# Patient Record
Sex: MALE | Race: WHITE | NOT HISPANIC OR LATINO | Employment: OTHER | URBAN - METROPOLITAN AREA
[De-identification: names, ages, dates, MRNs, and addresses within clinical notes are randomized per-mention and may not be internally consistent; named-entity substitution may affect disease eponyms.]

---

## 2017-01-11 DIAGNOSIS — G89.29 OTHER CHRONIC PAIN: ICD-10-CM

## 2017-01-11 RX ORDER — MORPHINE SULFATE 100 MG/1
100 TABLET, FILM COATED, EXTENDED RELEASE ORAL EVERY 12 HOURS
Qty: 60 TABLET | Refills: 0 | Status: SHIPPED | OUTPATIENT
Start: 2017-01-11 | End: 2017-02-09 | Stop reason: SDUPTHER

## 2017-01-11 NOTE — TELEPHONE ENCOUNTER
----- Message from Meliza Bernstein sent at 1/11/2017  4:09 PM CST -----  Contact: Aysha Pete  Wife called regarding the patient's refill of medication. Please contact [phone]    MS CONTIN 100 MG 12 hr tablet    [facility]/pharmacy  [address]  [place]

## 2017-01-20 DIAGNOSIS — G89.4 CHRONIC PAIN SYNDROME: ICD-10-CM

## 2017-01-20 RX ORDER — OXYCODONE AND ACETAMINOPHEN 10; 325 MG/1; MG/1
1 TABLET ORAL EVERY 6 HOURS PRN
Qty: 120 TABLET | Refills: 0 | Status: SHIPPED | OUTPATIENT
Start: 2017-01-20 | End: 2017-01-23 | Stop reason: SDUPTHER

## 2017-01-20 NOTE — TELEPHONE ENCOUNTER
----- Message from Rachel Gilliland sent at 1/20/2017  9:06 AM CST -----  Contact: wife, ana   Refill    oxycodone-acetaminophen (PERCOCET)  mg per tablet  .      [facility]/pharmacy #[id number] - [place] Singh  [address] [address]  [address] [place] [address]  Phone: [phone] Fax: [phone]     Call back

## 2017-01-23 ENCOUNTER — TELEPHONE (OUTPATIENT)
Dept: FAMILY MEDICINE | Facility: CLINIC | Age: 72
End: 2017-01-23

## 2017-01-23 DIAGNOSIS — G89.4 CHRONIC PAIN SYNDROME: ICD-10-CM

## 2017-01-23 RX ORDER — OXYCODONE AND ACETAMINOPHEN 10; 325 MG/1; MG/1
1 TABLET ORAL EVERY 6 HOURS PRN
Qty: 120 TABLET | Refills: 0 | Status: SHIPPED | OUTPATIENT
Start: 2017-01-23 | End: 2017-02-16 | Stop reason: SDUPTHER

## 2017-01-23 NOTE — TELEPHONE ENCOUNTER
----- Message from Ruthann Riley sent at 1/23/2017  8:18 AM CST -----  Contact: Aysha Pete (Spouse)  Aysha Pete (Spouse) calling in regards to f/u on medication refill. She called last week and left a message requesting a refill on Percocet for the patient. He is completely out. Please advise.  Call back   Thanks!  [facility]/pharmacy #[id number] - [place] - [address] [place] [address]  Phone: [phone] Fax: [phone]

## 2017-01-23 NOTE — TELEPHONE ENCOUNTER
Called [employer] and cancelled Percocet refill-send to [facility] on [address].

## 2017-01-23 NOTE — TELEPHONE ENCOUNTER
----- Message from Kylee Montalvo sent at 1/23/2017  1:32 PM CST -----  Contact: Sam  Patient is calling as Rx Percocet was sent to wrong pharmacy. Should have been sent to     [facility]/pharmacy #[id number] - [place] [facility] [address] [address]  [address] [address]  Phone: [phone] Fax: [phone]    Out of medication. Please call [phone]. Thanks!

## 2017-02-06 ENCOUNTER — DOCUMENTATION ONLY (OUTPATIENT)
Dept: FAMILY MEDICINE | Facility: CLINIC | Age: 72
End: 2017-02-06

## 2017-02-06 NOTE — PROGRESS NOTES
Pre-Visit Chart Review  For Appointment Scheduled on 02/09/2017    Health Maintenance Due   Topic Date Due    Fecal Occult Blood Test (FOBT)  1945    Colonoscopy  07/12/2014

## 2017-02-09 ENCOUNTER — OFFICE VISIT (OUTPATIENT)
Dept: FAMILY MEDICINE | Facility: CLINIC | Age: 72
End: 2017-02-09
Payer: MEDICARE

## 2017-02-09 VITALS
SYSTOLIC BLOOD PRESSURE: 117 MMHG | DIASTOLIC BLOOD PRESSURE: 72 MMHG | HEIGHT: 69 IN | HEART RATE: 80 BPM | TEMPERATURE: 98 F | WEIGHT: 214.5 LBS | BODY MASS INDEX: 31.77 KG/M2

## 2017-02-09 DIAGNOSIS — G89.29 OTHER CHRONIC PAIN: ICD-10-CM

## 2017-02-09 DIAGNOSIS — Z12.11 ENCOUNTER FOR SCREENING COLONOSCOPY: ICD-10-CM

## 2017-02-09 DIAGNOSIS — M47.816 SPONDYLOSIS OF LUMBAR REGION WITHOUT MYELOPATHY OR RADICULOPATHY: ICD-10-CM

## 2017-02-09 DIAGNOSIS — M51.36 DDD (DEGENERATIVE DISC DISEASE), LUMBAR: ICD-10-CM

## 2017-02-09 DIAGNOSIS — M48.061 SPINAL STENOSIS, LUMBAR REGION, WITHOUT NEUROGENIC CLAUDICATION: ICD-10-CM

## 2017-02-09 DIAGNOSIS — I10 ESSENTIAL HYPERTENSION: ICD-10-CM

## 2017-02-09 DIAGNOSIS — Z00.00 ENCOUNTER FOR PREVENTIVE HEALTH EXAMINATION: Primary | ICD-10-CM

## 2017-02-09 DIAGNOSIS — I70.0 ATHEROSCLEROSIS OF AORTA: ICD-10-CM

## 2017-02-09 DIAGNOSIS — M48.02 SPINAL STENOSIS IN CERVICAL REGION: ICD-10-CM

## 2017-02-09 DIAGNOSIS — E66.9 OBESITY (BMI 30.0-34.9): ICD-10-CM

## 2017-02-09 PROCEDURE — 3078F DIAST BP <80 MM HG: CPT | Mod: S$GLB,,, | Performed by: PHYSICIAN ASSISTANT

## 2017-02-09 PROCEDURE — 3074F SYST BP LT 130 MM HG: CPT | Mod: S$GLB,,, | Performed by: PHYSICIAN ASSISTANT

## 2017-02-09 PROCEDURE — 99499 UNLISTED E&M SERVICE: CPT | Mod: S$GLB,,, | Performed by: PHYSICIAN ASSISTANT

## 2017-02-09 PROCEDURE — G0439 PPPS, SUBSEQ VISIT: HCPCS | Mod: S$GLB,,, | Performed by: PHYSICIAN ASSISTANT

## 2017-02-09 PROCEDURE — 99999 PR PBB SHADOW E&M-EST. PATIENT-LVL IV: CPT | Mod: PBBFAC,,, | Performed by: PHYSICIAN ASSISTANT

## 2017-02-09 RX ORDER — OMEPRAZOLE 40 MG/1
CAPSULE, DELAYED RELEASE ORAL
COMMUNITY
Start: 2016-11-22 | End: 2017-02-09 | Stop reason: SDUPTHER

## 2017-02-09 RX ORDER — MORPHINE SULFATE 100 MG/1
100 TABLET, FILM COATED, EXTENDED RELEASE ORAL EVERY 12 HOURS
Qty: 60 TABLET | Refills: 0 | Status: SHIPPED | OUTPATIENT
Start: 2017-02-09 | End: 2017-02-13 | Stop reason: SDUPTHER

## 2017-02-09 NOTE — Clinical Note
Primary Care Providers: Ty Montalvo MD, MD (General)  Your patient was seen today for a HRA visit. Gap(s) in care (HEDIS gaps) have been identified during this visit that require additional testing and possible follow up.  Orders Placed This Encounter     Ambulatory referral to Gastroenterology         Referral Priority:Routine         Referral Type:Consultation         Referral Reason:Specialty Services Required         Requested Specialty:Gastroenterology         Number of Visits Requested:1   These orders were placed using [facility] approved protocol and any results will be forwarded to your office for appropriate follow up. I have included a copy of my visit note; please review the note and feel free to contact me with any questions.   Thank you for allowing me to participate in the care of your patients. Sapna Jordan PA-C

## 2017-02-09 NOTE — TELEPHONE ENCOUNTER
----- Message from Terri Zheng sent at 2/9/2017  1:59 PM CST -----  Contact: Aysha Pete (Spouse)  Refill on morphine (MS CONTIN) 100 MG 12 hr tablet  Call back on # [phone]     [facility]/pharmacy #[id number] - [address] [address]  Phone: [phone] Fax: [phone]

## 2017-02-09 NOTE — MR AVS SNAPSHOT
[facility]  [address] [place] [address]  Phone: [phone]  Fax: [phone]                  Sam Pete   2017 3:00 PM   Office Visit    Description:  Male : 1945   Provider:  Sapna Jordan PA-C   Department:  [place] [facility]           Reason for Visit     Health Risk Assessment           Diagnoses this Visit        Comments    Encounter for preventive health examination    -  Primary     Essential hypertension         Obesity (BMI 30.0-34.9)         DDD (degenerative disc disease), lumbar         Spondylosis of lumbar region without myelopathy or radiculopathy         Spinal stenosis in cervical region         Spinal stenosis, lumbar region, without neurogenic claudication         Encounter for screening colonoscopy                To Do List           Future Appointments        Provider Department Dept Phone    2017 4:00 PM Ty Montalvo MD [facility] [phone]    4/3/2017 8:00 AM Sienna Hawk PA-C [facility] [phone]      Goals (5 Years of Data)     None      [facility]     [facility] Nurse Care Line -  Assistance  Registered nurses in the [facility] provide clinical advisement, health education, appointment booking, and other advisory services.  Call for this free service at [phone].             Medications           Message regarding Medications     Verify the changes and/or additions to your medication regime listed below are the same as discussed with your clinician today.  If any of these changes or additions are incorrect, please notify your healthcare provider.             Verify that the below list of medications is an accurate representation of the medications you are currently taking.  If none reported, the list may be blank. If incorrect, please contact your healthcare provider. Carry this list with you in case of emergency.           Current Medications      "amlodipine (NORVASC) 10 MG tablet TAKE 1 TABLET ONCE DAILY    ammonium lactate 12 % Crea Apply 1 application topically 2 (two) times daily.    aspirin 81 mg Tab Take 1 tablet by mouth Daily. Every day    ciclopirox (PENLAC) 8 % Soln Apply topically nightly.    losartan (COZAAR) 50 MG tablet TAKE 1 TABLET ONE TIME DAILY    morphine (MS CONTIN) 100 MG 12 hr tablet Take 1 tablet (100 mg total) by mouth every 12 (twelve) hours.    omeprazole (PRILOSEC) 40 MG capsule TAKE 1 CAPSULE ONE TIME DAILY - DISCONTINUE NEXIUM    oxycodone-acetaminophen (PERCOCET)  mg per tablet Take 1 tablet by mouth every 6 (six) hours as needed for Pain.           Clinical Reference Information           Your Vitals Were     BP Pulse Temp Height Weight BMI    117/72 (BP Location: Right arm, Patient Position: Sitting, BP Method: Automatic) 80 98.3 °F (36.8 °C) (Oral) 5' 9" (1.753 m) 97.3 kg (214 lb 8.1 oz) 31.68 kg/m2      Blood Pressure          Most Recent Value    BP  117/72      Allergies as of 2/9/2017     Xyzal [Levocetirizine]      Immunizations Administered on Date of Encounter - 2/9/2017     None      Orders Placed During Today's Visit      Normal Orders This Visit    Ambulatory referral to Gastroenterology       [facility] Sign-Up     Activating your [facility] account is as easy as 1-2-3!     1) Visit [email or web address], select Sign Up Now, enter this activation code and your date of birth, then select Next.  Activation code not generated  Current Patient Portal Status: Account disabled      2) Create a username and password to use when you visit [facility] in the future and select a security question in case you lose your password and select Next.    3) Enter your e-mail address and click Sign Up!    Additional Information  If you have questions, please e-mail [email or web address] or call [phone] to talk to our [facility] staff. Remember, [facility] is NOT to be used for urgent needs. For medical emergencies, dial 911.       "   Instructions      Controlling High Blood Pressure  High blood pressure (hypertension) is often called the silent killer. This is because many people who have it dont know it. High blood pressure is defined as 140/90 mm Hg or higher. Know your blood pressure and remember to check it regularly. Doing so can save your life. Here are some things you can do to help control your blood pressure.    Choose heart-healthy foods  · Select low-salt, low-fat foods. Limit sodium intake to 2,400 mg per day or the amount suggested by your healthcare provider.  · Limit canned, dried, cured, packaged, and fast foods. These can contain a lot of salt.  · Eat 8 to 10 servings of fruits and vegetables every day.  · Choose lean meats, fish, or chicken.  · Eat whole-grain pasta, brown rice, and beans.  · Eat 2 to 3 servings of low-fat or fat-free dairy products.  · Ask your doctor about the DASH eating plan. This plan helps reduce blood pressure.  · When you go to a restaurant, ask that your meal be prepared with no added salt.  Maintain a healthy weight  · Ask your healthcare provider how many calories to eat a day. Then stick to that number.  · Ask your healthcare provider what weight range is healthiest for you. If you are overweight, a weight loss of only 3% to 5% of your body weight can help lower blood pressure. Generally, a good weight loss goal is to lose 10% of your body weight in a year.  · Limit snacks and sweets.  · Get regular exercise.  Get up and get active  · Choose activities you enjoy. Find ones you can do with friends or family. This includes bicycling, dancing, walking, and jogging.  · Park farther away from building entrances.  · Use stairs instead of the elevator.  · When you can, walk or bike instead of driving.  · [place] leaves, garden, or do household repairs.  · Be active at a moderate to vigorous level of physical activity for at least 40 minutes for a minimum of 3 to 4 days a week.   Manage stress  · Make time  to relax and enjoy life. Find time to laugh.  · Communicate your concerns with your loved ones and your healthcare provider.  · Visit with family and friends, and keep up with hobbies.  Limit alcohol and quit smoking  · Men should have no more than 2 drinks per day.  · Women should have no more than 1 drink per day.  · Talk with your healthcare provider about quitting smoking. Smoking significantly increases your risk for heart disease and stroke. Ask your healthcare provider about community smoking cessation programs and other options.  Medicines  If lifestyle changes arent enough, your healthcare provider may prescribe high blood pressure medicine. Take all medicines as prescribed. If you have any questions about your medicines, ask your healthcare provider before stopping or changing them.   Date Last Reviewed: 4/27/2016 © 2000-2016 [employer]. [address] [place] [address], [place] [address]. All rights reserved. This information is not intended as a substitute for professional medical care. Always follow your healthcare professional's instructions.        Weight Management: Getting Started  Healthy bodies come in all shapes and sizes. Not all bodies are made to be thin. For some people, a healthy weight is higher than the average weight listed on weight charts. Your healthcare provider can help you decide on a healthy weight for you.    Reasons to lose weight  Losing weight can help with some health problems, such as high blood pressure, heart disease, diabetes, sleep apnea, and arthritis. You may also feel more energy.  Set your long-term goal  Your goal doesn't even have to be a specific weight. You may decide on a fitness goal (such as being able to walk 10 miles a week), or a health goal (such as lowering your blood pressure). Choose a goal that is measurable and reasonable, so you know when you've reached it. A goal of reaching a BMI of less than 25 is not always reasonable (or possible).   Make  an action plan  Habits dont change overnight. Setting your goals too high can leave you feeling discouraged if you cant reach them. Be realistic. Choose one or two small changes you can make now. Set an action plan for how you are going to make these changes. When you can stick to this plan, keep making a few more small changes. Taking small steps will help you stay on the path to success.  Track your progress  Write down your goals. Then, keep a daily record of your progress. Write down what you eat and how active you are. This record lets you look back on how much youve done. It may also help when youre feeling frustrated. Reward yourself for success. Even if you dont reach every goal, give yourself credit for what you do get done.  Get support  Encouragement from others can help make losing weight easier. Ask your family members and friends for support. They may even want to join you. Also look to your healthcare provider, registered dietitian, and  for help. Your local hospital can give you more information about nutrition, exercise, and weight loss.  Date Last Reviewed: 1/31/2016  © 7889-6249 [employer]. [address] [place] [address]. All rights reserved. This information is not intended as a substitute for professional medical care. Always follow your healthcare professional's instructions.        Walking for Fitness  Fitness walking has something for everyone, even people who are already fit. Walking is one of the safest ways to condition your body aerobically. It can boost energy, help you lose weight, and reduce stress.    Physical benefits  · Walking strengthens your heart and lungs, and tones your muscles.  · When walking, your feet land with less impact than in other sports. This reduces chances of muscle, bone, and joint injury.  · Regular walking improves your cholesterol levels and lowers your risk of heart disease. And it helps you control your  blood sugar if you have diabetes.  · Walking is a weight-bearing activity, which helps maintain bone density. This can help prevent osteoporosis.  Personal rewards  · Taking walks can help you relax and manage stress. And fitness walking may make you feel better about yourself.  · Walking can help you sleep better at night and make you less likely to be depressed.  · Regular walking may help maintain your memory as you get older.  · Walking is a great way to spend extra time with friends and family members. Be sure to invite your dog along!  Q&A about fitness walking  Q: Will walking keep me fit?  A: Yes. Regular walking at the right pace gives you all the benefits of other aerobic activities, such as jogging and swimming.  Q: Will walking help me lose weight and keep it off?  A: Yes. Per mile, walking can burn as many calories as jogging. Your health care provider can help work walking into your weight-loss plan.  Q: Is walking safe for my health?  A: Yes. Walking is safe if you have high blood pressure, diabetes, heart disease, or other conditions. Talk to your health care provider before you start.  Date Last Reviewed: 5/9/2015  © 1111-2527 [employer]. [address] [place] [address], [place] [address]. All rights reserved. This information is not intended as a substitute for professional medical care. Always follow your healthcare professional's instructions.        Counseling and Referral of Other Preventative  (Italic type indicates deductible and co-insurance are waived)    Patient Name: Sam Pete  Today's Date: 2/9/2017      SERVICE LIMITATIONS RECOMMENDATION    Vaccines    · Pneumococcal (once after 65)    · Influenza (annually)    · Hepatitis B (if medium/high risk)    · Prevnar 13      Hepatitis B medium/high risk factors:       - End-stage renal disease       - Hemophiliacs who received Factor VII or         IX concentrates       - Clients of institutions for the mentally              retarded       - Persons who live in the same house as          a HepB carrier       - Homosexual men       - Illicit injectable drug abusers     Pneumococcal: Done, no repeat necessary     Influenza: Done, repeat in one year     Hepatitis B: Not indicated     Prevnar 13: Done, no repeat necessary    Prostate cancer screening (annually to age 75)     Prostate specific antigen (PSA) Shared decision making with Provider. Sometimes a co-pay may be required if the patient decides to have this test. The USPSTF no longer recommends prostate cancer screening routinely in medicine: every 1 year per PCP    Colorectal cancer screening (to age 75)    · Fecal occult blood test (annual)  · Flexible sigmoidoscopy (5y)  · Screening colonoscopy (10y)  · Barium enema   Last done 2004, recommend to repeat every 7-10  years    Diabetes self-management training (no USPSTF recommendations)  Requires referral by treating physician for patient with diabetes or renal disease. 10 hours of initial DSMT sessions of no less than 30 minutes each in a continuous 12-month period. 2 hours of follow-up DSMT in subsequent years.  Not indicated    Glaucoma screening (no USPSTF recommendation)  Diabetes mellitus, family history   , age 50 or over    American, age 65 or over  Not indicated    Medical nutrition therapy for diabetes or renal disease (no recommended schedule)  Requires referral by treating physician for patient with diabetes or renal disease or kidney transplant within the past 3 years.  Can be provided in same year as diabetes self-management training (DSMT), and CMS recommends medical nutrition therapy take place after DSMT. Up to 3 hours for initial year and 2 hours in subsequent years.  Not indicated    Cardiovascular screening blood tests (every 5 years)  · Fasting lipid panel  Order as a panel if possible  Due 12/2016, will f/u with PCP next week    Diabetes screening tests (at least every 3 years, Medicare  covers annually or at 6-month intervals for prediabetic patients)  · Fasting blood sugar (FBS) or glucose tolerance test (GTT)  Patient must be diagnosed with one of the following:       - Hypertension       - Dyslipidemia       - Obesity (BMI 30kg/m2)       - Previous elevated impaired FBS or GTT       ... or any two of the following:       - Overweight (BMI 25 but <30)       - Family history of diabetes       - Age 65 or older       - History of gestational diabetes or birth of baby weighing more than 9 pounds  Due 12/2016, will f/u with PCP next week    Abdominal aortic aneurysm screening (once)  · Sonogram   Limited to patients who meet one of the following criteria:       - Men who are 65-75 years old and have smoked more than 100 cigarette in their lifetime       - Anyone with a family history of abdominal aortic aneurysm       - Anyone recommended for screening by the USPSTF  Done, no repeat necessary    HIV screening (annually for increased risk patients)  · HIV-1 and HIV-2 by EIA, or MARGARET, rapid antibody test or oral mucosa transudate  Patients must be at increased risk for HIV infection per USPSTF guidelines or pregnant. Tests covered annually for patient at increased risk or as requested by the patient. Pregnant patients may receive up to 3 tests during pregnancy.  Risks discussed, screening is not recommended    Smoking cessation counseling (up to 8 sessions per year)  Patients must be asymptomatic of tobacco-related conditions to receive as a preventative service.  Not indicated    Subsequent annual wellness visit  At least 12 months since last AWV  Return in one year     The following information is provided to all patients.  This information is to help you find resources for any of the problems found today that may be affecting your health:                Living healthy guide: www.[facility].[place].gov      Understanding Diabetes: www.diabetes.org      Eating healthy: www.cdc.gov/healthyweight      CDC  home safety checklist: www.cdc.gov/steadi/patient.html      Agency on Aging: www.goea.[place].gov      Alcoholics anonymous (AA): www.aa.org      Physical Activity: www.mir.nih.gov/oe0yhlq      Tobacco use: www.quitwithusla.org          Language Assistance Services     ATTENTION: Language assistance services are available, free of charge. Please call [phone].      ATENCIÓN: Si habla good, tiene a ordaz disposición servicios gratuitos de asistencia lingüística. Llame al [phone].     CHÚ Ý: N?u b?n nói Ti?ng Vi?t, có các d?ch v? h? tr? ngôn ng? mi?n phí dành cho b?n. G?i s? [phone].         [place] [facility] complies with applicable Federal civil rights laws and does not discriminate on the basis of race, color, national origin, age, disability, or sex.

## 2017-02-09 NOTE — PATIENT INSTRUCTIONS
Controlling High Blood Pressure  High blood pressure (hypertension) is often called the silent killer. This is because many people who have it dont know it. High blood pressure is defined as 140/90 mm Hg or higher. Know your blood pressure and remember to check it regularly. Doing so can save your life. Here are some things you can do to help control your blood pressure.    Choose heart-healthy foods  · Select low-salt, low-fat foods. Limit sodium intake to 2,400 mg per day or the amount suggested by your healthcare provider.  · Limit canned, dried, cured, packaged, and fast foods. These can contain a lot of salt.  · Eat 8 to 10 servings of fruits and vegetables every day.  · Choose lean meats, fish, or chicken.  · Eat whole-grain pasta, brown rice, and beans.  · Eat 2 to 3 servings of low-fat or fat-free dairy products.  · Ask your doctor about the DASH eating plan. This plan helps reduce blood pressure.  · When you go to a restaurant, ask that your meal be prepared with no added salt.  Maintain a healthy weight  · Ask your healthcare provider how many calories to eat a day. Then stick to that number.  · Ask your healthcare provider what weight range is healthiest for you. If you are overweight, a weight loss of only 3% to 5% of your body weight can help lower blood pressure. Generally, a good weight loss goal is to lose 10% of your body weight in a year.  · Limit snacks and sweets.  · Get regular exercise.  Get up and get active  · Choose activities you enjoy. Find ones you can do with friends or family. This includes bicycling, dancing, walking, and jogging.  · Park farther away from building entrances.  · Use stairs instead of the elevator.  · When you can, walk or bike instead of driving.  · [place] leaves, garden, or do household repairs.  · Be active at a moderate to vigorous level of physical activity for at least 40 minutes for a minimum of 3 to 4 days a week.   Manage stress  · Make time to relax and enjoy  life. Find time to laugh.  · Communicate your concerns with your loved ones and your healthcare provider.  · Visit with family and friends, and keep up with hobbies.  Limit alcohol and quit smoking  · Men should have no more than 2 drinks per day.  · Women should have no more than 1 drink per day.  · Talk with your healthcare provider about quitting smoking. Smoking significantly increases your risk for heart disease and stroke. Ask your healthcare provider about community smoking cessation programs and other options.  Medicines  If lifestyle changes arent enough, your healthcare provider may prescribe high blood pressure medicine. Take all medicines as prescribed. If you have any questions about your medicines, ask your healthcare provider before stopping or changing them.   Date Last Reviewed: 4/27/2016 © 2000-2016 [employer]. [address] [place] [address], [place] [address]. All rights reserved. This information is not intended as a substitute for professional medical care. Always follow your healthcare professional's instructions.        Weight Management: Getting Started  Healthy bodies come in all shapes and sizes. Not all bodies are made to be thin. For some people, a healthy weight is higher than the average weight listed on weight charts. Your healthcare provider can help you decide on a healthy weight for you.    Reasons to lose weight  Losing weight can help with some health problems, such as high blood pressure, heart disease, diabetes, sleep apnea, and arthritis. You may also feel more energy.  Set your long-term goal  Your goal doesn't even have to be a specific weight. You may decide on a fitness goal (such as being able to walk 10 miles a week), or a health goal (such as lowering your blood pressure). Choose a goal that is measurable and reasonable, so you know when you've reached it. A goal of reaching a BMI of less than 25 is not always reasonable (or possible).   Make an action  plan  Habits dont change overnight. Setting your goals too high can leave you feeling discouraged if you cant reach them. Be realistic. Choose one or two small changes you can make now. Set an action plan for how you are going to make these changes. When you can stick to this plan, keep making a few more small changes. Taking small steps will help you stay on the path to success.  Track your progress  Write down your goals. Then, keep a daily record of your progress. Write down what you eat and how active you are. This record lets you look back on how much youve done. It may also help when youre feeling frustrated. Reward yourself for success. Even if you dont reach every goal, give yourself credit for what you do get done.  Get support  Encouragement from others can help make losing weight easier. Ask your family members and friends for support. They may even want to join you. Also look to your healthcare provider, registered dietitian, and  for help. Your local hospital can give you more information about nutrition, exercise, and weight loss.  Date Last Reviewed: 1/31/2016 © 2000-2016 [employer]. [address] [place] [address]. All rights reserved. This information is not intended as a substitute for professional medical care. Always follow your healthcare professional's instructions.        Walking for Fitness  Fitness walking has something for everyone, even people who are already fit. Walking is one of the safest ways to condition your body aerobically. It can boost energy, help you lose weight, and reduce stress.    Physical benefits  · Walking strengthens your heart and lungs, and tones your muscles.  · When walking, your feet land with less impact than in other sports. This reduces chances of muscle, bone, and joint injury.  · Regular walking improves your cholesterol levels and lowers your risk of heart disease. And it helps you control your blood sugar if  you have diabetes.  · Walking is a weight-bearing activity, which helps maintain bone density. This can help prevent osteoporosis.  Personal rewards  · Taking walks can help you relax and manage stress. And fitness walking may make you feel better about yourself.  · Walking can help you sleep better at night and make you less likely to be depressed.  · Regular walking may help maintain your memory as you get older.  · Walking is a great way to spend extra time with friends and family members. Be sure to invite your dog along!  Q&A about fitness walking  Q: Will walking keep me fit?  A: Yes. Regular walking at the right pace gives you all the benefits of other aerobic activities, such as jogging and swimming.  Q: Will walking help me lose weight and keep it off?  A: Yes. Per mile, walking can burn as many calories as jogging. Your health care provider can help work walking into your weight-loss plan.  Q: Is walking safe for my health?  A: Yes. Walking is safe if you have high blood pressure, diabetes, heart disease, or other conditions. Talk to your health care provider before you start.  Date Last Reviewed: 5/9/2015  © 4529-5441 [employer]. [address] [place] [address]. All rights reserved. This information is not intended as a substitute for professional medical care. Always follow your healthcare professional's instructions.        Counseling and Referral of Other Preventative  (Italic type indicates deductible and co-insurance are waived)    Patient Name: Sam Pete  Today's Date: 2/9/2017      SERVICE LIMITATIONS RECOMMENDATION    Vaccines    · Pneumococcal (once after 65)    · Influenza (annually)    · Hepatitis B (if medium/high risk)    · Prevnar 13      Hepatitis B medium/high risk factors:       - End-stage renal disease       - Hemophiliacs who received Factor VII or         IX concentrates       - Clients of institutions for the mentally             retarded       - Persons  who live in the same house as          a HepB carrier       - Homosexual men       - Illicit injectable drug abusers     Pneumococcal: Done, no repeat necessary     Influenza: Done, repeat in one year     Hepatitis B: Not indicated     Prevnar 13: Done, no repeat necessary    Prostate cancer screening (annually to age 75)     Prostate specific antigen (PSA) Shared decision making with Provider. Sometimes a co-pay may be required if the patient decides to have this test. The USPSTF no longer recommends prostate cancer screening routinely in medicine: every 1 year per PCP    Colorectal cancer screening (to age 75)    · Fecal occult blood test (annual)  · Flexible sigmoidoscopy (5y)  · Screening colonoscopy (10y)  · Barium enema   Last done 2004, recommend to repeat every 7-10  years    Diabetes self-management training (no USPSTF recommendations)  Requires referral by treating physician for patient with diabetes or renal disease. 10 hours of initial DSMT sessions of no less than 30 minutes each in a continuous 12-month period. 2 hours of follow-up DSMT in subsequent years.  Not indicated    Glaucoma screening (no USPSTF recommendation)  Diabetes mellitus, family history   , age 50 or over    American, age 65 or over  Not indicated    Medical nutrition therapy for diabetes or renal disease (no recommended schedule)  Requires referral by treating physician for patient with diabetes or renal disease or kidney transplant within the past 3 years.  Can be provided in same year as diabetes self-management training (DSMT), and CMS recommends medical nutrition therapy take place after DSMT. Up to 3 hours for initial year and 2 hours in subsequent years.  Not indicated    Cardiovascular screening blood tests (every 5 years)  · Fasting lipid panel  Order as a panel if possible  Due 12/2016, will f/u with PCP next week    Diabetes screening tests (at least every 3 years, Medicare covers annually or at  6-month intervals for prediabetic patients)  · Fasting blood sugar (FBS) or glucose tolerance test (GTT)  Patient must be diagnosed with one of the following:       - Hypertension       - Dyslipidemia       - Obesity (BMI 30kg/m2)       - Previous elevated impaired FBS or GTT       ... or any two of the following:       - Overweight (BMI 25 but <30)       - Family history of diabetes       - Age 65 or older       - History of gestational diabetes or birth of baby weighing more than 9 pounds  Due 12/2016, will f/u with PCP next week    Abdominal aortic aneurysm screening (once)  · Sonogram   Limited to patients who meet one of the following criteria:       - Men who are 65-75 years old and have smoked more than 100 cigarette in their lifetime       - Anyone with a family history of abdominal aortic aneurysm       - Anyone recommended for screening by the USPSTF  Done, no repeat necessary    HIV screening (annually for increased risk patients)  · HIV-1 and HIV-2 by EIA, or MARGARET, rapid antibody test or oral mucosa transudate  Patients must be at increased risk for HIV infection per USPSTF guidelines or pregnant. Tests covered annually for patient at increased risk or as requested by the patient. Pregnant patients may receive up to 3 tests during pregnancy.  Risks discussed, screening is not recommended    Smoking cessation counseling (up to 8 sessions per year)  Patients must be asymptomatic of tobacco-related conditions to receive as a preventative service.  Not indicated    Subsequent annual wellness visit  At least 12 months since last AWV  Return in one year     The following information is provided to all patients.  This information is to help you find resources for any of the problems found today that may be affecting your health:                Living healthy guide: www.[facility].[place].gov      Understanding Diabetes: www.diabetes.org      Eating healthy: www.cdc.gov/healthyweight      CDC home safety checklist:  www.cdc.gov/steadi/patient.html      Agency on Aging: www.goea.[place].[facility]      Alcoholics anonymous (AA): www.aa.org      Physical Activity: www.mir.nih.gov/zx5pjhf      Tobacco use: www.quitwithusla.org

## 2017-02-13 ENCOUNTER — TELEPHONE (OUTPATIENT)
Dept: FAMILY MEDICINE | Facility: CLINIC | Age: 72
End: 2017-02-13

## 2017-02-13 DIAGNOSIS — G89.29 OTHER CHRONIC PAIN: ICD-10-CM

## 2017-02-13 PROBLEM — I70.0 ATHEROSCLEROSIS OF AORTA: Status: ACTIVE | Noted: 2017-02-13

## 2017-02-13 RX ORDER — MORPHINE SULFATE 100 MG/1
100 TABLET, FILM COATED, EXTENDED RELEASE ORAL EVERY 12 HOURS
Qty: 60 TABLET | Refills: 0 | Status: SHIPPED | OUTPATIENT
Start: 2017-02-13 | End: 2017-03-13 | Stop reason: SDUPTHER

## 2017-02-13 NOTE — PROGRESS NOTES
Subjective:       Patient ID: Sam Pete is here for   Chief Complaint   Patient presents with    Health Risk Assessment       Sam Pete was seen today in a face to face encounter for a comprehensive Health Risk Assessment. This visit included a review of her total medical record available at the time of this visit.        Past Medical, Family, and Surgical History:      This information was reviewed and reconciled today during this encounter. Medications were reviewed and reconciled today. The active problem list has been reviewed/updated and reconciled today. These active problems are listed in the diagnosis list below.    **See completed HRA forms/Questionnaires/Flowsheets for ROS information.    Physical Exam   Constitutional: He is oriented to person, place, and time. He appears well-developed.   HENT:   Head: Normocephalic and atraumatic.   Right Ear: Hearing and external ear normal.   Left Ear: Hearing and external ear normal.   Eyes: Conjunctivae and EOM are normal. Pupils are equal, round, and reactive to light.   Cardiovascular: Normal rate, regular rhythm, normal heart sounds and intact distal pulses.    Pulmonary/Chest: Effort normal and breath sounds normal.   Neurological: He is alert and oriented to person, place, and time.   Skin: Skin is warm and dry.   Psychiatric: He has a normal mood and affect. His behavior is normal.         Diagnoses (identified today) and assoicated plan for each diagnosis:         Encounter for preventive health examination    Essential hypertension  Comments:  Controlled, on norvasc 10 mg daily, continue to follow with PCP    Obesity (BMI 30.0-34.9)  Comments:  Uncontrolled, recommend weight loss and increasing physical activity    DDD (degenerative disc disease), lumbar  Comments:  Chronic, continue to follow with PCP    Spondylosis of lumbar region without myelopathy or radiculopathy  Comments:  Chronic, continue to follow with Neurosurgery    Spinal  stenosis in cervical region  Comments:  Chronic, continue to follow with Neurosurgery    Spinal stenosis, lumbar region, without neurogenic claudication  Comments:  Chronic, continue to follow with Neurosurgery    Atherosclerosis of aorta  Comments:  Stable, atherosclerosis of arch aorta seen on CXR 3/16, continue to follow with PCP    Encounter for screening colonoscopy  -     Ambulatory referral to Gastroenterology    Follow up with PCP in 3-6 months.    Patient readiness: acceptance and barriers:none    During the course of the visit the patient was educated and counseled about the following:     Hypertension:   Medication: no change.  Obesity:   Regular aerobic exercise program discussed.    Goals: Hypertension: Reduce Blood Pressure and Obesity: Reduce calorie intake and BMI    Did patient meet goals/outcomes: Yes to HTN, No to Obesity    The following self management tools provided: declined    Patient Instructions (the written plan) was given to the patient/family.     Time spent with patient: 55 minutes

## 2017-02-13 NOTE — TELEPHONE ENCOUNTER
----- Message from Lacy Landeros sent at 2/13/2017  9:19 AM CST -----  Contact: wife Aysha   Patient's wife Jessica  called stating the she is follow up on a prescription for MS Contin  that was sent to the wrong pharmacy ([facility] mail order )  She would like it send to [facility] on [address] in [place]   Please call [phone] to advise.      Thanks

## 2017-02-13 NOTE — TELEPHONE ENCOUNTER
----- Message from Bonnie Ritchie sent at 2/13/2017 11:57 AM CST -----  Contact: Sam  Patient is requesting to have a call back. Medication Rx Morphine Sulphate was supposed to me sent to    [facility]/pharm[facility] #[id number] - [place] [address] [facility] [place] [address]  [address] [facility] [address]  [place] [address]  Phone: [phone] Fax: [phone]    States he has been trying to get refill since last week, and he is completely out [phone].

## 2017-02-14 ENCOUNTER — DOCUMENTATION ONLY (OUTPATIENT)
Dept: FAMILY MEDICINE | Facility: CLINIC | Age: 72
End: 2017-02-14

## 2017-02-14 NOTE — PROGRESS NOTES
Pre-Visit Chart Review  For Appointment Scheduled on 2-16-17    Health Maintenance Due   Topic Date Due    Zoster Vaccine  04/26/2007    Colonoscopy  07/12/2014

## 2017-02-16 ENCOUNTER — OFFICE VISIT (OUTPATIENT)
Dept: FAMILY MEDICINE | Facility: CLINIC | Age: 72
End: 2017-02-16
Payer: MEDICARE

## 2017-02-16 VITALS
SYSTOLIC BLOOD PRESSURE: 112 MMHG | HEIGHT: 69 IN | WEIGHT: 213.38 LBS | BODY MASS INDEX: 31.6 KG/M2 | TEMPERATURE: 98 F | RESPIRATION RATE: 16 BRPM | DIASTOLIC BLOOD PRESSURE: 69 MMHG | HEART RATE: 74 BPM | OXYGEN SATURATION: 98 %

## 2017-02-16 DIAGNOSIS — Z12.11 COLON CANCER SCREENING: ICD-10-CM

## 2017-02-16 DIAGNOSIS — G89.4 CHRONIC PAIN SYNDROME: Primary | ICD-10-CM

## 2017-02-16 PROCEDURE — 1125F AMNT PAIN NOTED PAIN PRSNT: CPT | Mod: S$GLB,,, | Performed by: FAMILY MEDICINE

## 2017-02-16 PROCEDURE — 1159F MED LIST DOCD IN RCRD: CPT | Mod: S$GLB,,, | Performed by: FAMILY MEDICINE

## 2017-02-16 PROCEDURE — 99213 OFFICE O/P EST LOW 20 MIN: CPT | Mod: S$GLB,,, | Performed by: FAMILY MEDICINE

## 2017-02-16 PROCEDURE — 99999 PR PBB SHADOW E&M-EST. PATIENT-LVL III: CPT | Mod: PBBFAC,,, | Performed by: FAMILY MEDICINE

## 2017-02-16 PROCEDURE — 3078F DIAST BP <80 MM HG: CPT | Mod: S$GLB,,, | Performed by: FAMILY MEDICINE

## 2017-02-16 PROCEDURE — 1157F ADVNC CARE PLAN IN RCRD: CPT | Mod: S$GLB,,, | Performed by: FAMILY MEDICINE

## 2017-02-16 PROCEDURE — 1160F RVW MEDS BY RX/DR IN RCRD: CPT | Mod: S$GLB,,, | Performed by: FAMILY MEDICINE

## 2017-02-16 PROCEDURE — 3074F SYST BP LT 130 MM HG: CPT | Mod: S$GLB,,, | Performed by: FAMILY MEDICINE

## 2017-02-16 RX ORDER — OXYCODONE AND ACETAMINOPHEN 10; 325 MG/1; MG/1
1 TABLET ORAL EVERY 6 HOURS PRN
Qty: 120 TABLET | Refills: 0 | Status: SHIPPED | OUTPATIENT
Start: 2017-02-16 | End: 2017-03-21 | Stop reason: SDUPTHER

## 2017-02-16 NOTE — PATIENT INSTRUCTIONS
Weight Management: Getting Started  Healthy bodies come in all shapes and sizes. Not all bodies are made to be thin. For some people, a healthy weight is higher than the average weight listed on weight charts. Your healthcare provider can help you decide on a healthy weight for you.    Reasons to lose weight  Losing weight can help with some health problems, such as high blood pressure, heart disease, diabetes, sleep apnea, and arthritis. You may also feel more energy.  Set your long-term goal  Your goal doesn't even have to be a specific weight. You may decide on a fitness goal (such as being able to walk 10 miles a week), or a health goal (such as lowering your blood pressure). Choose a goal that is measurable and reasonable, so you know when you've reached it. A goal of reaching a BMI of less than 25 is not always reasonable (or possible).   Make an action plan  Habits dont change overnight. Setting your goals too high can leave you feeling discouraged if you cant reach them. Be realistic. Choose one or two small changes you can make now. Set an action plan for how you are going to make these changes. When you can stick to this plan, keep making a few more small changes. Taking small steps will help you stay on the path to success.  Track your progress  Write down your goals. Then, keep a daily record of your progress. Write down what you eat and how active you are. This record lets you look back on how much youve done. It may also help when youre feeling frustrated. Reward yourself for success. Even if you dont reach every goal, give yourself credit for what you do get done.  Get support  Encouragement from others can help make losing weight easier. Ask your family members and friends for support. They may even want to join you. Also look to your healthcare provider, registered dietitian, and  for help. Your local hospital can give you more information about nutrition, exercise, and  weight loss.  Date Last Reviewed: 1/31/2016  © 7720-0051 [employer]. [address] [place] [address], [place] [address]. All rights reserved. This information is not intended as a substitute for professional medical care. Always follow your healthcare professional's instructions.

## 2017-02-17 NOTE — PROGRESS NOTES
Subjective:       Patient ID: Sam Pete is a 71 y.o. male.    Chief Complaint: Medication Refill    HPI Comments: 71 year old male in for renewal of pain meds for chronic pain disorder of low back and neck, spinal stenosis, and djd left elbow.  He is doing well with pain control depending on activities.  He is still due for a colonoscopy but not willing to do it at this time.  He is agreeable to doing FOBT testing.    Past Medical History:    Anticoagulant long-term use                                     Comment:ASA 81 mg    Arthritis                                                     Chronic low back pain                                         ED (erectile dysfunction)                                     GERD (gastroesophageal reflux disease)                        Hypertension                                                  Obesity                                                       Stroke                                                          Comment:basal ganglia, left sided weakness, resolved    Testicular hypofunction                                       Past Surgical History:    APPENDECTOMY                                                   KNEE ARTHROSCOPY W/ DEBRIDEMENT                                  Comment:bilateral knees , total of six    COLONOSCOPY                                      07/12/2004      Comment:Dr Rodriguez 7-10 year elisa     Epidural steroid injection                                       Comment:Pain management    BACK SURGERY                                                     Comment:3- back surgery    FRACTURE SURGERY                                Left                 Comment:wrist / forearm, total of 5    Nervbe Block injection                                           Comment:Pain management    KNEE SURGERY                                                   JOINT REPLACEMENT                                2-6-2013        Comment:( rt hip 2007), left hip     JOINT  REPLACEMENT                               Left                 Comment:total knee      Current Outpatient Prescriptions:     amlodipine (NORVASC) 10 MG tablet, TAKE 1 TABLET ONCE DAILY, Disp: 90 tablet, Rfl: 3    ammonium lactate 12 % Crea, Apply 1 application topically 2 (two) times daily., Disp: 140 g, Rfl: 11    aspirin 81 mg Tab, Take 1 tablet by mouth Daily. Every day, Disp: , Rfl:     ciclopirox (PENLAC) 8 % Soln, Apply topically nightly., Disp: 6.6 mL, Rfl: 11    losartan (COZAAR) 50 MG tablet, TAKE 1 TABLET ONE TIME DAILY, Disp: 90 tablet, Rfl: 3    morphine (MS CONTIN) 100 MG 12 hr tablet, Take 1 tablet (100 mg total) by mouth every 12 (twelve) hours., Disp: 60 tablet, Rfl: 0    omeprazole (PRILOSEC) 40 MG capsule, TAKE 1 CAPSULE ONE TIME DAILY - DISCONTINUE NEXIUM, Disp: 90 capsule, Rfl: 3    oxycodone-acetaminophen (PERCOCET)  mg per tablet, Take 1 tablet by mouth every 6 (six) hours as needed for Pain., Disp: 120 tablet, Rfl: 0        Medication Refill   Associated symptoms include arthralgias and neck pain.     Review of Systems   Musculoskeletal: Positive for arthralgias, back pain and neck pain.       Objective:      Physical Exam   Constitutional: He is oriented to person, place, and time. He appears well-developed. No distress.   Good blood pressure control  Patient is obese with bmi of 31.5.   Weight is decreased by 1.8lb since last visit of 11/16/16.     Neurological: He is alert and oriented to person, place, and time.   Skin: He is not diaphoretic.   Psychiatric: He has a normal mood and affect. His behavior is normal. Judgment and thought content normal.   Nursing note and vitals reviewed.      Assessment:       1. Chronic pain syndrome    2. Colon cancer screening        Plan:       1. Chronic pain syndrome  - oxycodone-acetaminophen (PERCOCET)  mg per tablet; Take 1 tablet by mouth every 6 (six) hours as needed for Pain.  Dispense: 120 tablet; Refill: 0    2. Colon cancer  screening  - POCT Hemocult Stool X3

## 2017-03-13 ENCOUNTER — OFFICE VISIT (OUTPATIENT)
Dept: ORTHOPEDIC SURGERY | Facility: CLINIC | Age: 72
End: 2017-03-13
Payer: MEDICARE

## 2017-03-13 VITALS
HEIGHT: 70 IN | DIASTOLIC BLOOD PRESSURE: 79 MMHG | SYSTOLIC BLOOD PRESSURE: 119 MMHG | BODY MASS INDEX: 31.43 KG/M2 | HEART RATE: 73 BPM | WEIGHT: 219.56 LBS

## 2017-03-13 DIAGNOSIS — Z98.890 HISTORY OF LUMBAR SURGERY: ICD-10-CM

## 2017-03-13 DIAGNOSIS — G89.29 CHRONIC BILATERAL LOW BACK PAIN WITH BILATERAL SCIATICA: ICD-10-CM

## 2017-03-13 DIAGNOSIS — M54.42 CHRONIC BILATERAL LOW BACK PAIN WITH BILATERAL SCIATICA: ICD-10-CM

## 2017-03-13 DIAGNOSIS — M47.26 OSTEOARTHRITIS OF SPINE WITH RADICULOPATHY, LUMBAR REGION: Primary | ICD-10-CM

## 2017-03-13 DIAGNOSIS — G89.29 OTHER CHRONIC PAIN: ICD-10-CM

## 2017-03-13 DIAGNOSIS — M54.41 CHRONIC BILATERAL LOW BACK PAIN WITH BILATERAL SCIATICA: ICD-10-CM

## 2017-03-13 PROCEDURE — 1160F RVW MEDS BY RX/DR IN RCRD: CPT | Mod: S$GLB,,, | Performed by: PHYSICIAN ASSISTANT

## 2017-03-13 PROCEDURE — 1125F AMNT PAIN NOTED PAIN PRSNT: CPT | Mod: S$GLB,,, | Performed by: PHYSICIAN ASSISTANT

## 2017-03-13 PROCEDURE — 1159F MED LIST DOCD IN RCRD: CPT | Mod: S$GLB,,, | Performed by: PHYSICIAN ASSISTANT

## 2017-03-13 PROCEDURE — 3074F SYST BP LT 130 MM HG: CPT | Mod: S$GLB,,, | Performed by: PHYSICIAN ASSISTANT

## 2017-03-13 PROCEDURE — 3078F DIAST BP <80 MM HG: CPT | Mod: S$GLB,,, | Performed by: PHYSICIAN ASSISTANT

## 2017-03-13 PROCEDURE — 1157F ADVNC CARE PLAN IN RCRD: CPT | Mod: S$GLB,,, | Performed by: PHYSICIAN ASSISTANT

## 2017-03-13 PROCEDURE — 99999 PR PBB SHADOW E&M-EST. PATIENT-LVL III: CPT | Mod: PBBFAC,,, | Performed by: PHYSICIAN ASSISTANT

## 2017-03-13 PROCEDURE — 99213 OFFICE O/P EST LOW 20 MIN: CPT | Mod: S$GLB,,, | Performed by: PHYSICIAN ASSISTANT

## 2017-03-13 RX ORDER — MORPHINE SULFATE 100 MG/1
100 TABLET, FILM COATED, EXTENDED RELEASE ORAL EVERY 12 HOURS
Qty: 60 TABLET | Refills: 0 | Status: SHIPPED | OUTPATIENT
Start: 2017-03-13 | End: 2017-04-12 | Stop reason: SDUPTHER

## 2017-03-13 RX ORDER — GABAPENTIN 300 MG/1
CAPSULE ORAL
Qty: 90 CAPSULE | Refills: 2 | Status: ON HOLD | OUTPATIENT
Start: 2017-03-13 | End: 2017-06-30 | Stop reason: HOSPADM

## 2017-03-13 NOTE — Clinical Note
Can you please arrange for him to see one of the surgeons after his 5/4/17 EMG/ NCV?  This will also give him time to complete PT and get an updated MRI.  Thanks. Sienna Hawk PA-C

## 2017-03-13 NOTE — TELEPHONE ENCOUNTER
----- Message from Terri Zheng sent at 3/13/2017  9:09 AM CDT -----  Contact: spouse  Refill on morphine (MS CONTIN) 100 MG 12 hr tablet  Call back  on # [phone]  thanks    Please send to;   [facility]   [address] [place] [id number]

## 2017-03-13 NOTE — MR AVS SNAPSHOT
[facility]  [address] [facility]  [facility] [address]  Phone: [phone]  Fax: [phone]                  Sam Pete   3/13/2017 1:00 PM   Office Visit    Description:  Male : 1945   Provider:  Sienna Hawk PA-C   Department:  [facility]           Reason for Visit     Back Pain           Diagnoses this Visit        Comments    Osteoarthritis of spine with radiculopathy, lumbar region    -  Primary     Chronic bilateral low back pain with bilateral sciatica                To Do List           Future Appointments        Provider Department Dept Phone    4/10/2017 1:00 PM [facility] MRI1 [facility] [phone]    2017 11:00 AM Олег Hernandez MD [facility] [phone]      Goals (5 Years of Data)     None      [facility] On Call     [facility] Care [facility] -  Assistance  Registered nurses in the [facility] provide clinical advisement, health education, appointment booking, and other advisory services.  Call for this free service at [phone].             Medications           Message regarding Medications     Verify the changes and/or additions to your medication regime listed below are the same as discussed with your clinician today.  If any of these changes or additions are incorrect, please notify your healthcare provider.             Verify that the below list of medications is an accurate representation of the medications you are currently taking.  If none reported, the list may be blank. If incorrect, please contact your healthcare provider. Carry this list with you in case of emergency.           Current Medications     amlodipine (NORVASC) 10 MG tablet TAKE 1 TABLET ONCE DAILY    ammonium lactate 12 % Crea Apply 1 application topically 2 (two) times daily.    aspirin 81 mg Tab Take 1 tablet by mouth Daily. Every day    ciclopirox (PENLAC) 8 % Soln Apply topically nightly.  "   losartan (COZAAR) 50 MG tablet TAKE 1 TABLET ONE TIME DAILY    morphine (MS CONTIN) 100 MG 12 hr tablet Take 1 tablet (100 mg total) by mouth every 12 (twelve) hours.    omeprazole (PRILOSEC) 40 MG capsule TAKE 1 CAPSULE ONE TIME DAILY - DISCONTINUE NEXIUM    oxycodone-acetaminophen (PERCOCET)  mg per tablet Take 1 tablet by mouth every 6 (six) hours as needed for Pain.           Clinical Reference Information           Your Vitals Were     BP Pulse Height Weight BMI    119/79 73 5' 10" (1.778 m) 99.6 kg (219 lb 9.3 oz) 31.51 kg/m2      Blood Pressure          Most Recent Value    BP  119/79      Allergies as of 3/13/2017     Xyzal [Levocetirizine]      Immunizations Administered on Date of Encounter - 3/13/2017     None      Orders Placed During Today's Visit     Future Labs/Procedures Expected by Expires    MRI Lumbar Spine Without Contrast  3/13/2017 3/13/2018    EMG - 2 Extremities  As directed 3/13/2018      Language Assistance Services     ATTENTION: Language assistance services are available, free of charge. Please call [phone].      ATENCIÓN: Si habla good, tiene a ordaz disposición servicios gratuitos de asistencia lingüística. Llame al [phone].     SISI Ý: N?u b?n nói Ti?ng Vi?t, có các d?ch v? h? tr? ngôn ng? mi?n phí dành cho b?n. G?i s? [phone].         [facility] complies with applicable Federal civil rights laws and does not discriminate on the basis of race, color, national origin, age, disability, or sex.        "

## 2017-03-14 ENCOUNTER — TELEPHONE (OUTPATIENT)
Dept: NEUROSURGERY | Facility: CLINIC | Age: 72
End: 2017-03-14
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
## 2017-03-14 NOTE — TELEPHONE ENCOUNTER
----- Message from Sienna Hawk PA-C sent at 3/14/2017 10:48 AM CDT -----  Can you please arrange for him to see one of the surgeons after his 5/4/17 EMG/ NCV?  This will also give him time to complete PT and get an updated MRI.    Thanks.  Sienna Hawk PA-C

## 2017-03-20 ENCOUNTER — TELEPHONE (OUTPATIENT)
Dept: ORTHOPEDIC SURGERY | Facility: CLINIC | Age: 72
End: 2017-03-20

## 2017-03-20 NOTE — TELEPHONE ENCOUNTER
----- Message from Hazel Elaine LPN sent at 3/20/2017  2:42 PM CDT -----  Contact: saloni SOLIS       ----- Message -----     From: Rachel Gilliland     Sent: 3/20/2017   2:39 PM       To: Kenn SAWANT Staff    Needs order for PT   Fax   Call back   sofie

## 2017-03-20 NOTE — TELEPHONE ENCOUNTER
----- Message from Rachel Gilliland sent at 3/20/2017  2:39 PM CDT -----  Contact: saloni SOLIS   Needs order for PT   Fax   Call back   sofie

## 2017-03-21 DIAGNOSIS — G89.4 CHRONIC PAIN SYNDROME: ICD-10-CM

## 2017-03-21 RX ORDER — OXYCODONE AND ACETAMINOPHEN 10; 325 MG/1; MG/1
1 TABLET ORAL EVERY 6 HOURS PRN
Qty: 120 TABLET | Refills: 0 | Status: SHIPPED | OUTPATIENT
Start: 2017-03-21 | End: 2017-04-20 | Stop reason: SDUPTHER

## 2017-04-12 DIAGNOSIS — G89.29 OTHER CHRONIC PAIN: ICD-10-CM

## 2017-04-12 RX ORDER — MORPHINE SULFATE 100 MG/1
100 TABLET, FILM COATED, EXTENDED RELEASE ORAL EVERY 12 HOURS
Qty: 60 TABLET | Refills: 0 | Status: SHIPPED | OUTPATIENT
Start: 2017-04-12 | End: 2017-05-12 | Stop reason: SDUPTHER

## 2017-04-12 NOTE — TELEPHONE ENCOUNTER
----- Message from Brooke Kennedy sent at 4/12/2017  9:17 AM CDT -----  Please call Miss Olmstead at [phone] /requesting  refill ms contin      [facility]/pharmacy #[id number] - [place] - [address] [address]  Phone: [phone] Fax: [phone]

## 2017-04-20 DIAGNOSIS — G89.4 CHRONIC PAIN SYNDROME: ICD-10-CM

## 2017-04-20 RX ORDER — OXYCODONE AND ACETAMINOPHEN 10; 325 MG/1; MG/1
1 TABLET ORAL EVERY 6 HOURS PRN
Qty: 120 TABLET | Refills: 0 | Status: SHIPPED | OUTPATIENT
Start: 2017-04-20 | End: 2017-05-15 | Stop reason: SDUPTHER

## 2017-04-20 RX ORDER — OXYCODONE AND ACETAMINOPHEN 10; 325 MG/1; MG/1
1 TABLET ORAL EVERY 6 HOURS PRN
Qty: 120 TABLET | Refills: 0 | Status: SHIPPED | OUTPATIENT
Start: 2017-04-20 | End: 2017-04-20 | Stop reason: SDUPTHER

## 2017-04-20 NOTE — TELEPHONE ENCOUNTER
----- Message from Kylee Montalvo sent at 4/20/2017  9:00 AM CDT -----  Contact: Aysha  Patient's wife is requesting refill Rx Percocet to be sent to     [facility]/pharmacy #[id number] - [place] - [address] [address]  [address] [address]  Phone: [phone] Fax: [phone]    Out of medication today. Thanks!

## 2017-04-26 ENCOUNTER — TELEPHONE (OUTPATIENT)
Dept: PHYSICAL MEDICINE AND REHAB | Facility: CLINIC | Age: 72
End: 2017-04-26

## 2017-04-26 NOTE — TELEPHONE ENCOUNTER
Advised patient that EMG is only done in [place] on thurs mornings. Patient voiced understanding he thought this appt was to review mri results with dr branch. I advised him this is his appt for the nerve testing. Patient voiced understanding and will keep appt for the date currently set

## 2017-04-26 NOTE — TELEPHONE ENCOUNTER
----- Message from Brooke Kennedy sent at 4/26/2017 12:45 PM CDT -----  Pt is requesting to change his 05/04  appointment to after the mri  scheduled for 05/08  / please call pt at  [phone] or  [phone]

## 2017-05-04 ENCOUNTER — PROCEDURE VISIT (OUTPATIENT)
Dept: PHYSICAL MEDICINE AND REHAB | Facility: CLINIC | Age: 72
End: 2017-05-04
Payer: MEDICARE

## 2017-05-04 DIAGNOSIS — M54.42 CHRONIC BILATERAL LOW BACK PAIN WITH BILATERAL SCIATICA: ICD-10-CM

## 2017-05-04 DIAGNOSIS — M47.26 OSTEOARTHRITIS OF SPINE WITH RADICULOPATHY, LUMBAR REGION: ICD-10-CM

## 2017-05-04 DIAGNOSIS — G89.29 CHRONIC BILATERAL LOW BACK PAIN WITH BILATERAL SCIATICA: ICD-10-CM

## 2017-05-04 DIAGNOSIS — M54.41 CHRONIC BILATERAL LOW BACK PAIN WITH BILATERAL SCIATICA: ICD-10-CM

## 2017-05-04 PROCEDURE — 95909 NRV CNDJ TST 5-6 STUDIES: CPT | Mod: 26,,, | Performed by: PHYSICAL MEDICINE & REHABILITATION

## 2017-05-04 PROCEDURE — 95886 MUSC TEST DONE W/N TEST COMP: CPT | Mod: 26,,, | Performed by: PHYSICAL MEDICINE & REHABILITATION

## 2017-05-07 NOTE — PROCEDURES
[facility]  [address] [place] [address]             Full Name: ROBERT PEARL Gender: Male  Patient ID: [id number] YOB: 1945  History: Pt with multiple prior lumbar spine surgeries, c/o persistent low back pain with radiation into both legs.      Visit Date: 5/4/2017 11:14  Age: 72 Years 0 Months Old  Examining Physician: KOLTON  Referring Physician: ROGELIO      Sensory NCS      Nerve / Sites Rec. Site Onset Lat Peak Lat NP Amp PP Amp Segments Distance Velocity     ms ms µV µV  cm m/s   L Sural - Ankle (Calf)      Calf Ankle 3.13 3.91 10.4 4.7 Calf - Ankle 14 45      2 Ankle 2.97 3.75 13.4 6.1      R Sural - Ankle (Calf)      Calf Ankle NR NR NR NR Calf - Ankle 14 NR       Motor NCS      Nerve / Sites Muscle Latency Amplitude Amp % Duration Segments Distance Lat Diff Velocity     ms mV % ms  cm ms m/s   L Peroneal - EDB      Ankle EDB 4.74 2.2 100 5.26 Ankle - EDB 8        Fib head EDB 12.66 2.2 99.9 6.56 Fib head - Ankle 31 7.92 39      Pop fossa EDB 15.16 2.8 132  Pop fossa - Fib head 11 2.50 44   R Peroneal - EDB      Ankle EDB 4.64 5.3 100 6.72 Ankle - EDB 8        Fib head EDB 12.50 5.2 98.2  Fib head - Ankle 31 7.86 39   L Tibial - AH      Ankle AH 4.69 7.6 100 5.68 Ankle - AH 8        Pop fossa AH 15.31 3.4 44.8  Pop fossa - Ankle 43 10.63 40   R Tibial - AH      Ankle AH 5.94 7.3 100 4.90 Ankle - AH 8        Pop fossa AH 16.25 5.2 71.5  Pop fossa - Ankle 43 10.31 42       EMG         EMG Summary Table     Spontaneous MUAP Recruitment   Muscle IA Fib PSW Fasc H.F. Amp Dur. PPP Pattern   R. Vastus medialis N None None None None N N N N   R. Peroneus longus N None None None None N N N Reduced   R. Tibialis anterior N None None None None N N N Reduced   R. Gastrocnemius (Medial head) N 1+ 2+ None None N N N Reduced   R. First dorsal interosseous N 1+ None None None       R. Gluteus medius N None None None None N N N N   R. Gluteus frank N None None None None       R. Lumbar  paraspinals N None 1+ None None       L. Vastus medialis N None None None None N N N N   L. Tibialis anterior N None None None None N N N Reduced   L. Peroneus longus N None 1+ None None N N 1+ Reduced   L. Gastrocnemius (Medial head) N None None None None N N N Reduced   L. First dorsal interosseous N 1+ 1+ None None       L. Gluteus medius N None None None None N N N Reduced   L. Gluteus frank N None None None None       L. Lumbar paraspinals N None 2+ None None N N N N       Summary    The motor conduction test was normal in all 4 of the tested nerves: L Peroneal - EDB, R Peroneal - EDB, L Tibial - AH, R Tibial - AH.    The sensory conduction test was performed on 2 nerve(s). The results were normal in 1 nerve(s): L Sural - Ankle (Calf). Results outside the specified normal range were found in 1 nerve(s), as follows:   In the R Sural - Ankle (Calf) study  o the response was considered absent for Calf stimulation    The needle EMG examination was performed in 16 muscles. It was normal in 5 muscle(s): R. Vastus medialis, R. Gluteus medius, R. Gluteus frank, L. Vastus medialis, L. Gluteus frank. The study was abnormal in 11 muscle(s), with the following distribution:   Abnormal spontaneous/insertional activity was found in R. Gastrocnemius (Medial head), R. First dorsal interosseous, R. Lumbar paraspinals, L. Peroneus longus, L. First dorsal interosseous, L. Lumbar paraspinals.   The MUP waveform abnormality was found in L. Peroneus longus.   Abnormal interference pattern was found in R. Peroneus longus, R. Tibialis anterior, R. Gastrocnemius (Medial head), L. Tibialis anterior, L. Peroneus longus, L. Gastrocnemius (Medial head), L. Gluteus medius.      Electrodiagnostic Impression:  1. There was electrophysiologic evidence of active right S1 radiculopathy, and active left L5/S1 radiculopathy.  Mr. Pete did have difficulty in performing continuous active muscle contraction, therefore motor unit action  potential analysis (determination of chronic changes) was hindered.  There were some polyphasic waveforms seen in the peroneus longus muscle on the left which may indicate the presence of some chronic neurogenic change in the L5/S1 myotome.  The presence of abnormal spontaneous activity seen in the bilateral lumbar paraspinal musculature is to be expected in a patient with prior lumbar surgery.  Additionally, findings of abnormal spontaneous activity seen in both dorsal interossei muscles are not uncommon in the general population.  Due to these aspects, the precise level of radiculopathy on the left could not be determined.  2. Absent response for the right sural sensory stimulation may be suggestive of some degree of peripheral neuropathy, however focal entrapment of this nerve cannot be entirely excluded.     Summary:  1. Active right S1 radiculopathy.  2. Active and chronic left L5/S1 radiculopathy.  3. Technically limited study (in terms of detecting chronic neurogenic change) secondary to the patients inability to perform continuous maximal muscle contraction.  4. Abnormal sural nerve response on the right, nonlocalized.      Plan:  Todays test results will be sent to his referring neurosurgery team for further review and direction in his treatment.    Thank you very much for the referral. Please call if you have any questions regarding this study or the report.       -------------------------------  Олег Hernandez M.D.

## 2017-05-08 ENCOUNTER — HOSPITAL ENCOUNTER (OUTPATIENT)
Dept: RADIOLOGY | Facility: HOSPITAL | Age: 72
Discharge: HOME | End: 2017-05-08
Attending: NEUROLOGICAL SURGERY
Payer: MEDICARE

## 2017-05-08 DIAGNOSIS — G89.29 CHRONIC BILATERAL LOW BACK PAIN WITH BILATERAL SCIATICA: ICD-10-CM

## 2017-05-08 DIAGNOSIS — M54.42 CHRONIC BILATERAL LOW BACK PAIN WITH BILATERAL SCIATICA: ICD-10-CM

## 2017-05-08 DIAGNOSIS — M47.26 OSTEOARTHRITIS OF SPINE WITH RADICULOPATHY, LUMBAR REGION: ICD-10-CM

## 2017-05-08 DIAGNOSIS — M54.41 CHRONIC BILATERAL LOW BACK PAIN WITH BILATERAL SCIATICA: ICD-10-CM

## 2017-05-08 PROCEDURE — 72148 MRI LUMBAR SPINE W/O DYE: CPT | Mod: 26,,, | Performed by: RADIOLOGY

## 2017-05-08 PROCEDURE — 72148 MRI LUMBAR SPINE W/O DYE: CPT | Mod: TC,PO

## 2017-05-10 ENCOUNTER — OFFICE VISIT (OUTPATIENT)
Dept: NEUROSURGERY | Facility: CLINIC | Age: 72
End: 2017-05-10
Payer: MEDICARE

## 2017-05-10 VITALS
HEIGHT: 70 IN | WEIGHT: 216.69 LBS | SYSTOLIC BLOOD PRESSURE: 130 MMHG | DIASTOLIC BLOOD PRESSURE: 81 MMHG | HEART RATE: 80 BPM | BODY MASS INDEX: 31.02 KG/M2

## 2017-05-10 DIAGNOSIS — Z96.649 STATUS POST TOTAL REPLACEMENT OF HIP, UNSPECIFIED LATERALITY: ICD-10-CM

## 2017-05-10 DIAGNOSIS — M47.816 FACET ARTHROPATHY, LUMBAR: ICD-10-CM

## 2017-05-10 DIAGNOSIS — M51.36 DDD (DEGENERATIVE DISC DISEASE), LUMBAR: Primary | ICD-10-CM

## 2017-05-10 DIAGNOSIS — N40.1 HYPERTROPHY OF PROSTATE WITH URINARY OBSTRUCTION AND OTHER LOWER URINARY TRACT SYMPTOMS (LUTS): ICD-10-CM

## 2017-05-10 DIAGNOSIS — E66.9 OBESITY (BMI 30.0-34.9): ICD-10-CM

## 2017-05-10 DIAGNOSIS — N52.9 IMPOTENCE OF ORGANIC ORIGIN: ICD-10-CM

## 2017-05-10 DIAGNOSIS — N13.8 HYPERTROPHY OF PROSTATE WITH URINARY OBSTRUCTION AND OTHER LOWER URINARY TRACT SYMPTOMS (LUTS): ICD-10-CM

## 2017-05-10 DIAGNOSIS — F11.24 OPIOID DEPENDENCE WITH OPIOID-INDUCED MOOD DISORDER: ICD-10-CM

## 2017-05-10 PROBLEM — F11.20 OPIOID DEPENDENCE: Status: ACTIVE | Noted: 2017-05-10

## 2017-05-10 PROCEDURE — 99499 UNLISTED E&M SERVICE: CPT | Mod: S$GLB,,, | Performed by: NEUROLOGICAL SURGERY

## 2017-05-10 PROCEDURE — 3079F DIAST BP 80-89 MM HG: CPT | Mod: S$GLB,,, | Performed by: NEUROLOGICAL SURGERY

## 2017-05-10 PROCEDURE — 1159F MED LIST DOCD IN RCRD: CPT | Mod: S$GLB,,, | Performed by: NEUROLOGICAL SURGERY

## 2017-05-10 PROCEDURE — 1125F AMNT PAIN NOTED PAIN PRSNT: CPT | Mod: S$GLB,,, | Performed by: NEUROLOGICAL SURGERY

## 2017-05-10 PROCEDURE — 3075F SYST BP GE 130 - 139MM HG: CPT | Mod: S$GLB,,, | Performed by: NEUROLOGICAL SURGERY

## 2017-05-10 PROCEDURE — 1160F RVW MEDS BY RX/DR IN RCRD: CPT | Mod: S$GLB,,, | Performed by: NEUROLOGICAL SURGERY

## 2017-05-10 PROCEDURE — 99215 OFFICE O/P EST HI 40 MIN: CPT | Mod: S$GLB,,, | Performed by: NEUROLOGICAL SURGERY

## 2017-05-10 NOTE — MR AVS SNAPSHOT
[facility] Neurosurgery  [address]  [facility] [address]  Phone: [phone]  Fax: [phone]                  Sam Pete   5/10/2017 2:00 PM   Office Visit    Description:  Male : 1945   Provider:  Lei Fowler MD   Department:  [place] - Neurosurgery           Reason for Visit     Follow-up           Diagnoses this Visit        Comments    DDD (degenerative disc disease), lumbar    -  Primary     Facet arthropathy, lumbar         Hypertrophy of prostate with urinary obstruction and other lower urinary tract symptoms (LUTS)         Obesity (BMI 30.0-34.9)         Impotence of organic origin         Status post total replacement of hip, unspecified laterality         Opioid dependence with opioid-induced mood disorder                To Do List           Goals (5 Years of Data)     None      [facility]     [facility] -  Assistance  Unless otherwise directed by your provider, please contact [facility], our nurse care line that is available for  assistance.     Registered nurses in the [facility] provide: appointment scheduling, clinical advisement, health education, and other advisory services.  Call: [phone] (toll free)               Medications           Message regarding Medications     Verify the changes and/or additions to your medication regime listed below are the same as discussed with your clinician today.  If any of these changes or additions are incorrect, please notify your healthcare provider.             Verify that the below list of medications is an accurate representation of the medications you are currently taking.  If none reported, the list may be blank. If incorrect, please contact your healthcare provider. Carry this list with you in case of emergency.           Current Medications     amlodipine (NORVASC) 10 MG tablet TAKE 1 TABLET ONCE DAILY    ammonium lactate 12 % Crea Apply 1 application  "topically 2 (two) times daily.    aspirin 81 mg Tab Take 1 tablet by mouth Daily. Every day    ciclopirox (PENLAC) 8 % Soln Apply topically nightly.    gabapentin (NEURONTIN) 300 MG capsule Take 1 by mouth at bedtime for 3 days, then 1 by mouth 2 times daily then 1 by mouth three times per day thereafter.    losartan (COZAAR) 50 MG tablet TAKE 1 TABLET ONE TIME DAILY    morphine (MS CONTIN) 100 MG 12 hr tablet Take 1 tablet (100 mg total) by mouth every 12 (twelve) hours.    omeprazole (PRILOSEC) 40 MG capsule TAKE 1 CAPSULE ONE TIME DAILY - DISCONTINUE NEXIUM    oxycodone-acetaminophen (PERCOCET)  mg per tablet Take 1 tablet by mouth every 6 (six) hours as needed for Pain.           Clinical Reference Information           Your Vitals Were     BP Pulse Height Weight BMI    130/81 80 5' 10" (1.778 m) 98.3 kg (216 lb 11.4 oz) 31.09 kg/m2      Blood Pressure          Most Recent Value    BP  130/81      Allergies as of 5/10/2017     Xyzal [Levocetirizine]      Immunizations Administered on Date of Encounter - 5/10/2017     None      Language Assistance Services     ATTENTION: Language assistance services are available, free of charge. Please call [phone].      ATENCIÓN: Si ralph funk, tiene a ordaz disposición servicios gratuitos de asistencia lingüística. Llame al [phone].     [facility] Ý: N?u b?n nói Ti?ng Vi?t, có các d?ch v? h? tr? ngôn ng? mi?n phí dành cho b?n. G?i s? [phone].         [facility] complies with applicable Federal civil rights laws and does not discriminate on the basis of race, color, national origin, age, disability, or sex.        "

## 2017-05-10 NOTE — PROGRESS NOTES
Neurosurgery History & Physical    Patient ID: Sam Pete is a 72 y.o. male.    No chief complaint on file.      Review of Systems   Constitutional: Negative for activity change, chills, fatigue and unexpected weight change.   HENT: Negative for hearing loss, tinnitus, trouble swallowing and voice change.    Eyes: Negative for visual disturbance.   Respiratory: Negative for apnea, chest tightness and shortness of breath.    Cardiovascular: Negative for chest pain and palpitations.   Gastrointestinal: Negative for abdominal pain, constipation, diarrhea, nausea and vomiting.   Genitourinary: Negative for difficulty urinating, dysuria and frequency.   Musculoskeletal: Positive for arthralgias, back pain and myalgias. Negative for gait problem, neck pain and neck stiffness.   Skin: Negative for wound.   Neurological: Positive for numbness. Negative for dizziness, tremors, seizures, facial asymmetry, speech difficulty, weakness, light-headedness and headaches.   Psychiatric/Behavioral: Negative for confusion and decreased concentration.       Past Medical History:   Diagnosis Date    Anticoagulant long-term use     ASA 81 mg    Arthritis     Chronic low back pain     ED (erectile dysfunction)     GERD (gastroesophageal reflux disease)     Hypertension     Obesity     Stroke     basal ganglia, left sided weakness, resolved    Testicular hypofunction      Social History     Social History    Marital status:      Spouse name: N/A    Number of children: N/A    Years of education: N/A     Occupational History    Not on file.     Social History Main Topics    Smoking status: Former Smoker     Packs/day: 1.00     Years: 30.00     Start date: 8/3/1963     Quit date: 1/1/1992    Smokeless tobacco: Never Used    Alcohol use Yes      Comment: On occasion    Drug use: No    Sexual activity: Not on file     Other Topics Concern    Not on file     Social History Narrative     Family History   Problem  Relation Age of Onset    Hypertension Father     Heart disease Father     Drug abuse Daughter     Hypertension Son     Lung disease Sister     COPD Sister     Hypertension Sister     Diverticulitis Sister     No Known Problems Maternal Grandmother     No Known Problems Maternal Grandfather     No Known Problems Paternal Grandmother     No Known Problems Paternal Grandfather     No Known Problems Maternal Aunt     No Known Problems Maternal Uncle     No Known Problems Paternal Aunt     No Known Problems Paternal Uncle      Review of patient's allergies indicates:   Allergen Reactions    Xyzal [levocetirizine] Other (See Comments)     Knocked him out        Current Outpatient Prescriptions:     amlodipine (NORVASC) 10 MG tablet, TAKE 1 TABLET ONCE DAILY, Disp: 90 tablet, Rfl: 3    ammonium lactate 12 % Crea, Apply 1 application topically 2 (two) times daily., Disp: 140 g, Rfl: 11    aspirin 81 mg Tab, Take 1 tablet by mouth Daily. Every day, Disp: , Rfl:     ciclopirox (PENLAC) 8 % Soln, Apply topically nightly., Disp: 6.6 mL, Rfl: 11    gabapentin (NEURONTIN) 300 MG capsule, Take 1 by mouth at bedtime for 3 days, then 1 by mouth 2 times daily then 1 by mouth three times per day thereafter., Disp: 90 capsule, Rfl: 2    losartan (COZAAR) 50 MG tablet, TAKE 1 TABLET ONE TIME DAILY, Disp: 90 tablet, Rfl: 3    morphine (MS CONTIN) 100 MG 12 hr tablet, Take 1 tablet (100 mg total) by mouth every 12 (twelve) hours., Disp: 60 tablet, Rfl: 0    omeprazole (PRILOSEC) 40 MG capsule, TAKE 1 CAPSULE ONE TIME DAILY - DISCONTINUE NEXIUM, Disp: 90 capsule, Rfl: 3    oxycodone-acetaminophen (PERCOCET)  mg per tablet, Take 1 tablet by mouth every 6 (six) hours as needed for Pain., Disp: 120 tablet, Rfl: 0    There were no vitals filed for this visit.    Physical Exam   Constitutional: He is oriented to person, place, and time. He appears well-developed and well-nourished.   HENT:   Head: Normocephalic  and atraumatic.   Eyes: Pupils are equal, round, and reactive to light.   Neck: Normal range of motion. Neck supple.   Cardiovascular: Normal rate.    Pulmonary/Chest: Effort normal.   Abdominal: He exhibits no distension.   Musculoskeletal: Normal range of motion. He exhibits no edema.   Neurological: He is alert and oriented to person, place, and time. He has a normal Finger-Nose-Finger Test, a normal Heel to Shin Test, a normal Romberg Test and a normal Tandem Gait Test. Gait normal.   Reflex Scores:       Tricep reflexes are 0 on the right side and 0 on the left side.       Bicep reflexes are 0 on the right side and 0 on the left side.       Brachioradialis reflexes are 0 on the right side and 0 on the left side.       Patellar reflexes are 0 on the right side and 0 on the left side.       Achilles reflexes are 0 on the right side and 0 on the left side.  Skin: Skin is warm and dry.   Psychiatric: He has a normal mood and affect. His speech is normal and behavior is normal. Judgment and thought content normal.   Nursing note and vitals reviewed.      Neurologic Exam     Mental Status   Oriented to person, place, and time.   Oriented to person.   Oriented to place.   Oriented to time.   Follows 3 step commands.   Attention: normal. Concentration: normal.   Speech: speech is normal   Level of consciousness: alert  Knowledge: consistent with education.   Able to name object. Able to read. Able to repeat. Able to write. Normal comprehension.     Cranial Nerves     CN II   Visual acuity: normal  Right visual field deficit: none  Left visual field deficit: none     CN III, IV, VI   Pupils are equal, round, and reactive to light.  Right pupil: Size: 3 mm. Shape: regular. Reactivity: brisk. Consensual response: intact.   Left pupil: Size: 3 mm. Shape: regular. Reactivity: brisk. Consensual response: intact.   CN III: no CN III palsy  CN VI: no CN VI palsy  Nystagmus: none   Diplopia: none  Ophthalmoparesis:  none  Conjugate gaze: present    CN V   Right facial sensation deficit: none  Left facial sensation deficit: none    CN VII   Right facial weakness: none  Left facial weakness: none    CN VIII   Hearing: intact    CN IX, X   CN IX normal.   CN X normal.     CN XI   Right sternocleidomastoid strength: normal  Left sternocleidomastoid strength: normal  Right trapezius strength: normal  Left trapezius strength: normal    CN XII   Fasciculations: absent  Tongue deviation: none    Motor Exam   Muscle bulk: normal  Overall muscle tone: normal  Right arm pronator drift: absent  Left arm pronator drift: absent    Strength   Right neck flexion: 4/5  Left neck flexion: 4/5  Right neck extension: 4/5  Left neck extension: 4/5  Right deltoid: 4/5  Left deltoid: 4/5  Right biceps: 4/5  Left biceps: 4/5  Right triceps: 4/5  Left triceps: 4/5  Right wrist flexion: 4/5  Left wrist flexion: 4/5  Right wrist extension: 4/5  Left wrist extension: 4/5  Right interossei: 4/5  Left interossei: 4/5  Right abdominals: 4/5  Left abdominals: 4/5  Right iliopsoas: 4/5  Left iliopsoas: 4/5  Right quadriceps: 4/5  Left quadriceps: 4/5  Right hamstrin/5  Left hamstrin/5  Right glutei: 4/5  Left glutei: 4/5  Right anterior tibial: 4/5  Left anterior tibial: 4/5  Right posterior tibial: 4/5  Left posterior tibial: 4/5  Right peroneal: 4/5  Left peroneal: 4/5  Right gastroc: 4/5  Left gastroc: 4/5       Intention tremors present with testing of all muscle groups in the upper and lower extremites.     Sensory Exam   Right arm light touch: normal  Left arm light touch: normal  Right leg light touch: decreased from ankle  Left leg light touch: normal  Right arm vibration: normal  Left arm vibration: normal  Right arm pinprick: normal  Left arm pinprick: normal    Gait, Coordination, and Reflexes     Gait  Gait: normal    Coordination   Romberg: negative  Finger to nose coordination: normal  Heel to shin coordination: normal  Tandem walking  coordination: normal    Tremor   Resting tremor: absent  Intention tremor: absent  Action tremor: absent    Reflexes   Right brachioradialis: 0  Left brachioradialis: 0  Right biceps: 0  Left biceps: 0  Right triceps: 0  Left triceps: 0  Right patellar: 0  Left patellar: 0  Right achilles: 0  Left achilles: 0  Right Rivero: absent  Left Rivero: absent  Right ankle clonus: absent  Left ankle clonus: absent      Provider dictation:  The patient is a 72 year old obese  male with chronic low back pain and a history of 3 previous lumbar decompressions at L4-S1. He returns now with new EMG/NCS and xrays.     He also has had bilateral total hip replacement and left knee replacement with no benefit for and worsening of his back and leg pain.  He has pain across the lower back with radiation into the bilateral posterior legs to the bottom of the feet.  There is burning and tingling from the knees to the feet bilaterally.  He is taking MS Contin and percocet for chronic pain.  He has not tried gabapentin.  He has had PT and CARLOS in the past without success.    Oswestry score: 57%.    PHQ:  3.    On exam, he has 4/5 strength throughout the upper and lower extremties with intention tremors with all strength testing.  His muscle stretch reflexes are absent throughout and has has decreased sensation in the right foot.    I have reviewed his most recent lumbar MRI showing  post operative changes at L4-S1 along with an interbody spacer devices at L4/5, L5/S1 but with pseudoarthrosis, ie failed fusion.  There are disk/ osteophyte complexes at L2/3 and L3/4 with moderate central canal stenosis with significant compression of the left exiting nerve roots at both levels..      The EMG/NCS reveals active S1 radiculopathy and subacute left S1 radiculopathy.    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 We will obtain dynamic xrays preoperatively.        Visit Diagnosis:  DDD (degenerative disc disease), lumbar    Facet arthropathy, lumbar    Hypertrophy of prostate with urinary obstruction and other lower urinary tract symptoms (LUTS)    Obesity (BMI 30.0-34.9)    Impotence of organic origin    Status post total replacement of hip, unspecified laterality    Opioid dependence with opioid-induced mood disorder        Total time spent counseling greater than fifty percent of total visit time.  Counseling included discussion regarding imaging findings, diagnosis possibilities, treatment options, risks and benefits.   The patient had many questions regarding the options and long-term effects.

## 2017-05-11 DIAGNOSIS — Z01.818 PRE-OP EVALUATION: ICD-10-CM

## 2017-05-11 DIAGNOSIS — M48.061 LUMBAR STENOSIS: Primary | ICD-10-CM

## 2017-05-11 DIAGNOSIS — R82.90 ABNORMAL URINE: ICD-10-CM

## 2017-05-11 DIAGNOSIS — Z79.01 ANTICOAGULANT LONG-TERM USE: ICD-10-CM

## 2017-05-11 RX ORDER — SODIUM CHLORIDE 9 MG/ML
INJECTION, SOLUTION INTRAVENOUS CONTINUOUS
Status: CANCELLED | OUTPATIENT
Start: 2017-05-11

## 2017-05-12 DIAGNOSIS — G89.29 OTHER CHRONIC PAIN: ICD-10-CM

## 2017-05-12 RX ORDER — MORPHINE SULFATE 100 MG/1
100 TABLET, FILM COATED, EXTENDED RELEASE ORAL EVERY 12 HOURS
Qty: 60 TABLET | Refills: 0 | Status: SHIPPED | OUTPATIENT
Start: 2017-05-12 | End: 2017-05-15 | Stop reason: SDUPTHER

## 2017-05-12 NOTE — TELEPHONE ENCOUNTER
----- Message from Rachel Gilliland sent at 5/12/2017  9:24 AM CDT -----  Contact: wife  ana   Needs refill   morphine (MS CONTIN) 100 MG 12 hr tablet  And preop appt for surgery   06 26 2017  Call back  [phone]  Lost call, this is on caller ID

## 2017-05-13 DIAGNOSIS — G89.29 OTHER CHRONIC PAIN: ICD-10-CM

## 2017-05-13 NOTE — TELEPHONE ENCOUNTER
Patient morphine rx was sent in on yesterday 5/12/17 to [facility] [address] spoke to jazmyn reports morphine is out of stock,please send to [facility] in [facility]

## 2017-05-13 NOTE — TELEPHONE ENCOUNTER
----- Message from Juana Montalvo sent at 5/13/2017 11:12 AM CDT -----  Patient had a prescription e-scribed to [facility] on [address], however they are out of Morphine. In order to get the medication it has to be e-scribed to the [facility] [address] in [place].     Please call the patient back at [phone], cell number.     Thank you

## 2017-05-15 ENCOUNTER — TELEPHONE (OUTPATIENT)
Dept: NEUROSURGERY | Facility: CLINIC | Age: 72
End: 2017-05-15

## 2017-05-15 ENCOUNTER — TELEPHONE (OUTPATIENT)
Dept: FAMILY MEDICINE | Facility: CLINIC | Age: 72
End: 2017-05-15

## 2017-05-15 DIAGNOSIS — G89.4 CHRONIC PAIN SYNDROME: ICD-10-CM

## 2017-05-15 DIAGNOSIS — G89.29 OTHER CHRONIC PAIN: ICD-10-CM

## 2017-05-15 RX ORDER — MORPHINE SULFATE 100 MG/1
100 TABLET, FILM COATED, EXTENDED RELEASE ORAL EVERY 12 HOURS
Qty: 60 TABLET | Refills: 0 | OUTPATIENT
Start: 2017-05-15

## 2017-05-15 RX ORDER — OXYCODONE AND ACETAMINOPHEN 10; 325 MG/1; MG/1
1 TABLET ORAL EVERY 6 HOURS PRN
Qty: 120 TABLET | Refills: 0 | Status: SHIPPED | OUTPATIENT
Start: 2017-05-15 | End: 2017-05-30 | Stop reason: SDUPTHER

## 2017-05-15 RX ORDER — MORPHINE SULFATE 100 MG/1
100 TABLET, FILM COATED, EXTENDED RELEASE ORAL EVERY 12 HOURS
Qty: 60 TABLET | Refills: 0 | Status: SHIPPED | OUTPATIENT
Start: 2017-05-15 | End: 2017-05-30 | Stop reason: SDUPTHER

## 2017-05-15 NOTE — TELEPHONE ENCOUNTER
----- Message from Rose Singh RN sent at 5/15/2017 10:47 AM CDT -----  Mr. Pete will be having surgery on 6/26/17 with Dr. Fowler, Neurosurgeon, at [facility]. We are reaching out to obtain a surgical clearance from Dr. Montalvo to ensure the safety of our patient. The surgery is L2-S1 decompression and spinal fusion with instrumentation and will be under general anesthesia. This procedure typically lasts approximately 4 hours. We request that the patient hold all anticoagulant/antiinflammatory medications for 5-7 days prior to surgery. Mr. Pete has a pre-op appointment on 6/12/17 at the [facility] where he will have a CBC, BMP, PT/INR, PTT, UA, CXR, AND EKG performed. These results will be available for Dr. Montalvo's review in Epic.  Please let us know if our office can be of further assistance.     Thank you,     Rose Singh RN  P: [phone]  F: [phone]

## 2017-05-15 NOTE — TELEPHONE ENCOUNTER
Patient calling in ahead of time to have Percocet filled at [facility] [address].

## 2017-05-15 NOTE — TELEPHONE ENCOUNTER
Called patient's wife regarding upcoming surgery on 6/26/17 with Dr. Fowler. Pre and post-operative appointments reviewed. Patient aware of stopping all anti-coagulant, anti-inflammatory, anti-platelet medications for 1 week prior to surgery. Address confirmed and appointment reminder letter and post-operative instructions mailed to patient. Informed patient to call with any further questions. Patient verbalized understanding.

## 2017-05-15 NOTE — TELEPHONE ENCOUNTER
----- Message from Bonnie Ritchie sent at 5/13/2017 11:50 AM CDT -----  Contact: Wife  [facility] is out of stock .  Rx morphine (MS CONTIN) 100 MG 12 hr tablet 60 tablet   please call into   [place]

## 2017-05-23 ENCOUNTER — DOCUMENTATION ONLY (OUTPATIENT)
Dept: FAMILY MEDICINE | Facility: CLINIC | Age: 72
End: 2017-05-23

## 2017-05-23 NOTE — PROGRESS NOTES
Pre-Visit Chart Review  For Appointment Scheduled on 5-30-17    Health Maintenance Due   Topic Date Due    Fecal Occult Blood Test (FOBT)  1945    Zoster Vaccine  04/26/2007    Colonoscopy  07/12/2014

## 2017-05-30 ENCOUNTER — HOSPITAL ENCOUNTER (OUTPATIENT)
Dept: RADIOLOGY | Facility: CLINIC | Age: 72
Discharge: HOME | End: 2017-05-30
Attending: FAMILY MEDICINE
Payer: MEDICARE

## 2017-05-30 ENCOUNTER — LAB VISIT (OUTPATIENT)
Dept: LAB | Facility: HOSPITAL | Age: 72
End: 2017-05-30
Attending: FAMILY MEDICINE
Payer: MEDICARE

## 2017-05-30 ENCOUNTER — OFFICE VISIT (OUTPATIENT)
Dept: FAMILY MEDICINE | Facility: CLINIC | Age: 72
End: 2017-05-30
Payer: MEDICARE

## 2017-05-30 VITALS
DIASTOLIC BLOOD PRESSURE: 74 MMHG | HEART RATE: 73 BPM | RESPIRATION RATE: 12 BRPM | OXYGEN SATURATION: 96 % | HEIGHT: 70 IN | TEMPERATURE: 98 F | WEIGHT: 215.38 LBS | SYSTOLIC BLOOD PRESSURE: 117 MMHG | BODY MASS INDEX: 30.83 KG/M2

## 2017-05-30 DIAGNOSIS — Z01.818 PREOP EXAMINATION: ICD-10-CM

## 2017-05-30 DIAGNOSIS — I10 GOOD HYPERTENSION CONTROL: ICD-10-CM

## 2017-05-30 DIAGNOSIS — F11.24 OPIOID DEPENDENCE WITH OPIOID-INDUCED MOOD DISORDER: ICD-10-CM

## 2017-05-30 DIAGNOSIS — M51.36 DDD (DEGENERATIVE DISC DISEASE), LUMBAR: ICD-10-CM

## 2017-05-30 DIAGNOSIS — G89.4 CHRONIC PAIN SYNDROME: ICD-10-CM

## 2017-05-30 DIAGNOSIS — M48.061 SPINAL STENOSIS, LUMBAR REGION, WITHOUT NEUROGENIC CLAUDICATION: Primary | ICD-10-CM

## 2017-05-30 DIAGNOSIS — G89.29 OTHER CHRONIC PAIN: ICD-10-CM

## 2017-05-30 DIAGNOSIS — M51.37 DEGENERATION OF LUMBAR OR LUMBOSACRAL INTERVERTEBRAL DISC: ICD-10-CM

## 2017-05-30 LAB
ANION GAP SERPL CALC-SCNC: 10 MMOL/L
BASOPHILS # BLD AUTO: 0.02 K/UL
BASOPHILS NFR BLD: 0.2 %
BUN SERPL-MCNC: 18 MG/DL
CALCIUM SERPL-MCNC: 8.9 MG/DL
CHLORIDE SERPL-SCNC: 103 MMOL/L
CO2 SERPL-SCNC: 27 MMOL/L
CREAT SERPL-MCNC: 0.8 MG/DL
DIFFERENTIAL METHOD: ABNORMAL
EOSINOPHIL # BLD AUTO: 0.4 K/UL
EOSINOPHIL NFR BLD: 5.2 %
ERYTHROCYTE [DISTWIDTH] IN BLOOD BY AUTOMATED COUNT: 13.8 %
EST. GFR  (AFRICAN AMERICAN): >60 ML/MIN/1.73 M^2
EST. GFR  (NON AFRICAN AMERICAN): >60 ML/MIN/1.73 M^2
GLUCOSE SERPL-MCNC: 90 MG/DL
HCT VFR BLD AUTO: 41.7 %
HGB BLD-MCNC: 12.9 G/DL
LYMPHOCYTES # BLD AUTO: 3.6 K/UL
LYMPHOCYTES NFR BLD: 43.8 %
MCH RBC QN AUTO: 27.7 PG
MCHC RBC AUTO-ENTMCNC: 30.9 %
MCV RBC AUTO: 90 FL
MONOCYTES # BLD AUTO: 0.8 K/UL
MONOCYTES NFR BLD: 9.4 %
NEUTROPHILS # BLD AUTO: 3.4 K/UL
NEUTROPHILS NFR BLD: 41.2 %
PLATELET # BLD AUTO: 296 K/UL
PMV BLD AUTO: 9.4 FL
POTASSIUM SERPL-SCNC: 4.3 MMOL/L
RBC # BLD AUTO: 4.65 M/UL
SODIUM SERPL-SCNC: 140 MMOL/L
WBC # BLD AUTO: 8.22 K/UL

## 2017-05-30 PROCEDURE — 85025 COMPLETE CBC W/AUTO DIFF WBC: CPT

## 2017-05-30 PROCEDURE — 93005 ELECTROCARDIOGRAM TRACING: CPT | Mod: S$GLB,,, | Performed by: FAMILY MEDICINE

## 2017-05-30 PROCEDURE — 99999 PR PBB SHADOW E&M-EST. PATIENT-LVL IV: CPT | Mod: PBBFAC,,, | Performed by: FAMILY MEDICINE

## 2017-05-30 PROCEDURE — 36415 COLL VENOUS BLD VENIPUNCTURE: CPT | Mod: PO

## 2017-05-30 PROCEDURE — 1159F MED LIST DOCD IN RCRD: CPT | Mod: S$GLB,,, | Performed by: FAMILY MEDICINE

## 2017-05-30 PROCEDURE — 99499 UNLISTED E&M SERVICE: CPT | Mod: S$GLB,,, | Performed by: FAMILY MEDICINE

## 2017-05-30 PROCEDURE — 71020 XR CHEST PA AND LATERAL: CPT | Mod: 26,,, | Performed by: RADIOLOGY

## 2017-05-30 PROCEDURE — 80048 BASIC METABOLIC PNL TOTAL CA: CPT

## 2017-05-30 PROCEDURE — 1125F AMNT PAIN NOTED PAIN PRSNT: CPT | Mod: S$GLB,,, | Performed by: FAMILY MEDICINE

## 2017-05-30 PROCEDURE — 93010 ELECTROCARDIOGRAM REPORT: CPT | Mod: S$GLB,,, | Performed by: INTERNAL MEDICINE

## 2017-05-30 PROCEDURE — 99214 OFFICE O/P EST MOD 30 MIN: CPT | Mod: S$GLB,,, | Performed by: FAMILY MEDICINE

## 2017-05-30 RX ORDER — MORPHINE SULFATE 100 MG/1
100 TABLET, FILM COATED, EXTENDED RELEASE ORAL EVERY 12 HOURS
Qty: 60 TABLET | Refills: 0 | Status: ON HOLD | OUTPATIENT
Start: 2017-07-15 | End: 2017-06-30 | Stop reason: HOSPADM

## 2017-05-30 RX ORDER — OXYCODONE AND ACETAMINOPHEN 10; 325 MG/1; MG/1
1 TABLET ORAL EVERY 6 HOURS PRN
Qty: 120 TABLET | Refills: 0 | Status: ON HOLD | OUTPATIENT
Start: 2017-07-15 | End: 2017-06-26 | Stop reason: CLARIF

## 2017-05-30 RX ORDER — OXYCODONE AND ACETAMINOPHEN 10; 325 MG/1; MG/1
1 TABLET ORAL EVERY 6 HOURS PRN
Qty: 120 TABLET | Refills: 0 | Status: ON HOLD | OUTPATIENT
Start: 2017-08-15 | End: 2017-06-26 | Stop reason: CLARIF

## 2017-05-30 RX ORDER — MORPHINE SULFATE 100 MG/1
100 TABLET, FILM COATED, EXTENDED RELEASE ORAL EVERY 12 HOURS
Qty: 60 TABLET | Refills: 0 | Status: ON HOLD | OUTPATIENT
Start: 2017-08-15 | End: 2017-06-26 | Stop reason: CLARIF

## 2017-05-30 RX ORDER — MORPHINE SULFATE 100 MG/1
100 TABLET, FILM COATED, EXTENDED RELEASE ORAL EVERY 12 HOURS
Qty: 60 TABLET | Refills: 0 | Status: ON HOLD | OUTPATIENT
Start: 2017-06-15 | End: 2017-06-30 | Stop reason: HOSPADM

## 2017-05-30 RX ORDER — OXYCODONE AND ACETAMINOPHEN 10; 325 MG/1; MG/1
1 TABLET ORAL EVERY 6 HOURS PRN
Qty: 120 TABLET | Refills: 0 | Status: ON HOLD | OUTPATIENT
Start: 2017-06-15 | End: 2017-06-30 | Stop reason: HOSPADM

## 2017-05-30 NOTE — PATIENT INSTRUCTIONS
Weight Management: Getting Started  Healthy bodies come in all shapes and sizes. Not all bodies are made to be thin. For some people, a healthy weight is higher than the average weight listed on weight charts. Your healthcare provider can help you decide on a healthy weight for you.    Reasons to lose weight  Losing weight can help with some health problems, such as high blood pressure, heart disease, diabetes, sleep apnea, and arthritis. You may also feel more energy.  Set your long-term goal  Your goal doesn't even have to be a specific weight. You may decide on a fitness goal (such as being able to walk 10 miles a week), or a health goal (such as lowering your blood pressure). Choose a goal that is measurable and reasonable, so you know when you've reached it. A goal of reaching a BMI of less than 25 is not always reasonable (or possible).   Make an action plan  Habits dont change overnight. Setting your goals too high can leave you feeling discouraged if you cant reach them. Be realistic. Choose one or two small changes you can make now. Set an action plan for how you are going to make these changes. When you can stick to this plan, keep making a few more small changes. Taking small steps will help you stay on the path to success.  Track your progress  Write down your goals. Then, keep a daily record of your progress. Write down what you eat and how active you are. This record lets you look back on how much youve done. It may also help when youre feeling frustrated. Reward yourself for success. Even if you dont reach every goal, give yourself credit for what you do get done.  Get support  Encouragement from others can help make losing weight easier. Ask your family members and friends for support. They may even want to join you. Also look to your healthcare provider, registered dietitian, and  for help. Your local hospital can give you more information about nutrition, exercise, and  weight loss.  Date Last Reviewed: 1/31/2016  © 7401-6896 [employer], [employer]. [address] [place] [address], [place] [address]. All rights reserved. This information is not intended as a substitute for professional medical care. Always follow your healthcare professional's instructions.

## 2017-05-30 NOTE — PROGRESS NOTES
Subjective:       Patient ID: Sam Pete is a 72 y.o. male.    Chief Complaint: Pre-op Exam    72 year old male with chronic back pain on morphine and percocet has had three failed back surgeries in the past.  He is planning to undergo another surgery with dr. Fowler on June 26 from L2-S1 including grafting at L4-5 and L5-S1.  He is here for preop clearance.  Other than his back pain and some arthritic pain he has no complaints.  In addition to the back he has had six total arthroscopies on the knees as well as bilateral total hips and left total knee.  He has had no significant problems with anesthesia during these surgeries.    Past Medical History:  No date: Anticoagulant long-term use      Comment: ASA 81 mg  No date: Arthritis  No date: Chronic low back pain  No date: ED (erectile dysfunction)  No date: GERD (gastroesophageal reflux disease)  No date: Hypertension  No date: Obesity  No date: Stroke      Comment: basal ganglia, left sided weakness, resolved  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    Past Surgical History:  No date: APPENDECTOMY  No date: BACK SURGERY      Comment: 3- back surgery  07/12/2004: COLONOSCOPY      Comment: Dr Rodriguez 7-10 year elisa   No date: Epidural steroid injection      Comment: Pain management  No date: FRACTURE SURGERY Left      Comment: wrist / forearm, total of 5  2-6-2013: JOINT REPLACEMENT      Comment: ( rt hip 2007), left hip   No date: JOINT REPLACEMENT Left      Comment: total knee  No date: KNEE ARTHROSCOPY W/ DEBRIDEMENT      Comment: bilateral knees , total of six  No date: KNEE SURGERY  No date: Nervbe Block injection      Comment: Pain management    Review of patient's family history indicates:    Hypertension                   Father                    Heart disease                  Father                    Drug abuse                     Daughter                  Hypertension                   Son                       Lung disease                   Sister                     COPD                           Sister                    Hypertension                   Sister                    Diverticulitis                 Sister                    Gout                           Sister                    Kidney disease                 Sister                    No Known Problems              Maternal Grandmother      No Known Problems              Maternal Grandfather      No Known Problems              Paternal Grandmother      No Known Problems              Paternal Grandfather      No Known Problems              Maternal Aunt             No Known Problems              Maternal Uncle            No Known Problems              Paternal Aunt             No Known Problems              Paternal Uncle            Social History    Marital status:              Spouse name:                       Years of education:                 Number of children:               Social History Main Topics    Smoking status: Former Smoker                                                                Packs/day: 1.00      Years: 30.00          Start date: 8/3/1963       Quit date: 1/1/1992    Smokeless tobacco: Never Used                        Alcohol use: Yes                Comment: On occasion    Drug use: No                Current Outpatient Prescriptions:     amlodipine (NORVASC) 10 MG tablet, TAKE 1 TABLET ONCE DAILY, Disp: 90 tablet, Rfl: 3    ammonium lactate 12 % Crea, Apply 1 application topically 2 (two) times daily. (Patient taking differently: Apply 1 application topically 2 (two) times daily as needed. ), Disp: 140 g, Rfl: 11    aspirin 81 mg Tab, Take 1 tablet by mouth Daily. Every day, Disp: , Rfl:     ciclopirox (PENLAC) 8 % Soln, Apply topically nightly. (Patient taking differently: Apply topically nightly as needed. ), Disp: 6.6 mL, Rfl: 11    gabapentin (NEURONTIN) 300 MG capsule, Take 1 by mouth at bedtime for 3 days, then 1 by mouth 2 times daily then 1 by mouth three times  per day thereafter. (Patient taking differently: Take 300 mg by mouth 2 (two) times daily. Take 1 by mouth at bedtime for 3 days, then 1 by mouth 2 times daily then 1 by mouth three times per day thereafter.), Disp: 90 capsule, Rfl: 2    losartan (COZAAR) 50 MG tablet, TAKE 1 TABLET ONE TIME DAILY, Disp: 90 tablet, Rfl: 3    (START ON 6/15/2017) morphine (MS CONTIN) 100 MG 12 hr tablet, Take 1 tablet (100 mg total) by mouth every 12 (twelve) hours., Disp: 60 tablet, Rfl: 0    omeprazole (PRILOSEC) 40 MG capsule, TAKE 1 CAPSULE ONE TIME DAILY - DISCONTINUE NEXIUM, Disp: 90 capsule, Rfl: 3    (START ON 6/15/2017) oxycodone-acetaminophen (PERCOCET)  mg per tablet, Take 1 tablet by mouth every 6 (six) hours as needed for Pain., Disp: 120 tablet, Rfl: 0    Zoster Vaccine due on 04/26/2007  Colonoscopy due on 07/12/2014        Review of Systems   Constitutional: Negative for activity change, chills, fatigue and fever.   HENT: Negative for congestion, ear pain, rhinorrhea and sore throat.    Eyes: Negative for visual disturbance.   Respiratory: Negative for cough, chest tightness and shortness of breath.    Cardiovascular: Negative for chest pain and palpitations.   Gastrointestinal: Negative for abdominal pain, blood in stool, constipation, diarrhea, nausea and vomiting.   Genitourinary: Negative for difficulty urinating, dysuria and hematuria.   Musculoskeletal: Positive for arthralgias, back pain and neck stiffness. Negative for neck pain.   Skin: Negative for rash.   Neurological: Negative for dizziness and headaches.   Psychiatric/Behavioral: Negative for sleep disturbance.       Objective:      Physical Exam   Constitutional: He is oriented to person, place, and time. He appears well-developed and well-nourished. No distress.   Good blood pressure control  Patient is obese with bmi of 30.9.   Weight is increased by 2lb since last visit of 2/16/17.     HENT:   Head: Normocephalic and atraumatic.   Right Ear:  External ear normal.   Left Ear: External ear normal.   Nose: Nose normal.   Mouth/Throat: Oropharynx is clear and moist. No oropharyngeal exudate.   Eyes: Conjunctivae and EOM are normal. Pupils are equal, round, and reactive to light. Right eye exhibits no discharge. Left eye exhibits no discharge. No scleral icterus.   Neck: Normal range of motion. Neck supple. No JVD present. No tracheal deviation present. No thyromegaly present.   Cardiovascular: Normal rate, regular rhythm and normal heart sounds.  Exam reveals no gallop and no friction rub.    No murmur heard.  Pulmonary/Chest: Effort normal and breath sounds normal. No stridor. No respiratory distress. He has no wheezes. He has no rales. He exhibits no tenderness.   Abdominal: Soft. Bowel sounds are normal. He exhibits no distension and no mass. There is no tenderness. There is no rebound and no guarding.   Musculoskeletal: Normal range of motion. He exhibits no edema.   Lymphadenopathy:     He has no cervical adenopathy.   Neurological: He is alert and oriented to person, place, and time. He has normal reflexes. No cranial nerve deficit. He exhibits normal muscle tone.   Skin: Skin is warm and dry. No rash noted. He is not diaphoretic. No erythema.   Psychiatric: He has a normal mood and affect. His behavior is normal. Judgment and thought content normal.   Nursing note and vitals reviewed.      Assessment:       1. Spinal stenosis, lumbar region, without neurogenic claudication    2. Preop examination    3. Opioid dependence with opioid-induced mood disorder    4. Good hypertension control    5. DDD (degenerative disc disease), lumbar    6. Degeneration of lumbar or lumbosacral intervertebral disc    7. Other chronic pain    8. Chronic pain syndrome    9. BMI 30.0-30.9,adult        Plan:       1. Spinal stenosis, lumbar region, without neurogenic claudication  Failed surgery X 3    2. Preop examination  He is clear for surgery on clinical grounds pending  results of ekg, cxr, and lab  - Basic metabolic panel; Future  - CBC auto differential; Future  - Urinalysis; Future  - X-Ray Chest PA And Lateral; Future  - IN OFFICE EKG 12-LEAD (to Muse)    3. Opioid dependence with opioid-induced mood disorder  Will likely remain on chronic pain meds even with successful surgery but may be able to reduce levels hopefully    4. Good hypertension control  No changes needed  - Basic metabolic panel; Future  - CBC auto differential; Future    5. DDD (degenerative disc disease), lumbar    6. Degeneration of lumbar or lumbosacral intervertebral disc    7. Other chronic pain  - morphine (MS CONTIN) 100 MG 12 hr tablet; Take 1 tablet (100 mg total) by mouth every 12 (twelve) hours.  Dispense: 60 tablet; Refill: 0  - morphine (MS CONTIN) 100 MG 12 hr tablet; Take 1 tablet (100 mg total) by mouth every 12 (twelve) hours.  Dispense: 60 tablet; Refill: 0  - morphine (MS CONTIN) 100 MG 12 hr tablet; Take 1 tablet (100 mg total) by mouth every 12 (twelve) hours.  Dispense: 60 tablet; Refill: 0    8. Chronic pain syndrome  - oxycodone-acetaminophen (PERCOCET)  mg per tablet; Take 1 tablet by mouth every 6 (six) hours as needed for Pain.  Dispense: 120 tablet; Refill: 0  - oxycodone-acetaminophen (PERCOCET)  mg per tablet; Take 1 tablet by mouth every 6 (six) hours as needed for Pain.  Dispense: 120 tablet; Refill: 0  - oxycodone-acetaminophen (PERCOCET)  mg per tablet; Take 1 tablet by mouth every 6 (six) hours as needed for Pain.  Dispense: 120 tablet; Refill: 0    9. BMI 30.0-30.9,adult

## 2017-06-12 ENCOUNTER — TELEPHONE (OUTPATIENT)
Dept: NEUROSURGERY | Facility: CLINIC | Age: 72
End: 2017-06-12

## 2017-06-15 ENCOUNTER — TELEPHONE (OUTPATIENT)
Dept: FAMILY MEDICINE | Facility: CLINIC | Age: 72
End: 2017-06-15

## 2017-06-15 DIAGNOSIS — K21.9 GERD (GASTROESOPHAGEAL REFLUX DISEASE): ICD-10-CM

## 2017-06-15 DIAGNOSIS — I10 HYPERTENSION: ICD-10-CM

## 2017-06-15 RX ORDER — OMEPRAZOLE 40 MG/1
CAPSULE, DELAYED RELEASE ORAL
Qty: 90 CAPSULE | Refills: 3 | Status: SHIPPED | OUTPATIENT
Start: 2017-06-15 | End: 2018-05-24 | Stop reason: SDUPTHER

## 2017-06-15 RX ORDER — LOSARTAN POTASSIUM 50 MG/1
TABLET ORAL
Qty: 90 TABLET | Refills: 3 | Status: SHIPPED | OUTPATIENT
Start: 2017-06-15 | End: 2018-05-24 | Stop reason: SDUPTHER

## 2017-06-15 RX ORDER — AMLODIPINE BESYLATE 10 MG/1
TABLET ORAL
Qty: 90 TABLET | Refills: 3 | Status: ON HOLD | OUTPATIENT
Start: 2017-06-15 | End: 2017-06-30 | Stop reason: HOSPADM

## 2017-06-15 NOTE — TELEPHONE ENCOUNTER
----- Message from Meliza Lowe sent at 6/15/2017  1:08 PM CDT -----  refill  omeprazole (PRILOSEC) 40 MG capsule   amlodipine (NORVASC) 10 MG tablet   losartan (COZAAR) 50 MG tablet    [phone] (home)     [facility] - [place], [place] - [address]  [address] [address]  Phone: [phone] Fax: [phone]

## 2017-06-26 PROBLEM — S32.009K LUMBAR PSEUDOARTHROSIS: Status: ACTIVE | Noted: 2017-06-26

## 2017-06-26 PROBLEM — M48.061 LUMBAR STENOSIS: Status: ACTIVE | Noted: 2017-06-26

## 2017-06-28 PROBLEM — E87.6 HYPOKALEMIA: Status: ACTIVE | Noted: 2017-06-28

## 2017-06-29 PROBLEM — E87.6 HYPOKALEMIA: Status: RESOLVED | Noted: 2017-06-29 | Resolved: 2017-06-29

## 2017-06-29 PROBLEM — E87.6 HYPOKALEMIA: Status: RESOLVED | Noted: 2017-06-28 | Resolved: 2017-06-29

## 2017-06-29 PROBLEM — I48.0 PAF (PAROXYSMAL ATRIAL FIBRILLATION): Status: ACTIVE | Noted: 2017-06-29

## 2017-06-30 ENCOUNTER — TELEPHONE (OUTPATIENT)
Dept: CARDIOLOGY | Facility: CLINIC | Age: 72
End: 2017-06-30

## 2017-06-30 NOTE — TELEPHONE ENCOUNTER
----- Message from Zeina Mcakey sent at 6/30/2017 10:14 AM CDT -----  Contact:   call  //[phone]    Calling to  Make a  Two  Week  Hospital   Follow  Up and  Stress  Test // please call

## 2017-07-06 ENCOUNTER — CLINICAL SUPPORT (OUTPATIENT)
Dept: NEUROSURGERY | Facility: CLINIC | Age: 72
End: 2017-07-06
Payer: MEDICARE

## 2017-07-06 DIAGNOSIS — Z98.1 STATUS POST LUMBAR SPINAL FUSION: Primary | ICD-10-CM

## 2017-07-06 NOTE — PROGRESS NOTES
Pt is 10 days s/p L2-S1 PSIF with decompression with Dr. Lei Fowler. No s/s of infection. Incision cleaned with chloraprep and staples removed with no issue. Incision is warm, dry, intact. Pt reports post-operative pain level < pre-operative state. Pt is not requesting medication refill today. Pt re-educated on narcotics policy. Educated patient on weight lifting status, bending/lifting/twisting, and to call with any changes or questions. Pt aware of imaging and f/u appt with provider. No further questions.

## 2017-07-19 ENCOUNTER — PATIENT MESSAGE (OUTPATIENT)
Dept: NEUROSURGERY | Facility: CLINIC | Age: 72
End: 2017-07-19

## 2017-07-21 ENCOUNTER — TELEPHONE (OUTPATIENT)
Dept: NEUROSURGERY | Facility: CLINIC | Age: 72
End: 2017-07-21

## 2017-07-21 NOTE — TELEPHONE ENCOUNTER
----- Message from Hayde Montalvo sent at 7/21/2017 11:10 AM CDT -----  Contact: [facility]  Patient needs refill on Gabapentin, 300 mg  sent to [facility] on [address] [place].

## 2017-07-24 ENCOUNTER — TELEPHONE (OUTPATIENT)
Dept: NEUROSURGERY | Facility: CLINIC | Age: 72
End: 2017-07-24

## 2017-07-24 NOTE — TELEPHONE ENCOUNTER
"I have reviewed his chart.    Per Vidhi's note on his date of discharge from the hospital.    " Patient receives pain medications from PCP. He will obtain future refills from PCP. "    At this time, he needs to contact his PCP for refills of oxycodone and morphine.  "

## 2017-07-24 NOTE — TELEPHONE ENCOUNTER
Called patient to relay this information. Number to Dr. Foss's office given to patient. Verbalized understanding.

## 2017-07-24 NOTE — TELEPHONE ENCOUNTER
----- Message from Kendy Pisano LPN sent at 7/24/2017  9:21 AM CDT -----  Contact: wife,  cristo   Patient is not quite 4 weeks post op. This is his first time requesting a refill. He did ask for a refill of his Gabapentin last week and we've already given this to him.   ----- Message -----  From: Rachel Gilliland  Sent: 7/24/2017   9:12 AM  To: Janeth COLEMAN Staff    oxycodone-acetaminophen (PERCOCET) 7.5-325 mg per tablet  morphine (MS CONTIN) 30 MG 12 hr tablet    .  Call back        [facility] #[id number] - [place] - [address]  [address] [place] [address]  Phone: [phone] Fax: [phone]    Call pt, he was [facility] meds

## 2017-07-26 ENCOUNTER — HOSPITAL ENCOUNTER (OUTPATIENT)
Dept: RADIOLOGY | Facility: HOSPITAL | Age: 72
Discharge: HOME | End: 2017-07-26
Attending: NEUROLOGICAL SURGERY
Payer: MEDICARE

## 2017-07-26 ENCOUNTER — OFFICE VISIT (OUTPATIENT)
Dept: NEUROSURGERY | Facility: CLINIC | Age: 72
End: 2017-07-26
Payer: MEDICARE

## 2017-07-26 VITALS
BODY MASS INDEX: 32.13 KG/M2 | SYSTOLIC BLOOD PRESSURE: 145 MMHG | RESPIRATION RATE: 14 BRPM | DIASTOLIC BLOOD PRESSURE: 79 MMHG | WEIGHT: 224.44 LBS | HEIGHT: 70 IN | HEART RATE: 85 BPM

## 2017-07-26 DIAGNOSIS — Z98.1 STATUS POST LUMBAR SPINAL FUSION: Primary | ICD-10-CM

## 2017-07-26 DIAGNOSIS — Z98.1 STATUS POST LUMBAR SPINAL FUSION: ICD-10-CM

## 2017-07-26 DIAGNOSIS — E66.9 OBESITY (BMI 30.0-34.9): ICD-10-CM

## 2017-07-26 PROCEDURE — 72100 X-RAY EXAM L-S SPINE 2/3 VWS: CPT | Mod: TC,PO

## 2017-07-26 PROCEDURE — 99024 POSTOP FOLLOW-UP VISIT: CPT | Mod: S$GLB,,, | Performed by: PHYSICIAN ASSISTANT

## 2017-07-26 PROCEDURE — 72100 X-RAY EXAM L-S SPINE 2/3 VWS: CPT | Mod: 26,,, | Performed by: RADIOLOGY

## 2017-07-26 RX ORDER — TIZANIDINE 4 MG/1
TABLET ORAL
COMMUNITY
Start: 2017-07-17 | End: 2017-08-02 | Stop reason: SDUPTHER

## 2017-07-27 ENCOUNTER — PATIENT MESSAGE (OUTPATIENT)
Dept: NEUROSURGERY | Facility: CLINIC | Age: 72
End: 2017-07-27

## 2017-07-28 NOTE — TELEPHONE ENCOUNTER
Pt called stating he will  his paperwork from the office. Office hours given to patient. Pt verbalized understanding.

## 2017-07-31 ENCOUNTER — PATIENT MESSAGE (OUTPATIENT)
Dept: FAMILY MEDICINE | Facility: CLINIC | Age: 72
End: 2017-07-31

## 2017-08-01 RX ORDER — MORPHINE SULFATE 30 MG/1
30 TABLET, FILM COATED, EXTENDED RELEASE ORAL EVERY 8 HOURS
Qty: 30 TABLET | Refills: 0 | Status: CANCELLED | OUTPATIENT
Start: 2017-08-01 | End: 2017-08-11

## 2017-08-01 RX ORDER — TIZANIDINE 4 MG/1
4 TABLET ORAL EVERY 8 HOURS PRN
Qty: 45 TABLET | Refills: 3 | Status: CANCELLED | OUTPATIENT
Start: 2017-08-01

## 2017-08-01 NOTE — PROGRESS NOTES
Neurosurgery History & Physical    Patient ID: Sam Pete is a 72 y.o. male.    Chief Complaint   Patient presents with    Post-op Evaluation     lumbar       Review of Systems   Constitutional: Negative for activity change, chills, fatigue and unexpected weight change.   HENT: Negative for hearing loss, tinnitus, trouble swallowing and voice change.    Eyes: Negative for visual disturbance.   Respiratory: Negative for apnea, chest tightness and shortness of breath.    Cardiovascular: Negative for chest pain and palpitations.   Gastrointestinal: Negative for abdominal pain, constipation, diarrhea, nausea and vomiting.   Genitourinary: Negative for difficulty urinating, dysuria and frequency.   Musculoskeletal: Positive for back pain. Negative for gait problem, neck pain and neck stiffness.   Skin: Negative for wound.   Neurological: Negative for dizziness, tremors, seizures, facial asymmetry, speech difficulty, weakness, light-headedness, numbness and headaches.   Psychiatric/Behavioral: Negative for confusion and decreased concentration.       Past Medical History:   Diagnosis Date    Anticoagulant long-term use     ASA 81 mg    Arthritis     Chronic low back pain     ED (erectile dysfunction)     GERD (gastroesophageal reflux disease)     Hypertension     Obesity     Stroke 1997    basal ganglia, left sided weakness, resolved    Testicular hypofunction      Social History     Social History    Marital status:      Spouse name: N/A    Number of children: N/A    Years of education: N/A     Occupational History    Not on file.     Social History Main Topics    Smoking status: Former Smoker     Packs/day: 1.00     Years: 30.00     Start date: 8/3/1963     Quit date: 1/1/1992    Smokeless tobacco: Never Used    Alcohol use Yes      Comment: On occasion    Drug use:      Types: Oxycodone, Morphine    Sexual activity: Not on file     Other Topics Concern    Not on file     Social History  Narrative    No narrative on file     Family History   Problem Relation Age of Onset    Hypertension Father     Heart disease Father     Drug abuse Daughter     Hypertension Son     Lung disease Sister     COPD Sister     Hypertension Sister     Diverticulitis Sister     Gout Sister     Kidney disease Sister     No Known Problems Maternal Grandmother     No Known Problems Maternal Grandfather     No Known Problems Paternal Grandmother     No Known Problems Paternal Grandfather     No Known Problems Maternal Aunt     No Known Problems Maternal Uncle     No Known Problems Paternal Aunt     No Known Problems Paternal Uncle      Review of patient's allergies indicates:   Allergen Reactions    Xyzal [levocetirizine] Other (See Comments)     Knocked him out        Current Outpatient Prescriptions:     aspirin 81 mg Tab, Take 1 tablet by mouth Daily. Every day, Disp: , Rfl:     losartan (COZAAR) 50 MG tablet, TAKE 1 TABLET ONE TIME DAILY, Disp: 90 tablet, Rfl: 3    metoprolol succinate (TOPROL-XL) 50 MG 24 hr tablet, Take 1 tablet (50 mg total) by mouth every evening., Disp: 30 tablet, Rfl: 0    omeprazole (PRILOSEC) 40 MG capsule, TAKE 1 CAPSULE ONE TIME DAILY, Disp: 90 capsule, Rfl: 3    oxycodone-acetaminophen (PERCOCET) 7.5-325 mg per tablet, Take 1 tablet by mouth every 4 (four) hours as needed for Pain., Disp: 60 tablet, Rfl: 0    pregabalin (LYRICA) 75 MG capsule, Take 1 capsule (75 mg total) by mouth 2 (two) times daily., Disp: 60 capsule, Rfl: 0    tizanidine (ZANAFLEX) 4 MG tablet, , Disp: , Rfl:     ammonium lactate 12 % Crea, Apply 1 application topically 2 (two) times daily. (Patient taking differently: Apply 1 application topically 2 (two) times daily as needed. ), Disp: 140 g, Rfl: 11    morphine (MS CONTIN) 30 MG 12 hr tablet, Take 1 tablet (30 mg total) by mouth every 8 (eight) hours., Disp: 30 tablet, Rfl: 0    Vitals:    07/26/17 1100   BP: (!) 145/79   Pulse: 85   Resp: 14  "  Weight: 101.8 kg (224 lb 6.9 oz)   Height: 5' 10" (1.778 m)       Physical Exam   Constitutional: He is oriented to person, place, and time. He appears well-developed and well-nourished.   HENT:   Head: Normocephalic and atraumatic.   Eyes: Pupils are equal, round, and reactive to light.   Neck: Normal range of motion. Neck supple.   Cardiovascular: Normal rate.    Pulmonary/Chest: Effort normal.   Abdominal: He exhibits no distension.   Musculoskeletal: Normal range of motion. He exhibits no edema.   Neurological: He is alert and oriented to person, place, and time. He has a normal Finger-Nose-Finger Test, a normal Heel to Shin Test, a normal Romberg Test and a normal Tandem Gait Test. Gait normal.   Reflex Scores:       Tricep reflexes are 2+ on the right side and 2+ on the left side.       Bicep reflexes are 2+ on the right side and 2+ on the left side.       Brachioradialis reflexes are 2+ on the right side and 2+ on the left side.       Patellar reflexes are 2+ on the right side and 2+ on the left side.       Achilles reflexes are 2+ on the right side and 2+ on the left side.  Skin: Skin is warm and dry.   Psychiatric: He has a normal mood and affect. His speech is normal and behavior is normal. Judgment and thought content normal.   Nursing note and vitals reviewed.      Neurologic Exam     Mental Status   Oriented to person, place, and time.   Oriented to person.   Oriented to place.   Oriented to time.   Follows 3 step commands.   Attention: normal. Concentration: normal.   Speech: speech is normal   Level of consciousness: alert  Knowledge: consistent with education.   Able to name object. Able to read. Able to repeat. Able to write. Normal comprehension.     Cranial Nerves     CN II   Visual acuity: normal  Right visual field deficit: none  Left visual field deficit: none     CN III, IV, VI   Pupils are equal, round, and reactive to light.  Right pupil: Size: 3 mm. Shape: regular. Reactivity: brisk. " Consensual response: intact.   Left pupil: Size: 3 mm. Shape: regular. Reactivity: brisk. Consensual response: intact.   CN III: no CN III palsy  CN VI: no CN VI palsy  Nystagmus: none   Diplopia: none  Ophthalmoparesis: none  Conjugate gaze: present    CN V   Right facial sensation deficit: none  Left facial sensation deficit: none    CN VII   Right facial weakness: none  Left facial weakness: none    CN VIII   Hearing: intact    CN IX, X   CN IX normal.   CN X normal.     CN XI   Right sternocleidomastoid strength: normal  Left sternocleidomastoid strength: normal  Right trapezius strength: normal  Left trapezius strength: normal    CN XII   Fasciculations: absent  Tongue deviation: none    Motor Exam   Muscle bulk: normal  Overall muscle tone: normal  Right arm pronator drift: absent  Left arm pronator drift: absent    Strength   Right neck flexion: 5/5  Left neck flexion: 5/5  Right neck extension: 5/5  Left neck extension: 5/5  Right deltoid: 5/5  Left deltoid: 5/5  Right biceps: 5/5  Left biceps: 5/5  Right triceps: 5/5  Left triceps: 5/5  Right wrist flexion: 5/5  Left wrist flexion: 5/5  Right wrist extension: 5/5  Left wrist extension: 5/5  Right interossei: 5/5  Left interossei: 5/5  Right abdominals: 5/5  Left abdominals: 5/5  Right iliopsoas: 5/5  Left iliopsoas: 5/5  Right quadriceps: 5/5  Left quadriceps: 5/5  Right hamstrin/5  Left hamstrin/5  Right glutei: 5/5  Left glutei: 5/5  Right anterior tibial: 5/5  Left anterior tibial: 5/5  Right posterior tibial: 5/5  Left posterior tibial: 5/5  Right peroneal: 5/5  Left peroneal: 5/5  Right gastroc: 5/5  Left gastroc: 5/5    Sensory Exam   Right arm light touch: normal  Left arm light touch: normal  Right leg light touch: normal  Left leg light touch: normal  Right arm vibration: normal  Left arm vibration: normal  Right arm pinprick: normal  Left arm pinprick: normal    Gait, Coordination, and Reflexes     Gait  Gait: normal    Coordination    Romberg: negative  Finger to nose coordination: normal  Heel to shin coordination: normal  Tandem walking coordination: normal    Tremor   Resting tremor: absent  Intention tremor: absent  Action tremor: absent    Reflexes   Right brachioradialis: 2+  Left brachioradialis: 2+  Right biceps: 2+  Left biceps: 2+  Right triceps: 2+  Left triceps: 2+  Right patellar: 2+  Left patellar: 2+  Right achilles: 2+  Left achilles: 2+  Right Rivero: absent  Left Rivero: absent  Right ankle clonus: absent  Left ankle clonus: absent      Provider dictation:  The patient is a 72 year-old male  following up 4 weeks s/p 6-26-17 L2-S1 PSIF with L4/5, L5/S1 TLIF.  He has had 3 previous lumbar surgeries prior to this most recent one.  He recovered as expected post-operatively and reports intermittent, mild lower back pain. his lower extremity symptoms have completely resolved. He continues to take chronic narcotics for pain and will follow with Dr. Montalvo for medication refills.    Post-op x-ray reveal spinal stability and appropriate hardware alignment without change from immediate post-op films.       He has been wearing the lumbar corset as ordered and we would like for him  to wean use of the brace over the next 2 weeks.     Overall he is very pleased with his surgical outcome. We will follow-up via phone at 12 weeks post-op to see how He is coming along.       Visit Diagnosis:  Status post lumbar spinal fusion    Obesity (BMI 30.0-34.9)        Total time spent counseling greater than fifty percent of total visit time.  Counseling included discussion regarding imaging findings, diagnosis possibilities, treatment options, risks and benefits.   The patient had many questions regarding the options and long-term effects.

## 2017-08-02 ENCOUNTER — PATIENT MESSAGE (OUTPATIENT)
Dept: FAMILY MEDICINE | Facility: CLINIC | Age: 72
End: 2017-08-02

## 2017-08-02 RX ORDER — MORPHINE SULFATE 30 MG/1
30 TABLET, FILM COATED, EXTENDED RELEASE ORAL EVERY 8 HOURS
Qty: 30 TABLET | Refills: 0 | Status: SHIPPED | OUTPATIENT
Start: 2017-08-02 | End: 2017-08-10 | Stop reason: SDUPTHER

## 2017-08-02 RX ORDER — TIZANIDINE 4 MG/1
4 TABLET ORAL EVERY 8 HOURS PRN
Qty: 60 TABLET | Refills: 2 | Status: SHIPPED | OUTPATIENT
Start: 2017-08-02 | End: 2017-09-29 | Stop reason: SDUPTHER

## 2017-08-10 NOTE — TELEPHONE ENCOUNTER
"Someone named Vidhi Price is listed on  site as prescribing morphine and percocet for him recently.  Dose has been changed in epic and several scripts have been cancelled as "error" I can't give him anything until this is straightened out.  Looks like he is getting controlled drugs elsewhere  "

## 2017-08-11 RX ORDER — MORPHINE SULFATE 30 MG/1
30 TABLET, FILM COATED, EXTENDED RELEASE ORAL EVERY 8 HOURS
Qty: 90 TABLET | Refills: 0 | Status: SHIPPED | OUTPATIENT
Start: 2017-08-11 | End: 2017-09-08 | Stop reason: SDUPTHER

## 2017-08-11 NOTE — TELEPHONE ENCOUNTER
Patient recently had back surgery with Dr. Stanley Price is his PA. Patient request for Morphine 30mg one three times daily- Percocet not needed at present- pain level at present is 5/10- patient in constant pain but is trying not to go higher than 30mg Morphine- he was getting 100mg after surgery.

## 2017-09-08 DIAGNOSIS — G89.29 OTHER CHRONIC PAIN: Primary | ICD-10-CM

## 2017-09-08 RX ORDER — MORPHINE SULFATE 30 MG/1
30 TABLET, FILM COATED, EXTENDED RELEASE ORAL EVERY 8 HOURS
Qty: 90 TABLET | Refills: 0 | Status: SHIPPED | OUTPATIENT
Start: 2017-09-08 | End: 2017-09-29

## 2017-09-08 NOTE — TELEPHONE ENCOUNTER
Sent in but he is overdue for 90 day check.  Last refill unless seen.   clear, no inappropriate activity

## 2017-09-19 ENCOUNTER — PATIENT MESSAGE (OUTPATIENT)
Dept: FAMILY MEDICINE | Facility: CLINIC | Age: 72
End: 2017-09-19

## 2017-09-20 ENCOUNTER — PATIENT MESSAGE (OUTPATIENT)
Dept: FAMILY MEDICINE | Facility: CLINIC | Age: 72
End: 2017-09-20

## 2017-09-20 RX ORDER — OXYCODONE AND ACETAMINOPHEN 7.5; 325 MG/1; MG/1
1 TABLET ORAL EVERY 4 HOURS PRN
Qty: 120 TABLET | Refills: 0 | Status: SHIPPED | OUTPATIENT
Start: 2017-09-20 | End: 2017-09-29

## 2017-09-20 NOTE — TELEPHONE ENCOUNTER
We need to wean down or what was the point of having the surgery?  Dr. Fowler had cut back to 7.5 after surgery

## 2017-09-25 ENCOUNTER — DOCUMENTATION ONLY (OUTPATIENT)
Dept: FAMILY MEDICINE | Facility: CLINIC | Age: 72
End: 2017-09-25

## 2017-09-25 NOTE — PROGRESS NOTES
Pre-Visit Chart Review  For Appointment Scheduled on 9-29-17    Health Maintenance Due   Topic Date Due    Zoster Vaccine  04/26/2007    Colonoscopy  07/12/2014

## 2017-09-29 ENCOUNTER — OFFICE VISIT (OUTPATIENT)
Dept: FAMILY MEDICINE | Facility: CLINIC | Age: 72
End: 2017-09-29
Payer: MEDICARE

## 2017-09-29 ENCOUNTER — PATIENT MESSAGE (OUTPATIENT)
Dept: FAMILY MEDICINE | Facility: CLINIC | Age: 72
End: 2017-09-29

## 2017-09-29 VITALS
TEMPERATURE: 98 F | RESPIRATION RATE: 12 BRPM | OXYGEN SATURATION: 97 % | DIASTOLIC BLOOD PRESSURE: 62 MMHG | BODY MASS INDEX: 32.35 KG/M2 | HEIGHT: 70 IN | SYSTOLIC BLOOD PRESSURE: 122 MMHG | HEART RATE: 84 BPM | WEIGHT: 226 LBS

## 2017-09-29 DIAGNOSIS — G89.29 OTHER CHRONIC PAIN: ICD-10-CM

## 2017-09-29 DIAGNOSIS — G89.29 OTHER CHRONIC PAIN: Primary | ICD-10-CM

## 2017-09-29 DIAGNOSIS — M51.36 DDD (DEGENERATIVE DISC DISEASE), LUMBAR: ICD-10-CM

## 2017-09-29 PROCEDURE — 1159F MED LIST DOCD IN RCRD: CPT | Mod: S$GLB,,, | Performed by: FAMILY MEDICINE

## 2017-09-29 PROCEDURE — 1125F AMNT PAIN NOTED PAIN PRSNT: CPT | Mod: S$GLB,,, | Performed by: FAMILY MEDICINE

## 2017-09-29 PROCEDURE — 3078F DIAST BP <80 MM HG: CPT | Mod: S$GLB,,, | Performed by: FAMILY MEDICINE

## 2017-09-29 PROCEDURE — 3074F SYST BP LT 130 MM HG: CPT | Mod: S$GLB,,, | Performed by: FAMILY MEDICINE

## 2017-09-29 PROCEDURE — 3008F BODY MASS INDEX DOCD: CPT | Mod: S$GLB,,, | Performed by: FAMILY MEDICINE

## 2017-09-29 PROCEDURE — 99213 OFFICE O/P EST LOW 20 MIN: CPT | Mod: S$GLB,,, | Performed by: FAMILY MEDICINE

## 2017-09-29 PROCEDURE — 99999 PR PBB SHADOW E&M-EST. PATIENT-LVL III: CPT | Mod: PBBFAC,,, | Performed by: FAMILY MEDICINE

## 2017-09-29 RX ORDER — OXYCODONE AND ACETAMINOPHEN 10; 325 MG/1; MG/1
1 TABLET ORAL EVERY 6 HOURS PRN
Qty: 120 TABLET | Refills: 0 | Status: SHIPPED | OUTPATIENT
Start: 2017-09-29 | End: 2017-09-29 | Stop reason: SDUPTHER

## 2017-09-29 RX ORDER — OXYCODONE AND ACETAMINOPHEN 10; 325 MG/1; MG/1
1 TABLET ORAL EVERY 6 HOURS PRN
Refills: 0 | COMMUNITY
Start: 2017-08-22 | End: 2017-09-29 | Stop reason: SDUPTHER

## 2017-09-29 RX ORDER — MORPHINE SULFATE 60 MG/1
60 TABLET, FILM COATED, EXTENDED RELEASE ORAL 2 TIMES DAILY
Qty: 60 TABLET | Refills: 0 | Status: SHIPPED | OUTPATIENT
Start: 2017-09-29 | End: 2017-10-26 | Stop reason: SDUPTHER

## 2017-09-29 RX ORDER — TIZANIDINE 4 MG/1
4 TABLET ORAL EVERY 8 HOURS PRN
Qty: 60 TABLET | Refills: 5 | Status: SHIPPED | OUTPATIENT
Start: 2017-09-29 | End: 2018-11-19 | Stop reason: SDUPTHER

## 2017-09-29 RX ORDER — OXYCODONE AND ACETAMINOPHEN 10; 325 MG/1; MG/1
1 TABLET ORAL EVERY 6 HOURS PRN
Qty: 120 TABLET | Refills: 0 | Status: SHIPPED | OUTPATIENT
Start: 2017-09-29 | End: 2017-10-26 | Stop reason: SDUPTHER

## 2017-09-29 NOTE — PATIENT INSTRUCTIONS
Weight Management: Getting Started  Healthy bodies come in all shapes and sizes. Not all bodies are made to be thin. For some people, a healthy weight is higher than the average weight listed on weight charts. Your healthcare provider can help you decide on a healthy weight for you.    Reasons to lose weight  Losing weight can help with some health problems, such as high blood pressure, heart disease, diabetes, sleep apnea, and arthritis. You may also feel more energy.  Set your long-term goal  Your goal doesn't even have to be a specific weight. You may decide on a fitness goal (such as being able to walk 10 miles a week), or a health goal (such as lowering your blood pressure). Choose a goal that is measurable and reasonable, so you know when you've reached it. A goal of reaching a BMI of less than 25 is not always reasonable (or possible).   Make an action plan  Habits dont change overnight. Setting your goals too high can leave you feeling discouraged if you cant reach them. Be realistic. Choose one or two small changes you can make now. Set an action plan for how you are going to make these changes. When you can stick to this plan, keep making a few more small changes. Taking small steps will help you stay on the path to success.  Track your progress  Write down your goals. Then, keep a daily record of your progress. Write down what you eat and how active you are. This record lets you look back on how much youve done. It may also help when youre feeling frustrated. Reward yourself for success. Even if you dont reach every goal, give yourself credit for what you do get done.  Get support  Encouragement from others can help make losing weight easier. Ask your family members and friends for support. They may even want to join you. Also look to your healthcare provider, registered dietitian, and  for help. Your local hospital can give you more information about nutrition, exercise, and  weight loss.  Date Last Reviewed: 1/31/2016  © 3147-2445 [employer], [employer]. [address] [place] [address], [place] [address]. All rights reserved. This information is not intended as a substitute for professional medical care. Always follow your healthcare professional's instructions.

## 2017-09-29 NOTE — PROGRESS NOTES
Subjective:       Patient ID: Sam Pete is a 72 y.o. male.    Chief Complaint: Medication Refill    72-year-old male with a long history of degenerative joint disease and disc disease of the lower spine along with arthritis and surgery had surgery for spinal stenosis several months back.  He is improving but very slowly.  Neurosurgery has been weaning his pain meds down faster than he can tolerate especially as he still trying to work and is unable to carry out his functions on current meds.  Currently he is taking MS Contin 30 mg 3 times daily and is on the 7.5 mg Percocet.  Pain level at this moment is 6-7 out of 10 and the 7.5 mg Percocet will bring it down to a 4 or 5 at best.  He is proposing to go to the MS Contin 60 mg twice a day for a slight increase in an easier schedule and would like to go back up to the Percocet 10 for a month or 2.  He is also leaving Sunday on a family trip to [place] and will be on his feet a lot.    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    Past Surgical History:  No date: APPENDECTOMY  No date: BACK SURGERY      Comment: 3- back surgery  07/12/2004: COLONOSCOPY      Comment: Dr Rodriguez 7-10 year elisa   No date: Epidural steroid injection      Comment: Pain management  No date: FRACTURE SURGERY Left      Comment: wrist / forearm, total of 5  2-6-2013: JOINT REPLACEMENT      Comment: ( rt hip 2007), left hip   No date: JOINT REPLACEMENT Left      Comment: total knee  No date: KNEE ARTHROSCOPY W/ DEBRIDEMENT      Comment: bilateral knees , total of six  No date: KNEE SURGERY  No date: Nervbe Block injection      Comment: Pain  management          Review of Systems   Musculoskeletal: Positive for arthralgias, back pain and myalgias.       Objective:      Physical Exam   Constitutional: He is oriented to person, place, and time.   Good blood pressure control   Neurological: He is alert and oriented to person, place, and time.   Psychiatric: He has a normal mood and affect. His behavior is normal.   Nursing note and vitals reviewed.      Assessment:       1. Other chronic pain    2. DDD (degenerative disc disease), lumbar        Plan:       1. Other chronic pain  Will increase MS Contin to 60 mg twice daily with Percocet 10-3 25 for when necessary use.  I would like to reduce it back to a 7-3 25 as soon as possible but will give him some time to adjust to the morphine change.   was checked with no inappropriate activity although Vidhi Price in the neurosurgery department has done some refills recently, back in July.  - morphine (MS CONTIN) 60 MG 12 hr tablet; Take 1 tablet (60 mg total) by mouth 2 (two) times daily.  Dispense: 60 tablet; Refill: 0  - oxycodone-acetaminophen (PERCOCET)  mg per tablet; Take 1 tablet by mouth every 6 (six) hours as needed.  Dispense: 120 tablet; Refill: 0  - tizanidine (ZANAFLEX) 4 MG tablet; Take 1 tablet (4 mg total) by mouth every 8 (eight) hours as needed.  Dispense: 60 tablet; Refill: 5    2. DDD (degenerative disc disease), lumbar

## 2017-10-11 ENCOUNTER — TELEPHONE (OUTPATIENT)
Dept: NEUROSURGERY | Facility: CLINIC | Age: 72
End: 2017-10-11

## 2017-10-11 NOTE — TELEPHONE ENCOUNTER
Called and left message with patient. Doing follow up calls for Dr. Fowler 3 months since Laminectomy. Calling to see how patient is feeling 3 months after surgery.

## 2017-10-11 NOTE — TELEPHONE ENCOUNTER
----- Message from Kendy Pisano LPN sent at 10/11/2017  8:58 AM CDT -----      ----- Message -----  From: Rose Singh, PATY  Sent: 5/11/2017   4:08 PM  To: Kendy Pisano LPN, Rose Singh, RN, #

## 2017-10-11 NOTE — TELEPHONE ENCOUNTER
Returned pt's phone call for 3 month phone follow up. LVM.    ----- Message from Jessica Bojorquez sent at 10/11/2017  1:18 PM CDT -----  Contact: self  Patient returning call for 3 month phone follow-up. Patient call back is [phone].

## 2017-10-16 ENCOUNTER — PATIENT MESSAGE (OUTPATIENT)
Dept: FAMILY MEDICINE | Facility: CLINIC | Age: 72
End: 2017-10-16

## 2017-10-16 RX ORDER — PREGABALIN 75 MG/1
75 CAPSULE ORAL 2 TIMES DAILY
Qty: 60 CAPSULE | Refills: 5 | Status: SHIPPED | OUTPATIENT
Start: 2017-10-16 | End: 2017-11-22 | Stop reason: SDUPTHER

## 2017-10-26 ENCOUNTER — PATIENT MESSAGE (OUTPATIENT)
Dept: FAMILY MEDICINE | Facility: CLINIC | Age: 72
End: 2017-10-26

## 2017-10-26 DIAGNOSIS — G89.29 OTHER CHRONIC PAIN: ICD-10-CM

## 2017-10-26 RX ORDER — MORPHINE SULFATE 60 MG/1
60 TABLET, FILM COATED, EXTENDED RELEASE ORAL 2 TIMES DAILY
Qty: 60 TABLET | Refills: 0 | Status: SHIPPED | OUTPATIENT
Start: 2017-10-26 | End: 2017-11-22 | Stop reason: SDUPTHER

## 2017-10-26 RX ORDER — OXYCODONE AND ACETAMINOPHEN 10; 325 MG/1; MG/1
1 TABLET ORAL EVERY 6 HOURS PRN
Qty: 120 TABLET | Refills: 0 | Status: SHIPPED | OUTPATIENT
Start: 2017-10-26 | End: 2017-11-22 | Stop reason: SDUPTHER

## 2017-11-22 DIAGNOSIS — G89.29 OTHER CHRONIC PAIN: ICD-10-CM

## 2017-11-22 RX ORDER — PREGABALIN 75 MG/1
75 CAPSULE ORAL 2 TIMES DAILY
Qty: 60 CAPSULE | Refills: 5 | Status: SHIPPED | OUTPATIENT
Start: 2017-11-22 | End: 2018-06-01 | Stop reason: SDUPTHER

## 2017-11-22 RX ORDER — OXYCODONE AND ACETAMINOPHEN 10; 325 MG/1; MG/1
1 TABLET ORAL EVERY 6 HOURS PRN
Qty: 120 TABLET | Refills: 0 | Status: SHIPPED | OUTPATIENT
Start: 2017-11-22 | End: 2017-12-22 | Stop reason: SDUPTHER

## 2017-11-22 RX ORDER — MORPHINE SULFATE 60 MG/1
60 TABLET, FILM COATED, EXTENDED RELEASE ORAL 2 TIMES DAILY
Qty: 60 TABLET | Refills: 0 | Status: SHIPPED | OUTPATIENT
Start: 2017-11-22 | End: 2017-12-22 | Stop reason: SDUPTHER

## 2017-11-22 NOTE — TELEPHONE ENCOUNTER
lov 9/29/17  lab 6/29/17  last written:   Lyrica-10/16/17   MS Contin-10/26/17   Percocet-10/26/17  appt 12/22/17

## 2017-12-07 ENCOUNTER — OFFICE VISIT (OUTPATIENT)
Dept: URGENT CARE | Facility: CLINIC | Age: 72
End: 2017-12-07
Payer: MEDICARE

## 2017-12-07 VITALS
TEMPERATURE: 98 F | HEIGHT: 70 IN | DIASTOLIC BLOOD PRESSURE: 85 MMHG | OXYGEN SATURATION: 95 % | HEART RATE: 81 BPM | RESPIRATION RATE: 18 BRPM | WEIGHT: 218 LBS | SYSTOLIC BLOOD PRESSURE: 139 MMHG | BODY MASS INDEX: 31.21 KG/M2

## 2017-12-07 DIAGNOSIS — J06.9 UPPER RESPIRATORY TRACT INFECTION, UNSPECIFIED TYPE: Primary | ICD-10-CM

## 2017-12-07 PROCEDURE — 99213 OFFICE O/P EST LOW 20 MIN: CPT | Mod: S$GLB,,, | Performed by: EMERGENCY MEDICINE

## 2017-12-07 RX ORDER — AMLODIPINE BESYLATE 10 MG/1
1 TABLET ORAL DAILY
COMMUNITY
Start: 2017-10-10 | End: 2018-05-24 | Stop reason: SDUPTHER

## 2017-12-07 RX ORDER — DOXYCYCLINE 100 MG/1
100 CAPSULE ORAL 2 TIMES DAILY
Qty: 14 CAPSULE | Refills: 0 | Status: SHIPPED | OUTPATIENT
Start: 2017-12-07 | End: 2017-12-14

## 2017-12-07 NOTE — PROGRESS NOTES
"Subjective:       Patient ID: Sam Pete is a 72 y.o. male.    Vitals:  height is 5' 10" (1.778 m) and weight is 98.9 kg (218 lb). His oral temperature is 97.6 °F (36.4 °C). His blood pressure is 139/85 and his pulse is 81. His respiration is 18 and oxygen saturation is 95%.     Chief Complaint: Nasal Congestion (Nasal and chest congestion since this morning)    Cold symptoms 1-2 days. Doctor gave him antibiotic last year and knocked it out wants that      URI    This is a new problem. The current episode started today. The problem has been unchanged. There has been no fever. Associated symptoms include congestion. He has tried nothing for the symptoms.     Review of Systems   HENT: Positive for congestion.        Objective:      Physical Exam   Constitutional: He is oriented to person, place, and time. He appears well-developed and well-nourished. He is cooperative.  Non-toxic appearance. He does not appear ill. No distress.   HENT:   Head: Normocephalic and atraumatic.   Right Ear: Hearing, tympanic membrane, external ear and ear canal normal.   Left Ear: Hearing, tympanic membrane, external ear and ear canal normal.   Nose: Rhinorrhea present. No mucosal edema or nasal deformity. No epistaxis. Right sinus exhibits no maxillary sinus tenderness and no frontal sinus tenderness. Left sinus exhibits no maxillary sinus tenderness and no frontal sinus tenderness.   Mouth/Throat: Uvula is midline, oropharynx is clear and moist and mucous membranes are normal. No trismus in the jaw. Normal dentition. No uvula swelling. No posterior oropharyngeal erythema.   Eyes: Conjunctivae and lids are normal. No scleral icterus.   Sclera clear bilat   Neck: Trachea normal, full passive range of motion without pain and phonation normal.   Cardiovascular: Normal rate, regular rhythm, normal heart sounds and normal pulses.    Pulmonary/Chest: Effort normal and breath sounds normal. No respiratory distress.   Abdominal: Normal " Your current Orthopaedic problem we are working together to treat is:  Right wrist fracture.    FRACTURE CARE    A fracture is a broken bone. A bone may be completely fractured or partially fractured in any number of ways.  The most common cause of a fracture is an injury. Many fractures are very painful and may prevent you from moving the injured area.     You will likely experience increased swelling and bruising which is common after a fracture.  Rest, apply ice and elevate higher then your heart to reduce the swelling.     Fractures take several weeks to several months to heal, depending on the extent of the injury.  Proper diet and exercise may help in preventing some fractures. A diet rich in calcium and Vitamin D will promote bone strength.      Your fracture is being treated with:  The splint that was applied in the emergency room. Please keep the splint clean dry and intact.    It is recommended you schedule a follow-up appointment with Lior Burkett PA-C  1 week.      Office hours are 8:00 am to 5:00 pm Monday through Friday.  If it is urgent that you speak with someone outside of these hours, our [facility] will be able to assist you.  You can reach the office by calling the:  [facility] [place]  [address] [place] [address] [phone] during office hours  [phone] after office hours    Thank you for choosing [facility] as your Orthopaedic provider!       appearance. He exhibits no distension. There is no tenderness.   Musculoskeletal: Normal range of motion. He exhibits no edema or deformity.   Neurological: He is alert and oriented to person, place, and time. He exhibits normal muscle tone. Coordination normal.   Skin: Skin is warm, dry and intact. He is not diaphoretic. No pallor.   Psychiatric: He has a normal mood and affect. His speech is normal and behavior is normal. Judgment and thought content normal. Cognition and memory are normal.   Nursing note and vitals reviewed.      Assessment:       1. Upper respiratory tract infection, unspecified type        Plan:         Upper respiratory tract infection, unspecified type  -     doxycycline (VIBRAMYCIN) 100 MG Cap; Take 1 capsule (100 mg total) by mouth 2 (two) times daily.  Dispense: 14 capsule; Refill: 0

## 2017-12-07 NOTE — PATIENT INSTRUCTIONS
You likely have a cold and don't need antibiotics  Viral Upper Respiratory Illness (Adult)  You have a viral upper respiratory illness (URI), which is another term for the common cold. This illness is contagious during the first few days. It is spread through the air by coughing and sneezing. It may also be spread by direct contact (touching the sick person and then touching your own eyes, nose, or mouth). Frequent handwashing will decrease risk of spread. Most viral illnesses go away within 7 to 10 days with rest and simple home remedies. Sometimes the illness may last for several weeks. Antibiotics will not kill a virus, and they are generally not prescribed for this condition.    Home care  · If symptoms are severe, rest at home for the first 2 to 3 days. When you resume activity, don't let yourself get too tired.  · Avoid being exposed to cigarette smoke (yours or others).  · You may use acetaminophen or ibuprofen to control pain and fever, unless another medicine was prescribed. (Note: If you have chronic liver or kidney disease, have ever had a stomach ulcer or gastrointestinal bleeding, or are taking blood-thinning medicines, talk with your healthcare provider before using these medicines.) Aspirin should never be given to anyone under 18 years of age who is ill with a viral infection or fever. It may cause severe liver or brain damage.  · Your appetite may be poor, so a light diet is fine. Avoid dehydration by drinking 6 to 8 glasses of fluids per day (water, soft drinks, juices, tea, or soup). Extra fluids will help loosen secretions in the nose and lungs.  · Over-the-counter cold medicines will not shorten the length of time youre sick, but they may be helpful for the following symptoms: cough, sore throat, and nasal and sinus congestion. (Note: Do not use decongestants if you have high blood pressure.)  Follow-up care  Follow up with your healthcare provider, or as advised.  When to seek medical  advice  Call your healthcare provider right away if any of these occur:  · Cough with lots of colored sputum (mucus)  · Severe headache; face, neck, or ear pain  · Difficulty swallowing due to throat pain  · Fever of 100.4°F (38°C)  Call 911, or get immediate medical care  Call emergency services right away if any of these occur:  · Chest pain, shortness of breath, wheezing, or difficulty breathing  · Coughing up blood  · Inability to swallow due to throat pain  Date Last Reviewed: 9/13/2015 © 2000-2017 [employer]. [address] [place] [address]. All rights reserved. This information is not intended as a substitute for professional medical care. Always follow your healthcare professional's instructions.

## 2017-12-10 ENCOUNTER — TELEPHONE (OUTPATIENT)
Dept: URGENT CARE | Facility: CLINIC | Age: 72
End: 2017-12-10

## 2017-12-13 ENCOUNTER — DOCUMENTATION ONLY (OUTPATIENT)
Dept: FAMILY MEDICINE | Facility: CLINIC | Age: 72
End: 2017-12-13

## 2017-12-13 NOTE — PROGRESS NOTES
Pre-Visit Chart Review  For Appointment Scheduled on 12-22-17    Health Maintenance Due   Topic Date Due    Zoster Vaccine  04/26/2007    Colonoscopy  07/12/2014

## 2017-12-22 ENCOUNTER — LAB VISIT (OUTPATIENT)
Dept: LAB | Facility: HOSPITAL | Age: 72
End: 2017-12-22
Attending: FAMILY MEDICINE
Payer: MEDICARE

## 2017-12-22 ENCOUNTER — OFFICE VISIT (OUTPATIENT)
Dept: FAMILY MEDICINE | Facility: CLINIC | Age: 72
End: 2017-12-22
Payer: MEDICARE

## 2017-12-22 VITALS
HEIGHT: 70 IN | OXYGEN SATURATION: 98 % | SYSTOLIC BLOOD PRESSURE: 130 MMHG | DIASTOLIC BLOOD PRESSURE: 66 MMHG | TEMPERATURE: 98 F | WEIGHT: 227.06 LBS | HEART RATE: 78 BPM | BODY MASS INDEX: 32.51 KG/M2 | RESPIRATION RATE: 16 BRPM

## 2017-12-22 DIAGNOSIS — M47.816 SPONDYLOSIS OF LUMBAR REGION WITHOUT MYELOPATHY OR RADICULOPATHY: ICD-10-CM

## 2017-12-22 DIAGNOSIS — M51.37 DEGENERATION OF LUMBAR OR LUMBOSACRAL INTERVERTEBRAL DISC: ICD-10-CM

## 2017-12-22 DIAGNOSIS — G89.29 OTHER CHRONIC PAIN: ICD-10-CM

## 2017-12-22 DIAGNOSIS — M48.061 SPINAL STENOSIS, LUMBAR REGION, WITHOUT NEUROGENIC CLAUDICATION: ICD-10-CM

## 2017-12-22 DIAGNOSIS — G89.29 OTHER CHRONIC PAIN: Primary | ICD-10-CM

## 2017-12-22 LAB
AMPHET+METHAMPHET UR QL: NEGATIVE
BARBITURATES UR QL SCN>200 NG/ML: NEGATIVE
BENZODIAZ UR QL SCN>200 NG/ML: NEGATIVE
BZE UR QL SCN: NEGATIVE
CANNABINOIDS UR QL SCN: NEGATIVE
CREAT UR-MCNC: 42 MG/DL
ETHANOL UR-MCNC: <10 MG/DL
METHADONE UR QL SCN>300 NG/ML: NEGATIVE
OPIATES UR QL SCN: NORMAL
PCP UR QL SCN>25 NG/ML: NEGATIVE
TOXICOLOGY INFORMATION: NORMAL

## 2017-12-22 PROCEDURE — 99214 OFFICE O/P EST MOD 30 MIN: CPT | Mod: S$GLB,,, | Performed by: FAMILY MEDICINE

## 2017-12-22 PROCEDURE — 80307 DRUG TEST PRSMV CHEM ANLYZR: CPT

## 2017-12-22 PROCEDURE — 99999 PR PBB SHADOW E&M-EST. PATIENT-LVL IV: CPT | Mod: PBBFAC,,, | Performed by: FAMILY MEDICINE

## 2017-12-22 RX ORDER — NALOXONE HYDROCHLORIDE 1 MG/ML
INJECTION INTRAMUSCULAR; INTRAVENOUS; SUBCUTANEOUS
Qty: 2 ML | Refills: 1 | Status: SHIPPED | OUTPATIENT
Start: 2017-12-22 | End: 2018-04-12

## 2017-12-22 RX ORDER — OXYCODONE AND ACETAMINOPHEN 10; 325 MG/1; MG/1
1 TABLET ORAL EVERY 6 HOURS PRN
Qty: 120 TABLET | Refills: 0 | Status: SHIPPED | OUTPATIENT
Start: 2018-01-22 | End: 2018-03-23 | Stop reason: SDUPTHER

## 2017-12-22 RX ORDER — MORPHINE SULFATE 60 MG/1
60 TABLET, FILM COATED, EXTENDED RELEASE ORAL 2 TIMES DAILY
Qty: 60 TABLET | Refills: 0 | Status: SHIPPED | OUTPATIENT
Start: 2018-02-22 | End: 2018-03-23 | Stop reason: SDUPTHER

## 2017-12-22 RX ORDER — MORPHINE SULFATE 60 MG/1
60 TABLET, FILM COATED, EXTENDED RELEASE ORAL 2 TIMES DAILY
Qty: 60 TABLET | Refills: 0 | Status: SHIPPED | OUTPATIENT
Start: 2017-12-22 | End: 2018-03-23 | Stop reason: SDUPTHER

## 2017-12-22 RX ORDER — OXYCODONE AND ACETAMINOPHEN 10; 325 MG/1; MG/1
1 TABLET ORAL EVERY 6 HOURS PRN
Qty: 120 TABLET | Refills: 0 | Status: SHIPPED | OUTPATIENT
Start: 2017-12-22 | End: 2018-03-23 | Stop reason: SDUPTHER

## 2017-12-22 RX ORDER — MORPHINE SULFATE 60 MG/1
60 TABLET, FILM COATED, EXTENDED RELEASE ORAL 2 TIMES DAILY
Qty: 60 TABLET | Refills: 0 | Status: SHIPPED | OUTPATIENT
Start: 2018-01-22 | End: 2018-03-23 | Stop reason: SDUPTHER

## 2017-12-22 RX ORDER — OXYCODONE AND ACETAMINOPHEN 10; 325 MG/1; MG/1
1 TABLET ORAL EVERY 6 HOURS PRN
Qty: 120 TABLET | Refills: 0 | Status: SHIPPED | OUTPATIENT
Start: 2018-02-22 | End: 2018-03-23 | Stop reason: SDUPTHER

## 2017-12-22 NOTE — PATIENT INSTRUCTIONS
Weight Management: Getting Started  Healthy bodies come in all shapes and sizes. Not all bodies are made to be thin. For some people, a healthy weight is higher than the average weight listed on weight charts. Your healthcare provider can help you decide on a healthy weight for you.    Reasons to lose weight  Losing weight can help with some health problems, such as high blood pressure, heart disease, diabetes, sleep apnea, and arthritis. You may also feel more energy.  Set your long-term goal  Your goal doesn't even have to be a specific weight. You may decide on a fitness goal (such as being able to walk 10 miles a week), or a health goal (such as lowering your blood pressure). Choose a goal that is measurable and reasonable, so you know when you've reached it. A goal of reaching a BMI of less than 25 is not always reasonable (or possible).   Make an action plan  Habits dont change overnight. Setting your goals too high can leave you feeling discouraged if you cant reach them. Be realistic. Choose one or two small changes you can make now. Set an action plan for how you are going to make these changes. When you can stick to this plan, keep making a few more small changes. Taking small steps will help you stay on the path to success.  Track your progress  Write down your goals. Then, keep a daily record of your progress. Write down what you eat and how active you are. This record lets you look back on how much youve done. It may also help when youre feeling frustrated. Reward yourself for success. Even if you dont reach every goal, give yourself credit for what you do get done.  Get support  Encouragement from others can help make losing weight easier. Ask your family members and friends for support. They may even want to join you. Also look to your healthcare provider, registered dietitian, and  for help. Your local hospital can give you more information about nutrition, exercise, and  weight loss.  Date Last Reviewed: 1/31/2016  © 0809-8261 [employer], [employer]. [address] [place] [address], [place] [address]. All rights reserved. This information is not intended as a substitute for professional medical care. Always follow your healthcare professional's instructions.

## 2017-12-22 NOTE — PROGRESS NOTES
Subjective:       Patient ID: Sam Pete is a 72 y.o. male.    Chief Complaint: Medication Refill    72-year-old male with a history of chronic pain secondary to back and neck pain with spinal stenosis and degenerative arthritis of the left arm.  He completed a pain contract in September 2014 but that appears to have been deleted from Epic.  We attempted to repeat it but the form could not be printed  We completed the opioid risk tool today and  was checked with no inappropriate activity found.  Pain control is adequate with a combination of slow release morphine and some when necessary Percocet.    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    Past Surgical History:  No date: APPENDECTOMY  No date: BACK SURGERY      Comment: 3- back surgery  07/12/2004: COLONOSCOPY      Comment: Dr Rodriguez 7-10 year elisa   No date: Epidural steroid injection      Comment: Pain management  No date: FRACTURE SURGERY Left      Comment: wrist / forearm, total of 5  2-6-2013: JOINT REPLACEMENT      Comment: ( rt hip 2007), left hip   No date: JOINT REPLACEMENT Left      Comment: total knee  No date: KNEE ARTHROSCOPY W/ DEBRIDEMENT      Comment: bilateral knees , total of six  No date: KNEE SURGERY  No date: Nervbe Block injection      Comment: Pain management          Review of Systems   Musculoskeletal: Positive for arthralgias, back pain and neck pain.       Objective:      Physical Exam   Constitutional: He is oriented to person, place, and time. He appears well-developed.   Good blood pressure control  Obese with BMI 32.6 she is up 1.1 pounds from his last visit September 29, 2017   Neurological: He is  alert and oriented to person, place, and time.   Psychiatric: He has a normal mood and affect. His behavior is normal. Judgment and thought content normal.   Nursing note and vitals reviewed.      Assessment:       1. Other chronic pain    2. Degeneration of lumbar or lumbosacral intervertebral disc    3. Spinal stenosis, lumbar region, without neurogenic claudication    4. Spondylosis of lumbar region without myelopathy or radiculopathy        Plan:       1. Other chronic pain  Discussed risks of respiratory depression.  Use for Narcan.  Completed risk tool.  - morphine (MS CONTIN) 60 MG 12 hr tablet; Take 1 tablet (60 mg total) by mouth 2 (two) times daily.  Dispense: 60 tablet; Refill: 0  - oxyCODONE-acetaminophen (PERCOCET)  mg per tablet; Take 1 tablet by mouth every 6 (six) hours as needed.  Dispense: 120 tablet; Refill: 0  - morphine (MS CONTIN) 60 MG 12 hr tablet; Take 1 tablet (60 mg total) by mouth 2 (two) times daily.  Dispense: 60 tablet; Refill: 0  - oxyCODONE-acetaminophen (PERCOCET)  mg per tablet; Take 1 tablet by mouth every 6 (six) hours as needed.  Dispense: 120 tablet; Refill: 0  - morphine (MS CONTIN) 60 MG 12 hr tablet; Take 1 tablet (60 mg total) by mouth 2 (two) times daily.  Dispense: 60 tablet; Refill: 0  - oxyCODONE-acetaminophen (PERCOCET)  mg per tablet; Take 1 tablet by mouth every 6 (six) hours as needed.  Dispense: 120 tablet; Refill: 0  - TOXICOLOGY SCREEN, URINE, RANDOM (COMPLIANCE); Future  - naloxone (NARCAN) 1 mg/mL injection; Use as needed for respiratory depression  Dispense: 2 mL; Refill: 1    2. Degeneration of lumbar or lumbosacral intervertebral disc    3. Spinal stenosis, lumbar region, without neurogenic claudication    4. Spondylosis of lumbar region without myelopathy or radiculopathy

## 2017-12-26 ENCOUNTER — PATIENT MESSAGE (OUTPATIENT)
Dept: FAMILY MEDICINE | Facility: CLINIC | Age: 72
End: 2017-12-26

## 2018-01-15 ENCOUNTER — OFFICE VISIT (OUTPATIENT)
Dept: URGENT CARE | Facility: CLINIC | Age: 73
End: 2018-01-15
Payer: MEDICARE

## 2018-01-15 VITALS
RESPIRATION RATE: 16 BRPM | HEART RATE: 110 BPM | SYSTOLIC BLOOD PRESSURE: 148 MMHG | TEMPERATURE: 99 F | DIASTOLIC BLOOD PRESSURE: 83 MMHG | OXYGEN SATURATION: 98 %

## 2018-01-15 DIAGNOSIS — J11.1 BRONCHITIS WITH FLU: ICD-10-CM

## 2018-01-15 DIAGNOSIS — J10.1 INFLUENZA A: Primary | ICD-10-CM

## 2018-01-15 LAB
CTP QC/QA: YES
FLUAV AG NPH QL: POSITIVE
FLUBV AG NPH QL: NEGATIVE

## 2018-01-15 PROCEDURE — 87804 INFLUENZA ASSAY W/OPTIC: CPT | Mod: 59,QW,S$GLB, | Performed by: INTERNAL MEDICINE

## 2018-01-15 PROCEDURE — 99213 OFFICE O/P EST LOW 20 MIN: CPT | Mod: 25,S$GLB,, | Performed by: INTERNAL MEDICINE

## 2018-01-15 PROCEDURE — 96372 THER/PROPH/DIAG INJ SC/IM: CPT | Mod: S$GLB,,, | Performed by: INTERNAL MEDICINE

## 2018-01-15 RX ORDER — BENZONATATE 200 MG/1
200 CAPSULE ORAL 3 TIMES DAILY PRN
Qty: 30 CAPSULE | Refills: 1 | Status: SHIPPED | OUTPATIENT
Start: 2018-01-15 | End: 2018-01-25

## 2018-01-15 RX ORDER — OSELTAMIVIR PHOSPHATE 75 MG/1
75 CAPSULE ORAL 2 TIMES DAILY
Qty: 10 CAPSULE | Refills: 0 | Status: SHIPPED | OUTPATIENT
Start: 2018-01-15 | End: 2018-01-20

## 2018-01-15 RX ORDER — METHYLPREDNISOLONE 4 MG/1
TABLET ORAL
Qty: 1 PACKAGE | Refills: 0 | Status: SHIPPED | OUTPATIENT
Start: 2018-01-15 | End: 2018-02-26 | Stop reason: ALTCHOICE

## 2018-01-15 RX ORDER — DEXAMETHASONE SODIUM PHOSPHATE 100 MG/10ML
15 INJECTION INTRAMUSCULAR; INTRAVENOUS
Status: COMPLETED | OUTPATIENT
Start: 2018-01-15 | End: 2018-01-15

## 2018-01-15 RX ADMIN — DEXAMETHASONE SODIUM PHOSPHATE 15 MG: 100 INJECTION INTRAMUSCULAR; INTRAVENOUS at 07:01

## 2018-01-15 NOTE — PROGRESS NOTES
Subjective:       Patient ID: Sam Pete is a 72 y.o. male.    Vitals:  oral temperature is 98.8 °F (37.1 °C). His blood pressure is 148/83 (abnormal) and his pulse is 110. His respiration is 16 and oxygen saturation is 98%.     Chief Complaint: Cough    PT C/O PRODUCTIVE COUGH, 3 DAYS, CHEST AND SINUS CONGESTION, POST NASAL DRIP, PAIN IN CHEST WITH COUGH, CHILLS, BODY ACHES, FEVER SYMPTOMS UNABLE TO GET TEMP AT HOME, TAKING TYLENOL WITH NO RELIEF, PT STATED HE RECEIVED FLU SHOT SHOT IN October       Cough   This is a new problem. The current episode started in the past 7 days. The problem has been unchanged. The problem occurs constantly. The cough is productive of sputum. Associated symptoms include chills, a fever, nasal congestion and postnasal drip. Pertinent negatives include no chest pain, ear pain, eye redness, headaches, myalgias, sore throat, shortness of breath or wheezing. Treatments tried: TYLENOL  The treatment provided mild relief.     Review of Systems   Constitution: Positive for chills, decreased appetite, fever and malaise/fatigue.   HENT: Positive for congestion and postnasal drip. Negative for ear pain, hoarse voice and sore throat.    Eyes: Negative for discharge and redness.   Cardiovascular: Negative for chest pain, dyspnea on exertion and leg swelling.   Respiratory: Positive for cough and sputum production. Negative for shortness of breath and wheezing.    Musculoskeletal: Negative for myalgias.   Gastrointestinal: Negative for abdominal pain and nausea.   Neurological: Negative for headaches.       Objective:      Physical Exam   Constitutional: He is oriented to person, place, and time. He appears well-developed and well-nourished. He is cooperative.  Non-toxic appearance. He does not appear ill. No distress.   HENT:   Head: Normocephalic.   Right Ear: Hearing, tympanic membrane, external ear and ear canal normal.   Left Ear: Hearing, tympanic membrane, external ear and ear canal  normal.   Nose: Nose normal. No mucosal edema, rhinorrhea or nasal deformity. No epistaxis. Right sinus exhibits no maxillary sinus tenderness and no frontal sinus tenderness. Left sinus exhibits no maxillary sinus tenderness and no frontal sinus tenderness.   Mouth/Throat: Uvula is midline, oropharynx is clear and moist and mucous membranes are normal. No trismus in the jaw. Normal dentition. No uvula swelling. No posterior oropharyngeal erythema.   Eyes: Conjunctivae and lids are normal. Right eye exhibits no discharge. Left eye exhibits no discharge.   Sclera clear bilat   Neck: Trachea normal, normal range of motion, full passive range of motion without pain and phonation normal. Neck supple.   Cardiovascular: Normal rate, regular rhythm, normal heart sounds, intact distal pulses and normal pulses.    Pulmonary/Chest: Effort normal and breath sounds normal. No respiratory distress.   Abdominal: Soft. Normal appearance and bowel sounds are normal. He exhibits no distension, no pulsatile midline mass and no mass. There is no tenderness.   Musculoskeletal: Normal range of motion. He exhibits no edema or deformity.   Neurological: He is alert and oriented to person, place, and time. He exhibits normal muscle tone. Coordination normal.   Skin: Skin is warm, dry and intact. He is not diaphoretic. No pallor.   Psychiatric: He has a normal mood and affect. His speech is normal. Cognition and memory are normal.   Nursing note and vitals reviewed.      Assessment:       1. Influenza A    2. Bronchitis with flu        Plan:         Influenza A  -     POCT Influenza A/B  -     oseltamivir (TAMIFLU) 75 MG capsule; Take 1 capsule (75 mg total) by mouth 2 (two) times daily.  Dispense: 10 capsule; Refill: 0    Bronchitis with flu  -     dexamethasone injection 15 mg; Inject 1.5 mLs (15 mg total) into the muscle one time.  -     methylPREDNISolone (MEDROL DOSEPACK) 4 mg tablet; Take all pills on each row once daily  Dispense: 1  Package; Refill: 0  -     benzonatate (TESSALON) 200 MG capsule; Take 1 capsule (200 mg total) by mouth 3 (three) times daily as needed for Cough.  Dispense: 30 capsule; Refill: 1

## 2018-01-16 NOTE — PATIENT INSTRUCTIONS
The Flu (Influenza)     The virus that causes the flu spreads through the air in droplets when someone who has the flu coughs, sneezes, laughs, or talks.   The flu (influenza) is an infection that affects your respiratory tract. This tract is made up of your mouth, nose, and lungs, and the passages between them. Unlike a cold, the flu can make you very ill. And it can lead to pneumonia, a serious lung infection. The flu can have serious complications and even cause death.  Who is at risk for the flu?  Anyone can get the flu. But you are more likely to become infected if you:  · Have a weakened immune system  · Work in a healthcare setting where you may be exposed to flu germs  · Live or work with someone who has the flu  · Havent had an annual flu shot  How does the flu spread?  The flu is caused by a virus. The virus spreads through the air in droplets when someone who has the flu coughs, sneezes, laughs, or talks. You can become infected when you inhale these viruses directly. You can also become infected when you touch a surface on which the droplets have landed and then transfer the germs to your eyes, nose, or mouth. Touching used tissues, or sharing utensils, drinking glasses, or a toothbrush from an infected person can expose you to flu viruses, too.  What are the symptoms of the flu?  Flu symptoms tend to come on quickly and may last a few days to a few weeks. They include:  · Fever usually higher than 100.4°F  (38°C) and chills  · Sore throat and headache  · Dry cough  · Runny nose  · Tiredness and weakness  · Muscle aches  Who is at risk for flu complications?  For some people, the flu can be very serious. The risk for complications is greater for:  · Children younger than age 5  · Adults ages 65 and older  · People with a chronic illness such as diabetes or heart, kidney, or lung disease  · People who live in a nursing home or long-term care facility   How is the flu treated?  The flu usually gets  better after 7 days or so. In some cases, your healthcare provider may prescribe an antiviral medicine. This may help you get well a little sooner. For the medicine to help, you need to take it as soon as possible (ideally within 48 hours) after your symptoms start. If you develop pneumonia or other serious illness, you may need to stay in the hospital.  Easing flu symptoms  · Drink lots of fluids such as water, juice, and warm soup. A good rule is to drink enough so that you urinate your normal amount.  · Get plenty of rest.  · Ask your healthcare provider what to take for fever and pain.  · Call your provider if your fever is 100.4°F (38°C) or higher, or you become dizzy, lightheaded, or short of breath.  Taking steps to protect others  · Wash your hands often, especially after coughing or sneezing. Or clean your hands with an alcohol-based hand  containing at least 60% alcohol.  · Cough or sneeze into a tissue. Then throw the tissue away and wash your hands. If you dont have a tissue, cough and sneeze into your elbow.  · Stay home until at least 24 hours after you no longer have a fever or chills. Be sure the fever isnt being hidden by fever-reducing medicine.  · Dont share food, utensils, drinking glasses, or a toothbrush with others.  · Ask your healthcare provider if others in your household should get antiviral medicine to help them avoid infection.  How can the flu be prevented?  · One of the best ways to avoid the flu is to get a flu vaccine each year. The virus that causes the flu changes from year to year. For that reason, healthcare providers recommend getting the flu vaccine each year, as soon as it's available in your area. The vaccine is given as a shot. Your healthcare provider can tell you which vaccine is right for you. A nasal spray is also available but is not recommended for the 3378-9414 flu season. The CDC says this is because the nasal spray did not seem to protect against the flu  over the last several flu seasons. In the past, it was meant for people ages 2 to 49.  · Wash your hands often. Frequent handwashing is a proven way to help prevent infection.  · Carry an alcohol-based hand gel containing at least 60% alcohol. Use it when you can't use soap and water. Then wash your hands as soon as you can.  · Avoid touching your eyes, nose, and mouth.  · At home and work, clean phones, computer keyboards, and toys often with disinfectant wipes.  · If possible, avoid close contact with others who have the flu or symptoms of the flu.  Handwashing tips  Handwashing is one of the best ways to prevent many common infections. If you are caring for or visiting someone with the flu, wash your hands each time you enter and leave the room. Follow these steps:  · Use warm water and plenty of soap. Rub your hands together well.  · Clean the whole hand, including under your nails, between your fingers, and up the wrists.  · Wash for at least 15 seconds.  · Rinse, letting the water run down your fingers, not up your wrists.  · Dry your hands well. Use a paper towel to turn off the faucet and open the door.  Using alcohol-based hand   Alcohol-based hand  are also a good choice. Use them when you can't use soap and water. Follow these steps:  · Squeeze about a tablespoon of gel into the palm of one hand.  · Rub your hands together briskly, cleaning the backs of your hands, the palms, between your fingers, and up the wrists.  · Rub until the gel is gone and your hands are completely dry.  Preventing the flu in healthcare settings  The flu is a special concern for people in hospitals and long-term care facilities. To help prevent the spread of flu, many hospitals and nursing homes take these steps:  · Healthcare providers wash their hands or use an alcohol-based hand  before and after treating each patient.  · People with the flu have private rooms and bathrooms or share a room with someone  with the same infection.  · People who are at high risk for the flu but don't have it are encouraged to get the flu and pneumonia vaccines.  · All healthcare workers are encouraged or required to get flu shots.

## 2018-01-25 ENCOUNTER — PATIENT MESSAGE (OUTPATIENT)
Dept: FAMILY MEDICINE | Facility: CLINIC | Age: 73
End: 2018-01-25

## 2018-01-31 ENCOUNTER — PES CALL (OUTPATIENT)
Dept: ADMINISTRATIVE | Facility: CLINIC | Age: 73
End: 2018-01-31

## 2018-02-23 ENCOUNTER — DOCUMENTATION ONLY (OUTPATIENT)
Dept: FAMILY MEDICINE | Facility: CLINIC | Age: 73
End: 2018-02-23

## 2018-02-23 NOTE — PROGRESS NOTES
Pre-Visit Chart Review  For Appointment Scheduled on 02/26/2018      Health Maintenance Due   Topic Date Due    Zoster Vaccine  04/26/2007    Colonoscopy  07/12/2014

## 2018-02-26 ENCOUNTER — OFFICE VISIT (OUTPATIENT)
Dept: FAMILY MEDICINE | Facility: CLINIC | Age: 73
End: 2018-02-26
Payer: MEDICARE

## 2018-02-26 VITALS
HEIGHT: 70 IN | DIASTOLIC BLOOD PRESSURE: 68 MMHG | TEMPERATURE: 98 F | BODY MASS INDEX: 32.26 KG/M2 | HEART RATE: 78 BPM | WEIGHT: 225.31 LBS | SYSTOLIC BLOOD PRESSURE: 127 MMHG

## 2018-02-26 DIAGNOSIS — M06.4 INFLAMMATORY POLYARTHROPATHY: ICD-10-CM

## 2018-02-26 DIAGNOSIS — E66.9 OBESITY (BMI 30.0-34.9): ICD-10-CM

## 2018-02-26 DIAGNOSIS — G89.29 OTHER CHRONIC PAIN: ICD-10-CM

## 2018-02-26 DIAGNOSIS — N52.9 IMPOTENCE OF ORGANIC ORIGIN: ICD-10-CM

## 2018-02-26 DIAGNOSIS — I48.0 PAF (PAROXYSMAL ATRIAL FIBRILLATION): ICD-10-CM

## 2018-02-26 DIAGNOSIS — Z00.00 ENCOUNTER FOR PREVENTIVE HEALTH EXAMINATION: Primary | ICD-10-CM

## 2018-02-26 DIAGNOSIS — M47.816 FACET ARTHROPATHY, LUMBAR: ICD-10-CM

## 2018-02-26 DIAGNOSIS — I10 ESSENTIAL (PRIMARY) HYPERTENSION: ICD-10-CM

## 2018-02-26 DIAGNOSIS — F11.24 OPIOID DEPENDENCE WITH OPIOID-INDUCED MOOD DISORDER: ICD-10-CM

## 2018-02-26 DIAGNOSIS — I70.0 ATHEROSCLEROSIS OF AORTA: ICD-10-CM

## 2018-02-26 DIAGNOSIS — G60.9 HEREDITARY AND IDIOPATHIC PERIPHERAL NEUROPATHY: ICD-10-CM

## 2018-02-26 PROBLEM — M48.061 LUMBAR STENOSIS: Status: RESOLVED | Noted: 2017-06-26 | Resolved: 2018-02-26

## 2018-02-26 PROBLEM — S32.009K LUMBAR PSEUDOARTHROSIS: Status: RESOLVED | Noted: 2017-06-26 | Resolved: 2018-02-26

## 2018-02-26 PROCEDURE — G0439 PPPS, SUBSEQ VISIT: HCPCS | Mod: S$GLB,,, | Performed by: PHYSICIAN ASSISTANT

## 2018-02-26 PROCEDURE — 99999 PR PBB SHADOW E&M-EST. PATIENT-LVL V: CPT | Mod: PBBFAC,,, | Performed by: PHYSICIAN ASSISTANT

## 2018-02-26 PROCEDURE — 99499 UNLISTED E&M SERVICE: CPT | Mod: S$GLB,,, | Performed by: PHYSICIAN ASSISTANT

## 2018-02-26 NOTE — PROGRESS NOTES
"Sam Pete presented for a  Medicare AWV and comprehensive Health Risk Assessment today. The following components were reviewed and updated:    · Medical history  · Family History  · Social history  · Allergies and Current Medications  · Health Risk Assessment  · Health Maintenance  · Care Team     ** See Completed Assessments for Annual Wellness Visit within the encounter summary.**       The following assessments were completed:  · Living Situation  · CAGE  · Depression Screening  · Timed Get Up and Go  · Whisper Test  · Cognitive Function Screening  · Nutrition Screening  · ADL Screening  · PAQ Screening    Vitals:    02/26/18 1101   BP: 127/68   BP Location: Right arm   Patient Position: Sitting   BP Method: Large (Automatic)   Pulse: 78   Temp: 98.2 °F (36.8 °C)   TempSrc: Oral   Weight: 102.2 kg (225 lb 5 oz)   Height: 5' 10" (1.778 m)     Body mass index is 32.33 kg/m².  Physical Exam   Constitutional: He is oriented to person, place, and time. He appears well-developed and well-nourished. No distress.   HENT:   Head: Normocephalic and atraumatic.   Neck: Normal range of motion. Neck supple. No JVD present.   Pulmonary/Chest: Effort normal.   Neurological: He is alert and oriented to person, place, and time.   Skin: He is not diaphoretic.                 Diagnoses and health risks identified today and associated recommendations/orders:    Sam was seen today for health risk assessment.    Diagnoses and all orders for this visit:    Encounter for preventive health examination    PAF (paroxysmal atrial fibrillation)  Comments:  stable; continue to monitor    Facet arthropathy, lumbar  Comments:  stable; continue to monitor    Opioid dependence with opioid-induced mood disorder  Comments:  controlled; continue to monitor    Atherosclerosis of aorta  Comments:  stable; continue to monitor    Inflammatory polyarthropathy  Comments:  stable; continue to monitor    Obesity (BMI " 30.0-34.9)  Comments:  uncontrolled; continue to monitor    Impotence of organic origin  Comments:  stable; meds if needed    Hereditary and idiopathic peripheral neuropathy  Comments:  stable; continue to monitor    Other chronic pain  Comments:  stable; followed by pain management    Essential (primary) hypertension  Comments:  controlled; continue to monitor        Provided Sam with a 5-10 year written screening schedule and personal prevention plan. Recommendations were developed using the USPSTF age appropriate recommendations. Education, counseling, and referrals were provided as needed. After Visit Summary printed and given to patient which includes a list of additional screenings\tests needed.    No Follow-up on file.    EMILY Espana    Patient readiness: acceptance and barriers:none    During the course of the visit the patient was educated and counseled about the following:     Hypertension:   Regular aerobic exercise.  Obesity:   General weight loss/lifestyle modification strategies discussed (elicit support from others; identify saboteurs; non-food rewards, etc).    Goals: Hypertension: Reduce Blood Pressure and Obesity: Reduce calorie intake and BMI    Did patient meet goals/outcomes: No    The following self management tools provided: declined    Patient Instructions (the written plan) was given to the patient/family.     Time spent with patient: 55 minutes    Barriers to medications present (no )    Adverse reactions to current medications (no)    Over the counter medications reviewed (Yes)

## 2018-02-26 NOTE — Clinical Note
Primary Care Providers: Ty Montalvo MD, MD (General)  Your patient was seen today for a HRA visit. Gap(s) in care (HEDIS gaps) have been identified during this visit that require additional testing and possible follow up.  No orders of the defined types were placed in this encounter.   These orders were placed using [facility] approved protocol and any results will be forwarded to your office for appropriate follow up. I have included a copy of my visit note; please review the note and feel free to contact me with any questions.   Thank you for allowing me to participate in the care of your patients. EMILY Espana

## 2018-02-26 NOTE — PATIENT INSTRUCTIONS
Weight Management: Getting Started  Healthy bodies come in all shapes and sizes. Not all bodies are made to be thin. For some people, a healthy weight is higher than the average weight listed on weight charts. Your healthcare provider can help you decide on a healthy weight for you.    Reasons to lose weight  Losing weight can help with some health problems, such as high blood pressure, heart disease, diabetes, sleep apnea, and arthritis. You may also feel more energy.  Set your long-term goal  Your goal doesn't even have to be a specific weight. You may decide on a fitness goal (such as being able to walk 10 miles a week), or a health goal (such as lowering your blood pressure). Choose a goal that is measurable and reasonable, so you know when you've reached it. A goal of reaching a BMI of less than 25 is not always reasonable (or possible).   Make an action plan  Habits dont change overnight. Setting your goals too high can leave you feeling discouraged if you cant reach them. Be realistic. Choose one or two small changes you can make now. Set an action plan for how you are going to make these changes. When you can stick to this plan, keep making a few more small changes. Taking small steps will help you stay on the path to success.  Track your progress  Write down your goals. Then, keep a daily record of your progress. Write down what you eat and how active you are. This record lets you look back on how much youve done. It may also help when youre feeling frustrated. Reward yourself for success. Even if you dont reach every goal, give yourself credit for what you do get done.  Get support  Encouragement from others can help make losing weight easier. Ask your family members and friends for support. They may even want to join you. Also look to your healthcare provider, registered dietitian, and  for help. Your local hospital can give you more information about nutrition, exercise, and  weight loss.  Date Last Reviewed: 1/31/2016 © 2000-2017 [employer] [address] [place] [address], [place] [address]. All rights reserved. This information is not intended as a substitute for professional medical care. Always follow your healthcare professional's instructions.        Walking for Fitness  Fitness walking has something for everyone, even people who are already fit. Walking is one of the safest ways to condition your body aerobically. It can boost energy, help you lose weight, and reduce stress.    Physical benefits  · Walking strengthens your heart and lungs, and tones your muscles.  · When walking, your feet land with less impact than in other sports. This reduces chances of muscle, bone, and joint injury.  · Regular walking improves your cholesterol levels and lowers your risk of heart disease. And it helps you control your blood sugar if you have diabetes.  · Walking is a weight-bearing activity, which helps maintain bone density. This can help prevent osteoporosis.  Personal rewards  · Taking walks can help you relax and manage stress. And fitness walking may make you feel better about yourself.  · Walking can help you sleep better at night and make you less likely to be depressed.  · Regular walking may help maintain your memory as you get older.  · Walking is a great way to spend extra time with friends and family members. Be sure to invite your dog along!  Q&A about fitness walking  Q: Will walking keep me fit?  A: Yes. Regular walking at the right pace gives you all the benefits of other aerobic activities, such as jogging and swimming.  Q: Will walking help me lose weight and keep it off?  A: Yes. Per mile, walking can burn as many calories as jogging. Your health care provider can help work walking into your weight-loss plan.  Q: Is walking safe for my health?  A: Yes. Walking is safe if you have high blood pressure, diabetes, heart disease, or other conditions. Talk to your  healthcare provider before you start.  Date Last Reviewed: 4/1/2017  © 1092-3814 [employer]. [address] [place] [address], [place] [address]. All rights reserved. This information is not intended as a substitute for professional medical care. Always follow your healthcare professional's instructions.          Counseling and Referral of Other Preventative  (Italic type indicates deductible and co-insurance are waived)    Patient Name: Sam Pete  Today's Date: 2/26/2018    Health Maintenance       Date Due Completion Date    Colonoscopy 02/26/2019 (Originally 7/12/2014) 7/12/2004 (Done)    Override on 7/12/2004: Done (7-10- year elisa Dr Rodriguez )    Zoster Vaccine 03/04/2019 (Originally 4/26/2007) ---    High Dose Statin 03/04/2019 (Originally 4/26/1966) ---    Urine Drug Screen 06/22/2018 12/22/2017    Naloxone Prescription 12/22/2018 12/22/2017    Lipid Panel 12/30/2020 12/30/2015    TETANUS VACCINE 10/14/2025 10/14/2015        No orders of the defined types were placed in this encounter.    The following information is provided to all patients.  This information is to help you find resources for any of the problems found today that may be affecting your health:                Living healthy guide: www.[facility].[place].gov      Understanding Diabetes: www.diabetes.org      Eating healthy: www.cdc.gov/healthyweight      CDC home safety checklist: www.cdc.gov/steadi/patient.html      Agency on Aging: www.goea.[place].gov      Alcoholics anonymous (AA): www.aa.org      Physical Activity: www.mir.nih.gov/tv2iepv      Tobacco use: www.quitwithusla.org

## 2018-03-23 ENCOUNTER — DOCUMENTATION ONLY (OUTPATIENT)
Dept: FAMILY MEDICINE | Facility: CLINIC | Age: 73
End: 2018-03-23

## 2018-03-23 DIAGNOSIS — G89.29 OTHER CHRONIC PAIN: ICD-10-CM

## 2018-03-23 RX ORDER — OXYCODONE AND ACETAMINOPHEN 10; 325 MG/1; MG/1
1 TABLET ORAL EVERY 6 HOURS PRN
Qty: 120 TABLET | Refills: 0 | Status: SHIPPED | OUTPATIENT
Start: 2018-03-23 | End: 2018-04-12 | Stop reason: SDUPTHER

## 2018-03-23 RX ORDER — MORPHINE SULFATE 60 MG/1
60 TABLET, FILM COATED, EXTENDED RELEASE ORAL 2 TIMES DAILY
Qty: 60 TABLET | Refills: 0 | Status: SHIPPED | OUTPATIENT
Start: 2018-03-23 | End: 2018-04-12 | Stop reason: SDUPTHER

## 2018-03-23 RX ORDER — OXYCODONE AND ACETAMINOPHEN 10; 325 MG/1; MG/1
1 TABLET ORAL EVERY 6 HOURS PRN
Qty: 120 TABLET | Refills: 0 | Status: SHIPPED | OUTPATIENT
Start: 2018-03-23 | End: 2018-03-23 | Stop reason: SDUPTHER

## 2018-03-23 RX ORDER — MORPHINE SULFATE 60 MG/1
60 TABLET, FILM COATED, EXTENDED RELEASE ORAL 2 TIMES DAILY
Qty: 60 TABLET | Refills: 0 | Status: SHIPPED | OUTPATIENT
Start: 2018-03-23 | End: 2018-03-23 | Stop reason: SDUPTHER

## 2018-03-23 NOTE — TELEPHONE ENCOUNTER
Cannot send to pharmacy, printed rx has to be picked up.  The  (Prescription Monitoring Program) website was checked with no inappropriate activity found.

## 2018-03-23 NOTE — PROGRESS NOTES
Pre-Visit Chart Review  For Appointment Scheduled on 4-12-18    There are no preventive care reminders to display for this patient.

## 2018-04-10 DIAGNOSIS — Z12.11 COLON CANCER SCREENING: Primary | ICD-10-CM

## 2018-04-12 ENCOUNTER — OFFICE VISIT (OUTPATIENT)
Dept: FAMILY MEDICINE | Facility: CLINIC | Age: 73
End: 2018-04-12
Attending: FAMILY MEDICINE
Payer: MEDICARE

## 2018-04-12 VITALS
TEMPERATURE: 98 F | RESPIRATION RATE: 16 BRPM | DIASTOLIC BLOOD PRESSURE: 68 MMHG | HEIGHT: 70 IN | OXYGEN SATURATION: 99 % | BODY MASS INDEX: 33.42 KG/M2 | SYSTOLIC BLOOD PRESSURE: 130 MMHG | WEIGHT: 233.44 LBS | HEART RATE: 76 BPM

## 2018-04-12 DIAGNOSIS — I10 GOOD HYPERTENSION CONTROL: ICD-10-CM

## 2018-04-12 DIAGNOSIS — G89.29 OTHER CHRONIC PAIN: Primary | ICD-10-CM

## 2018-04-12 DIAGNOSIS — F11.20 UNCOMPLICATED OPIOID DEPENDENCE: ICD-10-CM

## 2018-04-12 PROCEDURE — 3078F DIAST BP <80 MM HG: CPT | Mod: CPTII,S$GLB,, | Performed by: FAMILY MEDICINE

## 2018-04-12 PROCEDURE — 99999 PR PBB SHADOW E&M-EST. PATIENT-LVL IV: CPT | Mod: PBBFAC,,, | Performed by: FAMILY MEDICINE

## 2018-04-12 PROCEDURE — 99214 OFFICE O/P EST MOD 30 MIN: CPT | Mod: S$GLB,,, | Performed by: FAMILY MEDICINE

## 2018-04-12 PROCEDURE — 3075F SYST BP GE 130 - 139MM HG: CPT | Mod: CPTII,S$GLB,, | Performed by: FAMILY MEDICINE

## 2018-04-12 PROCEDURE — 99499 UNLISTED E&M SERVICE: CPT | Mod: S$GLB,,, | Performed by: FAMILY MEDICINE

## 2018-04-12 RX ORDER — MORPHINE SULFATE 60 MG/1
60 TABLET, FILM COATED, EXTENDED RELEASE ORAL 2 TIMES DAILY
Qty: 60 TABLET | Refills: 0 | Status: SHIPPED | OUTPATIENT
Start: 2018-04-23 | End: 2018-07-18

## 2018-04-12 RX ORDER — OXYCODONE AND ACETAMINOPHEN 10; 325 MG/1; MG/1
1 TABLET ORAL EVERY 6 HOURS PRN
Qty: 120 TABLET | Refills: 0 | Status: SHIPPED | OUTPATIENT
Start: 2018-06-23 | End: 2018-07-18

## 2018-04-12 RX ORDER — OXYCODONE AND ACETAMINOPHEN 10; 325 MG/1; MG/1
1 TABLET ORAL EVERY 6 HOURS PRN
Qty: 120 TABLET | Refills: 0 | Status: SHIPPED | OUTPATIENT
Start: 2018-05-23 | End: 2018-07-18

## 2018-04-12 RX ORDER — OXYCODONE AND ACETAMINOPHEN 10; 325 MG/1; MG/1
1 TABLET ORAL EVERY 6 HOURS PRN
Qty: 120 TABLET | Refills: 0 | Status: SHIPPED | OUTPATIENT
Start: 2018-04-23 | End: 2018-07-18 | Stop reason: SDUPTHER

## 2018-04-12 RX ORDER — MORPHINE SULFATE 60 MG/1
60 TABLET, FILM COATED, EXTENDED RELEASE ORAL 2 TIMES DAILY
Qty: 60 TABLET | Refills: 0 | Status: SHIPPED | OUTPATIENT
Start: 2018-06-23 | End: 2018-07-18 | Stop reason: SDUPTHER

## 2018-04-12 RX ORDER — MORPHINE SULFATE 60 MG/1
60 TABLET, FILM COATED, EXTENDED RELEASE ORAL 2 TIMES DAILY
Qty: 60 TABLET | Refills: 0 | Status: SHIPPED | OUTPATIENT
Start: 2018-05-23 | End: 2018-07-18

## 2018-04-12 RX ORDER — ACETAMINOPHEN 160 MG
2 TABLET,DISINTEGRATING ORAL DAILY PRN
COMMUNITY
Start: 2018-04-02 | End: 2019-01-17 | Stop reason: ALTCHOICE

## 2018-04-12 NOTE — PATIENT INSTRUCTIONS
Weight Management: Getting Started  Healthy bodies come in all shapes and sizes. Not all bodies are made to be thin. For some people, a healthy weight is higher than the average weight listed on weight charts. Your healthcare provider can help you decide on a healthy weight for you.    Reasons to lose weight  Losing weight can help with some health problems, such as high blood pressure, heart disease, diabetes, sleep apnea, and arthritis. You may also feel more energy.  Set your long-term goal  Your goal doesn't even have to be a specific weight. You may decide on a fitness goal (such as being able to walk 10 miles a week), or a health goal (such as lowering your blood pressure). Choose a goal that is measurable and reasonable, so you know when you've reached it. A goal of reaching a BMI of less than 25 is not always reasonable (or possible).   Make an action plan  Habits dont change overnight. Setting your goals too high can leave you feeling discouraged if you cant reach them. Be realistic. Choose one or two small changes you can make now. Set an action plan for how you are going to make these changes. When you can stick to this plan, keep making a few more small changes. Taking small steps will help you stay on the path to success.  Track your progress  Write down your goals. Then, keep a daily record of your progress. Write down what you eat and how active you are. This record lets you look back on how much youve done. It may also help when youre feeling frustrated. Reward yourself for success. Even if you dont reach every goal, give yourself credit for what you do get done.  Get support  Encouragement from others can help make losing weight easier. Ask your family members and friends for support. They may even want to join you. Also look to your healthcare provider, registered dietitian, and  for help. Your local hospital can give you more information about nutrition, exercise, and  weight loss.  Date Last Reviewed: 1/31/2016  © 7899-1976 [employer]. [address] [place] [address], [place] [address]. All rights reserved. This information is not intended as a substitute for professional medical care. Always follow your healthcare professional's instructions.

## 2018-04-12 NOTE — PROGRESS NOTES
Subjective:       Patient ID: Sam Pete is a 72 y.o. male.    Chief Complaint: Medication Refill    72-year-old male with chronic pain disorder secondary to spinal stenosis of lumbar and cervical spine with degenerative joint disease of the left arm and opioid dependent.  He is in for renewal of his Percocet and slow release morphine.  Patient states at its worst pain reaches level of 10 out of 10.  Currently he is about 5 out of 10.   with the morphine taken regularly and occasional use of Percocet pain will go to approximately 3-5 out of 10 depending on his starting point.  The patient is complaining of some drowsiness and thinks it may be due to his medications.  In the addition to the opioids he is on Zanaflex and Lyrica.  On questioning he denies any history of snoring or apnea.    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*  1997: Stroke      Comment: basal ganglia, left sided weakness, resolved  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  7/8/2015: Thoracic or lumbosacral neuritis or radiculitis)\    Past Surgical History:  No date: APPENDECTOMY  No date: BACK SURGERY      Comment: 4- back surgery  07/12/2004: COLONOSCOPY      Comment: Dr Rodriguez 7-10 year elisa   No date: Epidural steroid injection      Comment: Pain management  No date: FRACTURE SURGERY Left      Comment: wrist / forearm, total of 5  2-6-2013: JOINT REPLACEMENT      Comment: ( rt hip 2007), left hip   No date: JOINT REPLACEMENT Left      Comment: total knee  No  date: KNEE ARTHROSCOPY W/ DEBRIDEMENT      Comment: bilateral knees , total of six  No date: KNEE SURGERY  No date: Nervbe Block injection      Comment: Pain management    .      Review of Systems   Musculoskeletal: Positive for arthralgias, back pain and neck pain.   Psychiatric/Behavioral: Positive for sleep disturbance.       Objective:      Physical Exam   Constitutional: He is oriented to person, place, and time. He appears well-developed. No distress.   Good blood pressure control  Obese with BMI 33.5 he is up 6.4 pounds from his last visit with me December 22, 2017   Neurological: He is alert and oriented to person, place, and time.   Skin: He is not diaphoretic.   Psychiatric: He has a normal mood and affect. His behavior is normal. Thought content normal.   Nursing note and vitals reviewed.      Assessment:       1. Other chronic pain    2. Uncomplicated opioid dependence    3. Good hypertension control    4. BMI 33.0-33.9,adult        Plan:       1. Other chronic pain  - morphine (MS CONTIN) 60 MG 12 hr tablet; Take 1 tablet (60 mg total) by mouth 2 (two) times daily.  Dispense: 60 tablet; Refill: 0  - oxyCODONE-acetaminophen (PERCOCET)  mg per tablet; Take 1 tablet by mouth every 6 (six) hours as needed.  Dispense: 120 tablet; Refill: 0  - morphine (MS CONTIN) 60 MG 12 hr tablet; Take 1 tablet (60 mg total) by mouth 2 (two) times daily.  Dispense: 60 tablet; Refill: 0  - oxyCODONE-acetaminophen (PERCOCET)  mg per tablet; Take 1 tablet by mouth every 6 (six) hours as needed.  Dispense: 120 tablet; Refill: 0  - morphine (MS CONTIN) 60 MG 12 hr tablet; Take 1 tablet (60 mg total) by mouth 2 (two) times daily.  Dispense: 60 tablet; Refill: 0  - oxyCODONE-acetaminophen (PERCOCET)  mg per tablet; Take 1 tablet by mouth every 6 (six) hours as needed.  Dispense: 120 tablet; Refill: 0    2. Uncomplicated opioid dependence  The  (Prescription Monitoring Program) website was checked with no  inappropriate activity found.      3. Good hypertension control  No changes    4. BMI 33.0-33.9,adult  Encourage exercise and weight control         Patient readiness: acceptance and barriers:none    During the course of the visit the patient was educated and counseled about the following:     Hypertension:   Medication: no change.  Dietary sodium restriction.  Regular aerobic exercise.  Obesity:   General weight loss/lifestyle modification strategies discussed (elicit support from others; identify saboteurs; non-food rewards, etc).  Informal exercise measures discussed, e.g. taking stairs instead of elevator.  Regular aerobic exercise program discussed.    Goals: Hypertension: Reduce Blood Pressure and Obesity: Reduce calorie intake and BMI    Did patient meet goals/outcomes: No    The following self management tools provided: blood pressure log  excercise log    Patient Instructions (the written plan) was given to the patient/family.     Time spent with patient: 30 minutes

## 2018-05-18 ENCOUNTER — OFFICE VISIT (OUTPATIENT)
Dept: SPINE | Facility: CLINIC | Age: 73
End: 2018-05-18
Payer: MEDICARE

## 2018-05-18 VITALS
WEIGHT: 227.38 LBS | BODY MASS INDEX: 32.55 KG/M2 | SYSTOLIC BLOOD PRESSURE: 126 MMHG | DIASTOLIC BLOOD PRESSURE: 76 MMHG | HEART RATE: 76 BPM | HEIGHT: 70 IN

## 2018-05-18 DIAGNOSIS — G89.29 CHRONIC BILATERAL LOW BACK PAIN WITHOUT SCIATICA: Primary | ICD-10-CM

## 2018-05-18 DIAGNOSIS — M54.50 LOW BACK PAIN, NON-SPECIFIC: ICD-10-CM

## 2018-05-18 DIAGNOSIS — M54.50 CHRONIC BILATERAL LOW BACK PAIN WITHOUT SCIATICA: Primary | ICD-10-CM

## 2018-05-18 DIAGNOSIS — Z98.1 S/P LUMBAR SPINAL FUSION: ICD-10-CM

## 2018-05-18 PROCEDURE — 3074F SYST BP LT 130 MM HG: CPT | Mod: CPTII,S$GLB,, | Performed by: PHYSICIAN ASSISTANT

## 2018-05-18 PROCEDURE — 99213 OFFICE O/P EST LOW 20 MIN: CPT | Mod: S$GLB,,, | Performed by: PHYSICIAN ASSISTANT

## 2018-05-18 PROCEDURE — 99999 PR PBB SHADOW E&M-EST. PATIENT-LVL IV: CPT | Mod: PBBFAC,,, | Performed by: PHYSICIAN ASSISTANT

## 2018-05-18 PROCEDURE — 3078F DIAST BP <80 MM HG: CPT | Mod: CPTII,S$GLB,, | Performed by: PHYSICIAN ASSISTANT

## 2018-05-23 NOTE — PROGRESS NOTES
Neurosurgery History & Physical    Patient ID: Sam Pete is a 73 y.o. male.    Chief Complaint   Patient presents with    Low-back Pain     Has had pain for 2 weeks and it has been gradually getting worse. Pain is constant and severe. Hurts when walking and bending down. Had surgery by Dr. Fowler 1 year ago and he put a cage in his back.       Review of Systems   Constitutional: Negative for activity change, chills, fatigue and unexpected weight change.   HENT: Negative for hearing loss, tinnitus, trouble swallowing and voice change.    Eyes: Negative for visual disturbance.   Respiratory: Negative for apnea, chest tightness and shortness of breath.    Cardiovascular: Negative for chest pain and palpitations.   Gastrointestinal: Negative for abdominal pain, constipation, diarrhea, nausea and vomiting.   Genitourinary: Negative for difficulty urinating, dysuria and frequency.   Musculoskeletal: Positive for back pain. Negative for gait problem, neck pain and neck stiffness.   Skin: Negative for wound.   Neurological: Negative for dizziness, tremors, seizures, facial asymmetry, speech difficulty, weakness, light-headedness, numbness and headaches.   Psychiatric/Behavioral: Negative for confusion and decreased concentration.       Past Medical History:   Diagnosis Date    Anticoagulant long-term use     ASA 81 mg    Arthritis     Chronic low back pain     Complete rupture of rotator cuff 2/8/2011    DDD (degenerative disc disease), lumbar 7/8/2015    Degeneration of lumbar or lumbosacral intervertebral disc 9/9/2015    ED (erectile dysfunction)     GERD (gastroesophageal reflux disease)     Hypertension     Lumbar pseudoarthrosis 6/26/2017    Lumbar stenosis 6/26/2017    Obesity     Spondylosis of lumbar region without myelopathy or radiculopathy 7/8/2015    Stroke 1997    basal ganglia, left sided weakness, resolved    Testicular hypofunction     Thoracic or lumbosacral neuritis or radiculitis  7/8/2015     Social History     Social History    Marital status:      Spouse name: N/A    Number of children: N/A    Years of education: N/A     Occupational History    Not on file.     Social History Main Topics    Smoking status: Former Smoker     Packs/day: 1.00     Years: 30.00     Start date: 8/3/1963     Quit date: 1/1/1992    Smokeless tobacco: Never Used    Alcohol use Yes      Comment: On occasion    Drug use: Yes     Types: Oxycodone, Morphine    Sexual activity: Not on file     Other Topics Concern    Not on file     Social History Narrative    No narrative on file     Family History   Problem Relation Age of Onset    Hypertension Father     Heart disease Father     Drug abuse Daughter     Hypertension Son     Lung disease Sister     COPD Sister     Hypertension Sister     Diverticulitis Sister     Gout Sister     Kidney disease Sister     No Known Problems Mother      Review of patient's allergies indicates:   Allergen Reactions    Xyzal [levocetirizine] Other (See Comments)     Knocked him out        Current Outpatient Prescriptions:     acetaminophen 80 mg TbDL, Take 2 tablets by mouth daily as needed. , Disp: , Rfl:     amLODIPine (NORVASC) 10 MG tablet, Take 1 tablet by mouth once daily., Disp: , Rfl:     ammonium lactate 12 % Crea, Apply 1 application topically 2 (two) times daily. (Patient taking differently: Apply 1 application topically 2 (two) times daily as needed. ), Disp: 140 g, Rfl: 11    aspirin 81 mg Tab, Take 1 tablet by mouth Daily. Every day, Disp: , Rfl:     losartan (COZAAR) 50 MG tablet, TAKE 1 TABLET ONE TIME DAILY, Disp: 90 tablet, Rfl: 3    morphine (MS CONTIN) 60 MG 12 hr tablet, Take 1 tablet (60 mg total) by mouth 2 (two) times daily., Disp: 60 tablet, Rfl: 0    morphine (MS CONTIN) 60 MG 12 hr tablet, Take 1 tablet (60 mg total) by mouth 2 (two) times daily., Disp: 60 tablet, Rfl: 0    [START ON 6/23/2018] morphine (MS CONTIN) 60 MG 12 hr  "tablet, Take 1 tablet (60 mg total) by mouth 2 (two) times daily., Disp: 60 tablet, Rfl: 0    omeprazole (PRILOSEC) 40 MG capsule, TAKE 1 CAPSULE ONE TIME DAILY, Disp: 90 capsule, Rfl: 3    oxyCODONE-acetaminophen (PERCOCET)  mg per tablet, Take 1 tablet by mouth every 6 (six) hours as needed., Disp: 120 tablet, Rfl: 0    oxyCODONE-acetaminophen (PERCOCET)  mg per tablet, Take 1 tablet by mouth every 6 (six) hours as needed., Disp: 120 tablet, Rfl: 0    [START ON 6/23/2018] oxyCODONE-acetaminophen (PERCOCET)  mg per tablet, Take 1 tablet by mouth every 6 (six) hours as needed., Disp: 120 tablet, Rfl: 0    pregabalin (LYRICA) 75 MG capsule, Take 1 capsule (75 mg total) by mouth 2 (two) times daily., Disp: 60 capsule, Rfl: 5    tizanidine (ZANAFLEX) 4 MG tablet, Take 1 tablet (4 mg total) by mouth every 8 (eight) hours as needed., Disp: 60 tablet, Rfl: 5    Vitals:    05/18/18 1111   BP: 126/76   BP Location: Left arm   Patient Position: Sitting   BP Method: Medium (Manual)   Pulse: 76   Weight: 103.1 kg (227 lb 6.5 oz)   Height: 5' 10" (1.778 m)       Physical Exam   Constitutional: He is oriented to person, place, and time. He appears well-developed and well-nourished.   HENT:   Head: Normocephalic and atraumatic.   Eyes: Pupils are equal, round, and reactive to light.   Neck: Normal range of motion. Neck supple.   Cardiovascular: Normal rate.    Pulmonary/Chest: Effort normal.   Abdominal: He exhibits no distension.   Musculoskeletal: Normal range of motion. He exhibits no edema.   Neurological: He is alert and oriented to person, place, and time. He has a normal Finger-Nose-Finger Test, a normal Heel to Shin Test, a normal Romberg Test and a normal Tandem Gait Test. Gait normal.   Reflex Scores:       Tricep reflexes are 2+ on the right side and 2+ on the left side.       Bicep reflexes are 2+ on the right side and 2+ on the left side.       Brachioradialis reflexes are 2+ on the right side " and 2+ on the left side.       Patellar reflexes are 2+ on the right side and 2+ on the left side.       Achilles reflexes are 2+ on the right side and 2+ on the left side.  Skin: Skin is warm and dry.   Psychiatric: He has a normal mood and affect. His speech is normal and behavior is normal. Judgment and thought content normal.   Nursing note and vitals reviewed.      Neurologic Exam     Mental Status   Oriented to person, place, and time.   Oriented to person.   Oriented to place.   Oriented to time.   Follows 3 step commands.   Attention: normal. Concentration: normal.   Speech: speech is normal   Level of consciousness: alert  Knowledge: consistent with education.   Able to name object. Able to read. Able to repeat. Able to write. Normal comprehension.     Cranial Nerves     CN II   Visual acuity: normal  Right visual field deficit: none  Left visual field deficit: none     CN III, IV, VI   Pupils are equal, round, and reactive to light.  Right pupil: Size: 3 mm. Shape: regular. Reactivity: brisk. Consensual response: intact.   Left pupil: Size: 3 mm. Shape: regular. Reactivity: brisk. Consensual response: intact.   CN III: no CN III palsy  CN VI: no CN VI palsy  Nystagmus: none   Diplopia: none  Ophthalmoparesis: none  Conjugate gaze: present    CN V   Right facial sensation deficit: none  Left facial sensation deficit: none    CN VII   Right facial weakness: none  Left facial weakness: none    CN VIII   Hearing: intact    CN IX, X   CN IX normal.   CN X normal.     CN XI   Right sternocleidomastoid strength: normal  Left sternocleidomastoid strength: normal  Right trapezius strength: normal  Left trapezius strength: normal    CN XII   Fasciculations: absent  Tongue deviation: none    Motor Exam   Muscle bulk: normal  Overall muscle tone: normal  Right arm pronator drift: absent  Left arm pronator drift: absent    Strength   Right neck flexion: 5/5  Left neck flexion: 5/5  Right neck extension: 5/5  Left neck  extension: 5/5  Right deltoid: 5/5  Left deltoid: 5/5  Right biceps: 5/5  Left biceps: 5/5  Right triceps: 5/5  Left triceps: 5/5  Right wrist flexion: 5/5  Left wrist flexion: 5/5  Right wrist extension: 5/5  Left wrist extension: 5/5  Right interossei: 5/5  Left interossei: 5/5  Right abdominals: 5/5  Left abdominals: 5/5  Right iliopsoas: 5/5  Left iliopsoas: 5/5  Right quadriceps: 5/5  Left quadriceps: 5/5  Right hamstrin/5  Left hamstrin/  Right glutei: 5/5  Left glutei: 5/  Right anterior tibial: 5/  Left anterior tibial: 5  Right posterior tibial: 5  Left posterior tibial: 5/  Right peroneal: 55  Left peroneal: 5/  Right gastroc: 5/  Left gastroc: 5Tender to palpation along the midline inferior lumbar spine.     Sensory Exam   Right arm light touch: normal  Left arm light touch: normal  Right leg light touch: normal  Left leg light touch: normal  Right arm vibration: normal  Left arm vibration: normal  Right arm pinprick: normal  Left arm pinprick: normal    Gait, Coordination, and Reflexes     Gait  Gait: normal    Coordination   Romberg: negative  Finger to nose coordination: normal  Heel to shin coordination: normal  Tandem walking coordination: normal    Tremor   Resting tremor: absent  Intention tremor: absent  Action tremor: absent    Reflexes   Right brachioradialis: 2+  Left brachioradialis: 2+  Right biceps: 2+  Left biceps: 2+  Right triceps: 2+  Left triceps: 2+  Right patellar: 2+  Left patellar: 2+  Right achilles: 2+  Left achilles: 2+  Right Rivero: absent  Left Rivero: absent  Right ankle clonus: absent  Left ankle clonus: absent      Provider dictation:  73 year old male presents for follow up evaluation of back pain after lumbar surgery.  He is status post 17 L2-S1 PSIF with L4/5, L5/S1 TLIF.  He has chronic lower back pain that has been present since surgical intervention and has increased in the last 2 weeks.  He denies any onset of radicular leg pain, numbness  or tingling.  He denies any recent injury or fall or fever.  Post operative imaging revealled hardware intact without complication at the time and he has not had any recent imaging.  Oswestry score: 42%.  PHQ:  0.    He is neurologically intact with tenderness to palpation along the midline of the lumbar spine inferiorly    He has not had any recent imaging.    We will plan on obtaining updated lumbar xrays and an MRI lumbar spine to rule out any surgcial complication causing his continued and increasing pain. Follow up after imaging is complete.    Visit Diagnosis:  Chronic bilateral low back pain without sciatica  -     MRI Lumbar Spine Without Contrast; Future; Expected date: 05/18/2018  -     X-Ray Lumbar Complete With Flex And Ext; Future; Expected date: 05/18/2018    Low back pain, non-specific  -     MRI Lumbar Spine Without Contrast; Future; Expected date: 05/18/2018  -     X-Ray Lumbar Complete With Flex And Ext; Future; Expected date: 05/18/2018    S/P lumbar spinal fusion  -     X-Ray Lumbar Complete With Flex And Ext; Future; Expected date: 05/18/2018        Total time spent counseling greater than fifty percent of total visit time.  Counseling included discussion regarding imaging findings, diagnosis possibilities, treatment options, risks and benefits.   The patient had many questions regarding the options and long-term effects.

## 2018-05-24 DIAGNOSIS — I10 HYPERTENSION: ICD-10-CM

## 2018-05-24 DIAGNOSIS — K21.9 GERD (GASTROESOPHAGEAL REFLUX DISEASE): ICD-10-CM

## 2018-05-25 RX ORDER — LOSARTAN POTASSIUM 50 MG/1
TABLET ORAL
Qty: 90 TABLET | Refills: 3 | Status: SHIPPED | OUTPATIENT
Start: 2018-05-25 | End: 2019-02-28 | Stop reason: SDUPTHER

## 2018-05-25 RX ORDER — OMEPRAZOLE 40 MG/1
CAPSULE, DELAYED RELEASE ORAL
Qty: 90 CAPSULE | Refills: 3 | Status: SHIPPED | OUTPATIENT
Start: 2018-05-25 | End: 2019-02-28 | Stop reason: SDUPTHER

## 2018-05-25 RX ORDER — AMLODIPINE BESYLATE 10 MG/1
TABLET ORAL
Qty: 90 TABLET | Refills: 3 | Status: SHIPPED | OUTPATIENT
Start: 2018-05-25 | End: 2019-02-28 | Stop reason: SDUPTHER

## 2018-05-30 ENCOUNTER — HOSPITAL ENCOUNTER (OUTPATIENT)
Dept: RADIOLOGY | Facility: HOSPITAL | Age: 73
Discharge: HOME | End: 2018-05-30
Attending: PHYSICIAN ASSISTANT
Payer: MEDICARE

## 2018-05-30 DIAGNOSIS — G89.29 CHRONIC BILATERAL LOW BACK PAIN WITHOUT SCIATICA: ICD-10-CM

## 2018-05-30 DIAGNOSIS — M54.50 CHRONIC BILATERAL LOW BACK PAIN WITHOUT SCIATICA: ICD-10-CM

## 2018-05-30 DIAGNOSIS — M54.50 LOW BACK PAIN, NON-SPECIFIC: ICD-10-CM

## 2018-05-30 DIAGNOSIS — Z98.1 S/P LUMBAR SPINAL FUSION: ICD-10-CM

## 2018-05-30 PROCEDURE — 72148 MRI LUMBAR SPINE W/O DYE: CPT | Mod: 26,,, | Performed by: RADIOLOGY

## 2018-05-30 PROCEDURE — 72148 MRI LUMBAR SPINE W/O DYE: CPT | Mod: TC,PO

## 2018-05-30 PROCEDURE — 72114 X-RAY EXAM L-S SPINE BENDING: CPT | Mod: TC,FY,PO

## 2018-05-30 PROCEDURE — 72114 X-RAY EXAM L-S SPINE BENDING: CPT | Mod: 26,,, | Performed by: RADIOLOGY

## 2018-06-01 ENCOUNTER — TELEPHONE (OUTPATIENT)
Dept: FAMILY MEDICINE | Facility: CLINIC | Age: 73
End: 2018-06-01

## 2018-06-01 RX ORDER — PREGABALIN 75 MG/1
75 CAPSULE ORAL 2 TIMES DAILY
Qty: 60 CAPSULE | Refills: 5 | Status: SHIPPED | OUTPATIENT
Start: 2018-06-01 | End: 2018-10-16

## 2018-06-06 ENCOUNTER — OFFICE VISIT (OUTPATIENT)
Dept: SPINE | Facility: CLINIC | Age: 73
End: 2018-06-06
Payer: MEDICARE

## 2018-06-06 VITALS
HEART RATE: 74 BPM | DIASTOLIC BLOOD PRESSURE: 60 MMHG | WEIGHT: 227.31 LBS | BODY MASS INDEX: 32.54 KG/M2 | SYSTOLIC BLOOD PRESSURE: 116 MMHG | HEIGHT: 70 IN

## 2018-06-06 DIAGNOSIS — M54.50 CHRONIC BILATERAL LOW BACK PAIN WITHOUT SCIATICA: ICD-10-CM

## 2018-06-06 DIAGNOSIS — G89.29 CHRONIC BILATERAL LOW BACK PAIN WITHOUT SCIATICA: ICD-10-CM

## 2018-06-06 DIAGNOSIS — Z98.1 S/P LUMBAR SPINAL FUSION: Primary | ICD-10-CM

## 2018-06-06 PROCEDURE — 99999 PR PBB SHADOW E&M-EST. PATIENT-LVL IV: CPT | Mod: PBBFAC,,, | Performed by: PHYSICIAN ASSISTANT

## 2018-06-06 PROCEDURE — 3078F DIAST BP <80 MM HG: CPT | Mod: CPTII,S$GLB,, | Performed by: PHYSICIAN ASSISTANT

## 2018-06-06 PROCEDURE — 3074F SYST BP LT 130 MM HG: CPT | Mod: CPTII,S$GLB,, | Performed by: PHYSICIAN ASSISTANT

## 2018-06-06 PROCEDURE — 99214 OFFICE O/P EST MOD 30 MIN: CPT | Mod: S$GLB,,, | Performed by: PHYSICIAN ASSISTANT

## 2018-06-06 NOTE — PROGRESS NOTES
Neurosurgery History & Physical    Patient ID: Sam Pete is a 73 y.o. male.    Chief Complaint   Patient presents with    Follow-up     MRI AND X-RAY       Review of Systems   Constitutional: Negative for activity change, chills, fatigue and unexpected weight change.   HENT: Negative for hearing loss, tinnitus, trouble swallowing and voice change.    Eyes: Negative for visual disturbance.   Respiratory: Negative for apnea, chest tightness and shortness of breath.    Cardiovascular: Negative for chest pain and palpitations.   Gastrointestinal: Negative for abdominal pain, constipation, diarrhea, nausea and vomiting.   Genitourinary: Negative for difficulty urinating, dysuria and frequency.   Musculoskeletal: Positive for back pain. Negative for gait problem, neck pain and neck stiffness.   Skin: Negative for wound.   Neurological: Negative for dizziness, tremors, seizures, facial asymmetry, speech difficulty, weakness, light-headedness, numbness and headaches.   Psychiatric/Behavioral: Negative for confusion and decreased concentration.       Past Medical History:   Diagnosis Date    Anticoagulant long-term use     ASA 81 mg    Arthritis     Chronic low back pain     Complete rupture of rotator cuff 2/8/2011    DDD (degenerative disc disease), lumbar 7/8/2015    Degeneration of lumbar or lumbosacral intervertebral disc 9/9/2015    ED (erectile dysfunction)     GERD (gastroesophageal reflux disease)     Hypertension     Lumbar pseudoarthrosis 6/26/2017    Lumbar stenosis 6/26/2017    Obesity     Spondylosis of lumbar region without myelopathy or radiculopathy 7/8/2015    Stroke 1997    basal ganglia, left sided weakness, resolved    Testicular hypofunction     Thoracic or lumbosacral neuritis or radiculitis 7/8/2015     Social History     Social History    Marital status:      Spouse name: N/A    Number of children: N/A    Years of education: N/A     Occupational History    Not on  file.     Social History Main Topics    Smoking status: Former Smoker     Packs/day: 1.00     Years: 30.00     Start date: 8/3/1963     Quit date: 1/1/1992    Smokeless tobacco: Never Used    Alcohol use Yes      Comment: On occasion    Drug use: Yes     Types: Oxycodone, Morphine    Sexual activity: Not on file     Other Topics Concern    Not on file     Social History Narrative    No narrative on file     Family History   Problem Relation Age of Onset    Hypertension Father     Heart disease Father     Drug abuse Daughter     Hypertension Son     Lung disease Sister     COPD Sister     Hypertension Sister     Diverticulitis Sister     Gout Sister     Kidney disease Sister     No Known Problems Mother      Review of patient's allergies indicates:   Allergen Reactions    Xyzal [levocetirizine] Other (See Comments)     Knocked him out        Current Outpatient Prescriptions:     acetaminophen 80 mg TbDL, Take 2 tablets by mouth daily as needed. , Disp: , Rfl:     amLODIPine (NORVASC) 10 MG tablet, TAKE 1 TABLET ONE TIME DAILY, Disp: 90 tablet, Rfl: 3    ammonium lactate 12 % Crea, Apply 1 application topically 2 (two) times daily. (Patient taking differently: Apply 1 application topically 2 (two) times daily as needed. ), Disp: 140 g, Rfl: 11    aspirin 81 mg Tab, Take 1 tablet by mouth Daily. Every day, Disp: , Rfl:     losartan (COZAAR) 50 MG tablet, TAKE 1 TABLET ONE TIME DAILY, Disp: 90 tablet, Rfl: 3    LYRICA 75 mg capsule, TAKE 1 CAPSULE (75 MG TOTAL) BY MOUTH 2 (TWO) TIMES DAILY., Disp: 60 capsule, Rfl: 5    morphine (MS CONTIN) 60 MG 12 hr tablet, Take 1 tablet (60 mg total) by mouth 2 (two) times daily., Disp: 60 tablet, Rfl: 0    morphine (MS CONTIN) 60 MG 12 hr tablet, Take 1 tablet (60 mg total) by mouth 2 (two) times daily., Disp: 60 tablet, Rfl: 0    [START ON 6/23/2018] morphine (MS CONTIN) 60 MG 12 hr tablet, Take 1 tablet (60 mg total) by mouth 2 (two) times daily., Disp:  "60 tablet, Rfl: 0    omeprazole (PRILOSEC) 40 MG capsule, TAKE 1 CAPSULE ONE TIME DAILY, Disp: 90 capsule, Rfl: 3    oxyCODONE-acetaminophen (PERCOCET)  mg per tablet, Take 1 tablet by mouth every 6 (six) hours as needed., Disp: 120 tablet, Rfl: 0    oxyCODONE-acetaminophen (PERCOCET)  mg per tablet, Take 1 tablet by mouth every 6 (six) hours as needed., Disp: 120 tablet, Rfl: 0    [START ON 6/23/2018] oxyCODONE-acetaminophen (PERCOCET)  mg per tablet, Take 1 tablet by mouth every 6 (six) hours as needed., Disp: 120 tablet, Rfl: 0    tizanidine (ZANAFLEX) 4 MG tablet, Take 1 tablet (4 mg total) by mouth every 8 (eight) hours as needed., Disp: 60 tablet, Rfl: 5    Vitals:    06/06/18 1300   BP: 116/60   Pulse: 74   Weight: 103.1 kg (227 lb 4.7 oz)   Height: 5' 10" (1.778 m)       Physical Exam   Constitutional: He is oriented to person, place, and time. He appears well-developed and well-nourished.   HENT:   Head: Normocephalic and atraumatic.   Eyes: Pupils are equal, round, and reactive to light.   Neck: Normal range of motion. Neck supple.   Cardiovascular: Normal rate.    Pulmonary/Chest: Effort normal.   Abdominal: He exhibits no distension.   Musculoskeletal: Normal range of motion. He exhibits no edema.   Neurological: He is alert and oriented to person, place, and time. He has a normal Finger-Nose-Finger Test, a normal Heel to Shin Test, a normal Romberg Test and a normal Tandem Gait Test. Gait normal.   Reflex Scores:       Tricep reflexes are 2+ on the right side and 2+ on the left side.       Bicep reflexes are 2+ on the right side and 2+ on the left side.       Brachioradialis reflexes are 2+ on the right side and 2+ on the left side.       Patellar reflexes are 2+ on the right side and 2+ on the left side.       Achilles reflexes are 2+ on the right side and 2+ on the left side.  Skin: Skin is warm and dry.   Psychiatric: He has a normal mood and affect. His speech is normal and " behavior is normal. Judgment and thought content normal.   Nursing note and vitals reviewed.      Neurologic Exam     Mental Status   Oriented to person, place, and time.   Oriented to person.   Oriented to place.   Oriented to time.   Follows 3 step commands.   Attention: normal. Concentration: normal.   Speech: speech is normal   Level of consciousness: alert  Knowledge: consistent with education.   Able to name object. Able to read. Able to repeat. Able to write. Normal comprehension.     Cranial Nerves     CN II   Visual acuity: normal  Right visual field deficit: none  Left visual field deficit: none     CN III, IV, VI   Pupils are equal, round, and reactive to light.  Right pupil: Size: 3 mm. Shape: regular. Reactivity: brisk. Consensual response: intact.   Left pupil: Size: 3 mm. Shape: regular. Reactivity: brisk. Consensual response: intact.   CN III: no CN III palsy  CN VI: no CN VI palsy  Nystagmus: none   Diplopia: none  Ophthalmoparesis: none  Conjugate gaze: present    CN V   Right facial sensation deficit: none  Left facial sensation deficit: none    CN VII   Right facial weakness: none  Left facial weakness: none    CN VIII   Hearing: intact    CN IX, X   CN IX normal.   CN X normal.     CN XI   Right sternocleidomastoid strength: normal  Left sternocleidomastoid strength: normal  Right trapezius strength: normal  Left trapezius strength: normal    CN XII   Fasciculations: absent  Tongue deviation: none    Motor Exam   Muscle bulk: normal  Overall muscle tone: normal  Right arm pronator drift: absent  Left arm pronator drift: absent    Strength   Right neck flexion: 5/5  Left neck flexion: 5/5  Right neck extension: 5/5  Left neck extension: 5/5  Right deltoid: 5/5  Left deltoid: 5/5  Right biceps: 5/5  Left biceps: 5/5  Right triceps: 5/5  Left triceps: 5/5  Right wrist flexion: 5/5  Left wrist flexion: 5/5  Right wrist extension: 5/5  Left wrist extension: 5/5  Right interossei: 5/5  Left  interossei: 5/5  Right abdominals: 5/5  Left abdominals: 5/5  Right iliopsoas: 5/5  Left iliopsoas: 5/5  Right quadriceps: 5/5  Left quadriceps: 5/5  Right hamstrin/5  Left hamstrin/5  Right glutei: 5/5  Left glutei: 5/5  Right anterior tibial: 5/5  Left anterior tibial: 5/5  Right posterior tibial: 5/5  Left posterior tibial: 5/5  Right peroneal: 5/5  Left peroneal: 5/5  Right gastroc: 5/5  Left gastroc: 5/5Tender to palpation along the midline inferior lumbar spine.     Sensory Exam   Right arm light touch: normal  Left arm light touch: normal  Right leg light touch: normal  Left leg light touch: normal  Right arm vibration: normal  Left arm vibration: normal  Right arm pinprick: normal  Left arm pinprick: normal    Gait, Coordination, and Reflexes     Gait  Gait: normal    Coordination   Romberg: negative  Finger to nose coordination: normal  Heel to shin coordination: normal  Tandem walking coordination: normal    Tremor   Resting tremor: absent  Intention tremor: absent  Action tremor: absent    Reflexes   Right brachioradialis: 2+  Left brachioradialis: 2+  Right biceps: 2+  Left biceps: 2+  Right triceps: 2+  Left triceps: 2+  Right patellar: 2+  Left patellar: 2+  Right achilles: 2+  Left achilles: 2+  Right Rivero: absent  Left Rivero: absent  Right ankle clonus: absent  Left ankle clonus: absent      Provider dictation:  73 year old male presents for follow up evaluation of back pain after lumbar surgery and undergoing updated imaging to discuss results.  He is status post 17 L2-S1 PSIF with L4/5, L5/S1 TLIF.  He has chronic lower back pain that has been present since surgical intervention.  He denies any onset of radicular leg pain, numbness or tingling.  He denies any recent injury or fall or fever.  Post operative imaging revealled hardware intact without complication at the time and he has not had any recent imaging.  At the time of his last visit, we ordered further imaging to check  hardware and any cause of continued back pain.  Symptoms have not changed since his last visit.  Oswestry score: 42%.  PHQ:  0.    He is neurologically intact with tenderness to palpation along the midline of the lumbar spine inferiorly    I have reviewed an MRI and xrays of the lumbar spine from 5-30-18.  Surgical hardware is intact with L2-S1 posterior pedicle screw fusion and cross bar.  There is no mal-alignment, instability of the bones or hardware complication.  There is adequate decompression with no residual central canal or foraminal narrowing.  A seroma is seen posterior to the canal at L3 with no pressure on the canal.    Mr. Pete has had extensive lumbar fusion from L2 to S1 without complication or spine abnormality to explain chronic back pain.  Pain is most likley myofascial in nature.  I recommend regular exercise and PT to help with pain.  Referral placed to [facility].  If he has no benefit with PT we can refer to PM&R.  He would like to try PT before PM&R trigger point injections.    Visit Diagnosis:  S/P lumbar spinal fusion  -     Ambulatory Referral to Physical/Occupational Therapy    Chronic bilateral low back pain without sciatica  -     Ambulatory Referral to Physical/Occupational Therapy        Total time spent counseling greater than fifty percent of total visit time.  Counseling included discussion regarding imaging findings, diagnosis possibilities, treatment options, risks and benefits.   The patient had many questions regarding the options and long-term effects.

## 2018-06-17 ENCOUNTER — PATIENT MESSAGE (OUTPATIENT)
Dept: SPINE | Facility: CLINIC | Age: 73
End: 2018-06-17

## 2018-06-17 DIAGNOSIS — Z98.890 HISTORY OF LUMBAR SURGERY: ICD-10-CM

## 2018-06-17 DIAGNOSIS — G89.29 CHRONIC BILATERAL LOW BACK PAIN WITHOUT SCIATICA: Primary | ICD-10-CM

## 2018-06-17 DIAGNOSIS — M54.50 CHRONIC BILATERAL LOW BACK PAIN WITHOUT SCIATICA: Primary | ICD-10-CM

## 2018-06-18 ENCOUNTER — PATIENT MESSAGE (OUTPATIENT)
Dept: FAMILY MEDICINE | Facility: CLINIC | Age: 73
End: 2018-06-18

## 2018-06-18 NOTE — TELEPHONE ENCOUNTER
Spoke with patient and scheduled him to see Dr. Petit 7-11-18, mailed, and he indicated understanding.

## 2018-07-11 ENCOUNTER — INITIAL CONSULT (OUTPATIENT)
Dept: PHYSICAL MEDICINE AND REHAB | Facility: CLINIC | Age: 73
End: 2018-07-11
Payer: MEDICARE

## 2018-07-11 VITALS
WEIGHT: 227 LBS | DIASTOLIC BLOOD PRESSURE: 77 MMHG | SYSTOLIC BLOOD PRESSURE: 118 MMHG | BODY MASS INDEX: 32.5 KG/M2 | HEART RATE: 69 BPM | HEIGHT: 70 IN

## 2018-07-11 DIAGNOSIS — G89.29 CHRONIC BILATERAL LOW BACK PAIN WITHOUT SCIATICA: ICD-10-CM

## 2018-07-11 DIAGNOSIS — M54.50 CHRONIC BILATERAL LOW BACK PAIN WITHOUT SCIATICA: ICD-10-CM

## 2018-07-11 DIAGNOSIS — Z98.890 HISTORY OF LUMBAR SURGERY: ICD-10-CM

## 2018-07-11 PROCEDURE — 99204 OFFICE O/P NEW MOD 45 MIN: CPT | Mod: 25,S$GLB,, | Performed by: PHYSICAL MEDICINE & REHABILITATION

## 2018-07-11 PROCEDURE — 3078F DIAST BP <80 MM HG: CPT | Mod: CPTII,S$GLB,, | Performed by: PHYSICAL MEDICINE & REHABILITATION

## 2018-07-11 PROCEDURE — 3074F SYST BP LT 130 MM HG: CPT | Mod: CPTII,S$GLB,, | Performed by: PHYSICAL MEDICINE & REHABILITATION

## 2018-07-11 PROCEDURE — 99999 PR PBB SHADOW E&M-EST. PATIENT-LVL III: CPT | Mod: PBBFAC,,, | Performed by: PHYSICAL MEDICINE & REHABILITATION

## 2018-07-11 PROCEDURE — 20553 NJX 1/MLT TRIGGER POINTS 3/>: CPT | Mod: S$GLB,,, | Performed by: PHYSICAL MEDICINE & REHABILITATION

## 2018-07-11 RX ORDER — LIDOCAINE HYDROCHLORIDE 10 MG/ML
3 INJECTION INFILTRATION; PERINEURAL
Status: COMPLETED | OUTPATIENT
Start: 2018-07-11 | End: 2018-07-11

## 2018-07-11 RX ADMIN — LIDOCAINE HYDROCHLORIDE 3 ML: 10 INJECTION INFILTRATION; PERINEURAL at 11:07

## 2018-07-11 NOTE — PROGRESS NOTES
HPI:  Patient is a 73 y.o. year old male w. Back pain. He is s/p 35ft fall in 1987 when he worked as an . He has had a total of 4 spine surgeries. The last one was a year ago by Dr. Fowler. He is still having a lot of back pain. Occasionally he feels severe pain into his left leg, but most of his pain is localized to his low back.    Imaging    FINDINGS:  There is evidence of prior pedicular screw placement from L2-S1 with posterior cross links as demonstrated on recent plain films.  Alignment is good.  There is mild anterior osteophyte formation throughout the lumbar region and there is loss of disc space height at L4/L5.  The screws have been placed relative the previous exam performed May 8, 2017.  Posterior to the canal there is a complex colles fluid collection at the level of L3 measuring maximally 2.8 cm transversely by 1.8 cm AP by 3 cm cranial caudal.  This does not appear to distort the thecal sac and does not have an appearance suggesting a pseudo meninges seal.  This likely represents a seroma.  The conus terminates at L1 and has an unremarkable appearance.  The individual disc levels appears follows:    T12/L1: Degenerative facet changes are noted bilaterally and there is minimal distortion of the central canal.    L1/L2: Degenerative facet changes are noted and there is no evidence of significant canal or foraminal stenosis.    L2/L3: Disc bulging is evident which is more pronounced to the left of midline causing minimal distortion left foramen.  The central canal is intact as is the right foramen.    L3/L4: There is mild disc bulging to the left of midline without evidence of significant canal or foraminal stenosis.    L4/L5: There is annular disc bulging and there is degenerative facet disease.  This canal and foramina are intact.  There is slight narrowing the left foramen due to disc bulging.    L5/S1: There is mild narrowing the left foramen due to disc and osteophyte formation.  I do not  see evidence of recurrent or residual disc extrusion and degenerative facet changes are noted bilaterally.  The central canal is intact.   Impression       Interval fusion with pedicular screws without apparent complication or significant recurrent or residual stenosis     FINDINGS:  There are stable postsurgical changes of posterior instrumented fusion of L2 through S1 with bilateral transpedicular screw fixation and vertical joining rods as well as transverse bar.  There is no hardware complication such as fracture or loosening.  There is stable moderate to marked disc space narrowing at the L4-5 and L5-S1 levels.  There is facet joint arthropathy.  Again, pedicle screws at the S1 level extend just beyond the anterior cortical margin of the vertebral body.  There is moderate to marked atherosclerosis.  There is no obvious abnormal motion with flexion or extension.   Impression       1.  Stable postsurgical changes of extensive lumbar posterior instrumented fusion without change, complication or abnormal motion.  There is stable degenerative disc disease at the L4-5 and L5-S1 levels.    2.  Atherosclerosis.     Labs  Cr egfr nl  Gluc elev 141  lft's nl    Past Medical History:   Diagnosis Date    Anticoagulant long-term use     ASA 81 mg    Arthritis     Chronic low back pain     Complete rupture of rotator cuff 2/8/2011    DDD (degenerative disc disease), lumbar 7/8/2015    Degeneration of lumbar or lumbosacral intervertebral disc 9/9/2015    ED (erectile dysfunction)     GERD (gastroesophageal reflux disease)     Hypertension     Lumbar pseudoarthrosis 6/26/2017    Lumbar stenosis 6/26/2017    Obesity     Spondylosis of lumbar region without myelopathy or radiculopathy 7/8/2015    Stroke 1997    basal ganglia, left sided weakness, resolved    Testicular hypofunction     Thoracic or lumbosacral neuritis or radiculitis 7/8/2015     Past Surgical History:   Procedure Laterality Date    APPENDECTOMY       BACK SURGERY      4- back surgery    COLONOSCOPY  07/12/2004    Dr Rodriguez 7-10 year elisa     Epidural steroid injection      Pain management    FRACTURE SURGERY Left     wrist / forearm, total of 5    JOINT REPLACEMENT  2-6-2013    ( rt hip 2007), left hip     JOINT REPLACEMENT Left     total knee    KNEE ARTHROSCOPY W/ DEBRIDEMENT      bilateral knees , total of six    KNEE SURGERY      Nervbe Block injection      Pain management     Family History   Problem Relation Age of Onset    Hypertension Father     Heart disease Father     Drug abuse Daughter     Hypertension Son     Lung disease Sister     COPD Sister     Hypertension Sister     Diverticulitis Sister     Gout Sister     Kidney disease Sister     No Known Problems Mother      Social History     Social History    Marital status:      Spouse name: N/A    Number of children: N/A    Years of education: N/A     Social History Main Topics    Smoking status: Former Smoker     Packs/day: 1.00     Years: 30.00     Start date: 8/3/1963     Quit date: 1/1/1992    Smokeless tobacco: Never Used    Alcohol use Yes      Comment: On occasion    Drug use: Yes     Types: Oxycodone, Morphine    Sexual activity: Not on file     Other Topics Concern    Not on file     Social History Narrative    No narrative on file       Review of patient's allergies indicates:   Allergen Reactions    Xyzal [levocetirizine] Other (See Comments)     Knocked him out        Current Outpatient Prescriptions:     acetaminophen 80 mg TbDL, Take 2 tablets by mouth daily as needed. , Disp: , Rfl:     amLODIPine (NORVASC) 10 MG tablet, TAKE 1 TABLET ONE TIME DAILY, Disp: 90 tablet, Rfl: 3    ammonium lactate 12 % Crea, Apply 1 application topically 2 (two) times daily. (Patient taking differently: Apply 1 application topically 2 (two) times daily as needed. ), Disp: 140 g, Rfl: 11    aspirin 81 mg Tab, Take 1 tablet by mouth Daily. Every day, Disp: , Rfl:      losartan (COZAAR) 50 MG tablet, TAKE 1 TABLET ONE TIME DAILY, Disp: 90 tablet, Rfl: 3    LYRICA 75 mg capsule, TAKE 1 CAPSULE (75 MG TOTAL) BY MOUTH 2 (TWO) TIMES DAILY., Disp: 60 capsule, Rfl: 5    morphine (MS CONTIN) 60 MG 12 hr tablet, Take 1 tablet (60 mg total) by mouth 2 (two) times daily., Disp: 60 tablet, Rfl: 0    morphine (MS CONTIN) 60 MG 12 hr tablet, Take 1 tablet (60 mg total) by mouth 2 (two) times daily., Disp: 60 tablet, Rfl: 0    morphine (MS CONTIN) 60 MG 12 hr tablet, Take 1 tablet (60 mg total) by mouth 2 (two) times daily., Disp: 60 tablet, Rfl: 0    omeprazole (PRILOSEC) 40 MG capsule, TAKE 1 CAPSULE ONE TIME DAILY, Disp: 90 capsule, Rfl: 3    oxyCODONE-acetaminophen (PERCOCET)  mg per tablet, Take 1 tablet by mouth every 6 (six) hours as needed., Disp: 120 tablet, Rfl: 0    oxyCODONE-acetaminophen (PERCOCET)  mg per tablet, Take 1 tablet by mouth every 6 (six) hours as needed., Disp: 120 tablet, Rfl: 0    oxyCODONE-acetaminophen (PERCOCET)  mg per tablet, Take 1 tablet by mouth every 6 (six) hours as needed., Disp: 120 tablet, Rfl: 0    tizanidine (ZANAFLEX) 4 MG tablet, Take 1 tablet (4 mg total) by mouth every 8 (eight) hours as needed., Disp: 60 tablet, Rfl: 5      Review of Systems:    No nausea, vomiting, fevers, chills , contipation, diarrhea or sweats,no weight change, +back stiffness, no chest pain, no sob, no change of bowel or bladder habits,no coordination issues      Physical Exam:      Vitals:    07/11/18 1054   BP: 118/77   Pulse: 69     alert and oriented ×4 follows commands answers all questions appropriately,affect wnl  Manual muscle test 5 out of 5 sensation to light touch grossly intact  +excruciate tenderness b/l QL  Nl gait  -slR  -SABINE  Dec lumbar ROM in all directions, specially fwd flexion  babinsky down  No clonus  DTR's symmetric 2+  No C/C/E      Assessment:  S/p lumbar fusion  Lumbar ddd  Lumbar radic  Plan:  TPI's to low back  "today  He may be a candidate for a "sweet" caudal kavya in future, but he will need to stop ASA 7 days prior      pROCEDURE NOTE:  Risk and benefit of trigger point injections given to pt. Injections performed w. A 1.5" 25G needle after sterile prep w. Betadine, verbal consent obtained . NO complications. B/l Quadratus Lumborum  AND ILIOCOSTALIS were inJected with a total of 3ML of 1% Lidocaine    Thank you for this interesting referral          "

## 2018-07-11 NOTE — LETTER
July 11, 2018      Sienna Hawk PA-C  [address] [facility] [address]  [address]  [facility] [address]           [facility]  [address] [place] [address] [address]  [facility] [address]  Phone: [phone]  Fax: [phone]          Patient: Sam Pete   MR Number: [id number]   YOB: 1945   Date of Visit: 7/11/2018       Dear Sienna Hawk:    Thank you for referring Sam Pete to me for evaluation. Attached you will find relevant portions of my assessment and plan of care.    If you have questions, please do not hesitate to call me. I look forward to following Sam Pete along with you.    Sincerely,    Denise Petit,     Enclosure  CC:  No Recipients    If you would like to receive this communication electronically, please contact [email or web address] or [phone] to request more information on [facility] [employer] Link access.    For providers and/or their staff who would like to refer a patient to [facility], please contact us through our one-stop-shop provider referral line, [facility], at [phone].    If you feel you have received this communication in error or would no longer like to receive these types of communications, please e-mail [email or web address]

## 2018-07-18 ENCOUNTER — TELEPHONE (OUTPATIENT)
Dept: FAMILY MEDICINE | Facility: CLINIC | Age: 73
End: 2018-07-18

## 2018-07-18 ENCOUNTER — OFFICE VISIT (OUTPATIENT)
Dept: FAMILY MEDICINE | Facility: CLINIC | Age: 73
End: 2018-07-18
Attending: FAMILY MEDICINE
Payer: MEDICARE

## 2018-07-18 ENCOUNTER — LAB VISIT (OUTPATIENT)
Dept: LAB | Facility: HOSPITAL | Age: 73
End: 2018-07-18
Attending: FAMILY MEDICINE
Payer: MEDICARE

## 2018-07-18 VITALS
RESPIRATION RATE: 16 BRPM | SYSTOLIC BLOOD PRESSURE: 128 MMHG | BODY MASS INDEX: 33.11 KG/M2 | TEMPERATURE: 98 F | OXYGEN SATURATION: 95 % | WEIGHT: 231.25 LBS | DIASTOLIC BLOOD PRESSURE: 62 MMHG | HEART RATE: 68 BPM | HEIGHT: 70 IN

## 2018-07-18 DIAGNOSIS — F11.20 UNCOMPLICATED OPIOID DEPENDENCE: ICD-10-CM

## 2018-07-18 DIAGNOSIS — F11.20 UNCOMPLICATED OPIOID DEPENDENCE: Primary | ICD-10-CM

## 2018-07-18 DIAGNOSIS — G89.29 OTHER CHRONIC PAIN: ICD-10-CM

## 2018-07-18 DIAGNOSIS — Z86.73 HISTORY OF CVA IN ADULTHOOD: ICD-10-CM

## 2018-07-18 DIAGNOSIS — Z12.11 COLON CANCER SCREENING: ICD-10-CM

## 2018-07-18 DIAGNOSIS — N52.9 ERECTILE DYSFUNCTION, UNSPECIFIED ERECTILE DYSFUNCTION TYPE: ICD-10-CM

## 2018-07-18 LAB
AMPHET+METHAMPHET UR QL: NEGATIVE
BARBITURATES UR QL SCN>200 NG/ML: NEGATIVE
BENZODIAZ UR QL SCN>200 NG/ML: NEGATIVE
BZE UR QL SCN: NEGATIVE
CANNABINOIDS UR QL SCN: NEGATIVE
CREAT UR-MCNC: 37 MG/DL
ETHANOL UR-MCNC: <10 MG/DL
METHADONE UR QL SCN>300 NG/ML: NEGATIVE
OPIATES UR QL SCN: NORMAL
PCP UR QL SCN>25 NG/ML: NEGATIVE
TOXICOLOGY INFORMATION: NORMAL

## 2018-07-18 PROCEDURE — 3078F DIAST BP <80 MM HG: CPT | Mod: CPTII,S$GLB,, | Performed by: FAMILY MEDICINE

## 2018-07-18 PROCEDURE — 99214 OFFICE O/P EST MOD 30 MIN: CPT | Mod: S$GLB,,, | Performed by: FAMILY MEDICINE

## 2018-07-18 PROCEDURE — 3074F SYST BP LT 130 MM HG: CPT | Mod: CPTII,S$GLB,, | Performed by: FAMILY MEDICINE

## 2018-07-18 PROCEDURE — 99999 PR PBB SHADOW E&M-EST. PATIENT-LVL IV: CPT | Mod: PBBFAC,,, | Performed by: FAMILY MEDICINE

## 2018-07-18 PROCEDURE — 80307 DRUG TEST PRSMV CHEM ANLYZR: CPT

## 2018-07-18 RX ORDER — MORPHINE SULFATE 60 MG/1
60 TABLET, FILM COATED, EXTENDED RELEASE ORAL 2 TIMES DAILY
Qty: 60 TABLET | Refills: 0 | Status: SHIPPED | OUTPATIENT
Start: 2018-09-22 | End: 2018-10-16

## 2018-07-18 RX ORDER — OXYCODONE AND ACETAMINOPHEN 10; 325 MG/1; MG/1
1 TABLET ORAL EVERY 6 HOURS PRN
Qty: 120 TABLET | Refills: 0 | Status: SHIPPED | OUTPATIENT
Start: 2018-07-22 | End: 2018-10-16

## 2018-07-18 RX ORDER — ATORVASTATIN CALCIUM 40 MG/1
40 TABLET, FILM COATED ORAL DAILY
Qty: 90 TABLET | Refills: 3 | Status: SHIPPED | OUTPATIENT
Start: 2018-07-18 | End: 2019-05-22 | Stop reason: SDUPTHER

## 2018-07-18 RX ORDER — MORPHINE SULFATE 60 MG/1
60 TABLET, FILM COATED, EXTENDED RELEASE ORAL 2 TIMES DAILY
Qty: 60 TABLET | Refills: 0 | Status: SHIPPED | OUTPATIENT
Start: 2018-08-22 | End: 2018-10-16

## 2018-07-18 RX ORDER — OXYCODONE AND ACETAMINOPHEN 10; 325 MG/1; MG/1
1 TABLET ORAL EVERY 6 HOURS PRN
Qty: 120 TABLET | Refills: 0 | Status: SHIPPED | OUTPATIENT
Start: 2018-09-22 | End: 2018-10-16

## 2018-07-18 RX ORDER — SILDENAFIL CITRATE 20 MG/1
TABLET ORAL
Qty: 10 TABLET | Refills: 5 | Status: SHIPPED | OUTPATIENT
Start: 2018-07-18 | End: 2023-07-17 | Stop reason: CLARIF

## 2018-07-18 RX ORDER — OXYCODONE AND ACETAMINOPHEN 10; 325 MG/1; MG/1
1 TABLET ORAL EVERY 6 HOURS PRN
Qty: 120 TABLET | Refills: 0 | Status: SHIPPED | OUTPATIENT
Start: 2018-08-22 | End: 2018-10-16

## 2018-07-18 RX ORDER — MORPHINE SULFATE 60 MG/1
60 TABLET, FILM COATED, EXTENDED RELEASE ORAL 2 TIMES DAILY
Qty: 60 TABLET | Refills: 0 | Status: SHIPPED | OUTPATIENT
Start: 2018-07-22 | End: 2018-10-16

## 2018-07-18 RX ORDER — BUPROPION HYDROCHLORIDE 150 MG/1
150 TABLET ORAL DAILY
Qty: 90 TABLET | Refills: 1 | Status: SHIPPED | OUTPATIENT
Start: 2018-07-18 | End: 2019-02-28 | Stop reason: SDUPTHER
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
## 2018-08-07 ENCOUNTER — OFFICE VISIT (OUTPATIENT)
Dept: PHYSICAL MEDICINE AND REHAB | Facility: CLINIC | Age: 73
End: 2018-08-07
Payer: MEDICARE

## 2018-08-07 VITALS
DIASTOLIC BLOOD PRESSURE: 76 MMHG | WEIGHT: 231 LBS | BODY MASS INDEX: 33.07 KG/M2 | HEIGHT: 70 IN | SYSTOLIC BLOOD PRESSURE: 123 MMHG | HEART RATE: 74 BPM

## 2018-08-07 DIAGNOSIS — M79.10 MYALGIA: ICD-10-CM

## 2018-08-07 DIAGNOSIS — M54.16 LUMBAR RADICULAR PAIN: Primary | ICD-10-CM

## 2018-08-07 DIAGNOSIS — M54.50 CHRONIC BILATERAL LOW BACK PAIN WITHOUT SCIATICA: ICD-10-CM

## 2018-08-07 DIAGNOSIS — G89.29 CHRONIC BILATERAL LOW BACK PAIN WITHOUT SCIATICA: ICD-10-CM

## 2018-08-07 PROCEDURE — 3074F SYST BP LT 130 MM HG: CPT | Mod: CPTII,S$GLB,, | Performed by: PHYSICAL MEDICINE & REHABILITATION

## 2018-08-07 PROCEDURE — 99214 OFFICE O/P EST MOD 30 MIN: CPT | Mod: 25,S$GLB,, | Performed by: PHYSICAL MEDICINE & REHABILITATION

## 2018-08-07 PROCEDURE — 99999 PR PBB SHADOW E&M-EST. PATIENT-LVL III: CPT | Mod: PBBFAC,,, | Performed by: PHYSICAL MEDICINE & REHABILITATION

## 2018-08-07 PROCEDURE — 3078F DIAST BP <80 MM HG: CPT | Mod: CPTII,S$GLB,, | Performed by: PHYSICAL MEDICINE & REHABILITATION

## 2018-08-07 PROCEDURE — 20553 NJX 1/MLT TRIGGER POINTS 3/>: CPT | Mod: S$GLB,,, | Performed by: PHYSICAL MEDICINE & REHABILITATION

## 2018-08-07 RX ORDER — LIDOCAINE HYDROCHLORIDE 10 MG/ML
3 INJECTION INFILTRATION; PERINEURAL
Status: COMPLETED | OUTPATIENT
Start: 2018-08-07 | End: 2018-08-07

## 2018-08-07 RX ADMIN — LIDOCAINE HYDROCHLORIDE 3 ML: 10 INJECTION INFILTRATION; PERINEURAL at 10:08

## 2018-08-07 NOTE — PROGRESS NOTES
HPI:  Patient is a 73 y.o. year old male w. Low back pain s/p fusion and lumbar radic. He did well w. tpi's and got over a month of relief, however pain is returning.    Imaging  FINDINGS:  There is evidence of prior pedicular screw placement from L2-S1 with posterior cross links as demonstrated on recent plain films.  Alignment is good.  There is mild anterior osteophyte formation throughout the lumbar region and there is loss of disc space height at L4/L5.  The screws have been placed relative the previous exam performed May 8, 2017.  Posterior to the canal there is a complex colles fluid collection at the level of L3 measuring maximally 2.8 cm transversely by 1.8 cm AP by 3 cm cranial caudal.  This does not appear to distort the thecal sac and does not have an appearance suggesting a pseudo meninges seal.  This likely represents a seroma.  The conus terminates at L1 and has an unremarkable appearance.  The individual disc levels appears follows:    T12/L1: Degenerative facet changes are noted bilaterally and there is minimal distortion of the central canal.    L1/L2: Degenerative facet changes are noted and there is no evidence of significant canal or foraminal stenosis.    L2/L3: Disc bulging is evident which is more pronounced to the left of midline causing minimal distortion left foramen.  The central canal is intact as is the right foramen.    L3/L4: There is mild disc bulging to the left of midline without evidence of significant canal or foraminal stenosis.    L4/L5: There is annular disc bulging and there is degenerative facet disease.  This canal and foramina are intact.  There is slight narrowing the left foramen due to disc bulging.    L5/S1: There is mild narrowing the left foramen due to disc and osteophyte formation.  I do not see evidence of recurrent or residual disc extrusion and degenerative facet changes are noted bilaterally.  The central canal is intact.      Impression       Interval fusion  with pedicular screws without apparent complication or significant recurrent or residual stenosis       FINDINGS:  There are stable postsurgical changes of posterior instrumented fusion of L2 through S1 with bilateral transpedicular screw fixation and vertical joining rods as well as transverse bar.  There is no hardware complication such as fracture or loosening.  There is stable moderate to marked disc space narrowing at the L4-5 and L5-S1 levels.  There is facet joint arthropathy.  Again, pedicle screws at the S1 level extend just beyond the anterior cortical margin of the vertebral body.  There is moderate to marked atherosclerosis.  There is no obvious abnormal motion with flexion or extension.      Impression       1.  Stable postsurgical changes of extensive lumbar posterior instrumented fusion without change, complication or abnormal motion.  There is stable degenerative disc disease at the L4-5 and L5-S1 levels.    2.  Atherosclerosis.     Labs  Gluc elev 173  egfr cr nl      Past Medical History:   Diagnosis Date    Anticoagulant long-term use     ASA 81 mg    Arthritis     Chronic low back pain     Complete rupture of rotator cuff 2/8/2011    DDD (degenerative disc disease), lumbar 7/8/2015    Degeneration of lumbar or lumbosacral intervertebral disc 9/9/2015    ED (erectile dysfunction)     GERD (gastroesophageal reflux disease)     Hypertension     Lumbar pseudoarthrosis 6/26/2017    Lumbar stenosis 6/26/2017    Obesity     Spondylosis of lumbar region without myelopathy or radiculopathy 7/8/2015    Stroke 1997    basal ganglia, left sided weakness, resolved    Testicular hypofunction     Thoracic or lumbosacral neuritis or radiculitis 7/8/2015     Past Surgical History:   Procedure Laterality Date    APPENDECTOMY      BACK SURGERY      4- back surgery    COLONOSCOPY  07/12/2004    Dr Rodriguez 7-10 year elisa     Epidural steroid injection      Pain management    FRACTURE SURGERY Left      wrist / forearm, total of 5    JOINT REPLACEMENT  2-6-2013    ( rt hip 2007), left hip     JOINT REPLACEMENT Left     total knee    KNEE ARTHROSCOPY W/ DEBRIDEMENT      bilateral knees , total of six    KNEE SURGERY      Nervbe Block injection      Pain management     Family History   Problem Relation Age of Onset    Hypertension Father     Heart disease Father     Drug abuse Daughter     Hypertension Son     Lung disease Sister     COPD Sister     Hypertension Sister     Diverticulitis Sister     Gout Sister     Kidney disease Sister     No Known Problems Mother      Social History     Social History    Marital status:      Spouse name: N/A    Number of children: N/A    Years of education: N/A     Social History Main Topics    Smoking status: Former Smoker     Packs/day: 1.00     Years: 30.00     Start date: 8/3/1963     Quit date: 1/1/1992    Smokeless tobacco: Never Used    Alcohol use Yes      Comment: On occasion    Drug use: Yes     Types: Oxycodone, Morphine    Sexual activity: Not on file     Other Topics Concern    Not on file     Social History Narrative    No narrative on file       Review of patient's allergies indicates:   Allergen Reactions    Xyzal [levocetirizine] Other (See Comments)     Knocked him out        Current Outpatient Prescriptions:     acetaminophen 80 mg TbDL, Take 2 tablets by mouth daily as needed. , Disp: , Rfl:     amLODIPine (NORVASC) 10 MG tablet, TAKE 1 TABLET ONE TIME DAILY, Disp: 90 tablet, Rfl: 3    ammonium lactate 12 % Crea, Apply 1 application topically 2 (two) times daily. (Patient taking differently: Apply 1 application topically 2 (two) times daily as needed. ), Disp: 140 g, Rfl: 11    aspirin 81 mg Tab, Take 1 tablet by mouth Daily. Every day, Disp: , Rfl:     atorvastatin (LIPITOR) 40 MG tablet, Take 1 tablet (40 mg total) by mouth once daily., Disp: 90 tablet, Rfl: 3    buPROPion (WELLBUTRIN XL) 150 MG TB24 tablet, Take 1  tablet (150 mg total) by mouth once daily., Disp: 90 tablet, Rfl: 1    losartan (COZAAR) 50 MG tablet, TAKE 1 TABLET ONE TIME DAILY, Disp: 90 tablet, Rfl: 3    LYRICA 75 mg capsule, TAKE 1 CAPSULE (75 MG TOTAL) BY MOUTH 2 (TWO) TIMES DAILY., Disp: 60 capsule, Rfl: 5    morphine (MS CONTIN) 60 MG 12 hr tablet, Take 1 tablet (60 mg total) by mouth 2 (two) times daily., Disp: 60 tablet, Rfl: 0    [START ON 8/22/2018] morphine (MS CONTIN) 60 MG 12 hr tablet, Take 1 tablet (60 mg total) by mouth 2 (two) times daily., Disp: 60 tablet, Rfl: 0    [START ON 9/22/2018] morphine (MS CONTIN) 60 MG 12 hr tablet, Take 1 tablet (60 mg total) by mouth 2 (two) times daily., Disp: 60 tablet, Rfl: 0    omeprazole (PRILOSEC) 40 MG capsule, TAKE 1 CAPSULE ONE TIME DAILY, Disp: 90 capsule, Rfl: 3    oxyCODONE-acetaminophen (PERCOCET)  mg per tablet, Take 1 tablet by mouth every 6 (six) hours as needed., Disp: 120 tablet, Rfl: 0    [START ON 8/22/2018] oxyCODONE-acetaminophen (PERCOCET)  mg per tablet, Take 1 tablet by mouth every 6 (six) hours as needed., Disp: 120 tablet, Rfl: 0    [START ON 9/22/2018] oxyCODONE-acetaminophen (PERCOCET)  mg per tablet, Take 1 tablet by mouth every 6 (six) hours as needed., Disp: 120 tablet, Rfl: 0    sildenafil (REVATIO) 20 mg Tab, Take 1-5 daily as needed for ED, Disp: 10 tablet, Rfl: 5    tizanidine (ZANAFLEX) 4 MG tablet, Take 1 tablet (4 mg total) by mouth every 8 (eight) hours as needed., Disp: 60 tablet, Rfl: 5    Review of Systems  No nausea, vomiting, fevers, Chills , contipation, diarrhea or sweats    Physical Exam:      Vitals:    08/07/18 0956   BP: 123/76   Pulse: 74       alert and oriented ×4 follows commands answers all questions appropriately,affect wnl  Manual muscle test 5 out of 5 sensation to light touch grossly intact  +tenderness b/l QL  antalgic gait  No C/C/E wearing compression stockings    Assessment:  Lumbar ddd w. radic s/p lumbar  "fusion    Plan:  tpi's to low back today  Schedule a "sweet" caudal kavya- he needs to be off all nsaids+asa for 7 days      pROCEDURE NOTE:  Risk and benefit of trigger point injections given to pt. Injections performed w. A 1.5" 25G needle after sterile prep w. chlorohexedine, verbal consent obtained . NO complications. B/l Quadratus Lumborum  AND ILIOCOSTALIS were inJected with a total of 3ML of 1% Lidocaine      "

## 2018-08-21 ENCOUNTER — CLINICAL SUPPORT (OUTPATIENT)
Dept: PHYSICAL MEDICINE AND REHAB | Facility: CLINIC | Age: 73
End: 2018-08-21
Payer: MEDICARE

## 2018-08-21 VITALS
DIASTOLIC BLOOD PRESSURE: 82 MMHG | WEIGHT: 231 LBS | HEART RATE: 85 BPM | HEIGHT: 70 IN | SYSTOLIC BLOOD PRESSURE: 139 MMHG | BODY MASS INDEX: 33.07 KG/M2

## 2018-08-21 DIAGNOSIS — M54.16 LUMBAR RADICULAR PAIN: ICD-10-CM

## 2018-08-21 PROCEDURE — 99999 PR PBB SHADOW E&M-EST. PATIENT-LVL IV: CPT | Mod: PBBFAC,,, | Performed by: PHYSICAL MEDICINE & REHABILITATION

## 2018-08-21 PROCEDURE — 62323 NJX INTERLAMINAR LMBR/SAC: CPT | Mod: S$GLB,,, | Performed by: PHYSICAL MEDICINE & REHABILITATION

## 2018-08-21 PROCEDURE — 99212 OFFICE O/P EST SF 10 MIN: CPT | Mod: 25,S$GLB,, | Performed by: PHYSICAL MEDICINE & REHABILITATION

## 2018-08-21 RX ORDER — DEXAMETHASONE SODIUM PHOSPHATE 4 MG/ML
4 INJECTION, SOLUTION INTRA-ARTICULAR; INTRALESIONAL; INTRAMUSCULAR; INTRAVENOUS; SOFT TISSUE ONCE
Status: COMPLETED | OUTPATIENT
Start: 2018-08-21 | End: 2018-08-21

## 2018-08-21 RX ADMIN — DEXAMETHASONE SODIUM PHOSPHATE 4 MG: 4 INJECTION, SOLUTION INTRA-ARTICULAR; INTRALESIONAL; INTRAMUSCULAR; INTRAVENOUS; SOFT TISSUE at 02:08

## 2018-08-21 NOTE — PROGRESS NOTES
HPI:  Patient is a 73 y.o. year old male w. Back pain s/p fusion. The tpi's he had on 08/07 helped but he is still having a lot of pain. Overall, both tpi's have helped and pain is not as intense.       Past Medical History:   Diagnosis Date    Anticoagulant long-term use     ASA 81 mg    Arthritis     Chronic low back pain     Complete rupture of rotator cuff 2/8/2011    DDD (degenerative disc disease), lumbar 7/8/2015    Degeneration of lumbar or lumbosacral intervertebral disc 9/9/2015    ED (erectile dysfunction)     GERD (gastroesophageal reflux disease)     Hypertension     Lumbar pseudoarthrosis 6/26/2017    Lumbar stenosis 6/26/2017    Obesity     Spondylosis of lumbar region without myelopathy or radiculopathy 7/8/2015    Stroke 1997    basal ganglia, left sided weakness, resolved    Testicular hypofunction     Thoracic or lumbosacral neuritis or radiculitis 7/8/2015     Past Surgical History:   Procedure Laterality Date    APPENDECTOMY      BACK SURGERY      4- back surgery    COLONOSCOPY  07/12/2004    Dr Rodriguez 7-10 year elisa     Epidural steroid injection      Pain management    FRACTURE SURGERY Left     wrist / forearm, total of 5    JOINT REPLACEMENT  2-6-2013    ( rt hip 2007), left hip     JOINT REPLACEMENT Left     total knee    KNEE ARTHROSCOPY W/ DEBRIDEMENT      bilateral knees , total of six    KNEE SURGERY      Nervbe Block injection      Pain management     Family History   Problem Relation Age of Onset    Hypertension Father     Heart disease Father     Drug abuse Daughter     Hypertension Son     Lung disease Sister     COPD Sister     Hypertension Sister     Diverticulitis Sister     Gout Sister     Kidney disease Sister     No Known Problems Mother      Social History     Socioeconomic History    Marital status:      Spouse name: Not on file    Number of children: Not on file    Years of education: Not on file    Highest education level: Not  on file   Social Needs    Financial resource strain: Not on file    Food insecurity - worry: Not on file    Food insecurity - inability: Not on file    Transportation needs - medical: Not on file    Transportation needs - non-medical: Not on file   Occupational History    Not on file   Tobacco Use    Smoking status: Former Smoker     Packs/day: 1.00     Years: 30.00     Pack years: 30.00     Start date: 8/3/1963     Last attempt to quit: 1992     Years since quittin.6    Smokeless tobacco: Never Used   Substance and Sexual Activity    Alcohol use: Yes     Comment: On occasion    Drug use: Yes     Types: Oxycodone, Morphine    Sexual activity: Not on file   Other Topics Concern    Not on file   Social History Narrative    Not on file       Review of patient's allergies indicates:   Allergen Reactions    Xyzal [levocetirizine] Other (See Comments)     Knocked him out        Current Outpatient Medications:     acetaminophen 80 mg TbDL, Take 2 tablets by mouth daily as needed. , Disp: , Rfl:     amLODIPine (NORVASC) 10 MG tablet, TAKE 1 TABLET ONE TIME DAILY, Disp: 90 tablet, Rfl: 3    ammonium lactate 12 % Crea, Apply 1 application topically 2 (two) times daily. (Patient taking differently: Apply 1 application topically 2 (two) times daily as needed. ), Disp: 140 g, Rfl: 11    aspirin 81 mg Tab, Take 1 tablet by mouth Daily. Every day, Disp: , Rfl:     atorvastatin (LIPITOR) 40 MG tablet, Take 1 tablet (40 mg total) by mouth once daily., Disp: 90 tablet, Rfl: 3    buPROPion (WELLBUTRIN XL) 150 MG TB24 tablet, Take 1 tablet (150 mg total) by mouth once daily., Disp: 90 tablet, Rfl: 1    losartan (COZAAR) 50 MG tablet, TAKE 1 TABLET ONE TIME DAILY, Disp: 90 tablet, Rfl: 3    LYRICA 75 mg capsule, TAKE 1 CAPSULE (75 MG TOTAL) BY MOUTH 2 (TWO) TIMES DAILY., Disp: 60 capsule, Rfl: 5    morphine (MS CONTIN) 60 MG 12 hr tablet, Take 1 tablet (60 mg total) by mouth 2 (two) times daily., Disp: 60  "tablet, Rfl: 0    [START ON 8/22/2018] morphine (MS CONTIN) 60 MG 12 hr tablet, Take 1 tablet (60 mg total) by mouth 2 (two) times daily., Disp: 60 tablet, Rfl: 0    [START ON 9/22/2018] morphine (MS CONTIN) 60 MG 12 hr tablet, Take 1 tablet (60 mg total) by mouth 2 (two) times daily., Disp: 60 tablet, Rfl: 0    omeprazole (PRILOSEC) 40 MG capsule, TAKE 1 CAPSULE ONE TIME DAILY, Disp: 90 capsule, Rfl: 3    oxyCODONE-acetaminophen (PERCOCET)  mg per tablet, Take 1 tablet by mouth every 6 (six) hours as needed., Disp: 120 tablet, Rfl: 0    [START ON 8/22/2018] oxyCODONE-acetaminophen (PERCOCET)  mg per tablet, Take 1 tablet by mouth every 6 (six) hours as needed., Disp: 120 tablet, Rfl: 0    [START ON 9/22/2018] oxyCODONE-acetaminophen (PERCOCET)  mg per tablet, Take 1 tablet by mouth every 6 (six) hours as needed., Disp: 120 tablet, Rfl: 0    sildenafil (REVATIO) 20 mg Tab, Take 1-5 daily as needed for ED, Disp: 10 tablet, Rfl: 5    tizanidine (ZANAFLEX) 4 MG tablet, Take 1 tablet (4 mg total) by mouth every 8 (eight) hours as needed., Disp: 60 tablet, Rfl: 5        Review of Systems  No nausea, vomiting, fevers, Chills , contipation, diarrhea or sweats      Physical Exam:      Vitals:    08/21/18 1102   BP: 139/82   Pulse: 85       alert and oriented ×4 follows commands answers all questions appropriately,affect wnl  Manual muscle test 5 out of 5 (before and after procedure) sensation to light touch grossly intact  +tenderness of sacrum and coccyx  antalgic gait  Genu varus  No C/C/E    Assessment:  Lumbar ddd s/p fusion    Plan:  "sweet" caudal kavya today       PROCEDURE   Vertical small needle "sweet" caudal kavya     Risks and benefits of a "sweet " caudal kavya under ultrasound were given to patient. Verbal and written consent was obtained.  Risks and benefits of a caudal KAVYA given to patient. Injection was performed under ultrasound guidance. The patient was placed in a prone position. " Ultrasound was used to identify the sacral hiatus and sacrococcygeal ligament between the bilateral sacral cornua. A DILUTION of 1ml of dexamethasone and 14ml of D5W.  A total of 5 ml of this dilution was  Given using  10ml syringe and a 25 g 2 inch needle. He was not able to tolerate more of the dilution because of discomfort from procedure. Injection was performed under realtime ultrasonography.    The straight transducer was than rotated by 90deg to examine the longitudinal view of the sacral hiatus. The needle penetrated the sacocroccygeal ligament without being inserted into the sacral canal. Gentle aspiration was performed to identify blood or CSF. Upon negative aspiration the combination of dexamethasone and D5W was instilled.  The filling pattern was observed under doppler ultrasound.    Immediate relief of pain was obtained. No complications.

## 2018-09-10 ENCOUNTER — TELEPHONE (OUTPATIENT)
Dept: PHYSICAL MEDICINE AND REHAB | Facility: CLINIC | Age: 73
End: 2018-09-10

## 2018-09-10 ENCOUNTER — PATIENT MESSAGE (OUTPATIENT)
Dept: PHYSICAL MEDICINE AND REHAB | Facility: CLINIC | Age: 73
End: 2018-09-10

## 2018-09-10 DIAGNOSIS — M54.16 LUMBAR RADICULITIS: Primary | ICD-10-CM

## 2018-09-10 NOTE — TELEPHONE ENCOUNTER
There is no need to repeat a procedure that did not help. I have placed a referral for [facility] pain management , he may be able to provide him w. More relief. If he would like to get trigger pt injections pls set that up w. Me as well  pls notify

## 2018-09-10 NOTE — TELEPHONE ENCOUNTER
Informed patient of message.  appt has been scheduled with Dr Terrell.  appt canceled with Dr Petit per patient's request.

## 2018-10-10 ENCOUNTER — PATIENT MESSAGE (OUTPATIENT)
Dept: FAMILY MEDICINE | Facility: CLINIC | Age: 73
End: 2018-10-10

## 2018-10-11 ENCOUNTER — DOCUMENTATION ONLY (OUTPATIENT)
Dept: FAMILY MEDICINE | Facility: CLINIC | Age: 73
End: 2018-10-11

## 2018-10-11 NOTE — PROGRESS NOTES
Pre-Visit Chart Review  For Appointment Scheduled on 10-16-18    Health Maintenance Due   Topic Date Due    Colonoscopy  07/12/2014    Influenza Vaccine  08/01/2018

## 2018-10-16 ENCOUNTER — OFFICE VISIT (OUTPATIENT)
Dept: FAMILY MEDICINE | Facility: CLINIC | Age: 73
End: 2018-10-16
Attending: FAMILY MEDICINE
Payer: MEDICARE

## 2018-10-16 VITALS
SYSTOLIC BLOOD PRESSURE: 116 MMHG | WEIGHT: 234.81 LBS | HEART RATE: 79 BPM | HEIGHT: 70 IN | OXYGEN SATURATION: 92 % | BODY MASS INDEX: 33.62 KG/M2 | DIASTOLIC BLOOD PRESSURE: 68 MMHG | RESPIRATION RATE: 16 BRPM | TEMPERATURE: 98 F

## 2018-10-16 DIAGNOSIS — Z23 IMMUNIZATION DUE: ICD-10-CM

## 2018-10-16 DIAGNOSIS — G89.29 OTHER CHRONIC PAIN: ICD-10-CM

## 2018-10-16 DIAGNOSIS — F11.20 UNCOMPLICATED OPIOID DEPENDENCE: Primary | ICD-10-CM

## 2018-10-16 DIAGNOSIS — Z12.11 COLON CANCER SCREENING: ICD-10-CM

## 2018-10-16 PROCEDURE — 3078F DIAST BP <80 MM HG: CPT | Mod: CPTII,,, | Performed by: FAMILY MEDICINE

## 2018-10-16 PROCEDURE — 99999 PR PBB SHADOW E&M-EST. PATIENT-LVL IV: CPT | Mod: PBBFAC,,, | Performed by: FAMILY MEDICINE

## 2018-10-16 PROCEDURE — 3074F SYST BP LT 130 MM HG: CPT | Mod: CPTII,,, | Performed by: FAMILY MEDICINE

## 2018-10-16 PROCEDURE — 99214 OFFICE O/P EST MOD 30 MIN: CPT | Mod: PBBFAC,PO | Performed by: FAMILY MEDICINE

## 2018-10-16 PROCEDURE — 99214 OFFICE O/P EST MOD 30 MIN: CPT | Mod: S$PBB,,, | Performed by: FAMILY MEDICINE

## 2018-10-16 PROCEDURE — 90662 IIV NO PRSV INCREASED AG IM: CPT | Mod: PBBFAC,PO

## 2018-10-16 PROCEDURE — 1101F PT FALLS ASSESS-DOCD LE1/YR: CPT | Mod: CPTII,,, | Performed by: FAMILY MEDICINE

## 2018-10-16 RX ORDER — OXYCODONE AND ACETAMINOPHEN 10; 325 MG/1; MG/1
1 TABLET ORAL EVERY 6 HOURS PRN
Qty: 120 TABLET | Refills: 0 | Status: SHIPPED | OUTPATIENT
Start: 2018-12-21 | End: 2019-01-17 | Stop reason: SDUPTHER

## 2018-10-16 RX ORDER — MORPHINE SULFATE 60 MG/1
60 TABLET, FILM COATED, EXTENDED RELEASE ORAL 2 TIMES DAILY
Qty: 60 TABLET | Refills: 0 | Status: SHIPPED | OUTPATIENT
Start: 2018-11-22 | End: 2018-10-16 | Stop reason: SDUPTHER

## 2018-10-16 RX ORDER — OXYCODONE AND ACETAMINOPHEN 10; 325 MG/1; MG/1
1 TABLET ORAL EVERY 6 HOURS PRN
Qty: 120 TABLET | Refills: 0 | Status: SHIPPED | OUTPATIENT
Start: 2018-11-22 | End: 2018-10-16 | Stop reason: SDUPTHER

## 2018-10-16 RX ORDER — MORPHINE SULFATE 60 MG/1
60 TABLET, FILM COATED, EXTENDED RELEASE ORAL 2 TIMES DAILY
Qty: 60 TABLET | Refills: 0 | Status: SHIPPED | OUTPATIENT
Start: 2018-12-21 | End: 2019-01-17 | Stop reason: SDUPTHER

## 2018-10-16 RX ORDER — OXYCODONE AND ACETAMINOPHEN 10; 325 MG/1; MG/1
1 TABLET ORAL EVERY 6 HOURS PRN
Qty: 120 TABLET | Refills: 0 | Status: SHIPPED | OUTPATIENT
Start: 2018-10-22 | End: 2018-10-16 | Stop reason: SDUPTHER

## 2018-10-16 RX ORDER — MORPHINE SULFATE 60 MG/1
60 TABLET, FILM COATED, EXTENDED RELEASE ORAL 2 TIMES DAILY
Qty: 60 TABLET | Refills: 0 | Status: SHIPPED | OUTPATIENT
Start: 2018-10-22 | End: 2018-10-16 | Stop reason: SDUPTHER

## 2018-10-16 NOTE — PROGRESS NOTES
Subjective:       Patient ID: Sam Pete is a 73 y.o. male.    Chief Complaint: Medication Refill    73-year-old male coming in for renewal of Percocet and morphine for chronic back pain neck pain spinal stenosis degenerative joint and disc disease. The patient is having no problems with constipation or cognitive impairment.  His usual level of pain is 5 to 6/10 and the Percocet will bring it down to a level 1 or 2/10.  At its worst pain will reach about 9/10 and Percocet will drop it about 7/10 at those times.  He he is cutting back on his work load to try to reduce pain levels in the near future.  He is otherwise doing well and has agreed to go ahead and do his colonoscopy.  He is due for a flu shot and will do that today as well.    Past Medical History:  No date: Anticoagulant long-term use      Comment:  ASA 81 mg  No date: Arthritis  No date: Chronic low back pain  2/8/2011: Complete rupture of rotator cuff  7/8/2015: DDD (degenerative disc disease), lumbar  9/9/2015: Degeneration of lumbar or lumbosacral intervertebral disc  No date: ED (erectile dysfunction)  No date: GERD (gastroesophageal reflux disease)  No date: Hypertension  6/26/2017: Lumbar pseudoarthrosis  6/26/2017: Lumbar stenosis  No date: Obesity  7/8/2015: Spondylosis of lumbar region without myelopathy or   radiculopathy  1997: Stroke      Comment:  basal ganglia, left sided weakness, resolved  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  7/8/2015: Thoracic or lumbosacral neuritis or radiculitis    Past Surgical History:  No date: APPENDECTOMY  No date: BACK SURGERY      Comment:  4- back surgery  10/14/2015: BLOCK-NERVE-MEDIAL BRANCH-LUMBAR L3,4,5; Bilateral      Comment:  Performed by Everette Short MD at [facility] OR  07/12/2004: COLONOSCOPY      Comment:  Dr Rodriguez 7-10 year elisa   No date: Epidural steroid injection      Comment:  Pain management  8/5/2015: CARLOS-TRANSFORAMINAL L4/5 and L5/S1; Right      Comment:  Performed by Everette Short,  MD at [facility] OR  No date: FRACTURE SURGERY; Left      Comment:  wrist / forearm, total of 5  6/26/2017: FUSION-LAMINECTOMY-LUMBAR  L2-S1 DECOMPRESSION AND PSIF; N/  A      Comment:  Performed by Lei Fowler MD at [facility] OR  6/26/2017: FUSION-TRANSLUMINAL LUMBAR INTERBODY (TLIF)  L4-5 AND L5-  S1; N/A      Comment:  Performed by Lei Fowler MD at [facility] OR  9/9/2015: INJECTION-STEROID-EPIDURAL-CAUDAL; N/A      Comment:  Performed by Everette Short MD at [facility] OR  2-6-2013: JOINT REPLACEMENT      Comment:  ( rt hip 2007), left hip   No date: JOINT REPLACEMENT; Left      Comment:  total knee  No date: KNEE ARTHROSCOPY W/ DEBRIDEMENT      Comment:  bilateral knees , total of six  No date: KNEE SURGERY  No date: Nervbe Block injection      Comment:  Pain management  10/28/2015: RADIOFREQUENCY THERMOCOAGULATION (RFTC)-NERVE-MEDIAN   BRANCH-LUMBAR L3,4,5; Bilateral      Comment:  Performed by Everette Short MD at [facility] OR          Review of Systems   Gastrointestinal: Negative for constipation, diarrhea, nausea and vomiting.   Musculoskeletal: Positive for back pain, neck pain and neck stiffness.   Psychiatric/Behavioral: Negative for behavioral problems, confusion and decreased concentration.       Objective:      Physical Exam   Constitutional: He is oriented to person, place, and time.   Neurological: He is alert and oriented to person, place, and time.   Psychiatric: He has a normal mood and affect. His behavior is normal. Judgment and thought content normal.       Assessment:       1. Uncomplicated opioid dependence    2. Other chronic pain    3. Colon cancer screening        Plan:       1. Uncomplicated opioid dependence  BMP checked with no inappropriate activity found  - oxyCODONE-acetaminophen (PERCOCET)  mg per tablet; Take 1 tablet by mouth every 6 (six) hours as needed.  Dispense: 120 tablet; Refill: 0  - morphine (MS CONTIN) 60 MG 12 hr tablet; Take 1 tablet (60 mg total) by mouth 2 (two)  times daily.  Dispense: 60 tablet; Refill: 0  - Ambulatory referral to Gastroenterology    2. Other chronic pain  - oxyCODONE-acetaminophen (PERCOCET)  mg per tablet; Take 1 tablet by mouth every 6 (six) hours as needed.  Dispense: 120 tablet; Refill: 0  - morphine (MS CONTIN) 60 MG 12 hr tablet; Take 1 tablet (60 mg total) by mouth 2 (two) times daily.  Dispense: 60 tablet; Refill: 0  - Ambulatory referral to Gastroenterology    3. Colon cancer screening  - Ambulatory referral to Gastroenterology

## 2018-10-16 NOTE — PROGRESS NOTES
Administered HD-Flu IM in RA. Patient tolerated well. No bleeding at insertion site noted. Pain scale 0/10 with injection. Two patient identifiers used (Name and ). Asceptic technique maintained.

## 2018-10-16 NOTE — PATIENT INSTRUCTIONS
Weight Management: Getting Started  Healthy bodies come in all shapes and sizes. Not all bodies are made to be thin. For some people, a healthy weight is higher than the average weight listed on weight charts. Your healthcare provider can help you decide on a healthy weight for you.    Reasons to lose weight  Losing weight can help with some health problems, such as high blood pressure, heart disease, diabetes, sleep apnea, and arthritis. You may also feel more energy.  Set your long-term goal  Your goal doesn't even have to be a specific weight. You may decide on a fitness goal (such as being able to walk 10 miles a week), or a health goal (such as lowering your blood pressure). Choose a goal that is measurable and reasonable, so you know when you've reached it. A goal of reaching a BMI of less than 25 is not always reasonable (or possible).   Make an action plan  Habits dont change overnight. Setting your goals too high can leave you feeling discouraged if you cant reach them. Be realistic. Choose one or two small changes you can make now. Set an action plan for how you are going to make these changes. When you can stick to this plan, keep making a few more small changes. Taking small steps will help you stay on the path to success.  Track your progress  Write down your goals. Then, keep a daily record of your progress. Write down what you eat and how active you are. This record lets you look back on how much youve done. It may also help when youre feeling frustrated. Reward yourself for success. Even if you dont reach every goal, give yourself credit for what you do get done.  Get support  Encouragement from others can help make losing weight easier. Ask your family members and friends for support. They may even want to join you. Also look to your healthcare provider, registered dietitian, and  for help. Your local hospital can give you more information about nutrition, exercise, and  weight loss.  Date Last Reviewed: 1/31/2016 © 2000-2017 [employer]. [address] [place] [address], [place] [address]. All rights reserved. This information is not intended as a substitute for professional medical care. Always follow your healthcare professional's instructions.        Walking for Fitness  Fitness walking has something for everyone, even people who are already fit. Walking is one of the safest ways to condition your body aerobically. It can boost energy, help you lose weight, and reduce stress.    Physical benefits  · Walking strengthens your heart and lungs, and tones your muscles.  · When walking, your feet land with less impact than in other sports. This reduces chances of muscle, bone, and joint injury.  · Regular walking improves your cholesterol levels and lowers your risk of heart disease. And it helps you control your blood sugar if you have diabetes.  · Walking is a weight-bearing activity, which helps maintain bone density. This can help prevent osteoporosis.  Personal rewards  · Taking walks can help you relax and manage stress. And fitness walking may make you feel better about yourself.  · Walking can help you sleep better at night and make you less likely to be depressed.  · Regular walking may help maintain your memory as you get older.  · Walking is a great way to spend extra time with friends and family members. Be sure to invite your dog along!  Q&A about fitness walking  Q: Will walking keep me fit?  A: Yes. Regular walking at the right pace gives you all the benefits of other aerobic activities, such as jogging and swimming.  Q: Will walking help me lose weight and keep it off?  A: Yes. Per mile, walking can burn as many calories as jogging. Your health care provider can help work walking into your weight-loss plan.  Q: Is walking safe for my health?  A: Yes. Walking is safe if you have high blood pressure, diabetes, heart disease, or other conditions. Talk to your  healthcare provider before you start.  Date Last Reviewed: 4/1/2017  © 0408-7037 [employer], [employer]. [address] [place] [address], [place] [address]. All rights reserved. This information is not intended as a substitute for professional medical care. Always follow your healthcare professional's instructions.

## 2018-11-19 DIAGNOSIS — G89.29 OTHER CHRONIC PAIN: ICD-10-CM

## 2018-11-19 RX ORDER — TIZANIDINE 4 MG/1
4 TABLET ORAL EVERY 8 HOURS PRN
Qty: 60 TABLET | Refills: 5 | Status: SHIPPED | OUTPATIENT
Start: 2018-11-19 | End: 2019-11-06 | Stop reason: SDUPTHER

## 2018-11-27 ENCOUNTER — DOCUMENTATION ONLY (OUTPATIENT)
Dept: FAMILY MEDICINE | Facility: CLINIC | Age: 73
End: 2018-11-27

## 2018-11-27 NOTE — PROGRESS NOTES
Pre-Visit Chart Review  For Appointment Scheduled on 1-17-19    Health Maintenance Due   Topic Date Due    Colonoscopy  07/12/2014

## 2018-12-19 ENCOUNTER — TELEPHONE (OUTPATIENT)
Dept: GASTROENTEROLOGY | Facility: CLINIC | Age: 73
End: 2018-12-19

## 2019-01-03 ENCOUNTER — PATIENT OUTREACH (OUTPATIENT)
Dept: ADMINISTRATIVE | Facility: HOSPITAL | Age: 74
End: 2019-01-03

## 2019-01-17 ENCOUNTER — OFFICE VISIT (OUTPATIENT)
Dept: FAMILY MEDICINE | Facility: CLINIC | Age: 74
End: 2019-01-17
Attending: FAMILY MEDICINE
Payer: MEDICARE

## 2019-01-17 VITALS
HEART RATE: 76 BPM | HEIGHT: 70 IN | DIASTOLIC BLOOD PRESSURE: 68 MMHG | WEIGHT: 226.19 LBS | TEMPERATURE: 98 F | RESPIRATION RATE: 12 BRPM | OXYGEN SATURATION: 94 % | SYSTOLIC BLOOD PRESSURE: 114 MMHG | BODY MASS INDEX: 32.38 KG/M2

## 2019-01-17 DIAGNOSIS — F11.20 UNCOMPLICATED OPIOID DEPENDENCE: Primary | ICD-10-CM

## 2019-01-17 DIAGNOSIS — I10 GOOD HYPERTENSION CONTROL: ICD-10-CM

## 2019-01-17 DIAGNOSIS — G89.29 OTHER CHRONIC PAIN: ICD-10-CM

## 2019-01-17 PROCEDURE — 99999 PR PBB SHADOW E&M-EST. PATIENT-LVL IV: ICD-10-PCS | Mod: PBBFAC,,, | Performed by: FAMILY MEDICINE

## 2019-01-17 PROCEDURE — 99213 OFFICE O/P EST LOW 20 MIN: CPT | Mod: S$GLB,,, | Performed by: FAMILY MEDICINE

## 2019-01-17 PROCEDURE — 99213 PR OFFICE/OUTPT VISIT, EST, LEVL III, 20-29 MIN: ICD-10-PCS | Mod: S$GLB,,, | Performed by: FAMILY MEDICINE

## 2019-01-17 PROCEDURE — 1101F PT FALLS ASSESS-DOCD LE1/YR: CPT | Mod: CPTII,S$GLB,, | Performed by: FAMILY MEDICINE

## 2019-01-17 PROCEDURE — 3074F SYST BP LT 130 MM HG: CPT | Mod: CPTII,S$GLB,, | Performed by: FAMILY MEDICINE

## 2019-01-17 PROCEDURE — 99999 PR PBB SHADOW E&M-EST. PATIENT-LVL IV: CPT | Mod: PBBFAC,,, | Performed by: FAMILY MEDICINE

## 2019-01-17 PROCEDURE — 3078F DIAST BP <80 MM HG: CPT | Mod: CPTII,S$GLB,, | Performed by: FAMILY MEDICINE

## 2019-01-17 PROCEDURE — 3078F PR MOST RECENT DIASTOLIC BLOOD PRESSURE < 80 MM HG: ICD-10-PCS | Mod: CPTII,S$GLB,, | Performed by: FAMILY MEDICINE

## 2019-01-17 PROCEDURE — 3074F PR MOST RECENT SYSTOLIC BLOOD PRESSURE < 130 MM HG: ICD-10-PCS | Mod: CPTII,S$GLB,, | Performed by: FAMILY MEDICINE

## 2019-01-17 PROCEDURE — 1101F PR PT FALLS ASSESS DOC 0-1 FALLS W/OUT INJ PAST YR: ICD-10-PCS | Mod: CPTII,S$GLB,, | Performed by: FAMILY MEDICINE

## 2019-01-17 RX ORDER — MORPHINE SULFATE 60 MG/1
60 TABLET, FILM COATED, EXTENDED RELEASE ORAL 2 TIMES DAILY
Qty: 60 TABLET | Refills: 0 | Status: SHIPPED | OUTPATIENT
Start: 2019-02-22 | End: 2019-04-15

## 2019-01-17 RX ORDER — OXYCODONE AND ACETAMINOPHEN 10; 325 MG/1; MG/1
1 TABLET ORAL EVERY 6 HOURS PRN
Qty: 120 TABLET | Refills: 0 | Status: SHIPPED | OUTPATIENT
Start: 2019-01-22 | End: 2019-04-15

## 2019-01-17 RX ORDER — MORPHINE SULFATE 60 MG/1
60 TABLET, FILM COATED, EXTENDED RELEASE ORAL 2 TIMES DAILY
Qty: 60 TABLET | Refills: 0 | Status: SHIPPED | OUTPATIENT
Start: 2019-01-22 | End: 2019-02-28 | Stop reason: SDUPTHER

## 2019-01-17 RX ORDER — ACETAMINOPHEN 500 MG
500 TABLET ORAL DAILY
COMMUNITY
Start: 2018-11-07 | End: 2019-01-17

## 2019-01-17 RX ORDER — OXYCODONE AND ACETAMINOPHEN 10; 325 MG/1; MG/1
1 TABLET ORAL EVERY 6 HOURS PRN
Qty: 120 TABLET | Refills: 0 | Status: SHIPPED | OUTPATIENT
Start: 2019-03-22 | End: 2019-04-15

## 2019-01-17 RX ORDER — OXYCODONE AND ACETAMINOPHEN 10; 325 MG/1; MG/1
1 TABLET ORAL EVERY 6 HOURS PRN
Qty: 120 TABLET | Refills: 0 | Status: SHIPPED | OUTPATIENT
Start: 2019-02-22 | End: 2019-04-15

## 2019-01-17 RX ORDER — MORPHINE SULFATE 60 MG/1
60 TABLET, FILM COATED, EXTENDED RELEASE ORAL 2 TIMES DAILY
Qty: 60 TABLET | Refills: 0 | Status: SHIPPED | OUTPATIENT
Start: 2019-03-22 | End: 2019-04-15

## 2019-01-17 NOTE — PATIENT INSTRUCTIONS
Weight Management: Getting Started  Healthy bodies come in all shapes and sizes. Not all bodies are made to be thin. For some people, a healthy weight is higher than the average weight listed on weight charts. Your healthcare provider can help you decide on a healthy weight for you.    Reasons to lose weight  Losing weight can help with some health problems, such as high blood pressure, heart disease, diabetes, sleep apnea, and arthritis. You may also feel more energy.  Set your long-term goal  Your goal doesn't even have to be a specific weight. You may decide on a fitness goal (such as being able to walk 10 miles a week), or a health goal (such as lowering your blood pressure). Choose a goal that is measurable and reasonable, so you know when you've reached it. A goal of reaching a BMI of less than 25 is not always reasonable (or possible).   Make an action plan  Habits dont change overnight. Setting your goals too high can leave you feeling discouraged if you cant reach them. Be realistic. Choose one or two small changes you can make now. Set an action plan for how you are going to make these changes. When you can stick to this plan, keep making a few more small changes. Taking small steps will help you stay on the path to success.  Track your progress  Write down your goals. Then, keep a daily record of your progress. Write down what you eat and how active you are. This record lets you look back on how much youve done. It may also help when youre feeling frustrated. Reward yourself for success. Even if you dont reach every goal, give yourself credit for what you do get done.  Get support  Encouragement from others can help make losing weight easier. Ask your family members and friends for support. They may even want to join you. Also look to your healthcare provider, registered dietitian, and  for help. Your local hospital can give you more information about nutrition, exercise, and  weight loss.  Date Last Reviewed: 1/31/2016  © 0324-3830 [employer], [employer]. [address] [place] [address], [place] [address]. All rights reserved. This information is not intended as a substitute for professional medical care. Always follow your healthcare professional's instructions.

## 2019-01-18 NOTE — PROGRESS NOTES
Subjective:       Patient ID: Sam Pete is a 73 y.o. male.    Chief Complaint: Medication Refill    73 year old male presents for renewal of opioids for chronic pain due to spinal stenosis of lumbar spine, cervical spine, polyarthropathy and neuropathy.  He has no new complaints and no problems with constipation or cognitive impairment.  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    Past Medical History:  No date: Anticoagulant long-term use      Comment:  ASA 81 mg  No date: Arthritis  No date: Chronic low back pain  2/8/2011: Complete rupture of rotator cuff  7/8/2015: DDD (degenerative disc disease), lumbar  9/9/2015: Degeneration of lumbar or lumbosacral intervertebral disc  No date: ED (erectile dysfunction)  No date: GERD (gastroesophageal reflux disease)  No date: Hypertension  6/26/2017: Lumbar pseudoarthrosis  6/26/2017: Lumbar stenosis  No date: Obesity  7/8/2015: Spondylosis of lumbar region without myelopathy or   radiculopathy  1997: Stroke      Comment:  basal ganglia, left sided weakness, resolved  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  7/8/2015: Thoracic or lumbosacral neuritis or radiculitis    Past Surgical History:  No date: APPENDECTOMY  No date: BACK SURGERY      Comment:  4- back surgery  10/14/2015: BLOCK-NERVE-MEDIAL BRANCH-LUMBAR L3,4,5; Bilateral      Comment:  Performed by Everette Short MD at [facility] OR  07/12/2004: COLONOSCOPY      Comment:  Dr Rodriguez 7-10 year elisa   No date: Epidural steroid injection      Comment:  Pain management  8/5/2015: CARLOS-TRANSFORAMINAL L4/5 and L5/S1; Right      Comment:  Performed by Everette Short MD at [facility] OR  No date: FRACTURE SURGERY; Left      Comment:  wrist / forearm, total of 5  6/26/2017: FUSION-LAMINECTOMY-LUMBAR  L2-S1 DECOMPRESSION AND PSIF; N/  A      Comment:  Performed by Lei Fowler MD at [facility] OR  6/26/2017: FUSION-TRANSLUMINAL LUMBAR INTERBODY (TLIF)  L4-5 AND L5-  S1; N/A       Comment:  Performed by Lei Fowler MD at [facility] OR  9/9/2015: INJECTION-STEROID-EPIDURAL-CAUDAL; N/A      Comment:  Performed by Everette Short MD at [facility] OR  2-6-2013: JOINT REPLACEMENT      Comment:  ( rt hip 2007), left hip   No date: JOINT REPLACEMENT; Left      Comment:  total knee  No date: KNEE ARTHROSCOPY W/ DEBRIDEMENT      Comment:  bilateral knees , total of six  No date: KNEE SURGERY  No date: Nervbe Block injection      Comment:  Pain management  10/28/2015: RADIOFREQUENCY THERMOCOAGULATION (RFTC)-NERVE-MEDIAN   BRANCH-LUMBAR L3,4,5; Bilateral      Comment:  Performed by Everette Short MD at [facility] OR    Current Outpatient Medications on File Prior to Visit:  amLODIPine (NORVASC) 10 MG tablet, TAKE 1 TABLET ONE TIME DAILY, Disp: 90 tablet, Rfl: 3  ammonium lactate 12 % Crea, Apply 1 application topically 2 (two) times daily. (Patient taking differently: Apply 1 application topically 2 (two) times daily as needed. ), Disp: 140 g, Rfl: 11  aspirin 81 mg Tab, Take 1 tablet by mouth Daily. Every day, Disp: , Rfl:   atorvastatin (LIPITOR) 40 MG tablet, Take 1 tablet (40 mg total) by mouth once daily., Disp: 90 tablet, Rfl: 3  buPROPion (WELLBUTRIN XL) 150 MG TB24 tablet, Take 1 tablet (150 mg total) by mouth once daily., Disp: 90 tablet, Rfl: 1  losartan (COZAAR) 50 MG tablet, TAKE 1 TABLET ONE TIME DAILY, Disp: 90 tablet, Rfl: 3  omeprazole (PRILOSEC) 40 MG capsule, TAKE 1 CAPSULE ONE TIME DAILY, Disp: 90 capsule, Rfl: 3  sildenafil (REVATIO) 20 mg Tab, Take 1-5 daily as needed for ED, Disp: 10 tablet, Rfl: 5  tiZANidine (ZANAFLEX) 4 MG tablet, TAKE 1 TABLET (4 MG TOTAL) BY MOUTH EVERY 8 (EIGHT) HOURS AS NEEDED., Disp: 60 tablet, Rfl: 5  (DISCONTINUED) morphine (MS CONTIN) 60 MG 12 hr tablet, Take 1 tablet (60 mg total) by mouth 2 (two) times daily., Disp: 60 tablet, Rfl: 0  (DISCONTINUED) oxyCODONE-acetaminophen (PERCOCET)  mg per tablet, Take 1 tablet by mouth every 6 (six) hours as  needed., Disp: 120 tablet, Rfl: 0  (DISCONTINUED) acetaminophen (TYLENOL) 500 MG tablet, Take 500 mg by mouth once daily. , Disp: , Rfl:   (DISCONTINUED) acetaminophen 80 mg TbDL, Take 2 tablets by mouth daily as needed. , Disp: , Rfl:     No current facility-administered medications on file prior to visit.     Naloxone Prescription due on 04/26/1965  Colonoscopy due on 07/12/2014        Review of Systems   Musculoskeletal: Positive for arthralgias, back pain, neck pain and neck stiffness.       Objective:      Physical Exam   Constitutional: He is oriented to person, place, and time. He appears well-developed. No distress.   Good blood pressure control  Obese with a BMI of 32.5 he is down 8.6 lb from his October 16, 2018 visit   Neurological: He is alert and oriented to person, place, and time.   Skin: He is not diaphoretic.   Psychiatric: He has a normal mood and affect. His behavior is normal. Judgment and thought content normal.   Nursing note and vitals reviewed.      Assessment:       1. Uncomplicated opioid dependence    2. Other chronic pain    3. Good hypertension control        Plan:       1. Uncomplicated opioid dependence  The  (Prescription Monitoring Program) website was checked with no inappropriate activity found.  - morphine (MS CONTIN) 60 MG 12 hr tablet; Take 1 tablet (60 mg total) by mouth 2 (two) times daily.  Dispense: 60 tablet; Refill: 0  - oxyCODONE-acetaminophen (PERCOCET)  mg per tablet; Take 1 tablet by mouth every 6 (six) hours as needed.  Dispense: 120 tablet; Refill: 0  - morphine (MS CONTIN) 60 MG 12 hr tablet; Take 1 tablet (60 mg total) by mouth 2 (two) times daily.  Dispense: 60 tablet; Refill: 0  - oxyCODONE-acetaminophen (PERCOCET)  mg per tablet; Take 1 tablet by mouth every 6 (six) hours as needed.  Dispense: 120 tablet; Refill: 0  - morphine (MS CONTIN) 60 MG 12 hr tablet; Take 1 tablet (60 mg total) by mouth 2 (two) times daily.  Dispense: 60 tablet; Refill:  0  - oxyCODONE-acetaminophen (PERCOCET)  mg per tablet; Take 1 tablet by mouth every 6 (six) hours as needed.  Dispense: 120 tablet; Refill: 0    2. Other chronic pain  - morphine (MS CONTIN) 60 MG 12 hr tablet; Take 1 tablet (60 mg total) by mouth 2 (two) times daily.  Dispense: 60 tablet; Refill: 0  - oxyCODONE-acetaminophen (PERCOCET)  mg per tablet; Take 1 tablet by mouth every 6 (six) hours as needed.  Dispense: 120 tablet; Refill: 0  - morphine (MS CONTIN) 60 MG 12 hr tablet; Take 1 tablet (60 mg total) by mouth 2 (two) times daily.  Dispense: 60 tablet; Refill: 0  - oxyCODONE-acetaminophen (PERCOCET)  mg per tablet; Take 1 tablet by mouth every 6 (six) hours as needed.  Dispense: 120 tablet; Refill: 0  - morphine (MS CONTIN) 60 MG 12 hr tablet; Take 1 tablet (60 mg total) by mouth 2 (two) times daily.  Dispense: 60 tablet; Refill: 0  - oxyCODONE-acetaminophen (PERCOCET)  mg per tablet; Take 1 tablet by mouth every 6 (six) hours as needed.  Dispense: 120 tablet; Refill: 0    3. Good hypertension control  Good control with no changes needed

## 2019-01-21 ENCOUNTER — PATIENT MESSAGE (OUTPATIENT)
Dept: FAMILY MEDICINE | Facility: CLINIC | Age: 74
End: 2019-01-21

## 2019-01-22 ENCOUNTER — TELEPHONE (OUTPATIENT)
Dept: FAMILY MEDICINE | Facility: CLINIC | Age: 74
End: 2019-01-22

## 2019-01-22 NOTE — TELEPHONE ENCOUNTER
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  On [employer]'s desk.    Form indicates they want records (states to give to patient)  Records go back to paper chart regarding his disability-will involve a stack of paper

## 2019-01-25 ENCOUNTER — PATIENT MESSAGE (OUTPATIENT)
Dept: FAMILY MEDICINE | Facility: CLINIC | Age: 74
End: 2019-01-25

## 2019-02-14 ENCOUNTER — OFFICE VISIT (OUTPATIENT)
Dept: SPINE | Facility: CLINIC | Age: 74
End: 2019-02-14
Payer: MEDICARE

## 2019-02-14 VITALS
DIASTOLIC BLOOD PRESSURE: 77 MMHG | HEIGHT: 70 IN | SYSTOLIC BLOOD PRESSURE: 128 MMHG | BODY MASS INDEX: 32.21 KG/M2 | WEIGHT: 225 LBS | HEART RATE: 77 BPM

## 2019-02-14 DIAGNOSIS — Z98.890 HISTORY OF LUMBAR SURGERY: ICD-10-CM

## 2019-02-14 DIAGNOSIS — M54.50 CHRONIC BILATERAL LOW BACK PAIN WITHOUT SCIATICA: Primary | ICD-10-CM

## 2019-02-14 DIAGNOSIS — G89.29 CHRONIC BILATERAL LOW BACK PAIN WITHOUT SCIATICA: Primary | ICD-10-CM

## 2019-02-14 PROCEDURE — 1101F PR PT FALLS ASSESS DOC 0-1 FALLS W/OUT INJ PAST YR: ICD-10-PCS | Mod: HCNC,CPTII,S$GLB, | Performed by: PHYSICIAN ASSISTANT

## 2019-02-14 PROCEDURE — 3074F SYST BP LT 130 MM HG: CPT | Mod: HCNC,CPTII,S$GLB, | Performed by: PHYSICIAN ASSISTANT

## 2019-02-14 PROCEDURE — 3078F PR MOST RECENT DIASTOLIC BLOOD PRESSURE < 80 MM HG: ICD-10-PCS | Mod: HCNC,CPTII,S$GLB, | Performed by: PHYSICIAN ASSISTANT

## 2019-02-14 PROCEDURE — 1101F PT FALLS ASSESS-DOCD LE1/YR: CPT | Mod: HCNC,CPTII,S$GLB, | Performed by: PHYSICIAN ASSISTANT

## 2019-02-14 PROCEDURE — 99999 PR PBB SHADOW E&M-EST. PATIENT-LVL III: ICD-10-PCS | Mod: PBBFAC,HCNC,, | Performed by: PHYSICIAN ASSISTANT

## 2019-02-14 PROCEDURE — 99213 OFFICE O/P EST LOW 20 MIN: CPT | Mod: HCNC,S$GLB,, | Performed by: PHYSICIAN ASSISTANT

## 2019-02-14 PROCEDURE — 3078F DIAST BP <80 MM HG: CPT | Mod: HCNC,CPTII,S$GLB, | Performed by: PHYSICIAN ASSISTANT

## 2019-02-14 PROCEDURE — 99999 PR PBB SHADOW E&M-EST. PATIENT-LVL III: CPT | Mod: PBBFAC,HCNC,, | Performed by: PHYSICIAN ASSISTANT

## 2019-02-14 PROCEDURE — 3074F PR MOST RECENT SYSTOLIC BLOOD PRESSURE < 130 MM HG: ICD-10-PCS | Mod: HCNC,CPTII,S$GLB, | Performed by: PHYSICIAN ASSISTANT

## 2019-02-14 PROCEDURE — 99213 PR OFFICE/OUTPT VISIT, EST, LEVL III, 20-29 MIN: ICD-10-PCS | Mod: HCNC,S$GLB,, | Performed by: PHYSICIAN ASSISTANT

## 2019-02-14 NOTE — PROGRESS NOTES
Neurosurgery History & Physical    Patient ID: Sam Ptee is a 73 y.o. male.    Chief Complaint   Patient presents with    Low-back Pain     He has had low back pain for 3 days after running on the treadmill on Monday. Pain is constant. Nothing seems to help pain. Movement makes pain worse.       Review of Systems   Constitutional: Negative for activity change, chills, fatigue and unexpected weight change.   HENT: Negative for hearing loss, tinnitus, trouble swallowing and voice change.    Eyes: Negative for visual disturbance.   Respiratory: Negative for apnea, chest tightness and shortness of breath.    Cardiovascular: Negative for chest pain and palpitations.   Gastrointestinal: Negative for abdominal pain, constipation, diarrhea, nausea and vomiting.   Genitourinary: Negative for difficulty urinating, dysuria and frequency.   Musculoskeletal: Positive for back pain. Negative for gait problem, neck pain and neck stiffness.   Skin: Negative for wound.   Neurological: Negative for dizziness, tremors, seizures, facial asymmetry, speech difficulty, weakness, light-headedness, numbness and headaches.   Psychiatric/Behavioral: Negative for confusion and decreased concentration.       Past Medical History:   Diagnosis Date    Anticoagulant long-term use     ASA 81 mg    Arthritis     Chronic low back pain     Complete rupture of rotator cuff 2/8/2011    DDD (degenerative disc disease), lumbar 7/8/2015    Degeneration of lumbar or lumbosacral intervertebral disc 9/9/2015    ED (erectile dysfunction)     GERD (gastroesophageal reflux disease)     Hypertension     Lumbar pseudoarthrosis 6/26/2017    Lumbar stenosis 6/26/2017    Obesity     Spondylosis of lumbar region without myelopathy or radiculopathy 7/8/2015    Stroke 1997    basal ganglia, left sided weakness, resolved    Testicular hypofunction     Thoracic or lumbosacral neuritis or radiculitis 7/8/2015     Social History     Socioeconomic  History    Marital status:      Spouse name: Not on file    Number of children: Not on file    Years of education: Not on file    Highest education level: Not on file   Social Needs    Financial resource strain: Not on file    Food insecurity - worry: Not on file    Food insecurity - inability: Not on file    Transportation needs - medical: Not on file    Transportation needs - non-medical: Not on file   Occupational History    Not on file   Tobacco Use    Smoking status: Former Smoker     Packs/day: 1.00     Years: 30.00     Pack years: 30.00     Start date: 8/3/1963     Last attempt to quit: 1992     Years since quittin.1    Smokeless tobacco: Never Used   Substance and Sexual Activity    Alcohol use: Yes     Comment: On occasion    Drug use: Yes     Types: Oxycodone, Morphine    Sexual activity: Not on file   Other Topics Concern    Not on file   Social History Narrative    Not on file     Family History   Problem Relation Age of Onset    Hypertension Father     Heart disease Father     Drug abuse Daughter     Hypertension Son     Lung disease Sister     COPD Sister     Hypertension Sister     Diverticulitis Sister     Gout Sister     Kidney disease Sister     No Known Problems Mother      Review of patient's allergies indicates:   Allergen Reactions    Xyzal [levocetirizine] Other (See Comments)     Knocked him out        Current Outpatient Medications:     amLODIPine (NORVASC) 10 MG tablet, TAKE 1 TABLET ONE TIME DAILY, Disp: 90 tablet, Rfl: 3    ammonium lactate 12 % Crea, Apply 1 application topically 2 (two) times daily. (Patient taking differently: Apply 1 application topically 2 (two) times daily as needed. ), Disp: 140 g, Rfl: 11    aspirin 81 mg Tab, Take 1 tablet by mouth Daily. Every day, Disp: , Rfl:     atorvastatin (LIPITOR) 40 MG tablet, Take 1 tablet (40 mg total) by mouth once daily., Disp: 90 tablet, Rfl: 3    buPROPion (WELLBUTRIN XL) 150 MG  "TB24 tablet, Take 1 tablet (150 mg total) by mouth once daily., Disp: 90 tablet, Rfl: 1    losartan (COZAAR) 50 MG tablet, TAKE 1 TABLET ONE TIME DAILY, Disp: 90 tablet, Rfl: 3    morphine (MS CONTIN) 60 MG 12 hr tablet, Take 1 tablet (60 mg total) by mouth 2 (two) times daily., Disp: 60 tablet, Rfl: 0    [START ON 2/22/2019] morphine (MS CONTIN) 60 MG 12 hr tablet, Take 1 tablet (60 mg total) by mouth 2 (two) times daily., Disp: 60 tablet, Rfl: 0    [START ON 3/22/2019] morphine (MS CONTIN) 60 MG 12 hr tablet, Take 1 tablet (60 mg total) by mouth 2 (two) times daily., Disp: 60 tablet, Rfl: 0    omeprazole (PRILOSEC) 40 MG capsule, TAKE 1 CAPSULE ONE TIME DAILY, Disp: 90 capsule, Rfl: 3    oxyCODONE-acetaminophen (PERCOCET)  mg per tablet, Take 1 tablet by mouth every 6 (six) hours as needed., Disp: 120 tablet, Rfl: 0    [START ON 2/22/2019] oxyCODONE-acetaminophen (PERCOCET)  mg per tablet, Take 1 tablet by mouth every 6 (six) hours as needed., Disp: 120 tablet, Rfl: 0    [START ON 3/22/2019] oxyCODONE-acetaminophen (PERCOCET)  mg per tablet, Take 1 tablet by mouth every 6 (six) hours as needed., Disp: 120 tablet, Rfl: 0    sildenafil (REVATIO) 20 mg Tab, Take 1-5 daily as needed for ED, Disp: 10 tablet, Rfl: 5    tiZANidine (ZANAFLEX) 4 MG tablet, TAKE 1 TABLET (4 MG TOTAL) BY MOUTH EVERY 8 (EIGHT) HOURS AS NEEDED., Disp: 60 tablet, Rfl: 5    Vitals:    02/14/19 1456   BP: 128/77   BP Location: Left arm   Patient Position: Sitting   BP Method: Large (Automatic)   Pulse: 77   Weight: 102.1 kg (225 lb)   Height: 5' 10" (1.778 m)       Physical Exam   Constitutional: He is oriented to person, place, and time. He appears well-developed and well-nourished.   HENT:   Head: Normocephalic and atraumatic.   Eyes: Pupils are equal, round, and reactive to light.   Neck: Normal range of motion. Neck supple.   Cardiovascular: Normal rate.   Pulmonary/Chest: Effort normal.   Abdominal: He exhibits no " distension.   Musculoskeletal: Normal range of motion. He exhibits no edema.   Neurological: He is alert and oriented to person, place, and time. He has a normal Finger-Nose-Finger Test, a normal Heel to Shin Test, a normal Romberg Test and a normal Tandem Gait Test. Gait normal.   Reflex Scores:       Tricep reflexes are 2+ on the right side and 2+ on the left side.       Bicep reflexes are 2+ on the right side and 2+ on the left side.       Brachioradialis reflexes are 2+ on the right side and 2+ on the left side.       Patellar reflexes are 2+ on the right side and 2+ on the left side.       Achilles reflexes are 2+ on the right side and 2+ on the left side.  Skin: Skin is warm and dry.   Psychiatric: He has a normal mood and affect. His speech is normal and behavior is normal. Judgment and thought content normal.   Nursing note and vitals reviewed.      Neurologic Exam     Mental Status   Oriented to person, place, and time.   Oriented to person.   Oriented to place.   Oriented to time.   Follows 3 step commands.   Attention: normal. Concentration: normal.   Speech: speech is normal   Level of consciousness: alert  Knowledge: consistent with education.   Able to name object. Able to read. Able to repeat. Able to write. Normal comprehension.     Cranial Nerves     CN II   Visual acuity: normal  Right visual field deficit: none  Left visual field deficit: none     CN III, IV, VI   Pupils are equal, round, and reactive to light.  Right pupil: Size: 3 mm. Shape: regular. Reactivity: brisk. Consensual response: intact.   Left pupil: Size: 3 mm. Shape: regular. Reactivity: brisk. Consensual response: intact.   CN III: no CN III palsy  CN VI: no CN VI palsy  Nystagmus: none   Diplopia: none  Ophthalmoparesis: none  Conjugate gaze: present    CN V   Right facial sensation deficit: none  Left facial sensation deficit: none    CN VII   Right facial weakness: none  Left facial weakness: none    CN VIII   Hearing:  intact    CN IX, X   CN IX normal.   CN X normal.     CN XI   Right sternocleidomastoid strength: normal  Left sternocleidomastoid strength: normal  Right trapezius strength: normal  Left trapezius strength: normal    CN XII   Fasciculations: absent  Tongue deviation: none    Motor Exam   Muscle bulk: normal  Overall muscle tone: normal  Right arm pronator drift: absent  Left arm pronator drift: absent    Strength   Right neck flexion: 5/5  Left neck flexion: 5/5  Right neck extension: 5/5  Left neck extension: 5/5  Right deltoid: 5/5  Left deltoid: 5/5  Right biceps: 5/5  Left biceps: 5/5  Right triceps: 5/5  Left triceps: 5/5  Right wrist flexion: 5/5  Left wrist flexion: 5/5  Right wrist extension: 5/5  Left wrist extension: 5/5  Right interossei: 5/5  Left interossei: 5/5  Right abdominals: 5/5  Left abdominals: 5/5  Right iliopsoas: 5/5  Left iliopsoas: 5/5  Right quadriceps: 5/5  Left quadriceps: 5/5  Right hamstrin/5  Left hamstrin/5  Right glutei: 5/5  Left glutei: 5/5  Right anterior tibial: 5/5  Left anterior tibial: 5/5  Right posterior tibial: 5/5  Left posterior tibial: 5/5  Right peroneal: 5/5  Left peroneal: 5/5  Right gastroc: 5/5  Left gastroc: 5/5Tender to palpation along the midline inferior lumbar spine.     Sensory Exam   Right arm light touch: normal  Left arm light touch: normal  Right leg light touch: normal  Left leg light touch: normal  Right arm vibration: normal  Left arm vibration: normal  Right arm pinprick: normal  Left arm pinprick: normal    Gait, Coordination, and Reflexes     Gait  Gait: normal    Coordination   Romberg: negative  Finger to nose coordination: normal  Heel to shin coordination: normal  Tandem walking coordination: normal    Tremor   Resting tremor: absent  Intention tremor: absent  Action tremor: absent    Reflexes   Right brachioradialis: 2+  Left brachioradialis: 2+  Right biceps: 2+  Left biceps: 2+  Right triceps: 2+  Left triceps: 2+  Right patellar:  2+  Left patellar: 2+  Right achilles: 2+  Left achilles: 2+  Right Rivero: absent  Left Rivero: absent  Right ankle clonus: absent  Left ankle clonus: absent      Provider dictation:  73 year old male with history of lumbar fusion presents for follow up evaluation for acute on chronic back pain.  He is status post 6-26-17 L2-S1 PSIF with L4/5, L5/S1 TLIF.  He has chronic lower back pain that has been present since surgical intervention and is managing medically with his PCP.  Pain has been controlled with medications fairly well.  He purchased a treadmill and has been walking since Mid January 3/4 to 1 mile two times per day.  3 days ago, he felt really well and since he had been doing good with walking, he tried jogging for a minute.  That night and into the next day he felt an increase in pain particularly across the lower back.  He denies any radicular symptoms into the legs.  He denies any recent injury or fall or fever.   Oswestry score: 40%.  PHQ:  0.    He is neurologically intact with tenderness to palpation along the midline of the lumbar spine inferiorly - he was tender in June 2018 as well.    I have previously reviewed an MRI and xrays of the lumbar spine from 5-30-18.  Surgical hardware is intact with L2-S1 posterior pedicle screw fusion and cross bar.  There is no mal-alignment, instability of the bones or hardware complication.  There is adequate decompression with no residual central canal or foraminal narrowing.  A seroma is seen posterior to the canal at L3 with no pressure on the canal.    Mr. Pete has had extensive lumbar fusion from L2 to S1 with chronic lumbar pain managed with medications.  He has acute onset likely muscle strain/ spasm after jogging 3 days ago.  He has no neurological deficits.  With no truama, we will hold on xrays at this time.  I recommend adding advil for 1 week to thelp with inflammation.  I have encouraged stretching and slowly increaseing walking to help improve  pain.  Hold on running.    Follow up as needed.  If pain does not show signs of improvement by next week, we will proceed with xyras.    Visit Diagnosis:  Chronic bilateral low back pain without sciatica    History of lumbar surgery        Total time spent counseling greater than fifty percent of total visit time.  Counseling included discussion regarding imaging findings, diagnosis possibilities, treatment options, risks and benefits.   The patient had many questions regarding the options and long-term effects.

## 2019-02-18 ENCOUNTER — OFFICE VISIT (OUTPATIENT)
Dept: OPTOMETRY | Facility: CLINIC | Age: 74
End: 2019-02-18
Payer: MEDICARE

## 2019-02-18 DIAGNOSIS — H52.13 MYOPIA WITH ASTIGMATISM AND PRESBYOPIA, BILATERAL: ICD-10-CM

## 2019-02-18 DIAGNOSIS — Z13.5 GLAUCOMA SCREENING: ICD-10-CM

## 2019-02-18 DIAGNOSIS — H43.393 VITREOUS FLOATERS, BILATERAL: ICD-10-CM

## 2019-02-18 DIAGNOSIS — H52.4 MYOPIA WITH ASTIGMATISM AND PRESBYOPIA, BILATERAL: ICD-10-CM

## 2019-02-18 DIAGNOSIS — H52.203 MYOPIA WITH ASTIGMATISM AND PRESBYOPIA, BILATERAL: ICD-10-CM

## 2019-02-18 DIAGNOSIS — H25.13 NUCLEAR SCLEROSIS, BILATERAL: Primary | ICD-10-CM

## 2019-02-18 PROCEDURE — 92015 DETERMINE REFRACTIVE STATE: CPT | Mod: HCNC,S$GLB,, | Performed by: OPTOMETRIST

## 2019-02-18 PROCEDURE — 92015 PR REFRACTION: ICD-10-PCS | Mod: HCNC,S$GLB,, | Performed by: OPTOMETRIST

## 2019-02-18 PROCEDURE — 92004 PR EYE EXAM, NEW PATIENT,COMPREHESV: ICD-10-PCS | Mod: HCNC,S$GLB,, | Performed by: OPTOMETRIST

## 2019-02-18 PROCEDURE — 99999 PR PBB SHADOW E&M-EST. PATIENT-LVL III: ICD-10-PCS | Mod: PBBFAC,HCNC,, | Performed by: OPTOMETRIST

## 2019-02-18 PROCEDURE — 99999 PR PBB SHADOW E&M-EST. PATIENT-LVL III: CPT | Mod: PBBFAC,HCNC,, | Performed by: OPTOMETRIST

## 2019-02-18 PROCEDURE — 92004 COMPRE OPH EXAM NEW PT 1/>: CPT | Mod: HCNC,S$GLB,, | Performed by: OPTOMETRIST

## 2019-02-18 NOTE — PROGRESS NOTES
HPI     Annual Exam      Additional comments: DLE x 2 yrs (outside [facility])   ocular health exam                 Blurred Vision      Additional comments: at distance only --- [facility]          Last edited by Yesenia Dillon on 2/18/2019  3:04 PM. (History)        ROS     Positive for: Eyes    Negative for: Constitutional, Gastrointestinal, Neurological, Skin,   Genitourinary, Musculoskeletal, HENT, Endocrine, Cardiovascular,   Respiratory, Psychiatric, Allergic/Imm, Heme/Lymph    Last edited by MISSAEL Hodges, OD on 2/18/2019  3:16 PM. (History)        Assessment /Plan     For exam results, see Encounter Report.    Nuclear sclerosis, bilateral    Vitreous floaters, bilateral    Glaucoma screening    Myopia with astigmatism and presbyopia, bilateral      1. Early vis sig, not ready for consult --gave info  2. RD precautions given  3. Not suspect  4. Updated specs rx, gave copy, fill prn    Discussed and educated patient on current findings /plan.  RTC 1 year, prn if any changes / issues

## 2019-02-20 ENCOUNTER — PATIENT MESSAGE (OUTPATIENT)
Dept: GASTROENTEROLOGY | Facility: CLINIC | Age: 74
End: 2019-02-20

## 2019-02-24 NOTE — H&P
History & Physical - Short Stay  Gastroenterology      SUBJECTIVE:     Procedure: Colonoscopy    Chief Complaint/Indication for Procedure: Screening    History of Present Illness:  Asymptomatic    Office Visit     10/16/2018  [place] [facility]      Ty Montalvo MD   Family Medicine   Uncomplicated opioid dependence +3 more   Dx   Medication Refill   Reason for Visit        Progress Notes        Subjective:       Patient ID: Sam Pete is a 73 y.o. male.     Chief Complaint: Medication Refill     73-year-old male coming in for renewal of Percocet and morphine for chronic back pain neck pain spinal stenosis degenerative joint and disc disease. The patient is having no problems with constipation or cognitive impairment.  His usual level of pain is 5 to 6/10 and the Percocet will bring it down to a level 1 or 2/10.  At its worst pain will reach about 9/10 and Percocet will drop it about 7/10 at those times.  He he is cutting back on his work load to try to reduce pain levels in the near future.  He is otherwise doing well and has agreed to go ahead and do his colonoscopy.  He is due for a flu shot and will do that today as well.    Assessment:       1. Uncomplicated opioid dependence    2. Other chronic pain    3. Colon cancer screening        Plan:       1. Uncomplicated opioid dependence  BMP checked with no inappropriate activity found  - oxyCODONE-acetaminophen (PERCOCET)  mg per tablet; Take 1 tablet by mouth every 6 (six) hours as needed.  Dispense: 120 tablet; Refill: 0  - morphine (MS CONTIN) 60 MG 12 hr tablet; Take 1 tablet (60 mg total) by mouth 2 (two) times daily.  Dispense: 60 tablet; Refill: 0  - Ambulatory referral to Gastroenterology     2. Other chronic pain  - oxyCODONE-acetaminophen (PERCOCET)  mg per tablet; Take 1 tablet by mouth every 6 (six) hours as needed.  Dispense: 120 tablet; Refill: 0  - morphine (MS CONTIN) 60 MG 12 hr tablet; Take 1 tablet (60 mg total) by  mouth 2 (two) times daily.  Dispense: 60 tablet; Refill: 0  - Ambulatory referral to Gastroenterology     3. Colon cancer screening  - Ambulatory referral to Gastroenterology            PTA Medications   Medication Sig    amLODIPine (NORVASC) 10 MG tablet TAKE 1 TABLET ONE TIME DAILY    aspirin 81 mg Tab Take 1 tablet by mouth Daily. Every day    atorvastatin (LIPITOR) 40 MG tablet Take 1 tablet (40 mg total) by mouth once daily.    buPROPion (WELLBUTRIN XL) 150 MG TB24 tablet Take 1 tablet (150 mg total) by mouth once daily.    losartan (COZAAR) 50 MG tablet TAKE 1 TABLET ONE TIME DAILY    morphine (MS CONTIN) 60 MG 12 hr tablet Take 1 tablet (60 mg total) by mouth 2 (two) times daily.    omeprazole (PRILOSEC) 40 MG capsule TAKE 1 CAPSULE ONE TIME DAILY    oxyCODONE-acetaminophen (PERCOCET)  mg per tablet Take 1 tablet by mouth every 6 (six) hours as needed.    tiZANidine (ZANAFLEX) 4 MG tablet TAKE 1 TABLET (4 MG TOTAL) BY MOUTH EVERY 8 (EIGHT) HOURS AS NEEDED.    ammonium lactate 12 % Crea Apply 1 application topically 2 (two) times daily. (Patient taking differently: Apply 1 application topically 2 (two) times daily as needed. )    morphine (MS CONTIN) 60 MG 12 hr tablet Take 1 tablet (60 mg total) by mouth 2 (two) times daily.    [START ON 3/22/2019] morphine (MS CONTIN) 60 MG 12 hr tablet Take 1 tablet (60 mg total) by mouth 2 (two) times daily.    oxyCODONE-acetaminophen (PERCOCET)  mg per tablet Take 1 tablet by mouth every 6 (six) hours as needed.    [START ON 3/22/2019] oxyCODONE-acetaminophen (PERCOCET)  mg per tablet Take 1 tablet by mouth every 6 (six) hours as needed.    sildenafil (REVATIO) 20 mg Tab Take 1-5 daily as needed for ED       Review of patient's allergies indicates:   Allergen Reactions    Xyzal [levocetirizine] Other (See Comments)     Knocked him out         Past Medical History:   Diagnosis Date    Anticoagulant long-term use     ASA 81 mg     Arthritis     Cataract     OU    Chronic low back pain     Complete rupture of rotator cuff 2/8/2011    DDD (degenerative disc disease), lumbar 7/8/2015    Degeneration of lumbar or lumbosacral intervertebral disc 9/9/2015    ED (erectile dysfunction)     GERD (gastroesophageal reflux disease)     Hypertension     Lumbar pseudoarthrosis 6/26/2017    Lumbar stenosis 6/26/2017    Obesity     Spondylosis of lumbar region without myelopathy or radiculopathy 7/8/2015    Stroke 1997    basal ganglia, left sided weakness, resolved    Testicular hypofunction     Thoracic or lumbosacral neuritis or radiculitis 7/8/2015     Past Surgical History:   Procedure Laterality Date    APPENDECTOMY      BACK SURGERY      4- back surgery    BLOCK-NERVE-MEDIAL BRANCH-LUMBAR L3,4,5 Bilateral 10/14/2015    [facility]    COLONOSCOPY  07/12/2004    [facility].   Redundant tortuous colon, otherwise normal.    Epidural steroid injection      Pain management    CARLOS-TRANSFORAMINAL L4/5 and L5/S1 Right 8/5/2015    [facility] OR    ESOPHAGOGASTRODUODENOSCOPY  07/12/2004    [facility].    FRACTURE SURGERY Left     wrist / forearm, total of 5    FUSION-LAMINECTOMY-LUMBAR  L2-S1 DECOMPRESSION AND PSIF N/A 6/26/2017    [facility] OR    FUSION-TRANSLUMINAL LUMBAR INTERBODY (TLIF)  L4-5 AND L5-S1 N/A 6/26/2017    [facility]    INJECTION-STEROID-EPIDURAL-CAUDAL N/A 9/9/2015    [facility] OR    JOINT REPLACEMENT  2-6-2013    ( rt hip 2007), left hip     JOINT REPLACEMENT Left     total knee    KNEE ARTHROSCOPY W/ DEBRIDEMENT      bilateral knees , total of six    KNEE SURGERY      Nervbe Block injection      Pain management    RADIOFREQUENCY THERMOCOAGULATION (RFTC)-NERVE-MEDIAN BRANCH-LUMBAR L3,4,5 Bilateral 10/28/2015    [facility] at [facility]  "OR     Family History   Problem Relation Age of Onset    Hypertension Father     Heart disease Father     Drug abuse Daughter     Hypertension Son     Lung disease Sister     COPD Sister     Hypertension Sister     Diverticulitis Sister     Gout Sister     Kidney disease Sister     No Known Problems Mother      Social History     Tobacco Use    Smoking status: Former Smoker     Packs/day: 1.00     Years: 30.00     Pack years: 30.00     Start date: 8/3/1963     Last attempt to quit: 1992     Years since quittin.1    Smokeless tobacco: Never Used   Substance Use Topics    Alcohol use: Yes     Comment: On occasion    Drug use: Yes     Types: Oxycodone, Morphine         OBJECTIVE:     Vital Signs (Most Recent)  Temp: 97.2 °F (36.2 °C) (19 1008)  Pulse: 94 (19 1008)  Resp: 16 (19 1008)  BP: (!) 156/72 (19 1008)  SpO2: 96 % (19 1008)    Physical Exam:  : Ht 5' 10" (1.778 m)   Wt 106.5 kg (234 lb 12.6 oz)   BMI 33.69 kg/m² GENERAL:  Comfortable, in no acute distress.                                 HEENT EXAM:  Nonicteric.  No adenopathy.  Oropharynx is clear.      NECK:  Supple.                                                               LUNGS:  Clear.                                                               CARDIAC:  Regular rate and rhythm.  S1, S2.  No murmur.                ABDOMEN:  Soft, positive bowel sounds, nontender.  No hepatosplenomegaly or masses.  No rebound or guarding.                 EXTREMITIES:  No edema.     MENTAL STATUS:  Alert and oriented.    ASSESSMENT/PLAN:     Assessment: Colorectal cancer screening    Plan: Colonoscopy    Anesthesia Plan:   MAC / General Anaesthesia    ASA Grade: ASA 2 - Patient with mild systemic disease with no functional limitations    MALLAMPATI SCORE: II (hard and soft palate, upper portion of tonsils anduvula visible)    "

## 2019-02-25 ENCOUNTER — ANESTHESIA EVENT (OUTPATIENT)
Dept: ENDOSCOPY | Facility: HOSPITAL | Age: 74
End: 2019-02-25
Payer: MEDICARE

## 2019-02-25 ENCOUNTER — HOSPITAL ENCOUNTER (OUTPATIENT)
Facility: HOSPITAL | Age: 74
Discharge: HOME | End: 2019-02-25
Attending: INTERNAL MEDICINE | Admitting: INTERNAL MEDICINE
Payer: MEDICARE

## 2019-02-25 ENCOUNTER — ANESTHESIA (OUTPATIENT)
Dept: ENDOSCOPY | Facility: HOSPITAL | Age: 74
End: 2019-02-25
Payer: MEDICARE

## 2019-02-25 VITALS
HEIGHT: 70 IN | WEIGHT: 225 LBS | SYSTOLIC BLOOD PRESSURE: 138 MMHG | OXYGEN SATURATION: 93 % | BODY MASS INDEX: 32.21 KG/M2 | TEMPERATURE: 97 F | HEART RATE: 78 BPM | DIASTOLIC BLOOD PRESSURE: 74 MMHG | RESPIRATION RATE: 18 BRPM

## 2019-02-25 DIAGNOSIS — Z12.11 COLON CANCER SCREENING: ICD-10-CM

## 2019-02-25 PROCEDURE — 63600175 PHARM REV CODE 636 W HCPCS: Mod: HCNC,PO | Performed by: NURSE ANESTHETIST, CERTIFIED REGISTERED

## 2019-02-25 PROCEDURE — G0121 COLON CA SCRN NOT HI RSK IND: ICD-10-PCS | Mod: ,,, | Performed by: INTERNAL MEDICINE

## 2019-02-25 PROCEDURE — 25000003 PHARM REV CODE 250: Mod: HCNC,PO | Performed by: ANESTHESIOLOGY

## 2019-02-25 PROCEDURE — 37000008 HC ANESTHESIA 1ST 15 MINUTES: Mod: HCNC,PO | Performed by: INTERNAL MEDICINE

## 2019-02-25 PROCEDURE — D9220A PRA ANESTHESIA: ICD-10-PCS | Mod: HCNC,ANES,, | Performed by: ANESTHESIOLOGY

## 2019-02-25 PROCEDURE — G0121 COLON CA SCRN NOT HI RSK IND: HCPCS | Mod: HCNC,PO | Performed by: INTERNAL MEDICINE

## 2019-02-25 PROCEDURE — G0121 COLON CA SCRN NOT HI RSK IND: HCPCS | Mod: ,,, | Performed by: INTERNAL MEDICINE

## 2019-02-25 PROCEDURE — 25000003 PHARM REV CODE 250: Mod: HCNC,PO | Performed by: INTERNAL MEDICINE

## 2019-02-25 PROCEDURE — D9220A PRA ANESTHESIA: Mod: HCNC,ANES,, | Performed by: ANESTHESIOLOGY

## 2019-02-25 PROCEDURE — 37000009 HC ANESTHESIA EA ADD 15 MINS: Mod: HCNC,PO | Performed by: INTERNAL MEDICINE

## 2019-02-25 RX ORDER — LIDOCAINE HYDROCHLORIDE 10 MG/ML
1 INJECTION INFILTRATION; PERINEURAL ONCE
Status: COMPLETED | OUTPATIENT
Start: 2019-02-25 | End: 2019-02-25

## 2019-02-25 RX ORDER — PROPOFOL 10 MG/ML
VIAL (ML) INTRAVENOUS
Status: DISCONTINUED | OUTPATIENT
Start: 2019-02-25 | End: 2019-02-25

## 2019-02-25 RX ORDER — OXYCODONE AND ACETAMINOPHEN 5; 325 MG/1; MG/1
2 TABLET ORAL ONCE AS NEEDED
Status: COMPLETED | OUTPATIENT
Start: 2019-02-25 | End: 2019-02-25

## 2019-02-25 RX ORDER — SODIUM CHLORIDE 0.9 % (FLUSH) 0.9 %
3 SYRINGE (ML) INJECTION
Status: DISCONTINUED | OUTPATIENT
Start: 2019-02-25 | End: 2019-02-25 | Stop reason: HOSPADM

## 2019-02-25 RX ORDER — LIDOCAINE HCL/PF 100 MG/5ML
SYRINGE (ML) INTRAVENOUS
Status: DISCONTINUED | OUTPATIENT
Start: 2019-02-25 | End: 2019-02-25

## 2019-02-25 RX ORDER — SODIUM CHLORIDE, SODIUM LACTATE, POTASSIUM CHLORIDE, CALCIUM CHLORIDE 600; 310; 30; 20 MG/100ML; MG/100ML; MG/100ML; MG/100ML
INJECTION, SOLUTION INTRAVENOUS CONTINUOUS
Status: DISCONTINUED | OUTPATIENT
Start: 2019-02-25 | End: 2019-02-25 | Stop reason: HOSPADM

## 2019-02-25 RX ADMIN — PROPOFOL 30 MG: 10 INJECTION, EMULSION INTRAVENOUS at 11:02

## 2019-02-25 RX ADMIN — PROPOFOL 30 MG: 10 INJECTION, EMULSION INTRAVENOUS at 10:02

## 2019-02-25 RX ADMIN — OXYCODONE HYDROCHLORIDE AND ACETAMINOPHEN 2 TABLET: 5; 325 TABLET ORAL at 11:02

## 2019-02-25 RX ADMIN — LIDOCAINE HYDROCHLORIDE 100 MG: 20 INJECTION, SOLUTION INTRAVENOUS at 10:02

## 2019-02-25 RX ADMIN — LIDOCAINE HYDROCHLORIDE: 10 INJECTION, SOLUTION EPIDURAL; INFILTRATION; INTRACAUDAL; PERINEURAL at 10:02

## 2019-02-25 RX ADMIN — SODIUM CHLORIDE, SODIUM LACTATE, POTASSIUM CHLORIDE, AND CALCIUM CHLORIDE: .6; .31; .03; .02 INJECTION, SOLUTION INTRAVENOUS at 10:02

## 2019-02-25 NOTE — [employer] PATIENT INSTRUCTIONS
Discharge Summary/Instructions for after Colonoscopy without   Biopsy/Polypectomy  Sam Pete    Monday, February 25, 2019  Gilbert Rodriguez MD  RESTRICTIONS ON ACTIVITY:  - Do not drive a car or operate machinery until the day after the procedure.      - The following day: return to full activity including work.  - For  3 days: No heavy lifting, straining or running.  - Diet: You may eat solid foods, but no gassy foods (i.e. beans, broccoli,   cabbage, etc).  TREATMENT FOR COMMON SIDE EFFECTS:  - Mild abdominal pain and bloating or excessive gas: rest, eat lightly and   use a heating pad.  SYMPTOMS TO WATCH FOR AND REPORT TO YOUR PHYSICIAN:  1. Severe abdominal pain.  2. Fever within 24 hours after a procedure.  3. A large amount of rectal bleeding. (A small amount of blood from the   rectum is not serious, especially if hemorrhoids are present.  3.  Because air was put into your colon during the procedure, expelling   large amounts of air from your rectum is normal.  4.  You may not have a bowel movement for 1-3 days because of the   colonoscopy prep.  This is normal.  5.  Call immediately if you notice any of the following:   Chills and/or fever over 101   Persistent vomiting   Severe abdominal pain, other than gas cramps   Severe chest pain   Black, tarry stools   Any bleeding - exceeding one tablespoon  Your doctor recommends these additional instructions:  Eat a high fiber diet.   Continue your present medications.   Take a fiber supplement, for example Citrucel, Fibercon, Konsyl or   Metamucil.  Every day.  Take a PROBIOTIC, such as a carton of GREEK YOGURT (Chobani or Oikos,  or   Activia or Dannon);  or tablets of ALIGN or CULTURELLE or BOOGIE-Q (all   non-prescription), every day for a month.   And repeat this 3-4 times a   year.  Your physician has recommended a repeat colonoscopy in 9-10 years (at 81 y/o) for screening purposes.  None  If you have any questions or problems, please call your  physician.  EMERGENCY PHONE NUMBER: [phone]  LAB RESULTS: Call in two (2) weeks for lab results, [phone]  ___________________________________________  Nurse Signature  ___________________________________________  Patient/Designated Responsible Party Signature  Gilbert Rodriguez MD  2/25/2019 11:32:33 AM  This report has been verified and signed electronically.  [employer]

## 2019-02-25 NOTE — ANESTHESIA PREPROCEDURE EVALUATION
02/25/2019  Sam Pete is a 73 y.o., male.    Anesthesia Evaluation      I have reviewed the Medications.     Review of Systems  Anesthesia Hx:  No problems with previous Anesthesia   Social:  Former Smoker    Cardiovascular:   Hypertension Dysrhythmias atrial fibrillation hyperlipidemia    Pulmonary:  Pulmonary Normal    Renal/:  Renal/ Normal     Hepatic/GI:   GERD    Musculoskeletal:  Spine Disorders: thoracic, lumbar and cervical Chronic Pain and Degenerative disease    Neurological:   CVA MS Contin 60 mg BID and Percocets 10/325 daily  Chronic Pain Syndrome   Endocrine:  Endocrine Normal        Physical Exam  General:  Obesity    Airway/Jaw/Neck:  Airway Findings: Mouth Opening: Normal Tongue: Normal  General Airway Assessment: Adult, Average  Mallampati: III  Jaw/Neck Findings:  Neck ROM: Normal ROM      Dental:  Dental Findings: Upper Dentures, Lower Dentures   Chest/Lungs:  Chest/Lungs Findings: Clear to auscultation, Normal Respiratory Rate     Heart/Vascular:  Heart Findings: Rate: Normal  Rhythm: Regular Rhythm  Sounds: Normal  Heart murmur: negative       Mental Status:  Mental Status Findings:  Cooperative, Alert and Oriented         Anesthesia Plan  Type of Anesthesia, risks & benefits discussed:  Anesthesia Type:  general  Patient's Preference:   Intra-op Monitoring Plan:   Intra-op Monitoring Plan Comments:   Post Op Pain Control Plan:   Post Op Pain Control Plan Comments:   Induction:   IV  Beta Blocker:  Patient is not currently on a Beta-Blocker (No further documentation required).       Informed Consent: Patient understands risks and agrees with Anesthesia plan.  Questions answered. Anesthesia consent signed with patient.  ASA Score: 3     Day of Surgery Review of History & Physical:        Anesthesia Plan Notes: Propofol general        Ready For Surgery From Anesthesia  Perspective.

## 2019-02-25 NOTE — BRIEF OP NOTE
Discharge Note  Short Stay      SUMMARY     Admit Date: 2/25/2019    Attending Physician: Gilbert Rodriguez Jr., MD     Discharge Physician: Gilbert Rodriguez Jr., MD    Discharge Date: 2/25/2019 11:34 AM    Final Diagnosis: Screen for colon cancer [Z12.11]  Impression:  - Non-bleeding internal hemorrhoids.                       - Redundant colon.                       - The examination was otherwise normal.                       - The examined portion of the ileum was normal.                       - No specimens collected.  Recommendation:      - Discharge patient to home.                       - High fiber diet.                       - Continue present medications.                       - Use fiber, for example Citrucel, Fibercon, Konsyl                        or Metamucil.                       - Take a PROBIOTIC, such as a carton of GREEK YOGURT                        (Chobani or Oikos, or Activia or Dannon); or tablets                        of ALIGN or CULTURELLE or BOOGIE-Q (all                        non-prescription), every day for a month.                       - Repeat colonoscopy in 9 years for screening                        purposes.                       - Patient has a contact number available for                        emergencies. The signs and symptoms of potential                        delayed complications were discussed with the                        patient. Return to normal activities tomorrow.                        Written discharge instructions were provided to the                        patient.                       - Return to normal activities tomorrow.  Gilbert Rodriguez MD  2/25/2019  Disposition: HOME OR SELF CARE    Patient Instructions:   Current Discharge Medication List      CONTINUE these medications which have NOT CHANGED    Details   amLODIPine (NORVASC) 10 MG tablet TAKE 1 TABLET ONE TIME DAILY  Qty: 90 tablet, Refills: 3      aspirin 81 mg Tab Take 1 tablet by mouth  Daily. Every day      atorvastatin (LIPITOR) 40 MG tablet Take 1 tablet (40 mg total) by mouth once daily.  Qty: 90 tablet, Refills: 3    Associated Diagnoses: History of CVA in adulthood      buPROPion (WELLBUTRIN XL) 150 MG TB24 tablet Take 1 tablet (150 mg total) by mouth once daily.  Qty: 90 tablet, Refills: 1      losartan (COZAAR) 50 MG tablet TAKE 1 TABLET ONE TIME DAILY  Qty: 90 tablet, Refills: 3    Associated Diagnoses: Hypertension      !! morphine (MS CONTIN) 60 MG 12 hr tablet Take 1 tablet (60 mg total) by mouth 2 (two) times daily.  Qty: 60 tablet, Refills: 0    Associated Diagnoses: Other chronic pain; Uncomplicated opioid dependence      omeprazole (PRILOSEC) 40 MG capsule TAKE 1 CAPSULE ONE TIME DAILY  Qty: 90 capsule, Refills: 3    Associated Diagnoses: GERD (gastroesophageal reflux disease)      !! oxyCODONE-acetaminophen (PERCOCET)  mg per tablet Take 1 tablet by mouth every 6 (six) hours as needed.  Qty: 120 tablet, Refills: 0    Associated Diagnoses: Other chronic pain; Uncomplicated opioid dependence      tiZANidine (ZANAFLEX) 4 MG tablet TAKE 1 TABLET (4 MG TOTAL) BY MOUTH EVERY 8 (EIGHT) HOURS AS NEEDED.  Qty: 60 tablet, Refills: 5    Associated Diagnoses: Other chronic pain      ammonium lactate 12 % Crea Apply 1 application topically 2 (two) times daily.  Qty: 140 g, Refills: 11      !! morphine (MS CONTIN) 60 MG 12 hr tablet Take 1 tablet (60 mg total) by mouth 2 (two) times daily.  Qty: 60 tablet, Refills: 0    Associated Diagnoses: Other chronic pain; Uncomplicated opioid dependence      !! morphine (MS CONTIN) 60 MG 12 hr tablet Take 1 tablet (60 mg total) by mouth 2 (two) times daily.  Qty: 60 tablet, Refills: 0    Associated Diagnoses: Other chronic pain; Uncomplicated opioid dependence      !! oxyCODONE-acetaminophen (PERCOCET)  mg per tablet Take 1 tablet by mouth every 6 (six) hours as needed.  Qty: 120 tablet, Refills: 0    Associated Diagnoses: Other chronic pain;  Uncomplicated opioid dependence      !! oxyCODONE-acetaminophen (PERCOCET)  mg per tablet Take 1 tablet by mouth every 6 (six) hours as needed.  Qty: 120 tablet, Refills: 0    Associated Diagnoses: Other chronic pain; Uncomplicated opioid dependence      sildenafil (REVATIO) 20 mg Tab Take 1-5 daily as needed for ED  Qty: 10 tablet, Refills: 5    Associated Diagnoses: Erectile dysfunction, unspecified erectile dysfunction type       !! - Potential duplicate medications found. Please discuss with provider.          Discharge Procedure Orders (must include Diet, Follow-up, Activity)    Follow Up:  Follow up with PCP as per your routine.  Please follow a high fiber diet.  Activity as tolerated.    No driving day of procedure.    PROBIOTICS:  Now that your colon is so cleaned out, now is a good time for a round of PROBIOTICS.  Eat a container of Greek Yogurt, such as OIKOS or CHOBANI,  Or Activia or Dannon    Greek Yogurt.    Or Take a similar Probiotic product such as Align or Culturelle or Ro-Q, every day for a month.                  (The products listed are non-prescription, but you may need to ask the pharmacist for their location.)  Repeat this 3-4 times a year.

## 2019-02-25 NOTE — PLAN OF CARE
Patient voided via bedside with urinal, 400cc voided clear yellow urine. Spouse at bedside to assist with dresssing.

## 2019-02-25 NOTE — DISCHARGE INSTRUCTIONS
Recovery After Procedural Sedation (Adult)  You have been given medicine by vein to make you sleep during your surgery. This may have included both a pain medicine and sleeping medicine. Most of the effects have worn off. But you may still have some drowsiness for the next 6 to 8 hours.  Home care  Follow these guidelines when you get home:  · For the next 8 hours, you should be watched by a responsible adult. This person should make sure your condition is not getting worse.  · Don't drink any alcohol for the next 24 hours.  · Don't drive, operate dangerous machinery, or make important business or personal decisions during the next 24 hours.  Note: Your healthcare provider may tell you not to take any medicine by mouth for pain or sleep in the next 4 hours. These medicines may react with the medicines you were given in the hospital. This could cause a much stronger response than usual.  Follow-up care  Follow up with your healthcare provider if you are not alert and back to your usual level of activity within 12 hours.  When to seek medical advice  Call your healthcare provider right away if any of these occur:  · Drowsiness gets worse  · Weakness or dizziness gets worse  · Repeated vomiting  · You can't be awakened   Date Last Reviewed: 10/18/2016  © 4079-4127 [employer]. [address] [place] [address], [place] [address]. All rights reserved. This information is not intended as a substitute for professional medical care. Always follow your healthcare professional's instructions.      PROBIOTICS:  Now that your colon is so cleaned out, now is a good time for a round of PROBIOTICS.  Eat a container of Greek Yogurt, such as OIKOS or CHOBANI,  Or Activia or Dannon    Greek Yogurt.    Or Take a similar Probiotic product such as Align or Culturelle or Ro-Q, every day for a month.                  (The products listed are non-prescription, but you may need to ask the pharmacist for their location.)  Repeat  this 3-4 times a year.        High-Fiber Diet  Fiber is in fruits, vegetables, cereals, and grains. Fiber passes through your body undigested. A high-fiber diet helps food move through your intestinal tract. The added bulk is helpful in preventing constipation. In people with diverticulosis, fiber helps clean out the pouches along the colon wall. It also prevents new pouches from forming. A high-fiber diet reduces the risk of colon cancer. It also lowers blood cholesterol and prevents high blood sugar in people with diabetes.    The fiber-rich foods listed below should be part of your diet. If you are not used to high-fiber foods, start with 1 or 2 foods from this list. Every 3 to 4 days add a new one to your diet. Do this until you are eating 4 high-fiber foods per day. This should give you 20 to 35 grams of fiber a day. It is also important to drink a lot of water when you are on this diet. You should have 6 to 8 glasses of water a day. Water makes the fiber swell and increases the benefit.  Foods high in dietary fiber  The following foods are high in dietary fiber:  · Breads. Breads made with 100% whole-wheat flour; bebe, wheat, or rye crackers; whole-grain tortillas, bran muffins.  · Cereals. Whole-grain and bran cereals with bran (shredded wheat, wheat flakes, raisin bran, corn bran); oatmeal, rolled oats, granola, and brown rice.  · Fruits. Fresh fruits and their edible skins (pears, prunes, raisins, berries, apples, and apricots); bananas, citrus fruit, mangoes, pineapple; and prune juice.  · Nuts. Any nuts and seeds.  · Vegetables. Best served raw or lightly cooked. All types, especially: green peas, celery, eggplant, potatoes, spinach, broccoli, [place] sprouts, winter squash, carrots, cauliflower, soybeans, lentils, and fresh and dried beans of all kinds.  · Other. Popcorn, any spices.  Date Last Reviewed: 8/1/2016  © 3135-0692 [employer]. [address] [place] [address], [place] [address]. All  rights reserved. This information is not intended as a substitute for professional medical care. Always follow your healthcare professional's instructions.

## 2019-02-25 NOTE — ANESTHESIA POSTPROCEDURE EVALUATION
"Anesthesia Post Evaluation    Patient: Sam Pete    Procedure(s) Performed: Procedure(s) (LRB):  COLONOSCOPY (N/A)    Final Anesthesia Type: general  Patient location during evaluation: PACU  Patient participation: Yes- Able to Participate  Level of consciousness: awake and alert  Post-procedure vital signs: reviewed and stable  Pain management: adequate  Airway patency: patent  PONV status at discharge: No PONV  Anesthetic complications: no      Cardiovascular status: hemodynamically stable and blood pressure returned to baseline  Respiratory status: unassisted, spontaneous ventilation and room air  Hydration status: euvolemic  Follow-up not needed.        Visit Vitals  BP (P) 132/75 (BP Location: Left arm, Patient Position: Sitting)   Pulse (P) 95   Temp 36.3 °C (97.3 °F) (Skin)   Resp (P) 18   Ht 5' 10" (1.778 m)   Wt 102.1 kg (225 lb)   SpO2 (P) 96%   BMI 32.28 kg/m²       Pain/Forrest Score: Pain Rating Prior to Med Admin: 9 (2/25/2019 11:34 AM)        "

## 2019-02-28 DIAGNOSIS — K21.9 GERD (GASTROESOPHAGEAL REFLUX DISEASE): ICD-10-CM

## 2019-02-28 DIAGNOSIS — I10 HYPERTENSION: ICD-10-CM

## 2019-02-28 DIAGNOSIS — G89.29 OTHER CHRONIC PAIN: ICD-10-CM

## 2019-02-28 DIAGNOSIS — F11.20 UNCOMPLICATED OPIOID DEPENDENCE: ICD-10-CM

## 2019-02-28 RX ORDER — MORPHINE SULFATE 60 MG/1
60 TABLET, FILM COATED, EXTENDED RELEASE ORAL 2 TIMES DAILY
Qty: 60 TABLET | Refills: 0 | Status: SHIPPED | OUTPATIENT
Start: 2019-02-28 | End: 2019-04-15

## 2019-02-28 RX ORDER — BUPROPION HYDROCHLORIDE 150 MG/1
150 TABLET ORAL DAILY
Qty: 90 TABLET | Refills: 1 | Status: SHIPPED | OUTPATIENT
Start: 2019-02-28 | End: 2019-08-19 | Stop reason: SDUPTHER

## 2019-02-28 RX ORDER — AMLODIPINE BESYLATE 10 MG/1
TABLET ORAL
Qty: 90 TABLET | Refills: 3 | Status: SHIPPED | OUTPATIENT
Start: 2019-02-28 | End: 2020-01-30

## 2019-02-28 RX ORDER — OMEPRAZOLE 40 MG/1
CAPSULE, DELAYED RELEASE ORAL
Qty: 90 CAPSULE | Refills: 3 | Status: SHIPPED | OUTPATIENT
Start: 2019-02-28 | End: 2020-01-30

## 2019-02-28 RX ORDER — LOSARTAN POTASSIUM 50 MG/1
TABLET ORAL
Qty: 90 TABLET | Refills: 3 | Status: SHIPPED | OUTPATIENT
Start: 2019-02-28 | End: 2020-01-30

## 2019-03-06 ENCOUNTER — TELEPHONE (OUTPATIENT)
Dept: FAMILY MEDICINE | Facility: CLINIC | Age: 74
End: 2019-03-06

## 2019-03-06 DIAGNOSIS — G89.29 OTHER CHRONIC PAIN: ICD-10-CM

## 2019-03-06 NOTE — TELEPHONE ENCOUNTER
[facility] called- unable to give them date when patients arthritis began- it is prior to 2005 according to Dr. Ramirez note on the form in scanning- paper chart is in storage- representative will add that to her notes-if they need anything further they will contact office.

## 2019-03-15 ENCOUNTER — PATIENT MESSAGE (OUTPATIENT)
Dept: FAMILY MEDICINE | Facility: CLINIC | Age: 74
End: 2019-03-15

## 2019-03-19 ENCOUNTER — OFFICE VISIT (OUTPATIENT)
Dept: URGENT CARE | Facility: CLINIC | Age: 74
End: 2019-03-19
Payer: MEDICARE

## 2019-03-19 VITALS
RESPIRATION RATE: 16 BRPM | HEART RATE: 86 BPM | BODY MASS INDEX: 32.21 KG/M2 | OXYGEN SATURATION: 95 % | SYSTOLIC BLOOD PRESSURE: 135 MMHG | HEIGHT: 70 IN | TEMPERATURE: 97 F | WEIGHT: 225 LBS | DIASTOLIC BLOOD PRESSURE: 76 MMHG

## 2019-03-19 DIAGNOSIS — J40 BRONCHITIS: Primary | ICD-10-CM

## 2019-03-19 PROCEDURE — 3075F SYST BP GE 130 - 139MM HG: CPT | Mod: CPTII,S$GLB,, | Performed by: FAMILY MEDICINE

## 2019-03-19 PROCEDURE — 1101F PT FALLS ASSESS-DOCD LE1/YR: CPT | Mod: CPTII,S$GLB,, | Performed by: FAMILY MEDICINE

## 2019-03-19 PROCEDURE — 3078F DIAST BP <80 MM HG: CPT | Mod: CPTII,S$GLB,, | Performed by: FAMILY MEDICINE

## 2019-03-19 PROCEDURE — 99214 PR OFFICE/OUTPT VISIT, EST, LEVL IV, 30-39 MIN: ICD-10-PCS | Mod: 25,S$GLB,, | Performed by: FAMILY MEDICINE

## 2019-03-19 PROCEDURE — 3075F PR MOST RECENT SYSTOLIC BLOOD PRESS GE 130-139MM HG: ICD-10-PCS | Mod: CPTII,S$GLB,, | Performed by: FAMILY MEDICINE

## 2019-03-19 PROCEDURE — 3078F PR MOST RECENT DIASTOLIC BLOOD PRESSURE < 80 MM HG: ICD-10-PCS | Mod: CPTII,S$GLB,, | Performed by: FAMILY MEDICINE

## 2019-03-19 PROCEDURE — 96372 THER/PROPH/DIAG INJ SC/IM: CPT | Mod: S$GLB,,, | Performed by: FAMILY MEDICINE

## 2019-03-19 PROCEDURE — 99214 OFFICE O/P EST MOD 30 MIN: CPT | Mod: 25,S$GLB,, | Performed by: FAMILY MEDICINE

## 2019-03-19 PROCEDURE — 1101F PR PT FALLS ASSESS DOC 0-1 FALLS W/OUT INJ PAST YR: ICD-10-PCS | Mod: CPTII,S$GLB,, | Performed by: FAMILY MEDICINE

## 2019-03-19 PROCEDURE — 96372 PR INJECTION,THERAP/PROPH/DIAG2ST, IM OR SUBCUT: ICD-10-PCS | Mod: S$GLB,,, | Performed by: FAMILY MEDICINE

## 2019-03-19 RX ORDER — AZITHROMYCIN 250 MG/1
TABLET, FILM COATED ORAL
Qty: 6 TABLET | Refills: 0 | Status: SHIPPED | OUTPATIENT
Start: 2019-03-19 | End: 2019-04-15 | Stop reason: ALTCHOICE

## 2019-03-19 RX ORDER — BENZONATATE 100 MG/1
100 CAPSULE ORAL EVERY 6 HOURS PRN
Qty: 30 CAPSULE | Refills: 1 | Status: SHIPPED | OUTPATIENT
Start: 2019-03-19 | End: 2019-04-15 | Stop reason: ALTCHOICE

## 2019-03-19 RX ORDER — BETAMETHASONE SODIUM PHOSPHATE AND BETAMETHASONE ACETATE 3; 3 MG/ML; MG/ML
6 INJECTION, SUSPENSION INTRA-ARTICULAR; INTRALESIONAL; INTRAMUSCULAR; SOFT TISSUE ONCE
Status: COMPLETED | OUTPATIENT
Start: 2019-03-19 | End: 2019-03-19

## 2019-03-19 RX ADMIN — BETAMETHASONE SODIUM PHOSPHATE AND BETAMETHASONE ACETATE 6 MG: 3; 3 INJECTION, SUSPENSION INTRA-ARTICULAR; INTRALESIONAL; INTRAMUSCULAR; SOFT TISSUE at 12:03

## 2019-03-19 NOTE — PROGRESS NOTES
"Subjective:       Patient ID: Sam Pete is a 73 y.o. male.    Vitals:  height is 5' 10" (1.778 m) and weight is 102.1 kg (225 lb). His oral temperature is 97.3 °F (36.3 °C). His blood pressure is 135/76 and his pulse is 86. His respiration is 16 and oxygen saturation is 95%.     Chief Complaint: Cough    Severe dry cough for about three days and nasal congestion. Pt also complains of body aches.      Cough   This is a new problem. The current episode started in the past 7 days. The problem has been gradually worsening. The problem occurs constantly. The cough is non-productive. Associated symptoms include nasal congestion, postnasal drip and a sore throat. Pertinent negatives include no chills, ear pain, eye redness, fever, hemoptysis, myalgias, rash, shortness of breath or wheezing. Nothing aggravates the symptoms. Treatments tried: mucinex. The treatment provided no relief.       Constitution: Positive for appetite change. Negative for chills, sweating, fatigue and fever.   HENT: Positive for postnasal drip, sore throat and voice change. Negative for ear pain, congestion, sinus pain and sinus pressure.    Neck: Negative for painful lymph nodes.   Eyes: Negative for eye redness.   Respiratory: Positive for cough. Negative for chest tightness, sputum production, bloody sputum, COPD, shortness of breath, stridor, wheezing and asthma.    Gastrointestinal: Negative for nausea and vomiting.   Musculoskeletal: Negative for muscle ache.   Skin: Negative for rash.   Allergic/Immunologic: Negative for seasonal allergies and asthma.   Hematologic/Lymphatic: Negative for swollen lymph nodes.       Objective:      Physical Exam   Constitutional: He is oriented to person, place, and time. He appears well-developed and well-nourished. He is cooperative.  Non-toxic appearance. He does not appear ill. No distress.   HENT:   Head: Normocephalic and atraumatic.   Right Ear: Hearing, tympanic membrane, external ear and ear " canal normal.   Left Ear: Hearing, tympanic membrane, external ear and ear canal normal.   Nose: Nose normal. No mucosal edema, rhinorrhea or nasal deformity. No epistaxis. Right sinus exhibits no maxillary sinus tenderness and no frontal sinus tenderness. Left sinus exhibits no maxillary sinus tenderness and no frontal sinus tenderness.   Mouth/Throat: Uvula is midline, oropharynx is clear and moist and mucous membranes are normal. No trismus in the jaw. Normal dentition. No uvula swelling. No posterior oropharyngeal erythema.   Eyes: Conjunctivae and lids are normal. No scleral icterus.   Sclera clear bilat   Neck: Trachea normal, full passive range of motion without pain and phonation normal. Neck supple.   Cardiovascular: Normal rate, regular rhythm, normal heart sounds, intact distal pulses and normal pulses.   Pulmonary/Chest: Effort normal and breath sounds normal. No respiratory distress.   Wet cough   Abdominal: Soft. Normal appearance and bowel sounds are normal. He exhibits no distension. There is no tenderness.   Musculoskeletal: Normal range of motion. He exhibits no edema or deformity.   Neurological: He is alert and oriented to person, place, and time. He exhibits normal muscle tone. Coordination normal.   Skin: Skin is warm, dry and intact. He is not diaphoretic. No pallor.   Psychiatric: He has a normal mood and affect. His speech is normal and behavior is normal. Judgment and thought content normal. Cognition and memory are normal.   Nursing note and vitals reviewed.      Assessment:       1. Bronchitis        Plan:         Bronchitis    Other orders  -     azithromycin (ZITHROMAX) 250 MG tablet; Take 2 pills on day one, then one pill each day until completed  Dispense: 6 tablet; Refill: 0  -     betamethasone acetate-betamethasone sodium phosphate injection 6 mg  -     benzonatate (TESSALON PERLES) 100 MG capsule; Take 1 capsule (100 mg total) by mouth every 6 (six) hours as needed for Cough.   Dispense: 30 capsule; Refill: 1    pt states that usually gets steroid and abx when he's like this. Pocket script given to patient in case symptoms fail to improve or worsen. Pt advised on risk of taking medication too soon and verbalized understanding.    Explained r/b and patient v/u to fill only if symptoms progress or worsen after maximizing home remedies sheet given and/or explained during encounter.

## 2019-03-22 ENCOUNTER — CLINICAL SUPPORT (OUTPATIENT)
Dept: URGENT CARE | Facility: CLINIC | Age: 74
End: 2019-03-22
Payer: MEDICARE

## 2019-03-22 ENCOUNTER — TELEPHONE (OUTPATIENT)
Dept: URGENT CARE | Facility: CLINIC | Age: 74
End: 2019-03-22

## 2019-03-22 VITALS
RESPIRATION RATE: 16 BRPM | BODY MASS INDEX: 32.21 KG/M2 | HEIGHT: 70 IN | WEIGHT: 225 LBS | DIASTOLIC BLOOD PRESSURE: 73 MMHG | TEMPERATURE: 97 F | HEART RATE: 90 BPM | OXYGEN SATURATION: 93 % | SYSTOLIC BLOOD PRESSURE: 131 MMHG

## 2019-03-22 DIAGNOSIS — J40 BRONCHITIS: Primary | ICD-10-CM

## 2019-03-22 PROCEDURE — 71046 X-RAY EXAM CHEST 2 VIEWS: CPT | Mod: S$GLB,,, | Performed by: RADIOLOGY

## 2019-03-22 PROCEDURE — 99213 PR OFFICE/OUTPT VISIT, EST, LEVL III, 20-29 MIN: ICD-10-PCS | Mod: S$GLB,,, | Performed by: FAMILY MEDICINE

## 2019-03-22 PROCEDURE — 99213 OFFICE O/P EST LOW 20 MIN: CPT | Mod: S$GLB,,, | Performed by: FAMILY MEDICINE

## 2019-03-22 PROCEDURE — 71046 XR CHEST PA AND LATERAL: ICD-10-PCS | Mod: S$GLB,,, | Performed by: RADIOLOGY

## 2019-03-22 RX ORDER — METHYLPREDNISOLONE 4 MG/1
4 TABLET ORAL DAILY
Qty: 1 PACKAGE | Refills: 0 | Status: SHIPPED | OUTPATIENT
Start: 2019-03-22 | End: 2019-03-30

## 2019-03-22 RX ORDER — CEFUROXIME AXETIL 500 MG/1
500 TABLET ORAL 2 TIMES DAILY
Qty: 20 TABLET | Refills: 0 | Status: SHIPPED | OUTPATIENT
Start: 2019-03-22 | End: 2019-03-30

## 2019-03-22 RX ORDER — ALBUTEROL SULFATE 90 UG/1
2 AEROSOL, METERED RESPIRATORY (INHALATION) EVERY 6 HOURS PRN
Qty: 18 G | Refills: 0 | Status: SHIPPED | OUTPATIENT
Start: 2019-03-22 | End: 2020-03-25 | Stop reason: SDUPTHER

## 2019-03-22 NOTE — PROGRESS NOTES
"Subjective:       Patient ID: Sam Pete is a 73 y.o. male.    Vitals:  height is 5' 10" (1.778 m) and weight is 102.1 kg (225 lb). His oral temperature is 97 °F (36.1 °C). His blood pressure is 131/73 and his pulse is 90. His respiration is 16 and oxygen saturation is 93 (abnormal)%.     Chief Complaint: Cough    Patient was seen here three days ago and has not gotten any better. He is on antibiotics but wants more. Pt feeling worse. Run down and still with deep cough      Cough   This is a new problem. The current episode started in the past 7 days. The problem has been gradually worsening. The cough is productive of sputum. Pertinent negatives include no chills, ear pain, eye redness, fever, hemoptysis, myalgias, rash, sore throat, shortness of breath or wheezing. The treatment provided mild relief.       Constitution: Negative for chills, sweating, fatigue and fever.   HENT: Negative for ear pain, congestion, sinus pain, sinus pressure, sore throat and voice change.    Neck: Negative for painful lymph nodes.   Eyes: Negative for eye redness.   Respiratory: Positive for cough. Negative for chest tightness, sputum production, bloody sputum, COPD, shortness of breath, stridor, wheezing and asthma.    Gastrointestinal: Negative for nausea and vomiting.   Musculoskeletal: Negative for muscle ache.   Skin: Negative for rash.   Allergic/Immunologic: Negative for seasonal allergies and asthma.   Hematologic/Lymphatic: Negative for swollen lymph nodes.       Objective:      Physical Exam   Constitutional: He appears well-developed and well-nourished.   HENT:   Head: Normocephalic and atraumatic.   Right Ear: External ear normal.   Left Ear: External ear normal.   Nose: Nose normal.   Mouth/Throat: Oropharynx is clear and moist. No oropharyngeal exudate.   Eyes: Conjunctivae are normal. Right eye exhibits no discharge. Left eye exhibits no discharge.   Cardiovascular: Normal rate, regular rhythm and normal heart " sounds.   Pulmonary/Chest: Effort normal and breath sounds normal. He has no wheezes.   Rhonchi throughout on right, O2 fluctuating between 90 and 93   Skin: Skin is warm and dry.   Psychiatric: He has a normal mood and affect. His behavior is normal.   Vitals reviewed.      Assessment:       1. Bronchitis        Plan:         Bronchitis  -     X-Ray Chest PA And Lateral    Other orders  -     cefUROXime (CEFTIN) 500 MG tablet; Take 1 tablet (500 mg total) by mouth 2 (two) times daily. for 10 days  Dispense: 20 tablet; Refill: 0  -     methylPREDNISolone (MEDROL DOSEPACK) 4 mg tablet; Take 1 tablet (4 mg total) by mouth once daily. use as directed  Dispense: 1 Package; Refill: 0  -     albuterol (PROVENTIL/VENTOLIN HFA) 90 mcg/actuation inhaler; Inhale 2 puffs into the lungs every 6 (six) hours as needed for Wheezing. Rescue  Dispense: 18 g; Refill: 0

## 2019-03-30 ENCOUNTER — OFFICE VISIT (OUTPATIENT)
Dept: URGENT CARE | Facility: CLINIC | Age: 74
End: 2019-03-30
Payer: MEDICARE

## 2019-03-30 VITALS
HEIGHT: 70 IN | OXYGEN SATURATION: 95 % | SYSTOLIC BLOOD PRESSURE: 132 MMHG | DIASTOLIC BLOOD PRESSURE: 75 MMHG | TEMPERATURE: 97 F | BODY MASS INDEX: 31.21 KG/M2 | RESPIRATION RATE: 16 BRPM | HEART RATE: 80 BPM | WEIGHT: 218 LBS

## 2019-03-30 DIAGNOSIS — J40 BRONCHITIS: Primary | ICD-10-CM

## 2019-03-30 PROCEDURE — 3075F PR MOST RECENT SYSTOLIC BLOOD PRESS GE 130-139MM HG: ICD-10-PCS | Mod: CPTII,S$GLB,, | Performed by: INTERNAL MEDICINE

## 2019-03-30 PROCEDURE — 1101F PR PT FALLS ASSESS DOC 0-1 FALLS W/OUT INJ PAST YR: ICD-10-PCS | Mod: CPTII,S$GLB,, | Performed by: INTERNAL MEDICINE

## 2019-03-30 PROCEDURE — 99213 PR OFFICE/OUTPT VISIT, EST, LEVL III, 20-29 MIN: ICD-10-PCS | Mod: 25,S$GLB,, | Performed by: INTERNAL MEDICINE

## 2019-03-30 PROCEDURE — 3078F PR MOST RECENT DIASTOLIC BLOOD PRESSURE < 80 MM HG: ICD-10-PCS | Mod: CPTII,S$GLB,, | Performed by: INTERNAL MEDICINE

## 2019-03-30 PROCEDURE — 96372 PR INJECTION,THERAP/PROPH/DIAG2ST, IM OR SUBCUT: ICD-10-PCS | Mod: S$GLB,,, | Performed by: INTERNAL MEDICINE

## 2019-03-30 PROCEDURE — 99213 OFFICE O/P EST LOW 20 MIN: CPT | Mod: 25,S$GLB,, | Performed by: INTERNAL MEDICINE

## 2019-03-30 PROCEDURE — 3075F SYST BP GE 130 - 139MM HG: CPT | Mod: CPTII,S$GLB,, | Performed by: INTERNAL MEDICINE

## 2019-03-30 PROCEDURE — 96372 THER/PROPH/DIAG INJ SC/IM: CPT | Mod: S$GLB,,, | Performed by: INTERNAL MEDICINE

## 2019-03-30 PROCEDURE — 1101F PT FALLS ASSESS-DOCD LE1/YR: CPT | Mod: CPTII,S$GLB,, | Performed by: INTERNAL MEDICINE

## 2019-03-30 PROCEDURE — 3078F DIAST BP <80 MM HG: CPT | Mod: CPTII,S$GLB,, | Performed by: INTERNAL MEDICINE

## 2019-03-30 RX ORDER — DEXAMETHASONE SODIUM PHOSPHATE 100 MG/10ML
15 INJECTION INTRAMUSCULAR; INTRAVENOUS
Status: COMPLETED | OUTPATIENT
Start: 2019-03-30 | End: 2019-03-30

## 2019-03-30 RX ORDER — METHYLPREDNISOLONE 4 MG/1
TABLET ORAL
Qty: 1 PACKAGE | Refills: 0 | Status: SHIPPED | OUTPATIENT
Start: 2019-03-30 | End: 2019-04-15 | Stop reason: ALTCHOICE

## 2019-03-30 RX ORDER — AMOXICILLIN 875 MG/1
875 TABLET, FILM COATED ORAL 2 TIMES DAILY
Qty: 20 TABLET | Refills: 0 | Status: SHIPPED | OUTPATIENT
Start: 2019-03-30 | End: 2019-04-09

## 2019-03-30 RX ADMIN — DEXAMETHASONE SODIUM PHOSPHATE 15 MG: 100 INJECTION INTRAMUSCULAR; INTRAVENOUS at 04:03

## 2019-03-30 NOTE — PROGRESS NOTES
"Subjective:       Patient ID: Sam Pete is a 73 y.o. male.    Vitals:  height is 5' 10" (1.778 m) and weight is 98.9 kg (218 lb). His oral temperature is 97.3 °F (36.3 °C). His blood pressure is 132/75 and his pulse is 80. His respiration is 16 and oxygen saturation is 95%.     Chief Complaint: Cough    Pt states he has been here twice for cough and he still has cough for about two weeks.  He was prescribed Zpak and first visit but didn't work and was prescribed cefuroxime on last visit on 3/22 but took it for only 2 days due to diarrhea.   He complains of persistent cough with purulent sputum.      Cough   This is a new problem. The current episode started 1 to 4 weeks ago. The problem has been gradually worsening. The problem occurs every few minutes. The cough is non-productive. Associated symptoms include headaches, myalgias and a sore throat. Pertinent negatives include no chills, ear pain, eye redness, fever, rash, shortness of breath or wheezing. Associated symptoms comments: Runny nose, achy all over   . Nothing aggravates the symptoms. He has tried OTC cough suppressant (mucinex DM) for the symptoms. The treatment provided no relief.       Constitution: Negative for chills, sweating, fatigue and fever.   HENT: Positive for congestion, sinus pressure and sore throat. Negative for ear pain, sinus pain and voice change.    Neck: Negative for painful lymph nodes.   Eyes: Negative for eye redness.   Respiratory: Positive for cough and sputum production. Negative for chest tightness, COPD, shortness of breath and wheezing.    Gastrointestinal: Negative for nausea and vomiting.   Musculoskeletal: Positive for muscle ache.   Skin: Negative for rash.   Allergic/Immunologic: Negative for seasonal allergies.   Neurological: Positive for headaches.   Hematologic/Lymphatic: Negative for swollen lymph nodes.       Objective:      Physical Exam   Constitutional: He is oriented to person, place, and time. He appears " well-developed and well-nourished. He is cooperative.  Non-toxic appearance. He does not appear ill. No distress.   HENT:   Head: Normocephalic and atraumatic.   Right Ear: Hearing, tympanic membrane, external ear and ear canal normal.   Left Ear: Hearing, tympanic membrane, external ear and ear canal normal.   Nose: Nose normal. No mucosal edema, rhinorrhea or nasal deformity. No epistaxis. Right sinus exhibits no maxillary sinus tenderness and no frontal sinus tenderness. Left sinus exhibits no maxillary sinus tenderness and no frontal sinus tenderness.   Mouth/Throat: Uvula is midline, oropharynx is clear and moist and mucous membranes are normal. No trismus in the jaw. Normal dentition. No uvula swelling. No posterior oropharyngeal erythema.   Eyes: Conjunctivae and lids are normal.   Neck: Trachea normal, full passive range of motion without pain and phonation normal. Neck supple.   Cardiovascular: Normal rate, regular rhythm, normal heart sounds and normal pulses.   Pulmonary/Chest: Effort normal and breath sounds normal. No respiratory distress. He has no wheezes.   Bilateral scattered rhonchi   Abdominal: Soft. Normal appearance and bowel sounds are normal. He exhibits no distension. There is no tenderness.   Musculoskeletal: Normal range of motion. He exhibits no edema.   Lymphadenopathy:     He has no cervical adenopathy.   Neurological: He is alert and oriented to person, place, and time. He exhibits normal muscle tone. Coordination normal.   Skin: Skin is warm, dry and intact. No rash noted. He is not diaphoretic.   Psychiatric: He has a normal mood and affect. His speech is normal and behavior is normal. Cognition and memory are normal.   Nursing note and vitals reviewed.      Assessment:       1. Bronchitis        Plan:         Bronchitis  -     methylPREDNISolone (MEDROL DOSEPACK) 4 mg tablet; Take all pills on each row once daily  Dispense: 1 Package; Refill: 0  -     dexamethasone injection 15 mg  -      amoxicillin (AMOXIL) 875 MG tablet; Take 1 tablet (875 mg total) by mouth 2 (two) times daily. for 10 days  Dispense: 20 tablet; Refill: 0

## 2019-04-02 ENCOUNTER — TELEPHONE (OUTPATIENT)
Dept: URGENT CARE | Facility: CLINIC | Age: 74
End: 2019-04-02

## 2019-04-04 ENCOUNTER — PATIENT OUTREACH (OUTPATIENT)
Dept: ADMINISTRATIVE | Facility: HOSPITAL | Age: 74
End: 2019-04-04

## 2019-04-05 ENCOUNTER — DOCUMENTATION ONLY (OUTPATIENT)
Dept: FAMILY MEDICINE | Facility: CLINIC | Age: 74
End: 2019-04-05

## 2019-04-05 NOTE — PROGRESS NOTES
Pre-Visit Chart Review  For Appointment Scheduled on 4-15-19    Health Maintenance Due   Topic Date Due    Zoster Vaccine  04/26/2007

## 2019-04-11 ENCOUNTER — DOCUMENTATION ONLY (OUTPATIENT)
Dept: FAMILY MEDICINE | Facility: CLINIC | Age: 74
End: 2019-04-11

## 2019-04-11 NOTE — PROGRESS NOTES
Pre-Visit Chart Review  For Appointment Scheduled on 4-18-19    Health Maintenance Due   Topic Date Due    Zoster Vaccine  04/26/2007

## 2019-04-15 ENCOUNTER — OFFICE VISIT (OUTPATIENT)
Dept: FAMILY MEDICINE | Facility: CLINIC | Age: 74
End: 2019-04-15
Attending: FAMILY MEDICINE
Payer: MEDICARE

## 2019-04-15 VITALS
DIASTOLIC BLOOD PRESSURE: 74 MMHG | SYSTOLIC BLOOD PRESSURE: 138 MMHG | HEIGHT: 70 IN | WEIGHT: 229.06 LBS | OXYGEN SATURATION: 95 % | HEART RATE: 82 BPM | BODY MASS INDEX: 32.79 KG/M2 | TEMPERATURE: 98 F | RESPIRATION RATE: 16 BRPM

## 2019-04-15 DIAGNOSIS — I48.0 PAF (PAROXYSMAL ATRIAL FIBRILLATION): ICD-10-CM

## 2019-04-15 DIAGNOSIS — M48.061 SPINAL STENOSIS, LUMBAR REGION, WITHOUT NEUROGENIC CLAUDICATION: ICD-10-CM

## 2019-04-15 DIAGNOSIS — G89.29 OTHER CHRONIC PAIN: ICD-10-CM

## 2019-04-15 DIAGNOSIS — M48.02 SPINAL STENOSIS IN CERVICAL REGION: ICD-10-CM

## 2019-04-15 DIAGNOSIS — I10 ESSENTIAL (PRIMARY) HYPERTENSION: ICD-10-CM

## 2019-04-15 DIAGNOSIS — F11.20 UNCOMPLICATED OPIOID DEPENDENCE: Primary | ICD-10-CM

## 2019-04-15 LAB
AMPHETAMINE, QUAL, UR: NEGATIVE
COCAINE (METABOLITE), QUAL, UR: NEGATIVE
METHAMPHETAMINE, URINE SCREEN: NEGATIVE
OPIATE SCREEN, URINE: POSITIVE
OXYCODONE,URINE, POC: POSITIVE
THC, UR: NEGATIVE

## 2019-04-15 PROCEDURE — 99499 RISK ADDL DX/OHS AUDIT: ICD-10-PCS | Mod: HCNC,S$GLB,, | Performed by: FAMILY MEDICINE

## 2019-04-15 PROCEDURE — 99999 PR PBB SHADOW E&M-EST. PATIENT-LVL IV: CPT | Mod: PBBFAC,HCNC,, | Performed by: FAMILY MEDICINE

## 2019-04-15 PROCEDURE — 3078F DIAST BP <80 MM HG: CPT | Mod: CPTII,S$GLB,, | Performed by: FAMILY MEDICINE

## 2019-04-15 PROCEDURE — 99999 PR PBB SHADOW E&M-EST. PATIENT-LVL IV: ICD-10-PCS | Mod: PBBFAC,HCNC,, | Performed by: FAMILY MEDICINE

## 2019-04-15 PROCEDURE — 1101F PR PT FALLS ASSESS DOC 0-1 FALLS W/OUT INJ PAST YR: ICD-10-PCS | Mod: CPTII,S$GLB,, | Performed by: FAMILY MEDICINE

## 2019-04-15 PROCEDURE — 80305 POCT RAPID DRUG SCREEN: ICD-10-PCS | Mod: QW,S$GLB,, | Performed by: FAMILY MEDICINE

## 2019-04-15 PROCEDURE — 80305 DRUG TEST PRSMV DIR OPT OBS: CPT | Mod: QW,S$GLB,, | Performed by: FAMILY MEDICINE

## 2019-04-15 PROCEDURE — 3075F SYST BP GE 130 - 139MM HG: CPT | Mod: CPTII,S$GLB,, | Performed by: FAMILY MEDICINE

## 2019-04-15 PROCEDURE — 3078F PR MOST RECENT DIASTOLIC BLOOD PRESSURE < 80 MM HG: ICD-10-PCS | Mod: CPTII,S$GLB,, | Performed by: FAMILY MEDICINE

## 2019-04-15 PROCEDURE — 99499 UNLISTED E&M SERVICE: CPT | Mod: HCNC,S$GLB,, | Performed by: FAMILY MEDICINE

## 2019-04-15 PROCEDURE — 99213 PR OFFICE/OUTPT VISIT, EST, LEVL III, 20-29 MIN: ICD-10-PCS | Mod: S$GLB,,, | Performed by: FAMILY MEDICINE

## 2019-04-15 PROCEDURE — 1101F PT FALLS ASSESS-DOCD LE1/YR: CPT | Mod: CPTII,S$GLB,, | Performed by: FAMILY MEDICINE

## 2019-04-15 PROCEDURE — 99213 OFFICE O/P EST LOW 20 MIN: CPT | Mod: S$GLB,,, | Performed by: FAMILY MEDICINE

## 2019-04-15 PROCEDURE — 3075F PR MOST RECENT SYSTOLIC BLOOD PRESS GE 130-139MM HG: ICD-10-PCS | Mod: CPTII,S$GLB,, | Performed by: FAMILY MEDICINE

## 2019-04-15 RX ORDER — MORPHINE SULFATE 60 MG/1
60 TABLET, FILM COATED, EXTENDED RELEASE ORAL 2 TIMES DAILY
Qty: 60 TABLET | Refills: 0 | Status: SHIPPED | OUTPATIENT
Start: 2019-04-26 | End: 2019-05-24 | Stop reason: SDUPTHER

## 2019-04-15 RX ORDER — OXYCODONE AND ACETAMINOPHEN 10; 325 MG/1; MG/1
1 TABLET ORAL EVERY 6 HOURS PRN
Qty: 120 TABLET | Refills: 0 | Status: SHIPPED | OUTPATIENT
Start: 2019-04-22 | End: 2019-06-18 | Stop reason: SDUPTHER

## 2019-04-15 RX ORDER — OXYCODONE AND ACETAMINOPHEN 10; 325 MG/1; MG/1
1 TABLET ORAL EVERY 6 HOURS PRN
Qty: 120 TABLET | Refills: 0 | Status: SHIPPED | OUTPATIENT
Start: 2019-06-22 | End: 2019-06-18

## 2019-04-15 RX ORDER — MORPHINE SULFATE 60 MG/1
60 TABLET, FILM COATED, EXTENDED RELEASE ORAL 2 TIMES DAILY
Qty: 60 TABLET | Refills: 0 | Status: SHIPPED | OUTPATIENT
Start: 2019-05-26 | End: 2019-07-17 | Stop reason: SDUPTHER

## 2019-04-15 RX ORDER — MORPHINE SULFATE 60 MG/1
60 TABLET, FILM COATED, EXTENDED RELEASE ORAL 2 TIMES DAILY
Qty: 60 TABLET | Refills: 0 | Status: SHIPPED | OUTPATIENT
Start: 2019-06-26 | End: 2019-06-18

## 2019-04-15 RX ORDER — OXYCODONE AND ACETAMINOPHEN 10; 325 MG/1; MG/1
1 TABLET ORAL EVERY 6 HOURS PRN
Qty: 120 TABLET | Refills: 0 | Status: SHIPPED | OUTPATIENT
Start: 2019-05-22 | End: 2019-07-17 | Stop reason: SDUPTHER

## 2019-04-15 NOTE — PATIENT INSTRUCTIONS
Weight Management: Getting Started  Healthy bodies come in all shapes and sizes. Not all bodies are made to be thin. For some people, a healthy weight is higher than the average weight listed on weight charts. Your healthcare provider can help you decide on a healthy weight for you.    Reasons to lose weight  Losing weight can help with some health problems, such as high blood pressure, heart disease, diabetes, sleep apnea, and arthritis. You may also feel more energy.  Set your long-term goal  Your goal doesn't even have to be a specific weight. You may decide on a fitness goal (such as being able to walk 10 miles a week), or a health goal (such as lowering your blood pressure). Choose a goal that is measurable and reasonable, so you know when you've reached it. A goal of reaching a BMI of less than 25 is not always reasonable (or possible).   Make an action plan  Habits dont change overnight. Setting your goals too high can leave you feeling discouraged if you cant reach them. Be realistic. Choose one or two small changes you can make now. Set an action plan for how you are going to make these changes. When you can stick to this plan, keep making a few more small changes. Taking small steps will help you stay on the path to success.  Track your progress  Write down your goals. Then, keep a daily record of your progress. Write down what you eat and how active you are. This record lets you look back on how much youve done. It may also help when youre feeling frustrated. Reward yourself for success. Even if you dont reach every goal, give yourself credit for what you do get done.  Get support  Encouragement from others can help make losing weight easier. Ask your family members and friends for support. They may even want to join you. Also look to your healthcare provider, registered dietitian, and  for help. Your local hospital can give you more information about nutrition, exercise, and  weight loss.  Date Last Reviewed: 1/31/2016  © 3562-1231 [employer], [employer]. [address] [place] [address], [place] [address]. All rights reserved. This information is not intended as a substitute for professional medical care. Always follow your healthcare professional's instructions.

## 2019-04-15 NOTE — PROGRESS NOTES
Subjective:       Patient ID: Sam Pete is a 73 y.o. male.    Chief Complaint: Medication Refill    73-year-old male presents for refill of pain meds for chronic pain secondary to spinal stenosis in cervical region, spinal stenosis lumbar with neurogenic claudication peripheral neuropathy, degenerative arthritis of the left arm, and multiple joint replacements.  The patient states pain control has been satisfactory with no side effect issues.  There is no cognitive impairment and no constipation.  The  (Prescription Monitoring Program) website was checked with no inappropriate activity found.      Past Medical History:  No date: Anticoagulant long-term use      Comment:  ASA 81 mg  No date: Arthritis  No date: Cataract      Comment:  OU  No date: Chronic low back pain  2/8/2011: Complete rupture of rotator cuff  7/8/2015: DDD (degenerative disc disease), lumbar  9/9/2015: Degeneration of lumbar or lumbosacral intervertebral disc  No date: ED (erectile dysfunction)  No date: GERD (gastroesophageal reflux disease)  No date: Hypertension  6/26/2017: Lumbar pseudoarthrosis  6/26/2017: Lumbar stenosis  No date: Obesity  7/8/2015: Spondylosis of lumbar region without myelopathy or   radiculopathy  1997: Stroke      Comment:  basal ganglia, left sided weakness, resolved  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  7/8/2015: Thoracic or lumbosacral neuritis or radiculitis    Past Surgical History:  No date: APPENDECTOMY  No date: BACK SURGERY      Comment:  4- back surgery  10/14/2015: BLOCK-NERVE-MEDIAL BRANCH-LUMBAR L3,4,5; Bilateral      Comment:  Performed by Everette Short MD at [facility] OR  07/12/2004: COLONOSCOPY      Comment:  CHILO.   Redundant tortuous colon, otherwise normal.  2/25/2019: COLONOSCOPY; N/A      Comment:  Performed by Gilbert Rodriguez Jr., MD at [facility] ENDO  No date: Epidural steroid injection      Comment:  Pain management  8/5/2015: CARLOS-TRANSFORAMINAL L4/5 and L5/S1; Right      Comment:  Performed  by Everette Short MD at [facility] OR  07/12/2004: ESOPHAGOGASTRODUODENOSCOPY      Comment:  CHILO.  No date: FRACTURE SURGERY; Left      Comment:  wrist / forearm, total of 5  6/26/2017: FUSION-LAMINECTOMY-LUMBAR  L2-S1 DECOMPRESSION AND PSIF; N/  A      Comment:  Performed by Lei Fowler MD at [facility] OR  6/26/2017: FUSION-TRANSLUMINAL LUMBAR INTERBODY (TLIF)  L4-5 AND L5-  S1; N/A      Comment:  Performed by Lei Fowler MD at [facility] OR  9/9/2015: INJECTION-STEROID-EPIDURAL-CAUDAL; N/A      Comment:  Performed by Everette Short MD at [facility] OR  2-6-2013: JOINT REPLACEMENT      Comment:  ( rt hip 2007), left hip   No date: JOINT REPLACEMENT; Left      Comment:  total knee  No date: KNEE ARTHROSCOPY W/ DEBRIDEMENT      Comment:  bilateral knees , total of six  No date: KNEE SURGERY  No date: Nervbe Block injection      Comment:  Pain management  10/28/2015: RADIOFREQUENCY THERMOCOAGULATION (RFTC)-NERVE-MEDIAN   BRANCH-LUMBAR L3,4,5; Bilateral      Comment:  Performed by Everette Short MD at [facility] OR    Current Outpatient Medications on File Prior to Visit:  albuterol (PROVENTIL/VENTOLIN HFA) 90 mcg/actuation inhaler, Inhale 2 puffs into the lungs every 6 (six) hours as needed for Wheezing. Rescue, Disp: 18 g, Rfl: 0  amLODIPine (NORVASC) 10 MG tablet, TAKE 1 TABLET EVERY DAY, Disp: 90 tablet, Rfl: 3  ammonium lactate 12 % Crea, Apply 1 application topically 2 (two) times daily. (Patient taking differently: Apply 1 application topically 2 (two) times daily as needed. ), Disp: 140 g, Rfl: 11  aspirin 81 mg Tab, Take 1 tablet by mouth Daily. Every day, Disp: , Rfl:   atorvastatin (LIPITOR) 40 MG tablet, Take 1 tablet (40 mg total) by mouth once daily., Disp: 90 tablet, Rfl: 3  buPROPion (WELLBUTRIN XL) 150 MG TB24 tablet, TAKE 1 TABLET (150 MG TOTAL) BY MOUTH ONCE DAILY., Disp: 90 tablet, Rfl: 1  losartan (COZAAR) 50 MG tablet, TAKE 1 TABLET EVERY DAY, Disp: 90 tablet, Rfl: 3  omeprazole (PRILOSEC) 40 MG  capsule, TAKE 1 CAPSULE EVERY DAY, Disp: 90 capsule, Rfl: 3  sildenafil (REVATIO) 20 mg Tab, Take 1-5 daily as needed for ED, Disp: 10 tablet, Rfl: 5  tiZANidine (ZANAFLEX) 4 MG tablet, TAKE 1 TABLET (4 MG TOTAL) BY MOUTH EVERY 8 (EIGHT) HOURS AS NEEDED., Disp: 60 tablet, Rfl: 5  (DISCONTINUED) morphine (MS CONTIN) 60 MG 12 hr tablet, Take 1 tablet (60 mg total) by mouth 2 (two) times daily., Disp: 60 tablet, Rfl: 0  (DISCONTINUED) morphine (MS CONTIN) 60 MG 12 hr tablet, Take 1 tablet (60 mg total) by mouth 2 (two) times daily., Disp: 60 tablet, Rfl: 0  (DISCONTINUED) morphine (MS CONTIN) 60 MG 12 hr tablet, Take 1 tablet (60 mg total) by mouth 2 (two) times daily., Disp: 60 tablet, Rfl: 0  (DISCONTINUED) oxyCODONE-acetaminophen (PERCOCET)  mg per tablet, Take 1 tablet by mouth every 6 (six) hours as needed., Disp: 120 tablet, Rfl: 0  (DISCONTINUED) oxyCODONE-acetaminophen (PERCOCET)  mg per tablet, Take 1 tablet by mouth every 6 (six) hours as needed., Disp: 120 tablet, Rfl: 0  (DISCONTINUED) oxyCODONE-acetaminophen (PERCOCET)  mg per tablet, Take 1 tablet by mouth every 6 (six) hours as needed., Disp: 120 tablet, Rfl: 0  (DISCONTINUED) azithromycin (ZITHROMAX) 250 MG tablet, Take 2 pills on day one, then one pill each day until completed, Disp: 6 tablet, Rfl: 0  (DISCONTINUED) benzonatate (TESSALON PERLES) 100 MG capsule, Take 1 capsule (100 mg total) by mouth every 6 (six) hours as needed for Cough., Disp: 30 capsule, Rfl: 1  (DISCONTINUED) methylPREDNISolone (MEDROL DOSEPACK) 4 mg tablet, Take all pills on each row once daily, Disp: 1 Package, Rfl: 0    No current facility-administered medications on file prior to visit.         Review of Systems   Respiratory: Negative for chest tightness and shortness of breath.    Cardiovascular: Negative for chest pain and palpitations.   Gastrointestinal: Negative for constipation, diarrhea, nausea and vomiting.   Musculoskeletal: Positive for  arthralgias, back pain and neck pain.   Psychiatric/Behavioral: Negative for confusion and decreased concentration.       Objective:      Physical Exam   Constitutional: He is oriented to person, place, and time. He appears well-developed. No distress.   Good blood pressure control  Regular pulse  Obese with a BMI of 32.9 is up 3 lb from his January 17, 2019 visit   Cardiovascular: Normal rate, regular rhythm and normal heart sounds.   Neurological: He is alert and oriented to person, place, and time.   Skin: He is not diaphoretic.   Psychiatric: He has a normal mood and affect. His behavior is normal. Judgment and thought content normal.   Nursing note and vitals reviewed.      Assessment:       1. Uncomplicated opioid dependence    2. Other chronic pain    3. Spinal stenosis in cervical region    4. Spinal stenosis, lumbar region, without neurogenic claudication    5. Essential (primary) hypertension    6. PAF (paroxysmal atrial fibrillation)        Plan:       1. Uncomplicated opioid dependence  The  (Prescription Monitoring Program) website was checked with no inappropriate activity found.  - POCT RAPID DRUG SCREEN  - morphine (MS CONTIN) 60 MG 12 hr tablet; Take 1 tablet (60 mg total) by mouth 2 (two) times daily.  Dispense: 60 tablet; Refill: 0  - morphine (MS CONTIN) 60 MG 12 hr tablet; Take 1 tablet (60 mg total) by mouth 2 (two) times daily.  Dispense: 60 tablet; Refill: 0  - morphine (MS CONTIN) 60 MG 12 hr tablet; Take 1 tablet (60 mg total) by mouth 2 (two) times daily.  Dispense: 60 tablet; Refill: 0  - oxyCODONE-acetaminophen (PERCOCET)  mg per tablet; Take 1 tablet by mouth every 6 (six) hours as needed.  Dispense: 120 tablet; Refill: 0  - oxyCODONE-acetaminophen (PERCOCET)  mg per tablet; Take 1 tablet by mouth every 6 (six) hours as needed.  Dispense: 120 tablet; Refill: 0  - oxyCODONE-acetaminophen (PERCOCET)  mg per tablet; Take 1 tablet by mouth every 6 (six) hours as needed.   Dispense: 120 tablet; Refill: 0    2. Other chronic pain  - morphine (MS CONTIN) 60 MG 12 hr tablet; Take 1 tablet (60 mg total) by mouth 2 (two) times daily.  Dispense: 60 tablet; Refill: 0  - morphine (MS CONTIN) 60 MG 12 hr tablet; Take 1 tablet (60 mg total) by mouth 2 (two) times daily.  Dispense: 60 tablet; Refill: 0  - morphine (MS CONTIN) 60 MG 12 hr tablet; Take 1 tablet (60 mg total) by mouth 2 (two) times daily.  Dispense: 60 tablet; Refill: 0  - oxyCODONE-acetaminophen (PERCOCET)  mg per tablet; Take 1 tablet by mouth every 6 (six) hours as needed.  Dispense: 120 tablet; Refill: 0  - oxyCODONE-acetaminophen (PERCOCET)  mg per tablet; Take 1 tablet by mouth every 6 (six) hours as needed.  Dispense: 120 tablet; Refill: 0  - oxyCODONE-acetaminophen (PERCOCET)  mg per tablet; Take 1 tablet by mouth every 6 (six) hours as needed.  Dispense: 120 tablet; Refill: 0    3. Spinal stenosis in cervical region  Stable, pain controlled    4. Spinal stenosis, lumbar region, without neurogenic claudication  Stable, pain controlled    5. Essential (primary) hypertension  Good control with no changes needed    6. PAF (paroxysmal atrial fibrillation)  Asymptomatic currently

## 2019-04-15 NOTE — TELEPHONE ENCOUNTER
----- Message from Kylee Montalvo sent at 3/21/2017 10:05 AM CDT -----  Contact: Aysha  Patient's wife is requesting refill Rx Percocet to be sent to     [facility]/pharmacy #[id number] - [place] - [address] [address]  [address] [address]  Phone: [phone] Fax: [phone]    Out of medication. Thanks!  
2-3 times/wk

## 2019-04-16 ENCOUNTER — PATIENT MESSAGE (OUTPATIENT)
Dept: FAMILY MEDICINE | Facility: CLINIC | Age: 74
End: 2019-04-16

## 2019-05-10 ENCOUNTER — PES CALL (OUTPATIENT)
Dept: ADMINISTRATIVE | Facility: CLINIC | Age: 74
End: 2019-05-10

## 2019-05-22 DIAGNOSIS — E78.5 HYPERLIPIDEMIA, UNSPECIFIED HYPERLIPIDEMIA TYPE: Primary | ICD-10-CM

## 2019-05-22 DIAGNOSIS — Z86.73 HISTORY OF CVA IN ADULTHOOD: ICD-10-CM

## 2019-05-22 DIAGNOSIS — I10 HYPERTENSION, ESSENTIAL: ICD-10-CM

## 2019-05-22 RX ORDER — ATORVASTATIN CALCIUM 40 MG/1
TABLET, FILM COATED ORAL
Qty: 90 TABLET | Refills: 0 | Status: SHIPPED | OUTPATIENT
Start: 2019-05-22 | End: 2019-08-19 | Stop reason: SDUPTHER

## 2019-05-24 DIAGNOSIS — G89.29 OTHER CHRONIC PAIN: ICD-10-CM

## 2019-05-24 DIAGNOSIS — F11.20 UNCOMPLICATED OPIOID DEPENDENCE: ICD-10-CM

## 2019-05-24 RX ORDER — MORPHINE SULFATE 60 MG/1
60 TABLET, FILM COATED, EXTENDED RELEASE ORAL 2 TIMES DAILY
Qty: 60 TABLET | Refills: 0 | Status: SHIPPED | OUTPATIENT
Start: 2019-05-24 | End: 2019-06-18 | Stop reason: SDUPTHER

## 2019-06-18 DIAGNOSIS — G89.29 OTHER CHRONIC PAIN: ICD-10-CM

## 2019-06-18 DIAGNOSIS — F11.20 UNCOMPLICATED OPIOID DEPENDENCE: ICD-10-CM

## 2019-06-18 RX ORDER — OXYCODONE AND ACETAMINOPHEN 10; 325 MG/1; MG/1
1 TABLET ORAL EVERY 6 HOURS PRN
Qty: 120 TABLET | Refills: 0 | Status: SHIPPED | OUTPATIENT
Start: 2019-06-18 | End: 2019-07-17 | Stop reason: SDUPTHER

## 2019-06-18 RX ORDER — MORPHINE SULFATE 60 MG/1
60 TABLET, FILM COATED, EXTENDED RELEASE ORAL 2 TIMES DAILY
Qty: 60 TABLET | Refills: 0 | Status: SHIPPED | OUTPATIENT
Start: 2019-06-18 | End: 2019-07-17 | Stop reason: SDUPTHER

## 2019-06-18 NOTE — TELEPHONE ENCOUNTER
Sam Pete would like a refill of the following medications:         oxyCODONE-acetaminophen (PERCOCET)  mg per tablet [Ty Montalvo MD]         morphine (MS CONTIN) 60 MG 12 hr tablet [Ty Montalvo MD]     Preferred pharmacy: Other - will . Leaving for [place] Friday 6/21, celebrating our 52th with a week there.  Current RX is dated and [facility] will not fill it sooner so can you please do these dated now or 6/20 so will have them on vacation, thank you   Delivery method: Shawna       lov 4-15-19

## 2019-07-05 ENCOUNTER — PATIENT OUTREACH (OUTPATIENT)
Dept: ADMINISTRATIVE | Facility: HOSPITAL | Age: 74
End: 2019-07-05

## 2019-07-09 ENCOUNTER — DOCUMENTATION ONLY (OUTPATIENT)
Dept: FAMILY MEDICINE | Facility: CLINIC | Age: 74
End: 2019-07-09

## 2019-07-09 NOTE — PROGRESS NOTES
Pre-Visit Chart Review  For Appointment Scheduled on 7-17-19    Health Maintenance Due   Topic Date Due    Lipid Panel  12/30/2016

## 2019-07-17 ENCOUNTER — OFFICE VISIT (OUTPATIENT)
Dept: FAMILY MEDICINE | Facility: CLINIC | Age: 74
End: 2019-07-17
Attending: FAMILY MEDICINE
Payer: MEDICARE

## 2019-07-17 VITALS
HEART RATE: 83 BPM | HEIGHT: 70 IN | OXYGEN SATURATION: 94 % | DIASTOLIC BLOOD PRESSURE: 80 MMHG | RESPIRATION RATE: 16 BRPM | WEIGHT: 224.88 LBS | SYSTOLIC BLOOD PRESSURE: 120 MMHG | BODY MASS INDEX: 32.19 KG/M2 | TEMPERATURE: 98 F

## 2019-07-17 DIAGNOSIS — G89.29 OTHER CHRONIC PAIN: ICD-10-CM

## 2019-07-17 DIAGNOSIS — G60.9 HEREDITARY AND IDIOPATHIC PERIPHERAL NEUROPATHY: ICD-10-CM

## 2019-07-17 DIAGNOSIS — M48.061 SPINAL STENOSIS, LUMBAR REGION, WITHOUT NEUROGENIC CLAUDICATION: ICD-10-CM

## 2019-07-17 DIAGNOSIS — I70.0 ATHEROSCLEROSIS OF AORTA: ICD-10-CM

## 2019-07-17 DIAGNOSIS — F11.20 UNCOMPLICATED OPIOID DEPENDENCE: Primary | ICD-10-CM

## 2019-07-17 DIAGNOSIS — M48.02 SPINAL STENOSIS IN CERVICAL REGION: ICD-10-CM

## 2019-07-17 PROCEDURE — 99999 PR PBB SHADOW E&M-EST. PATIENT-LVL IV: ICD-10-PCS | Mod: PBBFAC,HCNC,, | Performed by: FAMILY MEDICINE

## 2019-07-17 PROCEDURE — 3079F PR MOST RECENT DIASTOLIC BLOOD PRESSURE 80-89 MM HG: ICD-10-PCS | Mod: HCNC,CPTII,S$GLB, | Performed by: FAMILY MEDICINE

## 2019-07-17 PROCEDURE — 1101F PT FALLS ASSESS-DOCD LE1/YR: CPT | Mod: HCNC,CPTII,S$GLB, | Performed by: FAMILY MEDICINE

## 2019-07-17 PROCEDURE — 3074F PR MOST RECENT SYSTOLIC BLOOD PRESSURE < 130 MM HG: ICD-10-PCS | Mod: HCNC,CPTII,S$GLB, | Performed by: FAMILY MEDICINE

## 2019-07-17 PROCEDURE — 3079F DIAST BP 80-89 MM HG: CPT | Mod: HCNC,CPTII,S$GLB, | Performed by: FAMILY MEDICINE

## 2019-07-17 PROCEDURE — 99213 PR OFFICE/OUTPT VISIT, EST, LEVL III, 20-29 MIN: ICD-10-PCS | Mod: HCNC,S$GLB,, | Performed by: FAMILY MEDICINE

## 2019-07-17 PROCEDURE — 99499 RISK ADDL DX/OHS AUDIT: ICD-10-PCS | Mod: HCNC,S$GLB,, | Performed by: FAMILY MEDICINE

## 2019-07-17 PROCEDURE — 99213 OFFICE O/P EST LOW 20 MIN: CPT | Mod: HCNC,S$GLB,, | Performed by: FAMILY MEDICINE

## 2019-07-17 PROCEDURE — 1101F PR PT FALLS ASSESS DOC 0-1 FALLS W/OUT INJ PAST YR: ICD-10-PCS | Mod: HCNC,CPTII,S$GLB, | Performed by: FAMILY MEDICINE

## 2019-07-17 PROCEDURE — 99499 UNLISTED E&M SERVICE: CPT | Mod: HCNC,S$GLB,, | Performed by: FAMILY MEDICINE

## 2019-07-17 PROCEDURE — 99999 PR PBB SHADOW E&M-EST. PATIENT-LVL IV: CPT | Mod: PBBFAC,HCNC,, | Performed by: FAMILY MEDICINE

## 2019-07-17 PROCEDURE — 3074F SYST BP LT 130 MM HG: CPT | Mod: HCNC,CPTII,S$GLB, | Performed by: FAMILY MEDICINE

## 2019-07-17 RX ORDER — MORPHINE SULFATE 60 MG/1
60 TABLET, FILM COATED, EXTENDED RELEASE ORAL 2 TIMES DAILY
Qty: 60 TABLET | Refills: 0 | Status: SHIPPED | OUTPATIENT
Start: 2019-08-20 | End: 2019-10-21

## 2019-07-17 RX ORDER — OXYCODONE AND ACETAMINOPHEN 10; 325 MG/1; MG/1
1 TABLET ORAL EVERY 6 HOURS PRN
Qty: 120 TABLET | Refills: 0 | Status: SHIPPED | OUTPATIENT
Start: 2019-07-20 | End: 2019-10-21

## 2019-07-17 RX ORDER — OXYCODONE AND ACETAMINOPHEN 10; 325 MG/1; MG/1
1 TABLET ORAL EVERY 4 HOURS PRN
Qty: 120 TABLET | Refills: 0 | Status: SHIPPED | OUTPATIENT
Start: 2019-09-20 | End: 2019-10-21

## 2019-07-17 RX ORDER — GABAPENTIN 300 MG/1
CAPSULE ORAL
Qty: 180 CAPSULE | Refills: 1 | Status: SHIPPED | OUTPATIENT
Start: 2019-07-17 | End: 2020-01-30

## 2019-07-17 RX ORDER — MORPHINE SULFATE 60 MG/1
60 TABLET, FILM COATED, EXTENDED RELEASE ORAL 2 TIMES DAILY
Qty: 60 TABLET | Refills: 0 | Status: SHIPPED | OUTPATIENT
Start: 2019-07-20 | End: 2019-10-21

## 2019-07-17 RX ORDER — NALOXONE HYDROCHLORIDE 1 MG/ML
INJECTION INTRAMUSCULAR; INTRAVENOUS; SUBCUTANEOUS
Qty: 2 ML | Refills: 1 | Status: SHIPPED | OUTPATIENT
Start: 2019-07-17 | End: 2020-06-15

## 2019-07-17 RX ORDER — OXYCODONE AND ACETAMINOPHEN 10; 325 MG/1; MG/1
1 TABLET ORAL EVERY 6 HOURS PRN
Qty: 120 TABLET | Refills: 0 | Status: SHIPPED | OUTPATIENT
Start: 2019-08-20 | End: 2019-10-21

## 2019-07-17 RX ORDER — MORPHINE SULFATE 60 MG/1
60 TABLET, FILM COATED, EXTENDED RELEASE ORAL 2 TIMES DAILY
Qty: 60 TABLET | Refills: 0 | Status: SHIPPED | OUTPATIENT
Start: 2019-09-20 | End: 2019-10-21

## 2019-07-17 NOTE — PATIENT INSTRUCTIONS
Weight Management: Getting Started  Healthy bodies come in all shapes and sizes. Not all bodies are made to be thin. For some people, a healthy weight is higher than the average weight listed on weight charts. Your healthcare provider can help you decide on a healthy weight for you.    Reasons to lose weight  Losing weight can help with some health problems, such as high blood pressure, heart disease, diabetes, sleep apnea, and arthritis. You may also feel more energy.  Set your long-term goal  Your goal doesn't even have to be a specific weight. You may decide on a fitness goal (such as being able to walk 10 miles a week), or a health goal (such as lowering your blood pressure). Choose a goal that is measurable and reasonable, so you know when you've reached it. A goal of reaching a BMI of less than 25 is not always reasonable (or possible).   Make an action plan  Habits dont change overnight. Setting your goals too high can leave you feeling discouraged if you cant reach them. Be realistic. Choose one or two small changes you can make now. Set an action plan for how you are going to make these changes. When you can stick to this plan, keep making a few more small changes. Taking small steps will help you stay on the path to success.  Track your progress  Write down your goals. Then, keep a daily record of your progress. Write down what you eat and how active you are. This record lets you look back on how much youve done. It may also help when youre feeling frustrated. Reward yourself for success. Even if you dont reach every goal, give yourself credit for what you do get done.  Get support  Encouragement from others can help make losing weight easier. Ask your family members and friends for support. They may even want to join you. Also look to your healthcare provider, registered dietitian, and  for help. Your local hospital can give you more information about nutrition, exercise, and  weight loss.  Date Last Reviewed: 1/31/2016  © 7450-8830 [employer], [employer]. [address] [place] [address], [place] [address]. All rights reserved. This information is not intended as a substitute for professional medical care. Always follow your healthcare professional's instructions.

## 2019-07-18 NOTE — PROGRESS NOTES
Subjective:       Patient ID: Sam Pete is a 74 y.o. male.    Chief Complaint: Medication Refill    74-year-old male coming in for renewal of long-term opioids for chronic back and neck pain with degenerative disc disease of lumbar and cervical spine.  He is status post multilevel fusion of the lumbar spine with incomplete relief of discomfort.  The patient states that he is overall stable still having significant pain on a daily basis but the Percocet and morphine are able to keep him mobile and carrying out his normal daily activities.  On a recent family vacation to the [facility] he walked excessively causing increased pain. When his usual pain medications did not give him sufficient relief he took 1/2 of his wife's 600 mg gabapentin and found that it gave him good relief of his pain. He had been placed on gabapentin back in March of 2017 by Sienna Hawk during evaluation in the back in spine clinic but was taken off of it three months later following his L2-S1 fusion with Dr. Fowler.  He has not been on it since that time.  He would like to try the 300 mg but is somewhat reluctant to go on a daily dose.  He wishes to try it a few times for exacerbations of pain and perhaps we can convince him to use it on a regular basis and reduce some of his opioid dependence.  Overall he is comfortable at this time and having no problems with medications either with GI upset her constipation or cognitive interference.  He is due to have a new prescription for Narcan.  His urine drug screen is up-to-date but will need to be repeated at his next visit in three months.    Past Medical History:  No date: Anticoagulant long-term use      Comment:  ASA 81 mg  No date: Arthritis  No date: Cataract      Comment:  OU  No date: Chronic low back pain  2/8/2011: Complete rupture of rotator cuff  7/8/2015: DDD (degenerative disc disease), lumbar  9/9/2015: Degeneration of lumbar or lumbosacral intervertebral disc  No date:  ED (erectile dysfunction)  No date: GERD (gastroesophageal reflux disease)  No date: Hypertension  6/26/2017: Lumbar pseudoarthrosis  6/26/2017: Lumbar stenosis  No date: Obesity  7/8/2015: Spondylosis of lumbar region without myelopathy or   radiculopathy  1997: Stroke      Comment:  basal ganglia, left sided weakness, resolved  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  7/8/2015: Thoracic or lumbosacral neuritis or radiculitis    Past Surgical History:  No date: APPENDECTOMY  No date: BACK SURGERY      Comment:  4- back surgery  10/14/2015: BLOCK-NERVE-MEDIAL BRANCH-LUMBAR L3,4,5; Bilateral      Comment:  Performed by Everette Short MD at [facility] OR  07/12/2004: COLONOSCOPY      Comment:  CHILO.   Redundant tortuous colon, otherwise normal.  2/25/2019: COLONOSCOPY; N/A      Comment:  Performed by Gilbert Rodriguez Jr., MD at [facility] ENDO  No date: Epidural steroid injection      Comment:  Pain management  8/5/2015: CARLOS-TRANSFORAMINAL L4/5 and L5/S1; Right      Comment:  Performed by Everette Short MD at [facility] OR  07/12/2004: ESOPHAGOGASTRODUODENOSCOPY      Comment:  CHILO.  No date: FRACTURE SURGERY; Left      Comment:  wrist / forearm, total of 5  6/26/2017: FUSION-LAMINECTOMY-LUMBAR  L2-S1 DECOMPRESSION AND PSIF; N/  A      Comment:  Performed by Lei Fowler MD at [facility] OR  6/26/2017: FUSION-TRANSLUMINAL LUMBAR INTERBODY (TLIF)  L4-5 AND L5-  S1; N/A      Comment:  Performed by Lei Fowler MD at [facility] OR  9/9/2015: INJECTION-STEROID-EPIDURAL-CAUDAL; N/A      Comment:  Performed by Everette Short MD at [facility] OR  2-6-2013: JOINT REPLACEMENT      Comment:  ( rt hip 2007), left hip   No date: JOINT REPLACEMENT; Left      Comment:  total knee  No date: KNEE ARTHROSCOPY W/ DEBRIDEMENT      Comment:  bilateral knees , total of six  No date: KNEE SURGERY  No date: Nervbe Block injection      Comment:  Pain management  10/28/2015: RADIOFREQUENCY THERMOCOAGULATION (RFTC)-NERVE-MEDIAN   BRANCH-LUMBAR L3,4,5;  Bilateral      Comment:  Performed by Everette Short MD at [facility] OR    Current Outpatient Medications on File Prior to Visit:  albuterol (PROVENTIL/VENTOLIN HFA) 90 mcg/actuation inhaler, Inhale 2 puffs into the lungs every 6 (six) hours as needed for Wheezing. Rescue, Disp: 18 g, Rfl: 0  amLODIPine (NORVASC) 10 MG tablet, TAKE 1 TABLET EVERY DAY, Disp: 90 tablet, Rfl: 3  ammonium lactate 12 % Crea, Apply 1 application topically 2 (two) times daily. (Patient taking differently: Apply 1 application topically 2 (two) times daily as needed. ), Disp: 140 g, Rfl: 11  aspirin 81 mg Tab, Take 1 tablet by mouth Daily. Every day, Disp: , Rfl:   atorvastatin (LIPITOR) 40 MG tablet, TAKE 1 TABLET EVERY DAY, Disp: 90 tablet, Rfl: 0  buPROPion (WELLBUTRIN XL) 150 MG TB24 tablet, TAKE 1 TABLET (150 MG TOTAL) BY MOUTH ONCE DAILY., Disp: 90 tablet, Rfl: 1  losartan (COZAAR) 50 MG tablet, TAKE 1 TABLET EVERY DAY, Disp: 90 tablet, Rfl: 3  omeprazole (PRILOSEC) 40 MG capsule, TAKE 1 CAPSULE EVERY DAY, Disp: 90 capsule, Rfl: 3  sildenafil (REVATIO) 20 mg Tab, Take 1-5 daily as needed for ED, Disp: 10 tablet, Rfl: 5  tiZANidine (ZANAFLEX) 4 MG tablet, TAKE 1 TABLET (4 MG TOTAL) BY MOUTH EVERY 8 (EIGHT) HOURS AS NEEDED., Disp: 60 tablet, Rfl: 5  (DISCONTINUED) morphine (MS CONTIN) 60 MG 12 hr tablet, Take 1 tablet (60 mg total) by mouth 2 (two) times daily., Disp: 60 tablet, Rfl: 0  (DISCONTINUED) morphine (MS CONTIN) 60 MG 12 hr tablet, Take 1 tablet (60 mg total) by mouth 2 (two) times daily., Disp: 60 tablet, Rfl: 0  (DISCONTINUED) oxyCODONE-acetaminophen (PERCOCET)  mg per tablet, Take 1 tablet by mouth every 6 (six) hours as needed., Disp: 120 tablet, Rfl: 0  (DISCONTINUED) oxyCODONE-acetaminophen (PERCOCET)  mg per tablet, Take 1 tablet by mouth every 6 (six) hours as needed., Disp: 120 tablet, Rfl: 0    No current facility-administered medications on file prior to visit.         Review of Systems    Gastrointestinal: Negative for abdominal pain, constipation, diarrhea, nausea and vomiting.   Musculoskeletal: Positive for arthralgias, back pain and neck pain.   Skin: Negative for color change and rash.   Psychiatric/Behavioral: Negative for confusion, decreased concentration, dysphoric mood and sleep disturbance. The patient is not nervous/anxious.        Objective:      Physical Exam   Constitutional: He is oriented to person, place, and time.   Good blood pressure control  Obese with a BMI of 32.3 he is down 4.3 lb from his April 15, 2019 visit   Neurological: He is alert and oriented to person, place, and time.   Psychiatric: He has a normal mood and affect. His behavior is normal. Judgment and thought content normal.   Nursing note and vitals reviewed.      Assessment:       1. Uncomplicated opioid dependence    2. Other chronic pain    3. Spinal stenosis, lumbar region, without neurogenic claudication    4. Spinal stenosis in cervical region    5. Hereditary and idiopathic peripheral neuropathy    6. Atherosclerosis of aorta        Plan:       1. Uncomplicated opioid dependence  The  (Prescription Monitoring Program) website was checked with no inappropriate activity found.  - morphine (MS CONTIN) 60 MG 12 hr tablet; Take 1 tablet (60 mg total) by mouth 2 (two) times daily.  Dispense: 60 tablet; Refill: 0  - oxyCODONE-acetaminophen (PERCOCET)  mg per tablet; Take 1 tablet by mouth every 6 (six) hours as needed.  Dispense: 120 tablet; Refill: 0  - oxyCODONE-acetaminophen (PERCOCET)  mg per tablet; Take 1 tablet by mouth every 6 (six) hours as needed.  Dispense: 120 tablet; Refill: 0  - morphine (MS CONTIN) 60 MG 12 hr tablet; Take 1 tablet (60 mg total) by mouth 2 (two) times daily.  Dispense: 60 tablet; Refill: 0  - oxyCODONE-acetaminophen (PERCOCET)  mg per tablet; Take 1 tablet by mouth every 4 (four) hours as needed for Pain.  Dispense: 120 tablet; Refill: 0  - morphine (MS CONTIN)  60 MG 12 hr tablet; Take 1 tablet (60 mg total) by mouth 2 (two) times daily.  Dispense: 60 tablet; Refill: 0  - naloxone (NARCAN) 1 mg/mL injection; Use as needed for respiratory depression  Dispense: 2 mL; Refill: 1    2. Other chronic pain  - morphine (MS CONTIN) 60 MG 12 hr tablet; Take 1 tablet (60 mg total) by mouth 2 (two) times daily.  Dispense: 60 tablet; Refill: 0  - oxyCODONE-acetaminophen (PERCOCET)  mg per tablet; Take 1 tablet by mouth every 6 (six) hours as needed.  Dispense: 120 tablet; Refill: 0  - oxyCODONE-acetaminophen (PERCOCET)  mg per tablet; Take 1 tablet by mouth every 6 (six) hours as needed.  Dispense: 120 tablet; Refill: 0  - morphine (MS CONTIN) 60 MG 12 hr tablet; Take 1 tablet (60 mg total) by mouth 2 (two) times daily.  Dispense: 60 tablet; Refill: 0  - oxyCODONE-acetaminophen (PERCOCET)  mg per tablet; Take 1 tablet by mouth every 4 (four) hours as needed for Pain.  Dispense: 120 tablet; Refill: 0  - morphine (MS CONTIN) 60 MG 12 hr tablet; Take 1 tablet (60 mg total) by mouth 2 (two) times daily.  Dispense: 60 tablet; Refill: 0  - gabapentin (NEURONTIN) 300 MG capsule; Take one twice a day prn back pain flare  Dispense: 180 capsule; Refill: 1    3. Spinal stenosis, lumbar region, without neurogenic claudication  Failure to get significant relief of opioid dependence following back surgery in June 2017    4. Spinal stenosis in cervical region    5. Hereditary and idiopathic peripheral neuropathy    6. Atherosclerosis of aorta
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
## 2019-08-07 ENCOUNTER — OFFICE VISIT (OUTPATIENT)
Dept: ORTHOPEDICS | Facility: CLINIC | Age: 74
End: 2019-08-07
Payer: MEDICARE

## 2019-08-07 ENCOUNTER — HOSPITAL ENCOUNTER (OUTPATIENT)
Dept: RADIOLOGY | Facility: HOSPITAL | Age: 74
Discharge: HOME | End: 2019-08-07
Attending: ORTHOPAEDIC SURGERY
Payer: MEDICARE

## 2019-08-07 VITALS — HEIGHT: 70 IN | WEIGHT: 224 LBS | BODY MASS INDEX: 32.07 KG/M2
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
## 2019-08-07 DIAGNOSIS — M25.572 ACUTE LEFT ANKLE PAIN: ICD-10-CM

## 2019-08-07 DIAGNOSIS — M25.552 LEFT HIP PAIN: ICD-10-CM

## 2019-08-07 DIAGNOSIS — Z96.642 HISTORY OF TOTAL HIP ARTHROPLASTY, LEFT: ICD-10-CM
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
## 2019-08-07 PROCEDURE — 73502 X-RAY EXAM HIP UNI 2-3 VIEWS: CPT | Mod: 26,HCNC,LT, | Performed by: RADIOLOGY

## 2019-08-07 PROCEDURE — 73502 X-RAY EXAM HIP UNI 2-3 VIEWS: CPT | Mod: TC,HCNC,PO,LT

## 2019-08-07 PROCEDURE — 1101F PR PT FALLS ASSESS DOC 0-1 FALLS W/OUT INJ PAST YR: ICD-10-PCS | Mod: HCNC,CPTII,S$GLB, | Performed by: ORTHOPAEDIC SURGERY

## 2019-08-07 PROCEDURE — 99213 PR OFFICE/OUTPT VISIT, EST, LEVL III, 20-29 MIN: ICD-10-PCS | Mod: 25,HCNC,S$GLB, | Performed by: ORTHOPAEDIC SURGERY

## 2019-08-07 PROCEDURE — 73560 X-RAY EXAM OF KNEE 1 OR 2: CPT | Mod: TC,HCNC,PO,RT

## 2019-08-07 PROCEDURE — 73562 X-RAY EXAM OF KNEE 3: CPT | Mod: 26,HCNC,LT, | Performed by: RADIOLOGY

## 2019-08-07 PROCEDURE — 73560 X-RAY EXAM OF KNEE 1 OR 2: CPT | Mod: 26,HCNC,59,RT | Performed by: RADIOLOGY

## 2019-08-07 PROCEDURE — 73562 XR KNEE ORTHO LEFT: ICD-10-PCS | Mod: 26,HCNC,LT, | Performed by: RADIOLOGY

## 2019-08-07 PROCEDURE — 73610 XR ANKLE COMPLETE 3 VIEW LEFT: ICD-10-PCS | Mod: 26,HCNC,LT, | Performed by: RADIOLOGY

## 2019-08-07 PROCEDURE — 99213 OFFICE O/P EST LOW 20 MIN: CPT | Mod: 25,HCNC,S$GLB, | Performed by: ORTHOPAEDIC SURGERY

## 2019-08-07 PROCEDURE — 73560 XR KNEE ORTHO LEFT: ICD-10-PCS | Mod: 26,HCNC,59,RT | Performed by: RADIOLOGY

## 2019-08-07 PROCEDURE — 97760 PR ORTHOTIC MGMT&TRAINJ INITIAL ENC EA 15 MINS: ICD-10-PCS | Mod: HCNC,GP,S$GLB, | Performed by: ORTHOPAEDIC SURGERY

## 2019-08-07 PROCEDURE — 73502 XR ORTHO PELVIS_HIP POST OP LEFT: ICD-10-PCS | Mod: 26,HCNC,LT, | Performed by: RADIOLOGY

## 2019-08-07 PROCEDURE — 73610 X-RAY EXAM OF ANKLE: CPT | Mod: 26,HCNC,LT, | Performed by: RADIOLOGY

## 2019-08-07 PROCEDURE — 97760 ORTHOTIC MGMT&TRAING 1ST ENC: CPT | Mod: HCNC,GP,S$GLB, | Performed by: ORTHOPAEDIC SURGERY

## 2019-08-07 PROCEDURE — 99999 PR PBB SHADOW E&M-EST. PATIENT-LVL II: ICD-10-PCS | Mod: PBBFAC,HCNC,, | Performed by: ORTHOPAEDIC SURGERY

## 2019-08-07 PROCEDURE — 99999 PR PBB SHADOW E&M-EST. PATIENT-LVL II: CPT | Mod: PBBFAC,HCNC,, | Performed by: ORTHOPAEDIC SURGERY

## 2019-08-07 PROCEDURE — 73562 X-RAY EXAM OF KNEE 3: CPT | Mod: TC,HCNC,PO,LT

## 2019-08-07 PROCEDURE — 73610 X-RAY EXAM OF ANKLE: CPT | Mod: TC,HCNC,PO,LT

## 2019-08-07 PROCEDURE — 1101F PT FALLS ASSESS-DOCD LE1/YR: CPT | Mod: HCNC,CPTII,S$GLB, | Performed by: ORTHOPAEDIC SURGERY

## 2019-08-07 NOTE — PROGRESS NOTES
A 74 years old, 10 days ago fell from a ladder, injured his ankle, swollen and   painful since then.    PHYSICAL EXAMINATION:  Today shows he has swelling about the ankle, tender   laterally, also medially.  Skin is intact.  Compartments are soft.  No signs of   infection.    X-rays are negative.    ASSESSMENT:  Ankle sprain.    PLAN:  Lace-up ankle brace, relative rest, elevation and ice.  Follow up in a   few weeks' time to see how things are coming along.      PBB/IN  dd: 08/07/2019 11:54:16 (CDT)  td: 08/08/2019 05:38:17 (CDT)  Doc ID   #[id number]  Job ID #[id number]    CC:     We performed a custom orthotic/brace fitting, adjusting and training with the patient. The patient demonstrated understanding and proper care. This was performed for 15 minutes.

## 2019-08-13 ENCOUNTER — OFFICE VISIT (OUTPATIENT)
Dept: SPINE | Facility: CLINIC | Age: 74
End: 2019-08-13
Payer: MEDICARE

## 2019-08-13 ENCOUNTER — HOSPITAL ENCOUNTER (OUTPATIENT)
Dept: RADIOLOGY | Facility: HOSPITAL | Age: 74
Discharge: HOME | End: 2019-08-13
Attending: PHYSICIAN ASSISTANT
Payer: MEDICARE

## 2019-08-13 VITALS
SYSTOLIC BLOOD PRESSURE: 119 MMHG | WEIGHT: 224 LBS | HEIGHT: 70 IN | HEART RATE: 78 BPM | DIASTOLIC BLOOD PRESSURE: 80 MMHG | BODY MASS INDEX: 32.07 KG/M2

## 2019-08-13 DIAGNOSIS — M54.6 ACUTE RIGHT-SIDED THORACIC BACK PAIN: ICD-10-CM

## 2019-08-13 DIAGNOSIS — M54.50 ACUTE RIGHT-SIDED LOW BACK PAIN WITHOUT SCIATICA: Primary | ICD-10-CM

## 2019-08-13 DIAGNOSIS — M54.50 ACUTE RIGHT-SIDED LOW BACK PAIN WITHOUT SCIATICA: ICD-10-CM

## 2019-08-13 PROCEDURE — 72110 X-RAY EXAM L-2 SPINE 4/>VWS: CPT | Mod: 26,HCNC,, | Performed by: RADIOLOGY

## 2019-08-13 PROCEDURE — 72110 X-RAY EXAM L-2 SPINE 4/>VWS: CPT | Mod: TC,HCNC,PO

## 2019-08-13 PROCEDURE — 71100 X-RAY EXAM RIBS UNI 2 VIEWS: CPT | Mod: 26,HCNC,RT, | Performed by: RADIOLOGY

## 2019-08-13 PROCEDURE — 99999 PR PBB SHADOW E&M-EST. PATIENT-LVL V: ICD-10-PCS | Mod: PBBFAC,HCNC,, | Performed by: PHYSICIAN ASSISTANT

## 2019-08-13 PROCEDURE — 1101F PR PT FALLS ASSESS DOC 0-1 FALLS W/OUT INJ PAST YR: ICD-10-PCS | Mod: HCNC,CPTII,S$GLB, | Performed by: PHYSICIAN ASSISTANT

## 2019-08-13 PROCEDURE — 71100 X-RAY EXAM RIBS UNI 2 VIEWS: CPT | Mod: TC,HCNC,PO,RT

## 2019-08-13 PROCEDURE — 3079F PR MOST RECENT DIASTOLIC BLOOD PRESSURE 80-89 MM HG: ICD-10-PCS | Mod: HCNC,CPTII,S$GLB, | Performed by: PHYSICIAN ASSISTANT

## 2019-08-13 PROCEDURE — 3079F DIAST BP 80-89 MM HG: CPT | Mod: HCNC,CPTII,S$GLB, | Performed by: PHYSICIAN ASSISTANT

## 2019-08-13 PROCEDURE — 72110 XR LUMBAR SPINE 5 VIEW WITH FLEX AND EXT: ICD-10-PCS | Mod: 26,HCNC,, | Performed by: RADIOLOGY

## 2019-08-13 PROCEDURE — 71100 XR RIBS 2 VIEW RIGHT: ICD-10-PCS | Mod: 26,HCNC,RT, | Performed by: RADIOLOGY

## 2019-08-13 PROCEDURE — 3074F PR MOST RECENT SYSTOLIC BLOOD PRESSURE < 130 MM HG: ICD-10-PCS | Mod: HCNC,CPTII,S$GLB, | Performed by: PHYSICIAN ASSISTANT

## 2019-08-13 PROCEDURE — 99213 OFFICE O/P EST LOW 20 MIN: CPT | Mod: HCNC,S$GLB,, | Performed by: PHYSICIAN ASSISTANT

## 2019-08-13 PROCEDURE — 99213 PR OFFICE/OUTPT VISIT, EST, LEVL III, 20-29 MIN: ICD-10-PCS | Mod: HCNC,S$GLB,, | Performed by: PHYSICIAN ASSISTANT

## 2019-08-13 PROCEDURE — 1101F PT FALLS ASSESS-DOCD LE1/YR: CPT | Mod: HCNC,CPTII,S$GLB, | Performed by: PHYSICIAN ASSISTANT

## 2019-08-13 PROCEDURE — 72070 X-RAY EXAM THORAC SPINE 2VWS: CPT | Mod: 26,HCNC,, | Performed by: RADIOLOGY

## 2019-08-13 PROCEDURE — 99999 PR PBB SHADOW E&M-EST. PATIENT-LVL V: CPT | Mod: PBBFAC,HCNC,, | Performed by: PHYSICIAN ASSISTANT

## 2019-08-13 PROCEDURE — 3074F SYST BP LT 130 MM HG: CPT | Mod: HCNC,CPTII,S$GLB, | Performed by: PHYSICIAN ASSISTANT

## 2019-08-13 PROCEDURE — 72070 X-RAY EXAM THORAC SPINE 2VWS: CPT | Mod: TC,HCNC,PO

## 2019-08-13 PROCEDURE — 72070 XR THORACIC SPINE AP LATERAL: ICD-10-PCS | Mod: 26,HCNC,, | Performed by: RADIOLOGY

## 2019-08-13 NOTE — PROGRESS NOTES
Neurosurgery History & Physical    Patient ID: Sam Pete is a 74 y.o. male.    Chief Complaint   Patient presents with    Low-back Pain     He fell off a ladder 2 weeks ago and injured his low back, right side. Pain is constant. Rest helps. Being up on his feet and bending down makes pain worse.       Review of Systems   Constitutional: Negative for activity change, chills, fatigue and unexpected weight change.   HENT: Negative for hearing loss, tinnitus, trouble swallowing and voice change.    Eyes: Negative for visual disturbance.   Respiratory: Negative for apnea, chest tightness and shortness of breath.    Cardiovascular: Negative for chest pain and palpitations.   Gastrointestinal: Negative for abdominal pain, constipation, diarrhea, nausea and vomiting.   Genitourinary: Negative for difficulty urinating, dysuria and frequency.   Musculoskeletal: Positive for back pain. Negative for gait problem, neck pain and neck stiffness.   Skin: Negative for wound.   Neurological: Negative for dizziness, tremors, seizures, facial asymmetry, speech difficulty, weakness, light-headedness, numbness and headaches.   Psychiatric/Behavioral: Negative for confusion and decreased concentration.       Past Medical History:   Diagnosis Date    Anticoagulant long-term use     ASA 81 mg    Arthritis     Cataract     OU    Chronic low back pain     Complete rupture of rotator cuff 2/8/2011    DDD (degenerative disc disease), lumbar 7/8/2015    Degeneration of lumbar or lumbosacral intervertebral disc 9/9/2015    ED (erectile dysfunction)     GERD (gastroesophageal reflux disease)     Hypertension     Lumbar pseudoarthrosis 6/26/2017    Lumbar stenosis 6/26/2017    Obesity     Spondylosis of lumbar region without myelopathy or radiculopathy 7/8/2015    Stroke 1997    basal ganglia, left sided weakness, resolved    Testicular hypofunction     Thoracic or lumbosacral neuritis or radiculitis 7/8/2015     Social  History     Socioeconomic History    Marital status:      Spouse name: Not on file    Number of children: Not on file    Years of education: Not on file    Highest education level: Not on file   Occupational History    Not on file   Social Needs    Financial resource strain: Not on file    Food insecurity:     Worry: Not on file     Inability: Not on file    Transportation needs:     Medical: Not on file     Non-medical: Not on file   Tobacco Use    Smoking status: Former Smoker     Packs/day: 1.00     Years: 30.00     Pack years: 30.00     Start date: 8/3/1963     Last attempt to quit: 1992     Years since quittin.6    Smokeless tobacco: Never Used   Substance and Sexual Activity    Alcohol use: Yes     Comment: On occasion    Drug use: Yes     Types: Oxycodone, Morphine    Sexual activity: Not on file   Lifestyle    Physical activity:     Days per week: Not on file     Minutes per session: Not on file    Stress: Not on file   Relationships    Social connections:     Talks on phone: Not on file     Gets together: Not on file     Attends Baptism service: Not on file     Active member of club or organization: Not on file     Attends meetings of clubs or organizations: Not on file     Relationship status: Not on file   Other Topics Concern    Not on file   Social History Narrative    Not on file     Family History   Problem Relation Age of Onset    Hypertension Father     Heart disease Father     Drug abuse Daughter     Hypertension Son     Lung disease Sister     COPD Sister     Hypertension Sister     Diverticulitis Sister     Gout Sister     Kidney disease Sister     No Known Problems Mother      Review of patient's allergies indicates:   Allergen Reactions    Xyzal [levocetirizine] Other (See Comments)     Knocked him out        Current Outpatient Medications:     albuterol (PROVENTIL/VENTOLIN HFA) 90 mcg/actuation inhaler, Inhale 2 puffs into the lungs every 6  (six) hours as needed for Wheezing. Rescue, Disp: 18 g, Rfl: 0    amLODIPine (NORVASC) 10 MG tablet, TAKE 1 TABLET EVERY DAY, Disp: 90 tablet, Rfl: 3    ammonium lactate 12 % Crea, Apply 1 application topically 2 (two) times daily. (Patient taking differently: Apply 1 application topically 2 (two) times daily as needed. ), Disp: 140 g, Rfl: 11    aspirin 81 mg Tab, Take 1 tablet by mouth Daily. Every day, Disp: , Rfl:     atorvastatin (LIPITOR) 40 MG tablet, TAKE 1 TABLET EVERY DAY, Disp: 90 tablet, Rfl: 0    buPROPion (WELLBUTRIN XL) 150 MG TB24 tablet, TAKE 1 TABLET (150 MG TOTAL) BY MOUTH ONCE DAILY., Disp: 90 tablet, Rfl: 1    gabapentin (NEURONTIN) 300 MG capsule, Take one twice a day prn back pain flare, Disp: 180 capsule, Rfl: 1    losartan (COZAAR) 50 MG tablet, TAKE 1 TABLET EVERY DAY, Disp: 90 tablet, Rfl: 3    morphine (MS CONTIN) 60 MG 12 hr tablet, Take 1 tablet (60 mg total) by mouth 2 (two) times daily., Disp: 60 tablet, Rfl: 0    [START ON 8/20/2019] morphine (MS CONTIN) 60 MG 12 hr tablet, Take 1 tablet (60 mg total) by mouth 2 (two) times daily., Disp: 60 tablet, Rfl: 0    [START ON 9/20/2019] morphine (MS CONTIN) 60 MG 12 hr tablet, Take 1 tablet (60 mg total) by mouth 2 (two) times daily., Disp: 60 tablet, Rfl: 0    naloxone (NARCAN) 1 mg/mL injection, Use as needed for respiratory depression, Disp: 2 mL, Rfl: 1    omeprazole (PRILOSEC) 40 MG capsule, TAKE 1 CAPSULE EVERY DAY, Disp: 90 capsule, Rfl: 3    oxyCODONE-acetaminophen (PERCOCET)  mg per tablet, Take 1 tablet by mouth every 6 (six) hours as needed., Disp: 120 tablet, Rfl: 0    [START ON 8/20/2019] oxyCODONE-acetaminophen (PERCOCET)  mg per tablet, Take 1 tablet by mouth every 6 (six) hours as needed., Disp: 120 tablet, Rfl: 0    [START ON 9/20/2019] oxyCODONE-acetaminophen (PERCOCET)  mg per tablet, Take 1 tablet by mouth every 4 (four) hours as needed for Pain., Disp: 120 tablet, Rfl: 0    sildenafil  "(REVATIO) 20 mg Tab, Take 1-5 daily as needed for ED, Disp: 10 tablet, Rfl: 5    tiZANidine (ZANAFLEX) 4 MG tablet, TAKE 1 TABLET (4 MG TOTAL) BY MOUTH EVERY 8 (EIGHT) HOURS AS NEEDED., Disp: 60 tablet, Rfl: 5    Vitals:    08/13/19 0913   BP: 119/80   BP Location: Left arm   Patient Position: Sitting   BP Method: Large (Automatic)   Pulse: 78   Weight: 101.6 kg (223 lb 15.8 oz)   Height: 5' 10" (1.778 m)       Physical Exam   Constitutional: He is oriented to person, place, and time. He appears well-developed and well-nourished.   HENT:   Head: Normocephalic and atraumatic.   Eyes: Pupils are equal, round, and reactive to light.   Neck: Normal range of motion. Neck supple.   Cardiovascular: Normal rate.   Pulmonary/Chest: Effort normal.   Abdominal: He exhibits no distension.   Musculoskeletal: Normal range of motion. He exhibits no edema.   Neurological: He is alert and oriented to person, place, and time. He has a normal Finger-Nose-Finger Test, a normal Heel to Shin Test, a normal Romberg Test and a normal Tandem Gait Test. Gait normal.   Reflex Scores:       Tricep reflexes are 2+ on the right side and 2+ on the left side.       Bicep reflexes are 2+ on the right side and 2+ on the left side.       Brachioradialis reflexes are 2+ on the right side and 2+ on the left side.       Patellar reflexes are 2+ on the right side and 2+ on the left side.       Achilles reflexes are 2+ on the right side and 2+ on the left side.  Skin: Skin is warm and dry.   Psychiatric: He has a normal mood and affect. His speech is normal and behavior is normal. Judgment and thought content normal.   Nursing note and vitals reviewed.      Neurologic Exam     Mental Status   Oriented to person, place, and time.   Oriented to person.   Oriented to place.   Oriented to time.   Follows 3 step commands.   Attention: normal. Concentration: normal.   Speech: speech is normal   Level of consciousness: alert  Knowledge: consistent with " education.   Able to name object. Able to read. Able to repeat. Able to write. Normal comprehension.     Cranial Nerves     CN II   Visual acuity: normal  Right visual field deficit: none  Left visual field deficit: none     CN III, IV, VI   Pupils are equal, round, and reactive to light.  Right pupil: Size: 3 mm. Shape: regular. Reactivity: brisk. Consensual response: intact.   Left pupil: Size: 3 mm. Shape: regular. Reactivity: brisk. Consensual response: intact.   CN III: no CN III palsy  CN VI: no CN VI palsy  Nystagmus: none   Diplopia: none  Ophthalmoparesis: none  Conjugate gaze: present    CN V   Right facial sensation deficit: none  Left facial sensation deficit: none    CN VII   Right facial weakness: none  Left facial weakness: none    CN VIII   Hearing: intact    CN IX, X   CN IX normal.   CN X normal.     CN XI   Right sternocleidomastoid strength: normal  Left sternocleidomastoid strength: normal  Right trapezius strength: normal  Left trapezius strength: normal    CN XII   Fasciculations: absent  Tongue deviation: none    Motor Exam   Muscle bulk: normal  Overall muscle tone: normal  Right arm pronator drift: absent  Left arm pronator drift: absent    Strength   Right neck flexion: 5/5  Left neck flexion: 5/5  Right neck extension: 5/5  Left neck extension: 5/5  Right deltoid: 5/5  Left deltoid: 5/5  Right biceps: 5/5  Left biceps: 5/5  Right triceps: 5/5  Left triceps: 5/5  Right wrist flexion: 5/5  Left wrist flexion: 5/5  Right wrist extension: 5/5  Left wrist extension: 5/5  Right interossei: 5/5  Left interossei: 5/5  Right abdominals: 5/5  Left abdominals: 5/5  Right iliopsoas: 5/5  Left iliopsoas: 5/5  Right quadriceps: 5/5  Left quadriceps: 5/5  Right hamstrin/5  Left hamstrin/5  Right glutei: 5/5  Left glutei: 5/5  Right anterior tibial: 5/5  Left anterior tibial: 5/5  Right posterior tibial: 5/5  Left posterior tibial: 5/5  Right peroneal: 5/5  Left peroneal: 5/5  Right gastroc:  5/5  Left gastroc: 5/5Tender to palpation along the midline inferior lumbar spine.     Sensory Exam   Right arm light touch: normal  Left arm light touch: normal  Right leg light touch: normal  Left leg light touch: normal  Right arm vibration: normal  Left arm vibration: normal  Right arm pinprick: normal  Left arm pinprick: normal    Gait, Coordination, and Reflexes     Gait  Gait: normal    Coordination   Romberg: negative  Finger to nose coordination: normal  Heel to shin coordination: normal  Tandem walking coordination: normal    Tremor   Resting tremor: absent  Intention tremor: absent  Action tremor: absent    Reflexes   Right brachioradialis: 2+  Left brachioradialis: 2+  Right biceps: 2+  Left biceps: 2+  Right triceps: 2+  Left triceps: 2+  Right patellar: 2+  Left patellar: 2+  Right achilles: 2+  Left achilles: 2+  Right Rivero: absent  Left Rivero: absent  Right ankle clonus: absent  Left ankle clonus: absent      Provider dictation:  73 year old male with history of 6-26-17 L2-S1 PSIF with L4/5, L5/S1 TLIF presents for follow up after a recent fall.  He has had chronic lower back pain that has been present since surgical intervention and continues to manage medically with his PCP.  Overall he has been doing well.  He fell off a ladder about 8 feet 2 weeks ago landing on the floor.  He had had pain since then.  Pain is felt in the right lower thoracic/ lumbar para spinal region and along the right infreior ribs.  He denies any new onset leg pain, weakness, bowel/ bladder dysfunction.  Overall pain has improved some since initial onset.  Oswestry score: 31%.  PHQ:  0.    He is neurologically intact with tenderness to palpation along the midline of the thoracic and lumbar spine.    In light of recent fall and area of pain in the thoracic, lumbar and right rib area I recommend xrays of the spine and ribs.  We will call with results and any further recommendations.  In the mean time, take it easy without  lifting anything greater than 10 pounds.       Visit Diagnosis:  Acute right-sided low back pain without sciatica  -     X-Ray Lumbar Complete With Flex And Ext; Future; Expected date: 08/13/2019    Acute right-sided thoracic back pain  -     X-Ray Thoracic Spine AP Lateral; Future; Expected date: 08/13/2019  -     X-Ray Ribs 2 View Right; Future; Expected date: 08/13/2019        Total time spent counseling greater than fifty percent of total visit time.  Counseling included discussion regarding imaging findings, diagnosis possibilities, treatment options, risks and benefits.   The patient had many questions regarding the options and long-term effects.

## 2019-08-14 ENCOUNTER — PATIENT MESSAGE (OUTPATIENT)
Dept: SPINE | Facility: CLINIC | Age: 74
End: 2019-08-14

## 2019-08-15 ENCOUNTER — PATIENT MESSAGE (OUTPATIENT)
Dept: SPINE | Facility: CLINIC | Age: 74
End: 2019-08-15

## 2019-08-19 DIAGNOSIS — Z86.73 HISTORY OF CVA IN ADULTHOOD: ICD-10-CM

## 2019-08-19 RX ORDER — BUPROPION HYDROCHLORIDE 150 MG/1
TABLET ORAL
Qty: 90 TABLET | Refills: 1 | Status: SHIPPED | OUTPATIENT
Start: 2019-08-19 | End: 2020-01-30

## 2019-08-20 RX ORDER — ATORVASTATIN CALCIUM 40 MG/1
TABLET, FILM COATED ORAL
Qty: 90 TABLET | Refills: 0 | Status: SHIPPED | OUTPATIENT
Start: 2019-08-20 | End: 2019-11-04 | Stop reason: SDUPTHER

## 2019-08-20 NOTE — TELEPHONE ENCOUNTER
Patient informed lipid booked 8-21-19. There is an order for a cmp from same date. Do you want it done also?

## 2019-08-21 ENCOUNTER — LAB VISIT (OUTPATIENT)
Dept: LAB | Facility: HOSPITAL | Age: 74
End: 2019-08-21
Attending: FAMILY MEDICINE
Payer: MEDICARE

## 2019-08-21 DIAGNOSIS — E78.5 HYPERLIPIDEMIA, UNSPECIFIED HYPERLIPIDEMIA TYPE: ICD-10-CM

## 2019-08-21 DIAGNOSIS — I10 HYPERTENSION, ESSENTIAL: ICD-10-CM

## 2019-08-21 LAB
ALBUMIN SERPL BCP-MCNC: 3.8 G/DL (ref 3.5–5.2)
ALP SERPL-CCNC: 93 U/L (ref 55–135)
ALT SERPL W/O P-5'-P-CCNC: 16 U/L (ref 10–44)
ANION GAP SERPL CALC-SCNC: 7 MMOL/L (ref 8–16)
AST SERPL-CCNC: 19 U/L (ref 10–40)
BILIRUB SERPL-MCNC: 0.2 MG/DL (ref 0.1–1)
BUN SERPL-MCNC: 18 MG/DL (ref 8–23)
CALCIUM SERPL-MCNC: 9.1 MG/DL (ref 8.7–10.5)
CHLORIDE SERPL-SCNC: 104 MMOL/L (ref 95–110)
CHOLEST SERPL-MCNC: 135 MG/DL (ref 120–199)
CHOLEST/HDLC SERPL: 3 {RATIO} (ref 2–5)
CO2 SERPL-SCNC: 30 MMOL/L (ref 23–29)
CREAT SERPL-MCNC: 0.9 MG/DL (ref 0.5–1.4)
EST. GFR  (AFRICAN AMERICAN): >60 ML/MIN/1.73 M^2
EST. GFR  (NON AFRICAN AMERICAN): >60 ML/MIN/1.73 M^2
GLUCOSE SERPL-MCNC: 93 MG/DL (ref 70–110)
HDLC SERPL-MCNC: 45 MG/DL (ref 40–75)
HDLC SERPL: 33.3 % (ref 20–50)
LDLC SERPL CALC-MCNC: 47.2 MG/DL (ref 63–159)
NONHDLC SERPL-MCNC: 90 MG/DL
POTASSIUM SERPL-SCNC: 4.6 MMOL/L (ref 3.5–5.1)
PROT SERPL-MCNC: 7.4 G/DL (ref 6–8.4)
SODIUM SERPL-SCNC: 141 MMOL/L (ref 136–145)
TRIGL SERPL-MCNC: 214 MG/DL (ref 30–150)

## 2019-08-21 PROCEDURE — 80053 COMPREHEN METABOLIC PANEL: CPT | Mod: HCNC

## 2019-08-21 PROCEDURE — 36415 COLL VENOUS BLD VENIPUNCTURE: CPT | Mod: HCNC,PO

## 2019-08-21 PROCEDURE — 80061 LIPID PANEL: CPT | Mod: HCNC

## 2019-10-19 ENCOUNTER — PATIENT MESSAGE (OUTPATIENT)
Dept: FAMILY MEDICINE | Facility: CLINIC | Age: 74
End: 2019-10-19

## 2019-10-19 DIAGNOSIS — F11.20 UNCOMPLICATED OPIOID DEPENDENCE: ICD-10-CM

## 2019-10-19 DIAGNOSIS — G89.29 OTHER CHRONIC PAIN: ICD-10-CM

## 2019-10-21 ENCOUNTER — PATIENT MESSAGE (OUTPATIENT)
Dept: FAMILY MEDICINE | Facility: CLINIC | Age: 74
End: 2019-10-21

## 2019-10-21 RX ORDER — MORPHINE SULFATE 60 MG/1
60 TABLET, FILM COATED, EXTENDED RELEASE ORAL 2 TIMES DAILY
Qty: 60 TABLET | Refills: 0 | Status: SHIPPED | OUTPATIENT
Start: 2019-10-21 | End: 2019-11-20 | Stop reason: SDUPTHER

## 2019-10-21 RX ORDER — OXYCODONE AND ACETAMINOPHEN 10; 325 MG/1; MG/1
1 TABLET ORAL EVERY 4 HOURS PRN
Qty: 120 TABLET | Refills: 0 | Status: SHIPPED | OUTPATIENT
Start: 2019-10-21 | End: 2019-11-20 | Stop reason: SDUPTHER

## 2019-10-24 ENCOUNTER — DOCUMENTATION ONLY (OUTPATIENT)
Dept: FAMILY MEDICINE | Facility: CLINIC | Age: 74
End: 2019-10-24

## 2019-10-24 NOTE — PROGRESS NOTES
Pre-Visit Chart Review  For Appointment Scheduled on 11-1-19    There are no preventive care reminders to display for this patient.

## 2019-11-01 ENCOUNTER — DOCUMENTATION ONLY (OUTPATIENT)
Dept: FAMILY MEDICINE | Facility: CLINIC | Age: 74
End: 2019-11-01

## 2019-11-01 ENCOUNTER — OFFICE VISIT (OUTPATIENT)
Dept: PAIN MEDICINE | Facility: CLINIC | Age: 74
End: 2019-11-01
Payer: MEDICARE

## 2019-11-01 VITALS
HEIGHT: 70 IN | OXYGEN SATURATION: 95 % | WEIGHT: 234.25 LBS | BODY MASS INDEX: 33.53 KG/M2 | SYSTOLIC BLOOD PRESSURE: 143 MMHG | DIASTOLIC BLOOD PRESSURE: 65 MMHG | TEMPERATURE: 97 F | RESPIRATION RATE: 20 BRPM | HEART RATE: 80 BPM

## 2019-11-01 DIAGNOSIS — M96.1 FAILED BACK SURGICAL SYNDROME: Primary | ICD-10-CM

## 2019-11-01 DIAGNOSIS — M54.16 LUMBAR RADICULOPATHY: ICD-10-CM

## 2019-11-01 PROCEDURE — 3077F SYST BP >= 140 MM HG: CPT | Mod: HCNC,CPTII,S$GLB, | Performed by: ANESTHESIOLOGY

## 2019-11-01 PROCEDURE — 3078F PR MOST RECENT DIASTOLIC BLOOD PRESSURE < 80 MM HG: ICD-10-PCS | Mod: HCNC,CPTII,S$GLB, | Performed by: ANESTHESIOLOGY

## 2019-11-01 PROCEDURE — 99214 OFFICE O/P EST MOD 30 MIN: CPT | Mod: HCNC,S$GLB,, | Performed by: ANESTHESIOLOGY

## 2019-11-01 PROCEDURE — 99214 PR OFFICE/OUTPT VISIT, EST, LEVL IV, 30-39 MIN: ICD-10-PCS | Mod: HCNC,S$GLB,, | Performed by: ANESTHESIOLOGY

## 2019-11-01 PROCEDURE — 3288F PR FALLS RISK ASSESSMENT DOCUMENTED: ICD-10-PCS | Mod: HCNC,CPTII,S$GLB, | Performed by: ANESTHESIOLOGY

## 2019-11-01 PROCEDURE — 3077F PR MOST RECENT SYSTOLIC BLOOD PRESSURE >= 140 MM HG: ICD-10-PCS | Mod: HCNC,CPTII,S$GLB, | Performed by: ANESTHESIOLOGY

## 2019-11-01 PROCEDURE — 3078F DIAST BP <80 MM HG: CPT | Mod: HCNC,CPTII,S$GLB, | Performed by: ANESTHESIOLOGY

## 2019-11-01 PROCEDURE — 99999 PR PBB SHADOW E&M-EST. PATIENT-LVL III: CPT | Mod: PBBFAC,HCNC,, | Performed by: ANESTHESIOLOGY

## 2019-11-01 PROCEDURE — 99999 PR PBB SHADOW E&M-EST. PATIENT-LVL III: ICD-10-PCS | Mod: PBBFAC,HCNC,, | Performed by: ANESTHESIOLOGY

## 2019-11-01 PROCEDURE — 3288F FALL RISK ASSESSMENT DOCD: CPT | Mod: HCNC,CPTII,S$GLB, | Performed by: ANESTHESIOLOGY

## 2019-11-01 PROCEDURE — 1100F PTFALLS ASSESS-DOCD GE2>/YR: CPT | Mod: HCNC,CPTII,S$GLB, | Performed by: ANESTHESIOLOGY

## 2019-11-01 PROCEDURE — 1100F PR PT FALLS ASSESS DOC 2+ FALLS/FALL W/INJURY/YR: ICD-10-PCS | Mod: HCNC,CPTII,S$GLB, | Performed by: ANESTHESIOLOGY

## 2019-11-01 NOTE — LETTER
November 1, 2019      Ty Montalvo MD  [address] [place] [address]           [facility]  [address] [place] [address]  Phone: [phone]  Fax: [phone]          Patient: Sam Pete   MR Number: [id number]   YOB: 1945   Date of Visit: 11/1/2019       Dear Dr. Ty Montalvo:    Thank you for referring Sam Pete to me for evaluation. Attached you will find relevant portions of my assessment and plan of care.    If you have questions, please do not hesitate to call me. I look forward to following Sam Pete along with you.    Sincerely,    Evan Lyles MD    Enclosure  CC:  No Recipients    If you would like to receive this communication electronically, please contact [email or web address] or [phone] to request more information on [employer] access.    For providers and/or their staff who would like to refer a patient to [facility], please contact us through our one-stop-shop provider referral line, [facility], at [phone].    If you feel you have received this communication in error or would no longer like to receive these types of communications, please e-mail [email or web address]

## 2019-11-01 NOTE — PROGRESS NOTES
[facility] New Patient Evaluation    Referred by: Dr. Montalvo  Reason for referral: back pain     CC:   Chief Complaint   Patient presents with    Establish Care    Low-back Pain      Last 3 PDI Scores 11/1/2019 8/26/2015 7/8/2015   Pain Disability Index (PDI) 14 6 8       HPI:   Sam Pete is a 74 y.o. male who complains of back pain    Onset: 30 years  Inciting Event: fell off a ladder in july  Progression: since onset, pain is gradually worsening  Current Pain Score: 6/10  Timing: constant  Quality: aching  Radiation: no  Associated numbness or weakness: no numbness, no weakness  Exacerbated by: back flexion, standing, walking  Allievated by: nothing  Is Pain Level Acceptable?: No    Previous Therapies:  PT/OT: yes  HEP: yes  Interventions:   10/28/15 - b/l L3-5 RFA with Dr. Short  9/9/15 - caudal CARLOS with Dr. Short  8/5/15 - right L4/5 and L5/S1 TFESI with Dr. Short  Surgery:  Medications:   - NSAIDS:   - MSK Relaxants: tizanidine   - TCAs:   - SNRIs:   - Topicals:   - Anticonvulsants: gabapentin  - Opioids: morphine ER, oxycodone    History:    Current Outpatient Medications:     albuterol (PROVENTIL/VENTOLIN HFA) 90 mcg/actuation inhaler, Inhale 2 puffs into the lungs every 6 (six) hours as needed for Wheezing. Rescue, Disp: 18 g, Rfl: 0    amLODIPine (NORVASC) 10 MG tablet, TAKE 1 TABLET EVERY DAY, Disp: 90 tablet, Rfl: 3    ammonium lactate 12 % Crea, Apply 1 application topically 2 (two) times daily. (Patient taking differently: Apply 1 application topically 2 (two) times daily as needed. ), Disp: 140 g, Rfl: 11    aspirin 81 mg Tab, Take 1 tablet by mouth Daily. Every day, Disp: , Rfl:     atorvastatin (LIPITOR) 40 MG tablet, TAKE 1 TABLET EVERY DAY, Disp: 90 tablet, Rfl: 0    buPROPion (WELLBUTRIN XL) 150 MG TB24 tablet, TAKE 1 TABLET EVERY DAY, Disp: 90 tablet, Rfl: 1    gabapentin (NEURONTIN) 300 MG capsule, Take one twice a day prn back pain flare, Disp: 180 capsule,  Rfl: 1    losartan (COZAAR) 50 MG tablet, TAKE 1 TABLET EVERY DAY, Disp: 90 tablet, Rfl: 3    morphine (MS CONTIN) 60 MG 12 hr tablet, Take 1 tablet (60 mg total) by mouth 2 (two) times daily., Disp: 60 tablet, Rfl: 0    naloxone (NARCAN) 1 mg/mL injection, Use as needed for respiratory depression, Disp: 2 mL, Rfl: 1    omeprazole (PRILOSEC) 40 MG capsule, TAKE 1 CAPSULE EVERY DAY, Disp: 90 capsule, Rfl: 3    oxyCODONE-acetaminophen (PERCOCET)  mg per tablet, Take 1 tablet by mouth every 4 (four) hours as needed for Pain., Disp: 120 tablet, Rfl: 0    sildenafil (REVATIO) 20 mg Tab, Take 1-5 daily as needed for ED, Disp: 10 tablet, Rfl: 5    tiZANidine (ZANAFLEX) 4 MG tablet, TAKE 1 TABLET (4 MG TOTAL) BY MOUTH EVERY 8 (EIGHT) HOURS AS NEEDED., Disp: 60 tablet, Rfl: 5    Past Medical History:   Diagnosis Date    Anticoagulant long-term use     ASA 81 mg    Arthritis     Cataract     OU    Chronic low back pain     Complete rupture of rotator cuff 2/8/2011    DDD (degenerative disc disease), lumbar 7/8/2015    Degeneration of lumbar or lumbosacral intervertebral disc 9/9/2015    ED (erectile dysfunction)     GERD (gastroesophageal reflux disease)     Hypertension     Lumbar pseudoarthrosis 6/26/2017    Lumbar stenosis 6/26/2017    Obesity     Spondylosis of lumbar region without myelopathy or radiculopathy 7/8/2015    Stroke 1997    basal ganglia, left sided weakness, resolved    Testicular hypofunction     Thoracic or lumbosacral neuritis or radiculitis 7/8/2015       Past Surgical History:   Procedure Laterality Date    APPENDECTOMY      BACK SURGERY      4- back surgery    COLONOSCOPY  07/12/2004    [facility].   Redundant tortuous colon, otherwise normal.    COLONOSCOPY N/A 2/25/2019    Procedure: COLONOSCOPY;  Surgeon: Gilbert Rodriguez Jr., MD;  Location: [facility];  Service: Endoscopy;  Laterality: N/A;    Epidural steroid injection      Pain management     ESOPHAGOGASTRODUODENOSCOPY  2004    [facility].    FRACTURE SURGERY Left     wrist / forearm, total of 5    JOINT REPLACEMENT  -    ( rt hip ), left hip     JOINT REPLACEMENT Left     total knee    KNEE ARTHROSCOPY W/ DEBRIDEMENT      bilateral knees , total of six    KNEE SURGERY      Nervbe Block injection      Pain management       Family History   Problem Relation Age of Onset    Hypertension Father     Heart disease Father     Drug abuse Daughter     Hypertension Son     Lung disease Sister     COPD Sister     Hypertension Sister     Diverticulitis Sister     Gout Sister     Kidney disease Sister     No Known Problems Mother        Social History     Socioeconomic History    Marital status:      Spouse name: Not on file    Number of children: Not on file    Years of education: Not on file    Highest education level: Not on file   Occupational History    Not on file   Social Needs    Financial resource strain: Not on file    Food insecurity:     Worry: Not on file     Inability: Not on file    Transportation needs:     Medical: Not on file     Non-medical: Not on file   Tobacco Use    Smoking status: Former Smoker     Packs/day: 1.00     Years: 30.00     Pack years: 30.00     Start date: 8/3/1963     Last attempt to quit: 1992     Years since quittin.8    Smokeless tobacco: Never Used   Substance and Sexual Activity    Alcohol use: Yes     Comment: On occasion    Drug use: Yes     Types: Oxycodone, Morphine    Sexual activity: Not on file   Lifestyle    Physical activity:     Days per week: Not on file     Minutes per session: Not on file    Stress: Not on file   Relationships    Social connections:     Talks on phone: Not on file     Gets together: Not on file     Attends Baptism service: Not on file     Active member of club or organization: Not on file     Attends meetings of clubs or organizations: Not on file     Relationship status: Not on file  "  Other Topics Concern    Not on file   Social History Narrative    Not on file       Review of patient's allergies indicates:   Allergen Reactions    Xyzal [levocetirizine] Other (See Comments)     Knocked him out        Review of Systems:  General ROS: negative for - fever  Psychological ROS: negative for - hostility  Hematological and Lymphatic ROS: negative for - bleeding problems  Endocrine ROS: negative for - unexpected weight changes  Respiratory ROS: no cough, shortness of breath, or wheezing  Cardiovascular ROS: no chest pain or dyspnea on exertion  Gastrointestinal ROS: no abdominal pain, change in bowel habits, or black or bloody stools  Musculoskeletal ROS: negative for - muscular weakness  Neurological ROS: positive for - numbness/tingling  negative for - bowel and bladder control changes  Dermatological ROS: negative for rash    Physical Exam:  Vitals:    11/01/19 1429   BP: (!) 143/65   Pulse: 80   Resp: 20   Temp: 97 °F (36.1 °C)   TempSrc: Oral   SpO2: 95%   Weight: 106.3 kg (234 lb 3.8 oz)   Height: 5' 10" (1.778 m)   PainSc:   6   PainLoc: Back     Body mass index is 33.61 kg/m².     Gen: NAD  Gait: gait intact  Psych:  Mood appropriate for given condition  HEENT: eyes anicteric   GI: Abd soft  CV: RRR  Lungs: breathing unlabored   ROM: limited AROM of the L spine in all planes, full ROM at ankles, knees and hips  Lumbar flexion 90 degrees, extension 50 degrees, side bending 30 degrees.    Sensation: intact to light touch in all dermatomes tested from L2-S1 bilaterally, except decreased left L5  Reflexes: 0/0 b/l patella and Achilles  Palpation: Diffusely tender over lumbar paraspinals  Tone: normal in the b/l knees and hips   Skin: intact  Extremities: No edema in b/l ankles or hands  Provacative tests: + b/l axial facet loading       Right Left   L2/3 Iliacus Hip flexion  5  5   L3/4 Qudratus Femoris Knee Extension  5  5   L4/5 Tib Anterior Ankle Dorsiflexion   5  5   L5/S1 Extensor Hallicus " Longus Great toe extension  5  5-                 S1/S2 Gastroc/Soleus Plantar Flexion  5  5     Imaging:  Xray lumbar spine 8/13/19  FINDINGS:  Postsurgical changes of posterior instrumented fusion with bipedicular screws and vertical stabilization rods from L2-S1 again demonstrated.  No hardware complication demonstrated.  S1 screw again extends just beyond the anterior cortical margin of the vertebral body.    The bones are osteopenic.  Sagittal alignment appears stable.  Vertebral bodies demonstrate normal height.  No obvious translation on flexion and extension.    Moderate to severe disc space narrowing at L4-5 and L5-S1 again demonstrated, unchanged.There is facet joint arthropathy throughout the lumbar spine, most pronounced at the lower lumbar levels.    Partially visualized bilateral hip replacements noted.  Atherosclerotic calcification of the abdominal aorta and common iliac arteries noted.    Xray thoracic spine 8/13/19  FINDINGS:  The bones are osteopenic.  There are postsurgical changes of posterior lumbar fusion which are incompletely included on the study.  The vertebral bodies maintain normal height and alignment.  There is no fracture.  There is only mild disc space narrowing in the midthoracic region where there is also bridging osteophyte formation.  The paraspinous soft tissues are grossly normal.    MRI lumbar spine 5/30/18  FINDINGS:  There is evidence of prior pedicular screw placement from L2-S1 with posterior cross links as demonstrated on recent plain films.  Alignment is good.  There is mild anterior osteophyte formation throughout the lumbar region and there is loss of disc space height at L4/L5.  The screws have been placed relative the previous exam performed May 8, 2017.  Posterior to the canal there is a complex colles fluid collection at the level of L3 measuring maximally 2.8 cm transversely by 1.8 cm AP by 3 cm cranial caudal.  This does not appear to distort the thecal sac and  does not have an appearance suggesting a pseudo meninges seal.  This likely represents a seroma.  The conus terminates at L1 and has an unremarkable appearance.  The individual disc levels appears follows:    T12/L1: Degenerative facet changes are noted bilaterally and there is minimal distortion of the central canal.  L1/L2: Degenerative facet changes are noted and there is no evidence of significant canal or foraminal stenosis.  L2/L3: Disc bulging is evident which is more pronounced to the left of midline causing minimal distortion left foramen.  The central canal is intact as is the right foramen.  L3/L4: There is mild disc bulging to the left of midline without evidence of significant canal or foraminal stenosis.  L4/L5: There is annular disc bulging and there is degenerative facet disease.  This canal and foramina are intact.  There is slight narrowing the left foramen due to disc bulging.  L5/S1: There is mild narrowing the left foramen due to disc and osteophyte formation.  I do not see evidence of recurrent or residual disc extrusion and degenerative facet changes are noted bilaterally.  The central canal is intact.    Labs:  BMP  Lab Results   Component Value Date     08/21/2019    K 4.6 08/21/2019     08/21/2019    CO2 30 (H) 08/21/2019    BUN 18 08/21/2019    CREATININE 0.9 08/21/2019    CALCIUM 9.1 08/21/2019    ANIONGAP 7 (L) 08/21/2019    ESTGFRAFRICA >60.0 08/21/2019    EGFRNONAA >60.0 08/21/2019     Lab Results   Component Value Date    ALT 16 08/21/2019    AST 19 08/21/2019    ALKPHOS 93 08/21/2019    BILITOT 0.2 08/21/2019       Assessment:  Problem List Items Addressed This Visit        Neuro    Lumbar radiculopathy       Orthopedic    Failed back surgical syndrome - Primary    Relevant Orders    MRI Lumbar Spine W WO Contrast    Creatinine, serum          Treatment Plan:  74 y.o. year old male with PMH paroxsymal a-fib, HTN presents to the office with back pain.  He has a history of  4 prior lumbar surgeries the most recent being L2-S1 posterolateral fusion on 6/26/17 with Dr. oFwler.  Today his back pain is 7/10, constant, aching, burning, worse with standing, walking, and back flexion and not relieved by anything.  He also has burning sensation in the bottom of his feet.  He has tried PT in the past and continues HEP at home.  He takes morphine ER 60mg BID, oxycodone 10/35mg q4h prn, and gabapentin 300mg BID.  He has undergone caudal and TFESI with Dr. Short in 2015 without relief of his previous radicular pain at the time.  He is having persistent lumbar back pain in the setting of previous lumbar fusion.  His pain is limiting his mobility and interfering with his ADL's.  He is an excellent candidate for SCS trial with nevro HF 10.  I've given him reading material on the device today.  He would like to take some time and discuss it with his wife.  Ideally it will provide him with pain relief, improvement with ADL's and mobility, and also provide an opportunity for him to decrease his opioid usage.  We will get lumbar MRI to look for any changes in previous seroma and he will follow up once its done to discuss his decision on the SCS trial.     Procedures: discussed SCS trial with nevro HF 10.  He is going to speak with his wife and get lumbar MRI and come in to discuss further.  PT/OT/HEP: continue HEP  Medications: continue current medications as prescribed  Labs: Reviewed and medications are appropriately dosed for current hepatorenal function.  Imaging: MRI lumbar spine w w/o contrast to evaluate for any changes to previous seroma    : Reviewed and consistent with medication use as prescribed.    Evan Lyles M.D.  Interventional Pain Medicine / Anesthesiology

## 2019-11-01 NOTE — PROGRESS NOTES
Pre-Visit Chart Review  For Appointment Scheduled on 11-20-19    There are no preventive care reminders to display for this patient.

## 2019-11-04 DIAGNOSIS — Z86.73 HISTORY OF CVA IN ADULTHOOD: ICD-10-CM

## 2019-11-05 RX ORDER — ATORVASTATIN CALCIUM 40 MG/1
TABLET, FILM COATED ORAL
Qty: 90 TABLET | Refills: 3 | Status: SHIPPED | OUTPATIENT
Start: 2019-11-05 | End: 2020-11-05 | Stop reason: SDUPTHER

## 2019-11-06 DIAGNOSIS — G89.29 OTHER CHRONIC PAIN: ICD-10-CM

## 2019-11-06 RX ORDER — TIZANIDINE 4 MG/1
TABLET ORAL
Qty: 60 TABLET | Refills: 2 | Status: SHIPPED | OUTPATIENT
Start: 2019-11-06 | End: 2020-06-15

## 2019-11-20 ENCOUNTER — OFFICE VISIT (OUTPATIENT)
Dept: FAMILY MEDICINE | Facility: CLINIC | Age: 74
End: 2019-11-20
Attending: FAMILY MEDICINE
Payer: MEDICARE

## 2019-11-20 VITALS
HEIGHT: 70 IN | WEIGHT: 234.38 LBS | SYSTOLIC BLOOD PRESSURE: 128 MMHG | HEART RATE: 93 BPM | RESPIRATION RATE: 12 BRPM | BODY MASS INDEX: 33.55 KG/M2 | TEMPERATURE: 98 F | DIASTOLIC BLOOD PRESSURE: 70 MMHG | OXYGEN SATURATION: 97 %

## 2019-11-20 DIAGNOSIS — G89.29 OTHER CHRONIC PAIN: ICD-10-CM

## 2019-11-20 DIAGNOSIS — F11.20 UNCOMPLICATED OPIOID DEPENDENCE: ICD-10-CM

## 2019-11-20 DIAGNOSIS — G89.4 CHRONIC PAIN SYNDROME: Primary | ICD-10-CM

## 2019-11-20 PROCEDURE — 3078F DIAST BP <80 MM HG: CPT | Mod: CPTII,S$GLB,, | Performed by: FAMILY MEDICINE

## 2019-11-20 PROCEDURE — 1101F PR PT FALLS ASSESS DOC 0-1 FALLS W/OUT INJ PAST YR: ICD-10-PCS | Mod: CPTII,S$GLB,, | Performed by: FAMILY MEDICINE

## 2019-11-20 PROCEDURE — 1159F PR MEDICATION LIST DOCUMENTED IN MEDICAL RECORD: ICD-10-PCS | Mod: S$GLB,,, | Performed by: FAMILY MEDICINE

## 2019-11-20 PROCEDURE — 99999 PR PBB SHADOW E&M-EST. PATIENT-LVL IV: CPT | Mod: PBBFAC,,, | Performed by: FAMILY MEDICINE

## 2019-11-20 PROCEDURE — 3074F PR MOST RECENT SYSTOLIC BLOOD PRESSURE < 130 MM HG: ICD-10-PCS | Mod: CPTII,S$GLB,, | Performed by: FAMILY MEDICINE

## 2019-11-20 PROCEDURE — 99999 PR PBB SHADOW E&M-EST. PATIENT-LVL IV: ICD-10-PCS | Mod: PBBFAC,,, | Performed by: FAMILY MEDICINE

## 2019-11-20 PROCEDURE — 3074F SYST BP LT 130 MM HG: CPT | Mod: CPTII,S$GLB,, | Performed by: FAMILY MEDICINE

## 2019-11-20 PROCEDURE — 99213 PR OFFICE/OUTPT VISIT, EST, LEVL III, 20-29 MIN: ICD-10-PCS | Mod: S$GLB,,, | Performed by: FAMILY MEDICINE

## 2019-11-20 PROCEDURE — 1125F AMNT PAIN NOTED PAIN PRSNT: CPT | Mod: S$GLB,,, | Performed by: FAMILY MEDICINE

## 2019-11-20 PROCEDURE — 1101F PT FALLS ASSESS-DOCD LE1/YR: CPT | Mod: CPTII,S$GLB,, | Performed by: FAMILY MEDICINE

## 2019-11-20 PROCEDURE — 1159F MED LIST DOCD IN RCRD: CPT | Mod: S$GLB,,, | Performed by: FAMILY MEDICINE

## 2019-11-20 PROCEDURE — 99213 OFFICE O/P EST LOW 20 MIN: CPT | Mod: S$GLB,,, | Performed by: FAMILY MEDICINE

## 2019-11-20 PROCEDURE — 1125F PR PAIN SEVERITY QUANTIFIED, PAIN PRESENT: ICD-10-PCS | Mod: S$GLB,,, | Performed by: FAMILY MEDICINE

## 2019-11-20 PROCEDURE — 3078F PR MOST RECENT DIASTOLIC BLOOD PRESSURE < 80 MM HG: ICD-10-PCS | Mod: CPTII,S$GLB,, | Performed by: FAMILY MEDICINE

## 2019-11-20 PROCEDURE — 80307 DRUG TEST PRSMV CHEM ANLYZR: CPT

## 2019-11-20 RX ORDER — OXYCODONE AND ACETAMINOPHEN 10; 325 MG/1; MG/1
1 TABLET ORAL EVERY 4 HOURS PRN
Qty: 120 TABLET | Refills: 0 | Status: SHIPPED | OUTPATIENT
Start: 2020-01-20 | End: 2020-02-17

## 2019-11-20 RX ORDER — MORPHINE SULFATE 60 MG/1
60 TABLET, FILM COATED, EXTENDED RELEASE ORAL 2 TIMES DAILY
Qty: 60 TABLET | Refills: 0 | Status: SHIPPED | OUTPATIENT
Start: 2019-11-20 | End: 2020-02-07 | Stop reason: CLARIF

## 2019-11-20 RX ORDER — OXYCODONE AND ACETAMINOPHEN 10; 325 MG/1; MG/1
1 TABLET ORAL EVERY 4 HOURS PRN
Qty: 120 TABLET | Refills: 0 | Status: SHIPPED | OUTPATIENT
Start: 2019-12-20 | End: 2020-02-07 | Stop reason: CLARIF

## 2019-11-20 RX ORDER — MORPHINE SULFATE 60 MG/1
60 TABLET, FILM COATED, EXTENDED RELEASE ORAL 2 TIMES DAILY
Qty: 60 TABLET | Refills: 0 | Status: SHIPPED | OUTPATIENT
Start: 2019-12-20 | End: 2020-02-07 | Stop reason: CLARIF

## 2019-11-20 RX ORDER — OXYCODONE AND ACETAMINOPHEN 10; 325 MG/1; MG/1
1 TABLET ORAL EVERY 4 HOURS PRN
Qty: 120 TABLET | Refills: 0 | Status: SHIPPED | OUTPATIENT
Start: 2019-11-20 | End: 2020-02-07 | Stop reason: CLARIF

## 2019-11-20 RX ORDER — MORPHINE SULFATE 60 MG/1
60 TABLET, FILM COATED, EXTENDED RELEASE ORAL 2 TIMES DAILY
Qty: 60 TABLET | Refills: 0 | Status: SHIPPED | OUTPATIENT
Start: 2020-01-20 | End: 2020-02-17

## 2019-11-20 NOTE — PATIENT INSTRUCTIONS
Controlling High Blood Pressure  High blood pressure (hypertension) is often called the silent killer. This is because many people who have it dont know it. High blood pressure is defined as 140/90 mm Hg or higher. Know your blood pressure and remember to check it regularly. Doing so can save your life. Here are some things you can do to help control your blood pressure.    Choose heart-healthy foods  · Select low-salt, low-fat foods. Limit sodium intake to 2,400 mg per day or the amount suggested by your healthcare provider.  · Limit canned, dried, cured, packaged, and fast foods. These can contain a lot of salt.  · Eat 8 to 10 servings of fruits and vegetables every day.  · Choose lean meats, fish, or chicken.  · Eat whole-grain pasta, brown rice, and beans.  · Eat 2 to 3 servings of low-fat or fat-free dairy products.  · Ask your doctor about the DASH eating plan. This plan helps reduce blood pressure.  · When you go to a restaurant, ask that your meal be prepared with no added salt.  Maintain a healthy weight  · Ask your healthcare provider how many calories to eat a day. Then stick to that number.  · Ask your healthcare provider what weight range is healthiest for you. If you are overweight, a weight loss of only 3% to 5% of your body weight can help lower blood pressure. Generally, a good weight loss goal is to lose 10% of your body weight in a year.  · Limit snacks and sweets.  · Get regular exercise.  Get up and get active  · Choose activities you enjoy. Find ones you can do with friends or family. This includes bicycling, dancing, walking, and jogging.  · Park farther away from building entrances.  · Use stairs instead of the elevator.  · When you can, walk or bike instead of driving.  · [place] leaves, garden, or do household repairs.  · Be active at a moderate to vigorous level of physical activity for at least 40 minutes for a minimum of 3 to 4 days a week.   Manage stress  · Make time to relax and enjoy  life. Find time to laugh.  · Communicate your concerns with your loved ones and your healthcare provider.  · Visit with family and friends, and keep up with hobbies.  Limit alcohol and quit smoking  · Men should have no more than 2 drinks per day.  · Women should have no more than 1 drink per day.  · Talk with your healthcare provider about quitting smoking. Smoking significantly increases your risk for heart disease and stroke. Ask your healthcare provider about community smoking cessation programs and other options.  Medicines  If lifestyle changes arent enough, your healthcare provider may prescribe high blood pressure medicine. Take all medicines as prescribed. If you have any questions about your medicines, ask your healthcare provider before stopping or changing them.   Date Last Reviewed: 4/27/2016  © 9520-1003 [email or web address]. [address] [place] [address], [place] [address]. All rights reserved. This information is not intended as a substitute for professional medical care. Always follow your healthcare professional's instructions.        Controlling High Blood Pressure  High blood pressure (hypertension) is often called the silent killer. This is because many people who have it dont know it. High blood pressure is defined as 140/90 mm Hg or higher. Know your blood pressure and remember to check it regularly. Doing so can save your life. Here are some things you can do to help control your blood pressure.    Choose heart-healthy foods  · Select low-salt, low-fat foods. Limit sodium intake to 2,400 mg per day or the amount suggested by your healthcare provider.  · Limit canned, dried, cured, packaged, and fast foods. These can contain a lot of salt.  · Eat 8 to 10 servings of fruits and vegetables every day.  · Choose lean meats, fish, or chicken.  · Eat whole-grain pasta, brown rice, and beans.  · Eat 2 to 3 servings of low-fat or fat-free dairy products.  · Ask your doctor about the DASH eating plan.  This plan helps reduce blood pressure.  · When you go to a restaurant, ask that your meal be prepared with no added salt.  Maintain a healthy weight  · Ask your healthcare provider how many calories to eat a day. Then stick to that number.  · Ask your healthcare provider what weight range is healthiest for you. If you are overweight, a weight loss of only 3% to 5% of your body weight can help lower blood pressure. Generally, a good weight loss goal is to lose 10% of your body weight in a year.  · Limit snacks and sweets.  · Get regular exercise.  Get up and get active  · Choose activities you enjoy. Find ones you can do with friends or family. This includes bicycling, dancing, walking, and jogging.  · Park farther away from building entrances.  · Use stairs instead of the elevator.  · When you can, walk or bike instead of driving.  · [place] leaves, garden, or do household repairs.  · Be active at a moderate to vigorous level of physical activity for at least 40 minutes for a minimum of 3 to 4 days a week.   Manage stress  · Make time to relax and enjoy life. Find time to laugh.  · Communicate your concerns with your loved ones and your healthcare provider.  · Visit with family and friends, and keep up with hobbies.  Limit alcohol and quit smoking  · Men should have no more than 2 drinks per day.  · Women should have no more than 1 drink per day.  · Talk with your healthcare provider about quitting smoking. Smoking significantly increases your risk for heart disease and stroke. Ask your healthcare provider about community smoking cessation programs and other options.  Medicines  If lifestyle changes arent enough, your healthcare provider may prescribe high blood pressure medicine. Take all medicines as prescribed. If you have any questions about your medicines, ask your healthcare provider before stopping or changing them.   Date Last Reviewed: 4/27/2016  © 1537-2526 [employer]. [address] [facility],  [place] [address]. All rights reserved. This information is not intended as a substitute for professional medical care. Always follow your healthcare professional's instructions.        Walking for Fitness  Fitness walking has something for everyone, even people who are already fit. Walking is one of the safest ways to condition your body aerobically. It can boost energy, help you lose weight, and reduce stress.    Physical benefits  · Walking strengthens your heart and lungs, and tones your muscles.  · When walking, your feet land with less impact than in other sports. This reduces chances of muscle, bone, and joint injury.  · Regular walking improves your cholesterol levels and lowers your risk of heart disease. And it helps you control your blood sugar if you have diabetes.  · Walking is a weight-bearing activity, which helps maintain bone density. This can help prevent osteoporosis.  Personal rewards  · Taking walks can help you relax and manage stress. And fitness walking may make you feel better about yourself.  · Walking can help you sleep better at night and make you less likely to be depressed.  · Regular walking may help maintain your memory as you get older.  · Walking is a great way to spend extra time with friends and family members. Be sure to invite your dog along!  Q&A about fitness walking  Q: Will walking keep me fit?  A: Yes. Regular walking at the right pace gives you all the benefits of other aerobic activities, such as jogging and swimming.  Q: Will walking help me lose weight and keep it off?  A: Yes. Per mile, walking can burn as many calories as jogging. Your health care provider can help work walking into your weight-loss plan.  Q: Is walking safe for my health?  A: Yes. Walking is safe if you have high blood pressure, diabetes, heart disease, or other conditions. Talk to your healthcare provider before you start.  Date Last Reviewed: 4/1/2017  © 7055-7473 [employer] [address]  [place] [address]. All rights reserved. This information is not intended as a substitute for professional medical care. Always follow your healthcare professional's instructions.        Weight Management: Getting Started  Healthy bodies come in all shapes and sizes. Not all bodies are made to be thin. For some people, a healthy weight is higher than the average weight listed on weight charts. Your healthcare provider can help you decide on a healthy weight for you.    Reasons to lose weight  Losing weight can help with some health problems, such as high blood pressure, heart disease, diabetes, sleep apnea, and arthritis. You may also feel more energy.  Set your long-term goal  Your goal doesn't even have to be a specific weight. You may decide on a fitness goal (such as being able to walk 10 miles a week), or a health goal (such as lowering your blood pressure). Choose a goal that is measurable and reasonable, so you know when you've reached it. A goal of reaching a BMI of less than 25 is not always reasonable (or possible).   Make an action plan  Habits dont change overnight. Setting your goals too high can leave you feeling discouraged if you cant reach them. Be realistic. Choose one or two small changes you can make now. Set an action plan for how you are going to make these changes. When you can stick to this plan, keep making a few more small changes. Taking small steps will help you stay on the path to success.  Track your progress  Write down your goals. Then, keep a daily record of your progress. Write down what you eat and how active you are. This record lets you look back on how much youve done. It may also help when youre feeling frustrated. Reward yourself for success. Even if you dont reach every goal, give yourself credit for what you do get done.  Get support  Encouragement from others can help make losing weight easier. Ask your family members and friends for support. They  may even want to join you. Also look to your healthcare provider, registered dietitian, and  for help. Your local hospital can give you more information about nutrition, exercise, and weight loss.  Date Last Reviewed: 1/31/2016  © 0112-0169 [employer]. [address] [place] [address], [place] [address]. All rights reserved. This information is not intended as a substitute for professional medical care. Always follow your healthcare professional's instructions.

## 2019-11-20 NOTE — PROGRESS NOTES
Subjective:       Patient ID: Sam Pete is a 74 y.o. male.    Chief Complaint: Medication Refill    74-year-old male coming in for renewal of opioids for chronic pain.  He has degenerative joint and disc disease of the spine with lumbar spinal stenosis as well as extensive arthritis with bilateral hip replacements, left knee replacement, and has been discussing with Dr. Livingston a spinal stimulator.  Orthopedics has been wanting to do shoulder replacements and he generally has had enough surgery and does not want to do any of this.  He is on a combination of morphine with p.r.n. Percocet an is controlling his pain well which allows him to carry out activities of daily living, moderate yd work and maintenance activity without any significant constipation or cognitive impairment.    Past Medical History:  No date: Anticoagulant long-term use      Comment:  ASA 81 mg  No date: Arthritis  No date: Cataract      Comment:  OU  No date: Chronic low back pain  2/8/2011: Complete rupture of rotator cuff  7/8/2015: DDD (degenerative disc disease), lumbar  9/9/2015: Degeneration of lumbar or lumbosacral intervertebral disc  No date: ED (erectile dysfunction)  No date: GERD (gastroesophageal reflux disease)  No date: Hypertension  6/26/2017: Lumbar pseudoarthrosis  6/26/2017: Lumbar stenosis  No date: Obesity  7/8/2015: Spondylosis of lumbar region without myelopathy or   radiculopathy  1997: Stroke      Comment:  basal ganglia, left sided weakness, resolved  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   Redundant tortuous colon, otherwise normal.  2/25/2019: COLONOSCOPY; N/A      Comment:  Procedure: COLONOSCOPY;  Surgeon: Gilbert Rodriguez Jr., MD;  Location: [facility];  Service: Endoscopy;                  Laterality: N/A;  No date: Epidural steroid injection      Comment:  Pain management  07/12/2004: ESOPHAGOGASTRODUODENOSCOPY      Comment:  CHILO.  No date: FRACTURE SURGERY; Left      Comment:  wrist / forearm, total of 5  2-6-2013: JOINT REPLACEMENT      Comment:  ( rt hip 2007), left hip   No date: JOINT REPLACEMENT; Left      Comment:  total knee  No date: KNEE ARTHROSCOPY W/ DEBRIDEMENT      Comment:  bilateral knees , total of six  No date: KNEE SURGERY  No date: Nervbe Block injection      Comment:  Pain management    Current Outpatient Medications on File Prior to Visit:  albuterol (PROVENTIL/VENTOLIN HFA) 90 mcg/actuation inhaler, Inhale 2 puffs into the lungs every 6 (six) hours as needed for Wheezing. Rescue, Disp: 18 g, Rfl: 0  amLODIPine (NORVASC) 10 MG tablet, TAKE 1 TABLET EVERY DAY, Disp: 90 tablet, Rfl: 3  ammonium lactate 12 % Crea, Apply 1 application topically 2 (two) times daily. (Patient taking differently: Apply 1 application topically 2 (two) times daily as needed. ), Disp: 140 g, Rfl: 11  aspirin 81 mg Tab, Take 1 tablet by mouth Daily. Every day, Disp: , Rfl:   atorvastatin (LIPITOR) 40 MG tablet, TAKE 1 TABLET EVERY DAY, Disp: 90 tablet, Rfl: 3  buPROPion (WELLBUTRIN XL) 150 MG TB24 tablet, TAKE 1 TABLET EVERY DAY, Disp: 90 tablet, Rfl: 1  gabapentin (NEURONTIN) 300 MG capsule, Take one twice a day prn back pain flare, Disp: 180 capsule, Rfl: 1  losartan (COZAAR) 50 MG tablet, TAKE 1 TABLET EVERY DAY, Disp: 90 tablet, Rfl: 3  omeprazole (PRILOSEC) 40 MG capsule, TAKE 1 CAPSULE EVERY DAY, Disp: 90 capsule, Rfl: 3  sildenafil (REVATIO) 20 mg Tab, Take 1-5 daily as needed for ED, Disp: 10 tablet, Rfl: 5  tiZANidine (ZANAFLEX) 4 MG tablet, TAKE 1 TABLET BY MOUTH EVERY 8 HOURS AS NEEDED, Disp: 60 tablet, Rfl: 2  (DISCONTINUED) morphine (MS CONTIN) 60 MG 12 hr tablet, Take 1 tablet (60 mg total) by mouth 2 (two) times daily., Disp: 60 tablet, Rfl: 0  (DISCONTINUED) oxyCODONE-acetaminophen  (PERCOCET)  mg per tablet, Take 1 tablet by mouth every 4 (four) hours as needed for Pain., Disp: 120 tablet, Rfl: 0  naloxone (NARCAN) 1 mg/mL injection, Use as needed for respiratory depression (Patient not taking: Reported on 11/20/2019), Disp: 2 mL, Rfl: 1    No current facility-administered medications on file prior to visit.         Review of Systems   Gastrointestinal: Negative for constipation, nausea and vomiting.   Musculoskeletal: Positive for arthralgias and back pain.   Psychiatric/Behavioral: Negative for behavioral problems, confusion, decreased concentration and dysphoric mood. The patient is not nervous/anxious.        Objective:      Physical Exam   Constitutional: He is oriented to person, place, and time. He appears well-developed. No distress.   Good blood pressure control  Obese with a BMI of 33.6 he is up 9.5 lb from his last visit with me July 17, 2019   Neurological: He is alert and oriented to person, place, and time.   Skin: He is not diaphoretic.   Psychiatric: He has a normal mood and affect. His behavior is normal. Judgment and thought content normal.   Nursing note and vitals reviewed.      Assessment:       1. Chronic pain syndrome    2. Other chronic pain    3. Uncomplicated opioid dependence        Plan:       1. Chronic pain syndrome  The  (Prescription Monitoring Program) website was checked with no inappropriate activity found.  - POCT RAPID DRUG SCREEN  - POCT Urine Drug Screen Pain Management  - Pain Clinic Drug Screen    2. Other chronic pain  - oxyCODONE-acetaminophen (PERCOCET)  mg per tablet; Take 1 tablet by mouth every 4 (four) hours as needed for Pain.  Dispense: 120 tablet; Refill: 0  - morphine (MS CONTIN) 60 MG 12 hr tablet; Take 1 tablet (60 mg total) by mouth 2 (two) times daily.  Dispense: 60 tablet; Refill: 0  - oxyCODONE-acetaminophen (PERCOCET)  mg per tablet; Take 1 tablet by mouth every 4 (four) hours as needed for Pain.  Dispense: 120  tablet; Refill: 0  - morphine (MS CONTIN) 60 MG 12 hr tablet; Take 1 tablet (60 mg total) by mouth 2 (two) times daily.  Dispense: 60 tablet; Refill: 0  - oxyCODONE-acetaminophen (PERCOCET)  mg per tablet; Take 1 tablet by mouth every 4 (four) hours as needed for Pain.  Dispense: 120 tablet; Refill: 0  - morphine (MS CONTIN) 60 MG 12 hr tablet; Take 1 tablet (60 mg total) by mouth 2 (two) times daily.  Dispense: 60 tablet; Refill: 0    3. Uncomplicated opioid dependence  - oxyCODONE-acetaminophen (PERCOCET)  mg per tablet; Take 1 tablet by mouth every 4 (four) hours as needed for Pain.  Dispense: 120 tablet; Refill: 0  - morphine (MS CONTIN) 60 MG 12 hr tablet; Take 1 tablet (60 mg total) by mouth 2 (two) times daily.  Dispense: 60 tablet; Refill: 0  - oxyCODONE-acetaminophen (PERCOCET)  mg per tablet; Take 1 tablet by mouth every 4 (four) hours as needed for Pain.  Dispense: 120 tablet; Refill: 0  - morphine (MS CONTIN) 60 MG 12 hr tablet; Take 1 tablet (60 mg total) by mouth 2 (two) times daily.  Dispense: 60 tablet; Refill: 0  - oxyCODONE-acetaminophen (PERCOCET)  mg per tablet; Take 1 tablet by mouth every 4 (four) hours as needed for Pain.  Dispense: 120 tablet; Refill: 0  - morphine (MS CONTIN) 60 MG 12 hr tablet; Take 1 tablet (60 mg total) by mouth 2 (two) times daily.  Dispense: 60 tablet; Refill: 0  - POCT Urine Drug Screen Pain Management  - Pain Clinic Drug Screen

## 2019-11-26 ENCOUNTER — HOSPITAL ENCOUNTER (OUTPATIENT)
Dept: RADIOLOGY | Facility: HOSPITAL | Age: 74
Discharge: HOME | End: 2019-11-26
Attending: ANESTHESIOLOGY
Payer: MEDICARE

## 2019-11-26 DIAGNOSIS — M96.1 FAILED BACK SURGICAL SYNDROME: ICD-10-CM

## 2019-11-26 PROCEDURE — 72158 MRI LUMBAR SPINE W/O & W/DYE: CPT | Mod: 26,,, | Performed by: RADIOLOGY

## 2019-11-26 PROCEDURE — 72158 MRI LUMBAR SPINE W WO CONTRAST: ICD-10-PCS | Mod: 26,,, | Performed by: RADIOLOGY

## 2019-11-26 PROCEDURE — 72158 MRI LUMBAR SPINE W/O & W/DYE: CPT | Mod: TC,PO

## 2019-11-26 PROCEDURE — A9585 GADOBUTROL INJECTION: HCPCS | Mod: PO | Performed by: ANESTHESIOLOGY

## 2019-11-26 PROCEDURE — 25500020 PHARM REV CODE 255: Mod: PO | Performed by: ANESTHESIOLOGY

## 2019-11-26 RX ORDER — GADOBUTROL 604.72 MG/ML
10 INJECTION INTRAVENOUS
Status: COMPLETED | OUTPATIENT
Start: 2019-11-26 | End: 2019-11-26

## 2019-11-26 RX ADMIN — GADOBUTROL 10 ML: 604.72 INJECTION INTRAVENOUS at 11:11

## 2019-12-02 ENCOUNTER — TELEPHONE (OUTPATIENT)
Dept: PAIN MEDICINE | Facility: CLINIC | Age: 74
End: 2019-12-02

## 2019-12-02 ENCOUNTER — OFFICE VISIT (OUTPATIENT)
Dept: PAIN MEDICINE | Facility: CLINIC | Age: 74
End: 2019-12-02
Payer: MEDICARE

## 2019-12-02 VITALS
RESPIRATION RATE: 18 BRPM | TEMPERATURE: 98 F | DIASTOLIC BLOOD PRESSURE: 67 MMHG | BODY MASS INDEX: 32.58 KG/M2 | WEIGHT: 227.06 LBS | HEART RATE: 80 BPM | SYSTOLIC BLOOD PRESSURE: 112 MMHG

## 2019-12-02 DIAGNOSIS — Z11.9 SCREENING EXAMINATION FOR INFECTIOUS DISEASE: ICD-10-CM

## 2019-12-02 DIAGNOSIS — G89.4 CHRONIC PAIN SYNDROME: ICD-10-CM

## 2019-12-02 DIAGNOSIS — M96.1 FAILED BACK SURGICAL SYNDROME: Primary | ICD-10-CM

## 2019-12-02 PROCEDURE — 1125F PR PAIN SEVERITY QUANTIFIED, PAIN PRESENT: ICD-10-PCS | Mod: HCNC,S$GLB,, | Performed by: ANESTHESIOLOGY

## 2019-12-02 PROCEDURE — 3078F DIAST BP <80 MM HG: CPT | Mod: HCNC,CPTII,S$GLB, | Performed by: ANESTHESIOLOGY

## 2019-12-02 PROCEDURE — 1100F PR PT FALLS ASSESS DOC 2+ FALLS/FALL W/INJURY/YR: ICD-10-PCS | Mod: HCNC,CPTII,S$GLB, | Performed by: ANESTHESIOLOGY

## 2019-12-02 PROCEDURE — 3288F PR FALLS RISK ASSESSMENT DOCUMENTED: ICD-10-PCS | Mod: HCNC,CPTII,S$GLB, | Performed by: ANESTHESIOLOGY

## 2019-12-02 PROCEDURE — 3074F PR MOST RECENT SYSTOLIC BLOOD PRESSURE < 130 MM HG: ICD-10-PCS | Mod: HCNC,CPTII,S$GLB, | Performed by: ANESTHESIOLOGY

## 2019-12-02 PROCEDURE — 87081 CULTURE SCREEN ONLY: CPT | Mod: HCNC

## 2019-12-02 PROCEDURE — 1159F PR MEDICATION LIST DOCUMENTED IN MEDICAL RECORD: ICD-10-PCS | Mod: HCNC,S$GLB,, | Performed by: ANESTHESIOLOGY

## 2019-12-02 PROCEDURE — 1159F MED LIST DOCD IN RCRD: CPT | Mod: HCNC,S$GLB,, | Performed by: ANESTHESIOLOGY

## 2019-12-02 PROCEDURE — 3074F SYST BP LT 130 MM HG: CPT | Mod: HCNC,CPTII,S$GLB, | Performed by: ANESTHESIOLOGY

## 2019-12-02 PROCEDURE — 3288F FALL RISK ASSESSMENT DOCD: CPT | Mod: HCNC,CPTII,S$GLB, | Performed by: ANESTHESIOLOGY

## 2019-12-02 PROCEDURE — 99214 OFFICE O/P EST MOD 30 MIN: CPT | Mod: HCNC,S$GLB,, | Performed by: ANESTHESIOLOGY

## 2019-12-02 PROCEDURE — 3078F PR MOST RECENT DIASTOLIC BLOOD PRESSURE < 80 MM HG: ICD-10-PCS | Mod: HCNC,CPTII,S$GLB, | Performed by: ANESTHESIOLOGY

## 2019-12-02 PROCEDURE — 99999 PR PBB SHADOW E&M-EST. PATIENT-LVL III: ICD-10-PCS | Mod: PBBFAC,,, | Performed by: ANESTHESIOLOGY

## 2019-12-02 PROCEDURE — 99214 PR OFFICE/OUTPT VISIT, EST, LEVL IV, 30-39 MIN: ICD-10-PCS | Mod: HCNC,S$GLB,, | Performed by: ANESTHESIOLOGY

## 2019-12-02 PROCEDURE — 99499 UNLISTED E&M SERVICE: CPT | Mod: HCNC,S$GLB,, | Performed by: ANESTHESIOLOGY

## 2019-12-02 PROCEDURE — 99499 RISK ADDL DX/OHS AUDIT: ICD-10-PCS | Mod: HCNC,S$GLB,, | Performed by: ANESTHESIOLOGY

## 2019-12-02 PROCEDURE — 1125F AMNT PAIN NOTED PAIN PRSNT: CPT | Mod: HCNC,S$GLB,, | Performed by: ANESTHESIOLOGY

## 2019-12-02 PROCEDURE — 1100F PTFALLS ASSESS-DOCD GE2>/YR: CPT | Mod: HCNC,CPTII,S$GLB, | Performed by: ANESTHESIOLOGY

## 2019-12-02 PROCEDURE — 99999 PR PBB SHADOW E&M-EST. PATIENT-LVL III: CPT | Mod: PBBFAC,,, | Performed by: ANESTHESIOLOGY

## 2019-12-02 RX ORDER — SODIUM CHLORIDE, SODIUM LACTATE, POTASSIUM CHLORIDE, CALCIUM CHLORIDE 600; 310; 30; 20 MG/100ML; MG/100ML; MG/100ML; MG/100ML
INJECTION, SOLUTION INTRAVENOUS CONTINUOUS
Status: CANCELLED | OUTPATIENT
Start: 2019-12-12

## 2019-12-02 NOTE — TELEPHONE ENCOUNTER
This patient is scheduled to have a spinal cord stimulator trial on 12/12 and he will need to hold his aspirin for 7 days prior to the surgery and for another 7 days during the trial. Please advise

## 2019-12-02 NOTE — H&P (VIEW-ONLY)
[facility] Follow Up Evaluation    Referred by: Dr. Montalvo  Reason for referral: back pain     CC:   Chief Complaint   Patient presents with    Results     MRI      Last 3 PDI Scores 12/2/2019 11/1/2019 8/26/2015   Pain Disability Index (PDI) 8 14 6     Interval HPI 12/2/19: Mr. Pete returns to the office for MRI review.    HPI:   Sam Pete is a 74 y.o. male who complains of back pain    Onset: 30 years  Inciting Event: fell off a ladder in july  Progression: since onset, pain is gradually worsening  Current Pain Score: 6/10  Timing: constant  Quality: aching  Radiation: no  Associated numbness or weakness: no numbness, no weakness  Exacerbated by: back flexion, standing, walking  Allievated by: nothing  Is Pain Level Acceptable?: No    Previous Therapies:  PT/OT: yes  HEP: yes  Interventions:   10/28/15 - b/l L3-5 RFA with Dr. Short  9/9/15 - caudal CARLOS with Dr. Short  8/5/15 - right L4/5 and L5/S1 TFESI with Dr. Short  Surgery:  Medications:   - NSAIDS:   - MSK Relaxants: tizanidine   - TCAs:   - SNRIs:   - Topicals:   - Anticonvulsants: gabapentin  - Opioids: morphine ER, oxycodone    History:    Current Outpatient Medications:     albuterol (PROVENTIL/VENTOLIN HFA) 90 mcg/actuation inhaler, Inhale 2 puffs into the lungs every 6 (six) hours as needed for Wheezing. Rescue, Disp: 18 g, Rfl: 0    amLODIPine (NORVASC) 10 MG tablet, TAKE 1 TABLET EVERY DAY, Disp: 90 tablet, Rfl: 3    ammonium lactate 12 % Crea, Apply 1 application topically 2 (two) times daily. (Patient taking differently: Apply 1 application topically 2 (two) times daily as needed. ), Disp: 140 g, Rfl: 11    aspirin 81 mg Tab, Take 1 tablet by mouth Daily. Every day, Disp: , Rfl:     atorvastatin (LIPITOR) 40 MG tablet, TAKE 1 TABLET EVERY DAY, Disp: 90 tablet, Rfl: 3    buPROPion (WELLBUTRIN XL) 150 MG TB24 tablet, TAKE 1 TABLET EVERY DAY, Disp: 90 tablet, Rfl: 1    gabapentin (NEURONTIN) 300 MG capsule, Take one  twice a day prn back pain flare, Disp: 180 capsule, Rfl: 1    losartan (COZAAR) 50 MG tablet, TAKE 1 TABLET EVERY DAY, Disp: 90 tablet, Rfl: 3    morphine (MS CONTIN) 60 MG 12 hr tablet, Take 1 tablet (60 mg total) by mouth 2 (two) times daily., Disp: 60 tablet, Rfl: 0    [START ON 12/20/2019] morphine (MS CONTIN) 60 MG 12 hr tablet, Take 1 tablet (60 mg total) by mouth 2 (two) times daily., Disp: 60 tablet, Rfl: 0    [START ON 1/20/2020] morphine (MS CONTIN) 60 MG 12 hr tablet, Take 1 tablet (60 mg total) by mouth 2 (two) times daily., Disp: 60 tablet, Rfl: 0    naloxone (NARCAN) 1 mg/mL injection, Use as needed for respiratory depression (Patient not taking: Reported on 11/20/2019), Disp: 2 mL, Rfl: 1    omeprazole (PRILOSEC) 40 MG capsule, TAKE 1 CAPSULE EVERY DAY, Disp: 90 capsule, Rfl: 3    oxyCODONE-acetaminophen (PERCOCET)  mg per tablet, Take 1 tablet by mouth every 4 (four) hours as needed for Pain., Disp: 120 tablet, Rfl: 0    [START ON 12/20/2019] oxyCODONE-acetaminophen (PERCOCET)  mg per tablet, Take 1 tablet by mouth every 4 (four) hours as needed for Pain., Disp: 120 tablet, Rfl: 0    [START ON 1/20/2020] oxyCODONE-acetaminophen (PERCOCET)  mg per tablet, Take 1 tablet by mouth every 4 (four) hours as needed for Pain., Disp: 120 tablet, Rfl: 0    sildenafil (REVATIO) 20 mg Tab, Take 1-5 daily as needed for ED, Disp: 10 tablet, Rfl: 5    tiZANidine (ZANAFLEX) 4 MG tablet, TAKE 1 TABLET BY MOUTH EVERY 8 HOURS AS NEEDED, Disp: 60 tablet, Rfl: 2    Past Medical History:   Diagnosis Date    Anticoagulant long-term use     ASA 81 mg    Arthritis     Cataract     OU    Chronic low back pain     Complete rupture of rotator cuff 2/8/2011    DDD (degenerative disc disease), lumbar 7/8/2015    Degeneration of lumbar or lumbosacral intervertebral disc 9/9/2015    ED (erectile dysfunction)     GERD (gastroesophageal reflux disease)     Hypertension     Lumbar pseudoarthrosis  6/26/2017    Lumbar stenosis 6/26/2017    Obesity     Spondylosis of lumbar region without myelopathy or radiculopathy 7/8/2015    Stroke 1997    basal ganglia, left sided weakness, resolved    Testicular hypofunction     Thoracic or lumbosacral neuritis or radiculitis 7/8/2015       Past Surgical History:   Procedure Laterality Date    APPENDECTOMY      BACK SURGERY      4- back surgery    COLONOSCOPY  07/12/2004    [facility].   Redundant tortuous colon, otherwise normal.    COLONOSCOPY N/A 2/25/2019    Procedure: COLONOSCOPY;  Surgeon: Gilbert Rodriguez Jr., MD;  Location: [facility];  Service: Endoscopy;  Laterality: N/A;    Epidural steroid injection      Pain management    ESOPHAGOGASTRODUODENOSCOPY  07/12/2004    [facility].    FRACTURE SURGERY Left     wrist / forearm, total of 5    JOINT REPLACEMENT  2-6-2013    ( rt hip 2007), left hip     JOINT REPLACEMENT Left     total knee    KNEE ARTHROSCOPY W/ DEBRIDEMENT      bilateral knees , total of six    KNEE SURGERY      Nervbe Block injection      Pain management       Family History   Problem Relation Age of Onset    Hypertension Father     Heart disease Father     Drug abuse Daughter     Hypertension Son     Lung disease Sister     COPD Sister     Hypertension Sister     Diverticulitis Sister     Gout Sister     Kidney disease Sister     No Known Problems Mother        Social History     Socioeconomic History    Marital status:      Spouse name: Not on file    Number of children: Not on file    Years of education: Not on file    Highest education level: Not on file   Occupational History    Not on file   Social Needs    Financial resource strain: Not on file    Food insecurity:     Worry: Not on file     Inability: Not on file    Transportation needs:     Medical: Not on file     Non-medical: Not on file   Tobacco Use    Smoking status: Former Smoker     Packs/day: 1.00     Years: 30.00     Pack years: 30.00     Start date:  8/3/1963     Last attempt to quit: 1992     Years since quittin.9    Smokeless tobacco: Never Used   Substance and Sexual Activity    Alcohol use: Yes     Comment: On occasion    Drug use: Yes     Types: Oxycodone, Morphine    Sexual activity: Not on file   Lifestyle    Physical activity:     Days per week: Not on file     Minutes per session: Not on file    Stress: Not on file   Relationships    Social connections:     Talks on phone: Not on file     Gets together: Not on file     Attends Worship service: Not on file     Active member of club or organization: Not on file     Attends meetings of clubs or organizations: Not on file     Relationship status: Not on file   Other Topics Concern    Not on file   Social History Narrative    Not on file       Review of patient's allergies indicates:   Allergen Reactions    Xyzal [levocetirizine] Other (See Comments)     Knocked him out        Review of Systems:  General ROS: negative for - fever  Psychological ROS: negative for - hostility  Hematological and Lymphatic ROS: negative for - bleeding problems  Endocrine ROS: negative for - unexpected weight changes  Respiratory ROS: no cough, shortness of breath, or wheezing  Cardiovascular ROS: no chest pain or dyspnea on exertion  Gastrointestinal ROS: no abdominal pain, change in bowel habits, or black or bloody stools  Musculoskeletal ROS: negative for - muscular weakness  Neurological ROS: positive for - numbness/tingling  negative for - bowel and bladder control changes  Dermatological ROS: negative for rash    Physical Exam:  Vitals:    19 1340   BP: 112/67   Pulse: 80   Resp: 18   Temp: 97.6 °F (36.4 °C)   TempSrc: Oral   Weight: 103 kg (227 lb 1.2 oz)   PainSc:   6   PainLoc: Back     Body mass index is 32.58 kg/m².     Gen: NAD  Gait: gait intact  Psych:  Mood appropriate for given condition  HEENT: eyes anicteric   GI: Abd soft  CV: RRR  Lungs: breathing unlabored   ROM: limited AROM of the  L spine in all planes, full ROM at ankles, knees and hips  Lumbar flexion 90 degrees, extension 50 degrees, side bending 30 degrees.    Sensation: intact to light touch in all dermatomes tested from L2-S1 bilaterally, except decreased left L5  Reflexes: 0/0 b/l patella and Achilles  Palpation: Diffusely tender over lumbar paraspinals  Tone: normal in the b/l knees and hips   Skin: intact  Extremities: No edema in b/l ankles or hands  Provacative tests: + b/l axial facet loading       Right Left   L2/3 Iliacus Hip flexion  5  5   L3/4 Qudratus Femoris Knee Extension  5  5   L4/5 Tib Anterior Ankle Dorsiflexion   5  5   L5/S1 Extensor Hallicus Longus Great toe extension  5  5- pain limited                  S1/S2 Gastroc/Soleus Plantar Flexion  5  5     Imaging:  Xray lumbar spine 8/13/19  FINDINGS:  Postsurgical changes of posterior instrumented fusion with bipedicular screws and vertical stabilization rods from L2-S1 again demonstrated.  No hardware complication demonstrated.  S1 screw again extends just beyond the anterior cortical margin of the vertebral body.    The bones are osteopenic.  Sagittal alignment appears stable.  Vertebral bodies demonstrate normal height.  No obvious translation on flexion and extension.    Moderate to severe disc space narrowing at L4-5 and L5-S1 again demonstrated, unchanged.There is facet joint arthropathy throughout the lumbar spine, most pronounced at the lower lumbar levels.    Partially visualized bilateral hip replacements noted.  Atherosclerotic calcification of the abdominal aorta and common iliac arteries noted.    Xray thoracic spine 8/13/19  FINDINGS:  The bones are osteopenic.  There are postsurgical changes of posterior lumbar fusion which are incompletely included on the study.  The vertebral bodies maintain normal height and alignment.  There is no fracture.  There is only mild disc space narrowing in the midthoracic region where there is also bridging osteophyte  formation.  The paraspinous soft tissues are grossly normal.    MRI lumbar spine 5/30/18  FINDINGS:  There is evidence of prior pedicular screw placement from L2-S1 with posterior cross links as demonstrated on recent plain films.  Alignment is good.  There is mild anterior osteophyte formation throughout the lumbar region and there is loss of disc space height at L4/L5.  The screws have been placed relative the previous exam performed May 8, 2017.  Posterior to the canal there is a complex colles fluid collection at the level of L3 measuring maximally 2.8 cm transversely by 1.8 cm AP by 3 cm cranial caudal.  This does not appear to distort the thecal sac and does not have an appearance suggesting a pseudo meninges seal.  This likely represents a seroma.  The conus terminates at L1 and has an unremarkable appearance.  The individual disc levels appears follows:    T12/L1: Degenerative facet changes are noted bilaterally and there is minimal distortion of the central canal.  L1/L2: Degenerative facet changes are noted and there is no evidence of significant canal or foraminal stenosis.  L2/L3: Disc bulging is evident which is more pronounced to the left of midline causing minimal distortion left foramen.  The central canal is intact as is the right foramen.  L3/L4: There is mild disc bulging to the left of midline without evidence of significant canal or foraminal stenosis.  L4/L5: There is annular disc bulging and there is degenerative facet disease.  This canal and foramina are intact.  There is slight narrowing the left foramen due to disc bulging.  L5/S1: There is mild narrowing the left foramen due to disc and osteophyte formation.  I do not see evidence of recurrent or residual disc extrusion and degenerative facet changes are noted bilaterally.  The central canal is intact.    MRI lumbar spine 11/26/19  FINDINGS:  There has been posterior loc and pedicle screw fusion from L2 through S1.  Multilevel  laminectomies have also been performed.  There is a 3 cm collection within the posterior soft tissues at the L3 level which is without significant change.  There is minimal anterolisthesis of L4 on L5.  Mild loss of disc height is present at all lumbar levels.  Conus terminates at L1.    T12-L1: No disc herniation, spinal canal narrowing, or neuroforaminal narrowing.  L1-2: No disc herniation, spinal canal narrowing, or neuroforaminal narrowing.  L2-3: There is mild generalized disc bulging without significant spinal canal narrowing.  Mild bilateral neuroforaminal narrowing is present.  No significant enhancing scar tissue formation identified.  There is no spinal canal narrowing.  L3-4: There is no disc herniation.  No spinal canal narrowing.  There is mild right neuroforaminal narrowing.  No significant enhancing scar tissue formation identified.  L4-5: There is no disc herniation or spinal canal narrowing.  Mild right neuroforaminal narrowing is present.  No significant enhancing scar tissue formation is identified.  L5-S1: No disc herniation, spinal canal narrowing, or neuroforaminal narrowing.    Labs:  BMP  Lab Results   Component Value Date     08/21/2019    K 4.6 08/21/2019     08/21/2019    CO2 30 (H) 08/21/2019    BUN 18 08/21/2019    CREATININE 1.0 11/26/2019    CALCIUM 9.1 08/21/2019    ANIONGAP 7 (L) 08/21/2019    ESTGFRAFRICA >60 11/26/2019    EGFRNONAA >60 11/26/2019     Lab Results   Component Value Date    ALT 16 08/21/2019    AST 19 08/21/2019    ALKPHOS 93 08/21/2019    BILITOT 0.2 08/21/2019       Assessment:  Problem List Items Addressed This Visit        Neuro    Chronic pain low back and neck with spinal stenosis, djd left arm.  pain contract renewed 9/10/14    Relevant Orders    Ambulatory Referral to Psychology       Orthopedic    Failed back surgical syndrome - Primary          Treatment Plan:  74 y.o. year old male with PMH paroxsymal a-fib, HTN presents to the office with back  pain.  He has a history of 4 prior lumbar surgeries the most recent being L2-S1 posterolateral fusion on 6/26/17 with Dr. Fowler.  Today his back pain is 7/10, constant, aching, burning, worse with standing, walking, and back flexion and not relieved by anything.  He also has burning sensation in the bottom of his feet.  He has tried PT in the past and continues HEP at home.  He takes morphine ER 60mg BID, oxycodone 10/35mg q4h prn, and gabapentin 300mg BID.  He has undergone caudal and TFESI with Dr. Short in 2015 without relief of his previous radicular pain at the time.  He is having persistent lumbar back pain in the setting of previous lumbar fusion.  His pain is limiting his mobility and interfering with his ADL's.  He is an excellent candidate for SCS trial with nevro HF 10.  I've given him reading material on the device today.  He would like to take some time and discuss it with his wife.  Ideally it will provide him with pain relief, improvement with ADL's and mobility, and also provide an opportunity for him to decrease his opioid usage.  We will get lumbar MRI to look for any changes in previous seroma and he will follow up once its done to discuss his decision on the SCS trial.     12/2/19 - Mr. Pete returns to the office for MRI review.  He continues to have axial lower back pain as well as burning in the bottom of his feet.  He continues morphine ER, oxycodone, and gabapentin.  On exam he is unchanged except that his 5- left EHL is pain limited and likely not true weakness.  MRI lumbar spine reviewed and he still has an unchanged 2cm seroma at the L3 level.  He is not having any fevers or tenderness over his spine.  We will proceed with SCS trial for his chronic pain and history of lumbar surgery as his pain is limiting his mobility and interfering with his ADL's.  I've placed a psychology referral today for assessment of his pain.  MRSA nasal swab screening.  We will get clearance for him to hold  his ASA for 7 days prior to and for the duration of the trial.  Follow up 5-7 days post trial.     Procedures: SCS trial. Procedure explained using an anatomical model.  Risks, benefits, alternatives explained to patient who verbalized understanding, including increased risk of infection, bleeding, need for additional procedures or surgery, and nerve damage.  Questions regarding the procedure, risks, expected outcome, and possible side effects were solicited and answered to the patient's satisfaction.  Ayo wishes to proceed with the injection.  Verbal and written consent were obtained in clinic today.  PT/OT/HEP: offered formal PT, he would like to continue HEP  Medications: continue current medications as prescribed  Labs: Reviewed and medications are appropriately dosed for current hepatorenal function.  Imaging: no additional at this time    : Reviewed and consistent with medication use as prescribed.    Evan Lyles M.D.  Interventional Pain Medicine / Anesthesiology

## 2019-12-02 NOTE — PROGRESS NOTES
[facility] Follow Up Evaluation    Referred by: Dr. Montalvo  Reason for referral: back pain     CC:   Chief Complaint   Patient presents with    Results     MRI      Last 3 PDI Scores 12/2/2019 11/1/2019 8/26/2015   Pain Disability Index (PDI) 8 14 6     Interval HPI 12/2/19: Mr. Pete returns to the office for MRI review.    HPI:   Sam Pete is a 74 y.o. male who complains of back pain    Onset: 30 years  Inciting Event: fell off a ladder in july  Progression: since onset, pain is gradually worsening  Current Pain Score: 6/10  Timing: constant  Quality: aching  Radiation: no  Associated numbness or weakness: no numbness, no weakness  Exacerbated by: back flexion, standing, walking  Allievated by: nothing  Is Pain Level Acceptable?: No    Previous Therapies:  PT/OT: yes  HEP: yes  Interventions:   10/28/15 - b/l L3-5 RFA with Dr. Short  9/9/15 - caudal CARLOS with Dr. Short  8/5/15 - right L4/5 and L5/S1 TFESI with Dr. Short  Surgery:  Medications:   - NSAIDS:   - MSK Relaxants: tizanidine   - TCAs:   - SNRIs:   - Topicals:   - Anticonvulsants: gabapentin  - Opioids: morphine ER, oxycodone    History:    Current Outpatient Medications:     albuterol (PROVENTIL/VENTOLIN HFA) 90 mcg/actuation inhaler, Inhale 2 puffs into the lungs every 6 (six) hours as needed for Wheezing. Rescue, Disp: 18 g, Rfl: 0    amLODIPine (NORVASC) 10 MG tablet, TAKE 1 TABLET EVERY DAY, Disp: 90 tablet, Rfl: 3    ammonium lactate 12 % Crea, Apply 1 application topically 2 (two) times daily. (Patient taking differently: Apply 1 application topically 2 (two) times daily as needed. ), Disp: 140 g, Rfl: 11    aspirin 81 mg Tab, Take 1 tablet by mouth Daily. Every day, Disp: , Rfl:     atorvastatin (LIPITOR) 40 MG tablet, TAKE 1 TABLET EVERY DAY, Disp: 90 tablet, Rfl: 3    buPROPion (WELLBUTRIN XL) 150 MG TB24 tablet, TAKE 1 TABLET EVERY DAY, Disp: 90 tablet, Rfl: 1    gabapentin (NEURONTIN) 300 MG capsule, Take one  twice a day prn back pain flare, Disp: 180 capsule, Rfl: 1    losartan (COZAAR) 50 MG tablet, TAKE 1 TABLET EVERY DAY, Disp: 90 tablet, Rfl: 3    morphine (MS CONTIN) 60 MG 12 hr tablet, Take 1 tablet (60 mg total) by mouth 2 (two) times daily., Disp: 60 tablet, Rfl: 0    [START ON 12/20/2019] morphine (MS CONTIN) 60 MG 12 hr tablet, Take 1 tablet (60 mg total) by mouth 2 (two) times daily., Disp: 60 tablet, Rfl: 0    [START ON 1/20/2020] morphine (MS CONTIN) 60 MG 12 hr tablet, Take 1 tablet (60 mg total) by mouth 2 (two) times daily., Disp: 60 tablet, Rfl: 0    naloxone (NARCAN) 1 mg/mL injection, Use as needed for respiratory depression (Patient not taking: Reported on 11/20/2019), Disp: 2 mL, Rfl: 1    omeprazole (PRILOSEC) 40 MG capsule, TAKE 1 CAPSULE EVERY DAY, Disp: 90 capsule, Rfl: 3    oxyCODONE-acetaminophen (PERCOCET)  mg per tablet, Take 1 tablet by mouth every 4 (four) hours as needed for Pain., Disp: 120 tablet, Rfl: 0    [START ON 12/20/2019] oxyCODONE-acetaminophen (PERCOCET)  mg per tablet, Take 1 tablet by mouth every 4 (four) hours as needed for Pain., Disp: 120 tablet, Rfl: 0    [START ON 1/20/2020] oxyCODONE-acetaminophen (PERCOCET)  mg per tablet, Take 1 tablet by mouth every 4 (four) hours as needed for Pain., Disp: 120 tablet, Rfl: 0    sildenafil (REVATIO) 20 mg Tab, Take 1-5 daily as needed for ED, Disp: 10 tablet, Rfl: 5    tiZANidine (ZANAFLEX) 4 MG tablet, TAKE 1 TABLET BY MOUTH EVERY 8 HOURS AS NEEDED, Disp: 60 tablet, Rfl: 2    Past Medical History:   Diagnosis Date    Anticoagulant long-term use     ASA 81 mg    Arthritis     Cataract     OU    Chronic low back pain     Complete rupture of rotator cuff 2/8/2011    DDD (degenerative disc disease), lumbar 7/8/2015    Degeneration of lumbar or lumbosacral intervertebral disc 9/9/2015    ED (erectile dysfunction)     GERD (gastroesophageal reflux disease)     Hypertension     Lumbar pseudoarthrosis  6/26/2017    Lumbar stenosis 6/26/2017    Obesity     Spondylosis of lumbar region without myelopathy or radiculopathy 7/8/2015    Stroke 1997    basal ganglia, left sided weakness, resolved    Testicular hypofunction     Thoracic or lumbosacral neuritis or radiculitis 7/8/2015       Past Surgical History:   Procedure Laterality Date    APPENDECTOMY      BACK SURGERY      4- back surgery    COLONOSCOPY  07/12/2004    [facility].   Redundant tortuous colon, otherwise normal.    COLONOSCOPY N/A 2/25/2019    Procedure: COLONOSCOPY;  Surgeon: Gilbert Rodriguez Jr., MD;  Location: [facility];  Service: Endoscopy;  Laterality: N/A;    Epidural steroid injection      Pain management    ESOPHAGOGASTRODUODENOSCOPY  07/12/2004    [facility].    FRACTURE SURGERY Left     wrist / forearm, total of 5    JOINT REPLACEMENT  2-6-2013    ( rt hip 2007), left hip     JOINT REPLACEMENT Left     total knee    KNEE ARTHROSCOPY W/ DEBRIDEMENT      bilateral knees , total of six    KNEE SURGERY      Nervbe Block injection      Pain management       Family History   Problem Relation Age of Onset    Hypertension Father     Heart disease Father     Drug abuse Daughter     Hypertension Son     Lung disease Sister     COPD Sister     Hypertension Sister     Diverticulitis Sister     Gout Sister     Kidney disease Sister     No Known Problems Mother        Social History     Socioeconomic History    Marital status:      Spouse name: Not on file    Number of children: Not on file    Years of education: Not on file    Highest education level: Not on file   Occupational History    Not on file   Social Needs    Financial resource strain: Not on file    Food insecurity:     Worry: Not on file     Inability: Not on file    Transportation needs:     Medical: Not on file     Non-medical: Not on file   Tobacco Use    Smoking status: Former Smoker     Packs/day: 1.00     Years: 30.00     Pack years: 30.00     Start date:  8/3/1963     Last attempt to quit: 1992     Years since quittin.9    Smokeless tobacco: Never Used   Substance and Sexual Activity    Alcohol use: Yes     Comment: On occasion    Drug use: Yes     Types: Oxycodone, Morphine    Sexual activity: Not on file   Lifestyle    Physical activity:     Days per week: Not on file     Minutes per session: Not on file    Stress: Not on file   Relationships    Social connections:     Talks on phone: Not on file     Gets together: Not on file     Attends [employer] service: Not on file     Active member of club or organization: Not on file     Attends meetings of clubs or organizations: Not on file     Relationship status: Not on file   Other Topics Concern    Not on file   Social History Narrative    Not on file       Review of patient's allergies indicates:   Allergen Reactions    Xyzal [levocetirizine] Other (See Comments)     Knocked him out        Review of Systems:  General ROS: negative for - fever  Psychological ROS: negative for - hostility  Hematological and Lymphatic ROS: negative for - bleeding problems  Endocrine ROS: negative for - unexpected weight changes  Respiratory ROS: no cough, shortness of breath, or wheezing  Cardiovascular ROS: no chest pain or dyspnea on exertion  Gastrointestinal ROS: no abdominal pain, change in bowel habits, or black or bloody stools  Musculoskeletal ROS: negative for - muscular weakness  Neurological ROS: positive for - numbness/tingling  negative for - bowel and bladder control changes  Dermatological ROS: negative for rash    Physical Exam:  Vitals:    19 1340   BP: 112/67   Pulse: 80   Resp: 18   Temp: 97.6 °F (36.4 °C)   TempSrc: Oral   Weight: 103 kg (227 lb 1.2 oz)   PainSc:   6   PainLoc: Back     Body mass index is 32.58 kg/m².     Gen: NAD  Gait: gait intact  Psych:  Mood appropriate for given condition  HEENT: eyes anicteric   GI: Abd soft  CV: RRR  Lungs: breathing unlabored   ROM: limited AROM of the  L spine in all planes, full ROM at ankles, knees and hips  Lumbar flexion 90 degrees, extension 50 degrees, side bending 30 degrees.    Sensation: intact to light touch in all dermatomes tested from L2-S1 bilaterally, except decreased left L5  Reflexes: 0/0 b/l patella and Achilles  Palpation: Diffusely tender over lumbar paraspinals  Tone: normal in the b/l knees and hips   Skin: intact  Extremities: No edema in b/l ankles or hands  Provacative tests: + b/l axial facet loading       Right Left   L2/3 Iliacus Hip flexion  5  5   L3/4 Qudratus Femoris Knee Extension  5  5   L4/5 Tib Anterior Ankle Dorsiflexion   5  5   L5/S1 Extensor Hallicus Longus Great toe extension  5  5- pain limited                  S1/S2 Gastroc/Soleus Plantar Flexion  5  5     Imaging:  Xray lumbar spine 8/13/19  FINDINGS:  Postsurgical changes of posterior instrumented fusion with bipedicular screws and vertical stabilization rods from L2-S1 again demonstrated.  No hardware complication demonstrated.  S1 screw again extends just beyond the anterior cortical margin of the vertebral body.    The bones are osteopenic.  Sagittal alignment appears stable.  Vertebral bodies demonstrate normal height.  No obvious translation on flexion and extension.    Moderate to severe disc space narrowing at L4-5 and L5-S1 again demonstrated, unchanged.There is facet joint arthropathy throughout the lumbar spine, most pronounced at the lower lumbar levels.    Partially visualized bilateral hip replacements noted.  Atherosclerotic calcification of the abdominal aorta and common iliac arteries noted.    Xray thoracic spine 8/13/19  FINDINGS:  The bones are osteopenic.  There are postsurgical changes of posterior lumbar fusion which are incompletely included on the study.  The vertebral bodies maintain normal height and alignment.  There is no fracture.  There is only mild disc space narrowing in the midthoracic region where there is also bridging osteophyte  formation.  The paraspinous soft tissues are grossly normal.    MRI lumbar spine 5/30/18  FINDINGS:  There is evidence of prior pedicular screw placement from L2-S1 with posterior cross links as demonstrated on recent plain films.  Alignment is good.  There is mild anterior osteophyte formation throughout the lumbar region and there is loss of disc space height at L4/L5.  The screws have been placed relative the previous exam performed May 8, 2017.  Posterior to the canal there is a complex colles fluid collection at the level of L3 measuring maximally 2.8 cm transversely by 1.8 cm AP by 3 cm cranial caudal.  This does not appear to distort the thecal sac and does not have an appearance suggesting a pseudo meninges seal.  This likely represents a seroma.  The conus terminates at L1 and has an unremarkable appearance.  The individual disc levels appears follows:    T12/L1: Degenerative facet changes are noted bilaterally and there is minimal distortion of the central canal.  L1/L2: Degenerative facet changes are noted and there is no evidence of significant canal or foraminal stenosis.  L2/L3: Disc bulging is evident which is more pronounced to the left of midline causing minimal distortion left foramen.  The central canal is intact as is the right foramen.  L3/L4: There is mild disc bulging to the left of midline without evidence of significant canal or foraminal stenosis.  L4/L5: There is annular disc bulging and there is degenerative facet disease.  This canal and foramina are intact.  There is slight narrowing the left foramen due to disc bulging.  L5/S1: There is mild narrowing the left foramen due to disc and osteophyte formation.  I do not see evidence of recurrent or residual disc extrusion and degenerative facet changes are noted bilaterally.  The central canal is intact.    MRI lumbar spine 11/26/19  FINDINGS:  There has been posterior loc and pedicle screw fusion from L2 through S1.  Multilevel  laminectomies have also been performed.  There is a 3 cm collection within the posterior soft tissues at the L3 level which is without significant change.  There is minimal anterolisthesis of L4 on L5.  Mild loss of disc height is present at all lumbar levels.  Conus terminates at L1.    T12-L1: No disc herniation, spinal canal narrowing, or neuroforaminal narrowing.  L1-2: No disc herniation, spinal canal narrowing, or neuroforaminal narrowing.  L2-3: There is mild generalized disc bulging without significant spinal canal narrowing.  Mild bilateral neuroforaminal narrowing is present.  No significant enhancing scar tissue formation identified.  There is no spinal canal narrowing.  L3-4: There is no disc herniation.  No spinal canal narrowing.  There is mild right neuroforaminal narrowing.  No significant enhancing scar tissue formation identified.  L4-5: There is no disc herniation or spinal canal narrowing.  Mild right neuroforaminal narrowing is present.  No significant enhancing scar tissue formation is identified.  L5-S1: No disc herniation, spinal canal narrowing, or neuroforaminal narrowing.    Labs:  BMP  Lab Results   Component Value Date     08/21/2019    K 4.6 08/21/2019     08/21/2019    CO2 30 (H) 08/21/2019    BUN 18 08/21/2019    CREATININE 1.0 11/26/2019    CALCIUM 9.1 08/21/2019    ANIONGAP 7 (L) 08/21/2019    ESTGFRAFRICA >60 11/26/2019    EGFRNONAA >60 11/26/2019     Lab Results   Component Value Date    ALT 16 08/21/2019    AST 19 08/21/2019    ALKPHOS 93 08/21/2019    BILITOT 0.2 08/21/2019       Assessment:  Problem List Items Addressed This Visit        Neuro    Chronic pain low back and neck with spinal stenosis, djd left arm.  pain contract renewed 9/10/14    Relevant Orders    Ambulatory Referral to Psychology       Orthopedic    Failed back surgical syndrome - Primary          Treatment Plan:  74 y.o. year old male with PMH paroxsymal a-fib, HTN presents to the office with back  pain.  He has a history of 4 prior lumbar surgeries the most recent being L2-S1 posterolateral fusion on 6/26/17 with Dr. Fowler.  Today his back pain is 7/10, constant, aching, burning, worse with standing, walking, and back flexion and not relieved by anything.  He also has burning sensation in the bottom of his feet.  He has tried PT in the past and continues HEP at home.  He takes morphine ER 60mg BID, oxycodone 10/35mg q4h prn, and gabapentin 300mg BID.  He has undergone caudal and TFESI with Dr. Short in 2015 without relief of his previous radicular pain at the time.  He is having persistent lumbar back pain in the setting of previous lumbar fusion.  His pain is limiting his mobility and interfering with his ADL's.  He is an excellent candidate for SCS trial with nevro HF 10.  I've given him reading material on the device today.  He would like to take some time and discuss it with his wife.  Ideally it will provide him with pain relief, improvement with ADL's and mobility, and also provide an opportunity for him to decrease his opioid usage.  We will get lumbar MRI to look for any changes in previous seroma and he will follow up once its done to discuss his decision on the SCS trial.     12/2/19 - Mr. Pete returns to the office for MRI review.  He continues to have axial lower back pain as well as burning in the bottom of his feet.  He continues morphine ER, oxycodone, and gabapentin.  On exam he is unchanged except that his 5- left EHL is pain limited and likely not true weakness.  MRI lumbar spine reviewed and he still has an unchanged 2cm seroma at the L3 level.  He is not having any fevers or tenderness over his spine.  We will proceed with SCS trial for his chronic pain and history of lumbar surgery as his pain is limiting his mobility and interfering with his ADL's.  I've placed a psychology referral today for assessment of his pain.  MRSA nasal swab screening.  We will get clearance for him to hold  his ASA for 7 days prior to and for the duration of the trial.  Follow up 5-7 days post trial.     Procedures: SCS trial. Procedure explained using an anatomical model.  Risks, benefits, alternatives explained to patient who verbalized understanding, including increased risk of infection, bleeding, need for additional procedures or surgery, and nerve damage.  Questions regarding the procedure, risks, expected outcome, and possible side effects were solicited and answered to the patient's satisfaction.  Ayo wishes to proceed with the injection.  Verbal and written consent were obtained in clinic today.  PT/OT/HEP: offered formal PT, he would like to continue HEP  Medications: continue current medications as prescribed  Labs: Reviewed and medications are appropriately dosed for current hepatorenal function.  Imaging: no additional at this time    : Reviewed and consistent with medication use as prescribed.    Evan Lyles M.D.  Interventional Pain Medicine / Anesthesiology

## 2019-12-04 LAB — MRSA SPEC QL CULT: NORMAL

## 2019-12-11 ENCOUNTER — TELEPHONE (OUTPATIENT)
Dept: PAIN MEDICINE | Facility: CLINIC | Age: 74
End: 2019-12-11

## 2019-12-11 DIAGNOSIS — M51.36 DDD (DEGENERATIVE DISC DISEASE), LUMBAR: Primary | ICD-10-CM

## 2019-12-11 NOTE — TELEPHONE ENCOUNTER
----- Message from Shyann Block sent at 12/11/2019 12:54 PM CST -----  Contact: patient  Type: Needs Medical Advice    Who Called:  Patient  Best Call Back Number:   Additional Information: Has questions regarding procedure tomorrow

## 2019-12-12 ENCOUNTER — HOSPITAL ENCOUNTER (OUTPATIENT)
Dept: RADIOLOGY | Facility: HOSPITAL | Age: 74
Discharge: HOME | End: 2019-12-12
Attending: ANESTHESIOLOGY | Admitting: ANESTHESIOLOGY
Payer: MEDICARE

## 2019-12-12 ENCOUNTER — HOSPITAL ENCOUNTER (OUTPATIENT)
Facility: HOSPITAL | Age: 74
Discharge: HOME | End: 2019-12-12
Attending: ANESTHESIOLOGY | Admitting: ANESTHESIOLOGY
Payer: MEDICARE

## 2019-12-12 ENCOUNTER — ANESTHESIA (OUTPATIENT)
Dept: SURGERY | Facility: HOSPITAL | Age: 74
End: 2019-12-12
Payer: MEDICARE

## 2019-12-12 ENCOUNTER — ANESTHESIA EVENT (OUTPATIENT)
Dept: SURGERY | Facility: HOSPITAL | Age: 74
End: 2019-12-12
Payer: MEDICARE

## 2019-12-12 DIAGNOSIS — M51.36 DDD (DEGENERATIVE DISC DISEASE), LUMBAR: ICD-10-CM

## 2019-12-12 DIAGNOSIS — G89.4 CHRONIC PAIN SYNDROME: Primary | ICD-10-CM

## 2019-12-12 DIAGNOSIS — M96.1 FAILED BACK SURGICAL SYNDROME: ICD-10-CM

## 2019-12-12 LAB

## 2019-12-12 PROCEDURE — 76000 FLUOROSCOPY <1 HR PHYS/QHP: CPT | Mod: TC,HCNC,PO

## 2019-12-12 PROCEDURE — 95971 ALYS SMPL SP/PN NPGT W/PRGRM: CPT | Mod: 59,HCNC,, | Performed by: ANESTHESIOLOGY

## 2019-12-12 PROCEDURE — 37000008 HC ANESTHESIA 1ST 15 MINUTES: Mod: HCNC,PO | Performed by: ANESTHESIOLOGY

## 2019-12-12 PROCEDURE — D9220A PRA ANESTHESIA: Mod: HCNC,CRNA,, | Performed by: NURSE ANESTHETIST, CERTIFIED REGISTERED

## 2019-12-12 PROCEDURE — 63600175 PHARM REV CODE 636 W HCPCS: Mod: HCNC,PO | Performed by: ANESTHESIOLOGY

## 2019-12-12 PROCEDURE — D9220A PRA ANESTHESIA: ICD-10-PCS | Mod: HCNC,ANES,, | Performed by: ANESTHESIOLOGY

## 2019-12-12 PROCEDURE — 36000706: Mod: HCNC,PO | Performed by: ANESTHESIOLOGY

## 2019-12-12 PROCEDURE — 63600175 PHARM REV CODE 636 W HCPCS: Mod: HCNC,PO | Performed by: NURSE ANESTHETIST, CERTIFIED REGISTERED

## 2019-12-12 PROCEDURE — 25000003 PHARM REV CODE 250: Mod: HCNC,PO | Performed by: NURSE ANESTHETIST, CERTIFIED REGISTERED

## 2019-12-12 PROCEDURE — 63650 PR PERCUT IMPLNT NEUROELECT,EPIDURAL: ICD-10-PCS | Mod: HCNC,,, | Performed by: ANESTHESIOLOGY

## 2019-12-12 PROCEDURE — 27201423 OPTIME MED/SURG SUP & DEVICES STERILE SUPPLY: Mod: HCNC,PO | Performed by: ANESTHESIOLOGY

## 2019-12-12 PROCEDURE — 63650 IMPLANT NEUROELECTRODES: CPT | Mod: HCNC,,, | Performed by: ANESTHESIOLOGY

## 2019-12-12 PROCEDURE — D9220A PRA ANESTHESIA: Mod: HCNC,ANES,, | Performed by: ANESTHESIOLOGY

## 2019-12-12 PROCEDURE — 71000033 HC RECOVERY, INTIAL HOUR: Mod: HCNC,PO | Performed by: ANESTHESIOLOGY

## 2019-12-12 PROCEDURE — D9220A PRA ANESTHESIA: ICD-10-PCS | Mod: HCNC,CRNA,, | Performed by: NURSE ANESTHETIST, CERTIFIED REGISTERED

## 2019-12-12 PROCEDURE — 95971 PR ANALYZE NEUROSTIM,SIMPLE/PROG: ICD-10-PCS | Mod: 59,HCNC,, | Performed by: ANESTHESIOLOGY

## 2019-12-12 PROCEDURE — 37000009 HC ANESTHESIA EA ADD 15 MINS: Mod: HCNC,PO | Performed by: ANESTHESIOLOGY

## 2019-12-12 PROCEDURE — 36000707: Mod: HCNC,PO | Performed by: ANESTHESIOLOGY

## 2019-12-12 PROCEDURE — 25000003 PHARM REV CODE 250: Mod: HCNC,PO | Performed by: ANESTHESIOLOGY

## 2019-12-12 PROCEDURE — C1778 LEAD, NEUROSTIMULATOR: HCPCS | Mod: HCNC,PO | Performed by: ANESTHESIOLOGY

## 2019-12-12 DEVICE — KIT TRIAL LEAD 50CM 5MM: Type: IMPLANTABLE DEVICE | Site: BACK | Status: FUNCTIONAL

## 2019-12-12 RX ORDER — FENTANYL CITRATE 50 UG/ML
INJECTION, SOLUTION INTRAMUSCULAR; INTRAVENOUS
Status: DISCONTINUED | OUTPATIENT
Start: 2019-12-12 | End: 2019-12-12

## 2019-12-12 RX ORDER — MEPERIDINE HYDROCHLORIDE 50 MG/ML
12.5 INJECTION INTRAMUSCULAR; INTRAVENOUS; SUBCUTANEOUS ONCE AS NEEDED
Status: DISCONTINUED | OUTPATIENT
Start: 2019-12-12 | End: 2019-12-12 | Stop reason: HOSPADM

## 2019-12-12 RX ORDER — PROPOFOL 10 MG/ML
VIAL (ML) INTRAVENOUS
Status: DISCONTINUED | OUTPATIENT
Start: 2019-12-12 | End: 2019-12-12

## 2019-12-12 RX ORDER — LIDOCAINE HYDROCHLORIDE 10 MG/ML
INJECTION, SOLUTION EPIDURAL; INFILTRATION; INTRACAUDAL; PERINEURAL
Status: DISCONTINUED | OUTPATIENT
Start: 2019-12-12 | End: 2019-12-12 | Stop reason: HOSPADM

## 2019-12-12 RX ORDER — SODIUM CHLORIDE, SODIUM LACTATE, POTASSIUM CHLORIDE, CALCIUM CHLORIDE 600; 310; 30; 20 MG/100ML; MG/100ML; MG/100ML; MG/100ML
INJECTION, SOLUTION INTRAVENOUS CONTINUOUS
Status: DISCONTINUED | OUTPATIENT
Start: 2019-12-12 | End: 2019-12-12 | Stop reason: HOSPADM

## 2019-12-12 RX ORDER — ACETAMINOPHEN 10 MG/ML
INJECTION, SOLUTION INTRAVENOUS
Status: DISCONTINUED | OUTPATIENT
Start: 2019-12-12 | End: 2019-12-12

## 2019-12-12 RX ORDER — FENTANYL CITRATE 50 UG/ML
25 INJECTION, SOLUTION INTRAMUSCULAR; INTRAVENOUS EVERY 5 MIN PRN
Status: DISCONTINUED | OUTPATIENT
Start: 2019-12-12 | End: 2019-12-12 | Stop reason: HOSPADM

## 2019-12-12 RX ORDER — OXYCODONE HYDROCHLORIDE 5 MG/1
5 TABLET ORAL
Status: DISCONTINUED | OUTPATIENT
Start: 2019-12-12 | End: 2019-12-12 | Stop reason: HOSPADM

## 2019-12-12 RX ORDER — MIDAZOLAM HYDROCHLORIDE 1 MG/ML
INJECTION, SOLUTION INTRAMUSCULAR; INTRAVENOUS
Status: DISCONTINUED | OUTPATIENT
Start: 2019-12-12 | End: 2019-12-12

## 2019-12-12 RX ORDER — DIPHENHYDRAMINE HYDROCHLORIDE 50 MG/ML
25 INJECTION INTRAMUSCULAR; INTRAVENOUS EVERY 6 HOURS PRN
Status: DISCONTINUED | OUTPATIENT
Start: 2019-12-12 | End: 2019-12-12 | Stop reason: HOSPADM

## 2019-12-12 RX ORDER — LIDOCAINE HYDROCHLORIDE 10 MG/ML
1 INJECTION INFILTRATION; PERINEURAL ONCE
Status: COMPLETED | OUTPATIENT
Start: 2019-12-12 | End: 2019-12-12

## 2019-12-12 RX ORDER — PROPOFOL 10 MG/ML
VIAL (ML) INTRAVENOUS CONTINUOUS PRN
Status: DISCONTINUED | OUTPATIENT
Start: 2019-12-12 | End: 2019-12-12

## 2019-12-12 RX ORDER — LIDOCAINE HCL/PF 100 MG/5ML
SYRINGE (ML) INTRAVENOUS
Status: DISCONTINUED | OUTPATIENT
Start: 2019-12-12 | End: 2019-12-12

## 2019-12-12 RX ORDER — KETAMINE HYDROCHLORIDE 100 MG/ML
INJECTION, SOLUTION INTRAMUSCULAR; INTRAVENOUS
Status: DISCONTINUED | OUTPATIENT
Start: 2019-12-12 | End: 2019-12-12

## 2019-12-12 RX ORDER — HYDROMORPHONE HYDROCHLORIDE 2 MG/ML
0.2 INJECTION, SOLUTION INTRAMUSCULAR; INTRAVENOUS; SUBCUTANEOUS EVERY 5 MIN PRN
Status: DISCONTINUED | OUTPATIENT
Start: 2019-12-12 | End: 2019-12-12 | Stop reason: HOSPADM

## 2019-12-12 RX ORDER — CEFAZOLIN SODIUM 2 G/50ML
2 SOLUTION INTRAVENOUS
Status: COMPLETED | OUTPATIENT
Start: 2019-12-12 | End: 2019-12-12

## 2019-12-12 RX ORDER — SODIUM CHLORIDE 0.9 % (FLUSH) 0.9 %
3 SYRINGE (ML) INJECTION
Status: DISCONTINUED | OUTPATIENT
Start: 2019-12-12 | End: 2019-12-12 | Stop reason: HOSPADM

## 2019-12-12 RX ADMIN — KETAMINE HYDROCHLORIDE 10 MG: 100 INJECTION, SOLUTION, CONCENTRATE INTRAMUSCULAR; INTRAVENOUS at 11:12

## 2019-12-12 RX ADMIN — FENTANYL CITRATE 25 MCG: 50 INJECTION, SOLUTION INTRAMUSCULAR; INTRAVENOUS at 11:12

## 2019-12-12 RX ADMIN — ACETAMINOPHEN 1000 MG: 10 INJECTION, SOLUTION INTRAVENOUS at 11:12

## 2019-12-12 RX ADMIN — MIDAZOLAM HYDROCHLORIDE 2 MG: 1 INJECTION, SOLUTION INTRAMUSCULAR; INTRAVENOUS at 10:12

## 2019-12-12 RX ADMIN — SODIUM CHLORIDE, SODIUM LACTATE, POTASSIUM CHLORIDE, AND CALCIUM CHLORIDE: .6; .31; .03; .02 INJECTION, SOLUTION INTRAVENOUS at 10:12

## 2019-12-12 RX ADMIN — FENTANYL CITRATE 25 MCG: 50 INJECTION, SOLUTION INTRAMUSCULAR; INTRAVENOUS at 12:12

## 2019-12-12 RX ADMIN — PROPOFOL 30 MG: 10 INJECTION, EMULSION INTRAVENOUS at 10:12

## 2019-12-12 RX ADMIN — PROPOFOL 25 MCG/KG/MIN: 10 INJECTION, EMULSION INTRAVENOUS at 11:12

## 2019-12-12 RX ADMIN — CEFAZOLIN SODIUM 2 G: 2 SOLUTION INTRAVENOUS at 11:12

## 2019-12-12 RX ADMIN — LIDOCAINE HYDROCHLORIDE: 10 INJECTION, SOLUTION EPIDURAL; INFILTRATION; INTRACAUDAL; PERINEURAL at 10:12

## 2019-12-12 RX ADMIN — LIDOCAINE HYDROCHLORIDE 100 MG: 20 INJECTION PARENTERAL at 10:12

## 2019-12-12 NOTE — PLAN OF CARE
Patient tolerating oral liquids without difficulty. No apparent s&s of distress noted at this time, no complaints voiced at this time. Discharge instructions reviewed with patient and wife with good verbal feedback received. Patient ready for discharge

## 2019-12-12 NOTE — ANESTHESIA POSTPROCEDURE EVALUATION
Anesthesia Post Evaluation    Patient: Sam Pete    Procedure(s) Performed: Procedure(s) (LRB):  INSERTION, NEUROSTIMULATOR, SPINAL CORD, DORSAL COLUMN, FOR TRIAL (N/A)    Final Anesthesia Type: general    Patient location during evaluation: PACU  Patient participation: Yes- Able to Participate  Level of consciousness: sedated and awake  Post-procedure vital signs: reviewed and stable  Pain management: adequate  Airway patency: patent    PONV status at discharge: No PONV  Anesthetic complications: no      Cardiovascular status: blood pressure returned to baseline  Respiratory status: spontaneous ventilation  Hydration status: euvolemic  Follow-up not needed.          Vitals Value Taken Time   /63 12/12/2019 10:05 AM   Temp 36.4 °C (97.5 °F) 12/12/2019 10:05 AM   Pulse 70 12/12/2019 10:05 AM   Resp 18 12/12/2019 10:05 AM   SpO2 95 % 12/12/2019 10:05 AM         No case tracking events are documented in the log.      Pain/Forrest Score: No data recorded

## 2019-12-12 NOTE — OP NOTE
PROCEDURE DATE:  12/12/2019     Procedure  1. Percutaneous insertion of spinal cord stimulator leads x 1. Total of 8 contacts.   2. Fluoroscopic needle guidance.     Pre-op Diagnosis: : 1. Chronic pain syndrome  2. Failed back syndrome    Post-op Diagnosis: Same    Surgeon: Evan Lyles M.D.    Anesthesia: MAC per Anesthesia Provider    Blood Loss: none    Complications: none    PROCEDURE NOTE: After obtaining written informed consent patient was taken to the procedure room. Pre-procedure blood pressure and pulse were stable and recorded in patients clinic chart. Pre-op IV antibiotics were started by the Anesthesia provider.    The patient was taken to the operating room and placed in prone position. Routine monitors were applied and IV anesthesia was titrated to effect by the Anesthesia provider. The patient's back and buttocks were prepped and draped in sterile fashion using betadine and then DuraPrep solution and sterile drapes were applied. C-arm fluoroscopy was used to identify the L1-2 interspace. Through a 1% local lidocaine skin wheal, a small stab incision was made with a #11 blade scapel. Through this, a 14-gauge Tuohy needle was advanced until contact was made with the right-sided L1 lamina. It was then walked off in a cephalad medial direction using loss of resistance to technique entering into the epidural space. Once within the epidural space, an epidural spinal cord stimulator lead was advanced until the tip of the lead rested at the top of the T8 vertebral body. Following this, another percutaneous lead was placed with another Tuohy needle on the left side, however despite multiple attempts I was initially not able to access the epidural space at T12-L1 and eventually access it at L1-2.  At L1-2 I was not able to maintain the lead posteriorly in the epidural space.  Decision was made to do the trial with one lead at the top of T8. Once the leads were noted to be within proper position, the needle was  removed. The leads were secured to the patient's back using 0-0 silk. Skin was cleaned, bacitracin applied and a sterile dressing was applied.    Following the procedure the patient's vital signs were stable. The patient was taken to the recovery room in good condition with no known complication. The patient was allowed to fully awaken from anesthesia and final programming of the device was done by the device representative under my guidance. The patient was discharged home in good condition after being given discharge instructions.

## 2019-12-12 NOTE — ANESTHESIA PREPROCEDURE EVALUATION
12/12/2019  Sam Pete is a 74 y.o., male.    Anesthesia Evaluation    I have reviewed the Patient Summary Reports.    I have reviewed the Nursing Notes.   I have reviewed the Medications.     Review of Systems  Anesthesia Hx:  No problems with previous Anesthesia    Social:  Former Smoker    Cardiovascular:   Hypertension, well controlled Dysrhythmias atrial fibrillation    Pulmonary:  Pulmonary Normal    Renal/:  Renal/ Normal     Hepatic/GI:   GERD, well controlled    Musculoskeletal:   Arthritis     Neurological:   CVA, no residual symptoms Neuromuscular Disease, Chronic Pain   Endocrine:  Endocrine Normal        Physical Exam  General:  Well nourished, Obesity    Airway/Jaw/Neck:  Airway Findings: Mouth Opening: Normal Tongue: Normal  General Airway Assessment: Adult  Oropharynx Findings:  Mallampati: II  Jaw/Neck Findings:  Neck ROM: Normal ROM     Eyes/Ears/Nose:  Eyes/Ears/Nose Findings:    Dental:  Dental Findings:   Chest/Lungs:  Chest/Lungs Findings: Normal Respiratory Rate     Heart/Vascular:  Heart Findings: Rate: Normal  Rhythm: Regular Rhythm        Mental Status:  Mental Status Findings:  Cooperative, Alert and Oriented         Anesthesia Plan  Type of Anesthesia, risks & benefits discussed:  Anesthesia Type:  general, MAC  Patient's Preference:   Intra-op Monitoring Plan: standard ASA monitors  Intra-op Monitoring Plan Comments:   Post Op Pain Control Plan: multimodal analgesia  Post Op Pain Control Plan Comments:   Induction:   IV  Beta Blocker:  Patient is not currently on a Beta-Blocker (No further documentation required).       Informed Consent: Patient understands risks and agrees with Anesthesia plan.  Questions answered. Anesthesia consent signed with patient.  ASA Score: 3     Day of Surgery Review of History & Physical:  There are no significant changes.   H&P completed by  Anesthesiologist.       Ready For Surgery From Anesthesia Perspective.

## 2019-12-12 NOTE — DISCHARGE SUMMARY
[facility]  Discharge Note  Short Stay    Admit Date: 12/12/2019    Discharge Date: 12/12/2019    Attending Physician: Evan Lyles     Discharge Provider: Evan Lyles    Diagnoses:  Active Hospital Problems    Diagnosis  POA    Chronic pain syndrome [G89.4]  Yes      Resolved Hospital Problems   No resolved problems to display.       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 The patient tolerated the procedure well with no complications.    Follow up/Patient Instructions:  Follow up as scheduled in Pain Management office in 3-4 weeks.  Patient has received instructions and follow up date and time.    Medications:  Continue previous medications    Discharge Procedure Orders   Notify your health care provider if you experience any of the following:  temperature >100.4     Notify your health care provider if you experience any of the following:  persistent nausea and vomiting or diarrhea     Notify your health care provider if you experience any of the following:  severe uncontrolled pain     Notify your health care provider if you experience any of the following:  redness, tenderness, or signs of infection (pain, swelling, redness, odor or green/yellow discharge around incision site)     Notify your health care provider if you experience any of the following:  difficulty breathing or increased cough     Notify your health care provider if you experience any of the following:  severe persistent headache     Notify your health care provider if you experience any of the following:  worsening rash     Notify your health care provider if you experience any of the following:  persistent dizziness,  light-headedness, or visual disturbances     Notify your health care provider if you experience any of the following:  increased confusion or weakness     Activity as tolerated         Discharge Procedure Orders (must include Diet, Follow-up, Activity):   Discharge Procedure Orders (must include Diet, Follow-up, Activity)   Notify your health care provider if you experience any of the following:  temperature >100.4     Notify your health care provider if you experience any of the following:  persistent nausea and vomiting or diarrhea     Notify your health care provider if you experience any of the following:  severe uncontrolled pain     Notify your health care provider if you experience any of the following:  redness, tenderness, or signs of infection (pain, swelling, redness, odor or green/yellow discharge around incision site)     Notify your health care provider if you experience any of the following:  difficulty breathing or increased cough     Notify your health care provider if you experience any of the following:  severe persistent headache     Notify your health care provider if you experience any of the following:  worsening rash     Notify your health care provider if you experience any of the following:  persistent dizziness, light-headedness, or visual disturbances     Notify your health care provider if you experience any of the following:  increased confusion or weakness     Activity as tolerated

## 2019-12-12 NOTE — TRANSFER OF CARE
"Anesthesia Transfer of Care Note    Patient: Sam Pete    Procedure(s) Performed: Procedure(s) (LRB):  INSERTION, NEUROSTIMULATOR, SPINAL CORD, DORSAL COLUMN, FOR TRIAL (N/A)    Patient location: PACU    Anesthesia Type: MAC    Transport from OR: Transported from OR on room air with adequate spontaneous ventilation    Post pain: adequate analgesia    Post assessment: no apparent anesthetic complications    Post vital signs: stable    Level of consciousness: responds to stimulation    Nausea/Vomiting: no nausea/vomiting    Complications: none    Transfer of care protocol was followed      Last vitals:   Visit Vitals  /63 (BP Location: Right arm, Patient Position: Lying)   Pulse 70   Temp 36.4 °C (97.5 °F) (Skin)   Resp 18   Ht 5' 10" (1.778 m)   Wt 103 kg (227 lb 1.2 oz)   SpO2 95%   BMI 32.58 kg/m²     "

## 2019-12-12 NOTE — DISCHARGE INSTRUCTIONS
Trial Stimulator    Sponge baths only until followup appointment.   Keep dressing clean and dry.   Avoid any unnecessary bending at the waist  No overhead lifting above the shoulders.  Stimulator must be turned off while driving.  Do not drive until tomorrow  Resume home medications as prescribed today  Resume normal diet      SEE IMMEDIATE MEDICAL HELP FOR:  Severe increase in your usual pain or appearance of new pain  Prolonged or increasing weakness or numbness in the legs or arms  Drainage, redness, active bleeding, or increased swelling at the injection site  Temperature over 100.0 degrees F.    Please call [facility] for questions or concerns about the stimulator    CALL 911 OR GO DIRECTLY TO EMERGENCY DEPARTMENT FOR:  Shortness of breath, chest pain, or problems breathing              Discharge Instructions: After Your Surgery  Youve just had surgery. During surgery, you were given medicine called anesthesia to keep you relaxed and free of pain. After surgery, you may have some pain or nausea. This is common. Here are some tips for feeling better and getting well after surgery.     Stay on schedule with your medicine.   Going home  Your healthcare provider will show you how to take care of yourself when you go home. He or she will also answer your questions. Have an adult family member or friend drive you home. For the first 24 hours after your surgery:  · Do not drive or use heavy equipment.  · Do not make important decisions or sign legal papers.  · Do not drink alcohol.  · Have someone stay with you, if needed. He or she can watch for problems and help keep you safe.  Be sure to go to all follow-up visits with your healthcare provider. And rest after your surgery for as long as your healthcare provider tells you to.  Coping with pain  If you have pain after surgery, pain medicine will help you feel better. Take it as told, before pain becomes severe. Also, ask your healthcare provider or pharmacist about  other ways to control pain. This might be with heat, ice, or relaxation. And follow any other instructions your surgeon or nurse gives you.  Tips for taking pain medicine  To get the best relief possible, remember these points:  · Pain medicines can upset your stomach. Taking them with a little food may help.  · Most pain relievers taken by mouth need at least 20 to 30 minutes to start to work.  · Taking medicine on a schedule can help you remember to take it. Try to time your medicine so that you can take it before starting an activity. This might be before you get dressed, go for a walk, or sit down for dinner.  · Constipation is a common side effect of pain medicines. Call your healthcare provider before taking any medicines such as laxatives or stool softeners to help ease constipation. Also ask if you should skip any foods. Drinking lots of fluids and eating foods such as fruits and vegetables that are high in fiber can also help. Remember, do not take laxatives unless your surgeon has prescribed them.  · Drinking alcohol and taking pain medicine can cause dizziness and slow your breathing. It can even be deadly. Do not drink alcohol while taking pain medicine.  · Pain medicine can make you react more slowly to things. Do not drive or run machinery while taking pain medicine.  Your healthcare provider may tell you to take acetaminophen to help ease your pain. Ask him or her how much you are supposed to take each day. Acetaminophen or other pain relievers may interact with your prescription medicines or other over-the-counter (OTC) medicines. Some prescription medicines have acetaminophen and other ingredients. Using both prescription and OTC acetaminophen for pain can cause you to overdose. Read the labels on your OTC medicines with care. This will help you to clearly know the list of ingredients, how much to take, and any warnings. It may also help you not take too much acetaminophen. If you have questions or  do not understand the information, ask your pharmacist or healthcare provider to explain it to you before you take the OTC medicine.  Managing nausea  Some people have an upset stomach after surgery. This is often because of anesthesia, pain, or pain medicine, or the stress of surgery. These tips will help you handle nausea and eat healthy foods as you get better. If you were on a special food plan before surgery, ask your healthcare provider if you should follow it while you get better. These tips may help:  · Do not push yourself to eat. Your body will tell you when to eat and how much.  · Start off with clear liquids and soup. They are easier to digest.  · Next try semi-solid foods, such as mashed potatoes, applesauce, and gelatin, as you feel ready.  · Slowly move to solid foods. Dont eat fatty, rich, or spicy foods at first.  · Do not force yourself to have 3 large meals a day. Instead eat smaller amounts more often.  · Take pain medicines with a small amount of solid food, such as crackers or toast, to avoid nausea.     Call your surgeon if  · You still have pain an hour after taking medicine. The medicine may not be strong enough.  · You feel too sleepy, dizzy, or groggy. The medicine may be too strong.  · You have side effects like nausea, vomiting, or skin changes, such as rash, itching, or hives.       If you have obstructive sleep apnea  You were given anesthesia medicine during surgery to keep you comfortable and free of pain. After surgery, you may have more apnea spells because of this medicine and other medicines you were given. The spells may last longer than usual.   At home:  · Keep using the continuous positive airway pressure (CPAP) device when you sleep. Unless your healthcare provider tells you not to, use it when you sleep, day or night. CPAP is a common device used to treat obstructive sleep apnea.  · Talk with your provider before taking any pain medicine, muscle relaxants, or sedatives.  Your provider will tell you about the possible dangers of taking these medicines.  Date Last Reviewed: 12/1/2016  © 6044-6141 [employer]. [address] [place] [address], [place] [address]. All rights reserved. This information is not intended as a substitute for professional medical care. Always follow your healthcare professional's instructions.

## 2019-12-13 VITALS
BODY MASS INDEX: 32.51 KG/M2 | TEMPERATURE: 97 F | HEIGHT: 70 IN | RESPIRATION RATE: 13 BRPM | WEIGHT: 227.06 LBS | HEART RATE: 75 BPM | OXYGEN SATURATION: 96 % | SYSTOLIC BLOOD PRESSURE: 135 MMHG | DIASTOLIC BLOOD PRESSURE: 71 MMHG

## 2019-12-16 ENCOUNTER — DOCUMENTATION ONLY (OUTPATIENT)
Dept: FAMILY MEDICINE | Facility: CLINIC | Age: 74
End: 2019-12-16

## 2019-12-16 ENCOUNTER — TELEPHONE (OUTPATIENT)
Dept: PAIN MEDICINE | Facility: CLINIC | Age: 74
End: 2019-12-16

## 2019-12-16 NOTE — TELEPHONE ENCOUNTER
----- Message from Carolina Martinez sent at 12/13/2019 12:39 PM CST -----  Pt is calling to see about getting a back brace that some one was to call him about       Pt # [phone]

## 2019-12-16 NOTE — PROGRESS NOTES
Pre-Visit Chart Review  For Appointment Scheduled on 1-6-20    There are no preventive care reminders to display for this patient.

## 2019-12-18 ENCOUNTER — OFFICE VISIT (OUTPATIENT)
Dept: FAMILY MEDICINE | Facility: CLINIC | Age: 74
End: 2019-12-18
Payer: MEDICARE

## 2019-12-18 VITALS
HEIGHT: 70 IN | BODY MASS INDEX: 33.42 KG/M2 | SYSTOLIC BLOOD PRESSURE: 132 MMHG | DIASTOLIC BLOOD PRESSURE: 70 MMHG | WEIGHT: 233.44 LBS

## 2019-12-18 DIAGNOSIS — Z00.00 ENCOUNTER FOR PREVENTIVE HEALTH EXAMINATION: Primary | ICD-10-CM

## 2019-12-18 DIAGNOSIS — I70.0 ATHEROSCLEROSIS OF AORTA: ICD-10-CM

## 2019-12-18 DIAGNOSIS — F11.20 UNCOMPLICATED OPIOID DEPENDENCE: ICD-10-CM

## 2019-12-18 DIAGNOSIS — R26.9 ABNORMALITY OF GAIT AND MOBILITY: ICD-10-CM

## 2019-12-18 DIAGNOSIS — I48.0 PAF (PAROXYSMAL ATRIAL FIBRILLATION): ICD-10-CM

## 2019-12-18 DIAGNOSIS — I10 ESSENTIAL (PRIMARY) HYPERTENSION: ICD-10-CM

## 2019-12-18 PROCEDURE — 99999 PR PBB SHADOW E&M-EST. PATIENT-LVL IV: ICD-10-PCS | Mod: PBBFAC,HCNC,, | Performed by: NURSE PRACTITIONER

## 2019-12-18 PROCEDURE — 3075F SYST BP GE 130 - 139MM HG: CPT | Mod: HCNC,CPTII,S$GLB, | Performed by: NURSE PRACTITIONER

## 2019-12-18 PROCEDURE — 3078F PR MOST RECENT DIASTOLIC BLOOD PRESSURE < 80 MM HG: ICD-10-PCS | Mod: HCNC,CPTII,S$GLB, | Performed by: NURSE PRACTITIONER

## 2019-12-18 PROCEDURE — 3078F DIAST BP <80 MM HG: CPT | Mod: HCNC,CPTII,S$GLB, | Performed by: NURSE PRACTITIONER

## 2019-12-18 PROCEDURE — G0439 PPPS, SUBSEQ VISIT: HCPCS | Mod: HCNC,S$GLB,, | Performed by: NURSE PRACTITIONER

## 2019-12-18 PROCEDURE — 99999 PR PBB SHADOW E&M-EST. PATIENT-LVL IV: CPT | Mod: PBBFAC,HCNC,, | Performed by: NURSE PRACTITIONER

## 2019-12-18 PROCEDURE — 3075F PR MOST RECENT SYSTOLIC BLOOD PRESS GE 130-139MM HG: ICD-10-PCS | Mod: HCNC,CPTII,S$GLB, | Performed by: NURSE PRACTITIONER

## 2019-12-18 PROCEDURE — G0439 PR MEDICARE ANNUAL WELLNESS SUBSEQUENT VISIT: ICD-10-PCS | Mod: HCNC,S$GLB,, | Performed by: NURSE PRACTITIONER

## 2019-12-18 NOTE — PROGRESS NOTES
"Sam Pete presented for a  Medicare AWV and comprehensive Health Risk Assessment today. The following components were reviewed and updated:    · Medical history  · Family History  · Social history  · Allergies and Current Medications  · Health Risk Assessment  · Health Maintenance  · Care Team     ** See Completed Assessments for Annual Wellness Visit within the encounter summary.**       The following assessments were completed:  · Living Situation  · CAGE  · Depression Screening  · Timed Get Up and Go  · Whisper Test  · Cognitive Function Screening          · Nutrition Screening  · ADL Screening  · PAQ Screening    Vitals:    12/18/19 1356   BP: 132/70   BP Location: Left arm   Patient Position: Sitting   BP Method: Large (Manual)   Weight: 105.9 kg (233 lb 7.5 oz)   Height: 5' 10" (1.778 m)     Body mass index is 33.5 kg/m².  Physical Exam   Constitutional: He is oriented to person, place, and time. He appears well-nourished. No distress.   Ambulates with cane   Cardiovascular: Normal rate and regular rhythm.   Pulmonary/Chest: Breath sounds normal. He has no wheezes.   Neurological: He is alert and oriented to person, place, and time.   Psychiatric: He has a normal mood and affect. His behavior is normal. Judgment and thought content normal.   Vitals reviewed.        Diagnoses and health risks identified today and associated recommendations/orders:    1. Encounter for preventive health examination  Reviewed health maintenance and provided recommendations    Educated on shingrix vaccine    2. Abnormality of gait and mobility  Continue to monitor  Followed by Ty Montalvo MD .      3. Uncomplicated opioid dependence  Continue to monitor  Followed by Ty Montalvo MD   Continue to take all meds as prescribed.      4. Essential (primary) hypertension  Continue to monitor  Followed by Ty Montalvo MD .      5. Atherosclerosis of aorta  Continue to monitor  Followed by Ty Montalvo MD   Taking " statin.      6. PAF (paroxysmal atrial fibrillation)  Continue to monitor  Followed by Ty Montalvo MD .        Provided Sam with a 5-10 year written screening schedule and personal prevention plan. Recommendations were developed using the USPSTF age appropriate recommendations. Education, counseling, and referrals were provided as needed. After Visit Summary printed and given to patient which includes a list of additional screenings\tests needed.    Follow up in about 1 year (around 12/18/2020).    Esperanza Krishnamurthy NP  I offered to discuss end of life issues, including information on how to make advance directives that the patient could use to name someone who would make medical decisions on their behalf if they became too ill to make themselves.    ___Patient declined  _X_Patient is interested, I provided paper work and offered to discuss.

## 2019-12-18 NOTE — PATIENT INSTRUCTIONS
Counseling and Referral of Other Preventative  (Italic type indicates deductible and co-insurance are waived)    Patient Name: Sam Pete  Today's Date: 12/18/2019    Health Maintenance       Date Due Completion Date    Shingles Vaccine (1 of 2) 04/26/1997 ---    Urine Drug Screen 05/20/2020 11/20/2019    Override on 4/15/2019: Done    Naloxone Prescription 07/17/2020 7/17/2019    Lipid Panel 08/21/2020 8/21/2019    High Dose Statin 12/12/2020 12/12/2019    TETANUS VACCINE 10/14/2025 10/14/2015    Colonoscopy 02/25/2029 2/25/2019    Override on 7/12/2004: Done (report in legacy)        No orders of the defined types were placed in this encounter.    The following information is provided to all patients.  This information is to help you find resources for any of the problems found today that may be affecting your health:                Living healthy guide: www.[facility].[place]      Understanding Diabetes: www.diabetes.org      Eating healthy: www.cdc.gov/healthyweight      CDC home safety checklist: www.cdc.gov/steadi/patient.html      Agency on Aging: www.goea.[place]      Alcoholics anonymous (AA): www.aa.org      Physical Activity: www.mir.nih.gov/nl5jkwg      Tobacco use: www.quitwithusla.org

## 2019-12-19 ENCOUNTER — HOSPITAL ENCOUNTER (OUTPATIENT)
Dept: RADIOLOGY | Facility: HOSPITAL | Age: 74
Discharge: HOME | End: 2019-12-19
Attending: ANESTHESIOLOGY
Payer: MEDICARE

## 2019-12-19 ENCOUNTER — OFFICE VISIT (OUTPATIENT)
Dept: PAIN MEDICINE | Facility: CLINIC | Age: 74
End: 2019-12-19
Payer: MEDICARE

## 2019-12-19 VITALS
HEIGHT: 70 IN | WEIGHT: 235.88 LBS | RESPIRATION RATE: 13 BRPM | SYSTOLIC BLOOD PRESSURE: 132 MMHG | HEART RATE: 86 BPM | BODY MASS INDEX: 33.77 KG/M2 | DIASTOLIC BLOOD PRESSURE: 63 MMHG

## 2019-12-19 DIAGNOSIS — G89.4 CHRONIC PAIN SYNDROME: ICD-10-CM

## 2019-12-19 DIAGNOSIS — M96.1 FAILED BACK SURGICAL SYNDROME: Primary | ICD-10-CM

## 2019-12-19 DIAGNOSIS — M96.1 FAILED BACK SURGICAL SYNDROME: ICD-10-CM

## 2019-12-19 PROCEDURE — 72080 XR THORACOLUMBAR SPINE AP LATERAL: ICD-10-PCS | Mod: 26,HCNC,, | Performed by: RADIOLOGY

## 2019-12-19 PROCEDURE — 3288F PR FALLS RISK ASSESSMENT DOCUMENTED: ICD-10-PCS | Mod: HCNC,CPTII,S$GLB, | Performed by: ANESTHESIOLOGY

## 2019-12-19 PROCEDURE — 1100F PR PT FALLS ASSESS DOC 2+ FALLS/FALL W/INJURY/YR: ICD-10-PCS | Mod: HCNC,CPTII,S$GLB, | Performed by: ANESTHESIOLOGY

## 2019-12-19 PROCEDURE — 1125F PR PAIN SEVERITY QUANTIFIED, PAIN PRESENT: ICD-10-PCS | Mod: HCNC,S$GLB,, | Performed by: ANESTHESIOLOGY

## 2019-12-19 PROCEDURE — 3078F DIAST BP <80 MM HG: CPT | Mod: HCNC,CPTII,S$GLB, | Performed by: ANESTHESIOLOGY

## 2019-12-19 PROCEDURE — 72080 X-RAY EXAM THORACOLMB 2/> VW: CPT | Mod: 26,HCNC,, | Performed by: RADIOLOGY

## 2019-12-19 PROCEDURE — 99999 PR PBB SHADOW E&M-EST. PATIENT-LVL III: ICD-10-PCS | Mod: PBBFAC,HCNC,, | Performed by: ANESTHESIOLOGY

## 2019-12-19 PROCEDURE — 99024 PR POST-OP FOLLOW-UP VISIT: ICD-10-PCS | Mod: HCNC,S$GLB,, | Performed by: ANESTHESIOLOGY

## 2019-12-19 PROCEDURE — 3075F PR MOST RECENT SYSTOLIC BLOOD PRESS GE 130-139MM HG: ICD-10-PCS | Mod: HCNC,CPTII,S$GLB, | Performed by: ANESTHESIOLOGY

## 2019-12-19 PROCEDURE — 3075F SYST BP GE 130 - 139MM HG: CPT | Mod: HCNC,CPTII,S$GLB, | Performed by: ANESTHESIOLOGY

## 2019-12-19 PROCEDURE — 1125F AMNT PAIN NOTED PAIN PRSNT: CPT | Mod: HCNC,S$GLB,, | Performed by: ANESTHESIOLOGY

## 2019-12-19 PROCEDURE — 72080 X-RAY EXAM THORACOLMB 2/> VW: CPT | Mod: TC,HCNC,PO

## 2019-12-19 PROCEDURE — 1100F PTFALLS ASSESS-DOCD GE2>/YR: CPT | Mod: HCNC,CPTII,S$GLB, | Performed by: ANESTHESIOLOGY

## 2019-12-19 PROCEDURE — 1159F PR MEDICATION LIST DOCUMENTED IN MEDICAL RECORD: ICD-10-PCS | Mod: HCNC,S$GLB,, | Performed by: ANESTHESIOLOGY

## 2019-12-19 PROCEDURE — 99024 POSTOP FOLLOW-UP VISIT: CPT | Mod: HCNC,S$GLB,, | Performed by: ANESTHESIOLOGY

## 2019-12-19 PROCEDURE — 99999 PR PBB SHADOW E&M-EST. PATIENT-LVL III: CPT | Mod: PBBFAC,HCNC,, | Performed by: ANESTHESIOLOGY

## 2019-12-19 PROCEDURE — 3288F FALL RISK ASSESSMENT DOCD: CPT | Mod: HCNC,CPTII,S$GLB, | Performed by: ANESTHESIOLOGY

## 2019-12-19 PROCEDURE — 3078F PR MOST RECENT DIASTOLIC BLOOD PRESSURE < 80 MM HG: ICD-10-PCS | Mod: HCNC,CPTII,S$GLB, | Performed by: ANESTHESIOLOGY

## 2019-12-19 PROCEDURE — 1159F MED LIST DOCD IN RCRD: CPT | Mod: HCNC,S$GLB,, | Performed by: ANESTHESIOLOGY

## 2019-12-19 NOTE — PROGRESS NOTES
"Post-Op Follow Up for Spinal Cord Stimulator Trial    Subjective  Sam Pete returns today reporting 80% relief of his tingling leg pain and back pain during the spinal cord stimulator trial.  Patient states that he has improved mobility and functionality during the course of the trial.  He also reports that he decreased his opioid use, decreasing from oxycodone 6 tabs per day to 3 tabs per day. .  Overall satisfaction with the trial is rated 10/10.  Patient would like to move forward with implantation of the permanent device.    Please see previous clinic note for full H&P.    Objective  Vitals:    12/19/19 1304   BP: 132/63   Pulse: 86   Resp: 13   Weight: 107 kg (235 lb 14.3 oz)   Height: 5' 10" (1.778 m)   PainSc:   3       Lead Removal Procedure  Dressing removed to expose insertion sites in low back.  Inspection of the lead insertion site reveal mild erythema around the leads without drainage.  Silk sutures anchoring the leads are intact.    Silk suture was cut with scissors and removed intact with pain or discomfort.  Leads were with drawn using gentle, continuous traction.  Inspection of the leads demonstrated they were removed intact with fracturing or breaking.  The skin was cleaned with chlorhexidine and an adhesive bandage applied.    Assessment:  Problem List Items Addressed This Visit        Neuro    Chronic pain syndrome       Orthopedic    Failed back surgical syndrome - Primary    Relevant Orders    X-Ray Thoracolumbar Spine AP Lateral        Plan  Proceed with SCS implant.  I has difficulty with lead placement during the trial and I was only able to get 1 lead up to T8.  We will get thoracolumbar xray today to document the current position of the lead and i'm going to consult with our neurosurgeons to see if they are willing to place a paddle lead for his implant.  He is schedule to complete his psych assessment at the beginning of January.     Evan Lyles M.D.  Interventional Pain Medicine " / Anesthesiology

## 2020-01-06 ENCOUNTER — PATIENT MESSAGE (OUTPATIENT)
Dept: FAMILY MEDICINE | Facility: CLINIC | Age: 75
End: 2020-01-06

## 2020-01-08 ENCOUNTER — PATIENT MESSAGE (OUTPATIENT)
Dept: PAIN MEDICINE | Facility: CLINIC | Age: 75
End: 2020-01-08

## 2020-01-15 ENCOUNTER — PATIENT MESSAGE (OUTPATIENT)
Dept: PAIN MEDICINE | Facility: CLINIC | Age: 75
End: 2020-01-15

## 2020-01-28 ENCOUNTER — TELEPHONE (OUTPATIENT)
Dept: PAIN MEDICINE | Facility: CLINIC | Age: 75
End: 2020-01-28

## 2020-01-28 ENCOUNTER — PATIENT MESSAGE (OUTPATIENT)
Dept: PAIN MEDICINE | Facility: CLINIC | Age: 75
End: 2020-01-28

## 2020-01-28 DIAGNOSIS — M96.1 FAILED BACK SURGICAL SYNDROME: Primary | ICD-10-CM

## 2020-01-30 ENCOUNTER — PATIENT MESSAGE (OUTPATIENT)
Dept: PAIN MEDICINE | Facility: CLINIC | Age: 75
End: 2020-01-30

## 2020-01-30 DIAGNOSIS — G89.29 OTHER CHRONIC PAIN: ICD-10-CM

## 2020-01-30 DIAGNOSIS — I10 HYPERTENSION: ICD-10-CM

## 2020-01-30 DIAGNOSIS — K21.9 GERD (GASTROESOPHAGEAL REFLUX DISEASE): ICD-10-CM

## 2020-01-30 RX ORDER — GABAPENTIN 300 MG/1
CAPSULE ORAL
Qty: 180 CAPSULE | Refills: 1 | Status: SHIPPED | OUTPATIENT
Start: 2020-01-30 | End: 2020-06-15 | Stop reason: SDUPTHER

## 2020-01-30 RX ORDER — AMLODIPINE BESYLATE 10 MG/1
TABLET ORAL
Qty: 90 TABLET | Refills: 3 | Status: SHIPPED | OUTPATIENT
Start: 2020-01-30 | End: 2021-01-26 | Stop reason: SDUPTHER

## 2020-01-30 RX ORDER — LOSARTAN POTASSIUM 50 MG/1
TABLET ORAL
Qty: 90 TABLET | Refills: 3 | Status: SHIPPED | OUTPATIENT
Start: 2020-01-30 | End: 2021-01-26 | Stop reason: SDUPTHER

## 2020-01-30 RX ORDER — BUPROPION HYDROCHLORIDE 150 MG/1
TABLET ORAL
Qty: 90 TABLET | Refills: 1 | Status: SHIPPED | OUTPATIENT
Start: 2020-01-30 | End: 2020-10-02 | Stop reason: SDUPTHER

## 2020-01-30 RX ORDER — OMEPRAZOLE 40 MG/1
CAPSULE, DELAYED RELEASE ORAL
Qty: 90 CAPSULE | Refills: 3 | Status: SHIPPED | OUTPATIENT
Start: 2020-01-30 | End: 2021-01-26 | Stop reason: SDUPTHER

## 2020-01-31 ENCOUNTER — TELEPHONE (OUTPATIENT)
Dept: NEUROSURGERY | Facility: CLINIC | Age: 75
End: 2020-01-31

## 2020-01-31 NOTE — TELEPHONE ENCOUNTER
----- Message from Shahrzad Martinez MA sent at 1/29/2020  4:29 PM CST -----      ----- Message -----  From: Bev Jones LPN  Sent: 1/29/2020   3:30 PM CST  To: [facility] Staff    Please schedule patient for spinal cord stimulator, psych eval scanned into chart.

## 2020-02-05 ENCOUNTER — OFFICE VISIT (OUTPATIENT)
Dept: NEUROSURGERY | Facility: CLINIC | Age: 75
End: 2020-02-05
Payer: MEDICARE

## 2020-02-05 ENCOUNTER — TELEPHONE (OUTPATIENT)
Dept: NEUROSURGERY | Facility: CLINIC | Age: 75
End: 2020-02-05

## 2020-02-05 VITALS
DIASTOLIC BLOOD PRESSURE: 68 MMHG | BODY MASS INDEX: 32.21 KG/M2 | SYSTOLIC BLOOD PRESSURE: 130 MMHG | HEART RATE: 78 BPM | HEIGHT: 70 IN | WEIGHT: 225 LBS | TEMPERATURE: 98 F

## 2020-02-05 DIAGNOSIS — M96.1 FAILED BACK SURGICAL SYNDROME: ICD-10-CM

## 2020-02-05 DIAGNOSIS — G89.4 CHRONIC PAIN SYNDROME: Primary | ICD-10-CM

## 2020-02-05 DIAGNOSIS — M96.1 POST LAMINECTOMY SYNDROME: ICD-10-CM

## 2020-02-05 PROCEDURE — 99214 OFFICE O/P EST MOD 30 MIN: CPT | Mod: HCNC,S$GLB,, | Performed by: NEUROLOGICAL SURGERY

## 2020-02-05 PROCEDURE — 1100F PR PT FALLS ASSESS DOC 2+ FALLS/FALL W/INJURY/YR: ICD-10-PCS | Mod: HCNC,CPTII,S$GLB, | Performed by: NEUROLOGICAL SURGERY

## 2020-02-05 PROCEDURE — 3078F DIAST BP <80 MM HG: CPT | Mod: HCNC,CPTII,S$GLB, | Performed by: NEUROLOGICAL SURGERY

## 2020-02-05 PROCEDURE — 3075F SYST BP GE 130 - 139MM HG: CPT | Mod: HCNC,CPTII,S$GLB, | Performed by: NEUROLOGICAL SURGERY

## 2020-02-05 PROCEDURE — 1159F PR MEDICATION LIST DOCUMENTED IN MEDICAL RECORD: ICD-10-PCS | Mod: HCNC,S$GLB,, | Performed by: NEUROLOGICAL SURGERY

## 2020-02-05 PROCEDURE — 3288F FALL RISK ASSESSMENT DOCD: CPT | Mod: HCNC,CPTII,S$GLB, | Performed by: NEUROLOGICAL SURGERY

## 2020-02-05 PROCEDURE — 99214 PR OFFICE/OUTPT VISIT, EST, LEVL IV, 30-39 MIN: ICD-10-PCS | Mod: HCNC,S$GLB,, | Performed by: NEUROLOGICAL SURGERY

## 2020-02-05 PROCEDURE — 3078F PR MOST RECENT DIASTOLIC BLOOD PRESSURE < 80 MM HG: ICD-10-PCS | Mod: HCNC,CPTII,S$GLB, | Performed by: NEUROLOGICAL SURGERY

## 2020-02-05 PROCEDURE — 1159F MED LIST DOCD IN RCRD: CPT | Mod: HCNC,S$GLB,, | Performed by: NEUROLOGICAL SURGERY

## 2020-02-05 PROCEDURE — 3075F PR MOST RECENT SYSTOLIC BLOOD PRESS GE 130-139MM HG: ICD-10-PCS | Mod: HCNC,CPTII,S$GLB, | Performed by: NEUROLOGICAL SURGERY

## 2020-02-05 PROCEDURE — 99999 PR PBB SHADOW E&M-EST. PATIENT-LVL III: ICD-10-PCS | Mod: PBBFAC,HCNC,, | Performed by: NEUROLOGICAL SURGERY

## 2020-02-05 PROCEDURE — 1100F PTFALLS ASSESS-DOCD GE2>/YR: CPT | Mod: HCNC,CPTII,S$GLB, | Performed by: NEUROLOGICAL SURGERY

## 2020-02-05 PROCEDURE — 1125F AMNT PAIN NOTED PAIN PRSNT: CPT | Mod: HCNC,S$GLB,, | Performed by: NEUROLOGICAL SURGERY

## 2020-02-05 PROCEDURE — 3288F PR FALLS RISK ASSESSMENT DOCUMENTED: ICD-10-PCS | Mod: HCNC,CPTII,S$GLB, | Performed by: NEUROLOGICAL SURGERY

## 2020-02-05 PROCEDURE — 1125F PR PAIN SEVERITY QUANTIFIED, PAIN PRESENT: ICD-10-PCS | Mod: HCNC,S$GLB,, | Performed by: NEUROLOGICAL SURGERY

## 2020-02-05 PROCEDURE — 99999 PR PBB SHADOW E&M-EST. PATIENT-LVL III: CPT | Mod: PBBFAC,HCNC,, | Performed by: NEUROLOGICAL SURGERY

## 2020-02-05 NOTE — TELEPHONE ENCOUNTER
----- Message from Gisela Holguin sent at 2/5/2020  2:25 PM CST -----  Contact: Sam  Cory was seen in the office on today and scheduled for surgery on 02/12/20.  He says he is supposed to be using an antibiotic prior to surgery but no prescription was given.  Please call in to:     [facility]/pharmacy #[id number] - [place] - [address] [place] [address]  Phone: [phone] Fax: [phone]    He would also like to know when he is supposed to start using the antibiotic.  He can be reached at [phone]

## 2020-02-05 NOTE — PROGRESS NOTES
History & Physical    SUBJECTIVE:     Chief Complaint:  Low back pain and bilateral lower extremity pain and paresthesias.    History of Present Illness:  Mr. Pereyra is a 74-year-old male who was referred to me by Dr. Evan Lyles.  His past medical history is significant for arthritis, cataracts, chronic low back pain, rectal dysfunction, GERD, hypertension, stroke, and testicular hyper function.  He has a longstanding history of low back pain and leg pain.  This started years ago.  He failed conservative management and approximately 2 and half years ago underwent an L2 through S1 fusion.  This failed to improve his pain.  He currently complains of back pain worsened leg pain.  He complains of radiation into his bilateral legs as well as numbness and tingling of his legs and feet.  He has tried both physical therapy as well as multiple epidural steroid injections which did not significantly help his pain.  Ultimately, he underwent a spinal cord stimulator trial which brought him 80% relief of his back pain and leg pain.  He had improved mobility in functionality during the course of the trial.  He also had decreased opioid use during the trial.  He decreased his oxycodone usage from 6 tabs to 3 tabs per day.  He was referred to me for permanent implantation of a spinal cord stimulator paddle electrode due to the difficulty placing percutaneous leads during the trial.    Review of patient's allergies indicates:   Allergen Reactions    Xyzal [levocetirizine] Other (See Comments)     Knocked him out        Current Outpatient Medications   Medication Sig Dispense Refill    albuterol (PROVENTIL/VENTOLIN HFA) 90 mcg/actuation inhaler Inhale 2 puffs into the lungs every 6 (six) hours as needed for Wheezing. Rescue 18 g 0    amLODIPine (NORVASC) 10 MG tablet TAKE 1 TABLET EVERY DAY 90 tablet 3    ammonium lactate 12 % Crea Apply 1 application topically 2 (two) times daily. (Patient taking differently: Apply 1 application  topically 2 (two) times daily as needed. ) 140 g 11    aspirin 81 mg Tab Take 1 tablet by mouth Daily. Every day      atorvastatin (LIPITOR) 40 MG tablet TAKE 1 TABLET EVERY DAY 90 tablet 3    buPROPion (WELLBUTRIN XL) 150 MG TB24 tablet TAKE 1 TABLET EVERY DAY 90 tablet 1    gabapentin (NEURONTIN) 300 MG capsule TAKE 1 CAPSULE TWICE DAILY AS NEEDED  FOR  BACK  PAIN  FLARE 180 capsule 1    losartan (COZAAR) 50 MG tablet TAKE 1 TABLET EVERY DAY 90 tablet 3    morphine (MS CONTIN) 60 MG 12 hr tablet Take 1 tablet (60 mg total) by mouth 2 (two) times daily. 60 tablet 0    naloxone (NARCAN) 1 mg/mL injection Use as needed for respiratory depression 2 mL 1    omeprazole (PRILOSEC) 40 MG capsule TAKE 1 CAPSULE EVERY DAY 90 capsule 3    oxyCODONE-acetaminophen (PERCOCET)  mg per tablet Take 1 tablet by mouth every 4 (four) hours as needed for Pain. 120 tablet 0    sildenafil (REVATIO) 20 mg Tab Take 1-5 daily as needed for ED 10 tablet 5    tiZANidine (ZANAFLEX) 4 MG tablet TAKE 1 TABLET BY MOUTH EVERY 8 HOURS AS NEEDED 60 tablet 2    morphine (MS CONTIN) 60 MG 12 hr tablet Take 1 tablet (60 mg total) by mouth 2 (two) times daily. (Patient not taking: Reported on 2/5/2020) 60 tablet 0    morphine (MS CONTIN) 60 MG 12 hr tablet Take 1 tablet (60 mg total) by mouth 2 (two) times daily. (Patient not taking: Reported on 2/5/2020) 60 tablet 0    oxyCODONE-acetaminophen (PERCOCET)  mg per tablet Take 1 tablet by mouth every 4 (four) hours as needed for Pain. 120 tablet 0    oxyCODONE-acetaminophen (PERCOCET)  mg per tablet Take 1 tablet by mouth every 4 (four) hours as needed for Pain. 120 tablet 0     No current facility-administered medications for this visit.        Past Medical History:   Diagnosis Date    Anticoagulant long-term use     ASA 81 mg    Arthritis     Cataract     OU    Chronic low back pain     Complete rupture of rotator cuff 2/8/2011    DDD (degenerative disc disease),  lumbar 7/8/2015    Degeneration of lumbar or lumbosacral intervertebral disc 9/9/2015    ED (erectile dysfunction)     GERD (gastroesophageal reflux disease)     Hypertension     Lumbar pseudoarthrosis 6/26/2017    Lumbar stenosis 6/26/2017    Obesity     Spondylosis of lumbar region without myelopathy or radiculopathy 7/8/2015    Stroke 1997    basal ganglia, left sided weakness, resolved    Testicular hypofunction     Thoracic or lumbosacral neuritis or radiculitis 7/8/2015     Past Surgical History:   Procedure Laterality Date    APPENDECTOMY      BACK SURGERY      4- back surgery    COLONOSCOPY  07/12/2004    [facility]   Redundant tortuous colon, otherwise normal.    COLONOSCOPY N/A 2/25/2019    Procedure: COLONOSCOPY;  Surgeon: Gilbert Rodriguez Jr., MD;  Location: [facility] END[facility];  Service: Endoscopy;  Laterality: N/A;    Epidural steroid injection      Pain management    ESOPHAGOGASTRODUODENOSCOPY  07/12/2004    [facility].    FRACTURE SURGERY Left     wrist / forearm, total of 5    INSERTION OF DORSAL COLUMN NERVE STIMULATOR FOR TRIAL N/A 12/12/2019    Procedure: INSERTION, NEUROSTIMULATOR, SPINAL CORD, DORSAL COLUMN, FOR TRIAL;  Surgeon: Evan Lyles MD;  Location: [facility] OR;  Service: Pain Management;  Laterality: N/A;    JOINT REPLACEMENT  2-6-2013    ( rt hip 2007), left hip     JOINT REPLACEMENT Left     total knee    KNEE ARTHROSCOPY W/ DEBRIDEMENT      bilateral knees , total of six    KNEE SURGERY      Nervbe Block injection      Pain management     Family History     Problem Relation (Age of Onset)    COPD Sister    Diverticulitis Sister    Drug abuse Daughter    Gout Sister    Heart disease Father    Hypertension Father, Son, Sister    Kidney disease Sister    Lung disease Sister    No Known Problems Mother        Social History     Socioeconomic History    Marital status:      Spouse name: Not on file    Number of children: Not on file    Years of education: Not on file     Highest education level: Not on file   Occupational History    Not on file   Social Needs    Financial resource strain: Not on file    Food insecurity:     Worry: Not on file     Inability: Not on file    Transportation needs:     Medical: Not on file     Non-medical: Not on file   Tobacco Use    Smoking status: Former Smoker     Packs/day: 1.00     Years: 30.00     Pack years: 30.00     Start date: 8/3/1963     Last attempt to quit: 1992     Years since quittin.1    Smokeless tobacco: Never Used   Substance and Sexual Activity    Alcohol use: Yes     Comment: On occasion    Drug use: Yes     Types: Oxycodone, Morphine    Sexual activity: Not on file   Lifestyle    Physical activity:     Days per week: Not on file     Minutes per session: Not on file    Stress: Not on file   Relationships    Social connections:     Talks on phone: Not on file     Gets together: Not on file     Attends Rastafarian service: Not on file     Active member of club or organization: Not on file     Attends meetings of clubs or organizations: Not on file     Relationship status: Not on file   Other Topics Concern    Not on file   Social History Narrative    Not on file       Review of Systems:  Review of Systems   Constitutional: Positive for unexpected weight change. Negative for activity change, diaphoresis, fatigue and fever.   HENT: Negative for hearing loss, nosebleeds, tinnitus and trouble swallowing.    Eyes: Negative for visual disturbance.   Respiratory: Negative for cough, shortness of breath and wheezing.    Cardiovascular: Negative for chest pain and palpitations.   Gastrointestinal: Negative for abdominal distention, abdominal pain, blood in stool, constipation, diarrhea, nausea and vomiting.   Endocrine: Negative for cold intolerance and heat intolerance.   Genitourinary: Negative for difficulty urinating, enuresis, frequency and urgency.   Musculoskeletal: Positive for back pain. Negative for gait  "problem, joint swelling, myalgias, neck pain and neck stiffness.   Skin: Negative for color change, rash and wound.   Allergic/Immunologic: Negative for environmental allergies and food allergies.   Neurological: Negative for dizziness, seizures, facial asymmetry, speech difficulty, weakness, light-headedness, numbness and headaches.   Hematological: Bruises/bleeds easily.   Psychiatric/Behavioral: Negative for agitation, behavioral problems, dysphoric mood and hallucinations. The patient is not nervous/anxious.        OBJECTIVE:     Vital Signs  Temp: 98.2 °F (36.8 °C)  Pulse: 78  BP: 130/68  Pain Score:   7  Height: 5' 10" (177.8 cm)  Weight: 102.1 kg (225 lb)  Body mass index is 32.28 kg/m².      Physical Exam:  Physical Exam:  Vitals reviewed.    Constitutional: He appears well-developed and well-nourished. No distress.     Eyes: Pupils are equal, round, and reactive to light. Conjunctivae and EOM are normal.     Cardiovascular: Normal rate, regular rhythm, normal pulses and no edema.     Abdominal: Soft. Bowel sounds are normal.     Skin: Skin displays no rash on trunk and no rash on extremities. Skin displays no lesions on trunk and no lesions on extremities.     Psych/Behavior: He is alert. He is oriented to person, place, and time. He has a normal mood and affect.     Musculoskeletal: Gait is normal.        Neck: Range of motion is full. There is no tenderness. Muscle strength is 5/5. Tone is normal.        Back: Range of motion is full. There is no tenderness. Muscle strength is 5/5. Tone is normal.        Right Upper Extremities: Range of motion is full. There is no tenderness. Muscle strength is 5/5. Tone is normal.        Left Upper Extremities: Range of motion is full. There is no tenderness. Muscle strength is 5/5. Tone is normal.       Right Lower Extremities: Range of motion is full. There is no tenderness. Muscle strength is 5/5. Tone is normal.        Left Lower Extremities: Range of motion is " full. There is no tenderness. Muscle strength is 5/5. Tone is normal.     Neurological:        Coordination: He has a normal Romberg Test, normal finger to nose coordination and normal tandem walking coordination.        Sensory: There is no sensory deficit in the trunk. There is no sensory deficit in the extremities.        DTRs: DTRs are DTRS NORMAL AND SYMMETRICnormal and symmetric. He displays no Babinski's sign on the right side. He displays no Babinski's sign on the left side.        Cranial nerves: Cranial nerve(s) II, III, IV, V, VI, VII, VIII, IX, X, XI and XII are intact.         Diagnostic Results:  He has an MRI of the lumbar spine available for review which I personally reviewed this shows a L2-S1 posterior instrumentation and fusion as well as laminectomy.  There is no adjacent level disease.  There is no significant spinal stenosis and neural foraminal narrowing throughout the lumbar spine.    ASSESSMENT/PLAN:     Mr. Pereyra is a 74-year-old male with chronic pain syndrome and lumbar post-laminectomy syndrome.  He has a longstanding history of back pain and leg pain as described above.  He failed conservative therapy as well as surgical therapy.  He responded well to a spinal cord stimulator trial.  He is ready to proceed with permanent implantation of a spinal cord stimulator paddle electrode and pulse generator.  I explained the procedure in detail to the patient as well as the risks and benefits and present cons.  The patient wishes to proceed at this point.  We will get all the appropriate preoperative testing and schedule surgery in the near future.  The patient knows he needs to be off of his aspirin for 1 week prior to surgery.  He knows he can call with any further questions or concerns in the meantime.        Note dictated with voice recognition software, please excuse any grammatical errors.

## 2020-02-05 NOTE — H&P (VIEW-ONLY)
History & Physical    SUBJECTIVE:     Chief Complaint:  Low back pain and bilateral lower extremity pain and paresthesias.    History of Present Illness:  Mr. Pereyra is a 74-year-old male who was referred to me by Dr. Evan Lyles.  His past medical history is significant for arthritis, cataracts, chronic low back pain, rectal dysfunction, GERD, hypertension, stroke, and testicular hyper function.  He has a longstanding history of low back pain and leg pain.  This started years ago.  He failed conservative management and approximately 2 and half years ago underwent an L2 through S1 fusion.  This failed to improve his pain.  He currently complains of back pain worsened leg pain.  He complains of radiation into his bilateral legs as well as numbness and tingling of his legs and feet.  He has tried both physical therapy as well as multiple epidural steroid injections which did not significantly help his pain.  Ultimately, he underwent a spinal cord stimulator trial which brought him 80% relief of his back pain and leg pain.  He had improved mobility in functionality during the course of the trial.  He also had decreased opioid use during the trial.  He decreased his oxycodone usage from 6 tabs to 3 tabs per day.  He was referred to me for permanent implantation of a spinal cord stimulator paddle electrode due to the difficulty placing percutaneous leads during the trial.    Review of patient's allergies indicates:   Allergen Reactions    Xyzal [levocetirizine] Other (See Comments)     Knocked him out        Current Outpatient Medications   Medication Sig Dispense Refill    albuterol (PROVENTIL/VENTOLIN HFA) 90 mcg/actuation inhaler Inhale 2 puffs into the lungs every 6 (six) hours as needed for Wheezing. Rescue 18 g 0    amLODIPine (NORVASC) 10 MG tablet TAKE 1 TABLET EVERY DAY 90 tablet 3    ammonium lactate 12 % Crea Apply 1 application topically 2 (two) times daily. (Patient taking differently: Apply 1 application  topically 2 (two) times daily as needed. ) 140 g 11    aspirin 81 mg Tab Take 1 tablet by mouth Daily. Every day      atorvastatin (LIPITOR) 40 MG tablet TAKE 1 TABLET EVERY DAY 90 tablet 3    buPROPion (WELLBUTRIN XL) 150 MG TB24 tablet TAKE 1 TABLET EVERY DAY 90 tablet 1    gabapentin (NEURONTIN) 300 MG capsule TAKE 1 CAPSULE TWICE DAILY AS NEEDED  FOR  BACK  PAIN  FLARE 180 capsule 1    losartan (COZAAR) 50 MG tablet TAKE 1 TABLET EVERY DAY 90 tablet 3    morphine (MS CONTIN) 60 MG 12 hr tablet Take 1 tablet (60 mg total) by mouth 2 (two) times daily. 60 tablet 0    naloxone (NARCAN) 1 mg/mL injection Use as needed for respiratory depression 2 mL 1    omeprazole (PRILOSEC) 40 MG capsule TAKE 1 CAPSULE EVERY DAY 90 capsule 3    oxyCODONE-acetaminophen (PERCOCET)  mg per tablet Take 1 tablet by mouth every 4 (four) hours as needed for Pain. 120 tablet 0    sildenafil (REVATIO) 20 mg Tab Take 1-5 daily as needed for ED 10 tablet 5    tiZANidine (ZANAFLEX) 4 MG tablet TAKE 1 TABLET BY MOUTH EVERY 8 HOURS AS NEEDED 60 tablet 2    morphine (MS CONTIN) 60 MG 12 hr tablet Take 1 tablet (60 mg total) by mouth 2 (two) times daily. (Patient not taking: Reported on 2/5/2020) 60 tablet 0    morphine (MS CONTIN) 60 MG 12 hr tablet Take 1 tablet (60 mg total) by mouth 2 (two) times daily. (Patient not taking: Reported on 2/5/2020) 60 tablet 0    oxyCODONE-acetaminophen (PERCOCET)  mg per tablet Take 1 tablet by mouth every 4 (four) hours as needed for Pain. 120 tablet 0    oxyCODONE-acetaminophen (PERCOCET)  mg per tablet Take 1 tablet by mouth every 4 (four) hours as needed for Pain. 120 tablet 0     No current facility-administered medications for this visit.        Past Medical History:   Diagnosis Date    Anticoagulant long-term use     ASA 81 mg    Arthritis     Cataract     OU    Chronic low back pain     Complete rupture of rotator cuff 2/8/2011    DDD (degenerative disc disease),  lumbar 7/8/2015    Degeneration of lumbar or lumbosacral intervertebral disc 9/9/2015    ED (erectile dysfunction)     GERD (gastroesophageal reflux disease)     Hypertension     Lumbar pseudoarthrosis 6/26/2017    Lumbar stenosis 6/26/2017    Obesity     Spondylosis of lumbar region without myelopathy or radiculopathy 7/8/2015    Stroke 1997    basal ganglia, left sided weakness, resolved    Testicular hypofunction     Thoracic or lumbosacral neuritis or radiculitis 7/8/2015     Past Surgical History:   Procedure Laterality Date    APPENDECTOMY      BACK SURGERY      4- back surgery    COLONOSCOPY  07/12/2004    [facility]   Redundant tortuous colon, otherwise normal.    COLONOSCOPY N/A 2/25/2019    Procedure: COLONOSCOPY;  Surgeon: Gilbert Rodriguez Jr., MD;  Location: [facility] END[facility];  Service: Endoscopy;  Laterality: N/A;    Epidural steroid injection      Pain management    ESOPHAGOGASTRODUODENOSCOPY  07/12/2004    [facility].    FRACTURE SURGERY Left     wrist / forearm, total of 5    INSERTION OF DORSAL COLUMN NERVE STIMULATOR FOR TRIAL N/A 12/12/2019    Procedure: INSERTION, NEUROSTIMULATOR, SPINAL CORD, DORSAL COLUMN, FOR TRIAL;  Surgeon: Evan Lyles MD;  Location: [facility] OR;  Service: Pain Management;  Laterality: N/A;    JOINT REPLACEMENT  2-6-2013    ( rt hip 2007), left hip     JOINT REPLACEMENT Left     total knee    KNEE ARTHROSCOPY W/ DEBRIDEMENT      bilateral knees , total of six    KNEE SURGERY      Nervbe Block injection      Pain management     Family History     Problem Relation (Age of Onset)    COPD Sister    Diverticulitis Sister    Drug abuse Daughter    Gout Sister    Heart disease Father    Hypertension Father, Son, Sister    Kidney disease Sister    Lung disease Sister    No Known Problems Mother        Social History     Socioeconomic History    Marital status:      Spouse name: Not on file    Number of children: Not on file    Years of education: Not on file     Highest education level: Not on file   Occupational History    Not on file   Social Needs    Financial resource strain: Not on file    Food insecurity:     Worry: Not on file     Inability: Not on file    Transportation needs:     Medical: Not on file     Non-medical: Not on file   Tobacco Use    Smoking status: Former Smoker     Packs/day: 1.00     Years: 30.00     Pack years: 30.00     Start date: 8/3/1963     Last attempt to quit: 1992     Years since quittin.1    Smokeless tobacco: Never Used   Substance and Sexual Activity    Alcohol use: Yes     Comment: On occasion    Drug use: Yes     Types: Oxycodone, Morphine    Sexual activity: Not on file   Lifestyle    Physical activity:     Days per week: Not on file     Minutes per session: Not on file    Stress: Not on file   Relationships    Social connections:     Talks on phone: Not on file     Gets together: Not on file     Attends Caodaism service: Not on file     Active member of club or organization: Not on file     Attends meetings of clubs or organizations: Not on file     Relationship status: Not on file   Other Topics Concern    Not on file   Social History Narrative    Not on file       Review of Systems:  Review of Systems   Constitutional: Positive for unexpected weight change. Negative for activity change, diaphoresis, fatigue and fever.   HENT: Negative for hearing loss, nosebleeds, tinnitus and trouble swallowing.    Eyes: Negative for visual disturbance.   Respiratory: Negative for cough, shortness of breath and wheezing.    Cardiovascular: Negative for chest pain and palpitations.   Gastrointestinal: Negative for abdominal distention, abdominal pain, blood in stool, constipation, diarrhea, nausea and vomiting.   Endocrine: Negative for cold intolerance and heat intolerance.   Genitourinary: Negative for difficulty urinating, enuresis, frequency and urgency.   Musculoskeletal: Positive for back pain. Negative for gait  "problem, joint swelling, myalgias, neck pain and neck stiffness.   Skin: Negative for color change, rash and wound.   Allergic/Immunologic: Negative for environmental allergies and food allergies.   Neurological: Negative for dizziness, seizures, facial asymmetry, speech difficulty, weakness, light-headedness, numbness and headaches.   Hematological: Bruises/bleeds easily.   Psychiatric/Behavioral: Negative for agitation, behavioral problems, dysphoric mood and hallucinations. The patient is not nervous/anxious.        OBJECTIVE:     Vital Signs  Temp: 98.2 °F (36.8 °C)  Pulse: 78  BP: 130/68  Pain Score:   7  Height: 5' 10" (177.8 cm)  Weight: 102.1 kg (225 lb)  Body mass index is 32.28 kg/m².      Physical Exam:  Physical Exam:  Vitals reviewed.    Constitutional: He appears well-developed and well-nourished. No distress.     Eyes: Pupils are equal, round, and reactive to light. Conjunctivae and EOM are normal.     Cardiovascular: Normal rate, regular rhythm, normal pulses and no edema.     Abdominal: Soft. Bowel sounds are normal.     Skin: Skin displays no rash on trunk and no rash on extremities. Skin displays no lesions on trunk and no lesions on extremities.     Psych/Behavior: He is alert. He is oriented to person, place, and time. He has a normal mood and affect.     Musculoskeletal: Gait is normal.        Neck: Range of motion is full. There is no tenderness. Muscle strength is 5/5. Tone is normal.        Back: Range of motion is full. There is no tenderness. Muscle strength is 5/5. Tone is normal.        Right Upper Extremities: Range of motion is full. There is no tenderness. Muscle strength is 5/5. Tone is normal.        Left Upper Extremities: Range of motion is full. There is no tenderness. Muscle strength is 5/5. Tone is normal.       Right Lower Extremities: Range of motion is full. There is no tenderness. Muscle strength is 5/5. Tone is normal.        Left Lower Extremities: Range of motion is " full. There is no tenderness. Muscle strength is 5/5. Tone is normal.     Neurological:        Coordination: He has a normal Romberg Test, normal finger to nose coordination and normal tandem walking coordination.        Sensory: There is no sensory deficit in the trunk. There is no sensory deficit in the extremities.        DTRs: DTRs are DTRS NORMAL AND SYMMETRICnormal and symmetric. He displays no Babinski's sign on the right side. He displays no Babinski's sign on the left side.        Cranial nerves: Cranial nerve(s) II, III, IV, V, VI, VII, VIII, IX, X, XI and XII are intact.         Diagnostic Results:  He has an MRI of the lumbar spine available for review which I personally reviewed this shows a L2-S1 posterior instrumentation and fusion as well as laminectomy.  There is no adjacent level disease.  There is no significant spinal stenosis and neural foraminal narrowing throughout the lumbar spine.    ASSESSMENT/PLAN:     Mr. Pereyra is a 74-year-old male with chronic pain syndrome and lumbar post-laminectomy syndrome.  He has a longstanding history of back pain and leg pain as described above.  He failed conservative therapy as well as surgical therapy.  He responded well to a spinal cord stimulator trial.  He is ready to proceed with permanent implantation of a spinal cord stimulator paddle electrode and pulse generator.  I explained the procedure in detail to the patient as well as the risks and benefits and present cons.  The patient wishes to proceed at this point.  We will get all the appropriate preoperative testing and schedule surgery in the near future.  The patient knows he needs to be off of his aspirin for 1 week prior to surgery.  He knows he can call with any further questions or concerns in the meantime.        Note dictated with voice recognition software, please excuse any grammatical errors.

## 2020-02-07 ENCOUNTER — ANESTHESIA EVENT (OUTPATIENT)
Dept: SURGERY | Facility: OTHER | Age: 75
End: 2020-02-07
Payer: MEDICARE

## 2020-02-07 ENCOUNTER — DOCUMENTATION ONLY (OUTPATIENT)
Dept: FAMILY MEDICINE | Facility: CLINIC | Age: 75
End: 2020-02-07

## 2020-02-07 ENCOUNTER — HOSPITAL ENCOUNTER (OUTPATIENT)
Dept: PREADMISSION TESTING | Facility: OTHER | Age: 75
Discharge: HOME | End: 2020-02-07
Attending: NEUROLOGICAL SURGERY
Payer: MEDICARE

## 2020-02-07 VITALS
OXYGEN SATURATION: 94 % | HEART RATE: 86 BPM | HEIGHT: 70 IN | SYSTOLIC BLOOD PRESSURE: 133 MMHG | WEIGHT: 225 LBS | TEMPERATURE: 98 F | BODY MASS INDEX: 32.21 KG/M2 | DIASTOLIC BLOOD PRESSURE: 73 MMHG

## 2020-02-07 DIAGNOSIS — Z01.818 PRE-OP TESTING: Primary | ICD-10-CM

## 2020-02-07 LAB
ANION GAP SERPL CALC-SCNC: 9 MMOL/L (ref 8–16)
BASOPHILS # BLD AUTO: 0.05 K/UL (ref 0–0.2)
BASOPHILS NFR BLD: 0.5 % (ref 0–1.9)
BUN SERPL-MCNC: 15 MG/DL (ref 8–23)
CALCIUM SERPL-MCNC: 8.8 MG/DL (ref 8.7–10.5)
CHLORIDE SERPL-SCNC: 102 MMOL/L (ref 95–110)
CO2 SERPL-SCNC: 28 MMOL/L (ref 23–29)
CREAT SERPL-MCNC: 0.8 MG/DL (ref 0.5–1.4)
DIFFERENTIAL METHOD: ABNORMAL
EOSINOPHIL # BLD AUTO: 0.2 K/UL (ref 0–0.5)
EOSINOPHIL NFR BLD: 2.5 % (ref 0–8)
ERYTHROCYTE [DISTWIDTH] IN BLOOD BY AUTOMATED COUNT: 15.7 % (ref 11.5–14.5)
EST. GFR  (AFRICAN AMERICAN): >60 ML/MIN/1.73 M^2
EST. GFR  (NON AFRICAN AMERICAN): >60 ML/MIN/1.73 M^2
GLUCOSE SERPL-MCNC: 100 MG/DL (ref 70–110)
HCT VFR BLD AUTO: 39.2 % (ref 40–54)
HGB BLD-MCNC: 11.3 G/DL (ref 14–18)
IMM GRANULOCYTES # BLD AUTO: 0.02 K/UL (ref 0–0.04)
IMM GRANULOCYTES NFR BLD AUTO: 0.2 % (ref 0–0.5)
LYMPHOCYTES # BLD AUTO: 2.8 K/UL (ref 1–4.8)
LYMPHOCYTES NFR BLD: 30.7 % (ref 18–48)
MCH RBC QN AUTO: 23.5 PG (ref 27–31)
MCHC RBC AUTO-ENTMCNC: 28.8 G/DL (ref 32–36)
MCV RBC AUTO: 82 FL (ref 82–98)
MONOCYTES # BLD AUTO: 1 K/UL (ref 0.3–1)
MONOCYTES NFR BLD: 11 % (ref 4–15)
NEUTROPHILS # BLD AUTO: 5.1 K/UL (ref 1.8–7.7)
NEUTROPHILS NFR BLD: 55.1 % (ref 38–73)
NRBC BLD-RTO: 0 /100 WBC
PLATELET # BLD AUTO: 328 K/UL (ref 150–350)
PMV BLD AUTO: 9.6 FL (ref 9.2–12.9)
POTASSIUM SERPL-SCNC: 4.5 MMOL/L (ref 3.5–5.1)
RBC # BLD AUTO: 4.81 M/UL (ref 4.6–6.2)
SODIUM SERPL-SCNC: 139 MMOL/L (ref 136–145)
WBC # BLD AUTO: 9.19 K/UL (ref 3.9–12.7)

## 2020-02-07 PROCEDURE — 80048 BASIC METABOLIC PNL TOTAL CA: CPT | Mod: HCNC

## 2020-02-07 PROCEDURE — 85025 COMPLETE CBC W/AUTO DIFF WBC: CPT | Mod: HCNC

## 2020-02-07 PROCEDURE — 93010 ELECTROCARDIOGRAM REPORT: CPT | Mod: HCNC,,, | Performed by: INTERNAL MEDICINE

## 2020-02-07 PROCEDURE — 93005 ELECTROCARDIOGRAM TRACING: CPT | Mod: HCNC

## 2020-02-07 PROCEDURE — 36415 COLL VENOUS BLD VENIPUNCTURE: CPT | Mod: HCNC

## 2020-02-07 PROCEDURE — 93010 EKG 12-LEAD: ICD-10-PCS | Mod: HCNC,,, | Performed by: INTERNAL MEDICINE

## 2020-02-07 RX ORDER — ACETAMINOPHEN 500 MG
1000 TABLET ORAL
Status: CANCELLED | OUTPATIENT
Start: 2020-02-07 | End: 2020-02-07

## 2020-02-07 RX ORDER — SODIUM CHLORIDE, SODIUM LACTATE, POTASSIUM CHLORIDE, CALCIUM CHLORIDE 600; 310; 30; 20 MG/100ML; MG/100ML; MG/100ML; MG/100ML
INJECTION, SOLUTION INTRAVENOUS CONTINUOUS
Status: CANCELLED | OUTPATIENT
Start: 2020-02-07

## 2020-02-07 RX ORDER — LIDOCAINE HYDROCHLORIDE 10 MG/ML
0.5 INJECTION, SOLUTION EPIDURAL; INFILTRATION; INTRACAUDAL; PERINEURAL ONCE
Status: CANCELLED | OUTPATIENT
Start: 2020-02-07 | End: 2020-02-07

## 2020-02-07 RX ORDER — PREGABALIN 50 MG/1
50 CAPSULE ORAL
Status: CANCELLED | OUTPATIENT
Start: 2020-02-07 | End: 2020-02-07

## 2020-02-07 NOTE — DISCHARGE INSTRUCTIONS
PRE-ADMIT TESTING -  [phone]    [address]  [place] [facility]          Your surgery has been scheduled at [facility]. We are pleased to have the opportunity to serve you. For Further Information please call [phone].    On the day of surgery please report to the Information Desk on the 1st floor.    · CONTACT YOUR PHYSICIAN'S OFFICE THE DAY PRIOR TO YOUR SURGERY TO OBTAIN YOUR ARRIVAL TIME.     · The evening before surgery do not eat anything after 9 p.m. ( this includes hard candy, chewing gum and mints).  You may only have GATORADE, POWERADE AND WATER  from 9 p.m. until you leave your home.   DO NOT DRINK ANY LIQUIDS ON THE WAY TO THE HOSPITAL.      SPECIAL MEDICATION INSTRUCTIONS: TAKE medications checked off by the Anesthesiologist on your Medication List.    Angiogram Patients: Take medications as instructed by your physician, including aspirin.     Surgery Patients:    If you take ASPIRIN - Your PHYSICIAN/SURGEON will need to inform you IF/OR when you need to stop taking aspirin prior to your surgery.     Do Not take any medications containing IBUPROFEN.  Do Not Wear any make-up or dark nail polish   (especially eye make-up) to surgery. If you come to surgery with makeup on you will be required to remove the makeup or nail polish.    Do not shave your surgical area at least 5 days prior to your surgery. The surgical prep will be performed at the hospital according to Infection Control regulations.    Leave all valuables at home.   Do Not wear any jewelry or watches, including any metal in body piercings. Jewelry must be removed prior to coming to the hospital.  There is a possibility that rings that are unable to be removed may be cut off if they are on the surgical extremity.    Contact Lens must be removed before surgery. Either do not wear the contact lens or bring a case and solution for storage.  Please bring a container for eyeglasses or dentures as required.  Bring  any paperwork your physician has provided, such as consent forms,  history and physicals, doctor's orders, etc.   Bring comfortable clothes that are loose fitting to wear upon discharge. Take into consideration the type of surgery being performed.  Maintain your diet as advised per your physician the day prior to surgery.      Adequate rest the night before surgery is advised.   Park in the Parking lot behind the hospital or in the [facility] across the street from the parking lot. Parking is complimentary.  If you will be discharged the same day as your procedure, please arrange for a responsible adult to drive you home or to accompany you if traveling by taxi.   YOU WILL NOT BE PERMITTED TO DRIVE OR TO LEAVE THE HOSPITAL ALONE AFTER SURGERY.   It is strongly recommended that you arrange for someone to remain with you for the first 24 hrs following your surgery.    The Surgeon will speak to your family/visitor after your surgery regarding the outcome of your surgery and post op care.  The Surgeon may speak to you after your surgery, but there is a possibility you may not remember the details.  Please check with your family members regarding the conversation with the Surgeon.    We strongly recommend whoever is bringing you home be present for discharge instructions.  This will ensure a thorough understanding for your post op home care.    EACH PATIENT IS ALLOWED TWO FAMILY MEMBERS OR VISITORS IN THE ROOM AND IN THE WAITING ROOMS WHILE YOU ARE IN SURGERY. ALL CHILDREN MUST ALWAYS BE ACCOMPANIED BY AN ADULT.    Thank you for your cooperation.  The Staff of [facility].                Bathing Instructions with Hibiclens     Shower the evening before and morning of your procedure with Hibiclens:   Wash your face with water and your regular face wash/soap   Apply Hibiclens directly on your skin or on a wet washcloth and wash gently. When showering: Move away from the shower stream when  applying Hibiclens to avoid rinsing off too soon.   Rinse thoroughly with warm water   Do not dilute Hibiclens         Dry off as usual, do not use any deodorant, powder, body lotions, perfume, after shave or cologne.

## 2020-02-07 NOTE — ANESTHESIA PREPROCEDURE EVALUATION
02/07/2020  Sam Pete is a 74 y.o., male.    Anesthesia Evaluation    I have reviewed the Patient Summary Reports.    I have reviewed the Nursing Notes.   I have reviewed the Medications.     Review of Systems  Anesthesia Hx:  No problems with previous Anesthesia  History of prior surgery of interest to airway management or planning: Denies Family Hx of Anesthesia complications.   Denies Personal Hx of Anesthesia complications.   Social:  Former Smoker    Hematology/Oncology:  Hematology Normal   Oncology Normal     EENT/Dental:EENT/Dental Normal   Cardiovascular:   Exercise tolerance: good Hypertension, well controlled Dysrhythmias atrial fibrillation hyperlipidemia Hx of short run of a fib in past   Pulmonary:  Pulmonary Normal    Renal/:  Renal/ Normal     Hepatic/GI:   GERD, well controlled    Musculoskeletal:   Arthritis   Spine Disorders: lumbar Degenerative disease    Neurological:   TIA, 1997 TIA done well since  Chronic Pain Syndrome   Endocrine:  Endocrine Normal    Dermatological:  Skin Normal    Psych:  Psychiatric Normal           Physical Exam  General:  Obesity    Airway/Jaw/Neck:  Airway Findings: Mouth Opening: Normal Tongue: Normal  General Airway Assessment: Adult  Mallampati: II      Dental:  Dental Findings: Upper Dentures, Lower Dentures        Mental Status:  Mental Status Findings:  Cooperative, Alert and Oriented         Anesthesia Plan  Type of Anesthesia, risks & benefits discussed:  Anesthesia Type:  general  Patient's Preference:   Intra-op Monitoring Plan: standard ASA monitors  Intra-op Monitoring Plan Comments:   Post Op Pain Control Plan: per primary service following discharge from PACU and multimodal analgesia  Post Op Pain Control Plan Comments:   Induction:   IV  Beta Blocker:         Informed Consent: Patient understands risks and agrees with Anesthesia plan.   Questions answered. Anesthesia consent signed with patient.  ASA Score: 3     Day of Surgery Review of History & Physical:    H&P update referred to the surgeon.     Anesthesia Plan Notes: Labs and EKG today    EKG normal, CBC mild anemia HCT 39        Ready For Surgery From Anesthesia Perspective.

## 2020-02-07 NOTE — PROGRESS NOTES
Pre-Visit Chart Review  For Appointment Scheduled on 2-17-20    There are no preventive care reminders to display for this patient.

## 2020-02-12 ENCOUNTER — HOSPITAL ENCOUNTER (OUTPATIENT)
Facility: OTHER | Age: 75
Discharge: HOME | End: 2020-02-12
Attending: NEUROLOGICAL SURGERY | Admitting: NEUROLOGICAL SURGERY
Payer: MEDICARE

## 2020-02-12 ENCOUNTER — TELEPHONE (OUTPATIENT)
Dept: NEUROSURGERY | Facility: CLINIC | Age: 75
End: 2020-02-12

## 2020-02-12 ENCOUNTER — ANESTHESIA (OUTPATIENT)
Dept: SURGERY | Facility: OTHER | Age: 75
End: 2020-02-12
Payer: MEDICARE

## 2020-02-12 VITALS
RESPIRATION RATE: 16 BRPM | SYSTOLIC BLOOD PRESSURE: 144 MMHG | TEMPERATURE: 98 F | HEIGHT: 70 IN | BODY MASS INDEX: 32.21 KG/M2 | OXYGEN SATURATION: 95 % | HEART RATE: 91 BPM | WEIGHT: 225 LBS | DIASTOLIC BLOOD PRESSURE: 65 MMHG

## 2020-02-12 DIAGNOSIS — Z01.818 PRE-OP TESTING: ICD-10-CM

## 2020-02-12 DIAGNOSIS — G89.29 CHRONIC BACK PAIN: Primary | ICD-10-CM

## 2020-02-12 DIAGNOSIS — M54.9 CHRONIC BACK PAIN: Primary | ICD-10-CM

## 2020-02-12 LAB
APTT BLDCRRT: 28.2 SEC (ref 21–32)
BILIRUB UR QL STRIP: NEGATIVE
CLARITY UR: CLEAR
COLOR UR: YELLOW
GLUCOSE UR QL STRIP: NEGATIVE
HGB UR QL STRIP: NEGATIVE
INR PPP: 0.9 (ref 0.8–1.2)
KETONES UR QL STRIP: NEGATIVE
LEUKOCYTE ESTERASE UR QL STRIP: NEGATIVE
NITRITE UR QL STRIP: NEGATIVE
PH UR STRIP: 7 [PH] (ref 5–8)
PROT UR QL STRIP: NEGATIVE
PROTHROMBIN TIME: 10 SEC (ref 9–12.5)
SP GR UR STRIP: <=1.005 (ref 1–1.03)
URN SPEC COLLECT METH UR: ABNORMAL
UROBILINOGEN UR STRIP-ACNC: NEGATIVE EU/DL

## 2020-02-12 PROCEDURE — 36000711: Mod: HCNC | Performed by: NEUROLOGICAL SURGERY

## 2020-02-12 PROCEDURE — C1778 LEAD, NEUROSTIMULATOR: HCPCS | Mod: HCNC | Performed by: NEUROLOGICAL SURGERY

## 2020-02-12 PROCEDURE — 37000009 HC ANESTHESIA EA ADD 15 MINS: Mod: HCNC | Performed by: NEUROLOGICAL SURGERY

## 2020-02-12 PROCEDURE — 63685 PR IMPLANT SPINAL NEUROSTIM/RECEIVER: ICD-10-PCS | Mod: 51,HCNC,, | Performed by: NEUROLOGICAL SURGERY

## 2020-02-12 PROCEDURE — 71000033 HC RECOVERY, INTIAL HOUR: Mod: HCNC | Performed by: NEUROLOGICAL SURGERY

## 2020-02-12 PROCEDURE — 63600175 PHARM REV CODE 636 W HCPCS: Mod: HCNC | Performed by: ANESTHESIOLOGY

## 2020-02-12 PROCEDURE — 71000015 HC POSTOP RECOV 1ST HR: Mod: HCNC | Performed by: NEUROLOGICAL SURGERY

## 2020-02-12 PROCEDURE — 25000003 PHARM REV CODE 250: Mod: HCNC | Performed by: NURSE ANESTHETIST, CERTIFIED REGISTERED

## 2020-02-12 PROCEDURE — 25000003 PHARM REV CODE 250: Mod: HCNC | Performed by: NEUROLOGICAL SURGERY

## 2020-02-12 PROCEDURE — 63600175 PHARM REV CODE 636 W HCPCS: Mod: HCNC | Performed by: NEUROLOGICAL SURGERY

## 2020-02-12 PROCEDURE — 63655 PR SURG IMPLNT NEUROELECT,EPIDURAL: ICD-10-PCS | Mod: HCNC,,, | Performed by: NEUROLOGICAL SURGERY

## 2020-02-12 PROCEDURE — 85610 PROTHROMBIN TIME: CPT | Mod: HCNC

## 2020-02-12 PROCEDURE — 81003 URINALYSIS AUTO W/O SCOPE: CPT | Mod: HCNC

## 2020-02-12 PROCEDURE — 71000039 HC RECOVERY, EACH ADD'L HOUR: Mod: HCNC | Performed by: NEUROLOGICAL SURGERY

## 2020-02-12 PROCEDURE — C1787 PATIENT PROGR, NEUROSTIM: HCPCS | Mod: HCNC | Performed by: NEUROLOGICAL SURGERY

## 2020-02-12 PROCEDURE — 25000003 PHARM REV CODE 250: Mod: HCNC | Performed by: STUDENT IN AN ORGANIZED HEALTH CARE EDUCATION/TRAINING PROGRAM

## 2020-02-12 PROCEDURE — 63600175 PHARM REV CODE 636 W HCPCS: Mod: HCNC | Performed by: STUDENT IN AN ORGANIZED HEALTH CARE EDUCATION/TRAINING PROGRAM

## 2020-02-12 PROCEDURE — C1822 GEN, NEURO, HF, RECHG BAT: HCPCS | Mod: HCNC | Performed by: NEUROLOGICAL SURGERY

## 2020-02-12 PROCEDURE — 71000016 HC POSTOP RECOV ADDL HR: Mod: HCNC | Performed by: NEUROLOGICAL SURGERY

## 2020-02-12 PROCEDURE — 37000008 HC ANESTHESIA 1ST 15 MINUTES: Mod: HCNC | Performed by: NEUROLOGICAL SURGERY

## 2020-02-12 PROCEDURE — 63600175 PHARM REV CODE 636 W HCPCS: Mod: HCNC | Performed by: NURSE ANESTHETIST, CERTIFIED REGISTERED

## 2020-02-12 PROCEDURE — 85730 THROMBOPLASTIN TIME PARTIAL: CPT | Mod: HCNC

## 2020-02-12 PROCEDURE — 36000710: Mod: HCNC | Performed by: NEUROLOGICAL SURGERY

## 2020-02-12 PROCEDURE — 36415 COLL VENOUS BLD VENIPUNCTURE: CPT | Mod: HCNC

## 2020-02-12 PROCEDURE — 63685 INS/RPLC SPI NPG/RCVR POCKET: CPT | Mod: 51,HCNC,, | Performed by: NEUROLOGICAL SURGERY

## 2020-02-12 PROCEDURE — 63655 IMPLANT NEUROELECTRODES: CPT | Mod: HCNC,,, | Performed by: NEUROLOGICAL SURGERY

## 2020-02-12 PROCEDURE — 25000003 PHARM REV CODE 250: Mod: HCNC | Performed by: ANESTHESIOLOGY

## 2020-02-12 PROCEDURE — 27201423 OPTIME MED/SURG SUP & DEVICES STERILE SUPPLY: Mod: HCNC | Performed by: NEUROLOGICAL SURGERY

## 2020-02-12 DEVICE — IMPLANTABLE DEVICE: Type: IMPLANTABLE DEVICE | Site: SPINE LUMBAR | Status: FUNCTIONAL

## 2020-02-12 RX ORDER — EPHEDRINE SULFATE 50 MG/ML
INJECTION, SOLUTION INTRAVENOUS
Status: DISCONTINUED | OUTPATIENT
Start: 2020-02-12 | End: 2020-02-12

## 2020-02-12 RX ORDER — ONDANSETRON 2 MG/ML
4 INJECTION INTRAMUSCULAR; INTRAVENOUS DAILY PRN
Status: DISCONTINUED | OUTPATIENT
Start: 2020-02-12 | End: 2020-02-12 | Stop reason: HOSPADM

## 2020-02-12 RX ORDER — PREGABALIN 50 MG/1
50 CAPSULE ORAL
Status: COMPLETED | OUTPATIENT
Start: 2020-02-12 | End: 2020-02-12

## 2020-02-12 RX ORDER — ROCURONIUM BROMIDE 10 MG/ML
INJECTION, SOLUTION INTRAVENOUS
Status: DISCONTINUED | OUTPATIENT
Start: 2020-02-12 | End: 2020-02-12

## 2020-02-12 RX ORDER — SODIUM CHLORIDE, SODIUM LACTATE, POTASSIUM CHLORIDE, CALCIUM CHLORIDE 600; 310; 30; 20 MG/100ML; MG/100ML; MG/100ML; MG/100ML
INJECTION, SOLUTION INTRAVENOUS CONTINUOUS
Status: DISCONTINUED | OUTPATIENT
Start: 2020-02-12 | End: 2020-02-12 | Stop reason: HOSPADM

## 2020-02-12 RX ORDER — SUCCINYLCHOLINE CHLORIDE 20 MG/ML
INJECTION INTRAMUSCULAR; INTRAVENOUS
Status: DISCONTINUED | OUTPATIENT
Start: 2020-02-12 | End: 2020-02-12

## 2020-02-12 RX ORDER — CEFAZOLIN SODIUM 1 G/3ML
2 INJECTION, POWDER, FOR SOLUTION INTRAMUSCULAR; INTRAVENOUS
Status: COMPLETED | OUTPATIENT
Start: 2020-02-12 | End: 2020-02-12

## 2020-02-12 RX ORDER — KETAMINE HCL IN 0.9 % NACL 50 MG/5 ML
SYRINGE (ML) INTRAVENOUS
Status: DISCONTINUED | OUTPATIENT
Start: 2020-02-12 | End: 2020-02-12

## 2020-02-12 RX ORDER — LIDOCAINE HYDROCHLORIDE 20 MG/ML
INJECTION INTRAVENOUS
Status: DISCONTINUED | OUTPATIENT
Start: 2020-02-12 | End: 2020-02-12

## 2020-02-12 RX ORDER — DIAZEPAM 5 MG/1
5 TABLET ORAL EVERY 6 HOURS PRN
Qty: 30 TABLET | Refills: 0 | Status: SHIPPED | OUTPATIENT
Start: 2020-02-12 | End: 2020-02-12 | Stop reason: SDUPTHER

## 2020-02-12 RX ORDER — ACETAMINOPHEN 500 MG
1000 TABLET ORAL
Status: COMPLETED | OUTPATIENT
Start: 2020-02-12 | End: 2020-02-12

## 2020-02-12 RX ORDER — BACITRACIN ZINC 500 UNIT/G
OINTMENT (GRAM) TOPICAL
Status: DISCONTINUED | OUTPATIENT
Start: 2020-02-12 | End: 2020-02-12 | Stop reason: HOSPADM

## 2020-02-12 RX ORDER — DIAZEPAM 5 MG/1
5 TABLET ORAL EVERY 6 HOURS PRN
Qty: 30 TABLET | Refills: 0 | Status: SHIPPED | OUTPATIENT
Start: 2020-02-12 | End: 2020-06-15

## 2020-02-12 RX ORDER — ONDANSETRON 2 MG/ML
INJECTION INTRAMUSCULAR; INTRAVENOUS
Status: DISCONTINUED | OUTPATIENT
Start: 2020-02-12 | End: 2020-02-12

## 2020-02-12 RX ORDER — OXYCODONE HYDROCHLORIDE 5 MG/1
5 TABLET ORAL
Status: DISCONTINUED | OUTPATIENT
Start: 2020-02-12 | End: 2020-02-12 | Stop reason: HOSPADM

## 2020-02-12 RX ORDER — LIDOCAINE HYDROCHLORIDE 10 MG/ML
0.5 INJECTION, SOLUTION EPIDURAL; INFILTRATION; INTRACAUDAL; PERINEURAL ONCE
Status: DISCONTINUED | OUTPATIENT
Start: 2020-02-12 | End: 2020-02-12 | Stop reason: HOSPADM

## 2020-02-12 RX ORDER — SODIUM CHLORIDE 9 MG/ML
INJECTION, SOLUTION INTRAVENOUS CONTINUOUS
Status: DISCONTINUED | OUTPATIENT
Start: 2020-02-12 | End: 2020-02-12 | Stop reason: HOSPADM

## 2020-02-12 RX ORDER — HYDROMORPHONE HYDROCHLORIDE 2 MG/ML
0.4 INJECTION, SOLUTION INTRAMUSCULAR; INTRAVENOUS; SUBCUTANEOUS EVERY 5 MIN PRN
Status: DISCONTINUED | OUTPATIENT
Start: 2020-02-12 | End: 2020-02-12 | Stop reason: HOSPADM

## 2020-02-12 RX ORDER — BACITRACIN 50000 [IU]/1
INJECTION, POWDER, FOR SOLUTION INTRAMUSCULAR
Status: DISCONTINUED | OUTPATIENT
Start: 2020-02-12 | End: 2020-02-12 | Stop reason: HOSPADM

## 2020-02-12 RX ORDER — SODIUM CHLORIDE 0.9 % (FLUSH) 0.9 %
3 SYRINGE (ML) INJECTION
Status: DISCONTINUED | OUTPATIENT
Start: 2020-02-12 | End: 2020-02-12 | Stop reason: HOSPADM

## 2020-02-12 RX ORDER — PROPOFOL 10 MG/ML
VIAL (ML) INTRAVENOUS
Status: DISCONTINUED | OUTPATIENT
Start: 2020-02-12 | End: 2020-02-12

## 2020-02-12 RX ORDER — MEPERIDINE HYDROCHLORIDE 25 MG/ML
12.5 INJECTION INTRAMUSCULAR; INTRAVENOUS; SUBCUTANEOUS ONCE AS NEEDED
Status: DISCONTINUED | OUTPATIENT
Start: 2020-02-12 | End: 2020-02-12 | Stop reason: HOSPADM

## 2020-02-12 RX ORDER — CEPHALEXIN 500 MG/1
500 CAPSULE ORAL 4 TIMES DAILY
Qty: 20 CAPSULE | Refills: 0 | Status: SHIPPED | OUTPATIENT
Start: 2020-02-12 | End: 2020-02-12 | Stop reason: SDUPTHER

## 2020-02-12 RX ORDER — CEPHALEXIN 500 MG/1
500 CAPSULE ORAL 4 TIMES DAILY
Qty: 20 CAPSULE | Refills: 0 | Status: SHIPPED | OUTPATIENT
Start: 2020-02-12 | End: 2020-02-17

## 2020-02-12 RX ORDER — DEXMEDETOMIDINE HYDROCHLORIDE 100 UG/ML
INJECTION, SOLUTION INTRAVENOUS
Status: DISCONTINUED | OUTPATIENT
Start: 2020-02-12 | End: 2020-02-12

## 2020-02-12 RX ORDER — OXYCODONE AND ACETAMINOPHEN 5; 325 MG/1; MG/1
1 TABLET ORAL EVERY 4 HOURS PRN
Qty: 30 TABLET | Refills: 0 | Status: SHIPPED | OUTPATIENT
Start: 2020-02-12 | End: 2020-02-12 | Stop reason: SDUPTHER

## 2020-02-12 RX ORDER — LIDOCAINE HYDROCHLORIDE AND EPINEPHRINE 10; 10 MG/ML; UG/ML
INJECTION, SOLUTION INFILTRATION; PERINEURAL
Status: DISCONTINUED | OUTPATIENT
Start: 2020-02-12 | End: 2020-02-12 | Stop reason: HOSPADM

## 2020-02-12 RX ORDER — MUPIROCIN 20 MG/G
OINTMENT TOPICAL
Status: DISCONTINUED | OUTPATIENT
Start: 2020-02-12 | End: 2020-02-12 | Stop reason: HOSPADM

## 2020-02-12 RX ORDER — FENTANYL CITRATE 50 UG/ML
INJECTION, SOLUTION INTRAMUSCULAR; INTRAVENOUS
Status: DISCONTINUED | OUTPATIENT
Start: 2020-02-12 | End: 2020-02-12

## 2020-02-12 RX ORDER — OXYCODONE AND ACETAMINOPHEN 5; 325 MG/1; MG/1
1 TABLET ORAL EVERY 4 HOURS PRN
Qty: 30 TABLET | Refills: 0 | Status: SHIPPED | OUTPATIENT
Start: 2020-02-12 | End: 2020-02-17

## 2020-02-12 RX ORDER — MUPIROCIN 20 MG/G
1 OINTMENT TOPICAL 2 TIMES DAILY
Status: DISCONTINUED | OUTPATIENT
Start: 2020-02-12 | End: 2020-02-12

## 2020-02-12 RX ORDER — VANCOMYCIN HYDROCHLORIDE 1 G/20ML
INJECTION, POWDER, LYOPHILIZED, FOR SOLUTION INTRAVENOUS
Status: DISCONTINUED | OUTPATIENT
Start: 2020-02-12 | End: 2020-02-12 | Stop reason: HOSPADM

## 2020-02-12 RX ADMIN — DEXMEDETOMIDINE HYDROCHLORIDE 8000 MCG: 100 INJECTION, SOLUTION, CONCENTRATE INTRAVENOUS at 09:02

## 2020-02-12 RX ADMIN — FENTANYL CITRATE 100 MCG: 50 INJECTION, SOLUTION INTRAMUSCULAR; INTRAVENOUS at 08:02

## 2020-02-12 RX ADMIN — ACETAMINOPHEN 1000 MG: 500 TABLET, FILM COATED ORAL at 06:02

## 2020-02-12 RX ADMIN — CEFAZOLIN 2 G: 330 INJECTION, POWDER, FOR SOLUTION INTRAMUSCULAR; INTRAVENOUS at 08:02

## 2020-02-12 RX ADMIN — Medication 25 MG: at 08:02

## 2020-02-12 RX ADMIN — HYDROMORPHONE HYDROCHLORIDE 0.4 MG: 2 INJECTION, SOLUTION INTRAMUSCULAR; INTRAVENOUS; SUBCUTANEOUS at 11:02

## 2020-02-12 RX ADMIN — SODIUM CHLORIDE, SODIUM LACTATE, POTASSIUM CHLORIDE, AND CALCIUM CHLORIDE: 600; 310; 30; 20 INJECTION, SOLUTION INTRAVENOUS at 07:02

## 2020-02-12 RX ADMIN — OXYCODONE HYDROCHLORIDE 5 MG: 5 TABLET ORAL at 11:02

## 2020-02-12 RX ADMIN — HYDROMORPHONE HYDROCHLORIDE 0.4 MG: 2 INJECTION, SOLUTION INTRAMUSCULAR; INTRAVENOUS; SUBCUTANEOUS at 12:02

## 2020-02-12 RX ADMIN — CARBOXYMETHYLCELLULOSE SODIUM 2 DROP: 2.5 SOLUTION/ DROPS OPHTHALMIC at 08:02

## 2020-02-12 RX ADMIN — PREGABALIN 50 MG: 50 CAPSULE ORAL at 06:02

## 2020-02-12 RX ADMIN — LIDOCAINE HYDROCHLORIDE 75 MG: 20 INJECTION, SOLUTION INTRAVENOUS at 08:02

## 2020-02-12 RX ADMIN — SUCCINYLCHOLINE CHLORIDE 140 MG: 20 INJECTION, SOLUTION INTRAMUSCULAR; INTRAVENOUS at 08:02

## 2020-02-12 RX ADMIN — EPHEDRINE SULFATE 5 MG: 50 INJECTION INTRAMUSCULAR; INTRAVENOUS; SUBCUTANEOUS at 09:02

## 2020-02-12 RX ADMIN — MUPIROCIN: 20 OINTMENT TOPICAL at 06:02

## 2020-02-12 RX ADMIN — FENTANYL CITRATE 50 MCG: 50 INJECTION, SOLUTION INTRAMUSCULAR; INTRAVENOUS at 11:02

## 2020-02-12 RX ADMIN — ONDANSETRON 4 MG: 2 INJECTION INTRAMUSCULAR; INTRAVENOUS at 10:02

## 2020-02-12 RX ADMIN — ROCURONIUM BROMIDE 5 MG: 10 INJECTION, SOLUTION INTRAVENOUS at 08:02

## 2020-02-12 RX ADMIN — PROPOFOL 120 MG: 10 INJECTION, EMULSION INTRAVENOUS at 08:02

## 2020-02-12 NOTE — TRANSFER OF CARE
"Anesthesia Transfer of Care Note    Patient: Sam Pete    Procedure(s) Performed: Procedure(s) (LRB):  LAMINECTOMY, SPINE, MINIMALLY INVASIVE /  PLACEMENT OF SPINAL CORD STIMULATOR (N/A)    Patient location: PACU    Anesthesia Type: general    Transport from OR: Transported from OR on 2-3 L/min O2 by NC with adequate spontaneous ventilation    Post pain: adequate analgesia    Post assessment: no apparent anesthetic complications    Post vital signs: stable    Level of consciousness: awake    Nausea/Vomiting: no nausea/vomiting    Complications: none    Transfer of care protocol was followed      Last vitals:   Visit Vitals  /74   Pulse 79   Temp 36.9 °C (98.4 °F) (Oral)   Resp 16   Ht 5' 10" (1.778 m)   Wt 102.1 kg (225 lb)   SpO2 (!) 94%   BMI 32.28 kg/m²     "

## 2020-02-12 NOTE — ANESTHESIA PROCEDURE NOTES
Intubation  Performed by: Darlene Ornelas CRNA  Authorized by: Josie Burgos MD     Intubation:     Induction:  Intravenous    Intubated:  Postinduction    Mask Ventilation:  Very difficult with oral airway (two hands needed on mask/ difficult fit)    Attempts:  1    Attempted By:  CRNA    Method of Intubation:  Video laryngoscopy    Blade:  Michael 3    Laryngeal View Grade: Grade I - full view of chords      Difficult Airway Encountered?: No      Complications:  None    Airway Device:  Oral endotracheal tube    Airway Device Size:  7.5    Style/Cuff Inflation:  Cuffed    Inflation Amount (mL):  4    Tube secured:  22    Secured at:  The lips    Placement Verified By:  Capnometry    Complicating Factors:  Obesity and poor neck/head extension    Findings Post-Intubation:  BS equal bilateral and atraumatic/condition of teeth unchanged

## 2020-02-12 NOTE — ANESTHESIA POSTPROCEDURE EVALUATION
Anesthesia Post Evaluation    Patient: Sam Pete    Procedure(s) Performed: Procedure(s) (LRB):  LAMINECTOMY, SPINE, MINIMALLY INVASIVE /  PLACEMENT OF SPINAL CORD STIMULATOR (N/A)    Final Anesthesia Type: general    Patient location during evaluation: PACU  Patient participation: Yes- Able to Participate  Level of consciousness: oriented and awake  Post-procedure vital signs: reviewed and stable  Pain management: adequate  Airway patency: patent    PONV status at discharge: No PONV  Anesthetic complications: no      Cardiovascular status: hemodynamically stable  Respiratory status: unassisted, spontaneous ventilation and room air  Hydration status: euvolemic  Follow-up not needed.          Vitals Value Taken Time   /84 2/12/2020  1:39 PM   Temp 36.6 °C (97.9 °F) 2/12/2020 12:35 PM   Pulse 68 2/12/2020  1:50 PM   Resp 16 2/12/2020  1:35 PM   SpO2 100 % 2/12/2020  1:50 PM   Vitals shown include unvalidated device data.      Event Time     Out of Recovery 12:30:23          Pain/Forrest Score: Pain Rating Prior to Med Admin: 7 (2/12/2020 12:06 PM)  Pain Rating Post Med Admin: 7 (states that pain is tolerabe, same as pre op) (2/12/2020 12:10 PM)  Forrest Score: 10 (2/12/2020  1:35 PM)

## 2020-02-12 NOTE — DISCHARGE INSTRUCTIONS
Neurosurgery Patient Information. Please follow ONLY the instructions that are checked below.    Activity Restrictions:  [x]  Return to work will be determined on an individual basis.  [x]  No lifting greater than 5-10 pounds.  [x]  Avoid bending and twisting the area of your surgery more than 45 degrees from neutral position in any direction.  [x]  No driving or operating machinery:  [x]  until cleared by your surgeon.  [x]  while taking narcotic pain medications or muscle relaxants.  []  Wear your brace at all times except when lying in bed, showering, using the restroom, or performing hygiene tasks.   [x]  Increase ambulation over the next 2 weeks so that you are walking 2 miles per day at 2 weeks post-operatively.  [x]  Walk on paved surfaces only. It is okay to walk up and down stairs while holding onto a side rail.  [x]  No sexual activity for 6 weeks.    Discharge Medication/Follow-up:  [x]  Please refer to discharge medication reconciliation form.  [x]  Do not take ANY Aspirin or Aspirin containing products for 2 weeks after surgery.   [x]  Do not take ANY herbal supplements for 2 weeks after surgery.    [x]  Do not take ANY non-steroidal anti-inflammatory drugs (NSAIDS), including the following: ibuprofen, naprosyn, Aleve, Advil, Indocin, Mobic, or Celebrex for 6 weeks after surgery.   [x]  Prescriptions for appropriate medication will be given upon discharge.  [x]  Take docusate (Colace 100 mg): take one capsule a day as needed for constipation. You can get this over the counter.  [x]  Follow-up appointment:  [x]  10-14 days post-op for wound check by physician assistant/nurse  [x]  4-6 weeks with MD:  []  with x-rays  [x]  An appointment will be mailed to you.    Wound Care:  [x]  No bandage required. Keep your incision open to the air.  [x]  You may shower on the 2nd day after your surgery. Keep the incision clean and dry at all times. Please cover the incision while showering and REMOVE once you have  completed taking your shower. Do not allow the force of water to hit the incision. If the incision gets damp, pat it dry. Do not rub or scrub the incision.  [x]  You cannot take a bath until 8 weeks after surgery.  [x]  Apply bacitracin to incision twice a day for 2 weeks.    Call your doctor or go to the Emergency Room for any signs of infection, including: increased redness, drainage, pain, or fever (temperature ?101.5 for 24 hours). Call your doctor or go to the Emergency Room if there are any localized neurological changes; problems with speech, vision, numbness, tingling, weakness, or severe headache; or for other concerns.    Special Instructions:  [x]  No use of tobacco products.  [x]  Diet: Please eat a regular diet as tolerated.      Physicians need 3 days' notice for pain medicine to be refilled. Pain medicine will only be refilled between 8 AM and 5 PM, Monday through Friday, due to Food and Drug Administration regulation of documentation.    If you have any questions about this form, please call [phone].    Form No. 59981 (Revised 10/31/2013)      Discharge Instructions: After Your Surgery  Youve just had surgery. During surgery, you were given medicine called anesthesia to keep you relaxed and free of pain. After surgery, you may have some pain or nausea. This is common. Here are some tips for feeling better and getting well after surgery.     Stay on schedule with your medicine.   Going home  Your healthcare provider will show you how to take care of yourself when you go home. He or she will also answer your questions. Have an adult family member or friend drive you home. For the first 24 hours after your surgery:  · Do not drive or use heavy equipment.  · Do not make important decisions or sign legal papers.  · Do not drink alcohol.  · Have someone stay with you, if needed. He or she can watch for problems and help keep you safe.  Be sure to go to all follow-up visits with your healthcare  provider. And rest after your surgery for as long as your healthcare provider tells you to.    Coping with pain  If you have pain after surgery, pain medicine will help you feel better. Take it as told, before pain becomes severe. Also, ask your healthcare provider or pharmacist about other ways to control pain. This might be with heat, ice, or relaxation. And follow any other instructions your surgeon or nurse gives you.    Tips for taking pain medicine  To get the best relief possible, remember these points:  · Pain medicines can upset your stomach. Taking them with a little food may help.  · Most pain relievers taken by mouth need at least 20 to 30 minutes to start to work.  · Taking medicine on a schedule can help you remember to take it. Try to time your medicine so that you can take it before starting an activity. This might be before you get dressed, go for a walk, or sit down for dinner.  · Constipation is a common side effect of pain medicines. Call your healthcare provider before taking any medicines such as laxatives or stool softeners to help ease constipation. Also ask if you should skip any foods. Drinking lots of fluids and eating foods such as fruits and vegetables that are high in fiber can also help. Remember, do not take laxatives unless your surgeon has prescribed them.  · Drinking alcohol and taking pain medicine can cause dizziness and slow your breathing. It can even be deadly. Do not drink alcohol while taking pain medicine.  · Pain medicine can make you react more slowly to things. Do not drive or run machinery while taking pain medicine.  Your healthcare provider may tell you to take acetaminophen to help ease your pain. Ask him or her how much you are supposed to take each day. Acetaminophen or other pain relievers may interact with your prescription medicines or other over-the-counter (OTC) medicines. Some prescription medicines have acetaminophen and other ingredients. Using both  prescription and OTC acetaminophen for pain can cause you to overdose. Read the labels on your OTC medicines with care. This will help you to clearly know the list of ingredients, how much to take, and any warnings. It may also help you not take too much acetaminophen. If you have questions or do not understand the information, ask your pharmacist or healthcare provider to explain it to you before you take the OTC medicine.    Managing nausea  Some people have an upset stomach after surgery. This is often because of anesthesia, pain, or pain medicine, or the stress of surgery. These tips will help you handle nausea and eat healthy foods as you get better. If you were on a special food plan before surgery, ask your healthcare provider if you should follow it while you get better. These tips may help:  · Do not push yourself to eat. Your body will tell you when to eat and how much.  · Start off with clear liquids and soup. They are easier to digest.  · Next try semi-solid foods, such as mashed potatoes, applesauce, and gelatin, as you feel ready.  · Slowly move to solid foods. Dont eat fatty, rich, or spicy foods at first.  · Do not force yourself to have 3 large meals a day. Instead eat smaller amounts more often.  · Take pain medicines with a small amount of solid food, such as crackers or toast, to avoid nausea.     Call your surgeon if  · You still have pain an hour after taking medicine. The medicine may not be strong enough.  · You feel too sleepy, dizzy, or groggy. The medicine may be too strong.  · You have side effects like nausea, vomiting, or skin changes, such as rash, itching, or hives.       If you have obstructive sleep apnea  You were given anesthesia medicine during surgery to keep you comfortable and free of pain. After surgery, you may have more apnea spells because of this medicine and other medicines you were given. The spells may last longer than usual.   At home:  · Keep using the continuous  positive airway pressure (CPAP) device when you sleep. Unless your healthcare provider tells you not to, use it when you sleep, day or night. CPAP is a common device used to treat obstructive sleep apnea.  · Talk with your provider before taking any pain medicine, muscle relaxants, or sedatives. Your provider will tell you about the possible dangers of taking these medicines.    Date Last Reviewed: 12/1/2016  © 0170-3360 [employer] [address] [place] [address], [place] [address]. All rights reserved. This information is not intended as a substitute for professional medical care. Always follow your healthcare professional's instructions.

## 2020-02-12 NOTE — OP NOTE
DATE OF PROCEDURE: 2/12/2020     PREOPERATIVE DIAGNOSES:   Chronic pain syndrome, Lumbar post laminectomy syndrome.    POSTOPERATIVE DIAGNOSES:   Chronic pain syndrome, Lumbar post laminectomy syndrome.    PROCEDURES PERFORMED:   1. Minimally invasive approach for a T11-T12 laminotomy for placement of spinal   cord stimulator paddle electrode using microsurgical technique.   2. Placement of subcutaneous spinal cord stimulator pulse generator.     Surgeon(s) and Role:     * Darlene Max MD - Primary     * Darlene Rangel MD - resident, assisting    ANESTHESIA: General    ESTIMATED BLOOD LOSS: 25 mL.    INDICATION FOR PROCEDURE: Sam Pete is a 74 y.o.male    who complains of a longstanding history of low back pain and leg pain.    He underwent an L2 through S1 instrumentation and fusion 2 and half   years ago which failed to improve his pain.  He has also failed conservative   management.  Ultimately, he underwent a spinal cord stimulator trial   which brought him 80% relief of his back pain and leg pain.  Therefore,   the decision was made to bring the patient to the operating room for   permanent placement of a spinal cord stimulator paddle electrode and   pulse generator.    OPERATIVE NOTE: After obtaining informed consent, the patient was   brought into the Operating Room. he was intubated and anesthetized by   Anesthesia. Neuromonitoring needles were placed and baselines were   obtained. Preoperative antibiotics as well as dexamethasone were   administered. The patient was then flipped prone on the 4-post Jose   table. All appropriate pressure points were padded. Fluoroscopy was   brought into the field to confirm the appropriate level. Skin incision was   then marked over the T11-T12 interspace and a transverse incision   was marked across the right back. The patient was then prepped and  draped in the standard surgical fashion. The skin was incised in the   midline and using sharp dissection, the  incision was carried down   through the fascia. At this point, we used the METRx tube system to   dilate up to a 22 mm tube. This was docked just over the T11-T12   spinous processes. The operating microscope was then brought into   the field and using microsurgical technique, a subperiosteal dissection   was carried down the T11-T12 spinous processes bilaterally in order to   expose the T11 and T12 lamina. A high-speed drill was then used to drill  down the T11 spinous process to the level of the T12 lamina. Ligamentum   flavum was identified and this was removed using the curette as well as   Kerrison rongeurs. The exposure was carried out laterally enough to create   enough room to place our paddle electrode. The thecal sac was identified.   The spinal cord stimulator electrode paddle was then passed into the   epidural space. This passed easily without any downward pressure on   the thecal sac. We advanced the lead into the epidural space to what we   believed to be the T9-T10 level. AP fluoroscopy was used to identify the   position of the paddle electrode. This showed the electrode was indeed   placed at the T9-T10 level, but was slightly off midline to the right. We   made several additional attempts to place the electrode more midline and   ultimately, the final placement of the paddle electrode was at midline.   Given this, we were happy with the placement of the paddle electrode.   We then turned our attention to hemostasis. FloSeal and bipolar   electrocautery were used. Bone wax was used on the drilled bone surfaces.   Evicel was laid down in the laminotomy defect. The paddle was secured to   the T12 spinous process to ensure that it would not migrate. A relaxing loop   was also secured to the T12 spinous process. We then turned our attention   to the right back where the subcutaneous neurostimulator pulse generator   would be placed. A transverse incision was made in between the bottom   of the ribs and  the top of the iliac crest. A subcutaneous pocket was created   in the fat layer. A sharp tip passer was then used to tunnel from the thoracic   region down to the generator pocket. The electrodes were passed through   the cannula. The connectors were then attached to the pulse generator.   The battery was implanted subcutaneously. The device magnet was brought   into the field to ensure the impedances through the skin were within an   acceptable range. Both wounds were then copiously irrigated. Vancomycin   powder was placed in the wounds. The wounds were closed in layers. Sterile  dressings were put into place. The patient was flipped back supine onto the   stretcher and extubated by Anesthesia. He was brought to the Recovery Room   in stable condition. All counts were correct at the end of the case and   neuromonitoring remained stable throughout the case.

## 2020-02-12 NOTE — PLAN OF CARE
Sam LEVON Juan R has met all discharge criteria from Phase II. Vital Signs are stable, ambulating  without difficulty.Pain is now under control and tolerable for the pt. Pain score 5/10 at this time.  Discharge instructions given, patient verbalized understanding. Discharged from facility via wheelchair in stable condition.

## 2020-02-12 NOTE — INTERVAL H&P NOTE
The patient has been examined and the H&P has been reviewed:    I concur with the findings and no changes have occurred since H&P was written.    Anesthesia/Surgery risks, benefits and alternative options discussed and understood by patient/family.          Active Hospital Problems    Diagnosis  POA    Chronic back pain [M54.9, G89.29]  Yes      Resolved Hospital Problems   No resolved problems to display.

## 2020-02-12 NOTE — BRIEF OP NOTE
[facility]  Surgery Department  Operative Note    SUMMARY     Date of Procedure: 2/12/2020     Procedure: Procedure(s) (LRB):  LAMINECTOMY, SPINE, MINIMALLY INVASIVE /  PLACEMENT OF SPINAL CORD STIMULATOR (N/A)     Surgeon(s) and Role:     * Darlene Max MD - Primary     * Darlene Rangel MD - Resident Assisting  Assisting Surgeon: None    Pre-Operative Diagnosis: Chronic pain syndrome [G89.4]  Post laminectomy syndrome [M96.1]    Post-Operative Diagnosis: Post-Op Diagnosis Codes:     * Chronic pain syndrome [G89.4]     * Post laminectomy syndrome [M96.1]    Anesthesia: General    Technical Procedures Used: see full operative note    Description of the Findings of the Procedure: insertion of Nevro SCS paddle and battery      Complications: No    Estimated Blood Loss (EBL): * No values recorded between 2/12/2020  9:17 AM and 2/12/2020 11:12 AM *           Implants:   Implant Name Type Inv. Item Serial No.  Lot No. LRB No. Used   IPG kit   [id number] N/A 1   70 cm Surgical Lead Kit    [employer] [id number] N/A 1       Specimens:   Specimen (12h ago, onward)    None                  Condition: Stable    Disposition: PACU - hemodynamically stable.    Attestation: Dr Max was present and scrubbed for procedure.     Darlene Rangel MD  Neurosurgery  [facility]

## 2020-02-12 NOTE — TELEPHONE ENCOUNTER
----- Message from Darlene Rangel MD sent at 2/12/2020 11:14 AM CST -----  2 weeks wound check, thx!

## 2020-02-12 NOTE — PLAN OF CARE
Noe Florence   3/31/2017   Anticoagulation Therapy Visit    Description:  83 year old male   Provider:  Danya Edwards, RN   Department:  [facility]           INR as of 3/31/2017     Today's INR 1.88!      Anticoagulation Summary as of 3/31/2017     INR goal 2.0-3.0   Today's INR 1.88!   Full instructions 3/31: 7.5 mg; Otherwise 5 mg every day   Next INR check 4/7/2017    Indications   Atrial fibrillation (H) [I48.91] [I48.91]  Long-term (current) use of anticoagulants [Z79.01] [Z79.01]         March 2017 Details    Sun Mon Tue Wed Thu Fri Sat        1               2               3               4                 5               6               7               8               9               10               11                 12               13               14               15               16               17               18                 19               20               21               22               23               24               25                 26               27               28               29               30               31      7.5 mg   See details        Date Details   03/31 This INR check               How to take your warfarin dose     To take:  7.5 mg Take 1.5 of the 5 mg tablets.           April 2017 Details    Sun Mon Tue Wed Thu Fri Sat           1      5 mg           2      5 mg         3      5 mg         4      5 mg         5      5 mg         6      5 mg         7            8                 9               10               11               12               13               14               15                 16               17               18               19               20               21               22                 23               24               25               26               27               28               29                 30                      Date Details   No additional details    Date of next INR:  4/7/2017         How to take  Patient prefers to have Aysha present for discharge.   your warfarin dose     To take:  5 mg Take 1 of the 5 mg tablets.

## 2020-02-17 ENCOUNTER — OFFICE VISIT (OUTPATIENT)
Dept: FAMILY MEDICINE | Facility: CLINIC | Age: 75
End: 2020-02-17
Attending: FAMILY MEDICINE
Payer: MEDICARE

## 2020-02-17 VITALS
HEART RATE: 86 BPM | SYSTOLIC BLOOD PRESSURE: 112 MMHG | WEIGHT: 231.5 LBS | HEIGHT: 70 IN | OXYGEN SATURATION: 96 % | DIASTOLIC BLOOD PRESSURE: 68 MMHG | BODY MASS INDEX: 33.14 KG/M2 | RESPIRATION RATE: 12 BRPM | TEMPERATURE: 98 F

## 2020-02-17 DIAGNOSIS — M48.02 SPINAL STENOSIS IN CERVICAL REGION: ICD-10-CM

## 2020-02-17 DIAGNOSIS — F11.20 UNCOMPLICATED OPIOID DEPENDENCE: ICD-10-CM

## 2020-02-17 DIAGNOSIS — G89.29 OTHER CHRONIC PAIN: Primary | ICD-10-CM

## 2020-02-17 DIAGNOSIS — M48.061 SPINAL STENOSIS, LUMBAR REGION, WITHOUT NEUROGENIC CLAUDICATION: ICD-10-CM

## 2020-02-17 DIAGNOSIS — I48.0 PAF (PAROXYSMAL ATRIAL FIBRILLATION): ICD-10-CM

## 2020-02-17 DIAGNOSIS — I70.0 ATHEROSCLEROSIS OF AORTA: ICD-10-CM

## 2020-02-17 PROCEDURE — 1159F MED LIST DOCD IN RCRD: CPT | Mod: HCNC,S$GLB,, | Performed by: FAMILY MEDICINE

## 2020-02-17 PROCEDURE — 3078F PR MOST RECENT DIASTOLIC BLOOD PRESSURE < 80 MM HG: ICD-10-PCS | Mod: HCNC,CPTII,S$GLB, | Performed by: FAMILY MEDICINE

## 2020-02-17 PROCEDURE — 1125F PR PAIN SEVERITY QUANTIFIED, PAIN PRESENT: ICD-10-PCS | Mod: HCNC,S$GLB,, | Performed by: FAMILY MEDICINE

## 2020-02-17 PROCEDURE — 3078F DIAST BP <80 MM HG: CPT | Mod: HCNC,CPTII,S$GLB, | Performed by: FAMILY MEDICINE

## 2020-02-17 PROCEDURE — 3074F PR MOST RECENT SYSTOLIC BLOOD PRESSURE < 130 MM HG: ICD-10-PCS | Mod: HCNC,CPTII,S$GLB, | Performed by: FAMILY MEDICINE

## 2020-02-17 PROCEDURE — 99499 UNLISTED E&M SERVICE: CPT | Mod: HCNC,S$GLB,, | Performed by: FAMILY MEDICINE

## 2020-02-17 PROCEDURE — 99214 OFFICE O/P EST MOD 30 MIN: CPT | Mod: HCNC,S$GLB,, | Performed by: FAMILY MEDICINE

## 2020-02-17 PROCEDURE — 99999 PR PBB SHADOW E&M-EST. PATIENT-LVL III: CPT | Mod: PBBFAC,HCNC,, | Performed by: FAMILY MEDICINE

## 2020-02-17 PROCEDURE — 99214 PR OFFICE/OUTPT VISIT, EST, LEVL IV, 30-39 MIN: ICD-10-PCS | Mod: HCNC,S$GLB,, | Performed by: FAMILY MEDICINE

## 2020-02-17 PROCEDURE — 1101F PT FALLS ASSESS-DOCD LE1/YR: CPT | Mod: HCNC,CPTII,S$GLB, | Performed by: FAMILY MEDICINE

## 2020-02-17 PROCEDURE — 99999 PR PBB SHADOW E&M-EST. PATIENT-LVL III: ICD-10-PCS | Mod: PBBFAC,HCNC,, | Performed by: FAMILY MEDICINE

## 2020-02-17 PROCEDURE — 1101F PR PT FALLS ASSESS DOC 0-1 FALLS W/OUT INJ PAST YR: ICD-10-PCS | Mod: HCNC,CPTII,S$GLB, | Performed by: FAMILY MEDICINE

## 2020-02-17 PROCEDURE — 1125F AMNT PAIN NOTED PAIN PRSNT: CPT | Mod: HCNC,S$GLB,, | Performed by: FAMILY MEDICINE

## 2020-02-17 PROCEDURE — 1159F PR MEDICATION LIST DOCUMENTED IN MEDICAL RECORD: ICD-10-PCS | Mod: HCNC,S$GLB,, | Performed by: FAMILY MEDICINE

## 2020-02-17 PROCEDURE — 99499 RISK ADDL DX/OHS AUDIT: ICD-10-PCS | Mod: HCNC,S$GLB,, | Performed by: FAMILY MEDICINE

## 2020-02-17 PROCEDURE — 3074F SYST BP LT 130 MM HG: CPT | Mod: HCNC,CPTII,S$GLB, | Performed by: FAMILY MEDICINE

## 2020-02-17 RX ORDER — MORPHINE SULFATE 60 MG/1
60 TABLET, FILM COATED, EXTENDED RELEASE ORAL 2 TIMES DAILY
Qty: 60 TABLET | Refills: 0 | Status: SHIPPED | OUTPATIENT
Start: 2020-02-17 | End: 2020-05-06 | Stop reason: SDUPTHER

## 2020-02-17 RX ORDER — OXYCODONE AND ACETAMINOPHEN 10; 325 MG/1; MG/1
1 TABLET ORAL EVERY 4 HOURS PRN
Qty: 120 TABLET | Refills: 0 | Status: SHIPPED | OUTPATIENT
Start: 2020-03-17 | End: 2020-06-15

## 2020-02-17 RX ORDER — MORPHINE SULFATE 60 MG/1
60 TABLET, FILM COATED, EXTENDED RELEASE ORAL 2 TIMES DAILY
Qty: 60 TABLET | Refills: 0 | Status: SHIPPED | OUTPATIENT
Start: 2020-04-17 | End: 2020-06-15

## 2020-02-17 RX ORDER — MORPHINE SULFATE 60 MG/1
60 TABLET, FILM COATED, EXTENDED RELEASE ORAL 2 TIMES DAILY
Qty: 60 TABLET | Refills: 0 | Status: SHIPPED | OUTPATIENT
Start: 2020-03-17 | End: 2020-06-15

## 2020-02-17 RX ORDER — OXYCODONE AND ACETAMINOPHEN 10; 325 MG/1; MG/1
1 TABLET ORAL EVERY 4 HOURS PRN
Qty: 120 TABLET | Refills: 0 | Status: SHIPPED | OUTPATIENT
Start: 2020-04-17 | End: 2020-06-15

## 2020-02-17 RX ORDER — OXYCODONE AND ACETAMINOPHEN 10; 325 MG/1; MG/1
1 TABLET ORAL EVERY 4 HOURS PRN
Qty: 120 TABLET | Refills: 0 | Status: SHIPPED | OUTPATIENT
Start: 2020-02-17 | End: 2020-05-06 | Stop reason: SDUPTHER

## 2020-02-17 NOTE — PROGRESS NOTES
Subjective:       Patient ID: Sam Pete is a 74 y.o. male.    Chief Complaint: Follow-up    74-year-old male with a history of degenerative disc disease and spinal stenosis of cervical and lumbar spine who recently underwent implantation of a spinal stimulator on February 12th by .  He has a follow-up on February 27th to remove staples and it looks like the plan is to turn on the stimulator about a month after that.  He is still dependent on opioids for pain control at this time.   was checked with no inappropriate activity.  He has a history of paroxysmal atrial fibrillation an atherosclerosis of the aorta but apparently they had no problems with surgery regarding these.    Past Medical History:  No date: Anticoagulant long-term use      Comment:  ASA 81 mg  No date: Arthritis  No date: Cataract      Comment:  OU  No date: Chronic low back pain  2/8/2011: Complete rupture of rotator cuff  7/8/2015: DDD (degenerative disc disease), lumbar  9/9/2015: Degeneration of lumbar or lumbosacral intervertebral disc  No date: ED (erectile dysfunction)  No date: GERD (gastroesophageal reflux disease)  No date: Hypertension  6/26/2017: Lumbar pseudoarthrosis  6/26/2017: Lumbar stenosis  No date: Obesity  7/8/2015: Spondylosis of lumbar region without myelopathy or   radiculopathy  1997: Stroke      Comment:  basal ganglia, left sided weakness, resolved  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   Redundant tortuous colon, otherwise normal.  2/25/2019: COLONOSCOPY; N/A      Comment:  Procedure: COLONOSCOPY;  Surgeon: Gilbert Rodriguez Jr., MD;  Location: [facility];  Service: Endoscopy;                 Laterality: N/A;  No date: Epidural steroid injection      Comment:  Pain management  07/12/2004:  ESOPHAGOGASTRODUODENOSCOPY      Comment:  CHILO.  No date: FRACTURE SURGERY; Left      Comment:  wrist / forearm, total of 5  12/12/2019: INSERTION OF DORSAL COLUMN NERVE STIMULATOR FOR TRIAL; N/A      Comment:  Procedure: INSERTION, NEUROSTIMULATOR, SPINAL CORD,                DORSAL COLUMN, FOR TRIAL;  Surgeon: Evan Lyles MD;                 Location: [facility] OR;  Service: Pain Management;                 Laterality: N/A;  2-6-2013: JOINT REPLACEMENT      Comment:  ( rt hip 2007), left hip   No date: JOINT REPLACEMENT; Left      Comment:  total knee  No date: KNEE ARTHROSCOPY W/ DEBRIDEMENT      Comment:  bilateral knees , total of six  No date: KNEE SURGERY  2/12/2020: LAMINECTOMY USING MINIMALLY INVASIVE TECHNIQUE; N/A      Comment:  Procedure: LAMINECTOMY, SPINE, MINIMALLY INVASIVE /                 PLACEMENT OF SPINAL CORD STIMULATOR;  Surgeon: Darlene Max MD;  Location: [facility] OR;  Service: Neurosurgery;                 Laterality: N/A;  No date: Nervbe Block injection      Comment:  Pain management    Current Outpatient Medications on File Prior to Visit:  albuterol (PROVENTIL/VENTOLIN HFA) 90 mcg/actuation inhaler, Inhale 2 puffs into the lungs every 6 (six) hours as needed for Wheezing. Rescue, Disp: 18 g, Rfl: 0  amLODIPine (NORVASC) 10 MG tablet, TAKE 1 TABLET EVERY DAY, Disp: 90 tablet, Rfl: 3  ammonium lactate 12 % Crea, Apply 1 application topically 2 (two) times daily. (Patient taking differently: Apply 1 application topically 2 (two) times daily as needed. ), Disp: 140 g, Rfl: 11  atorvastatin (LIPITOR) 40 MG tablet, TAKE 1 TABLET EVERY DAY, Disp: 90 tablet, Rfl: 3  buPROPion (WELLBUTRIN XL) 150 MG TB24 tablet, TAKE 1 TABLET EVERY DAY, Disp: 90 tablet, Rfl: 1  cephALEXin (KEFLEX) 500 MG capsule, Take 1 capsule (500 mg total) by mouth 4 (four) times daily. for 5 days, Disp: 20 capsule, Rfl: 0  diazePAM (VALIUM) 5 MG tablet, Take 1 tablet (5 mg total) by mouth every 6 (six) hours as  needed for Anxiety., Disp: 30 tablet, Rfl: 0  gabapentin (NEURONTIN) 300 MG capsule, TAKE 1 CAPSULE TWICE DAILY AS NEEDED  FOR  BACK  PAIN  FLARE, Disp: 180 capsule, Rfl: 1  losartan (COZAAR) 50 MG tablet, TAKE 1 TABLET EVERY DAY, Disp: 90 tablet, Rfl: 3  omeprazole (PRILOSEC) 40 MG capsule, TAKE 1 CAPSULE EVERY DAY, Disp: 90 capsule, Rfl: 3  sildenafil (REVATIO) 20 mg Tab, Take 1-5 daily as needed for ED, Disp: 10 tablet, Rfl: 5  tiZANidine (ZANAFLEX) 4 MG tablet, TAKE 1 TABLET BY MOUTH EVERY 8 HOURS AS NEEDED, Disp: 60 tablet, Rfl: 2  (DISCONTINUED) morphine (MS CONTIN) 60 MG 12 hr tablet, Take 1 tablet (60 mg total) by mouth 2 (two) times daily., Disp: 60 tablet, Rfl: 0  (DISCONTINUED) oxyCODONE-acetaminophen (PERCOCET) 5-325 mg per tablet, Take 1 tablet by mouth every 4 (four) hours as needed for Pain., Disp: 30 tablet, Rfl: 0  naloxone (NARCAN) 1 mg/mL injection, Use as needed for respiratory depression (Patient not taking: Reported on 2/17/2020), Disp: 2 mL, Rfl: 1  (DISCONTINUED) oxyCODONE-acetaminophen (PERCOCET)  mg per tablet, Take 1 tablet by mouth every 4 (four) hours as needed for Pain. (Patient not taking: Reported on 2/17/2020), Disp: 120 tablet, Rfl: 0    No current facility-administered medications on file prior to visit.         Review of Systems   Respiratory: Negative for chest tightness and shortness of breath.    Cardiovascular: Negative for chest pain and palpitations.   Musculoskeletal: Positive for back pain and neck pain.       Objective:      Physical Exam   Constitutional: He is oriented to person, place, and time. He appears well-developed. No distress.   Good blood pressure control  Obese with a BMI of 33.2 is down 2.8 lb from his November 20, 2019 visit   HENT:   Head: Normocephalic and atraumatic.   Eyes: Pupils are equal, round, and reactive to light. EOM are normal. No scleral icterus.   Cardiovascular: Normal rate, regular rhythm and normal heart sounds. Exam reveals no  gallop and no friction rub.   No murmur heard.  Pulmonary/Chest: Effort normal and breath sounds normal.   Neurological: He is alert and oriented to person, place, and time.   Skin: He is not diaphoretic.   Psychiatric: He has a normal mood and affect. His behavior is normal. Judgment and thought content normal.   Nursing note and vitals reviewed.      Assessment:       1. Other chronic pain    2. Spinal stenosis, lumbar region, without neurogenic claudication    3. Spinal stenosis in cervical region    4. Uncomplicated opioid dependence    5. PAF (paroxysmal atrial fibrillation)    6. Atherosclerosis of aorta        Plan:       1. Other chronic pain  The  (Prescription Monitoring Program) website was checked with no inappropriate activity found.  - oxyCODONE-acetaminophen (PERCOCET)  mg per tablet; Take 1 tablet by mouth every 4 (four) hours as needed for Pain.  Dispense: 120 tablet; Refill: 0  - morphine (MS CONTIN) 60 MG 12 hr tablet; Take 1 tablet (60 mg total) by mouth 2 (two) times daily.  Dispense: 60 tablet; Refill: 0  - oxyCODONE-acetaminophen (PERCOCET)  mg per tablet; Take 1 tablet by mouth every 4 (four) hours as needed for Pain.  Dispense: 120 tablet; Refill: 0  - morphine (MS CONTIN) 60 MG 12 hr tablet; Take 1 tablet (60 mg total) by mouth 2 (two) times daily.  Dispense: 60 tablet; Refill: 0  - oxyCODONE-acetaminophen (PERCOCET)  mg per tablet; Take 1 tablet by mouth every 4 (four) hours as needed for Pain.  Dispense: 120 tablet; Refill: 0  - morphine (MS CONTIN) 60 MG 12 hr tablet; Take 1 tablet (60 mg total) by mouth 2 (two) times daily.  Dispense: 60 tablet; Refill: 0    2. Spinal stenosis, lumbar region, without neurogenic claudication  Stable, spinal stimulator implanted.  Still opioid dependent, hopefully will be able to wean him off of some of his medication once it becomes effective    3. Spinal stenosis in cervical region    4. Uncomplicated opioid dependence  -  oxyCODONE-acetaminophen (PERCOCET)  mg per tablet; Take 1 tablet by mouth every 4 (four) hours as needed for Pain.  Dispense: 120 tablet; Refill: 0  - morphine (MS CONTIN) 60 MG 12 hr tablet; Take 1 tablet (60 mg total) by mouth 2 (two) times daily.  Dispense: 60 tablet; Refill: 0  - oxyCODONE-acetaminophen (PERCOCET)  mg per tablet; Take 1 tablet by mouth every 4 (four) hours as needed for Pain.  Dispense: 120 tablet; Refill: 0  - morphine (MS CONTIN) 60 MG 12 hr tablet; Take 1 tablet (60 mg total) by mouth 2 (two) times daily.  Dispense: 60 tablet; Refill: 0  - oxyCODONE-acetaminophen (PERCOCET)  mg per tablet; Take 1 tablet by mouth every 4 (four) hours as needed for Pain.  Dispense: 120 tablet; Refill: 0  - morphine (MS CONTIN) 60 MG 12 hr tablet; Take 1 tablet (60 mg total) by mouth 2 (two) times daily.  Dispense: 60 tablet; Refill: 0    5. PAF (paroxysmal atrial fibrillation)  Currently normal sinus rhythm    6. Atherosclerosis of aorta  Asymptomatic

## 2020-02-27 ENCOUNTER — CLINICAL SUPPORT (OUTPATIENT)
Dept: NEUROSURGERY | Facility: CLINIC | Age: 75
End: 2020-02-27
Payer: MEDICARE

## 2020-02-27 VITALS
TEMPERATURE: 98 F | SYSTOLIC BLOOD PRESSURE: 124 MMHG | HEART RATE: 93 BPM | BODY MASS INDEX: 34.54 KG/M2 | DIASTOLIC BLOOD PRESSURE: 64 MMHG | WEIGHT: 240.69 LBS

## 2020-02-27 PROCEDURE — 99999 PR PBB SHADOW E&M-EST. PATIENT-LVL III: ICD-10-PCS | Mod: PBBFAC,HCNC,,

## 2020-02-27 PROCEDURE — 99999 PR PBB SHADOW E&M-EST. PATIENT-LVL III: CPT | Mod: PBBFAC,HCNC,,

## 2020-02-27 NOTE — PROGRESS NOTES
Patient seen in clinic for 2 week post op s/p SCS placement 02/12/2020 with Dr Max            Incision on thoracic spine and right back assessed, removed staples , patient tolerated well, no swelling or purulent drainage, edges well approximated.     Patient was instructed as follows:    Discontinue Bacitracin after tonight.   May shower normally but pat dry after shower.   Do not submerge wound in bath tub or go swimming until released by the physician   Keep incision clean, dry and open to air as much as possible.   Patient encouraged to walk as much as possible but advised to walk with family member or friend and rest as necessary.   No lifting >10lbs.   Avoid bending and twisting the area of your surgery more than 45 degrees from neutral position in any direction.    A copy of post-operative instructions provided to the patient.    All questions were answered. Patient will follow up with Dr Max 03/27/20  . Patient was encouraged to call clinic with any future concerns prior to follow up appt. If any worsening symptoms, patient should report to ED.       Paola Rincon RN, BSN  Neurosurgery

## 2020-03-19 ENCOUNTER — OFFICE VISIT (OUTPATIENT)
Dept: URGENT CARE | Facility: CLINIC | Age: 75
End: 2020-03-19
Payer: MEDICARE

## 2020-03-19 DIAGNOSIS — J06.9 VIRAL URI WITH COUGH: Primary | ICD-10-CM

## 2020-03-19 DIAGNOSIS — J02.9 SORE THROAT: ICD-10-CM

## 2020-03-19 DIAGNOSIS — R68.89 FLU-LIKE SYMPTOMS: ICD-10-CM

## 2020-03-19 LAB
CTP QC/QA: YES
CTP QC/QA: YES
FLUAV AG NPH QL: NEGATIVE
FLUBV AG NPH QL: NEGATIVE
MOLECULAR STREP A: NEGATIVE

## 2020-03-19 PROCEDURE — 87651 POCT STREP A MOLECULAR: ICD-10-PCS | Mod: QW,S$GLB,, | Performed by: PHYSICIAN ASSISTANT

## 2020-03-19 PROCEDURE — 99214 PR OFFICE/OUTPT VISIT, EST, LEVL IV, 30-39 MIN: ICD-10-PCS | Mod: 25,S$GLB,, | Performed by: PHYSICIAN ASSISTANT

## 2020-03-19 PROCEDURE — 87804 POCT INFLUENZA A/B: ICD-10-PCS | Mod: QW,S$GLB,, | Performed by: PHYSICIAN ASSISTANT

## 2020-03-19 PROCEDURE — 87804 INFLUENZA ASSAY W/OPTIC: CPT | Mod: QW,S$GLB,, | Performed by: PHYSICIAN ASSISTANT

## 2020-03-19 PROCEDURE — 87651 STREP A DNA AMP PROBE: CPT | Mod: QW,S$GLB,, | Performed by: PHYSICIAN ASSISTANT

## 2020-03-19 PROCEDURE — 99214 OFFICE O/P EST MOD 30 MIN: CPT | Mod: 25,S$GLB,, | Performed by: PHYSICIAN ASSISTANT

## 2020-03-19 RX ORDER — LORATADINE 10 MG/1
10 TABLET ORAL DAILY
Qty: 30 TABLET | Refills: 0 | Status: SHIPPED | OUTPATIENT
Start: 2020-03-19 | End: 2020-06-15

## 2020-03-19 RX ORDER — FLUTICASONE PROPIONATE 50 MCG
1 SPRAY, SUSPENSION (ML) NASAL 2 TIMES DAILY
Qty: 16 ML | Refills: 0 | Status: SHIPPED | OUTPATIENT
Start: 2020-03-19 | End: 2022-03-03

## 2020-03-19 RX ORDER — BENZONATATE 100 MG/1
200 CAPSULE ORAL 3 TIMES DAILY PRN
Qty: 30 CAPSULE | Refills: 0 | Status: SHIPPED | OUTPATIENT
Start: 2020-03-19 | End: 2020-03-25 | Stop reason: SDUPTHER

## 2020-03-19 NOTE — PROGRESS NOTES
Subjective:       Patient ID: Sam Pete is a 74 y.o. male.    Vitals:  vitals were not taken for this visit.     Chief Complaint: Cough    Patient presents to urgent care for cough, sore throat and body aches x 1 week. Patient currently denies fever, chills, active CP, SOB, wheezing, abdominal pain, N/V/D/C or headache. No recent travel. No known direct contact with confirmed/presumed positive COVID-19. Patient reports that he lives at home with his wife. Patient has not taken anything OTC for symptoms prior to arrival.    Cough   This is a new problem. The current episode started in the past 7 days. The problem has been gradually worsening. The problem occurs constantly. The cough is productive of sputum. Associated symptoms include myalgias, nasal congestion, postnasal drip and a sore throat. Pertinent negatives include no chest pain, chills, ear congestion, ear pain, eye redness, fever, headaches, heartburn, hemoptysis, rash, rhinorrhea, shortness of breath, sweats, weight loss or wheezing. Nothing aggravates the symptoms. He has tried nothing for the symptoms. His past medical history is significant for bronchitis. There is no history of asthma or COPD.       Constitution: Negative for activity change, appetite change, chills, sweating, fatigue and fever.   HENT: Positive for congestion, postnasal drip and sore throat. Negative for ear pain, drooling, nosebleeds, foreign body in nose, sinus pain, sinus pressure, trouble swallowing and voice change.    Neck: Negative for neck pain, neck stiffness, painful lymph nodes and neck swelling.   Cardiovascular: Negative for chest pain, leg swelling, palpitations, sob on exertion and passing out.   Eyes: Negative for eye discharge, eye itching, eye pain, eye redness and eyelid swelling.   Respiratory: Positive for cough. Negative for chest tightness, sputum production, bloody sputum, shortness of breath, stridor, wheezing and asthma.    Gastrointestinal: Negative  for abdominal pain, abdominal bloating, nausea, vomiting, constipation, diarrhea and heartburn.   Genitourinary: Negative for dysuria.   Musculoskeletal: Positive for muscle ache. Negative for joint pain, joint swelling, abnormal ROM of joint, back pain, pain with walking and muscle cramps.   Skin: Negative for rash and hives.   Allergic/Immunologic: Negative for seasonal allergies, asthma, hives, itching and sneezing.   Neurological: Negative for dizziness, light-headedness, passing out, facial drooping, speech difficulty, loss of balance, headaches, altered mental status, loss of consciousness and seizures.   Hematologic/Lymphatic: Negative for swollen lymph nodes.   Psychiatric/Behavioral: Negative for altered mental status and nervous/anxious. The patient is not nervous/anxious.        Objective:      Physical Exam    Patient was seen remotely due to state of emergency for the COVID-19 outbreak.    Results for orders placed or performed in visit on 03/19/20   POCT Influenza A/B   Result Value Ref Range    Rapid Influenza A Ag Negative Negative    Rapid Influenza B Ag Negative Negative     Acceptable Yes    POCT Strep A, Molecular   Result Value Ref Range    Molecular Strep A, POC Negative Negative     Acceptable Yes         Assessment:       1. Viral URI with cough    2. Flu-like symptoms    3. Sore throat        Plan:     Discussed NEGATIVE strep and flu tests done in clinic today. Discussed with patient that he does not meet current testing criteria for COVID-19 testing. Advised patient to go home/quarantine, treat symptoms, avoid contact with others until symptoms are resolved. Info given for virtual visit, covid 19 information line, state info line. Strict ER precautions if trouble breathing- call ER prior to entry. Follow local/state guidelines per covid emergency. Patient aware, verbalized understanding and agreed with plan of care.    Viral URI with cough  -     benzonatate  (TESSALON PERLES) 100 MG capsule; Take 2 capsules (200 mg total) by mouth 3 (three) times daily as needed for Cough.  Dispense: 30 capsule; Refill: 0  -     fluticasone propionate (FLONASE ALLERGY RELIEF) 50 mcg/actuation nasal spray; 1 spray (50 mcg total) by Each Nostril route 2 (two) times daily.  Dispense: 16 mL; Refill: 0  -     loratadine (CLARITIN) 10 mg tablet; Take 1 tablet (10 mg total) by mouth once daily.  Dispense: 30 tablet; Refill: 0    Flu-like symptoms  -     POCT Influenza A/B    Sore throat  -     POCT Strep A, Molecular      Patient Instructions   If you were prescribed a narcotic or controlled medication, do not drive or operate heavy equipment or machinery while taking these medications.  You must understand that you've received an Urgent Care treatment only and that you may be released before all your medical problems are known or treated. You, the patient, will arrange for follow up care as instructed.  Follow up with your PCP or specialty clinic as directed if not improved or as needed. You can call [phone] to schedule an appointment with the appropriate provider.  If your condition worsens we recommend that you receive another evaluation at the Emergency Department for any concerns or worsening of condition.  Patient aware and verbalized understanding.    Your symptoms are viral in nature.  Increase fluids and rest is important.  Avoid contact with sick individuals.  Humidifier use at home.  Claritin or Zyrtec RX as prescribed daily for seasonal allergies.  Flonase Nasal Jamul RX as prescribed for nasal congestion/seasonal allergies.'  Tessalon Perles RX as prescribed for daytime cough.  OTC Tylenol every 4 - 6 hours as needed for fever or pain.  Follow-up with your PCP in the next 48hrs or sooner for re-evaluation especially if no improvement in symptoms.  Follow-up in the ER for any worsening of symptoms such as new fever, increasing ear pain, neck stiffness, shortness of breath,  etc.  Patient aware and verbalized understanding.    Discussed with patient that he does not meet current testing criteria for COVID-19 testing. Advised patient to go home/quarantine, treat symptoms, avoid contact with others until symptoms are resolved. Info given for virtual visit, covid 19 information line, state info line.Strict ER precautions if trouble breathing- call ER prior to entry. Follow local/state guidelines per covid emergency. Patient aware, verbalized understanding and agreed with plan of care.    Stay home/quarentine until symptoms resolved.  ER if worsening symptoms- call before entry.  IF NOT IMPROVING, FOLLOW UP WITH VIRTUAL ONLINE VISIT WITH A PROVIDER 24/7- FOR MORE INFORMATION OR TO DOWNLOAD THE [place], VISIT [email or web address]    FOR 24/7 NURSE ADVICE, CALL [phone]  FOR COVID 19 RELATED QUESTIONS, CALL THE [facility] covid hotline: [phone]    [facility] provider - [facility] ANYWHERE CARE:  [email or web address]/anywhere    [place] FOR UP TO DATE INFORMATION:  Text or dial 211, test keyword [id number] OR DIAL 211    HELPFUL EXTERNAL RESOURCES:  OFFICE OF PUBLIC HEALTH: [place]   Http://ldh.[place].gov/  1-418-925-4812    [facility] FOR DISEASE CONTROL  Https://www.cdc.gov/    WORLD HEALTH ORGANIZATION (WHO)  Https://www.who.int/      Viral Upper Respiratory Illness (Adult)  You have a viral upper respiratory illness (URI), which is another term for the common cold. This illness is contagious during the first few days. It is spread through the air by coughing and sneezing. It may also be spread by direct contact (touching the sick person and then touching your own eyes, nose, or mouth). Frequent handwashing will decrease risk of spread. Most viral illnesses go away within 7 to 10 days with rest and simple home remedies. Sometimes the illness may last for several weeks. Antibiotics will not kill a virus, and they are generally not prescribed for this condition.    Home care  · If  symptoms are severe, rest at home for the first 2 to 3 days. When you resume activity, don't let yourself get too tired.  · Avoid being exposed to cigarette smoke (yours or others).  · You may use acetaminophen or ibuprofen to control pain and fever, unless another medicine was prescribed. (Note: If you have chronic liver or kidney disease, have ever had a stomach ulcer or gastrointestinal bleeding, or are taking blood-thinning medicines, talk with your healthcare provider before using these medicines.) Aspirin should never be given to anyone under 18 years of age who is ill with a viral infection or fever. It may cause severe liver or brain damage.  · Your appetite may be poor, so a light diet is fine. Avoid dehydration by drinking 6 to 8 glasses of fluids per day (water, soft drinks, juices, tea, or soup). Extra fluids will help loosen secretions in the nose and lungs.  · Over-the-counter cold medicines will not shorten the length of time youre sick, but they may be helpful for the following symptoms: cough, sore throat, and nasal and sinus congestion. (Note: Do not use decongestants if you have high blood pressure.)  Follow-up care  Follow up with your healthcare provider, or as advised.  When to seek medical advice  Call your healthcare provider right away if any of these occur:  · Cough with lots of colored sputum (mucus)  · Severe headache; face, neck, or ear pain  · Difficulty swallowing due to throat pain  · Fever of 100.4°F (38°C)  Call 911, or get immediate medical care  Call emergency services right away if any of these occur:  · Chest pain, shortness of breath, wheezing, or difficulty breathing  · Coughing up blood  · Inability to swallow due to throat pain  Date Last Reviewed: 9/13/2015  © 7147-5287 [employer]. [address] [place] [address], [place] [address]. All rights reserved. This information is not intended as a substitute for professional medical care. Always follow your healthcare  professional's instructions.

## 2020-03-19 NOTE — PATIENT INSTRUCTIONS
If you were prescribed a narcotic or controlled medication, do not drive or operate heavy equipment or machinery while taking these medications.  You must understand that you've received an Urgent Care treatment only and that you may be released before all your medical problems are known or treated. You, the patient, will arrange for follow up care as instructed.  Follow up with your PCP or specialty clinic as directed if not improved or as needed. You can call [phone] to schedule an appointment with the appropriate provider.  If your condition worsens we recommend that you receive another evaluation at the Emergency Department for any concerns or worsening of condition.  Patient aware and verbalized understanding.    Your symptoms are viral in nature.  Increase fluids and rest is important.  Avoid contact with sick individuals.  Humidifier use at home.  Claritin or Zyrtec RX as prescribed daily for seasonal allergies.  Flonase Nasal Holt RX as prescribed for nasal congestion/seasonal allergies.'  Tessalon Perles RX as prescribed for daytime cough.  OTC Tylenol every 4 - 6 hours as needed for fever or pain.  Follow-up with your PCP in the next 48hrs or sooner for re-evaluation especially if no improvement in symptoms.  Follow-up in the ER for any worsening of symptoms such as new fever, increasing ear pain, neck stiffness, shortness of breath, etc.  Patient aware and verbalized understanding.    Discussed with patient that he does not meet current testing criteria for COVID-19 testing. Advised patient to go home/quarantine, treat symptoms, avoid contact with others until symptoms are resolved. Info given for virtual visit, covid 19 information line, state info line.Strict ER precautions if trouble breathing- call ER prior to entry. Follow local/state guidelines per covid emergency. Patient aware, verbalized understanding and agreed with plan of care.    Stay home/quarentine until symptoms resolved.  ER if  worsening symptoms- call before entry.  IF NOT IMPROVING, FOLLOW UP WITH VIRTUAL ONLINE VISIT WITH A PROVIDER 24/7- FOR MORE INFORMATION OR TO DOWNLOAD THE [place], VISIT [email or web address]    FOR 24/7 NURSE ADVICE, CALL [phone]  FOR COVID 19 RELATED QUESTIONS, CALL THE [facility] covid hotline: [phone]    Virtual visit with provider - [facility] ANYWHERE CARE:  [email or web address]    [place] FOR UP TO DATE INFORMATION:  Text or dial 211, test keyword LACOVID TO [id number] OR DIAL 211    HELPFUL EXTERNAL RESOURCES:  OFFICE OF PUBLIC HEALTH: [place]   Http://ldh.la.gov/  1-867.354.8307    [facility] FOR DISEASE CONTROL  Https://www.cdc.gov/    WORLD HEALTH ORGANIZATION (WHO)  Https://www.who.int/      Viral Upper Respiratory Illness (Adult)  You have a viral upper respiratory illness (URI), which is another term for the common cold. This illness is contagious during the first few days. It is spread through the air by coughing and sneezing. It may also be spread by direct contact (touching the sick person and then touching your own eyes, nose, or mouth). Frequent handwashing will decrease risk of spread. Most viral illnesses go away within 7 to 10 days with rest and simple home remedies. Sometimes the illness may last for several weeks. Antibiotics will not kill a virus, and they are generally not prescribed for this condition.    Home care  · If symptoms are severe, rest at home for the first 2 to 3 days. When you resume activity, don't let yourself get too tired.  · Avoid being exposed to cigarette smoke (yours or others).  · You may use acetaminophen or ibuprofen to control pain and fever, unless another medicine was prescribed. (Note: If you have chronic liver or kidney disease, have ever had a stomach ulcer or gastrointestinal bleeding, or are taking blood-thinning medicines, talk with your healthcare provider before using these medicines.) Aspirin should never be given to anyone under 18 years of age who is  ill with a viral infection or fever. It may cause severe liver or brain damage.  · Your appetite may be poor, so a light diet is fine. Avoid dehydration by drinking 6 to 8 glasses of fluids per day (water, soft drinks, juices, tea, or soup). Extra fluids will help loosen secretions in the nose and lungs.  · Over-the-counter cold medicines will not shorten the length of time youre sick, but they may be helpful for the following symptoms: cough, sore throat, and nasal and sinus congestion. (Note: Do not use decongestants if you have high blood pressure.)  Follow-up care  Follow up with your healthcare provider, or as advised.  When to seek medical advice  Call your healthcare provider right away if any of these occur:  · Cough with lots of colored sputum (mucus)  · Severe headache; face, neck, or ear pain  · Difficulty swallowing due to throat pain  · Fever of 100.4°F (38°C)  Call 911, or get immediate medical care  Call emergency services right away if any of these occur:  · Chest pain, shortness of breath, wheezing, or difficulty breathing  · Coughing up blood  · Inability to swallow due to throat pain  Date Last Reviewed: 9/13/2015  © 4687-8586 [employer]. [address] [place] [address], [place] [address]. All rights reserved. This information is not intended as a substitute for professional medical care. Always follow your healthcare professional's instructions.

## 2020-03-23 ENCOUNTER — TELEPHONE (OUTPATIENT)
Dept: URGENT CARE | Facility: CLINIC | Age: 75
End: 2020-03-23

## 2020-03-25 ENCOUNTER — OFFICE VISIT (OUTPATIENT)
Dept: URGENT CARE | Facility: CLINIC | Age: 75
End: 2020-03-25
Payer: MEDICARE

## 2020-03-25 VITALS
OXYGEN SATURATION: 92 % | HEIGHT: 70 IN | HEART RATE: 76 BPM | RESPIRATION RATE: 20 BRPM | BODY MASS INDEX: 32.93 KG/M2 | TEMPERATURE: 98 F | WEIGHT: 230 LBS

## 2020-03-25 DIAGNOSIS — R05.9 COUGH: ICD-10-CM

## 2020-03-25 DIAGNOSIS — J06.9 VIRAL URI WITH COUGH: ICD-10-CM

## 2020-03-25 PROCEDURE — 99213 OFFICE O/P EST LOW 20 MIN: CPT | Mod: S$GLB,,, | Performed by: INTERNAL MEDICINE

## 2020-03-25 PROCEDURE — 99213 PR OFFICE/OUTPT VISIT, EST, LEVL III, 20-29 MIN: ICD-10-PCS | Mod: S$GLB,,, | Performed by: INTERNAL MEDICINE

## 2020-03-25 RX ORDER — ALBUTEROL SULFATE 90 UG/1
2 AEROSOL, METERED RESPIRATORY (INHALATION) EVERY 6 HOURS PRN
Qty: 18 G | Refills: 0 | Status: SHIPPED | OUTPATIENT
Start: 2020-03-25 | End: 2021-06-30 | Stop reason: SDUPTHER

## 2020-03-25 RX ORDER — BENZONATATE 100 MG/1
200 CAPSULE ORAL 3 TIMES DAILY PRN
Qty: 30 CAPSULE | Refills: 0 | Status: SHIPPED | OUTPATIENT
Start: 2020-03-25 | End: 2020-04-04

## 2020-03-25 NOTE — PROGRESS NOTES
"Subjective:       Patient ID: Sam Pete is a 74 y.o. male.    Vitals:  height is 5' 10" (1.778 m) and weight is 104.3 kg (230 lb). His temperature is 97.7 °F (36.5 °C). His pulse is 76. His respiration is 20 and oxygen saturation is 92% (abnormal).     Chief Complaint: Cough    Pt presents today with cough that at 1500, and 2 episodes of emesis.    Cough   This is a new problem. The current episode started today. The problem has been gradually worsening. The problem occurs every few minutes. The cough is non-productive. Associated symptoms include rhinorrhea. Pertinent negatives include no chest pain, chills, ear congestion, ear pain, fever, headaches, heartburn, hemoptysis, myalgias, nasal congestion, postnasal drip, rash, sore throat, shortness of breath, sweats, weight loss or wheezing. Nothing aggravates the symptoms. He has tried nothing for the symptoms. The treatment provided no relief. There is no history of asthma, bronchiectasis, bronchitis, COPD, emphysema, environmental allergies or pneumonia.       Constitution: Negative for chills and fever.   HENT: Negative for ear pain, postnasal drip and sore throat.    Cardiovascular: Negative for chest pain.   Respiratory: Positive for cough. Negative for bloody sputum, shortness of breath and wheezing.    Gastrointestinal: Negative for heartburn.   Musculoskeletal: Negative for muscle ache.   Skin: Negative for rash.   Allergic/Immunologic: Negative for environmental allergies.   Neurological: Negative for headaches.       Objective:      Physical Exam      Patient was seen remotely due to state of emergency for the COVID 19 outbreak.    Assessment:       1. Cough    2. Viral URI with cough        Plan:         Cough    Viral URI with cough  -     albuterol (PROVENTIL/VENTOLIN HFA) 90 mcg/actuation inhaler; Inhale 2 puffs into the lungs every 6 (six) hours as needed for Wheezing. Rescue  Dispense: 18 g; Refill: 0  -     benzonatate (TESSALON PERLES) 100 MG " capsule; Take 2 capsules (200 mg total) by mouth 3 (three) times daily as needed for Cough.  Dispense: 30 capsule; Refill: 0      Patient Instructions   If your condition worsens we recommend that you receive another evaluation at the emergency room immediately or contact your primary medical clinics after hours call service to discuss your concerns. You must understand that you've received an Urgent Care treatment only and that you may be released before all of your medical problems are known or treated. You, the patient, will arrange for follow up care as instructed.  Drink plenty of Fluids  Wash hands frequently using mild antibacterial soap lathering for at least 15 seconds then rinse  Get plenty of Rest  Follow up in 1-2 weeks with Primary Care physician if not significantly better.   If you are not allergic please take Tylenol every 4-6 hours as needed and/or Ibuprofen every 6-8 hours as needed, over the counter for pain or fever.Guidelines for General Prevention of COVID-19    o Take steps to protect yourself from COVID-19. Perform hand hygiene frequently. Wash your hands often with soap and water for at least 20 seconds of use and alcohol-based hand , covering all surfaces of your hands and rubbing them together until they feel dry.  o Avoid touching your eyes, nose, and mouth with unwashed hands.  o Avoid close contact with people and stay home if youre sick, except to get medical care.   o Cover coughs and sneezes with a tissue, or use the inside of your elbow. Immediately wash your hands or use hand .     For more information, see CDC link below:    https://www.cdc.gov/coronavirus/2019-ncov/hcp/guidance-prevent-spread.html#precautions

## 2020-03-25 NOTE — PATIENT INSTRUCTIONS
If your condition worsens we recommend that you receive another evaluation at the emergency room immediately or contact your primary medical clinics after hours call service to discuss your concerns. You must understand that you've received an Urgent Care treatment only and that you may be released before all of your medical problems are known or treated. You, the patient, will arrange for follow up care as instructed.  Drink plenty of Fluids  Wash hands frequently using mild antibacterial soap lathering for at least 15 seconds then rinse  Get plenty of Rest  Follow up in 1-2 weeks with Primary Care physician if not significantly better.   If you are not allergic please take Tylenol every 4-6 hours as needed and/or Ibuprofen every 6-8 hours as needed, over the counter for pain or fever.Guidelines for General Prevention of COVID-19    o Take steps to protect yourself from COVID-19. Perform hand hygiene frequently. Wash your hands often with soap and water for at least 20 seconds of use and alcohol-based hand , covering all surfaces of your hands and rubbing them together until they feel dry.  o Avoid touching your eyes, nose, and mouth with unwashed hands.  o Avoid close contact with people and stay home if youre sick, except to get medical care.   o Cover coughs and sneezes with a tissue, or use the inside of your elbow. Immediately wash your hands or use hand .     For more information, see CDC link below:    https://www.cdc.gov/coronavirus/2019-ncov/hcp/guidance-prevent-spread.html#precautions

## 2020-04-01 ENCOUNTER — TELEPHONE (OUTPATIENT)
Dept: URGENT CARE | Facility: CLINIC | Age: 75
End: 2020-04-01

## 2020-04-01 DIAGNOSIS — R05.9 COUGH: Primary | ICD-10-CM

## 2020-04-01 RX ORDER — BENZONATATE 200 MG/1
200 CAPSULE ORAL 3 TIMES DAILY PRN
Qty: 30 CAPSULE | Refills: 0 | Status: SHIPPED | OUTPATIENT
Start: 2020-04-01 | End: 2020-04-11

## 2020-04-01 NOTE — TELEPHONE ENCOUNTER
Patient requesting additional cough medication. Advised and recommended patient return to  for evaluation and ER precautions. Tessalon rx sent to pharmacy.    WDL

## 2020-05-05 ENCOUNTER — PATIENT MESSAGE (OUTPATIENT)
Dept: ADMINISTRATIVE | Facility: HOSPITAL | Age: 75
End: 2020-05-05

## 2020-05-06 DIAGNOSIS — G89.29 OTHER CHRONIC PAIN: ICD-10-CM

## 2020-05-06 DIAGNOSIS — F11.20 UNCOMPLICATED OPIOID DEPENDENCE: ICD-10-CM

## 2020-05-06 RX ORDER — MORPHINE SULFATE 60 MG/1
60 TABLET, FILM COATED, EXTENDED RELEASE ORAL 2 TIMES DAILY
Qty: 60 TABLET | Refills: 0 | Status: SHIPPED | OUTPATIENT
Start: 2020-05-15 | End: 2020-06-15

## 2020-05-06 RX ORDER — OXYCODONE AND ACETAMINOPHEN 10; 325 MG/1; MG/1
1 TABLET ORAL EVERY 4 HOURS PRN
Qty: 120 TABLET | Refills: 0 | Status: SHIPPED | OUTPATIENT
Start: 2020-05-15 | End: 2020-06-15

## 2020-05-06 NOTE — TELEPHONE ENCOUNTER
Rx on Pina's desk.  Both were last filled on 4/17/20.  I moved them up to 5/15/20 but the pharmacy may not let him fill them that early.

## 2020-05-20 DIAGNOSIS — M25.551 RIGHT HIP PAIN: Primary | ICD-10-CM

## 2020-05-25 ENCOUNTER — OFFICE VISIT (OUTPATIENT)
Dept: ORTHOPEDICS | Facility: CLINIC | Age: 75
End: 2020-05-25
Payer: MEDICARE

## 2020-05-25 ENCOUNTER — HOSPITAL ENCOUNTER (OUTPATIENT)
Dept: RADIOLOGY | Facility: HOSPITAL | Age: 75
Discharge: HOME | End: 2020-05-25
Attending: ORTHOPAEDIC SURGERY
Payer: MEDICARE

## 2020-05-25 VITALS — HEIGHT: 70 IN | BODY MASS INDEX: 32.93 KG/M2 | WEIGHT: 230 LBS

## 2020-05-25 DIAGNOSIS — M25.551 RIGHT HIP PAIN: ICD-10-CM

## 2020-05-25 DIAGNOSIS — M25.551 RIGHT HIP PAIN: Primary | ICD-10-CM

## 2020-05-25 PROCEDURE — 99213 PR OFFICE/OUTPT VISIT, EST, LEVL III, 20-29 MIN: ICD-10-PCS | Mod: HCNC,S$GLB,, | Performed by: ORTHOPAEDIC SURGERY

## 2020-05-25 PROCEDURE — 1159F MED LIST DOCD IN RCRD: CPT | Mod: HCNC,S$GLB,, | Performed by: ORTHOPAEDIC SURGERY

## 2020-05-25 PROCEDURE — 1101F PR PT FALLS ASSESS DOC 0-1 FALLS W/OUT INJ PAST YR: ICD-10-PCS | Mod: HCNC,CPTII,S$GLB, | Performed by: ORTHOPAEDIC SURGERY

## 2020-05-25 PROCEDURE — 1159F PR MEDICATION LIST DOCUMENTED IN MEDICAL RECORD: ICD-10-PCS | Mod: HCNC,S$GLB,, | Performed by: ORTHOPAEDIC SURGERY

## 2020-05-25 PROCEDURE — 99213 OFFICE O/P EST LOW 20 MIN: CPT | Mod: HCNC,S$GLB,, | Performed by: ORTHOPAEDIC SURGERY

## 2020-05-25 PROCEDURE — 73502 X-RAY EXAM HIP UNI 2-3 VIEWS: CPT | Mod: 26,HCNC,RT, | Performed by: RADIOLOGY

## 2020-05-25 PROCEDURE — 1125F AMNT PAIN NOTED PAIN PRSNT: CPT | Mod: HCNC,S$GLB,, | Performed by: ORTHOPAEDIC SURGERY

## 2020-05-25 PROCEDURE — 73502 X-RAY EXAM HIP UNI 2-3 VIEWS: CPT | Mod: TC,HCNC,PO,RT

## 2020-05-25 PROCEDURE — 99999 PR PBB SHADOW E&M-EST. PATIENT-LVL II: ICD-10-PCS | Mod: PBBFAC,HCNC,, | Performed by: ORTHOPAEDIC SURGERY

## 2020-05-25 PROCEDURE — 73502 XR ORTHO PELVIS_HIP POST OP RIGHT: ICD-10-PCS | Mod: 26,HCNC,RT, | Performed by: RADIOLOGY

## 2020-05-25 PROCEDURE — 1101F PT FALLS ASSESS-DOCD LE1/YR: CPT | Mod: HCNC,CPTII,S$GLB, | Performed by: ORTHOPAEDIC SURGERY

## 2020-05-25 PROCEDURE — 1125F PR PAIN SEVERITY QUANTIFIED, PAIN PRESENT: ICD-10-PCS | Mod: HCNC,S$GLB,, | Performed by: ORTHOPAEDIC SURGERY

## 2020-05-25 PROCEDURE — 99999 PR PBB SHADOW E&M-EST. PATIENT-LVL II: CPT | Mod: PBBFAC,HCNC,, | Performed by: ORTHOPAEDIC SURGERY

## 2020-05-25 NOTE — PROGRESS NOTES
75 years old complaining pain in the right gluteal area for few months time.  No trauma.  Does have a history of a right-sided hip replacement 2008.  Also has history of a lumbar spine fusion as well as a left-sided hip replacement    Exam shows no signs infection, hip range of motion is painless, well-healed scar, straight leg raise testing weakly positive    X-rays show well placed components    Assessment lumbar radicular symptoms    Plan:  Home exercise program, follow-up as needed

## 2020-06-10 ENCOUNTER — DOCUMENTATION ONLY (OUTPATIENT)
Dept: FAMILY MEDICINE | Facility: CLINIC | Age: 75
End: 2020-06-10

## 2020-06-10 DIAGNOSIS — G89.29 OTHER CHRONIC PAIN: ICD-10-CM

## 2020-06-10 DIAGNOSIS — F11.20 UNCOMPLICATED OPIOID DEPENDENCE: ICD-10-CM

## 2020-06-10 RX ORDER — OXYCODONE AND ACETAMINOPHEN 10; 325 MG/1; MG/1
1 TABLET ORAL EVERY 4 HOURS PRN
Qty: 120 TABLET | Refills: 0 | Status: CANCELLED | OUTPATIENT
Start: 2020-06-10

## 2020-06-10 RX ORDER — MORPHINE SULFATE 60 MG/1
60 TABLET, FILM COATED, EXTENDED RELEASE ORAL 2 TIMES DAILY
Qty: 60 TABLET | Refills: 0 | Status: CANCELLED | OUTPATIENT
Start: 2020-06-10

## 2020-06-10 NOTE — PROGRESS NOTES
Pre-Visit Chart Review  For Appointment Scheduled on 6-15-20    There are no preventive care reminders to display for this patient.

## 2020-06-10 NOTE — TELEPHONE ENCOUNTER
Called patient and made appt- he says he has enough pain medication to last until he comes in 6/15

## 2020-06-15 ENCOUNTER — OFFICE VISIT (OUTPATIENT)
Dept: FAMILY MEDICINE | Facility: CLINIC | Age: 75
End: 2020-06-15
Attending: FAMILY MEDICINE
Payer: MEDICARE

## 2020-06-15 VITALS
OXYGEN SATURATION: 97 % | DIASTOLIC BLOOD PRESSURE: 68 MMHG | WEIGHT: 230.81 LBS | HEIGHT: 70 IN | HEART RATE: 81 BPM | SYSTOLIC BLOOD PRESSURE: 124 MMHG | RESPIRATION RATE: 12 BRPM | BODY MASS INDEX: 33.04 KG/M2 | TEMPERATURE: 98 F

## 2020-06-15 DIAGNOSIS — M48.02 SPINAL STENOSIS IN CERVICAL REGION: ICD-10-CM

## 2020-06-15 DIAGNOSIS — G89.29 OTHER CHRONIC PAIN: ICD-10-CM

## 2020-06-15 DIAGNOSIS — G89.4 CHRONIC PAIN SYNDROME: ICD-10-CM

## 2020-06-15 DIAGNOSIS — M48.061 SPINAL STENOSIS, LUMBAR REGION, WITHOUT NEUROGENIC CLAUDICATION: ICD-10-CM

## 2020-06-15 DIAGNOSIS — F11.20 UNCOMPLICATED OPIOID DEPENDENCE: Primary | ICD-10-CM

## 2020-06-15 PROCEDURE — 1159F PR MEDICATION LIST DOCUMENTED IN MEDICAL RECORD: ICD-10-PCS | Mod: HCNC,S$GLB,, | Performed by: FAMILY MEDICINE

## 2020-06-15 PROCEDURE — 99499 UNLISTED E&M SERVICE: CPT | Mod: HCNC,S$GLB,, | Performed by: FAMILY MEDICINE

## 2020-06-15 PROCEDURE — 1101F PT FALLS ASSESS-DOCD LE1/YR: CPT | Mod: HCNC,CPTII,S$GLB, | Performed by: FAMILY MEDICINE

## 2020-06-15 PROCEDURE — 99214 PR OFFICE/OUTPT VISIT, EST, LEVL IV, 30-39 MIN: ICD-10-PCS | Mod: HCNC,S$GLB,, | Performed by: FAMILY MEDICINE

## 2020-06-15 PROCEDURE — 1159F MED LIST DOCD IN RCRD: CPT | Mod: HCNC,S$GLB,, | Performed by: FAMILY MEDICINE

## 2020-06-15 PROCEDURE — 3074F PR MOST RECENT SYSTOLIC BLOOD PRESSURE < 130 MM HG: ICD-10-PCS | Mod: HCNC,CPTII,S$GLB, | Performed by: FAMILY MEDICINE

## 2020-06-15 PROCEDURE — 3078F PR MOST RECENT DIASTOLIC BLOOD PRESSURE < 80 MM HG: ICD-10-PCS | Mod: HCNC,CPTII,S$GLB, | Performed by: FAMILY MEDICINE

## 2020-06-15 PROCEDURE — 1101F PR PT FALLS ASSESS DOC 0-1 FALLS W/OUT INJ PAST YR: ICD-10-PCS | Mod: HCNC,CPTII,S$GLB, | Performed by: FAMILY MEDICINE

## 2020-06-15 PROCEDURE — 99499 RISK ADDL DX/OHS AUDIT: ICD-10-PCS | Mod: HCNC,S$GLB,, | Performed by: FAMILY MEDICINE

## 2020-06-15 PROCEDURE — 80307 DRUG TEST PRSMV CHEM ANLYZR: CPT | Mod: HCNC

## 2020-06-15 PROCEDURE — 99999 PR PBB SHADOW E&M-EST. PATIENT-LVL V: ICD-10-PCS | Mod: PBBFAC,HCNC,, | Performed by: FAMILY MEDICINE

## 2020-06-15 PROCEDURE — 99999 PR PBB SHADOW E&M-EST. PATIENT-LVL V: CPT | Mod: PBBFAC,HCNC,, | Performed by: FAMILY MEDICINE

## 2020-06-15 PROCEDURE — 3078F DIAST BP <80 MM HG: CPT | Mod: HCNC,CPTII,S$GLB, | Performed by: FAMILY MEDICINE

## 2020-06-15 PROCEDURE — 3074F SYST BP LT 130 MM HG: CPT | Mod: HCNC,CPTII,S$GLB, | Performed by: FAMILY MEDICINE

## 2020-06-15 PROCEDURE — 99214 OFFICE O/P EST MOD 30 MIN: CPT | Mod: HCNC,S$GLB,, | Performed by: FAMILY MEDICINE

## 2020-06-15 RX ORDER — OXYCODONE AND ACETAMINOPHEN 10; 325 MG/1; MG/1
1 TABLET ORAL EVERY 4 HOURS PRN
Qty: 120 TABLET | Refills: 0 | Status: SHIPPED | OUTPATIENT
Start: 2020-07-15 | End: 2020-08-25

## 2020-06-15 RX ORDER — OXYCODONE AND ACETAMINOPHEN 10; 325 MG/1; MG/1
1 TABLET ORAL EVERY 4 HOURS PRN
Qty: 120 TABLET | Refills: 0 | Status: SHIPPED | OUTPATIENT
Start: 2020-08-15 | End: 2020-08-25

## 2020-06-15 RX ORDER — OXYCODONE AND ACETAMINOPHEN 10; 325 MG/1; MG/1
1 TABLET ORAL EVERY 4 HOURS PRN
Qty: 120 TABLET | Refills: 0 | Status: SHIPPED | OUTPATIENT
Start: 2020-06-15 | End: 2020-08-25 | Stop reason: SDUPTHER

## 2020-06-15 RX ORDER — MORPHINE SULFATE 60 MG/1
60 TABLET, FILM COATED, EXTENDED RELEASE ORAL 2 TIMES DAILY
Qty: 60 TABLET | Refills: 0 | Status: SHIPPED | OUTPATIENT
Start: 2020-08-15 | End: 2020-08-25

## 2020-06-15 RX ORDER — NALOXONE HYDROCHLORIDE 4 MG/.1ML
1 SPRAY NASAL ONCE
Qty: 1 EACH | Refills: 0 | Status: SHIPPED | OUTPATIENT
Start: 2020-06-15 | End: 2020-06-15

## 2020-06-15 RX ORDER — MORPHINE SULFATE 60 MG/1
60 TABLET, FILM COATED, EXTENDED RELEASE ORAL 2 TIMES DAILY
Qty: 60 TABLET | Refills: 0 | Status: SHIPPED | OUTPATIENT
Start: 2020-06-15 | End: 2020-08-25 | Stop reason: SDUPTHER

## 2020-06-15 RX ORDER — MORPHINE SULFATE 60 MG/1
60 TABLET, FILM COATED, EXTENDED RELEASE ORAL 2 TIMES DAILY
Qty: 60 TABLET | Refills: 0 | Status: SHIPPED | OUTPATIENT
Start: 2020-07-15 | End: 2020-08-25

## 2020-06-15 RX ORDER — GABAPENTIN 300 MG/1
CAPSULE ORAL
Qty: 180 CAPSULE | Refills: 1 | Status: SHIPPED | OUTPATIENT
Start: 2020-06-15 | End: 2020-12-31

## 2020-06-15 NOTE — PROGRESS NOTES
Subjective:       Patient ID: Sam Pete is a 75 y.o. male.    Chief Complaint: Medication Refill    75-year-old male with a history of chronic pain secondary to degenerative joint and disc disease of lumbar spine with cervical and lumbar stenosis.  Patient has recently had lumbar surgery in a spinal stimulator and he is doing better, on some days he has not had to take any of the Percocet.  He thinks that by the next quarter we may be able to reduce the MS Contin somewhat.  He is not prepared to do it at this time.  He also needs a refill on his gabapentin and is due for a drug screen.   is checked with no inappropriate activity.  The opioids allow him to continue his ADLs as well is do some work around the home.  He has no problems with constipation or cognitive impairment.    Past Medical History:  No date: Anticoagulant long-term use      Comment:  ASA 81 mg  No date: Arthritis  No date: Cataract      Comment:  OU  No date: Chronic low back pain  2/8/2011: Complete rupture of rotator cuff  7/8/2015: DDD (degenerative disc disease), lumbar  9/9/2015: Degeneration of lumbar or lumbosacral intervertebral disc  No date: ED (erectile dysfunction)  No date: GERD (gastroesophageal reflux disease)  No date: Hypertension  6/26/2017: Lumbar pseudoarthrosis  6/26/2017: Lumbar stenosis  No date: Obesity  7/8/2015: Spondylosis of lumbar region without myelopathy or   radiculopathy  1997: Stroke      Comment:  basal ganglia, left sided weakness, resolved  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   Redundant tortuous colon, otherwise normal.  2/25/2019: COLONOSCOPY; N/A      Comment:  Procedure: COLONOSCOPY;  Surgeon: Gilbert Rodriguez Jr., MD;  Location: [facility];  Service: Endoscopy;                  Laterality: N/A;  No date: Epidural steroid injection      Comment:  Pain management  07/12/2004: ESOPHAGOGASTRODUODENOSCOPY      Comment:  CHILO.  No date: FRACTURE SURGERY; Left      Comment:  wrist / forearm, total of 5  12/12/2019: INSERTION OF DORSAL COLUMN NERVE STIMULATOR FOR TRIAL; N/A      Comment:  Procedure: INSERTION, NEUROSTIMULATOR, SPINAL CORD,                DORSAL COLUMN, FOR TRIAL;  Surgeon: Evan Lyles MD;                 Location: [facility] OR;  Service: Pain Management;                 Laterality: N/A;  2-6-2013: JOINT REPLACEMENT      Comment:  ( rt hip 2007), left hip   No date: JOINT REPLACEMENT; Left      Comment:  total knee  No date: KNEE ARTHROSCOPY W/ DEBRIDEMENT      Comment:  bilateral knees , total of six  No date: KNEE SURGERY  2/12/2020: LAMINECTOMY USING MINIMALLY INVASIVE TECHNIQUE; N/A      Comment:  Procedure: LAMINECTOMY, SPINE, MINIMALLY INVASIVE /                 PLACEMENT OF SPINAL CORD STIMULATOR;  Surgeon: Darlene Max MD;  Location: [facility] OR;  Service: Neurosurgery;                 Laterality: N/A;  No date: Nervbe Block injection      Comment:  Pain management    Current Outpatient Medications on File Prior to Visit:  albuterol (PROVENTIL/VENTOLIN HFA) 90 mcg/actuation inhaler, Inhale 2 puffs into the lungs every 6 (six) hours as needed for Wheezing. Rescue, Disp: 18 g, Rfl: 0  amLODIPine (NORVASC) 10 MG tablet, TAKE 1 TABLET EVERY DAY, Disp: 90 tablet, Rfl: 3  ammonium lactate 12 % Crea, Apply 1 application topically 2 (two) times daily. (Patient taking differently: Apply 1 application topically 2 (two) times daily as needed. ), Disp: 140 g, Rfl: 11  atorvastatin (LIPITOR) 40 MG tablet, TAKE 1 TABLET EVERY DAY, Disp: 90 tablet, Rfl: 3  buPROPion (WELLBUTRIN XL) 150 MG TB24 tablet, TAKE 1 TABLET EVERY DAY, Disp: 90 tablet, Rfl: 1  fluticasone propionate (FLONASE ALLERGY RELIEF) 50 mcg/actuation nasal spray, 1 spray (50 mcg total) by Each Nostril route 2  (two) times daily. (Patient taking differently: 1 spray by Each Nostril route 2 (two) times daily as needed. ), Disp: 16 mL, Rfl: 0  losartan (COZAAR) 50 MG tablet, TAKE 1 TABLET EVERY DAY, Disp: 90 tablet, Rfl: 3  omeprazole (PRILOSEC) 40 MG capsule, TAKE 1 CAPSULE EVERY DAY, Disp: 90 capsule, Rfl: 3  sildenafil (REVATIO) 20 mg Tab, Take 1-5 daily as needed for ED, Disp: 10 tablet, Rfl: 5  (DISCONTINUED) gabapentin (NEURONTIN) 300 MG capsule, TAKE 1 CAPSULE TWICE DAILY AS NEEDED  FOR  BACK  PAIN  FLARE, Disp: 180 capsule, Rfl: 1  (DISCONTINUED) morphine (MS CONTIN) 60 MG 12 hr tablet, Take 1 tablet (60 mg total) by mouth 2 (two) times daily., Disp: 60 tablet, Rfl: 0  (DISCONTINUED) morphine (MS CONTIN) 60 MG 12 hr tablet, Take 1 tablet (60 mg total) by mouth 2 (two) times daily., Disp: 60 tablet, Rfl: 0  (DISCONTINUED) morphine (MS CONTIN) 60 MG 12 hr tablet, Take 1 tablet (60 mg total) by mouth 2 (two) times daily., Disp: 60 tablet, Rfl: 0  (DISCONTINUED) naloxone (NARCAN) 1 mg/mL injection, Use as needed for respiratory depression, Disp: 2 mL, Rfl: 1  (DISCONTINUED) oxyCODONE-acetaminophen (PERCOCET)  mg per tablet, Take 1 tablet by mouth every 4 (four) hours as needed for Pain., Disp: 120 tablet, Rfl: 0  (DISCONTINUED) oxyCODONE-acetaminophen (PERCOCET)  mg per tablet, Take 1 tablet by mouth every 4 (four) hours as needed for Pain., Disp: 120 tablet, Rfl: 0  (DISCONTINUED) oxyCODONE-acetaminophen (PERCOCET)  mg per tablet, Take 1 tablet by mouth every 4 (four) hours as needed for Pain., Disp: 120 tablet, Rfl: 0  (DISCONTINUED) diazePAM (VALIUM) 5 MG tablet, Take 1 tablet (5 mg total) by mouth every 6 (six) hours as needed for Anxiety., Disp: 30 tablet, Rfl: 0  (DISCONTINUED) loratadine (CLARITIN) 10 mg tablet, Take 1 tablet (10 mg total) by mouth once daily. (Patient not taking: Reported on 6/15/2020), Disp: 30 tablet, Rfl: 0  (DISCONTINUED) tiZANidine (ZANAFLEX) 4 MG tablet, TAKE 1 TABLET BY  MOUTH EVERY 8 HOURS AS NEEDED (Patient not taking: Reported on 3/25/2020), Disp: 60 tablet, Rfl: 2    No current facility-administered medications on file prior to visit.         Review of Systems   Gastrointestinal: Negative for constipation and diarrhea.   Musculoskeletal: Positive for arthralgias and back pain.   Skin: Negative for color change and rash.   Psychiatric/Behavioral: Negative for confusion and decreased concentration.       Objective:      Physical Exam  Vitals signs and nursing note reviewed.   Constitutional:       Appearance: Normal appearance.      Comments: Good blood pressure control  Obese with a BMI of 33.1 is down 0.7 lb from his February 17, 2020 visit   Neurological:      General: No focal deficit present.      Mental Status: He is alert and oriented to person, place, and time.   Psychiatric:         Mood and Affect: Mood normal.         Behavior: Behavior normal.         Thought Content: Thought content normal.         Judgment: Judgment normal.         Assessment:       1. Uncomplicated opioid dependence    2. Chronic pain syndrome    3. Spinal stenosis, lumbar region, without neurogenic claudication    4. Spinal stenosis in cervical region    5. Other chronic pain    6. BMI 33.0-33.9,adult        Plan:       1. Uncomplicated opioid dependence  Continue same meds for the next quarter, hopefully we can reduce EMS Contin to 30 mg at the next visit  The  (Prescription Monitoring Program) website was checked with no inappropriate activity found.  - TOXICOLOGY SCREEN, URINE, RANDOM (COMPLIANCE)  - naloxone (NARCAN) 4 mg/actuation Spry; 1 spray (4 mg total) by Nasal route once. for 1 dose  Dispense: 1 each; Refill: 0  - morphine (MS CONTIN) 60 MG 12 hr tablet; Take 1 tablet (60 mg total) by mouth 2 (two) times daily.  Dispense: 60 tablet; Refill: 0  - oxyCODONE-acetaminophen (PERCOCET)  mg per tablet; Take 1 tablet by mouth every 4 (four) hours as needed for Pain.  Dispense: 120  tablet; Refill: 0  - morphine (MS CONTIN) 60 MG 12 hr tablet; Take 1 tablet (60 mg total) by mouth 2 (two) times daily.  Dispense: 60 tablet; Refill: 0  - oxyCODONE-acetaminophen (PERCOCET)  mg per tablet; Take 1 tablet by mouth every 4 (four) hours as needed for Pain.  Dispense: 120 tablet; Refill: 0  - morphine (MS CONTIN) 60 MG 12 hr tablet; Take 1 tablet (60 mg total) by mouth 2 (two) times daily.  Dispense: 60 tablet; Refill: 0  - oxyCODONE-acetaminophen (PERCOCET)  mg per tablet; Take 1 tablet by mouth every 4 (four) hours as needed for Pain.  Dispense: 120 tablet; Refill: 0    2. Chronic pain syndrome  See above    3. Spinal stenosis, lumbar region, without neurogenic claudication  See above    4. Spinal stenosis in cervical region  See above    5. Other chronic pain  - morphine (MS CONTIN) 60 MG 12 hr tablet; Take 1 tablet (60 mg total) by mouth 2 (two) times daily.  Dispense: 60 tablet; Refill: 0  - oxyCODONE-acetaminophen (PERCOCET)  mg per tablet; Take 1 tablet by mouth every 4 (four) hours as needed for Pain.  Dispense: 120 tablet; Refill: 0  - morphine (MS CONTIN) 60 MG 12 hr tablet; Take 1 tablet (60 mg total) by mouth 2 (two) times daily.  Dispense: 60 tablet; Refill: 0  - oxyCODONE-acetaminophen (PERCOCET)  mg per tablet; Take 1 tablet by mouth every 4 (four) hours as needed for Pain.  Dispense: 120 tablet; Refill: 0  - morphine (MS CONTIN) 60 MG 12 hr tablet; Take 1 tablet (60 mg total) by mouth 2 (two) times daily.  Dispense: 60 tablet; Refill: 0  - oxyCODONE-acetaminophen (PERCOCET)  mg per tablet; Take 1 tablet by mouth every 4 (four) hours as needed for Pain.  Dispense: 120 tablet; Refill: 0  - gabapentin (NEURONTIN) 300 MG capsule; TAKE 1 CAPSULE TWICE DAILY AS NEEDED  FOR  BACK  PAIN  FLARE  Dispense: 180 capsule; Refill: 1    6. BMI 33.0-33.9,adult  Continue weight loss

## 2020-06-15 NOTE — PATIENT INSTRUCTIONS
Weight Management: Getting Started  Healthy bodies come in all shapes and sizes. Not all bodies are made to be thin. For some people, a healthy weight is higher than the average weight listed on weight charts. Your healthcare provider can help you decide on a healthy weight for you.    Reasons to lose weight  Losing weight can help with some health problems, such as high blood pressure, heart disease, diabetes, sleep apnea, and arthritis. You may also feel more energy.  Set your long-term goal  Your goal doesn't even have to be a specific weight. You may decide on a fitness goal (such as being able to walk 10 miles a week), or a health goal (such as lowering your blood pressure). Choose a goal that is measurable and reasonable, so you know when you've reached it. A goal of reaching a BMI of less than 25 is not always reasonable (or possible).   Make an action plan  Habits dont change overnight. Setting your goals too high can leave you feeling discouraged if you cant reach them. Be realistic. Choose one or two small changes you can make now. Set an action plan for how you are going to make these changes. When you can stick to this plan, keep making a few more small changes. Taking small steps will help you stay on the path to success.  Track your progress  Write down your goals. Then, keep a daily record of your progress. Write down what you eat and how active you are. This record lets you look back on how much youve done. It may also help when youre feeling frustrated. Reward yourself for success. Even if you dont reach every goal, give yourself credit for what you do get done.  Get support  Encouragement from others can help make losing weight easier. Ask your family members and friends for support. They may even want to join you. Also look to your healthcare provider, registered dietitian, and  for help. Your local hospital can give you more information about nutrition, exercise, and  weight loss.  Date Last Reviewed: 1/31/2016  © 1453-4218 [employer]. [address] [place] [address], [place] [address]. All rights reserved. This information is not intended as a substitute for professional medical care. Always follow your healthcare professional's instructions.

## 2020-06-16 LAB
AMPHET+METHAMPHET UR QL: NEGATIVE
BARBITURATES UR QL SCN>200 NG/ML: NEGATIVE
BENZODIAZ UR QL SCN>200 NG/ML: NEGATIVE
BZE UR QL SCN: NEGATIVE
CANNABINOIDS UR QL SCN: NEGATIVE
CREAT UR-MCNC: 30 MG/DL (ref 23–375)
ETHANOL UR-MCNC: <10 MG/DL
METHADONE UR QL SCN>300 NG/ML: NEGATIVE
OPIATES UR QL SCN: NORMAL
PCP UR QL SCN>25 NG/ML: NEGATIVE
TOXICOLOGY INFORMATION: NORMAL

## 2020-08-04 ENCOUNTER — OFFICE VISIT (OUTPATIENT)
Dept: URGENT CARE | Facility: CLINIC | Age: 75
End: 2020-08-04
Payer: MEDICARE

## 2020-08-04 VITALS
TEMPERATURE: 98 F | HEART RATE: 85 BPM | SYSTOLIC BLOOD PRESSURE: 128 MMHG | DIASTOLIC BLOOD PRESSURE: 67 MMHG | OXYGEN SATURATION: 96 %

## 2020-08-04 DIAGNOSIS — I10 HYPERTENSION, UNSPECIFIED TYPE: Primary | ICD-10-CM

## 2020-08-04 PROCEDURE — 99499 UNLISTED E&M SERVICE: CPT | Mod: S$GLB,,, | Performed by: PHYSICIAN ASSISTANT

## 2020-08-04 PROCEDURE — 99213 OFFICE O/P EST LOW 20 MIN: CPT | Mod: S$GLB,,, | Performed by: PHYSICIAN ASSISTANT

## 2020-08-04 PROCEDURE — 99213 PR OFFICE/OUTPT VISIT, EST, LEVL III, 20-29 MIN: ICD-10-PCS | Mod: S$GLB,,, | Performed by: PHYSICIAN ASSISTANT

## 2020-08-04 PROCEDURE — 99499 RISK ADDL DX/OHS AUDIT: ICD-10-PCS | Mod: S$GLB,,, | Performed by: PHYSICIAN ASSISTANT

## 2020-08-04 NOTE — PATIENT INSTRUCTIONS
Established High Blood Pressure    High blood pressure (hypertension) is a chronic disease. Often, healthcare providers dont know what causes it. But it can be caused by certain health conditions and medicines.  If you have high blood pressure, you may not have any symptoms. If you do have symptoms, they may include headache, dizziness, changes in your vision, chest pain, and shortness of breath. But even without symptoms, high blood pressure thats not treated raises your risk for heart attack and stroke. High blood pressure is a serious health risk and shouldnt be ignored.  A blood pressure reading is made up of two numbers: a higher number over a lower number. The top number is the systolic pressure. The bottom number is the diastolic pressure. A normal blood pressure is a systolic pressure of  less than 120 over a diastolic pressure of less than 80. You will see your blood pressure readings written together. For example, a person with a systolic pressure of 188 and a diastolic pressure of 78 will have 118/78 written in the medical record.  High blood pressure is when either the top number is 140 or higher, or the bottom number is 90 or higher. This must be the result when taking your blood pressure a number of times. The blood pressures between normal and high are called prehypertension.  Home care  If you have high blood pressure, you should do what is listed below to lower your blood pressure. If you are taking medicines for high blood pressure, these methods may reduce or end your need for medicines in the future.  · Begin a weight-loss program if you are overweight.  · Cut back on how much salt you get in your diet. Heres how to do this:  ¨ Dont eat foods that have a lot of salt. These include olives, pickles, smoked meats, and salted potato chips.  ¨ Dont add salt to your food at the table.  ¨ Use only small amounts of salt when cooking.  · Start an exercise program. Talk with your healthcare  provider about the type of exercise program that would be best for you. It doesn't have to be hard. Even brisk walking for 20 minutes 3 times a week is a good form of exercise.  · Dont take medicines that stimulate the heart. This includes many over-the-counter cold and sinus decongestant pills and sprays, as well as diet pills. Check the warnings about hypertension on the label. Before buying any over-the-counter medicines or supplements, always ask the pharmacist about the product's potential interaction with your high blood pressure and your high blood pressure medicines.  · Stimulants such as amphetamine or cocaine could be deadly for someone with high blood pressure. Never take these.  · Limit how much caffeine you get in your diet. Switch to caffeine-free products.  · Stop smoking. If you are a long-time smoker, this can be hard. Talk to your healthcare provider about medicines and nicotine replacement options to help you. Also, enroll in a stop-smoking program to make it more likely that you will quit for good.  · Learn how to handle stress. This is an important part of any program to lower blood pressure. Learn about relaxation methods like meditation, yoga, or biofeedback.  · If your provider prescribed medicines, take them exactly as directed. Missing doses may cause your blood pressure get out of control.  · If you miss a dose or doses, check with your healthcare provider or pharmacist about what to do.  · Consider buying an automatic blood pressure machine. Ask your provider for a recommendation. You can get one of these at most pharmacies.     The American Heart Association recommends the following guidelines for home blood pressure monitoring:  · Don't smoke or drink coffee for 30 minutes before taking your blood pressure.  · Go to the bathroom before the test.  · Relax for 5 minutes before taking the measurement.  · Sit with your back supported (don't sit on a couch or soft chair); keep your feet on  the floor uncrossed. Place your arm on a solid flat surface (like a table) with the upper part of the arm at heart level. Place the middle of the cuff directly above the eye of the elbow. Check the monitor's instruction manual for an illustration.  · Take multiple readings. When you measure, take 2 to 3 readings one minute apart and record all of the results.  · Take your blood pressure at the same time every day, or as your healthcare provider recommends.  · Record the date, time, and blood pressure reading.  · Take the record with you to your next medical appointment. If your blood pressure monitor has a built-in memory, simply take the monitor with you to your next appointment.  · Call your provider if you have several high readings. Don't be frightened by a single high blood pressure reading, but if you get several high readings, check in with your healthcare provider.  · Note: When blood pressure reaches a systolic (top number) of 180 or higher OR diastolic (bottom number) of 110 or higher, seek emergency medical treatment.  Follow-up care  You will need to see your healthcare provider regularly. This is to check your blood pressure and to make changes to your medicines. Make a follow-up appointment as directed. Bring the record of your home blood pressure readings to the appointment.  When to seek medical advice  Call your healthcare provider right away if any of these occur:  · Blood pressure reaches a systolic (upper number) of 180 or higher OR a diastolic (bottom number) of 110 or higher  · Chest pain or shortness of breath  · Severe headache  · Throbbing or rushing sound in the ears  · Nosebleed  · Sudden severe pain in your belly (abdomen)  · Extreme drowsiness, confusion, or fainting  · Dizziness or spinning sensation (vertigo)  · Weakness of an arm or leg or one side of the face  · You have problems speaking or seeing   Date Last Reviewed: 12/1/2016  © 0960-3786 [employer]. [address] [place] [address]  [place] [address]. All rights reserved. This information is not intended as a substitute for professional medical care. Always follow your healthcare professional's instructions.    Thank you for enrolling in [facility]. Please follow the instructions below to securely access your online medical record. My allows you to send messages to your doctor, view your test results, renew your prescriptions, schedule appointments, and more.     How Do I Sign Up?  1. In your Internet browser, go to [email or web address].  2. In the lower right of the page, click the Activate Now link located under the Have Access Code? Title.  3. Enter your [facility] Access Code exactly as it appears below. You will not need to use this code after youve completed the sign-up process. If you do not sign up before the expiration date, you must request a new code.  [facility] Access Code: Activation code not generated  Current Patient Portal Status: Active    4. Enter Date of Birth (mm/dd/yyyy) as indicated and click the Next button. You will be taken to the next sign-up page.  5. Create a [facility] ID. This will be your new [facility] login ID and cannot be changed, so think of one that is secure and easy to remember.  6. Create a [facility] password.  Your password must be at least 8 characters long and contain at least 1 letter and 1 number.  You can change your password at any time.  7. Enter your Password Reset Question and Answer, then click the Next button.   8. Enter your e-mail address. You will receive e-mail notification when new information is available in [facility].  9. Click Sign Up. You can now view your medical record.     Additional Information  If you have questions, you can email [email or web address] or call [phone]  to talk to our [facility] staff. Remember, [facility] is NOT to be used for urgent needs. For medical emergencies, dial 911.     If not allergic,take tylenol (acetominophen) for fever control, chills,  or body aches every 4 hours. Do not exceed 4000 mg/ day.If not allergic, take Motrin (Ibuprofen) every 4 hours for fever, chills, pain or inflammation. Do not exceed 2400 mg/day. You can alternate taking tylenol and motrin.    If you were prescribed a narcotic medication, do not drive or operate heavy equipment or machinery while taking these medications.  You must understand that you've received an Urgent Care treatment only and that you may be released before all your medical problems are known or treated. You, the patient, will arrange for follow up care as instructed.  Follow up with your PCP or specialty clinic as directed in the next 1-2 weeks if not improved or as needed.  You can call [phone] to schedule an appointment with the appropriate provider.  If your condition worsens we recommend that you receive another evaluation at the emergency room immediately or contact your primary medical clinics after hours call service to discuss your concerns.    Please return here or go to the Emergency Department for any concerns or worsening of condition.    If you have been referred to another provider and wish to call to check on the status of your referral, please call [facility] at [phone]

## 2020-08-04 NOTE — PROGRESS NOTES
Subjective:       Patient ID: Sam Pete is a 75 y.o. male.    Vitals:  temperature is 97.8 °F (36.6 °C). His blood pressure is 128/67 and his pulse is 85. His oxygen saturation is 96%.     Chief Complaint: Hypertension    Patient noticed on Friday that his BP has been high, he also checked it at [facility] and it was high. His medication hasn't been changed in 10yrs. BP has been running 151/78 at home     Hypertension  This is a new problem. The current episode started in the past 7 days. The problem has been gradually worsening since onset. The problem is uncontrolled. Pertinent negatives include no anxiety, blurred vision, chest pain, headaches, malaise/fatigue, neck pain, orthopnea, palpitations, peripheral edema, PND, shortness of breath or sweats. There are no known risk factors for coronary artery disease. Past treatments include nothing. The current treatment provides no improvement. There is no history of angina, kidney disease, CAD/MI, CVA, heart failure, left ventricular hypertrophy, PVD or retinopathy. There is no history of chronic renal disease, coarctation of the aorta, hyperaldosteronism, hypercortisolism, hyperparathyroidism, a hypertension causing med, pheochromocytoma, renovascular disease, sleep apnea or a thyroid problem.       Neck: Negative for neck pain.   Cardiovascular: Negative for chest pain and palpitations.   Eyes: Negative for blurred vision.   Respiratory: Negative for shortness of breath.    Neurological: Negative for headaches.       Objective:      Physical Exam   Constitutional: Patient is oriented to person, place, and time. Patient appears well-developed. Patient is cooperative.  Non-toxic appearance. Patient does not appear ill. No distress.   HENT:   Head: Normocephalic and atraumatic.   Right Ear: Hearing and external ear normal.   Left Ear: Hearing and external ear normal.   Nose: Nose normal. No mucosal edema, rhinorrhea or nasal deformity. No epistaxis.   Mouth/Throat:  Uvula is midline, oropharynx is clear and moist and mucous membranes are normal. No trismus in the jaw. Normal dentition. No uvula swelling. No posterior oropharyngeal erythema.   Eyes: Conjunctivae and lids are normal. Right eye exhibits no discharge. Left eye exhibits no discharge. No scleral icterus.   Neck: Trachea normal, normal range of motion, full passive range of motion without pain and phonation normal.   Cardiovascular: Normal rate and regular rhythm.   Pulmonary/Chest: Effort normal and breath sounds normal. No respiratory distress.   Abdominal: Normal appearance. She exhibits no distension. There is no abdominal tenderness (none reported).   Musculoskeletal: Normal range of motion.         General: No deformity.   Neurological: Patient is alert and oriented to person, place, and time. Patient exhibits normal muscle tone. Coordination normal.   Skin: Skin is warm, dry, intact, not diaphoretic and not pale.   Psychiatric: Patient's speech is normal and behavior is normal. Judgment and thought content normal.   Nursing note and vitals reviewed.       Assessment:       1. Hypertension, unspecified type        Plan:         Hypertension, unspecified type    will discuss with PCP about Digital medicine program. BP normal today.   Patient Instructions     Established High Blood Pressure    High blood pressure (hypertension) is a chronic disease. Often, healthcare providers dont know what causes it. But it can be caused by certain health conditions and medicines.  If you have high blood pressure, you may not have any symptoms. If you do have symptoms, they may include headache, dizziness, changes in your vision, chest pain, and shortness of breath. But even without symptoms, high blood pressure thats not treated raises your risk for heart attack and stroke. High blood pressure is a serious health risk and shouldnt be ignored.  A blood pressure reading is made up of two numbers: a higher number over a lower  number. The top number is the systolic pressure. The bottom number is the diastolic pressure. A normal blood pressure is a systolic pressure of  less than 120 over a diastolic pressure of less than 80. You will see your blood pressure readings written together. For example, a person with a systolic pressure of 188 and a diastolic pressure of 78 will have 118/78 written in the medical record.  High blood pressure is when either the top number is 140 or higher, or the bottom number is 90 or higher. This must be the result when taking your blood pressure a number of times. The blood pressures between normal and high are called prehypertension.  Home care  If you have high blood pressure, you should do what is listed below to lower your blood pressure. If you are taking medicines for high blood pressure, these methods may reduce or end your need for medicines in the future.  · Begin a weight-loss program if you are overweight.  · Cut back on how much salt you get in your diet. Heres how to do this:  ¨ Dont eat foods that have a lot of salt. These include olives, pickles, smoked meats, and salted potato chips.  ¨ Dont add salt to your food at the table.  ¨ Use only small amounts of salt when cooking.  · Start an exercise program. Talk with your healthcare provider about the type of exercise program that would be best for you. It doesn't have to be hard. Even brisk walking for 20 minutes 3 times a week is a good form of exercise.  · Dont take medicines that stimulate the heart. This includes many over-the-counter cold and sinus decongestant pills and sprays, as well as diet pills. Check the warnings about hypertension on the label. Before buying any over-the-counter medicines or supplements, always ask the pharmacist about the product's potential interaction with your high blood pressure and your high blood pressure medicines.  · Stimulants such as amphetamine or cocaine could be deadly for someone with high blood  pressure. Never take these.  · Limit how much caffeine you get in your diet. Switch to caffeine-free products.  · Stop smoking. If you are a long-time smoker, this can be hard. Talk to your healthcare provider about medicines and nicotine replacement options to help you. Also, enroll in a stop-smoking program to make it more likely that you will quit for good.  · Learn how to handle stress. This is an important part of any program to lower blood pressure. Learn about relaxation methods like meditation, yoga, or biofeedback.  · If your provider prescribed medicines, take them exactly as directed. Missing doses may cause your blood pressure get out of control.  · If you miss a dose or doses, check with your healthcare provider or pharmacist about what to do.  · Consider buying an automatic blood pressure machine. Ask your provider for a recommendation. You can get one of these at most pharmacies.     The American Heart Association recommends the following guidelines for home blood pressure monitoring:  · Don't smoke or drink coffee for 30 minutes before taking your blood pressure.  · Go to the bathroom before the test.  · Relax for 5 minutes before taking the measurement.  · Sit with your back supported (don't sit on a couch or soft chair); keep your feet on the floor uncrossed. Place your arm on a solid flat surface (like a table) with the upper part of the arm at heart level. Place the middle of the cuff directly above the eye of the elbow. Check the monitor's instruction manual for an illustration.  · Take multiple readings. When you measure, take 2 to 3 readings one minute apart and record all of the results.  · Take your blood pressure at the same time every day, or as your healthcare provider recommends.  · Record the date, time, and blood pressure reading.  · Take the record with you to your next medical appointment. If your blood pressure monitor has a built-in memory, simply take the monitor with you to your  next appointment.  · Call your provider if you have several high readings. Don't be frightened by a single high blood pressure reading, but if you get several high readings, check in with your healthcare provider.  · Note: When blood pressure reaches a systolic (top number) of 180 or higher OR diastolic (bottom number) of 110 or higher, seek emergency medical treatment.  Follow-up care  You will need to see your healthcare provider regularly. This is to check your blood pressure and to make changes to your medicines. Make a follow-up appointment as directed. Bring the record of your home blood pressure readings to the appointment.  When to seek medical advice  Call your healthcare provider right away if any of these occur:  · Blood pressure reaches a systolic (upper number) of 180 or higher OR a diastolic (bottom number) of 110 or higher  · Chest pain or shortness of breath  · Severe headache  · Throbbing or rushing sound in the ears  · Nosebleed  · Sudden severe pain in your belly (abdomen)  · Extreme drowsiness, confusion, or fainting  · Dizziness or spinning sensation (vertigo)  · Weakness of an arm or leg or one side of the face  · You have problems speaking or seeing   Date Last Reviewed: 12/1/2016  © 8153-8444 [employer]. [address] [place] [address], [place] [address]. All rights reserved. This information is not intended as a substitute for professional medical care. Always follow your healthcare professional's instructions.    Thank you for enrolling in [facility]. Please follow the instructions below to securely access your online medical record. My allows you to send messages to your doctor, view your test results, renew your prescriptions, schedule appointments, and more.     How Do I Sign Up?  1. In your Internet browser, go to [email or web address].  2. In the lower right of the page, click the Activate Now link located under the Have Access Code? Title.  3. Enter your [facility] Access Code  exactly as it appears below. You will not need to use this code after youve completed the sign-up process. If you do not sign up before the expiration date, you must request a new code.  [facility] Access Code: Activation code not generated  Current Patient Portal Status: Active    4. Enter Date of Birth (mm/dd/yyyy) as indicated and click the Next button. You will be taken to the next sign-up page.  5. Create a [facility] ID. This will be your new [facility] login ID and cannot be changed, so think of one that is secure and easy to remember.  6. Create a [facility] password.  Your password must be at least 8 characters long and contain at least 1 letter and 1 number.  You can change your password at any time.  7. Enter your Password Reset Question and Answer, then click the Next button.   8. Enter your e-mail address. You will receive e-mail notification when new information is available in [facility].  9. Click Sign Up. You can now view your medical record.     Additional Information  If you have questions, you can email [email or web address] or call [phone]  to talk to our [facility] staff. Remember, [facility] is NOT to be used for urgent needs. For medical emergencies, dial 911.     If not allergic,take tylenol (acetominophen) for fever control, chills, or body aches every 4 hours. Do not exceed 4000 mg/ day.If not allergic, take Motrin (Ibuprofen) every 4 hours for fever, chills, pain or inflammation. Do not exceed 2400 mg/day. You can alternate taking tylenol and motrin.    If you were prescribed a narcotic medication, do not drive or operate heavy equipment or machinery while taking these medications.  You must understand that you've received an Urgent Care treatment only and that you may be released before all your medical problems are known or treated. You, the patient, will arrange for follow up care as instructed.  Follow up with your PCP or specialty clinic as directed in the next 1-2 weeks if not  improved or as needed.  You can call [phone] to schedule an appointment with the appropriate provider.  If your condition worsens we recommend that you receive another evaluation at the emergency room immediately or contact your primary medical clinics after hours call service to discuss your concerns.    Please return here or go to the Emergency Department for any concerns or worsening of condition.    If you have been referred to another provider and wish to call to check on the status of your referral, please call [facility] at [phone]

## 2020-08-25 DIAGNOSIS — F11.20 UNCOMPLICATED OPIOID DEPENDENCE: ICD-10-CM

## 2020-08-25 DIAGNOSIS — G89.29 OTHER CHRONIC PAIN: ICD-10-CM

## 2020-08-25 RX ORDER — MORPHINE SULFATE 60 MG/1
60 TABLET, FILM COATED, EXTENDED RELEASE ORAL 2 TIMES DAILY
Qty: 60 TABLET | Refills: 0 | Status: SHIPPED | OUTPATIENT
Start: 2020-09-15 | End: 2020-10-02 | Stop reason: SDUPTHER

## 2020-08-25 RX ORDER — OXYCODONE AND ACETAMINOPHEN 10; 325 MG/1; MG/1
1 TABLET ORAL EVERY 4 HOURS PRN
Qty: 120 TABLET | Refills: 0 | Status: SHIPPED | OUTPATIENT
Start: 2020-09-15 | End: 2020-10-02 | Stop reason: SDUPTHER

## 2020-08-25 NOTE — TELEPHONE ENCOUNTER
Will need refill before he could get back in for recheck     checked with no inappropriate activity

## 2020-09-10 NOTE — PROGRESS NOTES
Refill Routing Note     Medication(s) are not appropriate for processing by [facility]:    Medication Outside of Protocol    Appointments  past 12m or future 3m with PCP    Date Provider   Last Visit   6/15/2020 Ty Montalvo MD   Next Visit   10/2/2020 Ty Montalvo MD           Automatic Epic Protocol Generated Data:    Requested Prescriptions   Pending Prescriptions Disp Refills    tiZANidine (ZANAFLEX) 4 MG tablet [Pharmacy Med Name: TIZANIDINE HCL 4 MG TABLET] 60 tablet 0     Sig: TAKE 1 TABLET BY MOUTH EVERY 8 HOURS AS NEEDED       Off-Protocol Failed - 9/9/2020  2:35 PM        Failed - Medication not assigned to a protocol, review manually.        Passed - Office visit in past 12 months or future 90 days.     Recent Outpatient Visits            1 month ago Hypertension, unspecified type    [facility] Alas PA-C    2 months ago Uncomplicated opioid dependence    [place] [facility] Ty Montalvo MD    3 months ago [facility]    5 months ago [facility]ough    [facility]    5 months ago Viral URI with cough    [facility] [place] Mireya Cornejo PA-C          Future Appointments              In 3 weeks Ty Montalvo MD [place] [facility] [place]                      Note composed:10:42 PM 09/09/2020

## 2020-09-11 RX ORDER — TIZANIDINE 4 MG/1
TABLET ORAL
Qty: 60 TABLET | Refills: 0 | Status: SHIPPED | OUTPATIENT
Start: 2020-09-11 | End: 2020-10-06

## 2020-09-27 ENCOUNTER — OFFICE VISIT (OUTPATIENT)
Dept: URGENT CARE | Facility: CLINIC | Age: 75
End: 2020-09-27
Payer: MEDICARE

## 2020-09-27 VITALS
BODY MASS INDEX: 32.93 KG/M2 | TEMPERATURE: 98 F | SYSTOLIC BLOOD PRESSURE: 118 MMHG | OXYGEN SATURATION: 96 % | DIASTOLIC BLOOD PRESSURE: 61 MMHG | RESPIRATION RATE: 18 BRPM | HEART RATE: 102 BPM | HEIGHT: 70 IN | WEIGHT: 230 LBS

## 2020-09-27 DIAGNOSIS — R05.9 COUGH: Primary | ICD-10-CM

## 2020-09-27 LAB
CTP QC/QA: YES
SARS-COV-2 RDRP RESP QL NAA+PROBE: NEGATIVE

## 2020-09-27 PROCEDURE — 99214 PR OFFICE/OUTPT VISIT, EST, LEVL IV, 30-39 MIN: ICD-10-PCS | Mod: S$GLB,,, | Performed by: PHYSICIAN ASSISTANT

## 2020-09-27 PROCEDURE — 99214 OFFICE O/P EST MOD 30 MIN: CPT | Mod: S$GLB,,, | Performed by: PHYSICIAN ASSISTANT

## 2020-09-27 PROCEDURE — U0002 COVID-19 LAB TEST NON-CDC: HCPCS | Mod: QW,S$GLB,, | Performed by: PHYSICIAN ASSISTANT

## 2020-09-27 PROCEDURE — U0002: ICD-10-PCS | Mod: QW,S$GLB,, | Performed by: PHYSICIAN ASSISTANT

## 2020-09-27 RX ORDER — BENZONATATE 100 MG/1
100 CAPSULE ORAL EVERY 6 HOURS PRN
Qty: 60 CAPSULE | Refills: 1 | Status: SHIPPED | OUTPATIENT
Start: 2020-09-27 | End: 2021-09-27

## 2020-09-27 RX ORDER — A/SINGAPORE/GP1908/2015 IVR-180 (AN A/MICHIGAN/45/2015 (H1N1)PDM09-LIKE VIRUS, A/HONG KONG/4801/2014, NYMC X-263B (H3N2) (AN A/HONG KONG/4801/2014-LIKE VIRUS), AND B/BRISBANE/60/2008, WILD TYPE (A B/BRISBANE/60/2008-LIKE VIRUS) 15; 15; 15 UG/.5ML; UG/.5ML; UG/.5ML
INJECTION, SUSPENSION INTRAMUSCULAR
COMMUNITY
Start: 2020-09-15 | End: 2022-01-05

## 2020-09-27 NOTE — PROGRESS NOTES
"Subjective:       Patient ID: Sam Pete is a 75 y.o. male.    Vitals:  height is 5' 10" (1.778 m) and weight is 104.3 kg (230 lb). His temperature is 97.9 °F (36.6 °C). His blood pressure is 118/61 and his pulse is 102. His respiration is 18 and oxygen saturation is 96%.     Chief Complaint: Cough    Patient presents with a cough no production X 2 days. He states that, "it is a deep cough from my chest".    Cough  This is a new problem. The current episode started in the past 7 days. The problem has been unchanged. The problem occurs every few hours. The cough is non-productive. Pertinent negatives include no chest pain, chills, ear congestion, ear pain, fever, headaches, heartburn, hemoptysis, myalgias, nasal congestion, postnasal drip, rash, rhinorrhea, sore throat, shortness of breath, sweats, weight loss or wheezing. Nothing aggravates the symptoms. He has tried prescription cough suppressant for the symptoms. The treatment provided mild relief.       Constitution: Negative for chills and fever.   HENT: Negative for ear pain, postnasal drip and sore throat.    Cardiovascular: Negative for chest pain.   Respiratory: Positive for cough. Negative for bloody sputum, shortness of breath and wheezing.    Gastrointestinal: Negative for heartburn.   Musculoskeletal: Negative for muscle ache.   Skin: Negative for rash.   Neurological: Negative for headaches.       Objective:      Physical Exam   Constitutional: He is oriented to person, place, and time. He appears well-developed. He is cooperative.  Non-toxic appearance. He does not appear ill. No distress.   HENT:   Head: Normocephalic and atraumatic.   Ears:   Right Ear: Hearing and external ear normal.   Left Ear: Hearing and external ear normal.   Nose: Nose normal. No mucosal edema, rhinorrhea or nasal deformity. No epistaxis. Right sinus exhibits no maxillary sinus tenderness and no frontal sinus tenderness. Left sinus exhibits no maxillary sinus tenderness " and no frontal sinus tenderness.   Mouth/Throat: Uvula is midline, oropharynx is clear and moist and mucous membranes are normal. No trismus in the jaw. Normal dentition. No uvula swelling. No oropharyngeal exudate, posterior oropharyngeal edema or posterior oropharyngeal erythema.   Eyes: Conjunctivae and lids are normal. No scleral icterus.   Neck: Trachea normal, full passive range of motion without pain and phonation normal. Neck supple. No neck rigidity. No edema and no erythema present.   Cardiovascular: Normal rate, regular rhythm, normal heart sounds and normal pulses.   Pulmonary/Chest: Effort normal and breath sounds normal. No respiratory distress. He has no decreased breath sounds. He has no wheezes. He has no rhonchi. He has no rales.   Abdominal: Normal appearance.   Musculoskeletal: Normal range of motion.         General: No deformity.   Neurological: He is alert and oriented to person, place, and time. He exhibits normal muscle tone. Coordination normal.   Skin: Skin is warm, dry, intact, not diaphoretic, not pale and no rash. Psychiatric: His speech is normal and behavior is normal. Mood, judgment and thought content normal.   Nursing note and vitals reviewed.        Assessment:       1. Cough        Plan:         Cough  -     POCT COVID-19 Rapid Screening    Results for orders placed or performed in visit on 09/27/20   POCT COVID-19 Rapid Screening   Result Value Ref Range    POC Rapid COVID Negative Negative     Acceptable Yes        Other orders  -     benzonatate (TESSALON PERLES) 100 MG capsule; Take 1 capsule (100 mg total) by mouth every 6 (six) hours as needed.  Dispense: 60 capsule; Refill: 1    Patient Instructions   You must understand that you've received an Urgent Care treatment only and that you may be released before all your medical problems are known or treated. You, the patient, will arrange for follow up care as instructed.  Follow up with your PCP or specialty  clinic as directed in the next 1-2 weeks if not improved or as needed.  You can call [phone] to schedule an appointment with the appropriate provider.  If your condition worsens we recommend that you receive another evaluation at the emergency room immediately or contact your primary medical clinics after hours call service to discuss your concerns.  Please return here or go to the Emergency Department for any concerns or worsening of condition.

## 2020-09-27 NOTE — PATIENT INSTRUCTIONS

## 2020-09-29 ENCOUNTER — PATIENT MESSAGE (OUTPATIENT)
Dept: OTHER | Facility: OTHER | Age: 75
End: 2020-09-29

## 2020-10-02 ENCOUNTER — OFFICE VISIT (OUTPATIENT)
Dept: FAMILY MEDICINE | Facility: CLINIC | Age: 75
End: 2020-10-02
Attending: FAMILY MEDICINE
Payer: MEDICARE

## 2020-10-02 VITALS
BODY MASS INDEX: 33.39 KG/M2 | DIASTOLIC BLOOD PRESSURE: 64 MMHG | HEIGHT: 70 IN | TEMPERATURE: 98 F | WEIGHT: 233.25 LBS | OXYGEN SATURATION: 96 % | SYSTOLIC BLOOD PRESSURE: 118 MMHG | HEART RATE: 90 BPM

## 2020-10-02 DIAGNOSIS — R13.12 OROPHARYNGEAL DYSPHAGIA: Primary | ICD-10-CM

## 2020-10-02 DIAGNOSIS — G89.29 OTHER CHRONIC PAIN: ICD-10-CM

## 2020-10-02 DIAGNOSIS — M96.1 FAILED BACK SURGICAL SYNDROME: ICD-10-CM

## 2020-10-02 DIAGNOSIS — M48.02 SPINAL STENOSIS IN CERVICAL REGION: ICD-10-CM

## 2020-10-02 DIAGNOSIS — M48.061 SPINAL STENOSIS, LUMBAR REGION, WITHOUT NEUROGENIC CLAUDICATION: ICD-10-CM

## 2020-10-02 DIAGNOSIS — F11.20 UNCOMPLICATED OPIOID DEPENDENCE: ICD-10-CM

## 2020-10-02 DIAGNOSIS — R13.10 DYSPHAGIA, UNSPECIFIED TYPE: ICD-10-CM

## 2020-10-02 PROCEDURE — 3074F PR MOST RECENT SYSTOLIC BLOOD PRESSURE < 130 MM HG: ICD-10-PCS | Mod: HCNC,CPTII,S$GLB, | Performed by: FAMILY MEDICINE

## 2020-10-02 PROCEDURE — 1159F MED LIST DOCD IN RCRD: CPT | Mod: HCNC,S$GLB,, | Performed by: FAMILY MEDICINE

## 2020-10-02 PROCEDURE — 99499 UNLISTED E&M SERVICE: CPT | Mod: HCNC,S$GLB,, | Performed by: FAMILY MEDICINE

## 2020-10-02 PROCEDURE — 3078F DIAST BP <80 MM HG: CPT | Mod: HCNC,CPTII,S$GLB, | Performed by: FAMILY MEDICINE

## 2020-10-02 PROCEDURE — 3078F PR MOST RECENT DIASTOLIC BLOOD PRESSURE < 80 MM HG: ICD-10-PCS | Mod: HCNC,CPTII,S$GLB, | Performed by: FAMILY MEDICINE

## 2020-10-02 PROCEDURE — 99999 PR PBB SHADOW E&M-EST. PATIENT-LVL V: ICD-10-PCS | Mod: PBBFAC,HCNC,, | Performed by: FAMILY MEDICINE

## 2020-10-02 PROCEDURE — 1101F PR PT FALLS ASSESS DOC 0-1 FALLS W/OUT INJ PAST YR: ICD-10-PCS | Mod: HCNC,CPTII,S$GLB, | Performed by: FAMILY MEDICINE

## 2020-10-02 PROCEDURE — 3008F PR BODY MASS INDEX (BMI) DOCUMENTED: ICD-10-PCS | Mod: HCNC,CPTII,S$GLB, | Performed by: FAMILY MEDICINE

## 2020-10-02 PROCEDURE — 99214 OFFICE O/P EST MOD 30 MIN: CPT | Mod: HCNC,S$GLB,, | Performed by: FAMILY MEDICINE

## 2020-10-02 PROCEDURE — 99214 PR OFFICE/OUTPT VISIT, EST, LEVL IV, 30-39 MIN: ICD-10-PCS | Mod: HCNC,S$GLB,, | Performed by: FAMILY MEDICINE

## 2020-10-02 PROCEDURE — 1125F AMNT PAIN NOTED PAIN PRSNT: CPT | Mod: HCNC,S$GLB,, | Performed by: FAMILY MEDICINE

## 2020-10-02 PROCEDURE — 3074F SYST BP LT 130 MM HG: CPT | Mod: HCNC,CPTII,S$GLB, | Performed by: FAMILY MEDICINE

## 2020-10-02 PROCEDURE — 99499 RISK ADDL DX/OHS AUDIT: ICD-10-PCS | Mod: HCNC,S$GLB,, | Performed by: FAMILY MEDICINE

## 2020-10-02 PROCEDURE — 1159F PR MEDICATION LIST DOCUMENTED IN MEDICAL RECORD: ICD-10-PCS | Mod: HCNC,S$GLB,, | Performed by: FAMILY MEDICINE

## 2020-10-02 PROCEDURE — 3008F BODY MASS INDEX DOCD: CPT | Mod: HCNC,CPTII,S$GLB, | Performed by: FAMILY MEDICINE

## 2020-10-02 PROCEDURE — 99999 PR PBB SHADOW E&M-EST. PATIENT-LVL V: CPT | Mod: PBBFAC,HCNC,, | Performed by: FAMILY MEDICINE

## 2020-10-02 PROCEDURE — 1125F PR PAIN SEVERITY QUANTIFIED, PAIN PRESENT: ICD-10-PCS | Mod: HCNC,S$GLB,, | Performed by: FAMILY MEDICINE

## 2020-10-02 PROCEDURE — 1101F PT FALLS ASSESS-DOCD LE1/YR: CPT | Mod: HCNC,CPTII,S$GLB, | Performed by: FAMILY MEDICINE

## 2020-10-02 RX ORDER — MORPHINE SULFATE 60 MG/1
60 TABLET, FILM COATED, EXTENDED RELEASE ORAL 2 TIMES DAILY
Qty: 60 TABLET | Refills: 0 | Status: SHIPPED | OUTPATIENT
Start: 2020-10-15 | End: 2021-01-04

## 2020-10-02 RX ORDER — OXYCODONE AND ACETAMINOPHEN 10; 325 MG/1; MG/1
1 TABLET ORAL EVERY 4 HOURS PRN
Qty: 120 TABLET | Refills: 0 | Status: SHIPPED | OUTPATIENT
Start: 2020-11-15 | End: 2021-01-04

## 2020-10-02 RX ORDER — BUPROPION HYDROCHLORIDE 150 MG/1
150 TABLET ORAL DAILY
Qty: 90 TABLET | Refills: 1 | Status: SHIPPED | OUTPATIENT
Start: 2020-10-02 | End: 2021-03-17 | Stop reason: SDUPTHER

## 2020-10-02 RX ORDER — OXYCODONE AND ACETAMINOPHEN 10; 325 MG/1; MG/1
1 TABLET ORAL EVERY 4 HOURS PRN
Qty: 120 TABLET | Refills: 0 | Status: SHIPPED | OUTPATIENT
Start: 2020-12-15 | End: 2021-01-04

## 2020-10-02 RX ORDER — OXYCODONE AND ACETAMINOPHEN 10; 325 MG/1; MG/1
1 TABLET ORAL EVERY 4 HOURS PRN
Qty: 120 TABLET | Refills: 0 | Status: SHIPPED | OUTPATIENT
Start: 2020-10-15 | End: 2021-01-04

## 2020-10-02 RX ORDER — MORPHINE SULFATE 60 MG/1
60 TABLET, FILM COATED, EXTENDED RELEASE ORAL 2 TIMES DAILY
Qty: 60 TABLET | Refills: 0 | Status: SHIPPED | OUTPATIENT
Start: 2020-12-15 | End: 2021-01-04

## 2020-10-02 RX ORDER — MORPHINE SULFATE 60 MG/1
60 TABLET, FILM COATED, EXTENDED RELEASE ORAL 2 TIMES DAILY
Qty: 60 TABLET | Refills: 0 | Status: SHIPPED | OUTPATIENT
Start: 2020-11-15 | End: 2021-01-04

## 2020-10-02 RX ORDER — BUPROPION HYDROCHLORIDE 150 MG/1
TABLET ORAL
Qty: 90 TABLET | Refills: 1 | OUTPATIENT
Start: 2020-10-02

## 2020-10-02 NOTE — TELEPHONE ENCOUNTER
No new care gaps identified.  Powered by [employer]. Reference number: [id number]. 10/02/2020 1:06:33 PM   CDT

## 2020-10-02 NOTE — PROGRESS NOTES
Subjective:       Patient ID: Sam Pete is a 75 y.o. male.    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  The patient reports he has been having episodes of dysphagia almost entirely involving steak or pork chops that cause him to choke while eating.  It never happens with liquids and temperature does not appear to be a problem.  Over cooked meats which are dry tend to be the problems.  He is concerned because his mother actually choked to death while in the hospital undergoing evaluation for dysphagia.  It is still relatively infrequent but seems to be increasing in frequency over time.    He is doing well with his pain medications keeping adequate pain control which allow him to do his ADLs and low intensity home maintenance activities.  He has had no problems with constipation or cognitive impairment.    Past Medical History:  No date: Anticoagulant long-term use      Comment:  ASA 81 mg  No date: Arthritis  No date: Cataract      Comment:  OU  No date: Chronic low back pain  2/8/2011: Complete rupture of rotator cuff  7/8/2015: DDD (degenerative disc disease), lumbar  9/9/2015: Degeneration of lumbar or lumbosacral intervertebral disc  No date: ED (erectile dysfunction)  No date: GERD (gastroesophageal reflux disease)  No date: Hypertension  6/26/2017: Lumbar pseudoarthrosis  6/26/2017: Lumbar stenosis  No date: Obesity  7/8/2015: Spondylosis of lumbar region without myelopathy or   radiculopathy  1997: Stroke      Comment:  basal ganglia, left sided weakness, resolved  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   Redundant tortuous colon,  otherwise normal.  2/25/2019: COLONOSCOPY; N/A      Comment:  Procedure: COLONOSCOPY;  Surgeon: Gilbert Rodriguez Jr., MD;  Location: [facility] ENDO;  Service: Endoscopy;                 Laterality: N/A;  No date: Epidural steroid injection      Comment:  Pain management  07/12/2004: ESOPHAGOGASTRODUODENOSCOPY      Comment:  CHILO.  No date: FRACTURE SURGERY; Left      Comment:  wrist / forearm, total of 5  12/12/2019: INSERTION OF DORSAL COLUMN NERVE STIMULATOR FOR TRIAL; N/A      Comment:  Procedure: INSERTION, NEUROSTIMULATOR, SPINAL CORD,                DORSAL COLUMN, FOR TRIAL;  Surgeon: Evan Lyles MD;                 Location: [facility] OR;  Service: Pain Management;                 Laterality: N/A;  2-6-2013: JOINT REPLACEMENT      Comment:  ( rt hip 2007), left hip   No date: JOINT REPLACEMENT; Left      Comment:  total knee  No date: KNEE ARTHROSCOPY W/ DEBRIDEMENT      Comment:  bilateral knees , total of six  No date: KNEE SURGERY  2/12/2020: LAMINECTOMY USING MINIMALLY INVASIVE TECHNIQUE; N/A      Comment:  Procedure: LAMINECTOMY, SPINE, MINIMALLY INVASIVE /                 PLACEMENT OF SPINAL CORD STIMULATOR;  Surgeon: Darlene Max MD;  Location: [facility] OR;  Service: Neurosurgery;                 Laterality: N/A;  No date: Nervbe Block injection      Comment:  Pain management    Current Outpatient Medications on File Prior to Visit:  albuterol (PROVENTIL/VENTOLIN HFA) 90 mcg/actuation inhaler, Inhale 2 puffs into the lungs every 6 (six) hours as needed for Wheezing. Rescue, Disp: 18 g, Rfl: 0  amLODIPine (NORVASC) 10 MG tablet, TAKE 1 TABLET EVERY DAY, Disp: 90 tablet, Rfl: 3  ammonium lactate 12 % Crea, Apply 1 application topically 2 (two) times daily. (Patient taking differently: Apply 1 application topically 2 (two) times daily as needed. ), Disp: 140 g, Rfl: 11  atorvastatin (LIPITOR) 40 MG tablet, TAKE 1 TABLET EVERY DAY, Disp: 90 tablet, Rfl: 3  benzonatate (TESSALON  PERLES) 100 MG capsule, Take 1 capsule (100 mg total) by mouth every 6 (six) hours as needed., Disp: 60 capsule, Rfl: 1  FLUAD QUAD 2020-21,65Y UP,,PF, 60 mcg (15 mcg x 4)/0.5 mL Syrg, PHARMACY ADMINISTERED, Disp: , Rfl:   fluticasone propionate (FLONASE ALLERGY RELIEF) 50 mcg/actuation nasal spray, 1 spray (50 mcg total) by Each Nostril route 2 (two) times daily. (Patient taking differently: 1 spray by Each Nostril route 2 (two) times daily as needed. ), Disp: 16 mL, Rfl: 0  gabapentin (NEURONTIN) 300 MG capsule, TAKE 1 CAPSULE TWICE DAILY AS NEEDED  FOR  BACK  PAIN  FLARE, Disp: 180 capsule, Rfl: 1  losartan (COZAAR) 50 MG tablet, TAKE 1 TABLET EVERY DAY, Disp: 90 tablet, Rfl: 3  omeprazole (PRILOSEC) 40 MG capsule, TAKE 1 CAPSULE EVERY DAY, Disp: 90 capsule, Rfl: 3  sildenafil (REVATIO) 20 mg Tab, Take 1-5 daily as needed for ED, Disp: 10 tablet, Rfl: 5  tiZANidine (ZANAFLEX) 4 MG tablet, TAKE 1 TABLET BY MOUTH EVERY 8 HOURS AS NEEDED, Disp: 60 tablet, Rfl: 0  (DISCONTINUED) buPROPion (WELLBUTRIN XL) 150 MG TB24 tablet, TAKE 1 TABLET EVERY DAY, Disp: 90 tablet, Rfl: 1  (DISCONTINUED) morphine (MS CONTIN) 60 MG 12 hr tablet, Take 1 tablet (60 mg total) by mouth 2 (two) times daily., Disp: 60 tablet, Rfl: 0  (DISCONTINUED) oxyCODONE-acetaminophen (PERCOCET)  mg per tablet, Take 1 tablet by mouth every 4 (four) hours as needed for Pain., Disp: 120 tablet, Rfl: 0    No current facility-administered medications on file prior to visit.         Review of Systems   Constitutional: Negative for chills, diaphoresis and fever.   HENT: Positive for trouble swallowing. Negative for postnasal drip and sore throat.    Respiratory: Positive for choking. Negative for chest tightness and shortness of breath.    Cardiovascular: Negative for chest pain and palpitations.   Gastrointestinal: Negative for constipation, diarrhea, nausea and vomiting.   Psychiatric/Behavioral: Negative for dysphoric mood and sleep disturbance. The  patient is not nervous/anxious.        Objective:      Physical Exam  Vitals signs and nursing note reviewed.   Constitutional:       General: He is not in acute distress.     Appearance: Normal appearance. He is obese. He is not ill-appearing, toxic-appearing or diaphoretic.      Comments: Good blood pressure control  Obese with a BMI of 33.5 is up 2.4 lb from his Veronica 15, 2020 visit   HENT:      Mouth/Throat:      Mouth: Mucous membranes are moist.      Pharynx: Oropharynx is clear. No oropharyngeal exudate or posterior oropharyngeal erythema.   Neck:      Musculoskeletal: Normal range of motion and neck supple. No neck rigidity or muscular tenderness.      Vascular: No carotid bruit.   Pulmonary:      Effort: Pulmonary effort is normal. No respiratory distress.      Breath sounds: Normal breath sounds. No stridor. No wheezing, rhonchi or rales.   Chest:      Chest wall: No tenderness.   Lymphadenopathy:      Cervical: No cervical adenopathy.   Neurological:      General: No focal deficit present.      Mental Status: He is alert and oriented to person, place, and time.   Psychiatric:         Mood and Affect: Mood normal.         Behavior: Behavior normal.         Thought Content: Thought content normal.         Judgment: Judgment normal.         Assessment:       1. Oropharyngeal dysphagia    2. Dysphagia, unspecified type     3. Other chronic pain    4. Uncomplicated opioid dependence    5. Failed back surgical syndrome    6. Spinal stenosis, lumbar region, without neurogenic claudication    7. Spinal stenosis in cervical region    8. BMI 33.0-33.9,adult        Plan:       1. Oropharyngeal dysphagia  Will obtain modified barium swallow, may need ENT and/or GI evaluation possibly dilation  - Fl Modified Barium Swallow Speech; Future  - SLP video swallow; Future    2. Dysphagia, unspecified type   See above  - Fl Modified Barium Swallow Speech; Future    3. Other chronic pain  The  (Prescription Monitoring  Program) website was checked with no inappropriate activity found.  - oxyCODONE-acetaminophen (PERCOCET)  mg per tablet; Take 1 tablet by mouth every 4 (four) hours as needed for Pain.  Dispense: 120 tablet; Refill: 0  - morphine (MS CONTIN) 60 MG 12 hr tablet; Take 1 tablet (60 mg total) by mouth 2 (two) times daily.  Dispense: 60 tablet; Refill: 0  - oxyCODONE-acetaminophen (PERCOCET)  mg per tablet; Take 1 tablet by mouth every 4 (four) hours as needed for Pain.  Dispense: 120 tablet; Refill: 0  - morphine (MS CONTIN) 60 MG 12 hr tablet; Take 1 tablet (60 mg total) by mouth 2 (two) times daily.  Dispense: 60 tablet; Refill: 0  - oxyCODONE-acetaminophen (PERCOCET)  mg per tablet; Take 1 tablet by mouth every 4 (four) hours as needed for Pain.  Dispense: 120 tablet; Refill: 0  - morphine (MS CONTIN) 60 MG 12 hr tablet; Take 1 tablet (60 mg total) by mouth 2 (two) times daily.  Dispense: 60 tablet; Refill: 0    4. Uncomplicated opioid dependence  - oxyCODONE-acetaminophen (PERCOCET)  mg per tablet; Take 1 tablet by mouth every 4 (four) hours as needed for Pain.  Dispense: 120 tablet; Refill: 0  - morphine (MS CONTIN) 60 MG 12 hr tablet; Take 1 tablet (60 mg total) by mouth 2 (two) times daily.  Dispense: 60 tablet; Refill: 0  - oxyCODONE-acetaminophen (PERCOCET)  mg per tablet; Take 1 tablet by mouth every 4 (four) hours as needed for Pain.  Dispense: 120 tablet; Refill: 0  - morphine (MS CONTIN) 60 MG 12 hr tablet; Take 1 tablet (60 mg total) by mouth 2 (two) times daily.  Dispense: 60 tablet; Refill: 0  - oxyCODONE-acetaminophen (PERCOCET)  mg per tablet; Take 1 tablet by mouth every 4 (four) hours as needed for Pain.  Dispense: 120 tablet; Refill: 0  - morphine (MS CONTIN) 60 MG 12 hr tablet; Take 1 tablet (60 mg total) by mouth 2 (two) times daily.  Dispense: 60 tablet; Refill: 0    5. Failed back surgical syndrome    6. Spinal stenosis, lumbar region, without neurogenic  claudication    7. Spinal stenosis in cervical region    8. BMI 33.0-33.9,adult

## 2020-10-03 NOTE — PROGRESS NOTES
Quick DC. Duplicate Request   Refill Authorization Note   Sam Pete is requesting a refill authorization.    Brief assessment and rationale for refill: QUICK DC: HANDLED IN A PREVIOUS ENCOUNTER             Medication reconciliation completed: No    Comments:   Pended Medication(s)       Requested Prescriptions     Pending Prescriptions Disp Refills    buPROPion (WELLBUTRIN XL) 150 MG TB24 tablet [Pharmacy Med Name: BUPROPION HYDROCHLORIDE ER (XL) 150 MG Tablet Extended Release 24 Hour] 90 tablet 1     Sig: TAKE 1 TABLET EVERY DAY        Duplicate Pended Encounter(s)/ Last Prescribed Details:    Ordering Encounter Report    Associated Reports   View Encounter                Note composed:10:33 PM 10/02/2020

## 2020-11-02 ENCOUNTER — PATIENT MESSAGE (OUTPATIENT)
Dept: FAMILY MEDICINE | Facility: CLINIC | Age: 75
End: 2020-11-02

## 2020-11-05 DIAGNOSIS — I10 ESSENTIAL (PRIMARY) HYPERTENSION: Primary | ICD-10-CM

## 2020-11-05 DIAGNOSIS — Z86.73 HISTORY OF CVA IN ADULTHOOD: ICD-10-CM

## 2020-11-05 RX ORDER — ATORVASTATIN CALCIUM 40 MG/1
40 TABLET, FILM COATED ORAL DAILY
Qty: 90 TABLET | Refills: 0 | Status: SHIPPED | OUTPATIENT
Start: 2020-11-05 | End: 2021-03-17 | Stop reason: SDUPTHER

## 2020-11-05 NOTE — TELEPHONE ENCOUNTER
Care Due:                  Date            Visit Type   Department     Provider  --------------------------------------------------------------------------------                                             [facility]  Last Visit: 10-      [facility]         RADHA Montalvo                                           [facility]  Next Visit: 01-      None         MEDICINE       Ty Montalvo                                                            Last  Test          Frequency    Reason                     Performed    Due Date  --------------------------------------------------------------------------------    CMP.........  12 months..  losartan.................  08- 02-    Powered by [employer]. Reference number: [id number]. 11/05/2020 1:18:06 PM   CST

## 2020-11-05 NOTE — TELEPHONE ENCOUNTER
Received refill request from [facility]. Pt looks to be due for Annual wellness and labs (including lipid). Sent to refill pool.

## 2020-11-05 NOTE — PROGRESS NOTES
Provider Staff:     Action is required for this patient.     Please schedule patient for Labs (CMP/LIPID)    Thanks!  [facility]     Appointments  past 12m or future 3m with PCP    Date Provider   Last Visit   10/2/2020 Ty Montalvo MD   Next Visit   1/4/2021 Ty Montalvo MD     Note composed:1:37 PM 11/05/2020

## 2020-11-05 NOTE — PROGRESS NOTES
Refill Authorization Note   Sam Pete is requesting a refill authorization.  Brief assessment and rationale for refill: Approve    -Medication-related problems identified: Requires labs  Medication Therapy Plan: CDMR. LABS: (CMP/LIPID)    Medication reconciliation completed: No   Comments:   Orders Placed This Encounter    Comprehensive Metabolic Panel    Lipid Panel    atorvastatin (LIPITOR) 40 MG tablet      Requested Prescriptions   Signed Prescriptions Disp Refills    atorvastatin (LIPITOR) 40 MG tablet 90 tablet 0     Sig: Take 1 tablet (40 mg total) by mouth once daily.       Hmg CoA Reductase Inhibitors Protocol Failed - 11/5/2020  1:17 PM        Failed - Lipid Panel within past 12 months     Lab Results   Component Value Date    CHOL 135 08/21/2019    HDL 45 08/21/2019    LDLCALC 47.2 (L) 08/21/2019    TRIG 214 (H) 08/21/2019             Failed - ALT less than 95 in past 12 months      Lab Results   Component Value Date    ALT 16 08/21/2019    ALT 14 12/30/2015    ALT 18 02/13/2015              Passed - Recent or future visit with authorizing provider       Cardiovascular:  Antilipid - Statins Failed - 11/5/2020  1:17 PM        Failed - ALT is 94 or below and within 360 days     ALT   Date Value Ref Range Status   08/21/2019 16 10 - 44 U/L Final   12/30/2015 14 10 - 44 U/L Final   02/13/2015 18 10 - 44 U/L Final              Failed - AST is 54 or below and within 360 days     AST   Date Value Ref Range Status   08/21/2019 19 10 - 40 U/L Final   12/30/2015 17 10 - 40 U/L Final   02/13/2015 19 10 - 40 U/L Final              Failed - Total Cholesterol within 360 days     Cholesterol   Date Value Ref Range Status   08/21/2019 135 120 - 199 mg/dL Final     Comment:     The National Cholesterol Education Program (NCEP) has set the  following guidelines (reference ranges) for Cholesterol:  Optimal.....................<200 mg/dL  Borderline High.............200-239 mg/dL  High........................> or =  240 mg/dL     12/30/2015 184 120 - 199 mg/dL Final     Comment:     The National Cholesterol Education Program (NCEP) has set the  following guidelines (reference ranges) for Cholesterol:  Optimal.....................<200 mg/dL  Borderline High.............200-239 mg/dL  High........................> or = 240 mg/dL     11/22/2010 172 120 - 199 mg/dL Final     Comment:     The National Cholesterol Education Program (NCEP) has set the  following guidelines (reference ranges) for Cholesterol:  Optimal.....................<200 mg/dL  Borderline High.............200-239 mg/dL  High........................> or = 240 mg/dL              Failed - LDL within 360 days     LDL Cholesterol   Date Value Ref Range Status   08/21/2019 47.2 (L) 63.0 - 159.0 mg/dL Final     Comment:     The National Cholesterol Education Program (NCEP) has set the  following guidelines (reference values) for LDL Cholesterol:  Optimal.......................<130 mg/dL  Borderline High...............130-159 mg/dL  High..........................160-189 mg/dL  Very High.....................>190 mg/dL              Failed - HDL within 360 days     HDL   Date Value Ref Range Status   08/21/2019 45 40 - 75 mg/dL Final     Comment:     The National Cholesterol Education Program (NCEP) has set the  following guidelines (reference values) for HDL Cholesterol:  Low...............<40 mg/dL  Optimal...........>60 mg/dL              Failed - Triglycerides within 360 days     Triglycerides   Date Value Ref Range Status   08/21/2019 214 (H) 30 - 150 mg/dL Final     Comment:     The National Cholesterol Education Program (NCEP) has set the  following guidelines (reference values) for triglycerides:  Normal......................<150 mg/dL  Borderline High.............150-199 mg/dL  High........................200-499 mg/dL     12/30/2015 125 30 - 150 mg/dL Final     Comment:     The National Cholesterol Education Program (NCEP) has set the  following guidelines  (reference values) for triglycerides:  Normal......................<150 mg/dL  Borderline High.............150-199 mg/dL  High........................200-499 mg/dL     11/22/2010 155 (H) 30 - 150 mg/dL Final     Comment:     The National Cholesterol Education Program (NCEP) has set the  following guidelines (reference values) for triglycerides:  Normal......................<150 mg/dL  Borderline High.............150-199 mg/dL  High........................200-499 mg/dL  Very High...................> or = 500 mg/dL              Passed - Patient is at least 18 years old        Passed - Office visit in past 12 months or future 90 days     Recent Outpatient Visits            1 month ago Oropharyngeal dysphagia    [place] [facility] Ty Montalvo MD    1 month ago Cough    [facility]    3 months ago Hypertension, unspecified type    [facility] [place] Roberto Alas PA-C    4 months ago Uncomplicated opioid dependence    [place] [facility] Ty Montalvo MD    5 months ago Right hip pain    [facility] Frankie Joya [facility]          Future Appointments              In 1 week [id number]    In 2 months Ty Montalvo MD [facility]    In 2 months MISSAEL Hodges OD [place] [facility] [place]                    Appointments  past 12m or future 3m with PCP    Date Provider   Last Visit   10/2/2020 Ty Montalvo MD   Next Visit   1/4/2021 Ty Montalvo MD   ED visits in past 90 days: 0     Note composed:1:36 PM 11/05/2020

## 2020-11-06 DIAGNOSIS — R13.10 DYSPHAGIA, UNSPECIFIED TYPE: ICD-10-CM

## 2020-11-06 DIAGNOSIS — R13.12 OROPHARYNGEAL DYSPHAGIA: Primary | ICD-10-CM

## 2020-11-09 ENCOUNTER — TELEPHONE (OUTPATIENT)
Dept: FAMILY MEDICINE | Facility: CLINIC | Age: 75
End: 2020-11-09

## 2020-11-09 DIAGNOSIS — R13.12 OROPHARYNGEAL DYSPHAGIA: Primary | ICD-10-CM

## 2020-11-17 ENCOUNTER — PATIENT MESSAGE (OUTPATIENT)
Dept: FAMILY MEDICINE | Facility: CLINIC | Age: 75
End: 2020-11-17

## 2020-11-18 ENCOUNTER — HOSPITAL ENCOUNTER (OUTPATIENT)
Dept: RADIOLOGY | Facility: HOSPITAL | Age: 75
Discharge: HOME | End: 2020-11-18
Attending: FAMILY MEDICINE
Payer: MEDICARE

## 2020-11-18 ENCOUNTER — CLINICAL SUPPORT (OUTPATIENT)
Dept: REHABILITATION | Facility: HOSPITAL | Age: 75
End: 2020-11-18
Attending: FAMILY MEDICINE
Payer: MEDICARE

## 2020-11-18 DIAGNOSIS — R13.12 OROPHARYNGEAL DYSPHAGIA: Primary | ICD-10-CM

## 2020-11-18 DIAGNOSIS — R13.10 DYSPHAGIA, UNSPECIFIED TYPE: ICD-10-CM

## 2020-11-18 PROCEDURE — 92526 ORAL FUNCTION THERAPY: CPT | Mod: HCNC

## 2020-11-18 PROCEDURE — 74230 X-RAY XM SWLNG FUNCJ C+: CPT | Mod: TC,HCNC

## 2020-11-18 PROCEDURE — 92611 MOTION FLUOROSCOPY/SWALLOW: CPT | Mod: HCNC,PN

## 2020-11-18 PROCEDURE — 92610 EVALUATE SWALLOWING FUNCTION: CPT | Mod: HCNC,PN

## 2020-11-18 PROCEDURE — 74230 FL MODIFIED BARIUM SWALLOW SPEECH STUDY: ICD-10-PCS | Mod: 26,HCNC,, | Performed by: RADIOLOGY

## 2020-11-18 PROCEDURE — 74230 X-RAY XM SWLNG FUNCJ C+: CPT | Mod: 26,HCNC,, | Performed by: RADIOLOGY

## 2020-11-18 PROCEDURE — 92610 EVALUATE SWALLOWING FUNCTION: CPT | Mod: HCNC,59

## 2020-11-18 PROCEDURE — 92611 MOTION FLUOROSCOPY/SWALLOW: CPT | Mod: HCNC

## 2020-11-18 NOTE — PLAN OF CARE
Outpatient Neurological Rehabilitation  MODIFIED BARIUM SWALLOW STUDY      Date: 11/18/2020     Name: Sam Pete   MRN: [id number]    Therapy Diagnosis: mild oropharyngeal dysphagia    Physician: Ty Montalvo MD  Physician Orders: Fl modified barium swallow study with speech  Medical Diagnosis from Referral: Oropharyngeal dysphagia  Date of Evaluation:  11/18/2020    Time In:  1300  Time Out:  1335   Total Billable Time: 35      Procedure Min.   Fl Modified Barium Swallow Speech  15   Swallow and Oral Function Evaluation   20     Precautions: Standard and Fall    Subjective   Date of Onset: 1-2 years ago    Past Medical History: Sam Pete  has a past medical history of Anticoagulant long-term use, Arthritis, Cataract, Chronic low back pain, Complete rupture of rotator cuff (2/8/2011), DDD (degenerative disc disease), lumbar (7/8/2015), Degeneration of lumbar or lumbosacral intervertebral disc (9/9/2015), ED (erectile dysfunction), GERD (gastroesophageal reflux disease), Hypertension, Lumbar pseudoarthrosis (6/26/2017), Lumbar stenosis (6/26/2017), Obesity, Spondylosis of lumbar region without myelopathy or radiculopathy (7/8/2015), Stroke (1997), Testicular hypofunction, and Thoracic or lumbosacral neuritis or radiculitis (7/8/2015).  Sam Pete  has a past surgical history that includes Appendectomy; Knee arthroscopy w/ debridement; Colonoscopy (07/12/2004); Epidural steroid injection; Back surgery; Nervbe Block injection; Knee surgery; Joint replacement (2-6-2013); Joint replacement (Left); Esophagogastroduodenoscopy (07/12/2004); Fracture surgery (Left); Colonoscopy (N/A, 2/25/2019); Insertion of dorsal column nerve stimulator for trial (N/A, 12/12/2019); and Laminectomy using minimally invasive technique (N/A, 2/12/2020).    The patient is a 75 y.o. male who complains of difficulty swallowing solids.     The Pt gave the following history:   -Current diet at home: regular textures & liquids.  " Does not like overcooked-dry meats.  Has difficulty chewing steak, & pork chops enough, usually cuts into 1/2" pcs, sometimes admits he does not chew them enough.  Reported dry mouth, constantly drinking liquids, always starts meals with liquids.  No c/o liquids or soft foods or bread being a problem.  Reported pill dysphagia, especially on large pills getting stuck in throat.  Cannot reach in his throat far enough to find the stuck food/pill, and sometimes has to vomit up foods/pills which are stuck. Wears dentures to eat: stated upper full denture & lower partial denture are well fitted when he uses denture cream. 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   -Recommended diet from previous study: none prior per pt  -Therapy received: none  -Neuro: Pt endorsed neurological diagnosis of CVA and TIA. Pt denied all other neurological diagnoses.  -GI: Pt endorsed GI diagnosis of GERD and Reflux. Pt on daily medication, Prilosec 1x per day. Pt denied all other GI diagnoses.   -Pulmonary: Pt denied any pulmonary diagnoses.   -Surgery: none on cervical spine    The following observations were made:   -Mental status: Alert and Cooperative  -Factors affecting performance: no difficulties participating in the study  -Feeding Method: independent in self-feeding  -Respiratory Status: room air    Medical Hx and Allergies:    Review of patient's allergies indicates:   Allergen Reactions    Xyzal [levocetirizine] Other (See Comments)     Knocked him out        Pain Scale:  0/10 on VAS currently.     Objective     Modified Barium Swallow Study  A modified barium swallow study was ordered to objectively evaluate the safety and efficiency of the patients swallowing and to rule out aspiration.      The patient was seen in radiology seated upright in a video imaging chair for lateral views of the larynx. The study was conducted using Varibar thin liquid (IDDSI 0), " Varibar nectar liquid (IDDSI 2), Varibar pudding (IDDSI 4), solid coated in Varibar pudding (IDDSI 7) and peaches in thin barium. He tolerated the procedure well.     A cranial nerve examination revealed the following:  Trigeminal Nerve (V) Jaw Posture at rest: Closed  Mandible Elevation: WFL  Mandible Depression: WFL  Mandible lateralization: WFL  Facial Pain: None noted  Facial Sensation: intact for all three branches  Abnormal movement: absent Motor: WFL    Sensory: WFL   Facial Nerve (VII) Facial Symmetry: Symmetrical  Able to wrinkle forehead bilaterally: yes  Able to close eyes tightly: yes  Labial lateralization: WFL  Labial protrusion: WFL  Labial seal: WFL  Abnormal Movement: absent Motor: WFL    Sensory: WFL   Glossopharyngeal Nerve (IX) and Vagus Nerve (X) Palatal symmetry: Symmetrical  Palatal movement with short production of ah: Symmetrical  Resonance: Normal  Voice Prior to PO intake: WFL  Volitional Cough: Strong  Abnormal movement: absent Motor: WFL    Sensory: WFL   Hypoglossal Nerve (XII) Tongue at rest: WFL  Lingual protrusion: WFL  Lingual Lateralization: WFL  Lingual retraction: WFL  Abnormal movement: absent Motor: WFL   Volitional Swallow: Able to palpate laryngeal rise  Mucosal Quality: dry  Secretion Management: adequate  Dentition: upper denture, partial lower, well fitted    CONSISTENCIES ADMINISTERED:  Thin Liquids (IDDSI 0):   Mode and volume administered: 5ml x1, 10 ml x1, self-regulated cup sip x1, self-regulated straw sip x1, rapid consecutive cup sip x1   Oral Residue: trace     Vallecular Residue: trace     Pyriform Sinus Residue: trace     Rosenbeck's 8-Point Penetration-Aspiration Scale: (1) Material does not enter the airway   Strategies attempted: Dry swallow cleared residue   Barium Tablet: The barium tablet was swallowed without any oral or pharyngeal concerns.     Puree (IDDSI 4):   Mode and volume administered: 5ml x1   Oral Residue: mild    Vallecular Residue:  very mild    Pyriform Sinus Residue: very mild    Rosenbeck's 8-Point Penetration-Aspiration Scale: (1) Material does not enter the airway    Mixed Consistency Bolus (IDDSI 6 with IDDSI 0):   Mode and volume administered: tsp peaches in thin barium x1   Oral Residue: none    Vallecular Residue: trace    Pyriform Sinus Residue: trace    Rosenbeck's 8-Point Penetration-Aspiration Scale: (1) Material does not enter the airway   Strategies attempted: dry swallow cleared residue    Regular (IDDSI 7):   Mode and volume administered: 1/3 Alexandra doamauri cookie in barium pudding x1   Oral Residue: none    Vallecular Residue: trace    Pyriform Sinus Residue: trace    Rosenbeck's 8-Point Penetration-Aspiration Scale: (1) Material does not enter the airway   Strategies attempted: dry swallow cleared residue    Treatment   Treatment Time In: 1335  Treatment Time Out: 1345  Total Treatment Time: 10 minutes  Pt educated regarding results and recommendations of the evaluation. See the recommendations section below.    Education: Results of the study and Recommendations were discussed with the patient. Patient expressed understanding.     Assessment     Sam Pete is a 75 y.o. male referred for Modified Barium Swallow Study with a medical diagnosis of dysphagia.  .     Oral Phase: Lip closure was WNL with no labial escape.  Bolus prep and mastication was timely and efficient. Lingual motion was brisk for adequate bolus transport. There was no significant oral residue.. The swallow was initiated when the head of the bolus passed the ramus of the mandible.    Pharyngeal Phase:  The soft palate elevated for WNL closure of the velopharyngeal port. Tongue base retraction was WNl. A cervical protrusion (C3-7) was noted, but did not interfere with passage of any bolus type  No epiglottic inversion was noted in this study. Anterior hyoid excursion was WFL. Laryngeal elevation was WFL. There was no penetration in this study.   There was no aspiration observed in this study.  Pharyngeal stripping wave appeared present and complete. The PES opening was WFL.  There was trace to mild pharyngeal residue in the valleculae & pyriforms on most boluses which was reduced with dry swallow.    Esophageal Phase: On esophageal screen, nothing which affected the pharyngeal swallow.  Aerophagia was seen.     Dysphagia Outcome and Severity Scale (RICH): Level 6: WFL/ Modified [place]    Impressions: The patient presents with mild oropharyngeal dysphagia, but swallow is functional for present diet.  Pt displayed xerostomia (dry mouth) symptoms, and dentures, along with admission he does sometimes not chew steak and pork chops adequately: These and eating too fast could explain his reported difficulty swallowing meats.  Additionally, he reported difficulty swallowing large pills, which he takes with liquids. There was no indication of esophageal dysphagia.      Recommendations:      Consistency Recommendations: thin  liquids (IDDSI 0) and regular consistencies (IDDSI 7).  Medications should be taken whole in puree. Take meds one at a time.   Risk Management: small bites and sips and avoid tough meats, or cut into small pieces.  Moisten meats with a sip of liquid before swallowing.   Specialist Referrals: none   Ancillary Tests:  none .   Therapy: Dysphagia therapy is not recommended at this time.   Follow-up exam: Follow up swallow study is not indicated at this time.    Please contact [facility] at [phone] if there are questions re: the above or if we can be of additional service to this patient.    Therapist's Name:   Dixie Sanchez M.S. CCC-SLP/A  Speech Language Pathologist    Date: 11/18/2020

## 2020-11-25 NOTE — PLAN OF CARE
Outpatient Neurological Rehabilitation  MODIFIED BARIUM SWALLOW STUDY      Date: 11/18/2020     Name: Sam Pete   MRN: [id number]    Therapy Diagnosis: mild oropharyngeal dysphagia    Physician: Ty Montalvo MD  Physician Orders: Fl modified barium swallow study with speech  Medical Diagnosis from Referral: Oropharyngeal dysphagia  Date of Evaluation:  11/18/2020    Time In:  1300  Time Out:  1335   Total Billable Time: 35      Procedure Min.   Fl Modified Barium Swallow Speech  15   Swallow and Oral Function Evaluation   20     Precautions: Standard and Fall    Subjective   Date of Onset: 1-2 years ago    Past Medical History: Sam Pete  has a past medical history of Anticoagulant long-term use, Arthritis, Cataract, Chronic low back pain, Complete rupture of rotator cuff (2/8/2011), DDD (degenerative disc disease), lumbar (7/8/2015), Degeneration of lumbar or lumbosacral intervertebral disc (9/9/2015), ED (erectile dysfunction), GERD (gastroesophageal reflux disease), Hypertension, Lumbar pseudoarthrosis (6/26/2017), Lumbar stenosis (6/26/2017), Obesity, Spondylosis of lumbar region without myelopathy or radiculopathy (7/8/2015), Stroke (1997), Testicular hypofunction, and Thoracic or lumbosacral neuritis or radiculitis (7/8/2015).  Sam ePte  has a past surgical history that includes Appendectomy; Knee arthroscopy w/ debridement; Colonoscopy (07/12/2004); Epidural steroid injection; Back surgery; Nervbe Block injection; Knee surgery; Joint replacement (2-6-2013); Joint replacement (Left); Esophagogastroduodenoscopy (07/12/2004); Fracture surgery (Left); Colonoscopy (N/A, 2/25/2019); Insertion of dorsal column nerve stimulator for trial (N/A, 12/12/2019); and Laminectomy using minimally invasive technique (N/A, 2/12/2020).    The patient is a 75 y.o. male who complains of difficulty swallowing solids.     The Pt gave the following history:   -Current diet at home: regular textures & liquids.  " Does not like overcooked-dry meats.  Has difficulty chewing steak, & pork chops enough, usually cuts into 1/2" pcs, sometimes admits he does not chew them enough.  Reported dry mouth, constantly drinking liquids, always starts meals with liquids.  No c/o liquids or soft foods or bread being a problem.  Reported pill dysphagia, especially on large pills getting stuck in throat.  Cannot reach in his throat far enough to find the stuck food/pill, and sometimes has to vomit up foods/pills which are stuck. Wears dentures to eat: stated upper full denture & lower partial denture are well fitted when he uses denture cream. 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   -Recommended diet from previous study: none prior per pt  -Therapy received: none  -Neuro: Pt endorsed neurological diagnosis of CVA and TIA. Pt denied all other neurological diagnoses.  -GI: Pt endorsed GI diagnosis of GERD and Reflux. Pt on daily medication, Prilosec 1x per day. Pt denied all other GI diagnoses.   -Pulmonary: Pt denied any pulmonary diagnoses.   -Surgery: none on cervical spine    The following observations were made:   -Mental status: Alert and Cooperative  -Factors affecting performance: no difficulties participating in the study  -Feeding Method: independent in self-feeding  -Respiratory Status: room air    Medical Hx and Allergies:    Review of patient's allergies indicates:   Allergen Reactions    Xyzal [levocetirizine] Other (See Comments)     Knocked him out        Pain Scale:  0/10 on VAS currently.     Objective     Modified Barium Swallow Study  A modified barium swallow study was ordered to objectively evaluate the safety and efficiency of the patients swallowing and to rule out aspiration.      The patient was seen in radiology seated upright in a video imaging chair for lateral views of the larynx. The study was conducted using Varibar thin liquid (IDDSI 0), " Varibar nectar liquid (IDDSI 2), Varibar pudding (IDDSI 4), solid coated in Varibar pudding (IDDSI 7) and peaches in thin barium. He tolerated the procedure well.     A cranial nerve examination revealed the following:  Trigeminal Nerve (V) Jaw Posture at rest: Closed  Mandible Elevation: WFL  Mandible Depression: WFL  Mandible lateralization: WFL  Facial Pain: None noted  Facial Sensation: intact for all three branches  Abnormal movement: absent Motor: WFL    Sensory: WFL   Facial Nerve (VII) Facial Symmetry: Symmetrical  Able to wrinkle forehead bilaterally: yes  Able to close eyes tightly: yes  Labial lateralization: WFL  Labial protrusion: WFL  Labial seal: WFL  Abnormal Movement: absent Motor: WFL    Sensory: WFL   Glossopharyngeal Nerve (IX) and Vagus Nerve (X) Palatal symmetry: Symmetrical  Palatal movement with short production of ah: Symmetrical  Resonance: Normal  Voice Prior to PO intake: WFL  Volitional Cough: Strong  Abnormal movement: absent Motor: WFL    Sensory: WFL   Hypoglossal Nerve (XII) Tongue at rest: WFL  Lingual protrusion: WFL  Lingual Lateralization: WFL  Lingual retraction: WFL  Abnormal movement: absent Motor: WFL   Volitional Swallow: Able to palpate laryngeal rise  Mucosal Quality: dry  Secretion Management: adequate  Dentition: upper denture, partial lower, well fitted    CONSISTENCIES ADMINISTERED:  Thin Liquids (IDDSI 0):   Mode and volume administered: 5ml x1, 10 ml x1, self-regulated cup sip x1, self-regulated straw sip x1, rapid consecutive cup sip x1   Oral Residue: trace     Vallecular Residue: trace     Pyriform Sinus Residue: trace     Rosenbeck's 8-Point Penetration-Aspiration Scale: (1) Material does not enter the airway   Strategies attempted: Dry swallow cleared residue   Barium Tablet: The barium tablet was swallowed without any oral or pharyngeal concerns.     Puree (IDDSI 4):   Mode and volume administered: 5ml x1   Oral Residue: mild    Vallecular Residue:  very mild    Pyriform Sinus Residue: very mild    Rosenbeck's 8-Point Penetration-Aspiration Scale: (1) Material does not enter the airway    Mixed Consistency Bolus (IDDSI 6 with IDDSI 0):   Mode and volume administered: tsp peaches in thin barium x1   Oral Residue: none    Vallecular Residue: trace    Pyriform Sinus Residue: trace    Rosenbeck's 8-Point Penetration-Aspiration Scale: (1) Material does not enter the airway   Strategies attempted: dry swallow cleared residue    Regular (IDDSI 7):   Mode and volume administered: 1/3 Alexandra doamauri cookie in barium pudding x1   Oral Residue: none    Vallecular Residue: trace    Pyriform Sinus Residue: trace    Rosenbeck's 8-Point Penetration-Aspiration Scale: (1) Material does not enter the airway   Strategies attempted: dry swallow cleared residue    Treatment   Treatment Time In: 1335  Treatment Time Out: 1345  Total Treatment Time: 10 minutes  Pt educated regarding results and recommendations of the evaluation. See the recommendations section below.    Education: Results of the study and Recommendations were discussed with the patient. Patient expressed understanding.     Assessment     Sam Pete is a 75 y.o. male referred for Modified Barium Swallow Study with a medical diagnosis of dysphagia.  .     Oral Phase: Lip closure was WNL with no labial escape.  Bolus prep and mastication was timely and efficient. Lingual motion was brisk for adequate bolus transport. There was no significant oral residue.. The swallow was initiated when the head of the bolus passed the ramus of the mandible.    Pharyngeal Phase:  The soft palate elevated for WNL closure of the velopharyngeal port. Tongue base retraction was WNl. A cervical protrusion (C3-7) was noted, but did not interfere with passage of any bolus type  No epiglottic inversion was noted in this study. Anterior hyoid excursion was WFL. Laryngeal elevation was WFL. There was no penetration in this study.   There was no aspiration observed in this study.  Pharyngeal stripping wave appeared present and complete. The PES opening was WFL.  There was trace to mild pharyngeal residue in the valleculae & pyriforms on most boluses which was reduced with dry swallow.    Esophageal Phase: On esophageal screen, nothing which affected the pharyngeal swallow.  Aerophagia was seen.     Dysphagia Outcome and Severity Scale (RICH): Level 6: WFL/ Modified [place]    Impressions: The patient presents with mild oropharyngeal dysphagia, but swallow is functional for present diet.  Pt displayed xerostomia (dry mouth) symptoms, and dentures, along with admission he does sometimes not chew steak and pork chops adequately: These and eating too fast could explain his reported difficulty swallowing meats.  Additionally, he reported difficulty swallowing large pills, which he takes with liquids. There was no indication of esophageal dysphagia.      Recommendations:      Consistency Recommendations: thin  liquids (IDDSI 0) and regular consistencies (IDDSI 7).  Medications should be taken whole in puree. Take meds one at a time.   Risk Management: small bites and sips and avoid tough meats, or cut into small pieces.  Moisten meats with a sip of liquid before swallowing.   Specialist Referrals: none   Ancillary Tests:  none .   Therapy: Dysphagia therapy is not recommended at this time.   Follow-up exam: Follow up swallow study is not indicated at this time.    Please contact [facility] at [phone] if there are questions re: the above or if we can be of additional service to this patient.    Therapist's Name:   Dixie Sanchez M.S. CCC-SLP/A  Speech Language Pathologist    Date: 11/18/2020

## 2020-12-08 ENCOUNTER — TELEPHONE (OUTPATIENT)
Dept: FAMILY MEDICINE | Facility: CLINIC | Age: 75
End: 2020-12-08

## 2020-12-08 NOTE — TELEPHONE ENCOUNTER
Patient was called to book current labs from refills.   He will talk to Dr Montalvo on 1-4-2021 1st.

## 2020-12-11 ENCOUNTER — PATIENT MESSAGE (OUTPATIENT)
Dept: OTHER | Facility: OTHER | Age: 75
End: 2020-12-11

## 2021-01-04 ENCOUNTER — OFFICE VISIT (OUTPATIENT)
Dept: FAMILY MEDICINE | Facility: CLINIC | Age: 76
End: 2021-01-04
Attending: FAMILY MEDICINE
Payer: MEDICARE

## 2021-01-04 VITALS
WEIGHT: 233.94 LBS | BODY MASS INDEX: 33.49 KG/M2 | TEMPERATURE: 97 F | DIASTOLIC BLOOD PRESSURE: 60 MMHG | HEART RATE: 85 BPM | SYSTOLIC BLOOD PRESSURE: 110 MMHG | HEIGHT: 70 IN | OXYGEN SATURATION: 95 %

## 2021-01-04 DIAGNOSIS — G89.29 OTHER CHRONIC PAIN: ICD-10-CM

## 2021-01-04 DIAGNOSIS — G89.4 CHRONIC PAIN SYNDROME: Primary | ICD-10-CM

## 2021-01-04 DIAGNOSIS — D64.9 ANEMIA, UNSPECIFIED TYPE: ICD-10-CM

## 2021-01-04 DIAGNOSIS — F11.20 UNCOMPLICATED OPIOID DEPENDENCE: ICD-10-CM

## 2021-01-04 DIAGNOSIS — E78.49 OTHER HYPERLIPIDEMIA: ICD-10-CM

## 2021-01-04 DIAGNOSIS — I48.0 PAF (PAROXYSMAL ATRIAL FIBRILLATION): ICD-10-CM

## 2021-01-04 DIAGNOSIS — I10 ESSENTIAL (PRIMARY) HYPERTENSION: ICD-10-CM

## 2021-01-04 PROCEDURE — 99214 PR OFFICE/OUTPT VISIT, EST, LEVL IV, 30-39 MIN: ICD-10-PCS | Mod: HCNC,S$GLB,, | Performed by: FAMILY MEDICINE

## 2021-01-04 PROCEDURE — 1126F AMNT PAIN NOTED NONE PRSNT: CPT | Mod: HCNC,S$GLB,, | Performed by: FAMILY MEDICINE

## 2021-01-04 PROCEDURE — 3288F PR FALLS RISK ASSESSMENT DOCUMENTED: ICD-10-PCS | Mod: HCNC,CPTII,S$GLB, | Performed by: FAMILY MEDICINE

## 2021-01-04 PROCEDURE — 3078F PR MOST RECENT DIASTOLIC BLOOD PRESSURE < 80 MM HG: ICD-10-PCS | Mod: HCNC,CPTII,S$GLB, | Performed by: FAMILY MEDICINE

## 2021-01-04 PROCEDURE — 1101F PT FALLS ASSESS-DOCD LE1/YR: CPT | Mod: HCNC,CPTII,S$GLB, | Performed by: FAMILY MEDICINE

## 2021-01-04 PROCEDURE — 80307 DRUG TEST PRSMV CHEM ANLYZR: CPT | Mod: HCNC

## 2021-01-04 PROCEDURE — 99999 PR PBB SHADOW E&M-EST. PATIENT-LVL IV: CPT | Mod: PBBFAC,HCNC,, | Performed by: FAMILY MEDICINE

## 2021-01-04 PROCEDURE — 99999 PR PBB SHADOW E&M-EST. PATIENT-LVL IV: ICD-10-PCS | Mod: PBBFAC,HCNC,, | Performed by: FAMILY MEDICINE

## 2021-01-04 PROCEDURE — 3008F PR BODY MASS INDEX (BMI) DOCUMENTED: ICD-10-PCS | Mod: HCNC,CPTII,S$GLB, | Performed by: FAMILY MEDICINE

## 2021-01-04 PROCEDURE — 1159F MED LIST DOCD IN RCRD: CPT | Mod: HCNC,S$GLB,, | Performed by: FAMILY MEDICINE

## 2021-01-04 PROCEDURE — 3288F FALL RISK ASSESSMENT DOCD: CPT | Mod: HCNC,CPTII,S$GLB, | Performed by: FAMILY MEDICINE

## 2021-01-04 PROCEDURE — 99499 UNLISTED E&M SERVICE: CPT | Mod: S$GLB,,, | Performed by: FAMILY MEDICINE

## 2021-01-04 PROCEDURE — 3074F SYST BP LT 130 MM HG: CPT | Mod: HCNC,CPTII,S$GLB, | Performed by: FAMILY MEDICINE

## 2021-01-04 PROCEDURE — 1159F PR MEDICATION LIST DOCUMENTED IN MEDICAL RECORD: ICD-10-PCS | Mod: HCNC,S$GLB,, | Performed by: FAMILY MEDICINE

## 2021-01-04 PROCEDURE — 99499 RISK ADDL DX/OHS AUDIT: ICD-10-PCS | Mod: S$GLB,,, | Performed by: FAMILY MEDICINE

## 2021-01-04 PROCEDURE — 3008F BODY MASS INDEX DOCD: CPT | Mod: HCNC,CPTII,S$GLB, | Performed by: FAMILY MEDICINE

## 2021-01-04 PROCEDURE — 1126F PR PAIN SEVERITY QUANTIFIED, NO PAIN PRESENT: ICD-10-PCS | Mod: HCNC,S$GLB,, | Performed by: FAMILY MEDICINE

## 2021-01-04 PROCEDURE — 3074F PR MOST RECENT SYSTOLIC BLOOD PRESSURE < 130 MM HG: ICD-10-PCS | Mod: HCNC,CPTII,S$GLB, | Performed by: FAMILY MEDICINE

## 2021-01-04 PROCEDURE — 1101F PR PT FALLS ASSESS DOC 0-1 FALLS W/OUT INJ PAST YR: ICD-10-PCS | Mod: HCNC,CPTII,S$GLB, | Performed by: FAMILY MEDICINE

## 2021-01-04 PROCEDURE — 99214 OFFICE O/P EST MOD 30 MIN: CPT | Mod: HCNC,S$GLB,, | Performed by: FAMILY MEDICINE

## 2021-01-04 PROCEDURE — 3078F DIAST BP <80 MM HG: CPT | Mod: HCNC,CPTII,S$GLB, | Performed by: FAMILY MEDICINE

## 2021-01-04 RX ORDER — MORPHINE SULFATE 60 MG/1
60 TABLET, FILM COATED, EXTENDED RELEASE ORAL 2 TIMES DAILY
Qty: 60 TABLET | Refills: 0 | Status: SHIPPED | OUTPATIENT
Start: 2021-02-14 | End: 2021-04-06

## 2021-01-04 RX ORDER — MORPHINE SULFATE 60 MG/1
60 TABLET, FILM COATED, EXTENDED RELEASE ORAL 2 TIMES DAILY
Qty: 60 TABLET | Refills: 0 | Status: SHIPPED | OUTPATIENT
Start: 2021-03-14 | End: 2021-04-06

## 2021-01-04 RX ORDER — OXYCODONE AND ACETAMINOPHEN 10; 325 MG/1; MG/1
1 TABLET ORAL EVERY 4 HOURS PRN
Qty: 120 TABLET | Refills: 0 | Status: SHIPPED | OUTPATIENT
Start: 2021-01-14 | End: 2021-04-06

## 2021-01-04 RX ORDER — OXYCODONE AND ACETAMINOPHEN 10; 325 MG/1; MG/1
1 TABLET ORAL EVERY 4 HOURS PRN
Qty: 120 TABLET | Refills: 0 | Status: SHIPPED | OUTPATIENT
Start: 2021-03-14 | End: 2021-04-06

## 2021-01-04 RX ORDER — OXYCODONE AND ACETAMINOPHEN 10; 325 MG/1; MG/1
1 TABLET ORAL EVERY 4 HOURS PRN
Qty: 120 TABLET | Refills: 0 | Status: SHIPPED | OUTPATIENT
Start: 2021-02-14 | End: 2021-04-06

## 2021-01-04 RX ORDER — MORPHINE SULFATE 60 MG/1
60 TABLET, FILM COATED, EXTENDED RELEASE ORAL 2 TIMES DAILY
Qty: 60 TABLET | Refills: 0 | Status: SHIPPED | OUTPATIENT
Start: 2021-01-14 | End: 2021-04-06

## 2021-01-05 DIAGNOSIS — M25.512 LEFT SHOULDER PAIN, UNSPECIFIED CHRONICITY: Primary | ICD-10-CM

## 2021-01-05 LAB
AMPHET+METHAMPHET UR QL: NEGATIVE
BARBITURATES UR QL SCN>200 NG/ML: NEGATIVE
BENZODIAZ UR QL SCN>200 NG/ML: NEGATIVE
BZE UR QL SCN: NEGATIVE
CANNABINOIDS UR QL SCN: NEGATIVE
CREAT UR-MCNC: 71 MG/DL (ref 23–375)
ETHANOL UR-MCNC: <10 MG/DL
METHADONE UR QL SCN>300 NG/ML: NEGATIVE
OPIATES UR QL SCN: NORMAL
PCP UR QL SCN>25 NG/ML: NEGATIVE
TOXICOLOGY INFORMATION: NORMAL

## 2021-01-06 ENCOUNTER — OFFICE VISIT (OUTPATIENT)
Dept: OPTOMETRY | Facility: CLINIC | Age: 76
End: 2021-01-06
Payer: MEDICARE

## 2021-01-06 DIAGNOSIS — Z13.5 GLAUCOMA SCREENING: ICD-10-CM

## 2021-01-06 DIAGNOSIS — H25.13 NUCLEAR SCLEROSIS, BILATERAL: Primary | ICD-10-CM

## 2021-01-06 DIAGNOSIS — H52.4 MYOPIA WITH ASTIGMATISM AND PRESBYOPIA, BILATERAL: ICD-10-CM

## 2021-01-06 DIAGNOSIS — H52.203 MYOPIA WITH ASTIGMATISM AND PRESBYOPIA, BILATERAL: ICD-10-CM

## 2021-01-06 DIAGNOSIS — H52.13 MYOPIA WITH ASTIGMATISM AND PRESBYOPIA, BILATERAL: ICD-10-CM

## 2021-01-06 DIAGNOSIS — H43.393 VITREOUS FLOATERS, BILATERAL: ICD-10-CM

## 2021-01-06 PROCEDURE — 1126F PR PAIN SEVERITY QUANTIFIED, NO PAIN PRESENT: ICD-10-PCS | Mod: HCNC,S$GLB,, | Performed by: OPTOMETRIST

## 2021-01-06 PROCEDURE — 92014 PR EYE EXAM, EST PATIENT,COMPREHESV: ICD-10-PCS | Mod: HCNC,S$GLB,, | Performed by: OPTOMETRIST

## 2021-01-06 PROCEDURE — 92015 DETERMINE REFRACTIVE STATE: CPT | Mod: HCNC,S$GLB,, | Performed by: OPTOMETRIST

## 2021-01-06 PROCEDURE — 99999 PR PBB SHADOW E&M-EST. PATIENT-LVL IV: CPT | Mod: PBBFAC,HCNC,, | Performed by: OPTOMETRIST

## 2021-01-06 PROCEDURE — 99999 PR PBB SHADOW E&M-EST. PATIENT-LVL IV: ICD-10-PCS | Mod: PBBFAC,HCNC,, | Performed by: OPTOMETRIST

## 2021-01-06 PROCEDURE — 92015 PR REFRACTION: ICD-10-PCS | Mod: HCNC,S$GLB,, | Performed by: OPTOMETRIST

## 2021-01-06 PROCEDURE — 1126F AMNT PAIN NOTED NONE PRSNT: CPT | Mod: HCNC,S$GLB,, | Performed by: OPTOMETRIST

## 2021-01-06 PROCEDURE — 92014 COMPRE OPH EXAM EST PT 1/>: CPT | Mod: HCNC,S$GLB,, | Performed by: OPTOMETRIST

## 2021-01-07 ENCOUNTER — HOSPITAL ENCOUNTER (OUTPATIENT)
Dept: RADIOLOGY | Facility: HOSPITAL | Age: 76
Discharge: HOME | End: 2021-01-07
Attending: ORTHOPAEDIC SURGERY
Payer: MEDICARE

## 2021-01-07 ENCOUNTER — OFFICE VISIT (OUTPATIENT)
Dept: ORTHOPEDICS | Facility: CLINIC | Age: 76
End: 2021-01-07
Payer: MEDICARE

## 2021-01-07 VITALS — WEIGHT: 233.94 LBS | BODY MASS INDEX: 33.49 KG/M2 | HEIGHT: 70 IN

## 2021-01-07 DIAGNOSIS — M25.512 LEFT SHOULDER PAIN, UNSPECIFIED CHRONICITY: Primary | ICD-10-CM

## 2021-01-07 DIAGNOSIS — M25.512 LEFT SHOULDER PAIN, UNSPECIFIED CHRONICITY: ICD-10-CM

## 2021-01-07 PROCEDURE — 1101F PR PT FALLS ASSESS DOC 0-1 FALLS W/OUT INJ PAST YR: ICD-10-PCS | Mod: HCNC,CPTII,S$GLB, | Performed by: ORTHOPAEDIC SURGERY

## 2021-01-07 PROCEDURE — 73030 X-RAY EXAM OF SHOULDER: CPT | Mod: TC,50,HCNC,PO

## 2021-01-07 PROCEDURE — 1101F PT FALLS ASSESS-DOCD LE1/YR: CPT | Mod: HCNC,CPTII,S$GLB, | Performed by: ORTHOPAEDIC SURGERY

## 2021-01-07 PROCEDURE — 99999 PR PBB SHADOW E&M-EST. PATIENT-LVL III: CPT | Mod: PBBFAC,HCNC,, | Performed by: ORTHOPAEDIC SURGERY

## 2021-01-07 PROCEDURE — 73030 XR SHOULDER TRAUMA 3 VIEW BILATERAL: ICD-10-PCS | Mod: 26,50,HCNC, | Performed by: RADIOLOGY

## 2021-01-07 PROCEDURE — 73030 X-RAY EXAM OF SHOULDER: CPT | Mod: 26,50,HCNC, | Performed by: RADIOLOGY

## 2021-01-07 PROCEDURE — 1125F PR PAIN SEVERITY QUANTIFIED, PAIN PRESENT: ICD-10-PCS | Mod: HCNC,S$GLB,, | Performed by: ORTHOPAEDIC SURGERY

## 2021-01-07 PROCEDURE — 99213 PR OFFICE/OUTPT VISIT, EST, LEVL III, 20-29 MIN: ICD-10-PCS | Mod: HCNC,S$GLB,, | Performed by: ORTHOPAEDIC SURGERY

## 2021-01-07 PROCEDURE — 99999 PR PBB SHADOW E&M-EST. PATIENT-LVL III: ICD-10-PCS | Mod: PBBFAC,HCNC,, | Performed by: ORTHOPAEDIC SURGERY

## 2021-01-07 PROCEDURE — 3008F BODY MASS INDEX DOCD: CPT | Mod: HCNC,CPTII,S$GLB, | Performed by: ORTHOPAEDIC SURGERY

## 2021-01-07 PROCEDURE — 3008F PR BODY MASS INDEX (BMI) DOCUMENTED: ICD-10-PCS | Mod: HCNC,CPTII,S$GLB, | Performed by: ORTHOPAEDIC SURGERY

## 2021-01-07 PROCEDURE — 3288F FALL RISK ASSESSMENT DOCD: CPT | Mod: HCNC,CPTII,S$GLB, | Performed by: ORTHOPAEDIC SURGERY

## 2021-01-07 PROCEDURE — 3288F PR FALLS RISK ASSESSMENT DOCUMENTED: ICD-10-PCS | Mod: HCNC,CPTII,S$GLB, | Performed by: ORTHOPAEDIC SURGERY

## 2021-01-07 PROCEDURE — 99213 OFFICE O/P EST LOW 20 MIN: CPT | Mod: HCNC,S$GLB,, | Performed by: ORTHOPAEDIC SURGERY

## 2021-01-07 PROCEDURE — 1159F PR MEDICATION LIST DOCUMENTED IN MEDICAL RECORD: ICD-10-PCS | Mod: HCNC,S$GLB,, | Performed by: ORTHOPAEDIC SURGERY

## 2021-01-07 PROCEDURE — 1125F AMNT PAIN NOTED PAIN PRSNT: CPT | Mod: HCNC,S$GLB,, | Performed by: ORTHOPAEDIC SURGERY

## 2021-01-07 PROCEDURE — 1159F MED LIST DOCD IN RCRD: CPT | Mod: HCNC,S$GLB,, | Performed by: ORTHOPAEDIC SURGERY

## 2021-01-14 ENCOUNTER — LAB VISIT (OUTPATIENT)
Dept: LAB | Facility: HOSPITAL | Age: 76
End: 2021-01-14
Attending: FAMILY MEDICINE
Payer: MEDICARE

## 2021-01-14 DIAGNOSIS — I10 ESSENTIAL (PRIMARY) HYPERTENSION: ICD-10-CM

## 2021-01-14 DIAGNOSIS — D64.9 ANEMIA, UNSPECIFIED TYPE: ICD-10-CM

## 2021-01-14 DIAGNOSIS — E78.49 OTHER HYPERLIPIDEMIA: ICD-10-CM

## 2021-01-14 LAB
BASOPHILS # BLD AUTO: 0.03 K/UL (ref 0–0.2)
BASOPHILS NFR BLD: 0.4 % (ref 0–1.9)
DIFFERENTIAL METHOD: ABNORMAL
EOSINOPHIL # BLD AUTO: 0.3 K/UL (ref 0–0.5)
EOSINOPHIL NFR BLD: 3.2 % (ref 0–8)
ERYTHROCYTE [DISTWIDTH] IN BLOOD BY AUTOMATED COUNT: 16.4 % (ref 11.5–14.5)
HCT VFR BLD AUTO: 41.8 % (ref 40–54)
HGB BLD-MCNC: 11.7 G/DL (ref 14–18)
IMM GRANULOCYTES # BLD AUTO: 0.01 K/UL (ref 0–0.04)
IMM GRANULOCYTES NFR BLD AUTO: 0.1 % (ref 0–0.5)
LYMPHOCYTES # BLD AUTO: 3.1 K/UL (ref 1–4.8)
LYMPHOCYTES NFR BLD: 37.5 % (ref 18–48)
MCH RBC QN AUTO: 23.9 PG (ref 27–31)
MCHC RBC AUTO-ENTMCNC: 28 G/DL (ref 32–36)
MCV RBC AUTO: 86 FL (ref 82–98)
MONOCYTES # BLD AUTO: 0.8 K/UL (ref 0.3–1)
MONOCYTES NFR BLD: 9.8 % (ref 4–15)
NEUTROPHILS # BLD AUTO: 4.1 K/UL (ref 1.8–7.7)
NEUTROPHILS NFR BLD: 49 % (ref 38–73)
NRBC BLD-RTO: 0 /100 WBC
PLATELET # BLD AUTO: 368 K/UL (ref 150–350)
PMV BLD AUTO: 9.5 FL (ref 9.2–12.9)
RBC # BLD AUTO: 4.89 M/UL (ref 4.6–6.2)
WBC # BLD AUTO: 8.38 K/UL (ref 3.9–12.7)

## 2021-01-14 PROCEDURE — 80061 LIPID PANEL: CPT | Mod: HCNC

## 2021-01-14 PROCEDURE — 85025 COMPLETE CBC W/AUTO DIFF WBC: CPT | Mod: HCNC

## 2021-01-14 PROCEDURE — 80053 COMPREHEN METABOLIC PANEL: CPT | Mod: HCNC

## 2021-01-14 PROCEDURE — 36415 COLL VENOUS BLD VENIPUNCTURE: CPT | Mod: HCNC,PO

## 2021-01-15 LAB
ALBUMIN SERPL BCP-MCNC: 3.9 G/DL (ref 3.5–5.2)
ALP SERPL-CCNC: 88 U/L (ref 55–135)
ALT SERPL W/O P-5'-P-CCNC: 16 U/L (ref 10–44)
ANION GAP SERPL CALC-SCNC: 11 MMOL/L (ref 8–16)
AST SERPL-CCNC: 19 U/L (ref 10–40)
BILIRUB SERPL-MCNC: 0.4 MG/DL (ref 0.1–1)
BUN SERPL-MCNC: 16 MG/DL (ref 8–23)
CALCIUM SERPL-MCNC: 9.1 MG/DL (ref 8.7–10.5)
CHLORIDE SERPL-SCNC: 103 MMOL/L (ref 95–110)
CHOLEST SERPL-MCNC: 127 MG/DL (ref 120–199)
CHOLEST/HDLC SERPL: 2.7 {RATIO} (ref 2–5)
CO2 SERPL-SCNC: 26 MMOL/L (ref 23–29)
CREAT SERPL-MCNC: 1 MG/DL (ref 0.5–1.4)
EST. GFR  (AFRICAN AMERICAN): >60 ML/MIN/1.73 M^2
EST. GFR  (NON AFRICAN AMERICAN): >60 ML/MIN/1.73 M^2
GLUCOSE SERPL-MCNC: 114 MG/DL (ref 70–110)
HDLC SERPL-MCNC: 47 MG/DL (ref 40–75)
HDLC SERPL: 37 % (ref 20–50)
LDLC SERPL CALC-MCNC: 55 MG/DL (ref 63–159)
NONHDLC SERPL-MCNC: 80 MG/DL
POTASSIUM SERPL-SCNC: 4.7 MMOL/L (ref 3.5–5.1)
PROT SERPL-MCNC: 7.9 G/DL (ref 6–8.4)
SODIUM SERPL-SCNC: 140 MMOL/L (ref 136–145)
TRIGL SERPL-MCNC: 125 MG/DL (ref 30–150)

## 2021-01-22 ENCOUNTER — PATIENT MESSAGE (OUTPATIENT)
Dept: ADMINISTRATIVE | Facility: OTHER | Age: 76
End: 2021-01-22

## 2021-01-26 ENCOUNTER — PATIENT MESSAGE (OUTPATIENT)
Dept: FAMILY MEDICINE | Facility: CLINIC | Age: 76
End: 2021-01-26

## 2021-01-26 DIAGNOSIS — I10 HYPERTENSION: ICD-10-CM

## 2021-01-26 DIAGNOSIS — K21.9 GERD (GASTROESOPHAGEAL REFLUX DISEASE): ICD-10-CM

## 2021-01-26 RX ORDER — OMEPRAZOLE 40 MG/1
40 CAPSULE, DELAYED RELEASE ORAL DAILY
Qty: 10 CAPSULE | Refills: 0 | Status: SHIPPED | OUTPATIENT
Start: 2021-01-26 | End: 2021-01-28 | Stop reason: CLARIF

## 2021-01-26 RX ORDER — LOSARTAN POTASSIUM 50 MG/1
50 TABLET ORAL DAILY
Qty: 10 TABLET | Refills: 0 | Status: SHIPPED | OUTPATIENT
Start: 2021-01-26 | End: 2021-05-07 | Stop reason: SDUPTHER

## 2021-01-26 RX ORDER — AMLODIPINE BESYLATE 10 MG/1
10 TABLET ORAL DAILY
Qty: 10 TABLET | Refills: 0 | Status: SHIPPED | OUTPATIENT
Start: 2021-01-26 | End: 2021-05-07 | Stop reason: SDUPTHER

## 2021-01-28 PROBLEM — K21.9 GASTROESOPHAGEAL REFLUX DISEASE WITHOUT ESOPHAGITIS: Status: ACTIVE | Noted: 2021-01-28

## 2021-01-28 RX ORDER — AMLODIPINE BESYLATE 10 MG/1
TABLET ORAL
Qty: 90 TABLET | Refills: 3 | Status: SHIPPED | OUTPATIENT
Start: 2021-01-28 | End: 2021-05-06 | Stop reason: SDUPTHER

## 2021-01-28 RX ORDER — OMEPRAZOLE 40 MG/1
CAPSULE, DELAYED RELEASE ORAL
Qty: 90 CAPSULE | Refills: 3 | Status: SHIPPED | OUTPATIENT
Start: 2021-01-28 | End: 2021-11-28

## 2021-01-28 RX ORDER — LOSARTAN POTASSIUM 50 MG/1
TABLET ORAL
Qty: 90 TABLET | Refills: 3 | Status: SHIPPED | OUTPATIENT
Start: 2021-01-28 | End: 2021-05-06 | Stop reason: SDUPTHER

## 2021-02-09 ENCOUNTER — IMMUNIZATION (OUTPATIENT)
Dept: FAMILY MEDICINE | Facility: CLINIC | Age: 76
End: 2021-02-09
Payer: MEDICARE

## 2021-02-09 DIAGNOSIS — Z23 NEED FOR VACCINATION: Primary | ICD-10-CM

## 2021-02-09 PROCEDURE — 91300 COVID-19, MRNA, LNP-S, PF, 30 MCG/0.3 ML DOSE VACCINE: CPT | Mod: PBBFAC | Performed by: FAMILY MEDICINE

## 2021-03-02 ENCOUNTER — IMMUNIZATION (OUTPATIENT)
Dept: FAMILY MEDICINE | Facility: CLINIC | Age: 76
End: 2021-03-02
Payer: MEDICARE

## 2021-03-02 DIAGNOSIS — Z23 NEED FOR VACCINATION: Primary | ICD-10-CM

## 2021-03-02 PROCEDURE — 0002A COVID-19, MRNA, LNP-S, PF, 30 MCG/0.3 ML DOSE VACCINE: CPT | Mod: PBBFAC | Performed by: INTERNAL MEDICINE

## 2021-03-02 PROCEDURE — 91300 COVID-19, MRNA, LNP-S, PF, 30 MCG/0.3 ML DOSE VACCINE: CPT | Mod: PBBFAC | Performed by: INTERNAL MEDICINE

## 2021-03-17 DIAGNOSIS — Z86.73 HISTORY OF CVA IN ADULTHOOD: ICD-10-CM

## 2021-03-19 RX ORDER — ATORVASTATIN CALCIUM 40 MG/1
40 TABLET, FILM COATED ORAL DAILY
Qty: 90 TABLET | Refills: 3 | Status: SHIPPED | OUTPATIENT
Start: 2021-03-19 | End: 2022-07-28

## 2021-03-22 RX ORDER — BUPROPION HYDROCHLORIDE 150 MG/1
150 TABLET ORAL DAILY
Qty: 90 TABLET | Refills: 3 | Status: SHIPPED | OUTPATIENT
Start: 2021-03-22 | End: 2022-04-01 | Stop reason: SDUPTHER

## 2021-03-26 ENCOUNTER — PES CALL (OUTPATIENT)
Dept: ADMINISTRATIVE | Facility: CLINIC | Age: 76
End: 2021-03-26

## 2021-04-06 ENCOUNTER — OFFICE VISIT (OUTPATIENT)
Dept: FAMILY MEDICINE | Facility: CLINIC | Age: 76
End: 2021-04-06
Attending: FAMILY MEDICINE
Payer: MEDICARE

## 2021-04-06 VITALS
SYSTOLIC BLOOD PRESSURE: 136 MMHG | HEIGHT: 70 IN | BODY MASS INDEX: 33.36 KG/M2 | TEMPERATURE: 98 F | DIASTOLIC BLOOD PRESSURE: 72 MMHG | OXYGEN SATURATION: 93 % | WEIGHT: 233 LBS | HEART RATE: 87 BPM

## 2021-04-06 DIAGNOSIS — F11.20 UNCOMPLICATED OPIOID DEPENDENCE: ICD-10-CM

## 2021-04-06 DIAGNOSIS — G89.29 OTHER CHRONIC PAIN: ICD-10-CM

## 2021-04-06 PROCEDURE — 1125F AMNT PAIN NOTED PAIN PRSNT: CPT | Mod: S$GLB,,, | Performed by: FAMILY MEDICINE

## 2021-04-06 PROCEDURE — 3008F PR BODY MASS INDEX (BMI) DOCUMENTED: ICD-10-PCS | Mod: CPTII,S$GLB,, | Performed by: FAMILY MEDICINE

## 2021-04-06 PROCEDURE — 99999 PR PBB SHADOW E&M-EST. PATIENT-LVL V: ICD-10-PCS | Mod: PBBFAC,,, | Performed by: FAMILY MEDICINE

## 2021-04-06 PROCEDURE — 3075F PR MOST RECENT SYSTOLIC BLOOD PRESS GE 130-139MM HG: ICD-10-PCS | Mod: CPTII,S$GLB,, | Performed by: FAMILY MEDICINE

## 2021-04-06 PROCEDURE — 1125F PR PAIN SEVERITY QUANTIFIED, PAIN PRESENT: ICD-10-PCS | Mod: S$GLB,,, | Performed by: FAMILY MEDICINE

## 2021-04-06 PROCEDURE — 3288F FALL RISK ASSESSMENT DOCD: CPT | Mod: CPTII,S$GLB,, | Performed by: FAMILY MEDICINE

## 2021-04-06 PROCEDURE — 3288F PR FALLS RISK ASSESSMENT DOCUMENTED: ICD-10-PCS | Mod: CPTII,S$GLB,, | Performed by: FAMILY MEDICINE

## 2021-04-06 PROCEDURE — 99213 PR OFFICE/OUTPT VISIT, EST, LEVL III, 20-29 MIN: ICD-10-PCS | Mod: S$GLB,,, | Performed by: FAMILY MEDICINE

## 2021-04-06 PROCEDURE — 3008F BODY MASS INDEX DOCD: CPT | Mod: CPTII,S$GLB,, | Performed by: FAMILY MEDICINE

## 2021-04-06 PROCEDURE — 1101F PR PT FALLS ASSESS DOC 0-1 FALLS W/OUT INJ PAST YR: ICD-10-PCS | Mod: CPTII,S$GLB,, | Performed by: FAMILY MEDICINE

## 2021-04-06 PROCEDURE — 1159F MED LIST DOCD IN RCRD: CPT | Mod: S$GLB,,, | Performed by: FAMILY MEDICINE

## 2021-04-06 PROCEDURE — 1101F PT FALLS ASSESS-DOCD LE1/YR: CPT | Mod: CPTII,S$GLB,, | Performed by: FAMILY MEDICINE

## 2021-04-06 PROCEDURE — 3078F PR MOST RECENT DIASTOLIC BLOOD PRESSURE < 80 MM HG: ICD-10-PCS | Mod: CPTII,S$GLB,, | Performed by: FAMILY MEDICINE

## 2021-04-06 PROCEDURE — 3078F DIAST BP <80 MM HG: CPT | Mod: CPTII,S$GLB,, | Performed by: FAMILY MEDICINE

## 2021-04-06 PROCEDURE — 3075F SYST BP GE 130 - 139MM HG: CPT | Mod: CPTII,S$GLB,, | Performed by: FAMILY MEDICINE

## 2021-04-06 PROCEDURE — 99213 OFFICE O/P EST LOW 20 MIN: CPT | Mod: S$GLB,,, | Performed by: FAMILY MEDICINE

## 2021-04-06 PROCEDURE — 1159F PR MEDICATION LIST DOCUMENTED IN MEDICAL RECORD: ICD-10-PCS | Mod: S$GLB,,, | Performed by: FAMILY MEDICINE

## 2021-04-06 PROCEDURE — 99999 PR PBB SHADOW E&M-EST. PATIENT-LVL V: CPT | Mod: PBBFAC,,, | Performed by: FAMILY MEDICINE

## 2021-04-06 RX ORDER — OXYCODONE AND ACETAMINOPHEN 10; 325 MG/1; MG/1
1 TABLET ORAL EVERY 4 HOURS PRN
Qty: 120 TABLET | Refills: 0 | Status: SHIPPED | OUTPATIENT
Start: 2021-06-14 | End: 2021-07-07

## 2021-04-06 RX ORDER — OXYCODONE AND ACETAMINOPHEN 10; 325 MG/1; MG/1
1 TABLET ORAL EVERY 4 HOURS PRN
Qty: 120 TABLET | Refills: 0 | Status: SHIPPED | OUTPATIENT
Start: 2021-04-14 | End: 2021-07-07

## 2021-04-06 RX ORDER — MORPHINE SULFATE 60 MG/1
60 TABLET, FILM COATED, EXTENDED RELEASE ORAL 2 TIMES DAILY
Qty: 60 TABLET | Refills: 0 | Status: SHIPPED | OUTPATIENT
Start: 2021-04-14 | End: 2021-07-07

## 2021-04-06 RX ORDER — OXYCODONE AND ACETAMINOPHEN 10; 325 MG/1; MG/1
1 TABLET ORAL EVERY 4 HOURS PRN
Qty: 120 TABLET | Refills: 0 | Status: SHIPPED | OUTPATIENT
Start: 2021-05-14 | End: 2021-07-07

## 2021-04-06 RX ORDER — MORPHINE SULFATE 60 MG/1
60 TABLET, FILM COATED, EXTENDED RELEASE ORAL 2 TIMES DAILY
Qty: 60 TABLET | Refills: 0 | Status: SHIPPED | OUTPATIENT
Start: 2021-06-14 | End: 2021-07-07

## 2021-04-06 RX ORDER — MORPHINE SULFATE 60 MG/1
60 TABLET, FILM COATED, EXTENDED RELEASE ORAL 2 TIMES DAILY
Qty: 60 TABLET | Refills: 0 | Status: SHIPPED | OUTPATIENT
Start: 2021-05-14 | End: 2021-07-07

## 2021-04-27 ENCOUNTER — PES CALL (OUTPATIENT)
Dept: ADMINISTRATIVE | Facility: CLINIC | Age: 76
End: 2021-04-27

## 2021-05-05 DIAGNOSIS — I10 HYPERTENSION: ICD-10-CM

## 2021-05-06 DIAGNOSIS — I10 HYPERTENSION: ICD-10-CM

## 2021-05-07 RX ORDER — AMLODIPINE BESYLATE 10 MG/1
TABLET ORAL
Qty: 10 TABLET | Refills: 0 | OUTPATIENT
Start: 2021-05-07

## 2021-05-07 RX ORDER — LOSARTAN POTASSIUM 50 MG/1
50 TABLET ORAL DAILY
Qty: 90 TABLET | Refills: 1 | Status: SHIPPED | OUTPATIENT
Start: 2021-05-07 | End: 2021-11-28

## 2021-05-07 RX ORDER — LOSARTAN POTASSIUM 50 MG/1
TABLET ORAL
Qty: 10 TABLET | Refills: 0 | OUTPATIENT
Start: 2021-05-07

## 2021-05-07 RX ORDER — AMLODIPINE BESYLATE 10 MG/1
10 TABLET ORAL DAILY
Qty: 90 TABLET | Refills: 3 | Status: SHIPPED | OUTPATIENT
Start: 2021-05-07 | End: 2021-11-28

## 2021-05-21 ENCOUNTER — TELEPHONE (OUTPATIENT)
Dept: FAMILY MEDICINE | Facility: CLINIC | Age: 76
End: 2021-05-21

## 2021-06-05 DIAGNOSIS — G89.29 OTHER CHRONIC PAIN: ICD-10-CM

## 2021-06-08 RX ORDER — GABAPENTIN 300 MG/1
CAPSULE ORAL
Qty: 180 CAPSULE | Refills: 1 | Status: SHIPPED | OUTPATIENT
Start: 2021-06-08 | End: 2021-11-29

## 2021-06-22 ENCOUNTER — OFFICE VISIT (OUTPATIENT)
Dept: DERMATOLOGY | Facility: CLINIC | Age: 76
End: 2021-06-22
Payer: MEDICARE

## 2021-06-22 DIAGNOSIS — D22.9 MULTIPLE BENIGN NEVI: Primary | ICD-10-CM

## 2021-06-22 DIAGNOSIS — L82.1 SEBORRHEIC KERATOSES: ICD-10-CM

## 2021-06-22 DIAGNOSIS — L57.0 ACTINIC KERATOSIS: ICD-10-CM

## 2021-06-22 DIAGNOSIS — L57.8 ACTINIC SKIN DAMAGE: ICD-10-CM

## 2021-06-22 DIAGNOSIS — D36.10 NEUROFIBROMA: ICD-10-CM

## 2021-06-22 DIAGNOSIS — D23.71 BENIGN NEOPLASM OF SKIN OF RIGHT FOOT: ICD-10-CM

## 2021-06-22 DIAGNOSIS — L81.4 LENTIGO: ICD-10-CM

## 2021-06-22 PROCEDURE — 1159F MED LIST DOCD IN RCRD: CPT | Mod: S$GLB,,, | Performed by: STUDENT IN AN ORGANIZED HEALTH CARE EDUCATION/TRAINING PROGRAM

## 2021-06-22 PROCEDURE — 3288F PR FALLS RISK ASSESSMENT DOCUMENTED: ICD-10-PCS | Mod: CPTII,S$GLB,, | Performed by: STUDENT IN AN ORGANIZED HEALTH CARE EDUCATION/TRAINING PROGRAM

## 2021-06-22 PROCEDURE — 17004 PR DESTRUCTION, PREMALIGNANT LESIONS; 15 OR MORE LESIONS: ICD-10-PCS | Mod: S$GLB,,, | Performed by: STUDENT IN AN ORGANIZED HEALTH CARE EDUCATION/TRAINING PROGRAM

## 2021-06-22 PROCEDURE — 1101F PT FALLS ASSESS-DOCD LE1/YR: CPT | Mod: CPTII,S$GLB,, | Performed by: STUDENT IN AN ORGANIZED HEALTH CARE EDUCATION/TRAINING PROGRAM

## 2021-06-22 PROCEDURE — 1101F PR PT FALLS ASSESS DOC 0-1 FALLS W/OUT INJ PAST YR: ICD-10-PCS | Mod: CPTII,S$GLB,, | Performed by: STUDENT IN AN ORGANIZED HEALTH CARE EDUCATION/TRAINING PROGRAM

## 2021-06-22 PROCEDURE — 99203 PR OFFICE/OUTPT VISIT, NEW, LEVL III, 30-44 MIN: ICD-10-PCS | Mod: 25,S$GLB,, | Performed by: STUDENT IN AN ORGANIZED HEALTH CARE EDUCATION/TRAINING PROGRAM

## 2021-06-22 PROCEDURE — 99203 OFFICE O/P NEW LOW 30 MIN: CPT | Mod: 25,S$GLB,, | Performed by: STUDENT IN AN ORGANIZED HEALTH CARE EDUCATION/TRAINING PROGRAM

## 2021-06-22 PROCEDURE — 99999 PR PBB SHADOW E&M-EST. PATIENT-LVL IV: CPT | Mod: PBBFAC,,, | Performed by: STUDENT IN AN ORGANIZED HEALTH CARE EDUCATION/TRAINING PROGRAM

## 2021-06-22 PROCEDURE — 1159F PR MEDICATION LIST DOCUMENTED IN MEDICAL RECORD: ICD-10-PCS | Mod: S$GLB,,, | Performed by: STUDENT IN AN ORGANIZED HEALTH CARE EDUCATION/TRAINING PROGRAM

## 2021-06-22 PROCEDURE — 1126F AMNT PAIN NOTED NONE PRSNT: CPT | Mod: S$GLB,,, | Performed by: STUDENT IN AN ORGANIZED HEALTH CARE EDUCATION/TRAINING PROGRAM

## 2021-06-22 PROCEDURE — 17004 DESTROY PREMAL LESIONS 15/>: CPT | Mod: S$GLB,,, | Performed by: STUDENT IN AN ORGANIZED HEALTH CARE EDUCATION/TRAINING PROGRAM

## 2021-06-22 PROCEDURE — 3288F FALL RISK ASSESSMENT DOCD: CPT | Mod: CPTII,S$GLB,, | Performed by: STUDENT IN AN ORGANIZED HEALTH CARE EDUCATION/TRAINING PROGRAM

## 2021-06-22 PROCEDURE — 99999 PR PBB SHADOW E&M-EST. PATIENT-LVL IV: ICD-10-PCS | Mod: PBBFAC,,, | Performed by: STUDENT IN AN ORGANIZED HEALTH CARE EDUCATION/TRAINING PROGRAM

## 2021-06-22 PROCEDURE — 1126F PR PAIN SEVERITY QUANTIFIED, NO PAIN PRESENT: ICD-10-PCS | Mod: S$GLB,,, | Performed by: STUDENT IN AN ORGANIZED HEALTH CARE EDUCATION/TRAINING PROGRAM

## 2021-06-30 ENCOUNTER — OFFICE VISIT (OUTPATIENT)
Dept: URGENT CARE | Facility: CLINIC | Age: 76
End: 2021-06-30
Payer: MEDICARE

## 2021-06-30 VITALS
BODY MASS INDEX: 33.36 KG/M2 | DIASTOLIC BLOOD PRESSURE: 69 MMHG | HEART RATE: 83 BPM | SYSTOLIC BLOOD PRESSURE: 115 MMHG | RESPIRATION RATE: 16 BRPM | TEMPERATURE: 98 F | HEIGHT: 70 IN | WEIGHT: 233 LBS | OXYGEN SATURATION: 99 %

## 2021-06-30 DIAGNOSIS — Z76.0 MEDICATION REFILL: ICD-10-CM

## 2021-06-30 DIAGNOSIS — J06.9 VIRAL URI WITH COUGH: ICD-10-CM

## 2021-06-30 DIAGNOSIS — L03.90 CELLULITIS, UNSPECIFIED CELLULITIS SITE: Primary | ICD-10-CM

## 2021-06-30 PROCEDURE — 3008F PR BODY MASS INDEX (BMI) DOCUMENTED: ICD-10-PCS | Mod: CPTII,S$GLB,, | Performed by: INTERNAL MEDICINE

## 2021-06-30 PROCEDURE — 99214 PR OFFICE/OUTPT VISIT, EST, LEVL IV, 30-39 MIN: ICD-10-PCS | Mod: S$GLB,,, | Performed by: INTERNAL MEDICINE

## 2021-06-30 PROCEDURE — 99214 OFFICE O/P EST MOD 30 MIN: CPT | Mod: S$GLB,,, | Performed by: INTERNAL MEDICINE

## 2021-06-30 PROCEDURE — 3008F BODY MASS INDEX DOCD: CPT | Mod: CPTII,S$GLB,, | Performed by: INTERNAL MEDICINE

## 2021-06-30 RX ORDER — DOXYCYCLINE 100 MG/1
100 CAPSULE ORAL 2 TIMES DAILY
Qty: 14 CAPSULE | Refills: 0 | Status: SHIPPED | OUTPATIENT
Start: 2021-06-30 | End: 2021-07-07

## 2021-06-30 RX ORDER — ALBUTEROL SULFATE 90 UG/1
2 AEROSOL, METERED RESPIRATORY (INHALATION) EVERY 6 HOURS PRN
Qty: 18 G | Refills: 0 | Status: SHIPPED | OUTPATIENT
Start: 2021-06-30 | End: 2022-06-30

## 2021-07-07 ENCOUNTER — OFFICE VISIT (OUTPATIENT)
Dept: FAMILY MEDICINE | Facility: CLINIC | Age: 76
End: 2021-07-07
Attending: FAMILY MEDICINE
Payer: MEDICARE

## 2021-07-07 VITALS
HEIGHT: 70 IN | DIASTOLIC BLOOD PRESSURE: 68 MMHG | BODY MASS INDEX: 33.11 KG/M2 | SYSTOLIC BLOOD PRESSURE: 120 MMHG | OXYGEN SATURATION: 97 % | HEART RATE: 85 BPM | RESPIRATION RATE: 18 BRPM | WEIGHT: 231.25 LBS

## 2021-07-07 DIAGNOSIS — F11.20 UNCOMPLICATED OPIOID DEPENDENCE: Primary | ICD-10-CM

## 2021-07-07 DIAGNOSIS — G89.29 OTHER CHRONIC PAIN: ICD-10-CM

## 2021-07-07 DIAGNOSIS — G89.4 CHRONIC PAIN SYNDROME: ICD-10-CM

## 2021-07-07 PROCEDURE — 80307 DRUG TEST PRSMV CHEM ANLYZR: CPT | Performed by: FAMILY MEDICINE

## 2021-07-07 PROCEDURE — 99213 OFFICE O/P EST LOW 20 MIN: CPT | Mod: S$GLB,,, | Performed by: FAMILY MEDICINE

## 2021-07-07 PROCEDURE — 99999 PR PBB SHADOW E&M-EST. PATIENT-LVL V: CPT | Mod: PBBFAC,,, | Performed by: FAMILY MEDICINE

## 2021-07-07 PROCEDURE — 3008F BODY MASS INDEX DOCD: CPT | Mod: CPTII,S$GLB,, | Performed by: FAMILY MEDICINE

## 2021-07-07 PROCEDURE — 1125F PR PAIN SEVERITY QUANTIFIED, PAIN PRESENT: ICD-10-PCS | Mod: S$GLB,,, | Performed by: FAMILY MEDICINE

## 2021-07-07 PROCEDURE — 1159F PR MEDICATION LIST DOCUMENTED IN MEDICAL RECORD: ICD-10-PCS | Mod: S$GLB,,, | Performed by: FAMILY MEDICINE

## 2021-07-07 PROCEDURE — 1159F MED LIST DOCD IN RCRD: CPT | Mod: S$GLB,,, | Performed by: FAMILY MEDICINE

## 2021-07-07 PROCEDURE — 99999 PR PBB SHADOW E&M-EST. PATIENT-LVL V: ICD-10-PCS | Mod: PBBFAC,,, | Performed by: FAMILY MEDICINE

## 2021-07-07 PROCEDURE — 1101F PT FALLS ASSESS-DOCD LE1/YR: CPT | Mod: CPTII,S$GLB,, | Performed by: FAMILY MEDICINE

## 2021-07-07 PROCEDURE — 3288F FALL RISK ASSESSMENT DOCD: CPT | Mod: CPTII,S$GLB,, | Performed by: FAMILY MEDICINE

## 2021-07-07 PROCEDURE — 3288F PR FALLS RISK ASSESSMENT DOCUMENTED: ICD-10-PCS | Mod: CPTII,S$GLB,, | Performed by: FAMILY MEDICINE

## 2021-07-07 PROCEDURE — 99213 PR OFFICE/OUTPT VISIT, EST, LEVL III, 20-29 MIN: ICD-10-PCS | Mod: S$GLB,,, | Performed by: FAMILY MEDICINE

## 2021-07-07 PROCEDURE — 99499 UNLISTED E&M SERVICE: CPT | Mod: HCNC,S$GLB,, | Performed by: FAMILY MEDICINE

## 2021-07-07 PROCEDURE — 99499 RISK ADDL DX/OHS AUDIT: ICD-10-PCS | Mod: HCNC,S$GLB,, | Performed by: FAMILY MEDICINE

## 2021-07-07 PROCEDURE — 1125F AMNT PAIN NOTED PAIN PRSNT: CPT | Mod: S$GLB,,, | Performed by: FAMILY MEDICINE

## 2021-07-07 PROCEDURE — 3008F PR BODY MASS INDEX (BMI) DOCUMENTED: ICD-10-PCS | Mod: CPTII,S$GLB,, | Performed by: FAMILY MEDICINE

## 2021-07-07 PROCEDURE — 1101F PR PT FALLS ASSESS DOC 0-1 FALLS W/OUT INJ PAST YR: ICD-10-PCS | Mod: CPTII,S$GLB,, | Performed by: FAMILY MEDICINE

## 2021-07-07 RX ORDER — MORPHINE SULFATE 60 MG/1
60 TABLET, FILM COATED, EXTENDED RELEASE ORAL 2 TIMES DAILY
Qty: 60 TABLET | Refills: 0 | Status: SHIPPED | OUTPATIENT
Start: 2021-09-14 | End: 2021-10-05

## 2021-07-07 RX ORDER — OXYCODONE AND ACETAMINOPHEN 10; 325 MG/1; MG/1
1 TABLET ORAL EVERY 4 HOURS PRN
Qty: 120 TABLET | Refills: 0 | Status: SHIPPED | OUTPATIENT
Start: 2021-09-14 | End: 2021-10-05

## 2021-07-07 RX ORDER — OXYCODONE AND ACETAMINOPHEN 10; 325 MG/1; MG/1
1 TABLET ORAL EVERY 4 HOURS PRN
Qty: 120 TABLET | Refills: 0 | Status: SHIPPED | OUTPATIENT
Start: 2021-08-14 | End: 2021-08-12

## 2021-07-07 RX ORDER — OXYCODONE AND ACETAMINOPHEN 10; 325 MG/1; MG/1
1 TABLET ORAL EVERY 4 HOURS PRN
Qty: 120 TABLET | Refills: 0 | Status: SHIPPED | OUTPATIENT
Start: 2021-07-14 | End: 2021-10-05

## 2021-07-07 RX ORDER — NALOXONE HYDROCHLORIDE 0.4 MG/ML
0.4 INJECTION, SOLUTION INTRAMUSCULAR; INTRAVENOUS; SUBCUTANEOUS
Qty: 2 ML | Refills: 5 | Status: SHIPPED | OUTPATIENT
Start: 2021-07-07 | End: 2022-07-06 | Stop reason: SDUPTHER

## 2021-07-07 RX ORDER — MORPHINE SULFATE 60 MG/1
60 TABLET, FILM COATED, EXTENDED RELEASE ORAL 2 TIMES DAILY
Qty: 60 TABLET | Refills: 0 | Status: SHIPPED | OUTPATIENT
Start: 2021-08-14 | End: 2021-08-12 | Stop reason: CLARIF

## 2021-07-07 RX ORDER — MORPHINE SULFATE 60 MG/1
60 TABLET, FILM COATED, EXTENDED RELEASE ORAL 2 TIMES DAILY
Qty: 60 TABLET | Refills: 0 | Status: SHIPPED | OUTPATIENT
Start: 2021-07-14 | End: 2021-10-05

## 2021-07-09 LAB
AMPHET+METHAMPHET UR QL: NEGATIVE
BARBITURATES UR QL SCN>200 NG/ML: NEGATIVE
BENZODIAZ UR QL SCN>200 NG/ML: NEGATIVE
BZE UR QL SCN: NEGATIVE
CANNABINOIDS UR QL SCN: NEGATIVE
CREAT UR-MCNC: 25 MG/DL (ref 23–375)
ETHANOL UR-MCNC: <10 MG/DL
METHADONE UR QL SCN>300 NG/ML: NEGATIVE
OPIATES UR QL SCN: ABNORMAL
PCP UR QL SCN>25 NG/ML: NEGATIVE
TOXICOLOGY INFORMATION: ABNORMAL

## 2021-08-05 ENCOUNTER — PES CALL (OUTPATIENT)
Dept: ADMINISTRATIVE | Facility: CLINIC | Age: 76
End: 2021-08-05

## 2021-08-05 ENCOUNTER — PATIENT OUTREACH (OUTPATIENT)
Dept: ADMINISTRATIVE | Facility: OTHER | Age: 76
End: 2021-08-05

## 2021-08-09 ENCOUNTER — OFFICE VISIT (OUTPATIENT)
Dept: ORTHOPEDICS | Facility: CLINIC | Age: 76
End: 2021-08-09
Payer: MEDICARE

## 2021-08-09 DIAGNOSIS — M19.019 SHOULDER ARTHRITIS: Primary | ICD-10-CM

## 2021-08-09 PROCEDURE — 20610 DRAIN/INJ JOINT/BURSA W/O US: CPT | Mod: RT,S$GLB,, | Performed by: ORTHOPAEDIC SURGERY

## 2021-08-09 PROCEDURE — 1160F PR REVIEW ALL MEDS BY PRESCRIBER/CLIN PHARMACIST DOCUMENTED: ICD-10-PCS | Mod: CPTII,S$GLB,, | Performed by: ORTHOPAEDIC SURGERY

## 2021-08-09 PROCEDURE — 1159F MED LIST DOCD IN RCRD: CPT | Mod: CPTII,S$GLB,, | Performed by: ORTHOPAEDIC SURGERY

## 2021-08-09 PROCEDURE — 1160F RVW MEDS BY RX/DR IN RCRD: CPT | Mod: CPTII,S$GLB,, | Performed by: ORTHOPAEDIC SURGERY

## 2021-08-09 PROCEDURE — 99999 PR PBB SHADOW E&M-EST. PATIENT-LVL I: ICD-10-PCS | Mod: PBBFAC,,, | Performed by: ORTHOPAEDIC SURGERY

## 2021-08-09 PROCEDURE — 99214 PR OFFICE/OUTPT VISIT, EST, LEVL IV, 30-39 MIN: ICD-10-PCS | Mod: 25,S$GLB,, | Performed by: ORTHOPAEDIC SURGERY

## 2021-08-09 PROCEDURE — 20610 LARGE JOINT ASPIRATION/INJECTION: R SUBACROMIAL BURSA: ICD-10-PCS | Mod: RT,S$GLB,, | Performed by: ORTHOPAEDIC SURGERY

## 2021-08-09 PROCEDURE — 99214 OFFICE O/P EST MOD 30 MIN: CPT | Mod: 25,S$GLB,, | Performed by: ORTHOPAEDIC SURGERY

## 2021-08-09 PROCEDURE — 99999 PR PBB SHADOW E&M-EST. PATIENT-LVL I: CPT | Mod: PBBFAC,,, | Performed by: ORTHOPAEDIC SURGERY

## 2021-08-09 PROCEDURE — 1159F PR MEDICATION LIST DOCUMENTED IN MEDICAL RECORD: ICD-10-PCS | Mod: CPTII,S$GLB,, | Performed by: ORTHOPAEDIC SURGERY

## 2021-08-09 RX ORDER — TRIAMCINOLONE ACETONIDE 40 MG/ML
80 INJECTION, SUSPENSION INTRA-ARTICULAR; INTRAMUSCULAR
Status: DISCONTINUED | OUTPATIENT
Start: 2021-08-09 | End: 2021-08-09 | Stop reason: HOSPADM

## 2021-08-09 RX ADMIN — TRIAMCINOLONE ACETONIDE 80 MG: 40 INJECTION, SUSPENSION INTRA-ARTICULAR; INTRAMUSCULAR at 02:08

## 2021-08-11 ENCOUNTER — TELEPHONE (OUTPATIENT)
Dept: FAMILY MEDICINE | Facility: CLINIC | Age: 76
End: 2021-08-11

## 2021-08-12 ENCOUNTER — TELEPHONE (OUTPATIENT)
Dept: FAMILY MEDICINE | Facility: CLINIC | Age: 76
End: 2021-08-12

## 2021-08-12 DIAGNOSIS — F11.20 UNCOMPLICATED OPIOID DEPENDENCE: ICD-10-CM

## 2021-08-12 RX ORDER — OXYCODONE AND ACETAMINOPHEN 10; 325 MG/1; MG/1
1 TABLET ORAL EVERY 4 HOURS PRN
Qty: 120 TABLET | Refills: 0 | Status: SHIPPED | OUTPATIENT
Start: 2021-08-13 | End: 2021-10-05

## 2021-08-12 RX ORDER — OXYCODONE AND ACETAMINOPHEN 10; 325 MG/1; MG/1
1 TABLET ORAL EVERY 4 HOURS PRN
Qty: 120 TABLET | Refills: 0 | Status: SHIPPED | OUTPATIENT
Start: 2021-08-13 | End: 2021-08-12 | Stop reason: SDUPTHER

## 2021-08-12 RX ORDER — MORPHINE SULFATE 60 MG/1
60 TABLET, FILM COATED, EXTENDED RELEASE ORAL 2 TIMES DAILY
Qty: 60 TABLET | Refills: 0 | Status: SHIPPED | OUTPATIENT
Start: 2021-08-13 | End: 2021-10-05

## 2021-08-12 RX ORDER — MORPHINE SULFATE 60 MG/1
60 TABLET, FILM COATED, EXTENDED RELEASE ORAL 2 TIMES DAILY
Qty: 60 TABLET | Refills: 0 | Status: SHIPPED | OUTPATIENT
Start: 2021-08-13 | End: 2021-08-12 | Stop reason: SDUPTHER

## 2021-08-20 DIAGNOSIS — M48.02 SPINAL STENOSIS IN CERVICAL REGION: Primary | ICD-10-CM

## 2021-08-20 DIAGNOSIS — M48.061 SPINAL STENOSIS, LUMBAR REGION, WITHOUT NEUROGENIC CLAUDICATION: ICD-10-CM

## 2021-08-20 RX ORDER — TIZANIDINE 4 MG/1
4 TABLET ORAL EVERY 8 HOURS PRN
Qty: 60 TABLET | Refills: 5 | Status: SHIPPED | OUTPATIENT
Start: 2021-08-20 | End: 2021-09-22

## 2021-09-13 ENCOUNTER — TELEPHONE (OUTPATIENT)
Dept: FAMILY MEDICINE | Facility: CLINIC | Age: 76
End: 2021-09-13

## 2021-09-15 ENCOUNTER — PES CALL (OUTPATIENT)
Dept: ADMINISTRATIVE | Facility: CLINIC | Age: 76
End: 2021-09-15

## 2021-09-21 DIAGNOSIS — M48.02 SPINAL STENOSIS IN CERVICAL REGION: ICD-10-CM

## 2021-09-21 DIAGNOSIS — M48.061 SPINAL STENOSIS, LUMBAR REGION, WITHOUT NEUROGENIC CLAUDICATION: ICD-10-CM

## 2021-09-22 ENCOUNTER — TELEPHONE (OUTPATIENT)
Dept: FAMILY MEDICINE | Facility: CLINIC | Age: 76
End: 2021-09-22

## 2021-09-22 RX ORDER — TIZANIDINE 4 MG/1
4 TABLET ORAL 3 TIMES DAILY PRN
Qty: 90 TABLET | Refills: 3 | Status: SHIPPED | OUTPATIENT
Start: 2021-09-22 | End: 2022-06-13

## 2021-10-05 ENCOUNTER — OFFICE VISIT (OUTPATIENT)
Dept: FAMILY MEDICINE | Facility: CLINIC | Age: 76
End: 2021-10-05
Attending: FAMILY MEDICINE
Payer: MEDICARE

## 2021-10-05 VITALS
TEMPERATURE: 98 F | HEIGHT: 70 IN | OXYGEN SATURATION: 97 % | HEART RATE: 79 BPM | RESPIRATION RATE: 18 BRPM | WEIGHT: 233 LBS | DIASTOLIC BLOOD PRESSURE: 82 MMHG | BODY MASS INDEX: 33.36 KG/M2 | SYSTOLIC BLOOD PRESSURE: 135 MMHG

## 2021-10-05 DIAGNOSIS — G89.4 CHRONIC PAIN SYNDROME: Primary | ICD-10-CM

## 2021-10-05 DIAGNOSIS — K76.0 FATTY LIVER: ICD-10-CM

## 2021-10-05 DIAGNOSIS — M96.1 FAILED BACK SURGICAL SYNDROME: ICD-10-CM

## 2021-10-05 DIAGNOSIS — K80.20 GALLSTONES: ICD-10-CM

## 2021-10-05 DIAGNOSIS — M48.02 SPINAL STENOSIS IN CERVICAL REGION: ICD-10-CM

## 2021-10-05 DIAGNOSIS — M48.061 SPINAL STENOSIS, LUMBAR REGION, WITHOUT NEUROGENIC CLAUDICATION: ICD-10-CM

## 2021-10-05 DIAGNOSIS — E04.2 MULTIPLE THYROID NODULES: ICD-10-CM

## 2021-10-05 DIAGNOSIS — F11.20 UNCOMPLICATED OPIOID DEPENDENCE: ICD-10-CM

## 2021-10-05 PROCEDURE — 99999 PR PBB SHADOW E&M-EST. PATIENT-LVL V: CPT | Mod: PBBFAC,HCNC,, | Performed by: FAMILY MEDICINE

## 2021-10-05 PROCEDURE — 1159F PR MEDICATION LIST DOCUMENTED IN MEDICAL RECORD: ICD-10-PCS | Mod: HCNC,CPTII,S$GLB, | Performed by: FAMILY MEDICINE

## 2021-10-05 PROCEDURE — G0008 FLU VACCINE - QUADRIVALENT - ADJUVANTED: ICD-10-PCS | Mod: HCNC,S$GLB,, | Performed by: FAMILY MEDICINE

## 2021-10-05 PROCEDURE — 3288F PR FALLS RISK ASSESSMENT DOCUMENTED: ICD-10-PCS | Mod: HCNC,CPTII,S$GLB, | Performed by: FAMILY MEDICINE

## 2021-10-05 PROCEDURE — 99214 OFFICE O/P EST MOD 30 MIN: CPT | Mod: 25,HCNC,S$GLB, | Performed by: FAMILY MEDICINE

## 2021-10-05 PROCEDURE — 3008F PR BODY MASS INDEX (BMI) DOCUMENTED: ICD-10-PCS | Mod: HCNC,CPTII,S$GLB, | Performed by: FAMILY MEDICINE

## 2021-10-05 PROCEDURE — 3079F DIAST BP 80-89 MM HG: CPT | Mod: HCNC,CPTII,S$GLB, | Performed by: FAMILY MEDICINE

## 2021-10-05 PROCEDURE — 1159F MED LIST DOCD IN RCRD: CPT | Mod: HCNC,CPTII,S$GLB, | Performed by: FAMILY MEDICINE

## 2021-10-05 PROCEDURE — 1160F RVW MEDS BY RX/DR IN RCRD: CPT | Mod: HCNC,CPTII,S$GLB, | Performed by: FAMILY MEDICINE

## 2021-10-05 PROCEDURE — 90694 VACC AIIV4 NO PRSRV 0.5ML IM: CPT | Mod: HCNC,S$GLB,, | Performed by: FAMILY MEDICINE

## 2021-10-05 PROCEDURE — 99499 RISK ADDL DX/OHS AUDIT: ICD-10-PCS | Mod: S$GLB,,, | Performed by: FAMILY MEDICINE

## 2021-10-05 PROCEDURE — 1125F PR PAIN SEVERITY QUANTIFIED, PAIN PRESENT: ICD-10-PCS | Mod: HCNC,CPTII,S$GLB, | Performed by: FAMILY MEDICINE

## 2021-10-05 PROCEDURE — 1101F PT FALLS ASSESS-DOCD LE1/YR: CPT | Mod: HCNC,CPTII,S$GLB, | Performed by: FAMILY MEDICINE

## 2021-10-05 PROCEDURE — 3075F SYST BP GE 130 - 139MM HG: CPT | Mod: HCNC,CPTII,S$GLB, | Performed by: FAMILY MEDICINE

## 2021-10-05 PROCEDURE — 1101F PR PT FALLS ASSESS DOC 0-1 FALLS W/OUT INJ PAST YR: ICD-10-PCS | Mod: HCNC,CPTII,S$GLB, | Performed by: FAMILY MEDICINE

## 2021-10-05 PROCEDURE — G0008 ADMIN INFLUENZA VIRUS VAC: HCPCS | Mod: HCNC,S$GLB,, | Performed by: FAMILY MEDICINE

## 2021-10-05 PROCEDURE — 3288F FALL RISK ASSESSMENT DOCD: CPT | Mod: HCNC,CPTII,S$GLB, | Performed by: FAMILY MEDICINE

## 2021-10-05 PROCEDURE — 1125F AMNT PAIN NOTED PAIN PRSNT: CPT | Mod: HCNC,CPTII,S$GLB, | Performed by: FAMILY MEDICINE

## 2021-10-05 PROCEDURE — 99499 UNLISTED E&M SERVICE: CPT | Mod: S$GLB,,, | Performed by: FAMILY MEDICINE

## 2021-10-05 PROCEDURE — 99999 PR PBB SHADOW E&M-EST. PATIENT-LVL V: ICD-10-PCS | Mod: PBBFAC,HCNC,, | Performed by: FAMILY MEDICINE

## 2021-10-05 PROCEDURE — 1160F PR REVIEW ALL MEDS BY PRESCRIBER/CLIN PHARMACIST DOCUMENTED: ICD-10-PCS | Mod: HCNC,CPTII,S$GLB, | Performed by: FAMILY MEDICINE

## 2021-10-05 PROCEDURE — 4010F PR ACE/ARB THEARPY RXD/TAKEN: ICD-10-PCS | Mod: HCNC,CPTII,S$GLB, | Performed by: FAMILY MEDICINE

## 2021-10-05 PROCEDURE — 3075F PR MOST RECENT SYSTOLIC BLOOD PRESS GE 130-139MM HG: ICD-10-PCS | Mod: HCNC,CPTII,S$GLB, | Performed by: FAMILY MEDICINE

## 2021-10-05 PROCEDURE — 3008F BODY MASS INDEX DOCD: CPT | Mod: HCNC,CPTII,S$GLB, | Performed by: FAMILY MEDICINE

## 2021-10-05 PROCEDURE — 4010F ACE/ARB THERAPY RXD/TAKEN: CPT | Mod: HCNC,CPTII,S$GLB, | Performed by: FAMILY MEDICINE

## 2021-10-05 PROCEDURE — 90694 FLU VACCINE - QUADRIVALENT - ADJUVANTED: ICD-10-PCS | Mod: HCNC,S$GLB,, | Performed by: FAMILY MEDICINE

## 2021-10-05 PROCEDURE — 3079F PR MOST RECENT DIASTOLIC BLOOD PRESSURE 80-89 MM HG: ICD-10-PCS | Mod: HCNC,CPTII,S$GLB, | Performed by: FAMILY MEDICINE

## 2021-10-05 PROCEDURE — 99214 PR OFFICE/OUTPT VISIT, EST, LEVL IV, 30-39 MIN: ICD-10-PCS | Mod: 25,HCNC,S$GLB, | Performed by: FAMILY MEDICINE

## 2021-10-05 RX ORDER — OXYCODONE AND ACETAMINOPHEN 10; 325 MG/1; MG/1
1 TABLET ORAL EVERY 4 HOURS PRN
Qty: 120 TABLET | Refills: 0 | Status: SHIPPED | OUTPATIENT
Start: 2021-12-13 | End: 2021-12-09

## 2021-10-05 RX ORDER — MORPHINE SULFATE 60 MG/1
60 TABLET, FILM COATED, EXTENDED RELEASE ORAL 2 TIMES DAILY
Qty: 60 TABLET | Refills: 0 | Status: SHIPPED | OUTPATIENT
Start: 2021-10-13 | End: 2021-12-09

## 2021-10-05 RX ORDER — MORPHINE SULFATE 60 MG/1
60 TABLET, FILM COATED, EXTENDED RELEASE ORAL 2 TIMES DAILY
Qty: 60 TABLET | Refills: 0 | Status: SHIPPED | OUTPATIENT
Start: 2021-11-12 | End: 2021-12-09

## 2021-10-05 RX ORDER — MORPHINE SULFATE 60 MG/1
60 TABLET, FILM COATED, EXTENDED RELEASE ORAL 2 TIMES DAILY
Qty: 60 TABLET | Refills: 0 | Status: SHIPPED | OUTPATIENT
Start: 2021-12-13 | End: 2021-12-09

## 2021-10-05 RX ORDER — OXYCODONE AND ACETAMINOPHEN 10; 325 MG/1; MG/1
1 TABLET ORAL EVERY 4 HOURS PRN
Qty: 120 TABLET | Refills: 0 | Status: SHIPPED | OUTPATIENT
Start: 2021-10-13 | End: 2021-12-09

## 2021-10-05 RX ORDER — OXYCODONE AND ACETAMINOPHEN 10; 325 MG/1; MG/1
1 TABLET ORAL EVERY 4 HOURS PRN
Qty: 120 TABLET | Refills: 0 | Status: SHIPPED | OUTPATIENT
Start: 2021-11-12 | End: 2021-12-09

## 2021-10-15 ENCOUNTER — IMMUNIZATION (OUTPATIENT)
Dept: FAMILY MEDICINE | Facility: CLINIC | Age: 76
End: 2021-10-15
Payer: MEDICARE

## 2021-10-15 DIAGNOSIS — Z23 NEED FOR VACCINATION: Primary | ICD-10-CM

## 2021-10-15 PROCEDURE — 0003A COVID-19, MRNA, LNP-S, PF, 30 MCG/0.3 ML DOSE VACCINE: CPT | Mod: HCNC,PBBFAC | Performed by: FAMILY MEDICINE

## 2021-10-15 PROCEDURE — 91300 COVID-19, MRNA, LNP-S, PF, 30 MCG/0.3 ML DOSE VACCINE: CPT | Mod: HCNC,PBBFAC | Performed by: FAMILY MEDICINE

## 2021-10-27 ENCOUNTER — HOSPITAL ENCOUNTER (OUTPATIENT)
Dept: RADIOLOGY | Facility: HOSPITAL | Age: 76
Discharge: HOME | End: 2021-10-27
Attending: FAMILY MEDICINE
Payer: MEDICARE

## 2021-10-27 DIAGNOSIS — E04.2 MULTIPLE THYROID NODULES: ICD-10-CM

## 2021-10-27 DIAGNOSIS — D35.1 PARATHYROID ADENOMA: Primary | ICD-10-CM

## 2021-10-27 DIAGNOSIS — E04.1 SOLITARY NODULE OF RIGHT LOBE OF THYROID: ICD-10-CM

## 2021-10-27 DIAGNOSIS — K80.20 GALLSTONES: ICD-10-CM

## 2021-10-27 DIAGNOSIS — K76.0 FATTY LIVER: ICD-10-CM

## 2021-10-27 PROCEDURE — 76705 ECHO EXAM OF ABDOMEN: CPT | Mod: TC,HCNC

## 2021-10-27 PROCEDURE — 76536 US EXAM OF HEAD AND NECK: CPT | Mod: 26,HCNC,, | Performed by: RADIOLOGY

## 2021-10-27 PROCEDURE — 76536 US EXAM OF HEAD AND NECK: CPT | Mod: TC,HCNC

## 2021-10-27 PROCEDURE — 76705 US ABDOMEN LIMITED: ICD-10-PCS | Mod: 26,HCNC,, | Performed by: RADIOLOGY

## 2021-10-27 PROCEDURE — 76536 US SOFT TISSUE HEAD NECK THYROID: ICD-10-PCS | Mod: 26,HCNC,, | Performed by: RADIOLOGY

## 2021-10-27 PROCEDURE — 76705 ECHO EXAM OF ABDOMEN: CPT | Mod: 26,HCNC,, | Performed by: RADIOLOGY

## 2021-10-28 ENCOUNTER — TELEPHONE (OUTPATIENT)
Dept: FAMILY MEDICINE | Facility: CLINIC | Age: 76
End: 2021-10-28
Payer: MEDICARE

## 2021-10-28 ENCOUNTER — HOSPITAL ENCOUNTER (OUTPATIENT)
Dept: RADIOLOGY | Facility: HOSPITAL | Age: 76
Discharge: HOME | End: 2021-10-28
Attending: PHYSICIAN ASSISTANT
Payer: MEDICARE

## 2021-10-28 ENCOUNTER — OFFICE VISIT (OUTPATIENT)
Dept: SPINE | Facility: CLINIC | Age: 76
End: 2021-10-28
Payer: MEDICARE

## 2021-10-28 VITALS
SYSTOLIC BLOOD PRESSURE: 130 MMHG | HEART RATE: 86 BPM | DIASTOLIC BLOOD PRESSURE: 80 MMHG | HEIGHT: 70 IN | WEIGHT: 238.56 LBS | BODY MASS INDEX: 34.15 KG/M2

## 2021-10-28 DIAGNOSIS — G89.29 CHRONIC BILATERAL LOW BACK PAIN WITH BILATERAL SCIATICA: ICD-10-CM

## 2021-10-28 DIAGNOSIS — E04.1 THYROID NODULE: Primary | ICD-10-CM

## 2021-10-28 DIAGNOSIS — M54.42 CHRONIC BILATERAL LOW BACK PAIN WITH BILATERAL SCIATICA: ICD-10-CM

## 2021-10-28 DIAGNOSIS — M54.9 DORSALGIA, UNSPECIFIED: ICD-10-CM

## 2021-10-28 DIAGNOSIS — M54.41 CHRONIC BILATERAL LOW BACK PAIN WITH BILATERAL SCIATICA: ICD-10-CM

## 2021-10-28 DIAGNOSIS — Z98.1 HISTORY OF LUMBAR FUSION: ICD-10-CM

## 2021-10-28 DIAGNOSIS — E04.1 THYROID NODULE GREATER THAN OR EQUAL TO 1.5 CM IN DIAMETER INCIDENTALLY NOTED ON IMAGING STUDY: ICD-10-CM

## 2021-10-28 DIAGNOSIS — Z98.1 HISTORY OF LUMBAR FUSION: Primary | ICD-10-CM

## 2021-10-28 PROCEDURE — 1125F AMNT PAIN NOTED PAIN PRSNT: CPT | Mod: HCNC,CPTII,S$GLB, | Performed by: PHYSICIAN ASSISTANT

## 2021-10-28 PROCEDURE — 1101F PR PT FALLS ASSESS DOC 0-1 FALLS W/OUT INJ PAST YR: ICD-10-PCS | Mod: HCNC,CPTII,S$GLB, | Performed by: PHYSICIAN ASSISTANT

## 2021-10-28 PROCEDURE — 3008F PR BODY MASS INDEX (BMI) DOCUMENTED: ICD-10-PCS | Mod: HCNC,CPTII,S$GLB, | Performed by: PHYSICIAN ASSISTANT

## 2021-10-28 PROCEDURE — 4010F PR ACE/ARB THEARPY RXD/TAKEN: ICD-10-PCS | Mod: HCNC,CPTII,S$GLB, | Performed by: PHYSICIAN ASSISTANT

## 2021-10-28 PROCEDURE — 3075F PR MOST RECENT SYSTOLIC BLOOD PRESS GE 130-139MM HG: ICD-10-PCS | Mod: HCNC,CPTII,S$GLB, | Performed by: PHYSICIAN ASSISTANT

## 2021-10-28 PROCEDURE — 1101F PT FALLS ASSESS-DOCD LE1/YR: CPT | Mod: HCNC,CPTII,S$GLB, | Performed by: PHYSICIAN ASSISTANT

## 2021-10-28 PROCEDURE — 3008F BODY MASS INDEX DOCD: CPT | Mod: HCNC,CPTII,S$GLB, | Performed by: PHYSICIAN ASSISTANT

## 2021-10-28 PROCEDURE — 1159F PR MEDICATION LIST DOCUMENTED IN MEDICAL RECORD: ICD-10-PCS | Mod: HCNC,CPTII,S$GLB, | Performed by: PHYSICIAN ASSISTANT

## 2021-10-28 PROCEDURE — 99213 PR OFFICE/OUTPT VISIT, EST, LEVL III, 20-29 MIN: ICD-10-PCS | Mod: HCNC,S$GLB,, | Performed by: PHYSICIAN ASSISTANT

## 2021-10-28 PROCEDURE — 3079F PR MOST RECENT DIASTOLIC BLOOD PRESSURE 80-89 MM HG: ICD-10-PCS | Mod: HCNC,CPTII,S$GLB, | Performed by: PHYSICIAN ASSISTANT

## 2021-10-28 PROCEDURE — 1125F PR PAIN SEVERITY QUANTIFIED, PAIN PRESENT: ICD-10-PCS | Mod: HCNC,CPTII,S$GLB, | Performed by: PHYSICIAN ASSISTANT

## 2021-10-28 PROCEDURE — 3288F PR FALLS RISK ASSESSMENT DOCUMENTED: ICD-10-PCS | Mod: HCNC,CPTII,S$GLB, | Performed by: PHYSICIAN ASSISTANT

## 2021-10-28 PROCEDURE — 1160F RVW MEDS BY RX/DR IN RCRD: CPT | Mod: HCNC,CPTII,S$GLB, | Performed by: PHYSICIAN ASSISTANT

## 2021-10-28 PROCEDURE — 99213 OFFICE O/P EST LOW 20 MIN: CPT | Mod: HCNC,S$GLB,, | Performed by: PHYSICIAN ASSISTANT

## 2021-10-28 PROCEDURE — 72110 X-RAY EXAM L-2 SPINE 4/>VWS: CPT | Mod: TC,HCNC,PO

## 2021-10-28 PROCEDURE — 3288F FALL RISK ASSESSMENT DOCD: CPT | Mod: HCNC,CPTII,S$GLB, | Performed by: PHYSICIAN ASSISTANT

## 2021-10-28 PROCEDURE — 3075F SYST BP GE 130 - 139MM HG: CPT | Mod: HCNC,CPTII,S$GLB, | Performed by: PHYSICIAN ASSISTANT

## 2021-10-28 PROCEDURE — 1160F PR REVIEW ALL MEDS BY PRESCRIBER/CLIN PHARMACIST DOCUMENTED: ICD-10-PCS | Mod: HCNC,CPTII,S$GLB, | Performed by: PHYSICIAN ASSISTANT

## 2021-10-28 PROCEDURE — 99999 PR PBB SHADOW E&M-EST. PATIENT-LVL V: CPT | Mod: PBBFAC,HCNC,, | Performed by: PHYSICIAN ASSISTANT

## 2021-10-28 PROCEDURE — 1159F MED LIST DOCD IN RCRD: CPT | Mod: HCNC,CPTII,S$GLB, | Performed by: PHYSICIAN ASSISTANT

## 2021-10-28 PROCEDURE — 99999 PR PBB SHADOW E&M-EST. PATIENT-LVL V: ICD-10-PCS | Mod: PBBFAC,HCNC,, | Performed by: PHYSICIAN ASSISTANT

## 2021-10-28 PROCEDURE — 4010F ACE/ARB THERAPY RXD/TAKEN: CPT | Mod: HCNC,CPTII,S$GLB, | Performed by: PHYSICIAN ASSISTANT

## 2021-10-28 PROCEDURE — 72110 X-RAY EXAM L-2 SPINE 4/>VWS: CPT | Mod: 26,HCNC,, | Performed by: RADIOLOGY

## 2021-10-28 PROCEDURE — 72110 XR LUMBAR SPINE 5 VIEW WITH FLEX AND EXT: ICD-10-PCS | Mod: 26,HCNC,, | Performed by: RADIOLOGY

## 2021-10-28 PROCEDURE — 3079F DIAST BP 80-89 MM HG: CPT | Mod: HCNC,CPTII,S$GLB, | Performed by: PHYSICIAN ASSISTANT

## 2021-10-29 ENCOUNTER — TELEPHONE (OUTPATIENT)
Dept: SPINE | Facility: CLINIC | Age: 76
End: 2021-10-29
Payer: MEDICARE

## 2021-11-04 ENCOUNTER — HOSPITAL ENCOUNTER (OUTPATIENT)
Dept: RADIOLOGY | Facility: HOSPITAL | Age: 76
Discharge: HOME | End: 2021-11-04
Attending: PHYSICIAN ASSISTANT
Payer: MEDICARE

## 2021-11-04 DIAGNOSIS — M54.41 CHRONIC BILATERAL LOW BACK PAIN WITH BILATERAL SCIATICA: ICD-10-CM

## 2021-11-04 DIAGNOSIS — M54.42 CHRONIC BILATERAL LOW BACK PAIN WITH BILATERAL SCIATICA: ICD-10-CM

## 2021-11-04 DIAGNOSIS — G89.29 CHRONIC BILATERAL LOW BACK PAIN WITH BILATERAL SCIATICA: ICD-10-CM

## 2021-11-04 DIAGNOSIS — Z98.1 HISTORY OF LUMBAR FUSION: ICD-10-CM

## 2021-11-04 PROCEDURE — 72148 MRI LUMBAR SPINE WITHOUT CONTRAST: ICD-10-PCS | Mod: 26,HCNC,, | Performed by: RADIOLOGY

## 2021-11-04 PROCEDURE — 72148 MRI LUMBAR SPINE W/O DYE: CPT | Mod: TC,HCNC,PO

## 2021-11-04 PROCEDURE — 72148 MRI LUMBAR SPINE W/O DYE: CPT | Mod: 26,HCNC,, | Performed by: RADIOLOGY

## 2021-11-08 ENCOUNTER — OFFICE VISIT (OUTPATIENT)
Dept: SPINE | Facility: CLINIC | Age: 76
End: 2021-11-08
Payer: MEDICARE

## 2021-11-08 VITALS
WEIGHT: 238.56 LBS | HEART RATE: 78 BPM | BODY MASS INDEX: 34.15 KG/M2 | DIASTOLIC BLOOD PRESSURE: 77 MMHG | HEIGHT: 70 IN | SYSTOLIC BLOOD PRESSURE: 128 MMHG

## 2021-11-08 DIAGNOSIS — M54.42 CHRONIC BILATERAL LOW BACK PAIN WITH BILATERAL SCIATICA: ICD-10-CM

## 2021-11-08 DIAGNOSIS — Z98.1 HISTORY OF LUMBAR FUSION: Primary | ICD-10-CM

## 2021-11-08 DIAGNOSIS — G89.29 CHRONIC BILATERAL LOW BACK PAIN WITH BILATERAL SCIATICA: ICD-10-CM

## 2021-11-08 DIAGNOSIS — M54.41 CHRONIC BILATERAL LOW BACK PAIN WITH BILATERAL SCIATICA: ICD-10-CM

## 2021-11-08 PROCEDURE — 1160F PR REVIEW ALL MEDS BY PRESCRIBER/CLIN PHARMACIST DOCUMENTED: ICD-10-PCS | Mod: HCNC,CPTII,S$GLB, | Performed by: PHYSICIAN ASSISTANT

## 2021-11-08 PROCEDURE — 99214 PR OFFICE/OUTPT VISIT, EST, LEVL IV, 30-39 MIN: ICD-10-PCS | Mod: HCNC,S$GLB,, | Performed by: PHYSICIAN ASSISTANT

## 2021-11-08 PROCEDURE — 3008F PR BODY MASS INDEX (BMI) DOCUMENTED: ICD-10-PCS | Mod: HCNC,CPTII,S$GLB, | Performed by: PHYSICIAN ASSISTANT

## 2021-11-08 PROCEDURE — 3288F PR FALLS RISK ASSESSMENT DOCUMENTED: ICD-10-PCS | Mod: HCNC,CPTII,S$GLB, | Performed by: PHYSICIAN ASSISTANT

## 2021-11-08 PROCEDURE — 3288F FALL RISK ASSESSMENT DOCD: CPT | Mod: HCNC,CPTII,S$GLB, | Performed by: PHYSICIAN ASSISTANT

## 2021-11-08 PROCEDURE — 3078F DIAST BP <80 MM HG: CPT | Mod: HCNC,CPTII,S$GLB, | Performed by: PHYSICIAN ASSISTANT

## 2021-11-08 PROCEDURE — 1160F RVW MEDS BY RX/DR IN RCRD: CPT | Mod: HCNC,CPTII,S$GLB, | Performed by: PHYSICIAN ASSISTANT

## 2021-11-08 PROCEDURE — 99999 PR PBB SHADOW E&M-EST. PATIENT-LVL IV: ICD-10-PCS | Mod: PBBFAC,HCNC,, | Performed by: PHYSICIAN ASSISTANT

## 2021-11-08 PROCEDURE — 99214 OFFICE O/P EST MOD 30 MIN: CPT | Mod: HCNC,S$GLB,, | Performed by: PHYSICIAN ASSISTANT

## 2021-11-08 PROCEDURE — 1125F PR PAIN SEVERITY QUANTIFIED, PAIN PRESENT: ICD-10-PCS | Mod: HCNC,CPTII,S$GLB, | Performed by: PHYSICIAN ASSISTANT

## 2021-11-08 PROCEDURE — 3074F SYST BP LT 130 MM HG: CPT | Mod: HCNC,CPTII,S$GLB, | Performed by: PHYSICIAN ASSISTANT

## 2021-11-08 PROCEDURE — 4010F ACE/ARB THERAPY RXD/TAKEN: CPT | Mod: HCNC,CPTII,S$GLB, | Performed by: PHYSICIAN ASSISTANT

## 2021-11-08 PROCEDURE — 1100F PR PT FALLS ASSESS DOC 2+ FALLS/FALL W/INJURY/YR: ICD-10-PCS | Mod: HCNC,CPTII,S$GLB, | Performed by: PHYSICIAN ASSISTANT

## 2021-11-08 PROCEDURE — 4010F PR ACE/ARB THEARPY RXD/TAKEN: ICD-10-PCS | Mod: HCNC,CPTII,S$GLB, | Performed by: PHYSICIAN ASSISTANT

## 2021-11-08 PROCEDURE — 99999 PR PBB SHADOW E&M-EST. PATIENT-LVL IV: CPT | Mod: PBBFAC,HCNC,, | Performed by: PHYSICIAN ASSISTANT

## 2021-11-08 PROCEDURE — 3008F BODY MASS INDEX DOCD: CPT | Mod: HCNC,CPTII,S$GLB, | Performed by: PHYSICIAN ASSISTANT

## 2021-11-08 PROCEDURE — 1100F PTFALLS ASSESS-DOCD GE2>/YR: CPT | Mod: HCNC,CPTII,S$GLB, | Performed by: PHYSICIAN ASSISTANT

## 2021-11-08 PROCEDURE — 1159F MED LIST DOCD IN RCRD: CPT | Mod: HCNC,CPTII,S$GLB, | Performed by: PHYSICIAN ASSISTANT

## 2021-11-08 PROCEDURE — 3074F PR MOST RECENT SYSTOLIC BLOOD PRESSURE < 130 MM HG: ICD-10-PCS | Mod: HCNC,CPTII,S$GLB, | Performed by: PHYSICIAN ASSISTANT

## 2021-11-08 PROCEDURE — 1125F AMNT PAIN NOTED PAIN PRSNT: CPT | Mod: HCNC,CPTII,S$GLB, | Performed by: PHYSICIAN ASSISTANT

## 2021-11-08 PROCEDURE — 3078F PR MOST RECENT DIASTOLIC BLOOD PRESSURE < 80 MM HG: ICD-10-PCS | Mod: HCNC,CPTII,S$GLB, | Performed by: PHYSICIAN ASSISTANT

## 2021-11-08 PROCEDURE — 1159F PR MEDICATION LIST DOCUMENTED IN MEDICAL RECORD: ICD-10-PCS | Mod: HCNC,CPTII,S$GLB, | Performed by: PHYSICIAN ASSISTANT

## 2021-11-11 ENCOUNTER — HOSPITAL ENCOUNTER (OUTPATIENT)
Dept: RADIOLOGY | Facility: HOSPITAL | Age: 76
Discharge: HOME | End: 2021-11-11
Attending: FAMILY MEDICINE
Payer: MEDICARE

## 2021-11-11 DIAGNOSIS — E04.1 THYROID NODULE GREATER THAN OR EQUAL TO 1.5 CM IN DIAMETER INCIDENTALLY NOTED ON IMAGING STUDY: ICD-10-CM

## 2021-11-11 PROCEDURE — 10006 FNA BX W/US GDN EA ADDL: CPT | Mod: HCNC,LT,, | Performed by: RADIOLOGY

## 2021-11-11 PROCEDURE — 10005 FNA BX W/US GDN 1ST LES: CPT | Mod: HCNC,RT,, | Performed by: RADIOLOGY

## 2021-11-11 PROCEDURE — 88173 CYTOPATH EVAL FNA REPORT: CPT | Mod: 26,HCNC,, | Performed by: STUDENT IN AN ORGANIZED HEALTH CARE EDUCATION/TRAINING PROGRAM

## 2021-11-11 PROCEDURE — 88173 PR  INTERPRETATION OF FNA SMEAR: ICD-10-PCS | Mod: 26,HCNC,, | Performed by: STUDENT IN AN ORGANIZED HEALTH CARE EDUCATION/TRAINING PROGRAM

## 2021-11-11 PROCEDURE — 10006 PR FINE NEEDLE ASP BIOPSY, W/US GUIDANCE, EA ADDTL LESION: ICD-10-PCS | Mod: HCNC,LT,, | Performed by: RADIOLOGY

## 2021-11-11 PROCEDURE — 10006 FNA BX W/US GDN EA ADDL: CPT | Mod: HCNC

## 2021-11-11 PROCEDURE — 10005 US FINE NEEDLE ASPIRATION THYROID EA ADDITIONAL LESION: ICD-10-PCS | Mod: HCNC,RT,, | Performed by: RADIOLOGY

## 2021-11-11 PROCEDURE — 88173 CYTOPATH EVAL FNA REPORT: CPT | Mod: 59,HCNC | Performed by: STUDENT IN AN ORGANIZED HEALTH CARE EDUCATION/TRAINING PROGRAM

## 2021-11-11 PROCEDURE — 10005 FNA BX W/US GDN 1ST LES: CPT | Mod: HCNC

## 2021-11-15 LAB
FINAL PATHOLOGIC DIAGNOSIS: ABNORMAL
Lab: ABNORMAL

## 2021-11-16 ENCOUNTER — PATIENT MESSAGE (OUTPATIENT)
Dept: FAMILY MEDICINE | Facility: CLINIC | Age: 76
End: 2021-11-16
Payer: MEDICARE

## 2021-11-16 ENCOUNTER — TELEPHONE (OUTPATIENT)
Dept: FAMILY MEDICINE | Facility: CLINIC | Age: 76
End: 2021-11-16
Payer: MEDICARE

## 2021-11-16 DIAGNOSIS — E04.1 THYROID NODULE GREATER THAN OR EQUAL TO 1 CM IN DIAMETER INCIDENTALLY NOTED ON IMAGING STUDY: Primary | ICD-10-CM

## 2021-11-22 ENCOUNTER — PATIENT MESSAGE (OUTPATIENT)
Dept: ORTHOPEDICS | Facility: CLINIC | Age: 76
End: 2021-11-22
Payer: MEDICARE

## 2021-11-26 DIAGNOSIS — G89.29 OTHER CHRONIC PAIN: ICD-10-CM

## 2021-11-26 DIAGNOSIS — I10 HYPERTENSION: ICD-10-CM

## 2021-11-26 DIAGNOSIS — K21.9 GERD (GASTROESOPHAGEAL REFLUX DISEASE): ICD-10-CM

## 2021-11-28 RX ORDER — AMLODIPINE BESYLATE 10 MG/1
TABLET ORAL
Qty: 90 TABLET | Refills: 3 | Status: SHIPPED | OUTPATIENT
Start: 2021-11-28 | End: 2023-01-26

## 2021-11-28 RX ORDER — LOSARTAN POTASSIUM 50 MG/1
TABLET ORAL
Qty: 90 TABLET | Refills: 0 | Status: SHIPPED | OUTPATIENT
Start: 2021-11-28 | End: 2022-03-03 | Stop reason: SDUPTHER

## 2021-11-28 RX ORDER — OMEPRAZOLE 40 MG/1
CAPSULE, DELAYED RELEASE ORAL
Qty: 90 CAPSULE | Refills: 3 | Status: SHIPPED | OUTPATIENT
Start: 2021-11-28 | End: 2022-03-03 | Stop reason: SDUPTHER

## 2021-11-29 ENCOUNTER — PATIENT OUTREACH (OUTPATIENT)
Dept: ADMINISTRATIVE | Facility: OTHER | Age: 76
End: 2021-11-29
Payer: MEDICARE

## 2021-11-29 RX ORDER — GABAPENTIN 300 MG/1
CAPSULE ORAL
Qty: 180 CAPSULE | Refills: 1 | Status: SHIPPED | OUTPATIENT
Start: 2021-11-29 | End: 2022-01-19 | Stop reason: ALTCHOICE

## 2021-11-30 ENCOUNTER — TELEPHONE (OUTPATIENT)
Dept: FAMILY MEDICINE | Facility: CLINIC | Age: 76
End: 2021-11-30
Payer: MEDICARE

## 2021-11-30 ENCOUNTER — PATIENT MESSAGE (OUTPATIENT)
Dept: FAMILY MEDICINE | Facility: CLINIC | Age: 76
End: 2021-11-30
Payer: MEDICARE

## 2021-11-30 ENCOUNTER — OFFICE VISIT (OUTPATIENT)
Dept: SPINE | Facility: CLINIC | Age: 76
End: 2021-11-30
Payer: MEDICARE

## 2021-11-30 ENCOUNTER — OFFICE VISIT (OUTPATIENT)
Dept: DERMATOLOGY | Facility: CLINIC | Age: 76
End: 2021-11-30
Payer: MEDICARE

## 2021-11-30 VITALS — HEIGHT: 70 IN | BODY MASS INDEX: 32.92 KG/M2 | WEIGHT: 229.94 LBS

## 2021-11-30 DIAGNOSIS — D18.01 CHERRY ANGIOMA: ICD-10-CM

## 2021-11-30 DIAGNOSIS — D36.10 NEUROFIBROMA: ICD-10-CM

## 2021-11-30 DIAGNOSIS — L57.8 ACTINIC SKIN DAMAGE: ICD-10-CM

## 2021-11-30 DIAGNOSIS — M54.41 CHRONIC BILATERAL LOW BACK PAIN WITH BILATERAL SCIATICA: Primary | ICD-10-CM

## 2021-11-30 DIAGNOSIS — G89.29 CHRONIC BILATERAL LOW BACK PAIN WITH BILATERAL SCIATICA: Primary | ICD-10-CM

## 2021-11-30 DIAGNOSIS — M54.42 CHRONIC BILATERAL LOW BACK PAIN WITH BILATERAL SCIATICA: Primary | ICD-10-CM

## 2021-11-30 DIAGNOSIS — L82.1 SEBORRHEIC KERATOSES: ICD-10-CM

## 2021-11-30 DIAGNOSIS — L57.0 ACTINIC KERATOSIS: Primary | ICD-10-CM

## 2021-11-30 DIAGNOSIS — D48.5 NEOPLASM OF UNCERTAIN BEHAVIOR OF SKIN: ICD-10-CM

## 2021-11-30 DIAGNOSIS — D22.9 MULTIPLE BENIGN NEVI: ICD-10-CM

## 2021-11-30 PROCEDURE — 11102 PR TANGENTIAL BIOPSY, SKIN, SINGLE LESION: ICD-10-PCS | Mod: 59,HCNC,S$GLB, | Performed by: STUDENT IN AN ORGANIZED HEALTH CARE EDUCATION/TRAINING PROGRAM

## 2021-11-30 PROCEDURE — 4010F ACE/ARB THERAPY RXD/TAKEN: CPT | Mod: HCNC,CPTII,S$GLB, | Performed by: PHYSICAL MEDICINE & REHABILITATION

## 2021-11-30 PROCEDURE — 88305 TISSUE EXAM BY PATHOLOGIST: CPT | Mod: HCNC | Performed by: PATHOLOGY

## 2021-11-30 PROCEDURE — 99204 OFFICE O/P NEW MOD 45 MIN: CPT | Mod: HCNC,S$GLB,, | Performed by: PHYSICAL MEDICINE & REHABILITATION

## 2021-11-30 PROCEDURE — 4010F PR ACE/ARB THEARPY RXD/TAKEN: ICD-10-PCS | Mod: HCNC,CPTII,S$GLB, | Performed by: PHYSICAL MEDICINE & REHABILITATION

## 2021-11-30 PROCEDURE — 88305 TISSUE EXAM BY PATHOLOGIST: CPT | Mod: 26,HCNC,, | Performed by: PATHOLOGY

## 2021-11-30 PROCEDURE — 88305 TISSUE EXAM BY PATHOLOGIST: ICD-10-PCS | Mod: 26,HCNC,, | Performed by: PATHOLOGY

## 2021-11-30 PROCEDURE — 4010F ACE/ARB THERAPY RXD/TAKEN: CPT | Mod: HCNC,CPTII,S$GLB, | Performed by: STUDENT IN AN ORGANIZED HEALTH CARE EDUCATION/TRAINING PROGRAM

## 2021-11-30 PROCEDURE — 99213 OFFICE O/P EST LOW 20 MIN: CPT | Mod: 25,HCNC,S$GLB, | Performed by: STUDENT IN AN ORGANIZED HEALTH CARE EDUCATION/TRAINING PROGRAM

## 2021-11-30 PROCEDURE — 17004 PR DESTRUCTION, PREMALIGNANT LESIONS; 15 OR MORE LESIONS: ICD-10-PCS | Mod: HCNC,S$GLB,, | Performed by: STUDENT IN AN ORGANIZED HEALTH CARE EDUCATION/TRAINING PROGRAM

## 2021-11-30 PROCEDURE — 4010F PR ACE/ARB THEARPY RXD/TAKEN: ICD-10-PCS | Mod: HCNC,CPTII,S$GLB, | Performed by: STUDENT IN AN ORGANIZED HEALTH CARE EDUCATION/TRAINING PROGRAM

## 2021-11-30 PROCEDURE — 99999 PR PBB SHADOW E&M-EST. PATIENT-LVL III: CPT | Mod: PBBFAC,HCNC,, | Performed by: STUDENT IN AN ORGANIZED HEALTH CARE EDUCATION/TRAINING PROGRAM

## 2021-11-30 PROCEDURE — 99213 PR OFFICE/OUTPT VISIT, EST, LEVL III, 20-29 MIN: ICD-10-PCS | Mod: 25,HCNC,S$GLB, | Performed by: STUDENT IN AN ORGANIZED HEALTH CARE EDUCATION/TRAINING PROGRAM

## 2021-11-30 PROCEDURE — 17004 DESTROY PREMAL LESIONS 15/>: CPT | Mod: HCNC,S$GLB,, | Performed by: STUDENT IN AN ORGANIZED HEALTH CARE EDUCATION/TRAINING PROGRAM

## 2021-11-30 PROCEDURE — 99999 PR PBB SHADOW E&M-EST. PATIENT-LVL III: ICD-10-PCS | Mod: PBBFAC,HCNC,, | Performed by: STUDENT IN AN ORGANIZED HEALTH CARE EDUCATION/TRAINING PROGRAM

## 2021-11-30 PROCEDURE — 11102 TANGNTL BX SKIN SINGLE LES: CPT | Mod: 59,HCNC,S$GLB, | Performed by: STUDENT IN AN ORGANIZED HEALTH CARE EDUCATION/TRAINING PROGRAM

## 2021-11-30 PROCEDURE — 99204 PR OFFICE/OUTPT VISIT, NEW, LEVL IV, 45-59 MIN: ICD-10-PCS | Mod: HCNC,S$GLB,, | Performed by: PHYSICAL MEDICINE & REHABILITATION

## 2021-12-01 ENCOUNTER — TELEPHONE (OUTPATIENT)
Dept: PAIN MEDICINE | Facility: CLINIC | Age: 76
End: 2021-12-01
Payer: MEDICARE

## 2021-12-02 DIAGNOSIS — M54.16 LUMBAR RADICULOPATHY: Primary | ICD-10-CM

## 2021-12-03 LAB
FINAL PATHOLOGIC DIAGNOSIS: NORMAL
GROSS: NORMAL
Lab: NORMAL
MICROSCOPIC EXAM: NORMAL

## 2021-12-09 ENCOUNTER — OFFICE VISIT (OUTPATIENT)
Dept: ORTHOPEDICS | Facility: CLINIC | Age: 76
End: 2021-12-09
Payer: MEDICARE

## 2021-12-09 ENCOUNTER — TELEPHONE (OUTPATIENT)
Dept: PHARMACY | Facility: CLINIC | Age: 76
End: 2021-12-09
Payer: MEDICARE

## 2021-12-09 VITALS — HEIGHT: 70 IN | WEIGHT: 229 LBS | BODY MASS INDEX: 32.78 KG/M2

## 2021-12-09 DIAGNOSIS — M19.019 SHOULDER ARTHRITIS: Primary | ICD-10-CM

## 2021-12-09 DIAGNOSIS — F11.20 UNCOMPLICATED OPIOID DEPENDENCE: ICD-10-CM

## 2021-12-09 DIAGNOSIS — E66.9 OBESITY (BMI 30.0-34.9): ICD-10-CM

## 2021-12-09 PROCEDURE — 4010F PR ACE/ARB THEARPY RXD/TAKEN: ICD-10-PCS | Mod: HCNC,CPTII,S$GLB, | Performed by: ORTHOPAEDIC SURGERY

## 2021-12-09 PROCEDURE — 99214 PR OFFICE/OUTPT VISIT, EST, LEVL IV, 30-39 MIN: ICD-10-PCS | Mod: 25,HCNC,S$GLB, | Performed by: ORTHOPAEDIC SURGERY

## 2021-12-09 PROCEDURE — 20610 DRAIN/INJ JOINT/BURSA W/O US: CPT | Mod: HCNC,RT,S$GLB, | Performed by: ORTHOPAEDIC SURGERY

## 2021-12-09 PROCEDURE — 20610 LARGE JOINT ASPIRATION/INJECTION: R SUBACROMIAL BURSA: ICD-10-PCS | Mod: HCNC,RT,S$GLB, | Performed by: ORTHOPAEDIC SURGERY

## 2021-12-09 PROCEDURE — 99999 PR PBB SHADOW E&M-EST. PATIENT-LVL III: CPT | Mod: PBBFAC,HCNC,, | Performed by: ORTHOPAEDIC SURGERY

## 2021-12-09 PROCEDURE — 99999 PR PBB SHADOW E&M-EST. PATIENT-LVL III: ICD-10-PCS | Mod: PBBFAC,HCNC,, | Performed by: ORTHOPAEDIC SURGERY

## 2021-12-09 PROCEDURE — 4010F ACE/ARB THERAPY RXD/TAKEN: CPT | Mod: HCNC,CPTII,S$GLB, | Performed by: ORTHOPAEDIC SURGERY

## 2021-12-09 PROCEDURE — 99214 OFFICE O/P EST MOD 30 MIN: CPT | Mod: 25,HCNC,S$GLB, | Performed by: ORTHOPAEDIC SURGERY

## 2021-12-09 RX ORDER — TRIAMCINOLONE ACETONIDE 40 MG/ML
80 INJECTION, SUSPENSION INTRA-ARTICULAR; INTRAMUSCULAR
Status: DISCONTINUED | OUTPATIENT
Start: 2021-12-09 | End: 2021-12-09 | Stop reason: HOSPADM

## 2021-12-09 RX ORDER — MORPHINE SULFATE 60 MG/1
60 TABLET, FILM COATED, EXTENDED RELEASE ORAL 2 TIMES DAILY
Qty: 60 TABLET | Refills: 0 | Status: SHIPPED | OUTPATIENT
Start: 2021-12-10 | End: 2022-01-05 | Stop reason: SDUPTHER

## 2021-12-09 RX ORDER — OXYCODONE AND ACETAMINOPHEN 10; 325 MG/1; MG/1
1 TABLET ORAL EVERY 4 HOURS PRN
Qty: 120 TABLET | Refills: 0 | Status: SHIPPED | OUTPATIENT
Start: 2021-12-10 | End: 2022-01-05 | Stop reason: SDUPTHER

## 2021-12-09 RX ADMIN — TRIAMCINOLONE ACETONIDE 80 MG: 40 INJECTION, SUSPENSION INTRA-ARTICULAR; INTRAMUSCULAR at 02:12

## 2021-12-14 RX ORDER — NAPROXEN SODIUM 220 MG/1
81 TABLET, FILM COATED ORAL DAILY
COMMUNITY

## 2021-12-16 ENCOUNTER — HOSPITAL ENCOUNTER (OUTPATIENT)
Facility: AMBULARY SURGERY CENTER | Age: 76
Discharge: HOME | End: 2021-12-16
Attending: ANESTHESIOLOGY | Admitting: ANESTHESIOLOGY
Payer: MEDICARE

## 2021-12-16 DIAGNOSIS — M54.16 LUMBAR RADICULITIS: ICD-10-CM

## 2021-12-16 PROCEDURE — 62323 PR INJ LUMBAR/SACRAL, W/IMAGING GUIDANCE: ICD-10-PCS | Mod: HCNC,,, | Performed by: ANESTHESIOLOGY

## 2021-12-16 PROCEDURE — 62323 NJX INTERLAMINAR LMBR/SAC: CPT | Mod: HCNC,,, | Performed by: ANESTHESIOLOGY

## 2021-12-16 PROCEDURE — 62323 NJX INTERLAMINAR LMBR/SAC: CPT | Performed by: ANESTHESIOLOGY

## 2021-12-16 RX ORDER — MIDAZOLAM HYDROCHLORIDE 2 MG/2ML
INJECTION, SOLUTION INTRAMUSCULAR; INTRAVENOUS
Status: DISCONTINUED | OUTPATIENT
Start: 2021-12-16 | End: 2021-12-16 | Stop reason: HOSPADM

## 2021-12-16 RX ORDER — SODIUM CHLORIDE 0.9 % (FLUSH) 0.9 %
SYRINGE (ML) INJECTION
Status: DISCONTINUED | OUTPATIENT
Start: 2021-12-16 | End: 2021-12-16 | Stop reason: HOSPADM

## 2021-12-16 RX ORDER — SODIUM CHLORIDE, SODIUM LACTATE, POTASSIUM CHLORIDE, CALCIUM CHLORIDE 600; 310; 30; 20 MG/100ML; MG/100ML; MG/100ML; MG/100ML
INJECTION, SOLUTION INTRAVENOUS ONCE AS NEEDED
Status: ACTIVE | OUTPATIENT
Start: 2021-12-16 | End: 2033-05-14

## 2021-12-16 RX ORDER — FENTANYL CITRATE 50 UG/ML
INJECTION, SOLUTION INTRAMUSCULAR; INTRAVENOUS
Status: DISCONTINUED | OUTPATIENT
Start: 2021-12-16 | End: 2021-12-16 | Stop reason: HOSPADM

## 2021-12-16 RX ORDER — DEXAMETHASONE SODIUM PHOSPHATE 10 MG/ML
INJECTION INTRAMUSCULAR; INTRAVENOUS
Status: DISCONTINUED | OUTPATIENT
Start: 2021-12-16 | End: 2021-12-16 | Stop reason: HOSPADM

## 2021-12-16 RX ORDER — LIDOCAINE HYDROCHLORIDE 10 MG/ML
INJECTION, SOLUTION EPIDURAL; INFILTRATION; INTRACAUDAL; PERINEURAL
Status: DISCONTINUED | OUTPATIENT
Start: 2021-12-16 | End: 2021-12-16 | Stop reason: HOSPADM

## 2021-12-20 VITALS
HEART RATE: 75 BPM | RESPIRATION RATE: 16 BRPM | WEIGHT: 229 LBS | OXYGEN SATURATION: 93 % | BODY MASS INDEX: 32.78 KG/M2 | DIASTOLIC BLOOD PRESSURE: 69 MMHG | SYSTOLIC BLOOD PRESSURE: 130 MMHG | HEIGHT: 70 IN | TEMPERATURE: 98 F

## 2022-01-05 ENCOUNTER — OFFICE VISIT (OUTPATIENT)
Dept: FAMILY MEDICINE | Facility: CLINIC | Age: 77
End: 2022-01-05
Attending: FAMILY MEDICINE
Payer: MEDICARE

## 2022-01-05 VITALS
DIASTOLIC BLOOD PRESSURE: 70 MMHG | SYSTOLIC BLOOD PRESSURE: 126 MMHG | WEIGHT: 240.31 LBS | BODY MASS INDEX: 34.4 KG/M2 | HEIGHT: 70 IN | OXYGEN SATURATION: 97 % | HEART RATE: 76 BPM | TEMPERATURE: 98 F

## 2022-01-05 DIAGNOSIS — G89.4 CHRONIC PAIN SYNDROME: ICD-10-CM

## 2022-01-05 DIAGNOSIS — R13.10 DYSPHAGIA, UNSPECIFIED TYPE: ICD-10-CM

## 2022-01-05 DIAGNOSIS — M48.02 SPINAL STENOSIS IN CERVICAL REGION: ICD-10-CM

## 2022-01-05 DIAGNOSIS — I10 ESSENTIAL (PRIMARY) HYPERTENSION: ICD-10-CM

## 2022-01-05 DIAGNOSIS — E04.1 THYROID NODULE: ICD-10-CM

## 2022-01-05 DIAGNOSIS — F11.20 UNCOMPLICATED OPIOID DEPENDENCE: Primary | ICD-10-CM

## 2022-01-05 DIAGNOSIS — M48.061 SPINAL STENOSIS, LUMBAR REGION, WITHOUT NEUROGENIC CLAUDICATION: ICD-10-CM

## 2022-01-05 DIAGNOSIS — I48.0 PAF (PAROXYSMAL ATRIAL FIBRILLATION): ICD-10-CM

## 2022-01-05 DIAGNOSIS — I70.0 ATHEROSCLEROSIS OF AORTA: ICD-10-CM

## 2022-01-05 DIAGNOSIS — E78.49 OTHER HYPERLIPIDEMIA: ICD-10-CM

## 2022-01-05 PROCEDURE — 1100F PR PT FALLS ASSESS DOC 2+ FALLS/FALL W/INJURY/YR: ICD-10-PCS | Mod: HCNC,CPTII,S$GLB, | Performed by: FAMILY MEDICINE

## 2022-01-05 PROCEDURE — 3008F BODY MASS INDEX DOCD: CPT | Mod: HCNC,CPTII,S$GLB, | Performed by: FAMILY MEDICINE

## 2022-01-05 PROCEDURE — 1100F PTFALLS ASSESS-DOCD GE2>/YR: CPT | Mod: HCNC,CPTII,S$GLB, | Performed by: FAMILY MEDICINE

## 2022-01-05 PROCEDURE — 1159F MED LIST DOCD IN RCRD: CPT | Mod: HCNC,CPTII,S$GLB, | Performed by: FAMILY MEDICINE

## 2022-01-05 PROCEDURE — 3288F FALL RISK ASSESSMENT DOCD: CPT | Mod: HCNC,CPTII,S$GLB, | Performed by: FAMILY MEDICINE

## 2022-01-05 PROCEDURE — 3078F DIAST BP <80 MM HG: CPT | Mod: HCNC,CPTII,S$GLB, | Performed by: FAMILY MEDICINE

## 2022-01-05 PROCEDURE — 99214 OFFICE O/P EST MOD 30 MIN: CPT | Mod: HCNC,S$GLB,, | Performed by: FAMILY MEDICINE

## 2022-01-05 PROCEDURE — 3078F PR MOST RECENT DIASTOLIC BLOOD PRESSURE < 80 MM HG: ICD-10-PCS | Mod: HCNC,CPTII,S$GLB, | Performed by: FAMILY MEDICINE

## 2022-01-05 PROCEDURE — 1125F AMNT PAIN NOTED PAIN PRSNT: CPT | Mod: HCNC,CPTII,S$GLB, | Performed by: FAMILY MEDICINE

## 2022-01-05 PROCEDURE — 99999 PR PBB SHADOW E&M-EST. PATIENT-LVL IV: CPT | Mod: PBBFAC,HCNC,, | Performed by: FAMILY MEDICINE

## 2022-01-05 PROCEDURE — 1125F PR PAIN SEVERITY QUANTIFIED, PAIN PRESENT: ICD-10-PCS | Mod: HCNC,CPTII,S$GLB, | Performed by: FAMILY MEDICINE

## 2022-01-05 PROCEDURE — 80307 DRUG TEST PRSMV CHEM ANLYZR: CPT | Mod: HCNC | Performed by: FAMILY MEDICINE

## 2022-01-05 PROCEDURE — 1160F PR REVIEW ALL MEDS BY PRESCRIBER/CLIN PHARMACIST DOCUMENTED: ICD-10-PCS | Mod: HCNC,CPTII,S$GLB, | Performed by: FAMILY MEDICINE

## 2022-01-05 PROCEDURE — 3008F PR BODY MASS INDEX (BMI) DOCUMENTED: ICD-10-PCS | Mod: HCNC,CPTII,S$GLB, | Performed by: FAMILY MEDICINE

## 2022-01-05 PROCEDURE — 99999 PR PBB SHADOW E&M-EST. PATIENT-LVL IV: ICD-10-PCS | Mod: PBBFAC,HCNC,, | Performed by: FAMILY MEDICINE

## 2022-01-05 PROCEDURE — 99214 PR OFFICE/OUTPT VISIT, EST, LEVL IV, 30-39 MIN: ICD-10-PCS | Mod: HCNC,S$GLB,, | Performed by: FAMILY MEDICINE

## 2022-01-05 PROCEDURE — 3288F PR FALLS RISK ASSESSMENT DOCUMENTED: ICD-10-PCS | Mod: HCNC,CPTII,S$GLB, | Performed by: FAMILY MEDICINE

## 2022-01-05 PROCEDURE — 1159F PR MEDICATION LIST DOCUMENTED IN MEDICAL RECORD: ICD-10-PCS | Mod: HCNC,CPTII,S$GLB, | Performed by: FAMILY MEDICINE

## 2022-01-05 PROCEDURE — 1160F RVW MEDS BY RX/DR IN RCRD: CPT | Mod: HCNC,CPTII,S$GLB, | Performed by: FAMILY MEDICINE

## 2022-01-05 PROCEDURE — 3074F SYST BP LT 130 MM HG: CPT | Mod: HCNC,CPTII,S$GLB, | Performed by: FAMILY MEDICINE

## 2022-01-05 PROCEDURE — 3074F PR MOST RECENT SYSTOLIC BLOOD PRESSURE < 130 MM HG: ICD-10-PCS | Mod: HCNC,CPTII,S$GLB, | Performed by: FAMILY MEDICINE

## 2022-01-05 RX ORDER — MORPHINE SULFATE 60 MG/1
60 TABLET, FILM COATED, EXTENDED RELEASE ORAL 2 TIMES DAILY
Qty: 60 TABLET | Refills: 0 | Status: SHIPPED | OUTPATIENT
Start: 2022-03-04 | End: 2022-04-05

## 2022-01-05 RX ORDER — OXYCODONE AND ACETAMINOPHEN 10; 325 MG/1; MG/1
1 TABLET ORAL EVERY 4 HOURS PRN
Qty: 120 TABLET | Refills: 0 | Status: SHIPPED | OUTPATIENT
Start: 2022-01-07 | End: 2022-04-05

## 2022-01-05 RX ORDER — MORPHINE SULFATE 60 MG/1
60 TABLET, FILM COATED, EXTENDED RELEASE ORAL 2 TIMES DAILY
Qty: 60 TABLET | Refills: 0 | Status: SHIPPED | OUTPATIENT
Start: 2022-01-07 | End: 2022-03-03 | Stop reason: SDUPTHER

## 2022-01-05 RX ORDER — MORPHINE SULFATE 60 MG/1
60 TABLET, FILM COATED, EXTENDED RELEASE ORAL 2 TIMES DAILY
Qty: 60 TABLET | Refills: 0 | Status: SHIPPED | OUTPATIENT
Start: 2022-02-04 | End: 2022-04-05

## 2022-01-05 RX ORDER — OXYCODONE AND ACETAMINOPHEN 10; 325 MG/1; MG/1
1 TABLET ORAL EVERY 4 HOURS PRN
Qty: 120 TABLET | Refills: 0 | Status: SHIPPED | OUTPATIENT
Start: 2022-02-04 | End: 2022-04-05

## 2022-01-05 RX ORDER — OXYCODONE AND ACETAMINOPHEN 10; 325 MG/1; MG/1
1 TABLET ORAL EVERY 4 HOURS PRN
Qty: 120 TABLET | Refills: 0 | Status: SHIPPED | OUTPATIENT
Start: 2022-03-04 | End: 2022-03-21 | Stop reason: SDUPTHER

## 2022-01-06 NOTE — PROGRESS NOTES
Subjective:       Patient ID: Sam Pete is a 76 y.o. male.    Chief Complaint: Chronic Pain Syndrome    76-year-old male coming in for renewal of chronic pain medications for chronic back pain with failed surgery, spinal stenosis of cervical and lumbar spine with cervical radiculitis.  He has ongoing complaints of dysphagia that he associates with a feeling of a lump in his throat.  He was found to have a 3.9 x 2.7 cm right thyroid nodule that has been biopsied x2 in the last three months the 1st biopsy obtaining no usable tissue and the 2nd appearing to show benign follicular cells.  In spite of the benign nature of the biopsy he continues to have a sensation of difficulty with swallowing and attributes it to the nodule.  He would like to have a referral to a surgical specialist for evaluation for partial thyroidectomy.  In addition to his chronic pain complaints he has a history of hypertension, hyperlipidemia, paroxysmal atrial fibrillation and aortic atherosclerosis visible on lumbar spine x-rays but seen to be nonobstructive on an aortic aneurysm screen in 2015. He has no symptoms of claudication associated with his back complaints.  He is due for a urine drug screen and cholesterol level.  He has not yet had the Shingrix vaccine and this has been discussed.  The  was checked with no inappropriate activity, his last refills on Percocet and MS Contin were both December 10, 2021.    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   Redundant tortuous colon, otherwise normal.  2/25/2019: COLONOSCOPY; N/A      Comment:  Procedure: COLONOSCOPY;  Surgeon: Gilbert Rodriguez Jr., MD;  Location: [facility];  Service: Endoscopy;                 Laterality: N/A;  No date: Epidural steroid injection      Comment:  Pain management  07/12/2004: ESOPHAGOGASTRODUODENOSCOPY      Comment:  CHILO.  No date: FRACTURE SURGERY; Left      Comment:  wrist / forearm, total of 5  12/12/2019: INSERTION OF DORSAL COLUMN NERVE STIMULATOR FOR TRIAL; N/A      Comment:  Procedure: INSERTION, NEUROSTIMULATOR, SPINAL CORD,                DORSAL COLUMN, FOR TRIAL;  Surgeon: Evan Lyles MD;                 Location: [facility] OR;  Service: Pain Management;                 Laterality: N/A;  2-6-2013: JOINT REPLACEMENT      Comment:  ( rt hip 2007), left hip   No date: JOINT REPLACEMENT; Left      Comment:  total knee  No date: KNEE ARTHROSCOPY W/ DEBRIDEMENT      Comment:  bilateral knees , total of six  No date: KNEE SURGERY  2/12/2020: LAMINECTOMY USING MINIMALLY INVASIVE TECHNIQUE; N/A      Comment:  Procedure: LAMINECTOMY, SPINE, MINIMALLY INVASIVE /                 PLACEMENT OF SPINAL CORD STIMULATOR;  Surgeon: Darlene Max MD;  Location: [facility] OR;  Service: Neurosurgery;                 Laterality: N/A;  No date:  Nervbe Block injection      Comment:  Pain management    Current Outpatient Medications on File Prior to Visit:  albuterol (PROVENTIL/VENTOLIN HFA) 90 mcg/actuation inhaler, Inhale 2 puffs into the lungs every 6 (six) hours as needed for Wheezing. Rescue, Disp: 18 g, Rfl: 0  amLODIPine (NORVASC) 10 MG tablet, TAKE 1 TABLET EVERY DAY, Disp: 90 tablet, Rfl: 3  aspirin 81 MG Chew, Take 81 mg by mouth once daily., Disp: , Rfl:   atorvastatin (LIPITOR) 40 MG tablet, Take 1 tablet (40 mg total) by mouth once daily., Disp: 90 tablet, Rfl: 3  buPROPion (WELLBUTRIN XL) 150 MG TB24 tablet, Take 1 tablet (150 mg total) by mouth once daily., Disp: 90 tablet, Rfl: 3  fluticasone propionate (FLONASE ALLERGY RELIEF) 50 mcg/actuation nasal spray, 1 spray (50 mcg total) by Each Nostril route 2 (two) times daily., Disp: 16 mL, Rfl: 0  gabapentin (NEURONTIN) 300 MG capsule, TAKE 1 CAPSULE TWICE DAILY, Disp: 180 capsule, Rfl: 1  losartan (COZAAR) 50 MG tablet, TAKE 1 TABLET EVERY DAY, Disp: 90 tablet, Rfl: 0  omeprazole (PRILOSEC) 40 MG capsule, TAKE 1 CAPSULE EVERY DAY, Disp: 90 capsule, Rfl: 3  saw/vit E/sod lisa/lyc/beta/pyg (PROSTATE HEALTH ORAL), Take 2 capsules by mouth once daily. With saw palmetto, Disp: , Rfl:   sildenafil (REVATIO) 20 mg Tab, Take 1-5 daily as needed for ED, Disp: 10 tablet, Rfl: 5  tiZANidine (ZANAFLEX) 4 MG tablet, Take 1 tablet (4 mg total) by mouth 3 (three) times daily as needed (muscle spasm)., Disp: 90 tablet, Rfl: 3  UNABLE TO FIND, Take by mouth once daily. Vitafusion multivites gummies, Disp: , Rfl:   (DISCONTINUED) morphine (MS CONTIN) 60 MG 12 hr tablet, Take 1 tablet (60 mg total) by mouth 2 (two) times daily., Disp: 60 tablet, Rfl: 0  (DISCONTINUED) oxyCODONE-acetaminophen (PERCOCET)  mg per tablet, Take 1 tablet by mouth every 4 (four) hours as needed for Pain., Disp: 120 tablet, Rfl: 0  naloxone (NARCAN) 0.4 mg/mL injection, Inject 1 mL (0.4 mg total) into the vein as needed (overdose).  (Patient not taking: Reported on 1/5/2022), Disp: 2 mL, Rfl: 5  (DISCONTINUED) ammonium lactate 12 % Crea, Apply 1 application topically 2 (two) times daily. (Patient taking differently: Apply 1 application topically 2 (two) times daily as needed.), Disp: 140 g, Rfl: 11  (DISCONTINUED) FLUAD QUAD 2020-21,65Y UP,,PF, 60 mcg (15 mcg x 4)/0.5 mL Syrg, PHARMACY ADMINISTERED, Disp: , Rfl:     Current Facility-Administered Medications on File Prior to Visit:  lactated ringers infusion, , Intravenous, Once PRN, Aram Terrell MD            Review of Systems   Constitutional: Negative for chills, diaphoresis and fever.   HENT: Positive for trouble swallowing (Feeling of a lump in the right side of the throat). Negative for congestion, postnasal drip, rhinorrhea, sinus pressure and sinus pain.    Eyes: Negative for redness and visual disturbance.   Respiratory: Negative for chest tightness and shortness of breath.    Cardiovascular: Negative for chest pain and palpitations.        No symptoms of vascular claudication   Gastrointestinal: Negative for abdominal pain, constipation, diarrhea and nausea.   Endocrine: Negative for cold intolerance, heat intolerance, polydipsia and polyuria.   Genitourinary: Negative for dysuria, frequency and hematuria.   Musculoskeletal: Positive for arthralgias, back pain and neck pain.   Skin: Negative for color change and rash.   Neurological: Negative for dizziness, light-headedness and headaches.   Psychiatric/Behavioral: Negative for dysphoric mood and sleep disturbance. The patient is not nervous/anxious.        Objective:      Physical Exam  Vitals and nursing note reviewed.   Constitutional:       General: He is not in acute distress.     Appearance: Normal appearance. He is obese. He is not ill-appearing, toxic-appearing or diaphoretic.      Comments: Good blood pressure control  Obese with a BMI of 34.5 he is up 7.3 lb from his last visit October 5, 2021   HENT:      Head: Normocephalic  and atraumatic.   Cardiovascular:      Rate and Rhythm: Normal rate and regular rhythm.      Heart sounds: Normal heart sounds.   Pulmonary:      Effort: Pulmonary effort is normal. No respiratory distress.      Breath sounds: Normal breath sounds.   Skin:     General: Skin is warm and dry.      Coloration: Skin is not jaundiced or pale.      Findings: No erythema or rash.   Neurological:      General: No focal deficit present.      Mental Status: He is alert and oriented to person, place, and time. Mental status is at baseline.   Psychiatric:         Mood and Affect: Mood normal.         Behavior: Behavior normal.         Thought Content: Thought content normal.         Judgment: Judgment normal.         Assessment:       1. Uncomplicated opioid dependence    2. Thyroid nodule    3. Dysphagia, unspecified type    4. PAF (paroxysmal atrial fibrillation)    5. Atherosclerosis of aorta    6. Essential (primary) hypertension    7. Other hyperlipidemia    8. Chronic pain syndrome    9. Spinal stenosis in cervical region    10. Spinal stenosis, lumbar region, without neurogenic claudication    11. BMI 34.0-34.9,adult        Plan:       1. Uncomplicated opioid dependence  The  (Prescription Monitoring Program) website was checked with no inappropriate activity found.  - morphine (MS CONTIN) 60 MG 12 hr tablet; Take 1 tablet (60 mg total) by mouth 2 (two) times daily.  Dispense: 60 tablet; Refill: 0  - oxyCODONE-acetaminophen (PERCOCET)  mg per tablet; Take 1 tablet by mouth every 4 (four) hours as needed for Pain.  Dispense: 120 tablet; Refill: 0  - morphine (MS CONTIN) 60 MG 12 hr tablet; Take 1 tablet (60 mg total) by mouth 2 (two) times daily.  Dispense: 60 tablet; Refill: 0  - oxyCODONE-acetaminophen (PERCOCET)  mg per tablet; Take 1 tablet by mouth every 4 (four) hours as needed for Pain.  Dispense: 120 tablet; Refill: 0  - morphine (MS CONTIN) 60 MG 12 hr tablet; Take 1 tablet (60 mg total) by mouth 2  (two) times daily.  Dispense: 60 tablet; Refill: 0  - oxyCODONE-acetaminophen (PERCOCET)  mg per tablet; Take 1 tablet by mouth every 4 (four) hours as needed for Pain.  Dispense: 120 tablet; Refill: 0  - Toxicology screen, urine    2. Thyroid nodule  Consult to Dr. Angela for evaluation and surgical thyroidectomy if indicated  - Ambulatory referral/consult to ENT; Future    3. Dysphagia, unspecified type  See above  - Ambulatory referral/consult to ENT; Future    4. PAF (paroxysmal atrial fibrillation)  Currently normal sinus rhythm    5. Atherosclerosis of aorta  Asymptomatic with no claudication symptoms.  Continue atorvastatin 40 mg and will need recheck of lipid panel in near future    6. Essential (primary) hypertension  Good control with no changes needed in medications    7. Other hyperlipidemia  Lab Results   Component Value Date    CHOL 127 01/14/2021    CHOL 135 08/21/2019    CHOL 184 12/30/2015     Lab Results   Component Value Date    HDL 47 01/14/2021    HDL 45 08/21/2019    HDL 51 12/30/2015     Lab Results   Component Value Date    LDLCALC 55.0 (L) 01/14/2021    LDLCALC 47.2 (L) 08/21/2019    LDLCALC 108.0 12/30/2015     Lab Results   Component Value Date    TRIG 125 01/14/2021    TRIG 214 (H) 08/21/2019    TRIG 125 12/30/2015     Lab Results   Component Value Date    CHOLHDL 37.0 01/14/2021    CHOLHDL 33.3 08/21/2019    CHOLHDL 27.7 12/30/2015     Well controlled, recheck at next visit three months    8. Chronic pain syndrome  Opioid dependent, well controlled, no side effect issues    9. Spinal stenosis in cervical region    10. Spinal stenosis, lumbar region, without neurogenic claudication    11. BMI 34.0-34.9,adult

## 2022-01-07 LAB
AMPHET+METHAMPHET UR QL: NEGATIVE
BARBITURATES UR QL SCN>200 NG/ML: NEGATIVE
BENZODIAZ UR QL SCN>200 NG/ML: NEGATIVE
BZE UR QL SCN: NEGATIVE
CANNABINOIDS UR QL SCN: NEGATIVE
CREAT UR-MCNC: 53 MG/DL (ref 23–375)
ETHANOL UR-MCNC: <10 MG/DL
METHADONE UR QL SCN>300 NG/ML: NEGATIVE
OPIATES UR QL SCN: ABNORMAL
PCP UR QL SCN>25 NG/ML: NEGATIVE
TOXICOLOGY INFORMATION: ABNORMAL

## 2022-01-10 ENCOUNTER — PATIENT OUTREACH (OUTPATIENT)
Dept: ADMINISTRATIVE | Facility: OTHER | Age: 77
End: 2022-01-10
Payer: MEDICARE

## 2022-01-10 NOTE — PROGRESS NOTES
LINKS immunization registry updated  Care Everywhere updated  Health Maintenance updated  Chart reviewed for overdue Proactive [facility] Encounters ([facility]) health maintenance testing (CRS, Breast Ca, Diabetic Eye Exam)   Orders entered:N/A

## 2022-01-13 ENCOUNTER — OFFICE VISIT (OUTPATIENT)
Dept: ORTHOPEDICS | Facility: CLINIC | Age: 77
End: 2022-01-13
Payer: MEDICARE

## 2022-01-13 VITALS — HEIGHT: 70 IN | BODY MASS INDEX: 34.36 KG/M2 | WEIGHT: 240 LBS

## 2022-01-13 DIAGNOSIS — M19.011 PRIMARY OSTEOARTHRITIS OF RIGHT SHOULDER: ICD-10-CM

## 2022-01-13 DIAGNOSIS — M19.019 SHOULDER ARTHRITIS: Primary | ICD-10-CM

## 2022-01-13 PROCEDURE — 99214 PR OFFICE/OUTPT VISIT, EST, LEVL IV, 30-39 MIN: ICD-10-PCS | Mod: 57,HCNC,S$GLB, | Performed by: ORTHOPAEDIC SURGERY

## 2022-01-13 PROCEDURE — 1159F PR MEDICATION LIST DOCUMENTED IN MEDICAL RECORD: ICD-10-PCS | Mod: HCNC,CPTII,S$GLB, | Performed by: ORTHOPAEDIC SURGERY

## 2022-01-13 PROCEDURE — 1160F RVW MEDS BY RX/DR IN RCRD: CPT | Mod: HCNC,CPTII,S$GLB, | Performed by: ORTHOPAEDIC SURGERY

## 2022-01-13 PROCEDURE — 1160F PR REVIEW ALL MEDS BY PRESCRIBER/CLIN PHARMACIST DOCUMENTED: ICD-10-PCS | Mod: HCNC,CPTII,S$GLB, | Performed by: ORTHOPAEDIC SURGERY

## 2022-01-13 PROCEDURE — 3008F BODY MASS INDEX DOCD: CPT | Mod: HCNC,CPTII,S$GLB, | Performed by: ORTHOPAEDIC SURGERY

## 2022-01-13 PROCEDURE — 99999 PR PBB SHADOW E&M-EST. PATIENT-LVL III: CPT | Mod: PBBFAC,HCNC,, | Performed by: ORTHOPAEDIC SURGERY

## 2022-01-13 PROCEDURE — 99999 PR PBB SHADOW E&M-EST. PATIENT-LVL III: ICD-10-PCS | Mod: PBBFAC,HCNC,, | Performed by: ORTHOPAEDIC SURGERY

## 2022-01-13 PROCEDURE — 1159F MED LIST DOCD IN RCRD: CPT | Mod: HCNC,CPTII,S$GLB, | Performed by: ORTHOPAEDIC SURGERY

## 2022-01-13 PROCEDURE — 1125F AMNT PAIN NOTED PAIN PRSNT: CPT | Mod: HCNC,CPTII,S$GLB, | Performed by: ORTHOPAEDIC SURGERY

## 2022-01-13 PROCEDURE — 99214 OFFICE O/P EST MOD 30 MIN: CPT | Mod: 57,HCNC,S$GLB, | Performed by: ORTHOPAEDIC SURGERY

## 2022-01-13 PROCEDURE — 1125F PR PAIN SEVERITY QUANTIFIED, PAIN PRESENT: ICD-10-PCS | Mod: HCNC,CPTII,S$GLB, | Performed by: ORTHOPAEDIC SURGERY

## 2022-01-13 PROCEDURE — 3008F PR BODY MASS INDEX (BMI) DOCUMENTED: ICD-10-PCS | Mod: HCNC,CPTII,S$GLB, | Performed by: ORTHOPAEDIC SURGERY

## 2022-01-13 NOTE — PROGRESS NOTES
76 years old debilitating pain in the right shoulder.  Kenalog injection does provide temporary relief.  Patient interested in shoulder arthroplasty    Exam shows painful limited motion about the shoulder, no signs infection, hand is functioning well    X-rays show glenohumeral arthrosis    Assessment:  Right shoulder glenohumeral arthrosis    Plan:  We will schedule the patient for humeral head resurfacing, he is aware the risks and limitations surgery and still wants to proceed    Imaging studies ordered and reviewed by me    Further History  Aching pain  Worse with activity  Relieved with rest  No other associated symptoms  No other radiation    Further Exam  Alert and oriented  Pleasant  Contralateral limb has appropriate range of motion for age and condition  Contralateral limb has appropriate strength for age and condition  Contralateral limb has appropriate stability  for age and condition  No adenopathy  Pulses are appropriate for current condition  Skin is intact        Chief Complaint    Chief Complaint   Patient presents with    Right Shoulder - Pain       HPI  Sam Pete is a 76 y.o.  male who presents with       Past Medical History  Past Medical History:   Diagnosis Date    Anticoagulant long-term use     ASA 81 mg    Arthritis     Cataract     OU    Chronic low back pain     Complete rupture of rotator cuff 2/8/2011    DDD (degenerative disc disease), lumbar 7/8/2015    Degeneration of lumbar or lumbosacral intervertebral disc 9/9/2015    ED (erectile dysfunction)     GERD (gastroesophageal reflux disease)     Hypertension     Lumbar pseudoarthrosis 6/26/2017    Lumbar stenosis 6/26/2017    Obesity     Spondylosis of lumbar region without myelopathy or radiculopathy 7/8/2015    Stroke 1997    basal ganglia, left sided weakness, resolved    Testicular hypofunction     Thoracic or lumbosacral neuritis or radiculitis 7/8/2015       Past Surgical History  Past Surgical History:    Procedure Laterality Date    APPENDECTOMY      BACK SURGERY      4- back surgery    CAUDAL EPIDURAL STEROID INJECTION N/A 12/16/2021    Procedure: Injection-steroid-epidural-caudal;  Surgeon: Aram Terrell MD;  Location: [facility] OR;  Service: Pain Management;  Laterality: N/A;    COLONOSCOPY  07/12/2004    CHILO.   Redundant tortuous colon, otherwise normal.    COLONOSCOPY N/A 2/25/2019    Procedure: COLONOSCOPY;  Surgeon: Gilbert Rodriguez Jr., MD;  Location: [facility] ENDO;  Service: Endoscopy;  Laterality: N/A;    Epidural steroid injection      Pain management    ESOPHAGOGASTRODUODENOSCOPY  07/12/2004    CHILO.    FRACTURE SURGERY Left     wrist / forearm, total of 5    INSERTION OF DORSAL COLUMN NERVE STIMULATOR FOR TRIAL N/A 12/12/2019    Procedure: INSERTION, NEUROSTIMULATOR, SPINAL CORD, DORSAL COLUMN, FOR TRIAL;  Surgeon: Evan Lyles MD;  Location: [facility] OR;  Service: Pain Management;  Laterality: N/A;    JOINT REPLACEMENT  2-6-2013    ( rt hip 2007), left hip     JOINT REPLACEMENT Left     total knee    KNEE ARTHROSCOPY W/ DEBRIDEMENT      bilateral knees , total of six    KNEE SURGERY      LAMINECTOMY USING MINIMALLY INVASIVE TECHNIQUE N/A 2/12/2020    Procedure: LAMINECTOMY, SPINE, MINIMALLY INVASIVE /  PLACEMENT OF SPINAL CORD STIMULATOR;  Surgeon: Darlene Max MD;  Location: [facility] OR;  Service: Neurosurgery;  Laterality: N/A;    Nervbe Block injection      Pain management       Medications  Current Outpatient Medications   Medication Sig    albuterol (PROVENTIL/VENTOLIN HFA) 90 mcg/actuation inhaler Inhale 2 puffs into the lungs every 6 (six) hours as needed for Wheezing. Rescue    amLODIPine (NORVASC) 10 MG tablet TAKE 1 TABLET EVERY DAY    aspirin 81 MG Chew Take 81 mg by mouth once daily.    atorvastatin (LIPITOR) 40 MG tablet Take 1 tablet (40 mg total) by mouth once daily.    buPROPion (WELLBUTRIN XL) 150 MG TB24 tablet Take 1 tablet (150 mg total) by mouth once daily.     fluticasone propionate (FLONASE ALLERGY RELIEF) 50 mcg/actuation nasal spray 1 spray (50 mcg total) by Each Nostril route 2 (two) times daily.    gabapentin (NEURONTIN) 300 MG capsule TAKE 1 CAPSULE TWICE DAILY    losartan (COZAAR) 50 MG tablet TAKE 1 TABLET EVERY DAY    morphine (MS CONTIN) 60 MG 12 hr tablet Take 1 tablet (60 mg total) by mouth 2 (two) times daily.    [START ON 2/4/2022] morphine (MS CONTIN) 60 MG 12 hr tablet Take 1 tablet (60 mg total) by mouth 2 (two) times daily.    [START ON 3/4/2022] morphine (MS CONTIN) 60 MG 12 hr tablet Take 1 tablet (60 mg total) by mouth 2 (two) times daily.    naloxone (NARCAN) 0.4 mg/mL injection Inject 1 mL (0.4 mg total) into the vein as needed (overdose). (Patient not taking: Reported on 1/5/2022)    omeprazole (PRILOSEC) 40 MG capsule TAKE 1 CAPSULE EVERY DAY    oxyCODONE-acetaminophen (PERCOCET)  mg per tablet Take 1 tablet by mouth every 4 (four) hours as needed for Pain.    [START ON 2/4/2022] oxyCODONE-acetaminophen (PERCOCET)  mg per tablet Take 1 tablet by mouth every 4 (four) hours as needed for Pain.    [START ON 3/4/2022] oxyCODONE-acetaminophen (PERCOCET)  mg per tablet Take 1 tablet by mouth every 4 (four) hours as needed for Pain.    saw/vit E/sod lisa/lyc/beta/pyg (PROSTATE HEALTH ORAL) Take 2 capsules by mouth once daily. With saw palmetto    sildenafil (REVATIO) 20 mg Tab Take 1-5 daily as needed for ED    tiZANidine (ZANAFLEX) 4 MG tablet Take 1 tablet (4 mg total) by mouth 3 (three) times daily as needed (muscle spasm).    UNABLE TO FIND Take by mouth once daily. Vitafusion multivites gummies     No current facility-administered medications for this visit.     Facility-Administered Medications Ordered in Other Visits   Medication    lactated ringers infusion       Allergies  Review of patient's allergies indicates:   Allergen Reactions    Xyzal [levocetirizine] Other (See Comments)     Knocked him out        Family  History  Family History   Problem Relation Age of Onset    Hypertension Father     Heart disease Father     Drug abuse Daughter     Hypertension Son     Lung disease Sister     COPD Sister     Hypertension Sister     Diverticulitis Sister     Gout Sister     Kidney disease Sister     No Known Problems Mother     Eczema Neg Hx     Lupus Neg Hx     Psoriasis Neg Hx     Melanoma Neg Hx        Social History  Social History     Socioeconomic History    Marital status:    Tobacco Use    Smoking status: Former Smoker     Packs/day: 1.00     Years: 30.00     Pack years: 30.00     Start date: 8/3/1963     Quit date: 1992     Years since quittin.0    Smokeless tobacco: Never Used   Substance and Sexual Activity    Alcohol use: Not Currently     Comment: On occasion    Drug use: Yes     Types: Oxycodone, Morphine               Review of Systems     Constitutional: Negative    HENT: Negative  Eyes: Negative  Respiratory: Negative  Cardiovascular: Negative  Musculoskeletal: HPI  Skin: Negative  Neurological: Negative  Hematological: Negative  Endocrine: Negative                 Physical Exam    There were no vitals filed for this visit.  Body mass index is 34.44 kg/m².  Physical Examination:     General appearance -  well appearing, and in no distress  Mental status - awake  Neck - supple  Chest -  symmetric air entry  Heart - normal rate   Abdomen - soft      Assessment     1. Shoulder arthritis    2. Primary osteoarthritis of right shoulder          Plan

## 2022-01-18 ENCOUNTER — PATIENT MESSAGE (OUTPATIENT)
Dept: FAMILY MEDICINE | Facility: CLINIC | Age: 77
End: 2022-01-18
Payer: MEDICARE

## 2022-01-19 ENCOUNTER — TELEPHONE (OUTPATIENT)
Dept: PAIN MEDICINE | Facility: CLINIC | Age: 77
End: 2022-01-19
Payer: MEDICARE

## 2022-01-19 ENCOUNTER — OFFICE VISIT (OUTPATIENT)
Dept: SPINE | Facility: CLINIC | Age: 77
End: 2022-01-19
Payer: MEDICARE

## 2022-01-19 DIAGNOSIS — M54.41 CHRONIC BILATERAL LOW BACK PAIN WITH BILATERAL SCIATICA: Primary | ICD-10-CM

## 2022-01-19 DIAGNOSIS — Z01.818 PRE-OP TESTING: Primary | ICD-10-CM

## 2022-01-19 DIAGNOSIS — M54.16 LUMBAR RADICULOPATHY: ICD-10-CM

## 2022-01-19 DIAGNOSIS — G89.29 CHRONIC BILATERAL LOW BACK PAIN WITH BILATERAL SCIATICA: Primary | ICD-10-CM

## 2022-01-19 DIAGNOSIS — M54.42 CHRONIC BILATERAL LOW BACK PAIN WITH BILATERAL SCIATICA: Primary | ICD-10-CM

## 2022-01-19 PROCEDURE — 1159F MED LIST DOCD IN RCRD: CPT | Mod: HCNC,CPTII,S$GLB, | Performed by: PHYSICAL MEDICINE & REHABILITATION

## 2022-01-19 PROCEDURE — 1160F PR REVIEW ALL MEDS BY PRESCRIBER/CLIN PHARMACIST DOCUMENTED: ICD-10-PCS | Mod: HCNC,CPTII,S$GLB, | Performed by: PHYSICAL MEDICINE & REHABILITATION

## 2022-01-19 PROCEDURE — 1159F PR MEDICATION LIST DOCUMENTED IN MEDICAL RECORD: ICD-10-PCS | Mod: HCNC,CPTII,S$GLB, | Performed by: PHYSICAL MEDICINE & REHABILITATION

## 2022-01-19 PROCEDURE — 1160F RVW MEDS BY RX/DR IN RCRD: CPT | Mod: HCNC,CPTII,S$GLB, | Performed by: PHYSICAL MEDICINE & REHABILITATION

## 2022-01-19 PROCEDURE — 99213 OFFICE O/P EST LOW 20 MIN: CPT | Mod: HCNC,S$GLB,, | Performed by: PHYSICAL MEDICINE & REHABILITATION

## 2022-01-19 PROCEDURE — 99213 PR OFFICE/OUTPT VISIT, EST, LEVL III, 20-29 MIN: ICD-10-PCS | Mod: HCNC,S$GLB,, | Performed by: PHYSICAL MEDICINE & REHABILITATION

## 2022-01-19 RX ORDER — PREGABALIN 75 MG/1
75 CAPSULE ORAL 2 TIMES DAILY
Qty: 60 CAPSULE | Refills: 1 | Status: SHIPPED | OUTPATIENT
Start: 2022-01-19 | End: 2022-03-03 | Stop reason: SDUPTHER

## 2022-01-19 NOTE — PROGRESS NOTES
SUBJECTIVE:    Patient ID: Sam Pete is a 76 y.o. male.    Chief Complaint: No chief complaint on file.    He is here for follow-up status post caudal epidural steroid injection on 2021 with Dr. Terrell.  About 25% improvement from that procedure.  Improvement in the pain in the gluteal region and radiating down the back of the legs but with persistent pins and needle sensation plantar portion of both feet.  Pain level is 6/10.  Currently on gabapentin.  He has had this pins and needle sensation for years now.  I note that he had EMG and nerve conduction studies done by Dr. Hernandez in 2017 which are summarized below:    1. Active right S1 radiculopathy.  2. Active and chronic left L5/S1 radiculopathy.  3. Technically limited study (in terms of detecting chronic neurogenic change) secondary to the patient's inability to perform continuous maximal muscle contraction.  4. Abnormal sural nerve response on the right, nonlocalized          Past Medical History:   Diagnosis Date    Anticoagulant long-term use     ASA 81 mg    Arthritis     Cataract     OU    Chronic low back pain     Complete rupture of rotator cuff 2011    DDD (degenerative disc disease), lumbar 2015    Degeneration of lumbar or lumbosacral intervertebral disc 2015    ED (erectile dysfunction)     GERD (gastroesophageal reflux disease)     Hypertension     Lumbar pseudoarthrosis 2017    Lumbar stenosis 2017    Obesity     Spondylosis of lumbar region without myelopathy or radiculopathy 2015    Stroke     basal ganglia, left sided weakness, resolved    Testicular hypofunction     Thoracic or lumbosacral neuritis or radiculitis 2015     Social History     Socioeconomic History    Marital status:    Tobacco Use    Smoking status: Former Smoker     Packs/day: 1.00     Years: 30.00     Pack years: 30.00     Start date: 8/3/1963     Quit date: 1992     Years since quittin.0     Smokeless tobacco: Never Used   Substance and Sexual Activity    Alcohol use: Not Currently     Comment: On occasion    Drug use: Yes     Types: Oxycodone, Morphine     Past Surgical History:   Procedure Laterality Date    APPENDECTOMY      BACK SURGERY      4- back surgery    CAUDAL EPIDURAL STEROID INJECTION N/A 12/16/2021    Procedure: Injection-steroid-epidural-caudal;  Surgeon: Aram Terrell MD;  Location: [facility] OR;  Service: Pain Management;  Laterality: N/A;    COLONOSCOPY  07/12/2004    [facility].   Redundant tortuous colon, otherwise normal.    COLONOSCOPY N/A 2/25/2019    Procedure: COLONOSCOPY;  Surgeon: Gilbert Rodriguez Jr., MD;  Location: [facility] ENDO;  Service: Endoscopy;  Laterality: N/A;    Epidural steroid injection      Pain management    ESOPHAGOGASTRODUODENOSCOPY  07/12/2004    [facility].    FRACTURE SURGERY Left     wrist / forearm, total of 5    INSERTION OF DORSAL COLUMN NERVE STIMULATOR FOR TRIAL N/A 12/12/2019    Procedure: INSERTION, NEUROSTIMULATOR, SPINAL CORD, DORSAL COLUMN, FOR TRIAL;  Surgeon: Evan Lyles MD;  Location: [facility] OR;  Service: Pain Management;  Laterality: N/A;    JOINT REPLACEMENT  2-6-2013    ( rt hip 2007), left hip     JOINT REPLACEMENT Left     total knee    KNEE ARTHROSCOPY W/ DEBRIDEMENT      bilateral knees , total of six    KNEE SURGERY      LAMINECTOMY USING MINIMALLY INVASIVE TECHNIQUE N/A 2/12/2020    Procedure: LAMINECTOMY, SPINE, MINIMALLY INVASIVE /  PLACEMENT OF SPINAL CORD STIMULATOR;  Surgeon: Darlene Max MD;  Location: [facility] OR;  Service: Neurosurgery;  Laterality: N/A;    Nervbe Block injection      Pain management     Family History   Problem Relation Age of Onset    Hypertension Father     Heart disease Father     Drug abuse Daughter     Hypertension Son     Lung disease Sister     COPD Sister     Hypertension Sister     Diverticulitis Sister     Gout Sister     Kidney disease Sister     No Known Problems Mother     Eczema Neg Hx      Lupus Neg Hx     Psoriasis Neg Hx     Melanoma Neg Hx      There were no vitals filed for this visit.    Review of Systems   Constitutional: Negative for chills, diaphoresis, fatigue, fever and unexpected weight change.   HENT: Negative for trouble swallowing.    Eyes: Negative for visual disturbance.   Respiratory: Negative for shortness of breath.    Cardiovascular: Negative for chest pain.   Gastrointestinal: Negative for abdominal pain, constipation, diarrhea, nausea and vomiting.   Genitourinary: Negative for difficulty urinating.   Musculoskeletal: Negative for arthralgias, back pain, gait problem, joint swelling, myalgias, neck pain and neck stiffness.   Neurological: Negative for dizziness, speech difficulty, weakness, light-headedness, numbness and headaches.          Objective:      Physical Exam  Neurological:      Mental Status: He is alert and oriented to person, place, and time.             Assessment:       1. Chronic bilateral low back pain with bilateral sciatica           Plan:     improved but still not satisfied with quality of life status post caudal injection.  We will repeat that procedure.  Discontinue gabapentin and start Lyrica for neuropathic pain.  Follow-up with me after the procedure      Chronic bilateral low back pain with bilateral sciatica    Other orders  -     pregabalin (LYRICA) 75 MG capsule; Take 1 capsule (75 mg total) by mouth 2 (two) times daily.  Dispense: 60 capsule; Refill: 1

## 2022-01-19 NOTE — TELEPHONE ENCOUNTER
----- Message from Nikita Landrum MD sent at 1/19/2022  1:16 PM CST -----  Please schedule for caudal injection

## 2022-01-19 NOTE — H&P (VIEW-ONLY)
SUBJECTIVE:    Patient ID: Sam Pete is a 76 y.o. male.    Chief Complaint: No chief complaint on file.    He is here for follow-up status post caudal epidural steroid injection on 2021 with Dr. Terrell.  About 25% improvement from that procedure.  Improvement in the pain in the gluteal region and radiating down the back of the legs but with persistent pins and needle sensation plantar portion of both feet.  Pain level is 6/10.  Currently on gabapentin.  He has had this pins and needle sensation for years now.  I note that he had EMG and nerve conduction studies done by Dr. Hernandez in 2017 which are summarized below:    1. Active right S1 radiculopathy.  2. Active and chronic left L5/S1 radiculopathy.  3. Technically limited study (in terms of detecting chronic neurogenic change) secondary to the patient's inability to perform continuous maximal muscle contraction.  4. Abnormal sural nerve response on the right, nonlocalized          Past Medical History:   Diagnosis Date    Anticoagulant long-term use     ASA 81 mg    Arthritis     Cataract     OU    Chronic low back pain     Complete rupture of rotator cuff 2011    DDD (degenerative disc disease), lumbar 2015    Degeneration of lumbar or lumbosacral intervertebral disc 2015    ED (erectile dysfunction)     GERD (gastroesophageal reflux disease)     Hypertension     Lumbar pseudoarthrosis 2017    Lumbar stenosis 2017    Obesity     Spondylosis of lumbar region without myelopathy or radiculopathy 2015    Stroke     basal ganglia, left sided weakness, resolved    Testicular hypofunction     Thoracic or lumbosacral neuritis or radiculitis 2015     Social History     Socioeconomic History    Marital status:    Tobacco Use    Smoking status: Former Smoker     Packs/day: 1.00     Years: 30.00     Pack years: 30.00     Start date: 8/3/1963     Quit date: 1992     Years since quittin.0     Smokeless tobacco: Never Used   Substance and Sexual Activity    Alcohol use: Not Currently     Comment: On occasion    Drug use: Yes     Types: Oxycodone, Morphine     Past Surgical History:   Procedure Laterality Date    APPENDECTOMY      BACK SURGERY      4- back surgery    CAUDAL EPIDURAL STEROID INJECTION N/A 12/16/2021    Procedure: Injection-steroid-epidural-caudal;  Surgeon: Aram Terrell MD;  Location: [facility] OR;  Service: Pain Management;  Laterality: N/A;    COLONOSCOPY  07/12/2004    [facility].   Redundant tortuous colon, otherwise normal.    COLONOSCOPY N/A 2/25/2019    Procedure: COLONOSCOPY;  Surgeon: Gilbert Rodriguez Jr., MD;  Location: [facility] ENDO;  Service: Endoscopy;  Laterality: N/A;    Epidural steroid injection      Pain management    ESOPHAGOGASTRODUODENOSCOPY  07/12/2004    [facility].    FRACTURE SURGERY Left     wrist / forearm, total of 5    INSERTION OF DORSAL COLUMN NERVE STIMULATOR FOR TRIAL N/A 12/12/2019    Procedure: INSERTION, NEUROSTIMULATOR, SPINAL CORD, DORSAL COLUMN, FOR TRIAL;  Surgeon: Evan Lyles MD;  Location: [facility] OR;  Service: Pain Management;  Laterality: N/A;    JOINT REPLACEMENT  2-6-2013    ( rt hip 2007), left hip     JOINT REPLACEMENT Left     total knee    KNEE ARTHROSCOPY W/ DEBRIDEMENT      bilateral knees , total of six    KNEE SURGERY      LAMINECTOMY USING MINIMALLY INVASIVE TECHNIQUE N/A 2/12/2020    Procedure: LAMINECTOMY, SPINE, MINIMALLY INVASIVE /  PLACEMENT OF SPINAL CORD STIMULATOR;  Surgeon: Darlene Max MD;  Location: [facility] OR;  Service: Neurosurgery;  Laterality: N/A;    Nervbe Block injection      Pain management     Family History   Problem Relation Age of Onset    Hypertension Father     Heart disease Father     Drug abuse Daughter     Hypertension Son     Lung disease Sister     COPD Sister     Hypertension Sister     Diverticulitis Sister     Gout Sister     Kidney disease Sister     No Known Problems Mother     Eczema Neg Hx      Lupus Neg Hx     Psoriasis Neg Hx     Melanoma Neg Hx      There were no vitals filed for this visit.    Review of Systems   Constitutional: Negative for chills, diaphoresis, fatigue, fever and unexpected weight change.   HENT: Negative for trouble swallowing.    Eyes: Negative for visual disturbance.   Respiratory: Negative for shortness of breath.    Cardiovascular: Negative for chest pain.   Gastrointestinal: Negative for abdominal pain, constipation, diarrhea, nausea and vomiting.   Genitourinary: Negative for difficulty urinating.   Musculoskeletal: Negative for arthralgias, back pain, gait problem, joint swelling, myalgias, neck pain and neck stiffness.   Neurological: Negative for dizziness, speech difficulty, weakness, light-headedness, numbness and headaches.          Objective:      Physical Exam  Neurological:      Mental Status: He is alert and oriented to person, place, and time.             Assessment:       1. Chronic bilateral low back pain with bilateral sciatica           Plan:     improved but still not satisfied with quality of life status post caudal injection.  We will repeat that procedure.  Discontinue gabapentin and start Lyrica for neuropathic pain.  Follow-up with me after the procedure      Chronic bilateral low back pain with bilateral sciatica    Other orders  -     pregabalin (LYRICA) 75 MG capsule; Take 1 capsule (75 mg total) by mouth 2 (two) times daily.  Dispense: 60 capsule; Refill: 1

## 2022-01-31 ENCOUNTER — LAB VISIT (OUTPATIENT)
Dept: LAB | Facility: HOSPITAL | Age: 77
End: 2022-01-31
Attending: FAMILY MEDICINE
Payer: MEDICARE

## 2022-01-31 ENCOUNTER — LAB VISIT (OUTPATIENT)
Dept: PRIMARY CARE CLINIC | Facility: CLINIC | Age: 77
End: 2022-01-31
Payer: MEDICARE

## 2022-01-31 DIAGNOSIS — Z01.818 PRE-OP TESTING: ICD-10-CM

## 2022-01-31 DIAGNOSIS — D35.1 PARATHYROID ADENOMA: ICD-10-CM

## 2022-01-31 LAB — PTH-INTACT SERPL-MCNC: 64 PG/ML (ref 9–77)

## 2022-01-31 PROCEDURE — U0003 INFECTIOUS AGENT DETECTION BY NUCLEIC ACID (DNA OR RNA); SEVERE ACUTE RESPIRATORY SYNDROME CORONAVIRUS 2 (SARS-COV-2) (CORONAVIRUS DISEASE [COVID-19]), AMPLIFIED PROBE TECHNIQUE, MAKING USE OF HIGH THROUGHPUT TECHNOLOGIES AS DESCRIBED BY CMS-2020-01-R: HCPCS | Mod: HCNC | Performed by: ANESTHESIOLOGY

## 2022-01-31 PROCEDURE — 36415 COLL VENOUS BLD VENIPUNCTURE: CPT | Mod: HCNC,PO | Performed by: FAMILY MEDICINE

## 2022-01-31 PROCEDURE — U0005 INFEC AGEN DETEC AMPLI PROBE: HCPCS | Performed by: ANESTHESIOLOGY

## 2022-01-31 PROCEDURE — 83970 ASSAY OF PARATHORMONE: CPT | Mod: HCNC | Performed by: FAMILY MEDICINE

## 2022-02-01 LAB
SARS-COV-2 RNA RESP QL NAA+PROBE: NOT DETECTED
SARS-COV-2- CYCLE NUMBER: NORMAL

## 2022-02-02 ENCOUNTER — PATIENT MESSAGE (OUTPATIENT)
Dept: FAMILY MEDICINE | Facility: CLINIC | Age: 77
End: 2022-02-02
Payer: MEDICARE

## 2022-02-02 ENCOUNTER — HOSPITAL ENCOUNTER (OUTPATIENT)
Facility: AMBULARY SURGERY CENTER | Age: 77
Discharge: HOME | End: 2022-02-02
Attending: ANESTHESIOLOGY | Admitting: ANESTHESIOLOGY
Payer: MEDICARE

## 2022-02-02 DIAGNOSIS — M54.16 LUMBAR RADICULITIS: Primary | ICD-10-CM

## 2022-02-02 PROCEDURE — 62323 NJX INTERLAMINAR LMBR/SAC: CPT | Mod: HCNC,,, | Performed by: ANESTHESIOLOGY

## 2022-02-02 PROCEDURE — 62323 PR INJ LUMBAR/SACRAL, W/IMAGING GUIDANCE: ICD-10-PCS | Mod: HCNC,,, | Performed by: ANESTHESIOLOGY

## 2022-02-02 PROCEDURE — 62323 NJX INTERLAMINAR LMBR/SAC: CPT | Performed by: ANESTHESIOLOGY

## 2022-02-02 RX ORDER — DEXAMETHASONE SODIUM PHOSPHATE 10 MG/ML
INJECTION INTRAMUSCULAR; INTRAVENOUS
Status: DISCONTINUED | OUTPATIENT
Start: 2022-02-02 | End: 2022-02-02 | Stop reason: HOSPADM

## 2022-02-02 RX ORDER — MIDAZOLAM HYDROCHLORIDE 2 MG/2ML
INJECTION, SOLUTION INTRAMUSCULAR; INTRAVENOUS
Status: DISCONTINUED | OUTPATIENT
Start: 2022-02-02 | End: 2022-02-02 | Stop reason: HOSPADM

## 2022-02-02 RX ORDER — LIDOCAINE HYDROCHLORIDE 10 MG/ML
INJECTION, SOLUTION EPIDURAL; INFILTRATION; INTRACAUDAL; PERINEURAL
Status: DISCONTINUED | OUTPATIENT
Start: 2022-02-02 | End: 2022-02-02 | Stop reason: HOSPADM

## 2022-02-02 RX ORDER — SODIUM CHLORIDE, SODIUM LACTATE, POTASSIUM CHLORIDE, CALCIUM CHLORIDE 600; 310; 30; 20 MG/100ML; MG/100ML; MG/100ML; MG/100ML
INJECTION, SOLUTION INTRAVENOUS ONCE AS NEEDED
Status: COMPLETED | OUTPATIENT
Start: 2022-02-02 | End: 2022-02-02

## 2022-02-02 RX ORDER — SODIUM CHLORIDE 9 MG/ML
INJECTION, SOLUTION INTRAMUSCULAR; INTRAVENOUS; SUBCUTANEOUS
Status: DISCONTINUED | OUTPATIENT
Start: 2022-02-02 | End: 2022-02-02 | Stop reason: HOSPADM

## 2022-02-02 RX ORDER — FENTANYL CITRATE 50 UG/ML
INJECTION, SOLUTION INTRAMUSCULAR; INTRAVENOUS
Status: DISCONTINUED | OUTPATIENT
Start: 2022-02-02 | End: 2022-02-02 | Stop reason: HOSPADM

## 2022-02-02 RX ADMIN — SODIUM CHLORIDE, SODIUM LACTATE, POTASSIUM CHLORIDE, CALCIUM CHLORIDE: 600; 310; 30; 20 INJECTION, SOLUTION INTRAVENOUS at 01:02

## 2022-02-02 NOTE — PLAN OF CARE
"Patient awake alert and says he is ready to go home; his wife says she is ready to take patient home and will monitor patient; patient's wife says she is driving. Patient's vital signs and injection site stable. Patient denies dizziness, weakness or nausea, though he says he's tired. Patient says his pain has improved since admit today and currently " soreness of about a 3 " out of 10 to left low back.  All patient belongings have been returned to patient; he is wearing his facemask and carrying his eyeglasses.  "

## 2022-02-02 NOTE — OP NOTE
PROCEDURE DATE: 2/2/2022    PROCEDURE:  Caudal epidural steroid injection under fluoroscopy.    Diagnosis: Lumbar radiculitis    Post Op diagnosis: Same    PHYSICIAN: Aram Terrell M.D.    MEDICATIONS INJECTED:  10 mg of dexamethasone and 4 ml of sterile, preservative-free NaCl.    LOCAL ANESTHETIC GIVEN:  Lidocaine 1%, 2 ml total    SEDATION MEDICATIONS: RN IV sedation    ESTIMATED BLOOD LOSS:  None    COMPLICATIONS:  None    TECHNIQUE:   After the patient was placed in prone position, the patient was prepped and draped in the usual sterile fashion using ChloraPrep and sterile towels.  Appropriate anatomic landmarks were determined by identifying the sacral hiatus in the lateral fluoroscopic view.  Local anesthetic was given via a 25g 1.5 inch needle by raising a wheal and infiltrating down to the periosteum.  A 3.5 inch 20 gauge touhy needle was introduced thru the sacral hiatus.  2 ml of contrast was injected to confirm placement in the appropriate area and that there was no vascular uptake.  The medication was then injected slowly.  The patient tolerated the procedure well.    The patient was monitored after the procedure.  Patient was given post procedure and discharge instructions to follow at home.  The patient was discharged in a stable condition

## 2022-02-02 NOTE — DISCHARGE INSTRUCTIONS
PLEASE MAKE SURE YOU HAVE ALL OF YOUR BELONGINGS BEFORE LEAVING    Pain injection instructions:     This procedure may take a couple weeks to relieve pain  You may get some pain relief from the local anesthetic initally.   Steroids can have side effects of flushed face or nervous feeling.    No driving for 24 hrs.   Activity as tolerated- gradually increase activities.  Dont lift over 10 lbs for 24 hrs   No heat at injection sites for 2 full days. No heating pads, hot tubs, saunas, or swimming in any body of water or pool for 2 full days.  Use ice pack for mild swelling and for comfort , apply for 20 minutes, remove for 20 minute intervals. No direct contact of ice itself  to skin.  May shower today if not drowsy  .  Do not allow shower water to beat on injections site(s) for 2 full days. No tub baths for two full days.      Resume Aspirin, Plavix, or Coumadin the day after the procedure unless otherwise instructed.   If diabetic,monitor your glucose carefully as steroids can increase your glucose level    Seek immediate medical help for:   Severe increase in your usual pain or appearance of new pain.  Prolonged (more than 8 hours) or increasing weakness or numbness in the legs or arms.   .    Fever above 100.4 degrees F ,Drainage,redness,active bleeding, or increased swelling at the injection site.  Headache, shortness of breath, chest pain, or breathing problems.    After Surgery:  Always be aware that any surgery can cause these symptoms:    Pain- Medication can be prescribed for pain to decrease your pain but may not completely take your pain away. Over the Counter pain medicine my be enough and you can always use Ice and rest to help ease pain.    Bleeding- a little bleeding after a surgery is usually within normal.  If there is a lot of blood you need to notify your MD.  Emergency treatments of bleeding are cold application, elevation of the bleeding site and compression.    Infection- Infection after surgery  is NOT a normal occurrence.  Signs of infection are fever, swelling, hot to touch the incision.  If this occurs notify your MD immediately.    Nausea- this can be common after a surgery especially if you have had anesthesia medicine or are taking pain medicine.  Steroids have a side effect of nausea sometimes. Staying on clear liquids, bland foods, gingerale, or over the counter anti nausea medicines can help.  If you vomit more than once, notify your MD.  Anti Nausea medicines can be prescribed.

## 2022-02-02 NOTE — DISCHARGE SUMMARY
[facility]  Discharge Note  Short Stay    Procedure(s) (LRB):  INJECTION, STEROID, SPINE, EPIDURAL, CAUDAL (N/A)    OUTCOME: Patient tolerated treatment/procedure well without complication and is now ready for discharge.    DISPOSITION: Home or Self Care    FINAL DIAGNOSIS:  <principal problem not specified>    FOLLOWUP: In clinic    DISCHARGE INSTRUCTIONS:    Discharge Procedure Orders   Notify your health care provider if you experience any of the following:  temperature >100.4     Notify your health care provider if you experience any of the following:  severe uncontrolled pain     Notify your health care provider if you experience any of the following:  redness, tenderness, or signs of infection (pain, swelling, redness, odor or green/yellow discharge around incision site)     Activity as tolerated        TIME SPENT ON DISCHARGE: 30 minutes

## 2022-02-03 VITALS
HEART RATE: 73 BPM | OXYGEN SATURATION: 95 % | HEIGHT: 70 IN | DIASTOLIC BLOOD PRESSURE: 72 MMHG | WEIGHT: 240 LBS | TEMPERATURE: 98 F | RESPIRATION RATE: 18 BRPM | SYSTOLIC BLOOD PRESSURE: 119 MMHG | BODY MASS INDEX: 34.36 KG/M2

## 2022-02-22 PROBLEM — Z91.81 AT RISK FOR FALLS: Status: ACTIVE | Noted: 2022-02-22

## 2022-02-28 ENCOUNTER — PATIENT MESSAGE (OUTPATIENT)
Dept: ORTHOPEDICS | Facility: CLINIC | Age: 77
End: 2022-02-28
Payer: MEDICARE

## 2022-03-02 ENCOUNTER — PATIENT MESSAGE (OUTPATIENT)
Dept: ORTHOPEDICS | Facility: CLINIC | Age: 77
End: 2022-03-02
Payer: MEDICARE

## 2022-03-02 ENCOUNTER — TELEPHONE (OUTPATIENT)
Dept: ORTHOPEDICS | Facility: CLINIC | Age: 77
End: 2022-03-02
Payer: MEDICARE

## 2022-03-02 NOTE — TELEPHONE ENCOUNTER
Contacted patient. All questions and concerns were answered. Tentative surgery date 3/15/22 with written clearance. Patient verbalized understanding.

## 2022-03-03 ENCOUNTER — HOSPITAL ENCOUNTER (OUTPATIENT)
Dept: RADIOLOGY | Facility: CLINIC | Age: 77
Discharge: HOME | End: 2022-03-03
Payer: MEDICARE

## 2022-03-03 ENCOUNTER — OFFICE VISIT (OUTPATIENT)
Dept: FAMILY MEDICINE | Facility: CLINIC | Age: 77
End: 2022-03-03
Payer: MEDICARE

## 2022-03-03 ENCOUNTER — OFFICE VISIT (OUTPATIENT)
Dept: SPINE | Facility: CLINIC | Age: 77
End: 2022-03-03
Payer: MEDICARE

## 2022-03-03 VITALS
WEIGHT: 240.06 LBS | HEIGHT: 70 IN | OXYGEN SATURATION: 95 % | SYSTOLIC BLOOD PRESSURE: 128 MMHG | BODY MASS INDEX: 34.37 KG/M2 | DIASTOLIC BLOOD PRESSURE: 62 MMHG | HEART RATE: 77 BPM

## 2022-03-03 VITALS — HEIGHT: 70 IN | WEIGHT: 240 LBS | BODY MASS INDEX: 34.36 KG/M2

## 2022-03-03 DIAGNOSIS — F11.20 UNCOMPLICATED OPIOID DEPENDENCE: ICD-10-CM

## 2022-03-03 DIAGNOSIS — G89.4 CHRONIC PAIN SYNDROME: ICD-10-CM

## 2022-03-03 DIAGNOSIS — E78.49 OTHER HYPERLIPIDEMIA: ICD-10-CM

## 2022-03-03 DIAGNOSIS — M19.011 PRIMARY OSTEOARTHRITIS OF RIGHT SHOULDER: Primary | ICD-10-CM

## 2022-03-03 DIAGNOSIS — M54.41 CHRONIC BILATERAL LOW BACK PAIN WITH BILATERAL SCIATICA: Primary | ICD-10-CM

## 2022-03-03 DIAGNOSIS — M19.011 OSTEOARTHRITIS OF RIGHT SHOULDER REGION: ICD-10-CM

## 2022-03-03 DIAGNOSIS — K76.0 FATTY LIVER: ICD-10-CM

## 2022-03-03 DIAGNOSIS — Z01.818 PREOP EXAMINATION: ICD-10-CM

## 2022-03-03 DIAGNOSIS — Z20.822 ENCOUNTER FOR LABORATORY TESTING FOR COVID-19 VIRUS: ICD-10-CM

## 2022-03-03 DIAGNOSIS — Z01.818 PREOP EXAMINATION: Primary | ICD-10-CM

## 2022-03-03 DIAGNOSIS — I10 ESSENTIAL HYPERTENSION: ICD-10-CM

## 2022-03-03 DIAGNOSIS — M54.42 CHRONIC BILATERAL LOW BACK PAIN WITH BILATERAL SCIATICA: Primary | ICD-10-CM

## 2022-03-03 DIAGNOSIS — G89.29 CHRONIC BILATERAL LOW BACK PAIN WITH BILATERAL SCIATICA: Primary | ICD-10-CM

## 2022-03-03 PROCEDURE — 71046 X-RAY EXAM CHEST 2 VIEWS: CPT | Mod: 26,,, | Performed by: RADIOLOGY

## 2022-03-03 PROCEDURE — 99213 OFFICE O/P EST LOW 20 MIN: CPT | Mod: S$GLB,,, | Performed by: PHYSICAL MEDICINE & REHABILITATION

## 2022-03-03 PROCEDURE — 3008F BODY MASS INDEX DOCD: CPT | Mod: CPTII,S$GLB,,

## 2022-03-03 PROCEDURE — 1101F PT FALLS ASSESS-DOCD LE1/YR: CPT | Mod: CPTII,S$GLB,, | Performed by: PHYSICAL MEDICINE & REHABILITATION

## 2022-03-03 PROCEDURE — 1159F MED LIST DOCD IN RCRD: CPT | Mod: CPTII,S$GLB,,

## 2022-03-03 PROCEDURE — 3008F PR BODY MASS INDEX (BMI) DOCUMENTED: ICD-10-PCS | Mod: CPTII,S$GLB,,

## 2022-03-03 PROCEDURE — 99499 RISK ADDL DX/OHS AUDIT: ICD-10-PCS | Mod: S$GLB,,,

## 2022-03-03 PROCEDURE — 1125F AMNT PAIN NOTED PAIN PRSNT: CPT | Mod: CPTII,S$GLB,, | Performed by: PHYSICAL MEDICINE & REHABILITATION

## 2022-03-03 PROCEDURE — 93010 EKG 12-LEAD: ICD-10-PCS | Mod: S$GLB,,, | Performed by: INTERNAL MEDICINE

## 2022-03-03 PROCEDURE — 99214 PR OFFICE/OUTPT VISIT, EST, LEVL IV, 30-39 MIN: ICD-10-PCS | Mod: S$GLB,,,

## 2022-03-03 PROCEDURE — 3008F BODY MASS INDEX DOCD: CPT | Mod: CPTII,S$GLB,, | Performed by: PHYSICAL MEDICINE & REHABILITATION

## 2022-03-03 PROCEDURE — 1101F PT FALLS ASSESS-DOCD LE1/YR: CPT | Mod: CPTII,S$GLB,,

## 2022-03-03 PROCEDURE — 99999 PR PBB SHADOW E&M-EST. PATIENT-LVL V: CPT | Mod: PBBFAC,,,

## 2022-03-03 PROCEDURE — 93005 EKG 12-LEAD: ICD-10-PCS | Mod: S$GLB,,,

## 2022-03-03 PROCEDURE — 1101F PR PT FALLS ASSESS DOC 0-1 FALLS W/OUT INJ PAST YR: ICD-10-PCS | Mod: CPTII,S$GLB,,

## 2022-03-03 PROCEDURE — 3288F PR FALLS RISK ASSESSMENT DOCUMENTED: ICD-10-PCS | Mod: CPTII,S$GLB,,

## 2022-03-03 PROCEDURE — 1159F PR MEDICATION LIST DOCUMENTED IN MEDICAL RECORD: ICD-10-PCS | Mod: CPTII,S$GLB,, | Performed by: PHYSICAL MEDICINE & REHABILITATION

## 2022-03-03 PROCEDURE — 3078F PR MOST RECENT DIASTOLIC BLOOD PRESSURE < 80 MM HG: ICD-10-PCS | Mod: CPTII,S$GLB,,

## 2022-03-03 PROCEDURE — 1125F AMNT PAIN NOTED PAIN PRSNT: CPT | Mod: CPTII,S$GLB,,

## 2022-03-03 PROCEDURE — 93010 ELECTROCARDIOGRAM REPORT: CPT | Mod: S$GLB,,, | Performed by: INTERNAL MEDICINE

## 2022-03-03 PROCEDURE — 1160F PR REVIEW ALL MEDS BY PRESCRIBER/CLIN PHARMACIST DOCUMENTED: ICD-10-PCS | Mod: CPTII,S$GLB,, | Performed by: PHYSICAL MEDICINE & REHABILITATION

## 2022-03-03 PROCEDURE — 1160F RVW MEDS BY RX/DR IN RCRD: CPT | Mod: CPTII,S$GLB,, | Performed by: PHYSICAL MEDICINE & REHABILITATION

## 2022-03-03 PROCEDURE — 99214 OFFICE O/P EST MOD 30 MIN: CPT | Mod: S$GLB,,,

## 2022-03-03 PROCEDURE — 1125F PR PAIN SEVERITY QUANTIFIED, PAIN PRESENT: ICD-10-PCS | Mod: CPTII,S$GLB,, | Performed by: PHYSICAL MEDICINE & REHABILITATION

## 2022-03-03 PROCEDURE — 3078F DIAST BP <80 MM HG: CPT | Mod: CPTII,S$GLB,,

## 2022-03-03 PROCEDURE — 3288F FALL RISK ASSESSMENT DOCD: CPT | Mod: CPTII,S$GLB,,

## 2022-03-03 PROCEDURE — 99499 UNLISTED E&M SERVICE: CPT | Mod: S$GLB,,,

## 2022-03-03 PROCEDURE — 71046 XR CHEST PA AND LATERAL: ICD-10-PCS | Mod: 26,,, | Performed by: RADIOLOGY

## 2022-03-03 PROCEDURE — 3008F PR BODY MASS INDEX (BMI) DOCUMENTED: ICD-10-PCS | Mod: CPTII,S$GLB,, | Performed by: PHYSICAL MEDICINE & REHABILITATION

## 2022-03-03 PROCEDURE — 1159F MED LIST DOCD IN RCRD: CPT | Mod: CPTII,S$GLB,, | Performed by: PHYSICAL MEDICINE & REHABILITATION

## 2022-03-03 PROCEDURE — 3074F PR MOST RECENT SYSTOLIC BLOOD PRESSURE < 130 MM HG: ICD-10-PCS | Mod: CPTII,S$GLB,,

## 2022-03-03 PROCEDURE — 1101F PR PT FALLS ASSESS DOC 0-1 FALLS W/OUT INJ PAST YR: ICD-10-PCS | Mod: CPTII,S$GLB,, | Performed by: PHYSICAL MEDICINE & REHABILITATION

## 2022-03-03 PROCEDURE — 3288F PR FALLS RISK ASSESSMENT DOCUMENTED: ICD-10-PCS | Mod: CPTII,S$GLB,, | Performed by: PHYSICAL MEDICINE & REHABILITATION

## 2022-03-03 PROCEDURE — 99213 PR OFFICE/OUTPT VISIT, EST, LEVL III, 20-29 MIN: ICD-10-PCS | Mod: S$GLB,,, | Performed by: PHYSICAL MEDICINE & REHABILITATION

## 2022-03-03 PROCEDURE — 1160F PR REVIEW ALL MEDS BY PRESCRIBER/CLIN PHARMACIST DOCUMENTED: ICD-10-PCS | Mod: CPTII,S$GLB,,

## 2022-03-03 PROCEDURE — 99999 PR PBB SHADOW E&M-EST. PATIENT-LVL V: ICD-10-PCS | Mod: PBBFAC,,,

## 2022-03-03 PROCEDURE — 1159F PR MEDICATION LIST DOCUMENTED IN MEDICAL RECORD: ICD-10-PCS | Mod: CPTII,S$GLB,,

## 2022-03-03 PROCEDURE — 93005 ELECTROCARDIOGRAM TRACING: CPT | Mod: S$GLB,,,

## 2022-03-03 PROCEDURE — 3288F FALL RISK ASSESSMENT DOCD: CPT | Mod: CPTII,S$GLB,, | Performed by: PHYSICAL MEDICINE & REHABILITATION

## 2022-03-03 PROCEDURE — 1125F PR PAIN SEVERITY QUANTIFIED, PAIN PRESENT: ICD-10-PCS | Mod: CPTII,S$GLB,,

## 2022-03-03 PROCEDURE — 1160F RVW MEDS BY RX/DR IN RCRD: CPT | Mod: CPTII,S$GLB,,

## 2022-03-03 PROCEDURE — 71046 X-RAY EXAM CHEST 2 VIEWS: CPT | Mod: TC,FY,PO

## 2022-03-03 PROCEDURE — 3074F SYST BP LT 130 MM HG: CPT | Mod: CPTII,S$GLB,,

## 2022-03-03 RX ORDER — OXYCODONE AND ACETAMINOPHEN 10; 325 MG/1; MG/1
TABLET ORAL
COMMUNITY
End: 2022-04-05

## 2022-03-03 RX ORDER — BUPROPION HYDROCHLORIDE 150 MG/1
150 TABLET ORAL
COMMUNITY
End: 2022-03-03 | Stop reason: SDUPTHER

## 2022-03-03 RX ORDER — TRIAMCINOLONE ACETONIDE 40 MG/ML
INJECTION, SUSPENSION INTRA-ARTICULAR; INTRAMUSCULAR
COMMUNITY
Start: 2021-12-09 | End: 2023-01-26

## 2022-03-03 RX ORDER — MUPIROCIN 20 MG/G
OINTMENT TOPICAL
Status: CANCELLED | OUTPATIENT
Start: 2022-03-03

## 2022-03-03 RX ORDER — LOSARTAN POTASSIUM 50 MG/1
50 TABLET ORAL
COMMUNITY
End: 2022-04-01 | Stop reason: SDUPTHER

## 2022-03-03 RX ORDER — ATORVASTATIN CALCIUM 40 MG/1
40 TABLET, FILM COATED ORAL
COMMUNITY
End: 2022-03-03 | Stop reason: SDUPTHER

## 2022-03-03 RX ORDER — ALBUTEROL SULFATE 90 UG/1
AEROSOL, METERED RESPIRATORY (INHALATION)
COMMUNITY
End: 2023-07-17 | Stop reason: CLARIF

## 2022-03-03 RX ORDER — MORPHINE SULFATE 60 MG/1
60 TABLET, FILM COATED, EXTENDED RELEASE ORAL
COMMUNITY
End: 2022-03-03 | Stop reason: SDUPTHER

## 2022-03-03 RX ORDER — COVID-19 TEST SPECIMEN COLLECT
MISCELLANEOUS MISCELLANEOUS
COMMUNITY
Start: 2022-01-30 | End: 2023-01-26

## 2022-03-03 RX ORDER — TIZANIDINE 4 MG/1
4 TABLET ORAL
COMMUNITY
End: 2022-03-03 | Stop reason: SDUPTHER

## 2022-03-03 RX ORDER — AMLODIPINE BESYLATE 10 MG/1
10 TABLET ORAL
COMMUNITY
End: 2022-03-03

## 2022-03-03 RX ORDER — NAPROXEN SODIUM 220 MG/1
81 TABLET, FILM COATED ORAL
COMMUNITY
End: 2022-03-03 | Stop reason: SDUPTHER

## 2022-03-03 RX ORDER — PREGABALIN 75 MG/1
75 CAPSULE ORAL
COMMUNITY
End: 2022-03-23 | Stop reason: SDUPTHER

## 2022-03-03 RX ORDER — OMEPRAZOLE 20 MG/1
40 TABLET, DELAYED RELEASE ORAL
COMMUNITY
End: 2023-01-03

## 2022-03-03 NOTE — PATIENT INSTRUCTIONS
If you are due for any health screening(s) below please notify me so we can arrange them to be ordered and scheduled to maintain your health.     Health Maintenance   Topic Date Due    Lipid Panel  01/14/2022    High Dose Statin  01/05/2023    TETANUS VACCINE  10/14/2025    Hepatitis C Screening  Completed    Abdominal Aortic Aneurysm Screening  Completed

## 2022-03-03 NOTE — PROGRESS NOTES
SUBJECTIVE:    Patient ID: Sam Pete is a 76 y.o. male.    Chief Complaint: Follow-up (back injection)    He is here for follow-up status post a 2nd caudal injection on 2022 with Dr. Terrell.  Good response to that procedure.  Pain level is down to 5/10 which is good for him.  He is currently satisfied with his quality of life.  He finds that his pain is worse in the morning when he 1st gets up and better when he sits down.  He has no new or progressive problems.  He is scheduled to have right shoulder surgery coming up soon with Dr. Joya        Past Medical History:   Diagnosis Date    Anticoagulant long-term use     ASA 81 mg    Arthritis     Cataract     OU    Chronic low back pain     Complete rupture of rotator cuff 2011    DDD (degenerative disc disease), lumbar 2015    Degeneration of lumbar or lumbosacral intervertebral disc 2015    ED (erectile dysfunction)     GERD (gastroesophageal reflux disease)     Hypertension     Lumbar pseudoarthrosis 2017    Lumbar stenosis 2017    Obesity     Spondylosis of lumbar region without myelopathy or radiculopathy 2015    Stroke 1997    basal ganglia, left sided weakness, resolved    Testicular hypofunction     Thoracic or lumbosacral neuritis or radiculitis 2015     Social History     Socioeconomic History    Marital status:    Tobacco Use    Smoking status: Former Smoker     Packs/day: 1.00     Years: 30.00     Pack years: 30.00     Start date: 8/3/1963     Quit date: 1992     Years since quittin.1    Smokeless tobacco: Never Used   Substance and Sexual Activity    Alcohol use: Not Currently     Comment: On occasion    Drug use: Yes     Types: Oxycodone, Morphine     Past Surgical History:   Procedure Laterality Date    APPENDECTOMY      BACK SURGERY      4- back surgery    CAUDAL EPIDURAL STEROID INJECTION N/A 2021    Procedure: Injection-steroid-epidural-caudal;  Surgeon: Aram CHIRINOS  "MD Xander;  Location: [facility] OR;  Service: Pain Management;  Laterality: N/A;    CAUDAL EPIDURAL STEROID INJECTION N/A 2/2/2022    Procedure: INJECTION, STEROID, SPINE, EPIDURAL, CAUDAL;  Surgeon: Aram Terrell MD;  Location: [facility] OR;  Service: Pain Management;  Laterality: N/A;    COLONOSCOPY  07/12/2004    [facility].   Redundant tortuous colon, otherwise normal.    COLONOSCOPY N/A 2/25/2019    Procedure: COLONOSCOPY;  Surgeon: Gilbert Rodriguez Jr., MD;  Location: [facility] ENDO;  Service: Endoscopy;  Laterality: N/A;    Epidural steroid injection      Pain management    ESOPHAGOGASTRODUODENOSCOPY  07/12/2004    [facility].    FRACTURE SURGERY Left     wrist / forearm, total of 5    INSERTION OF DORSAL COLUMN NERVE STIMULATOR FOR TRIAL N/A 12/12/2019    Procedure: INSERTION, NEUROSTIMULATOR, SPINAL CORD, DORSAL COLUMN, FOR TRIAL;  Surgeon: Evan Lyles MD;  Location: [facility] OR;  Service: Pain Management;  Laterality: N/A;    JOINT REPLACEMENT  2-6-2013    ( rt hip 2007), left hip     JOINT REPLACEMENT Left     total knee    KNEE ARTHROSCOPY W/ DEBRIDEMENT      bilateral knees , total of six    KNEE SURGERY      LAMINECTOMY USING MINIMALLY INVASIVE TECHNIQUE N/A 2/12/2020    Procedure: LAMINECTOMY, SPINE, MINIMALLY INVASIVE /  PLACEMENT OF SPINAL CORD STIMULATOR;  Surgeon: Darlene Max MD;  Location: [facility] OR;  Service: Neurosurgery;  Laterality: N/A;    Nervbe Block injection      Pain management     Family History   Problem Relation Age of Onset    Hypertension Father     Heart disease Father     Drug abuse Daughter     Hypertension Son     Lung disease Sister     COPD Sister     Hypertension Sister     Diverticulitis Sister     Gout Sister     Kidney disease Sister     No Known Problems Mother     Eczema Neg Hx     Lupus Neg Hx     Psoriasis Neg Hx     Melanoma Neg Hx      Vitals:    03/03/22 1305   Weight: 108.9 kg (240 lb)   Height: 5' 10" (1.778 m)       Review of Systems   Constitutional: Negative for " chills, diaphoresis, fatigue, fever and unexpected weight change.   HENT: Negative for trouble swallowing.    Eyes: Negative for visual disturbance.   Respiratory: Negative for shortness of breath.    Cardiovascular: Negative for chest pain.   Gastrointestinal: Negative for abdominal pain, constipation, diarrhea, nausea and vomiting.   Genitourinary: Negative for difficulty urinating.   Musculoskeletal: Negative for arthralgias, back pain, gait problem, joint swelling, myalgias, neck pain and neck stiffness.   Neurological: Negative for dizziness, speech difficulty, weakness, light-headedness, numbness and headaches.          Objective:      Physical Exam  Neurological:      Mental Status: He is alert and oriented to person, place, and time.             Assessment:       1. Chronic bilateral low back pain with bilateral sciatica           Plan:     improved and currently satisfied with quality of life status post caudal injection x2.  We can repeat the procedure as needed.  He can follow up here as needed      Chronic bilateral low back pain with bilateral sciatica

## 2022-03-03 NOTE — PROGRESS NOTES
Patient ID: Sam Pete is a 76 y.o. male.    Chief Complaint: Pre-op Exam      Ayo Pete is a 75 yo M pt w/ MHx listed below that presents to the clinic for preoperative examination. He denies any acute health complaints at this time. He does have chronic pain symptoms currently ongoing. He is having surgery on his R shoulder via Dr. Joya. He has not had an adverse reaction to general or local anesthesia in the past. Is on aspirin daily but has been holding this medication for a few days now.       Past Medical History:   Diagnosis Date    Anticoagulant long-term use     ASA 81 mg    Arthritis     Cataract     OU    Chronic low back pain     Complete rupture of rotator cuff 2/8/2011    DDD (degenerative disc disease), lumbar 7/8/2015    Degeneration of lumbar or lumbosacral intervertebral disc 9/9/2015    ED (erectile dysfunction)     GERD (gastroesophageal reflux disease)     Hypertension     Lumbar pseudoarthrosis 6/26/2017    Lumbar stenosis 6/26/2017    Obesity     Spondylosis of lumbar region without myelopathy or radiculopathy 7/8/2015    Stroke 1997    basal ganglia, left sided weakness, resolved    Testicular hypofunction     Thoracic or lumbosacral neuritis or radiculitis 7/8/2015                Current Outpatient Medications:     albuterol (PROVENTIL/VENTOLIN HFA) 90 mcg/actuation inhaler, Inhale 2 puffs into the lungs every 6 (six) hours as needed for Wheezing. Rescue, Disp: 18 g, Rfl: 0    amLODIPine (NORVASC) 10 MG tablet, TAKE 1 TABLET EVERY DAY, Disp: 90 tablet, Rfl: 3    aspirin 81 MG Chew, Take 81 mg by mouth once daily., Disp: , Rfl:     atorvastatin (LIPITOR) 40 MG tablet, Take 1 tablet (40 mg total) by mouth once daily., Disp: 90 tablet, Rfl: 3    buPROPion (WELLBUTRIN XL) 150 MG TB24 tablet, Take 1 tablet (150 mg total) by mouth once daily., Disp: 90 tablet, Rfl: 3    calcitRIOL (ROCALTROL) 0.5 MCG Cap, Take 1 capsule (0.5 mcg total) by mouth once daily., Disp:  30 capsule, Rfl: 0    cephALEXin (KEFLEX) 500 MG capsule, Take 1 capsule (500 mg total) by mouth every 6 (six) hours for 7 days, Disp: 28 capsule, Rfl: 0    fluticasone propionate (FLONASE ALLERGY RELIEF) 50 mcg/actuation nasal spray, 1 spray (50 mcg total) by Each Nostril route 2 (two) times daily., Disp: 16 mL, Rfl: 0    levothyroxine (SYNTHROID) 125 MCG tablet, Take 1 tablet(s) by oral route ,  1 time per day , for 30 days, Disp: 30 tablet, Rfl: 0    losartan (COZAAR) 50 MG tablet, TAKE 1 TABLET EVERY DAY, Disp: 90 tablet, Rfl: 0    morphine (MS CONTIN) 60 MG 12 hr tablet, Take 1 tablet (60 mg total) by mouth 2 (two) times daily., Disp: 60 tablet, Rfl: 0    morphine (MS CONTIN) 60 MG 12 hr tablet, Take 1 tablet (60 mg total) by mouth 2 (two) times daily., Disp: 60 tablet, Rfl: 0    [START ON 3/4/2022] morphine (MS CONTIN) 60 MG 12 hr tablet, Take 1 tablet (60 mg total) by mouth 2 (two) times daily., Disp: 60 tablet, Rfl: 0    naloxone (NARCAN) 0.4 mg/mL injection, Inject 1 mL (0.4 mg total) into the vein as needed (overdose). (Patient not taking: Reported on 1/5/2022), Disp: 2 mL, Rfl: 5    omeprazole (PRILOSEC) 40 MG capsule, TAKE 1 CAPSULE EVERY DAY, Disp: 90 capsule, Rfl: 3    ondansetron (ZOFRAN-ODT) 8 MG TbDL, Take 1 tablet (8 mg total) by mouth 2 (two) times daily as needed for nausea for 5days, Disp: 10 tablet, Rfl: 0    oxyCODONE-acetaminophen (PERCOCET)  mg per tablet, Take 1 tablet by mouth every 4 (four) hours as needed for Pain., Disp: 120 tablet, Rfl: 0    oxyCODONE-acetaminophen (PERCOCET)  mg per tablet, Take 1 tablet by mouth every 4 (four) hours as needed for Pain., Disp: 120 tablet, Rfl: 0    [START ON 3/4/2022] oxyCODONE-acetaminophen (PERCOCET)  mg per tablet, Take 1 tablet by mouth every 4 (four) hours as needed for Pain., Disp: 120 tablet, Rfl: 0    pregabalin (LYRICA) 75 MG capsule, Take 1 capsule (75 mg total) by mouth 2 (two) times daily., Disp: 60 capsule,  Rfl: 1    saw/vit E/sod lisa/lyc/beta/pyg (PROSTATE HEALTH ORAL), Take 2 capsules by mouth once daily. With saw palmetto, Disp: , Rfl:     sildenafil (REVATIO) 20 mg Tab, Take 1-5 daily as needed for ED, Disp: 10 tablet, Rfl: 5    tiZANidine (ZANAFLEX) 4 MG tablet, Take 1 tablet (4 mg total) by mouth 3 (three) times daily as needed (muscle spasm)., Disp: 90 tablet, Rfl: 3    UNABLE TO FIND, Take by mouth once daily. Vitafusion multivites gummies, Disp: , Rfl:   No current facility-administered medications for this visit.    Facility-Administered Medications Ordered in Other Visits:     lactated ringers infusion, , Intravenous, Once PRN, Aram Terrell MD    The ASCVD Risk score (Lore MCKENZIE Beck., et al., 2013) failed to calculate for the following reasons:    The valid total cholesterol range is 130 to 320 mg/dL     Wt Readings from Last 3 Encounters:   01/27/22 108.9 kg (240 lb)   01/13/22 108.9 kg (240 lb)   01/05/22 109 kg (240 lb 4.8 oz)     Temp Readings from Last 3 Encounters:   02/02/22 97.5 °F (36.4 °C) (Skin)   01/05/22 98.3 °F (36.8 °C) (Oral)   12/16/21 98.1 °F (36.7 °C) (Skin)     BP Readings from Last 3 Encounters:   02/02/22 119/72   01/05/22 126/70   12/16/21 130/69     Pulse Readings from Last 3 Encounters:   02/02/22 73   01/05/22 76   12/16/21 75     Resp Readings from Last 3 Encounters:   02/02/22 18   12/16/21 16   11/29/21 16     PF Readings from Last 3 Encounters:   No data found for PF     SpO2 Readings from Last 3 Encounters:   02/02/22 95%   01/05/22 97%   12/16/21 (!) 93%        Lab Results   Component Value Date    HGBA1C 6.1 (H) 06/12/2017    HGBA1C 6.6 (H) 08/22/2011    HGBA1C 6.4 (H) 11/22/2010     Lab Results   Component Value Date    GLUF 120 (H) 07/16/2007    LDLCALC 55.0 (L) 01/14/2021    CREATININE 1.0 01/14/2021       Review of Systems   Constitutional: Negative for activity change, appetite change, chills, diaphoresis, fatigue, fever and unexpected weight change.   HENT: Negative for  congestion, ear pain, postnasal drip, rhinorrhea, sinus pressure, sneezing, sore throat and trouble swallowing.    Eyes: Negative for pain, itching and visual disturbance.   Respiratory: Negative for cough, chest tightness, shortness of breath and wheezing.    Cardiovascular: Negative for chest pain, palpitations and leg swelling.   Gastrointestinal: Negative for abdominal distention, abdominal pain, blood in stool, constipation, diarrhea, nausea and vomiting.   Endocrine: Negative for cold intolerance and heat intolerance.   Genitourinary: Negative for difficulty urinating, dysuria, frequency, hematuria and urgency.   Musculoskeletal: Positive for arthralgias and back pain. Negative for myalgias and neck pain.   Skin: Negative for color change, pallor, rash and wound.   Neurological: Negative for dizziness, syncope, weakness, numbness and headaches.   Hematological: Negative for adenopathy.   Psychiatric/Behavioral: Negative for behavioral problems. The patient is not nervous/anxious.            Objective:      Physical Exam  Vitals reviewed.   Constitutional:       General: He is not in acute distress.     Appearance: Normal appearance. He is obese. He is not ill-appearing, toxic-appearing or diaphoretic.   HENT:      Head: Normocephalic.      Right Ear: External ear normal.      Left Ear: External ear normal.      Nose: Nose normal. No congestion or rhinorrhea.      Mouth/Throat:      Mouth: Mucous membranes are moist.      Pharynx: Oropharynx is clear.   Eyes:      General: No scleral icterus.        Right eye: No discharge.         Left eye: No discharge.      Extraocular Movements: Extraocular movements intact.      Conjunctiva/sclera: Conjunctivae normal.   Cardiovascular:      Rate and Rhythm: Normal rate and regular rhythm.      Pulses: Normal pulses.      Heart sounds: Normal heart sounds. No murmur heard.    No friction rub. No gallop.   Pulmonary:      Effort: Pulmonary effort is normal. No respiratory  distress.      Breath sounds: Normal breath sounds. No wheezing, rhonchi or rales.   Chest:      Chest wall: No tenderness.   Abdominal:      Palpations: Abdomen is soft. There is no mass.      Tenderness: There is no abdominal tenderness. There is no guarding.   Musculoskeletal:         General: No swelling, tenderness or deformity. Normal range of motion.      Cervical back: Normal range of motion.      Right lower leg: Edema present.      Left lower leg: Edema (L>R) present.   Skin:     General: Skin is warm and dry.      Capillary Refill: Capillary refill takes less than 2 seconds.      Coloration: Skin is not jaundiced.      Findings: No bruising, erythema, lesion or rash.   Neurological:      Mental Status: He is alert and oriented to person, place, and time.      Gait: Gait abnormal (Walks w/ cane).   Psychiatric:         Mood and Affect: Mood normal.         Behavior: Behavior normal.         Thought Content: Thought content normal.         Judgment: Judgment normal.             Screening recommendations appropriate to age and health status were reviewed.    STOP BANG:    3 points  High risk of HENRIETTA    Preop examination  -     Comprehensive Metabolic Panel; Future; Expected date: 03/03/2022  -     CBC Auto Differential; Future; Expected date: 03/03/2022  -     IN OFFICE EKG 12-LEAD (to Muse)  -     X-Ray Chest PA And Lateral; Future; Expected date: 03/03/2022        -     Unremarkable EKG.     CXR:     FINDINGS:  Atherosclerotic calcifications are noted at the aortic arch.  Evidence of a neurostimulator appears to be present with its tip at approximately the T10 level.The cardiac silhouette appears appropriate in size.  No convincing confluent consolidations are noted.  No acute cardiopulmonary process is convincingly noted.  Anterior osseous spurring is noted in the lower thoracic spine as can be seen with diffuse idiopathic skeletal hyperostosis.  Sclerosis of the right humeral head is noted with  deformation suggesting labral tearing and cartilaginous loss and remodeling.  Avascular necrosis would be difficult to exclude.  Postsurgical changes are noted within the spine extending below our field of view     Impression:     No acute cardiopulmonary process noted.    Labs: Pending results. Will complete required paperwork and fax to requested number when resulted.     Uncomplicated opioid dependence    Essential hypertension  -     Comprehensive Metabolic Panel; Future; Expected date: 03/03/2022  -     CBC Auto Differential; Future; Expected date: 03/03/2022        -     Stable. Continue meds as prescribed. Will continue to monitor.     Fatty liver  -     Comprehensive Metabolic Panel; Future; Expected date: 03/03/2022    Chronic pain syndrome    Other hyperlipidemia          -    Continue meds as prescribed. Will continue to monitor.       RCRI risk factors include: history of cerebrovascular disease. As such, per RCRI the risk of cardiac death, nonfatal myocardial infarction, or nonfatal cardiac arrest is 1.0% and the risk of myocardial infarction, pulmonary edema, ventricular fibrillation, primary cardiac arrest, or complete heart block is 1.3%.  Overall this patient can be considered intermediate risk for this low risk procedure. No further cardiac testing is recommended at this time.     Patient denies any symptoms (as per HPI) concerning for undiagnosed lung disease including HENRIETTA. Would not recommend obtaining chest X-ray, sleep study, or PFTs at this time. Patient quit smoking many years ago. We discussed the benefits of early mobilization and deep breathing after surgery.      Screened patient for alcohol misuse, use of illicit drugs, and personal or family history of anesthetic complications or bleeding diathesis and no substantial concerns were identified. Recommended that pt avoid alcoholic beverages leading up to his surgical procedure.     All current medications were reviewed and at this time no  changes to medications are recommended prior to surgery. Aspirin is already being held.     I recommend use of standard pre-op and post-op precautions for this patient. In my opinion, he is medically optimized for this procedure, and can proceed without further evaluation.

## 2022-03-04 ENCOUNTER — TELEPHONE (OUTPATIENT)
Dept: FAMILY MEDICINE | Facility: CLINIC | Age: 77
End: 2022-03-04
Payer: MEDICARE

## 2022-03-04 NOTE — TELEPHONE ENCOUNTER
----- Message from Hernando Rodriguez PA-C sent at 3/4/2022  8:16 AM CST -----  Mild anemia seen on lab work. No acute health concerns. Patient is cleared for his upcoming procedure.

## 2022-03-07 DIAGNOSIS — F11.20 UNCOMPLICATED OPIOID DEPENDENCE: ICD-10-CM

## 2022-03-07 RX ORDER — MORPHINE SULFATE 60 MG/1
60 TABLET, FILM COATED, EXTENDED RELEASE ORAL 2 TIMES DAILY
Qty: 60 TABLET | Refills: 0 | Status: CANCELLED | OUTPATIENT
Start: 2022-03-07

## 2022-03-07 NOTE — TELEPHONE ENCOUNTER
Care Due:                  Date            Visit Type   Department     Provider  --------------------------------------------------------------------------------                                EP -                              PRIMARY      [facility]  Last Visit: 01-      CARE ([facility])   PRACTICE       Ty Montalvo                              EP -                              PRIMARY      [facility]  Next Visit: 04-      CARE ([facility])   PRACTICE       Ty Padron  Test          Frequency    Reason                     Performed    Due Date  --------------------------------------------------------------------------------    Lipid Panel.  12 months..  atorvastatin.............  01-   01-    Powered by [employer]. Reference number: [id number].   3/07/2022 9:08:33 AM CST

## 2022-03-08 DIAGNOSIS — Z01.818 PRE-OP TESTING: ICD-10-CM

## 2022-03-08 DIAGNOSIS — M19.011 PRIMARY OSTEOARTHRITIS OF RIGHT SHOULDER: Primary | ICD-10-CM

## 2022-03-08 PROBLEM — Z79.01 LONG TERM (CURRENT) USE OF ANTICOAGULANTS: Status: ACTIVE | Noted: 2022-03-08

## 2022-03-10 ENCOUNTER — TELEPHONE (OUTPATIENT)
Dept: ORTHOPEDICS | Facility: CLINIC | Age: 77
End: 2022-03-10
Payer: MEDICARE

## 2022-03-10 NOTE — TELEPHONE ENCOUNTER
----- Message from Leandra Marin sent at 3/10/2022 10:30 AM CST -----  Who Called: Patient    What is the reqeust in detail: Requesting call back to know if he is allowed to move his mrsa swab lab appointment to the others are that scheduled for him on 03/19/22. Please advise.     Can the clinic reply by [facility]? No    Best Call Back Number: [phone]    Additional Information: Please advise.

## 2022-03-10 NOTE — TELEPHONE ENCOUNTER
Contacted patient. Informed him his appointment concerning the MRSA swab. Need 5-7 days prior to surgery date. Patient verbalized understanding. All questions and concerns were answered.

## 2022-03-11 ENCOUNTER — LAB VISIT (OUTPATIENT)
Dept: LAB | Facility: HOSPITAL | Age: 77
End: 2022-03-11
Attending: ORTHOPAEDIC SURGERY
Payer: MEDICARE

## 2022-03-11 DIAGNOSIS — M19.011 PRIMARY OSTEOARTHRITIS OF RIGHT SHOULDER: ICD-10-CM

## 2022-03-11 DIAGNOSIS — Z01.818 PRE-OP TESTING: ICD-10-CM

## 2022-03-11 PROCEDURE — 87081 CULTURE SCREEN ONLY: CPT | Performed by: ORTHOPAEDIC SURGERY

## 2022-03-13 LAB — MRSA SPEC QL CULT: NORMAL

## 2022-03-18 ENCOUNTER — TELEPHONE (OUTPATIENT)
Dept: ORTHOPEDICS | Facility: CLINIC | Age: 77
End: 2022-03-18
Payer: MEDICARE

## 2022-03-18 RX ORDER — WARFARIN SODIUM 5 MG/1
5 TABLET ORAL DAILY
Qty: 30 TABLET | Refills: 0 | Status: SHIPPED | OUTPATIENT
Start: 2022-03-18 | End: 2022-04-05 | Stop reason: ALTCHOICE

## 2022-03-18 NOTE — TELEPHONE ENCOUNTER
----- Message from Kendy Belle sent at 3/18/2022  9:18 AM CDT -----  Contact: patient  Type: Needs Medical Advice  Who Called:  patient  Best Call Back Number: [phone]   Additional Information: patient would like the coumadin prescription sent to the [facility] [place], he would like to  today if possible.

## 2022-03-21 ENCOUNTER — ANESTHESIA EVENT (OUTPATIENT)
Dept: SURGERY | Facility: HOSPITAL | Age: 77
End: 2022-03-21
Payer: MEDICARE

## 2022-03-21 DIAGNOSIS — M19.011 PRIMARY OSTEOARTHRITIS OF RIGHT SHOULDER: Primary | ICD-10-CM

## 2022-03-21 RX ORDER — OXYCODONE AND ACETAMINOPHEN 10; 325 MG/1; MG/1
1 TABLET ORAL
Qty: 42 TABLET | Refills: 0 | Status: SHIPPED | OUTPATIENT
Start: 2022-03-21 | End: 2022-06-29

## 2022-03-21 RX ORDER — CEPHALEXIN 500 MG/1
500 CAPSULE ORAL EVERY 6 HOURS
Qty: 20 CAPSULE | Refills: 0 | Status: SHIPPED | OUTPATIENT
Start: 2022-03-21 | End: 2023-01-26

## 2022-03-21 NOTE — TELEPHONE ENCOUNTER
----- Message from Kendy Belle sent at 3/21/2022 12:14 PM CDT -----  Contact: patient  Type: Needs Medical Advice  Who Called:  patient    Best Call Back Number: [phone] (home) [phone] (work)  Additional Information: patient is requesting a call back to discuss procedure.

## 2022-03-21 NOTE — TELEPHONE ENCOUNTER
----- Message from Kendy Belle sent at 3/21/2022 12:14 PM CDT -----  Contact: patient  Type: Needs Medical Advice  Who Called:  patient    Best Call Back Number: [phone] (home) [phone] (work)  Additional Information: patient is requesting a call back to discuss procedure.

## 2022-03-22 ENCOUNTER — HOSPITAL ENCOUNTER (OUTPATIENT)
Facility: HOSPITAL | Age: 77
Discharge: HOME | End: 2022-03-22
Attending: ORTHOPAEDIC SURGERY | Admitting: ORTHOPAEDIC SURGERY
Payer: MEDICARE

## 2022-03-22 ENCOUNTER — HOSPITAL ENCOUNTER (OUTPATIENT)
Dept: RADIOLOGY | Facility: HOSPITAL | Age: 77
Discharge: HOME | End: 2022-03-22
Attending: ORTHOPAEDIC SURGERY | Admitting: ORTHOPAEDIC SURGERY
Payer: MEDICARE

## 2022-03-22 ENCOUNTER — ANESTHESIA (OUTPATIENT)
Dept: SURGERY | Facility: HOSPITAL | Age: 77
End: 2022-03-22
Payer: MEDICARE

## 2022-03-22 VITALS
OXYGEN SATURATION: 95 % | TEMPERATURE: 97 F | DIASTOLIC BLOOD PRESSURE: 72 MMHG | HEART RATE: 73 BPM | WEIGHT: 225 LBS | BODY MASS INDEX: 32.21 KG/M2 | SYSTOLIC BLOOD PRESSURE: 137 MMHG | RESPIRATION RATE: 17 BRPM | HEIGHT: 70 IN

## 2022-03-22 DIAGNOSIS — M19.011 OSTEOARTHRITIS OF RIGHT SHOULDER REGION: ICD-10-CM

## 2022-03-22 DIAGNOSIS — M19.011 PRIMARY OSTEOARTHRITIS OF RIGHT SHOULDER: ICD-10-CM

## 2022-03-22 PROCEDURE — 76942 PR U/S GUIDANCE FOR NEEDLE GUIDANCE: ICD-10-PCS | Mod: 26,,, | Performed by: ANESTHESIOLOGY

## 2022-03-22 PROCEDURE — 73020 XR SHOULDER 1 VIEW RIGHT: ICD-10-PCS | Mod: 26,RT,, | Performed by: RADIOLOGY

## 2022-03-22 PROCEDURE — 64415 PR NERVE BLOCK INJ, ANES/STEROID, BRACHIAL PLEXUS, INCL IMAG GUIDANCE: ICD-10-PCS | Mod: 59,RT,, | Performed by: ANESTHESIOLOGY

## 2022-03-22 PROCEDURE — 23472 PR RECONSTR TOTAL SHOULDER IMPLANT: ICD-10-PCS | Mod: RT,,, | Performed by: ORTHOPAEDIC SURGERY

## 2022-03-22 PROCEDURE — D9220A PRA ANESTHESIA: Mod: CRNA,,, | Performed by: NURSE ANESTHETIST, CERTIFIED REGISTERED

## 2022-03-22 PROCEDURE — 71000039 HC RECOVERY, EACH ADD'L HOUR: Mod: PO | Performed by: ORTHOPAEDIC SURGERY

## 2022-03-22 PROCEDURE — D9220A PRA ANESTHESIA: ICD-10-PCS | Mod: ANES,,, | Performed by: ANESTHESIOLOGY

## 2022-03-22 PROCEDURE — 37000008 HC ANESTHESIA 1ST 15 MINUTES: Mod: PO | Performed by: ORTHOPAEDIC SURGERY

## 2022-03-22 PROCEDURE — 27201423 OPTIME MED/SURG SUP & DEVICES STERILE SUPPLY: Mod: PO | Performed by: ORTHOPAEDIC SURGERY

## 2022-03-22 PROCEDURE — 27200750 HC INSULATED NEEDLE/ STIMUPLEX: Mod: PO | Performed by: ANESTHESIOLOGY

## 2022-03-22 PROCEDURE — 64415 NJX AA&/STRD BRCH PLXS IMG: CPT | Mod: PO,59,RT | Performed by: ANESTHESIOLOGY

## 2022-03-22 PROCEDURE — C9290 INJ, BUPIVACAINE LIPOSOME: HCPCS | Mod: PO | Performed by: ANESTHESIOLOGY

## 2022-03-22 PROCEDURE — 71000033 HC RECOVERY, INTIAL HOUR: Mod: PO | Performed by: ORTHOPAEDIC SURGERY

## 2022-03-22 PROCEDURE — 64415 NJX AA&/STRD BRCH PLXS IMG: CPT | Mod: 59,RT,, | Performed by: ANESTHESIOLOGY

## 2022-03-22 PROCEDURE — 76942 ECHO GUIDE FOR BIOPSY: CPT | Mod: 26,,, | Performed by: ANESTHESIOLOGY

## 2022-03-22 PROCEDURE — 36000711: Mod: PO | Performed by: ORTHOPAEDIC SURGERY

## 2022-03-22 PROCEDURE — 23472 RECONSTRUCT SHOULDER JOINT: CPT | Mod: RT,,, | Performed by: ORTHOPAEDIC SURGERY

## 2022-03-22 PROCEDURE — 25000003 PHARM REV CODE 250: Mod: PO | Performed by: ORTHOPAEDIC SURGERY

## 2022-03-22 PROCEDURE — C1776 JOINT DEVICE (IMPLANTABLE): HCPCS | Mod: PO | Performed by: ORTHOPAEDIC SURGERY

## 2022-03-22 PROCEDURE — D9220A PRA ANESTHESIA: Mod: ANES,,, | Performed by: ANESTHESIOLOGY

## 2022-03-22 PROCEDURE — 63600175 PHARM REV CODE 636 W HCPCS: Mod: PO | Performed by: ORTHOPAEDIC SURGERY

## 2022-03-22 PROCEDURE — 36000710: Mod: PO | Performed by: ORTHOPAEDIC SURGERY

## 2022-03-22 PROCEDURE — 63600175 PHARM REV CODE 636 W HCPCS: Mod: PO | Performed by: ANESTHESIOLOGY

## 2022-03-22 PROCEDURE — 25000003 PHARM REV CODE 250: Mod: PO | Performed by: ANESTHESIOLOGY

## 2022-03-22 PROCEDURE — 73020 X-RAY EXAM OF SHOULDER: CPT | Mod: 26,RT,, | Performed by: RADIOLOGY

## 2022-03-22 PROCEDURE — 63600175 PHARM REV CODE 636 W HCPCS: Mod: PO | Performed by: NURSE ANESTHETIST, CERTIFIED REGISTERED

## 2022-03-22 PROCEDURE — 73020 X-RAY EXAM OF SHOULDER: CPT | Mod: TC,FY,PO,RT

## 2022-03-22 PROCEDURE — 25000003 PHARM REV CODE 250: Mod: PO | Performed by: NURSE ANESTHETIST, CERTIFIED REGISTERED

## 2022-03-22 PROCEDURE — 37000009 HC ANESTHESIA EA ADD 15 MINS: Mod: PO | Performed by: ORTHOPAEDIC SURGERY

## 2022-03-22 PROCEDURE — D9220A PRA ANESTHESIA: ICD-10-PCS | Mod: CRNA,,, | Performed by: NURSE ANESTHETIST, CERTIFIED REGISTERED

## 2022-03-22 DEVICE — IMPLANTABLE DEVICE: Type: IMPLANTABLE DEVICE | Site: SHOULDER | Status: FUNCTIONAL

## 2022-03-22 RX ORDER — PROCHLORPERAZINE EDISYLATE 5 MG/ML
5 INJECTION INTRAMUSCULAR; INTRAVENOUS EVERY 30 MIN PRN
Status: ACTIVE | OUTPATIENT
Start: 2022-03-22

## 2022-03-22 RX ORDER — MUPIROCIN 20 MG/G
OINTMENT TOPICAL
Status: DISCONTINUED | OUTPATIENT
Start: 2022-03-22 | End: 2022-03-22 | Stop reason: HOSPADM

## 2022-03-22 RX ORDER — FENTANYL CITRATE 50 UG/ML
50 INJECTION, SOLUTION INTRAMUSCULAR; INTRAVENOUS
Status: COMPLETED | OUTPATIENT
Start: 2022-03-22 | End: 2022-03-22

## 2022-03-22 RX ORDER — MIDAZOLAM HYDROCHLORIDE 1 MG/ML
1 INJECTION INTRAMUSCULAR; INTRAVENOUS
Status: COMPLETED | OUTPATIENT
Start: 2022-03-22 | End: 2022-03-22

## 2022-03-22 RX ORDER — DEXAMETHASONE SODIUM PHOSPHATE 4 MG/ML
INJECTION, SOLUTION INTRA-ARTICULAR; INTRALESIONAL; INTRAMUSCULAR; INTRAVENOUS; SOFT TISSUE
Status: DISCONTINUED | OUTPATIENT
Start: 2022-03-22 | End: 2022-03-22

## 2022-03-22 RX ORDER — PROPOFOL 10 MG/ML
VIAL (ML) INTRAVENOUS
Status: DISCONTINUED | OUTPATIENT
Start: 2022-03-22 | End: 2022-03-22

## 2022-03-22 RX ORDER — MEPERIDINE HYDROCHLORIDE 50 MG/ML
12.5 INJECTION INTRAMUSCULAR; INTRAVENOUS; SUBCUTANEOUS ONCE AS NEEDED
Status: ACTIVE | OUTPATIENT
Start: 2022-03-22 | End: 2022-03-23

## 2022-03-22 RX ORDER — TRANEXAMIC ACID 100 MG/ML
INJECTION, SOLUTION INTRAVENOUS
Status: DISCONTINUED | OUTPATIENT
Start: 2022-03-22 | End: 2022-03-22

## 2022-03-22 RX ORDER — HYDROMORPHONE HYDROCHLORIDE 2 MG/ML
0.2 INJECTION, SOLUTION INTRAMUSCULAR; INTRAVENOUS; SUBCUTANEOUS EVERY 5 MIN PRN
Status: ACTIVE | OUTPATIENT
Start: 2022-03-22

## 2022-03-22 RX ORDER — DIPHENHYDRAMINE HYDROCHLORIDE 50 MG/ML
12.5 INJECTION INTRAMUSCULAR; INTRAVENOUS ONCE AS NEEDED
Status: ACTIVE | OUTPATIENT
Start: 2022-03-22 | End: 2033-08-18

## 2022-03-22 RX ORDER — LIDOCAINE HYDROCHLORIDE 10 MG/ML
1 INJECTION, SOLUTION EPIDURAL; INFILTRATION; INTRACAUDAL; PERINEURAL ONCE
Status: COMPLETED | OUTPATIENT
Start: 2022-03-22 | End: 2022-03-22

## 2022-03-22 RX ORDER — NEOSTIGMINE METHYLSULFATE 1 MG/ML
INJECTION, SOLUTION INTRAVENOUS
Status: DISCONTINUED | OUTPATIENT
Start: 2022-03-22 | End: 2022-03-22

## 2022-03-22 RX ORDER — CEFAZOLIN SODIUM 1 G/3ML
INJECTION, POWDER, FOR SOLUTION INTRAMUSCULAR; INTRAVENOUS
Status: DISCONTINUED | OUTPATIENT
Start: 2022-03-22 | End: 2022-03-22

## 2022-03-22 RX ORDER — SODIUM CHLORIDE, SODIUM LACTATE, POTASSIUM CHLORIDE, CALCIUM CHLORIDE 600; 310; 30; 20 MG/100ML; MG/100ML; MG/100ML; MG/100ML
500 INJECTION, SOLUTION INTRAVENOUS ONCE
Status: COMPLETED | OUTPATIENT
Start: 2022-03-22 | End: 2022-03-22

## 2022-03-22 RX ORDER — SODIUM CHLORIDE 0.9 % (FLUSH) 0.9 %
3 SYRINGE (ML) INJECTION EVERY 8 HOURS
Status: SHIPPED | OUTPATIENT
Start: 2022-03-22

## 2022-03-22 RX ORDER — ROCURONIUM BROMIDE 10 MG/ML
INJECTION, SOLUTION INTRAVENOUS
Status: DISCONTINUED | OUTPATIENT
Start: 2022-03-22 | End: 2022-03-22

## 2022-03-22 RX ORDER — LIDOCAINE HYDROCHLORIDE AND EPINEPHRINE 5; 5 MG/ML; UG/ML
INJECTION, SOLUTION INFILTRATION; PERINEURAL
Status: DISCONTINUED | OUTPATIENT
Start: 2022-03-22 | End: 2022-03-22 | Stop reason: HOSPADM

## 2022-03-22 RX ORDER — FENTANYL CITRATE 50 UG/ML
25 INJECTION, SOLUTION INTRAMUSCULAR; INTRAVENOUS EVERY 5 MIN PRN
Status: DISCONTINUED | OUTPATIENT
Start: 2022-03-22 | End: 2022-03-22 | Stop reason: HOSPADM

## 2022-03-22 RX ORDER — EPHEDRINE SULFATE 50 MG/ML
INJECTION, SOLUTION INTRAVENOUS
Status: DISCONTINUED | OUTPATIENT
Start: 2022-03-22 | End: 2022-03-22

## 2022-03-22 RX ORDER — LIDOCAINE HYDROCHLORIDE 20 MG/ML
INJECTION INTRAVENOUS
Status: DISCONTINUED | OUTPATIENT
Start: 2022-03-22 | End: 2022-03-22

## 2022-03-22 RX ORDER — BUPIVACAINE HYDROCHLORIDE 5 MG/ML
INJECTION, SOLUTION EPIDURAL; INTRACAUDAL
Status: COMPLETED | OUTPATIENT
Start: 2022-03-22 | End: 2022-03-22

## 2022-03-22 RX ORDER — SUCCINYLCHOLINE CHLORIDE 20 MG/ML
INJECTION INTRAMUSCULAR; INTRAVENOUS
Status: DISCONTINUED | OUTPATIENT
Start: 2022-03-22 | End: 2022-03-22

## 2022-03-22 RX ORDER — SODIUM CHLORIDE, SODIUM LACTATE, POTASSIUM CHLORIDE, CALCIUM CHLORIDE 600; 310; 30; 20 MG/100ML; MG/100ML; MG/100ML; MG/100ML
10 INJECTION, SOLUTION INTRAVENOUS CONTINUOUS
Status: DISPENSED | OUTPATIENT
Start: 2022-03-22

## 2022-03-22 RX ORDER — OXYCODONE HYDROCHLORIDE 5 MG/1
5 TABLET ORAL ONCE AS NEEDED
Status: COMPLETED | OUTPATIENT
Start: 2022-03-22 | End: 2022-03-22

## 2022-03-22 RX ORDER — LORAZEPAM 2 MG/ML
0.25 INJECTION INTRAMUSCULAR ONCE AS NEEDED
Status: ACTIVE | OUTPATIENT
Start: 2022-03-22 | End: 2033-08-18

## 2022-03-22 RX ORDER — FENTANYL CITRATE 50 UG/ML
INJECTION, SOLUTION INTRAMUSCULAR; INTRAVENOUS
Status: DISCONTINUED | OUTPATIENT
Start: 2022-03-22 | End: 2022-03-22

## 2022-03-22 RX ADMIN — EPHEDRINE SULFATE 10 MG: 50 INJECTION INTRAVENOUS at 12:03

## 2022-03-22 RX ADMIN — DEXAMETHASONE SODIUM PHOSPHATE 4 MG: 4 INJECTION, SOLUTION INTRAMUSCULAR; INTRAVENOUS at 01:03

## 2022-03-22 RX ADMIN — EPHEDRINE SULFATE 10 MG: 50 INJECTION INTRAVENOUS at 01:03

## 2022-03-22 RX ADMIN — BUPIVACAINE 10 ML: 13.3 INJECTION, SUSPENSION, LIPOSOMAL INFILTRATION at 11:03

## 2022-03-22 RX ADMIN — ROCURONIUM BROMIDE 10 MG: 10 INJECTION, SOLUTION INTRAVENOUS at 01:03

## 2022-03-22 RX ADMIN — CEFAZOLIN 2 G: 330 INJECTION, POWDER, FOR SOLUTION INTRAMUSCULAR; INTRAVENOUS at 12:03

## 2022-03-22 RX ADMIN — BUPIVACAINE HYDROCHLORIDE 5 ML: 5 INJECTION, SOLUTION EPIDURAL; INTRACAUDAL; PERINEURAL at 11:03

## 2022-03-22 RX ADMIN — SUCCINYLCHOLINE CHLORIDE 160 MG: 20 INJECTION, SOLUTION INTRAMUSCULAR; INTRAVENOUS at 12:03

## 2022-03-22 RX ADMIN — PROPOFOL 150 MG: 10 INJECTION, EMULSION INTRAVENOUS at 12:03

## 2022-03-22 RX ADMIN — SODIUM CHLORIDE, SODIUM LACTATE, POTASSIUM CHLORIDE, AND CALCIUM CHLORIDE 500 ML: .6; .31; .03; .02 INJECTION, SOLUTION INTRAVENOUS at 10:03

## 2022-03-22 RX ADMIN — LIDOCAINE HYDROCHLORIDE 100 MG: 20 INJECTION, SOLUTION INTRAVENOUS at 12:03

## 2022-03-22 RX ADMIN — GLYCOPYRROLATE 0.6 MG: 0.2 INJECTION, SOLUTION INTRAMUSCULAR; INTRAVITREAL at 02:03

## 2022-03-22 RX ADMIN — TRANEXAMIC ACID 1000 MG: 100 INJECTION, SOLUTION INTRAVENOUS at 12:03

## 2022-03-22 RX ADMIN — SODIUM CHLORIDE, SODIUM LACTATE, POTASSIUM CHLORIDE, AND CALCIUM CHLORIDE 10 ML/HR: .6; .31; .03; .02 INJECTION, SOLUTION INTRAVENOUS at 10:03

## 2022-03-22 RX ADMIN — MIDAZOLAM 1 MG: 1 INJECTION INTRAMUSCULAR; INTRAVENOUS at 10:03

## 2022-03-22 RX ADMIN — FENTANYL CITRATE 50 MCG: 50 INJECTION INTRAMUSCULAR; INTRAVENOUS at 10:03

## 2022-03-22 RX ADMIN — ROCURONIUM BROMIDE 5 MG: 10 INJECTION, SOLUTION INTRAVENOUS at 12:03

## 2022-03-22 RX ADMIN — FENTANYL CITRATE 50 MCG: 50 INJECTION, SOLUTION INTRAMUSCULAR; INTRAVENOUS at 12:03

## 2022-03-22 RX ADMIN — LIDOCAINE HYDROCHLORIDE 10 MG: 10 INJECTION, SOLUTION EPIDURAL; INFILTRATION; INTRACAUDAL; PERINEURAL at 10:03

## 2022-03-22 RX ADMIN — OXYCODONE 5 MG: 5 TABLET ORAL at 03:03

## 2022-03-22 RX ADMIN — DEXTROSE 1 G: 50 INJECTION, SOLUTION INTRAVENOUS at 02:03

## 2022-03-22 RX ADMIN — MUPIROCIN: 20 OINTMENT TOPICAL at 10:03

## 2022-03-22 RX ADMIN — ROCURONIUM BROMIDE 25 MG: 10 INJECTION, SOLUTION INTRAVENOUS at 12:03

## 2022-03-22 RX ADMIN — NEOSTIGMINE METHYLSULFATE 5 MG: 1 INJECTION INTRAVENOUS at 02:03

## 2022-03-22 RX ADMIN — TRANEXAMIC ACID 1000 MG: 100 INJECTION, SOLUTION INTRAVENOUS at 01:03

## 2022-03-22 RX ADMIN — FENTANYL CITRATE 25 MCG: 50 INJECTION INTRAMUSCULAR; INTRAVENOUS at 02:03

## 2022-03-22 NOTE — TRANSFER OF CARE
"Anesthesia Transfer of Care Note    Patient: Sam Pete    Procedure(s) Performed: Procedure(s) (LRB):  ARTHROPLASTY, SHOULDER BRENNAN RIGHT SHOULDER HUMERAL HEAD RESURFACING (Right)    Patient location: PACU    Anesthesia Type: general    Transport from OR: Transported from OR on room air with adequate spontaneous ventilation    Post pain: adequate analgesia    Post assessment: no apparent anesthetic complications and tolerated procedure well    Level of consciousness: responds to stimulation    Nausea/Vomiting: no nausea/vomiting    Complications: none    Transfer of care protocol was followed      Last vitals:   Visit Vitals  BP (!) 153/70   Pulse 69   Temp 36.3 °C (97.3 °F) (Skin)   Resp 17   Ht 5' 10" (1.778 m)   Wt 102.1 kg (225 lb)   SpO2 96%   BMI 32.28 kg/m²     "

## 2022-03-22 NOTE — DISCHARGE SUMMARY
Discharge Note  Short Stay      SUMMARY     Admit Date: 3/22/2022    Attending Physician: Frankie Joya MD     Discharge Physician: Frankie Joya MD    Discharge Date: 3/22/2022 2:04 PM    Final Diagnosis: Primary osteoarthritis of right shoulder [M19.011]    Hospital Course: Patient tolerated procedure well.     Disposition: Home or Self Care    Patient Instructions:   Current Discharge Medication List      CONTINUE these medications which have NOT CHANGED    Details   !! albuterol (PROVENTIL/VENTOLIN HFA) 90 mcg/actuation inhaler Inhale 2 puffs into the lungs every 6 (six) hours as needed for Wheezing. Rescue  Qty: 18 g, Refills: 0    Associated Diagnoses: Medication refill      amLODIPine (NORVASC) 10 MG tablet TAKE 1 TABLET EVERY DAY  Qty: 90 tablet, Refills: 3    Associated Diagnoses: Hypertension      atorvastatin (LIPITOR) 40 MG tablet Take 1 tablet (40 mg total) by mouth once daily.  Qty: 90 tablet, Refills: 3    Comments: Please inactivate all prior scripts with same name and strength including on holds.  Associated Diagnoses: History of CVA in adulthood      buPROPion (WELLBUTRIN XL) 150 MG TB24 tablet Take 1 tablet (150 mg total) by mouth once daily.  Qty: 90 tablet, Refills: 3    Comments: Please inactivate all prior scripts with same name and strength including on holds.      calcitRIOL (ROCALTROL) 0.5 MCG Cap Take 1 capsule (0.5 mcg total) by mouth once daily.  Qty: 30 capsule, Refills: 0      levothyroxine (SYNTHROID) 125 MCG tablet Take 1 tablet(s) by oral route ,  1 time per day , for 30 days  Qty: 30 tablet, Refills: 0      losartan (COZAAR) 50 MG tablet 50 mg.      !! morphine (MS CONTIN) 60 MG 12 hr tablet Take 1 tablet (60 mg total) by mouth 2 (two) times daily.  Qty: 60 tablet, Refills: 0    Comments: Quantity prescribed more than 7 day supply? Yes, quantity medically necessary  Associated Diagnoses: Uncomplicated opioid dependence      omeprazole (PRILOSEC OTC) 20 MG tablet 40 mg.       !! oxyCODONE-acetaminophen (PERCOCET)  mg per tablet Take 1 tablet by mouth every 4 (four) hours as needed for Pain.  Qty: 120 tablet, Refills: 0    Comments: Quantity prescribed more than 7 day supply? Yes, quantity medically necessary  Associated Diagnoses: Uncomplicated opioid dependence      pregabalin (LYRICA) 75 MG capsule 75 mg.      saw/vit E/sod lisa/lyc/beta/pyg (PROSTATE HEALTH ORAL) Take 2 capsules by mouth once daily. With saw palmetto      tiZANidine (ZANAFLEX) 4 MG tablet Take 1 tablet (4 mg total) by mouth 3 (three) times daily as needed (muscle spasm).  Qty: 90 tablet, Refills: 3    Comments: NEW RX REQUEST FOR PATIENT DUE TO USING NEW MAIL ORDER PHARMACY[facility]  Associated Diagnoses: Spinal stenosis in cervical region; Spinal stenosis, lumbar region, without neurogenic claudication      UNABLE TO FIND Take by mouth once daily. Vitafusion multivites gummies      warfarin (COUMADIN) 5 MG tablet Take 1 tablet (5 mg total) by mouth Daily.  Qty: 30 tablet, Refills: 0    Comments: Take first dose the night before surgery btw 5-6pm.      !! albuterol (PROVENTIL/VENTOLIN HFA) 90 mcg/actuation inhaler EVERY 4 HOURS AS NEEDED as needed for      aspirin 81 MG Chew Take 81 mg by mouth once daily.      cephALEXin (KEFLEX) 500 MG capsule Take 1 capsule (500 mg total) by mouth every 6 (six) hours.  Qty: 20 capsule, Refills: 0    Associated Diagnoses: Primary osteoarthritis of right shoulder      COVID-19 test specimen collect Misc TEST AS DIRECTED TODAY      KENALOG 40 mg/mL injection       !! morphine (MS CONTIN) 60 MG 12 hr tablet Take 1 tablet (60 mg total) by mouth 2 (two) times daily.  Qty: 60 tablet, Refills: 0    Comments: Quantity prescribed more than 7 day supply? Yes, quantity medically necessary  Associated Diagnoses: Uncomplicated opioid dependence      naloxone (NARCAN) 0.4 mg/mL injection Inject 1 mL (0.4 mg total) into the vein as needed (overdose).  Qty: 2 mL, Refills: 5     Associated Diagnoses: Uncomplicated opioid dependence      ondansetron (ZOFRAN-ODT) 8 MG TbDL Take 1 tablet (8 mg total) by mouth 2 (two) times daily as needed for nausea for 5days  Qty: 10 tablet, Refills: 0      !! oxyCODONE-acetaminophen (PERCOCET)  mg per tablet Take 1 tablet by mouth every 4 (four) hours as needed for Pain.  Qty: 120 tablet, Refills: 0    Comments: Quantity prescribed more than 7 day supply? Yes, quantity medically necessary  Associated Diagnoses: Uncomplicated opioid dependence      !! oxyCODONE-acetaminophen (PERCOCET)  mg per tablet EVERY 6 HOURS AS NEEDED as needed for      !! oxyCODONE-acetaminophen (PERCOCET)  mg per tablet Take 1 tablet by mouth every 4 to 6 hours as needed for Pain.  Qty: 42 tablet, Refills: 0    Comments: Quantity prescribed more than 7 day supply? Yes, quantity medically necessary  Associated Diagnoses: Primary osteoarthritis of right shoulder      sildenafil (REVATIO) 20 mg Tab Take 1-5 daily as needed for ED  Qty: 10 tablet, Refills: 5    Associated Diagnoses: Erectile dysfunction, unspecified erectile dysfunction type       !! - Potential duplicate medications found. Please discuss with provider.          Discharge Procedure Orders (must include Diet, Follow-up, Activity):   Discharge Procedure Orders (must include Diet, Follow-up, Activity)   X-Ray Shoulder 1 View Right   Standing Status: Future Standing Exp. Date: 03/22/23     Order Specific Question Answer Comments   May the Radiologist modify the order per protocol to meet the clinical needs of the patient? Yes    Release to patient Immediate       Remove dressing in 72 hours     Ice to affected area     Lifting restrictions   Order Comments: sling        Follow Up:  Follow up as scheduled.  Resume routine diet.  Activity as tolerated.    No driving day of procedure.

## 2022-03-22 NOTE — ANESTHESIA PROCEDURE NOTES
Right interscalene    Patient location during procedure: pre-op   Block not for primary anesthetic.  Reason for block: at surgeon's request and post-op pain management   Post-op Pain Location: right shoulder pain   Start time: 3/22/2022 11:40 AM  Timeout: 3/22/2022 11:40 AM   End time: 3/22/2022 11:45 AM    Staffing  Authorizing Provider: Soto Torres MD  Performing Provider: Soto Torres MD    Preanesthetic Checklist  Completed: patient identified, IV checked, site marked, risks and benefits discussed, surgical consent, monitors and equipment checked, pre-op evaluation and timeout performed  Peripheral Block  Patient position: sitting  Prep: ChloraPrep  Patient monitoring: heart rate, cardiac monitor, continuous pulse ox, continuous capnometry and frequent blood pressure checks  Block type: interscalene  Laterality: right  Injection technique: single shot  Needle  Needle type: Stimuplex   Needle gauge: 22 G  Needle length: 2 in  Needle localization: anatomical landmarks and ultrasound guidance  Needle insertion depth: 3 cm   -ultrasound image captured on disc.  Assessment  Injection assessment: negative aspiration, negative parasthesia and local visualized surrounding nerve  Paresthesia pain: none  Heart rate change: no  Slow fractionated injection: yes  Pain Tolerance: comfortable throughout block and no complaints  Medications:    Medications: bupivacaine (pf) (MARCAINE) injection 0.5% - Perineural   5 mL - 3/22/2022 11:45:00 AM    Additional Notes  VSS.  [facility] RN monitoring vitals throughout procedure.  Patient tolerated procedure well.     Exparel 10ml + Marcaine 0.5% 5ml dosed under direct Ultrasound guidance.

## 2022-03-22 NOTE — OR NURSING
Right interscalene single shot block complete per Dr. Torres with Anesthesia. Pt tolerated well. See Anesthesia record for procedural notes. See flowsheet and MAR for vitals and medications. Monitors in place, alarms audible. VSS. etCO2 monitoring in place. Resp even, unlabored. Skin warm, dry. Pt in NAD. Pt awaiting transport to OR. Family at bedside. Bed in lowest, locked position. Side rails up x2. Call light within reach.

## 2022-03-22 NOTE — DISCHARGE INSTRUCTIONS
Joint Replacement     Shoulder  After surgery until first follow-up visit:    DOS:  Keep arm elevated for several hours per day while recovering from surgery.  Use sling for comfort. Wear until follow-up appointment.  Minimal activity and advance diet as tolerated  Keep operative site clean and dry for 72 hours  Keep ice pack on the shoulder for 48-72 hours. Ice on for 30 minutes of each hour while awake to reduce pain and swelling.  Resume home medications   Home health will call the day following surgery to provide follow up.   Physical therapy will be done by home health for two weeks after surgery.  Remove dressing in 72 hours.       DONT:  Do not soak in tub.  No driving for 24 hours or while taking narcotic pain medication  DO NOT TAKE ADDITIONAL TYLENOL/ACETAMINOPHEN WHILE TAKING NARCOTIC PAIN MEDICATION THAT CONTAINS TYLENOL/ACETAMINOPHEN.    CALL PHYSICIAN FOR ANY QUESTIONS OR CONCERNS.    You will have a return appointment for 2 weeks

## 2022-03-22 NOTE — ANESTHESIA PREPROCEDURE EVALUATION
03/22/2022  Sam Pete is a 76 y.o., male.      Pre-op Assessment    I have reviewed the Patient Summary Reports.     I have reviewed the Nursing Notes. I have reviewed the NPO Status.   I have reviewed the Medications.     Review of Systems  Anesthesia Hx:  No problems with previous Anesthesia    Social:  Former Smoker    Cardiovascular:   Hypertension, well controlled Dysrhythmias atrial fibrillation    Pulmonary:  Pulmonary Normal    Renal/:  Renal/ Normal     Hepatic/GI:   GERD, well controlled    Musculoskeletal:   Arthritis  Lumbar spondylosis  DDD lumbar  Chronic low back pain   Neurological:   CVA, no residual symptoms Neuromuscular Disease,   Chronic Pain Syndrome   Endocrine:  Endocrine Normal  Obesity / BMI > 30      Physical Exam  General: Well nourished, Cooperative, Alert and Oriented    Airway:  Mallampati: I   Mouth Opening: Normal  Neck ROM: Normal ROM    Dental:  Dentures        Anesthesia Plan  Type of Anesthesia, risks & benefits discussed:    Anesthesia Type: Gen ETT, Gen Supraglottic Airway, Gen Natural Airway, MAC  Intra-op Monitoring Plan: Standard ASA Monitors  Post Op Pain Control Plan: multimodal analgesia  Induction:  IV  Airway Plan: Direct, Video and Fiberoptic, Post-Induction  Informed Consent: Informed consent signed with the Patient and all parties understand the risks and agree with anesthesia plan.  All questions answered.   ASA Score: 3    Ready For Surgery From Anesthesia Perspective.     .

## 2022-03-22 NOTE — OP NOTE
Page - Surgery  Operative Note    SUMMARY     Date of Procedure: 3/22/2022     Pre-Operative Diagnosis: Primary osteoarthritis of right shoulder [M19.011]    Post-Operative Diagnosis: Post-Op Diagnosis Codes:     * Primary osteoarthritis of right shoulder [M19.011]    Procedure: Procedure(s) (LRB):  ARTHROPLASTY, SHOULDER BRENNAN RIGHT SHOULDER HUMERAL HEAD RESURFACING (Right)       Surgeon(s) and Role:     * Frankie Joya MD - Primary    Indications for procedure:      Procedure in Detail:  After obtaining consent and starting the patient on preoperative IV antibiotics patient taken back to the operating room where general anesthesia was performed by the anesthesia team.  Patient then positioned in the beach chair position stabilized with a captain's chair.  The right shoulder and upper extremity were prepped and draped in normal sterile fashion.  We injected the proposed incision site with 1% lidocaine with epinephrine to help with hemostasis we then made a longitudinal incision beginning just medial to the AC joint extending distally toward the axillary fold cut down the dermal layer identify the deltopectoral interval bluntly spread through this retracting the cephalic vein laterally with the deltoid musculature placed retractors deep to the deltoid identified the clavipectoral fascia placed a retractor deep to the conjoined tendon taking care to protect the muscular cutaneous nerve.  We then used a needle tip Bovie to open the subscap and capsule just medial to the bicipital groove.  We opened the capsule inferiorly down to the 6 o'clock position large osteophytes identified which were excised were able to dislocate the shoulder with external rotation and shoulder extension get good exposure of the humeral head used extramedullary alignment guide 30° retroversion referencing the forearm with the 135 degree cutting guide pinned into place we made her humeral head resection at the anatomic neck we sized  this to a 56 with a 22 mm thickness.  We then opened medullary canal with a canal finer then sequentially reamed up to size 13 we also broach up to a size 13 again referencing off the forearm to obtain 30° retroversion with the trial broach in place went ahead and trialed different head combinations felt that a 56 head with a 19 mm thickness eccentric head provided good coverage and good stability.  We were able to 80 duct arm cross-body adduction internal rotate it did sublux about 40% posteriorly the humeral head in relation to the glenoid felt stable in extension external rotation as well.  We then placed drill holes in the lateral aspect of the humerus to accommodate pull-through stitches which we would be used to reapproximate the subscap removed the trial broach placed our 13 Press-Fit stem again in 30° retroversion referencing off of forearm.  We again trialed and finally went with a 56 x 19 eccentric head which had good coverage and again excellent stability and motion.  Went ahead and used the pull-through stitches to reapproximate her subscap we irrigated the wound closed the subcutaneous tissue with 2-0 Vicryl subcuticular layer closed running 3-0 Prolene sterile dressing applied this concluded the operative procedure.    Anesthesia: Regional    Complications: No    Estimated Blood Loss (EBL): * No values recorded between 3/22/2022 12:47 PM and 3/22/2022  1:56 PM *           Implants:   Implant Name Type Inv. Item Serial No.  Lot No. LRB No. Used Action   MODULAR HUMERAL STEM SZ 13 STM LENGTH 12MM    LOLA [id number] Right 1 Implanted   SINGLE RADIUS HUMERAL HEAD [id number]    LOLA [id number] Right 1 Implanted       Specimens:   Specimen (24h ago, onward)            None

## 2022-03-22 NOTE — H&P
76 years old debilitating pain in the right shoulder.  Kenalog injection does provide temporary relief.  Patient interested in shoulder arthroplasty     Exam shows painful limited motion about the shoulder, no signs infection, hand is functioning well     X-rays show glenohumeral arthrosis     Assessment:  Right shoulder glenohumeral arthrosis     Plan:  We will schedule the patient for right shoulder hemiarthroplasty, he is aware the risks and limitations surgery and still wants to proceed    Imaging studies ordered and reviewed by me    Further History  Aching pain  Worse with activity  Relieved with rest  No other associated symptoms  No other radiation    Further Exam  Alert and oriented  Pleasant  Contralateral limb has appropriate range of motion for age and condition  Contralateral limb has appropriate strength for age and condition  Contralateral limb has appropriate stability  for age and condition  No adenopathy  Pulses are appropriate for current condition  Skin is intact        Chief Complaint    No chief complaint on file.      HPI  Sam Pete is a 76 y.o.  male who presents with       Past Medical History  Past Medical History:   Diagnosis Date    Anticoagulant long-term use     ASA 81 mg    Arthritis     Cataract     OU    Chronic low back pain     Complete rupture of rotator cuff 02/08/2011    DDD (degenerative disc disease), lumbar 07/08/2015    Degeneration of lumbar or lumbosacral intervertebral disc 09/09/2015    ED (erectile dysfunction)     GERD (gastroesophageal reflux disease)     Hypertension     Lumbar pseudoarthrosis 06/26/2017    Lumbar stenosis 06/26/2017    Obesity     Spondylosis of lumbar region without myelopathy or radiculopathy 07/08/2015    Stroke 1997    basal ganglia, left sided weakness, resolved    Testicular hypofunction     Thoracic or lumbosacral neuritis or radiculitis 07/08/2015       Past Surgical History  Past Surgical History:   Procedure  Laterality Date    APPENDECTOMY      BACK SURGERY      4- back surgery    CAUDAL EPIDURAL STEROID INJECTION N/A 12/16/2021    Procedure: Injection-steroid-epidural-caudal;  Surgeon: Aram Terrell MD;  Location: [facility] OR;  Service: Pain Management;  Laterality: N/A;    CAUDAL EPIDURAL STEROID INJECTION N/A 02/02/2022    Procedure: INJECTION, STEROID, SPINE, EPIDURAL, CAUDAL;  Surgeon: Aram Terrell MD;  Location: [facility] OR;  Service: Pain Management;  Laterality: N/A;    COLONOSCOPY  07/12/2004    [facility].   Redundant tortuous colon, otherwise normal.    COLONOSCOPY N/A 02/25/2019    Procedure: COLONOSCOPY;  Surgeon: Gilbert Rodriguez Jr., MD;  Location: [facility] ENDO;  Service: Endoscopy;  Laterality: N/A;    Epidural steroid injection      Pain management    ESOPHAGOGASTRODUODENOSCOPY  07/12/2004    [facility].    FRACTURE SURGERY Left     wrist / forearm, total of 5    INSERTION OF DORSAL COLUMN NERVE STIMULATOR FOR TRIAL N/A 12/12/2019    Procedure: INSERTION, NEUROSTIMULATOR, SPINAL CORD, DORSAL COLUMN, FOR TRIAL;  Surgeon: Evan Lyles MD;  Location: [facility] OR;  Service: Pain Management;  Laterality: N/A;    JOINT REPLACEMENT  02/06/2013    ( rt hip 2007), left hip     JOINT REPLACEMENT Left     total knee    KNEE ARTHROSCOPY W/ DEBRIDEMENT      bilateral knees , total of six    KNEE SURGERY      LAMINECTOMY USING MINIMALLY INVASIVE TECHNIQUE N/A 02/12/2020    Procedure: LAMINECTOMY, SPINE, MINIMALLY INVASIVE /  PLACEMENT OF SPINAL CORD STIMULATOR;  Surgeon: Darlene Max MD;  Location: [facility] OR;  Service: Neurosurgery;  Laterality: N/A;    Nervbe Block injection      Pain management    TOTAL THYROIDECTOMY         Medications  Current Facility-Administered Medications   Medication    ceFAZolin (ANCEF) 2 g in dextrose 5 % 50 mL IVPB    electrolyte-S (ISOLYTE)    lactated ringers infusion    mupirocin 2 % ointment    sodium chloride 0.9% flush 3 mL    tranexamic acid (CYKLOKAPRON) 1,000 mg in sodium chloride  0.9% 100 mL     Facility-Administered Medications Ordered in Other Encounters   Medication    lactated ringers infusion       Allergies  Review of patient's allergies indicates:   Allergen Reactions    Xyzal [levocetirizine] Other (See Comments)     Knocked him out        Family History  Family History   Problem Relation Age of Onset    Hypertension Father     Heart disease Father     Drug abuse Daughter     Hypertension Son     Lung disease Sister     COPD Sister     Hypertension Sister     Diverticulitis Sister     Gout Sister     Kidney disease Sister     No Known Problems Mother     Eczema Neg Hx     Lupus Neg Hx     Psoriasis Neg Hx     Melanoma Neg Hx        Social History  Social History     Socioeconomic History    Marital status:    Tobacco Use    Smoking status: Former Smoker     Packs/day: 1.00     Years: 30.00     Pack years: 30.00     Start date: 8/3/1963     Quit date: 1992     Years since quittin.2    Smokeless tobacco: Never Used   Substance and Sexual Activity    Alcohol use: Not Currently     Comment: On occasion    Drug use: Yes     Types: Oxycodone, Morphine               Review of Systems     Constitutional: Negative    HENT: Negative  Eyes: Negative  Respiratory: Negative  Cardiovascular: Negative  Musculoskeletal: HPI  Skin: Negative  Neurological: Negative  Hematological: Negative  Endocrine: Negative                 Physical Exam    Vitals:    22 1100   BP: 135/70   Pulse: 68   Resp: 17   Temp:      Body mass index is 32.28 kg/m².  Physical Examination:     General appearance -  well appearing, and in no distress  Mental status - awake  Neck - supple  Chest -  symmetric air entry  Heart - normal rate   Abdomen - soft      Assessment     1. Primary osteoarthritis of right shoulder    2. Osteoarthritis of right shoulder region          Plan

## 2022-03-23 ENCOUNTER — TELEPHONE (OUTPATIENT)
Dept: ORTHOPEDICS | Facility: CLINIC | Age: 77
End: 2022-03-23
Payer: MEDICARE

## 2022-03-23 ENCOUNTER — TELEPHONE (OUTPATIENT)
Dept: FAMILY MEDICINE | Facility: CLINIC | Age: 77
End: 2022-03-23
Payer: MEDICARE

## 2022-03-23 PROCEDURE — G0180 PR HOME HEALTH MD CERTIFICATION: ICD-10-PCS | Mod: ,,, | Performed by: ORTHOPAEDIC SURGERY

## 2022-03-23 PROCEDURE — G0180 MD CERTIFICATION HHA PATIENT: HCPCS | Mod: ,,, | Performed by: ORTHOPAEDIC SURGERY

## 2022-03-23 RX ORDER — PREGABALIN 75 MG/1
75 CAPSULE ORAL 2 TIMES DAILY
Qty: 180 CAPSULE | Refills: 1 | Status: SHIPPED | OUTPATIENT
Start: 2022-03-23 | End: 2023-08-10

## 2022-03-23 NOTE — TELEPHONE ENCOUNTER
----- Message from Corona Kelley sent at 3/23/2022 10:54 AM CDT -----  Contact: Debra/[facility]  Type: Needs Medical Advice  Who Called:  Debra/[facility]  Symptoms (please be specific):  n/a  How long has patient had these symptoms:  n/a  Pharmacy name and phone #:  n/a  Best Call Back Number: [phone]  Additional Information: Debra called in and stated patient is in a lot of pain.  8 out of 10 & has already taken meds.  Patients blood pressure was 158/94.

## 2022-03-23 NOTE — TELEPHONE ENCOUNTER
Spoke to nurse Richardson and to patient- visited with patient today- didn't appear in distress while watching tv. Pain assessment done and he stated 8/10-post op should surgery 3/22/22 per Dr. Joya- no response from his office. Nurse says they typically respond later in the day and will likely not do anything different since patient is already taking Percocet and Morphine. bp elevated likely due to pain level. Patient says he ran out of his Lyrica and needs refill. Dr. Landrum had him on it. I see a refill request was sent to Hernando Rodriguez NP. Needs to go to Dr. Landrum. Lis douglass. Sent him a separate message. I mentioned to patient that with the current pain medications he is taking there may not be a dose increase.

## 2022-03-23 NOTE — TELEPHONE ENCOUNTER
----- Message from Cely Murray sent at 3/23/2022 11:14 AM CDT -----  Contact: pt  Type:  RX Refill Request    Who Called:   Debra FINK     Refill or New Rx:  Refill     RX Name and Strength:  pregabalin (LYRICA) 75 MG capsule    How is the patient currently taking it? (ex. 1XDay):  As Directed  Is this a 30 day or 90 day RX:       Preferred Pharmacy with phone number:    [facility] "[id number] [place] [address] & [address] [address]  Phone: [phone] Fax: [phone]    Local or Mail Order:  Local    Ordering Provider:  Jennifer White Call Back Number:  [phone]    Additional Information:  Please contact pt upon completion-Thank you~

## 2022-03-23 NOTE — TELEPHONE ENCOUNTER
Spoke with [facility] call desk and left a message to have the [facility] Nurse to call back tomorrow to speak with  staff about the prescription orders.

## 2022-03-23 NOTE — TELEPHONE ENCOUNTER
----- Message from Mary Pereira MA sent at 3/23/2022  4:50 PM CDT -----  Type: Needs Medical Advice  Who Called:  Pam  Christopher Call Back Number: [phone]  Additional Information: PT needs therapy protocol for patient.

## 2022-03-23 NOTE — TELEPHONE ENCOUNTER
Contacted Debra with [facility]. All questions and concerns were answered pertaining to pain medication. I will contact the patient to reassure him it will get better over time and take pain medication as prescribed.

## 2022-03-23 NOTE — TELEPHONE ENCOUNTER
----- Message from Corona Kelley sent at 3/23/2022 10:56 AM CDT -----  Contact: Debra/[facility]  Type: Needs Medical Advice  Who Called:  Debra/[facility]  Symptoms (please be specific):  n/a  How long has patient had these symptoms:  n/a  Pharmacy name and phone #:  n/a  Best Call Back Number: [phone]  Additional Information: Debra called in and stated patient is in a lot of pain.  8 out of 10 & has already taken meds.  Patients blood pressure was 158/94.

## 2022-03-23 NOTE — TELEPHONE ENCOUNTER
Patient says Dr. Landrum has him on Lyrica 75mg twice daily and needs refill- sending refill request- Lis as listed.

## 2022-03-23 NOTE — TELEPHONE ENCOUNTER
Spoke to nurse Debra and patient- asked them to monitor bp and call in log- continue current dose of pain medications.

## 2022-03-23 NOTE — TELEPHONE ENCOUNTER
----- Message from Rachel Gilliland MA sent at 3/23/2022  4:21 PM CDT -----  Contact: darwin BRAY  2 different sets of Coumadin orders   Call back

## 2022-03-23 NOTE — ANESTHESIA POSTPROCEDURE EVALUATION
Anesthesia Post Evaluation    Patient: Sam Pete    Procedure(s) Performed: Procedure(s) (LRB):  ARTHROPLASTY, SHOULDER BRENNAN RIGHT SHOULDER HUMERAL HEAD RESURFACING (Right)    Final Anesthesia Type: general      Patient location during evaluation: PACU  Patient participation: Yes- Able to Participate  Level of consciousness: awake and alert and oriented  Post-procedure vital signs: reviewed and stable  Pain management: adequate  Airway patency: patent    PONV status at discharge: No PONV  Anesthetic complications: no      Cardiovascular status: blood pressure returned to baseline and stable  Respiratory status: unassisted and spontaneous ventilation  Hydration status: euvolemic  Follow-up not needed.          Vitals Value Taken Time   /72 03/22/22 1500   Temp   03/23/22 1315   Pulse 73 03/22/22 1500   Resp 17 03/22/22 1529   SpO2 95 % 03/22/22 1500         No case tracking events are documented in the log.      Pain/Forrest Score: Pain Rating Prior to Med Admin: 6 (3/22/2022  3:29 PM)  Forrest Score: 10 (3/22/2022  3:27 PM)

## 2022-03-23 NOTE — TELEPHONE ENCOUNTER
I am unable to prescribe this medication. It appears that Dr. Landrum prescribed the medication today.

## 2022-03-23 NOTE — TELEPHONE ENCOUNTER
Dr. Landrum refilled his Lyrica to day.  I would prefer not to increase pain meds from current dosing.  Percocet is at maximum dose and morphine slow release is not appropriate for p.r.n. use.   the Lyrica and get back on it.  If needed we can increase losartan to 100 mg daily.

## 2022-03-24 ENCOUNTER — TELEPHONE (OUTPATIENT)
Dept: ORTHOPEDICS | Facility: CLINIC | Age: 77
End: 2022-03-24
Payer: MEDICARE

## 2022-03-24 NOTE — TELEPHONE ENCOUNTER
----- Message from Radha Venegas MA sent at 3/23/2022  4:58 PM CDT -----  Donald , just spoke to the  of [facility] and they are needing advice about 2 warfrain orders and pt therapy   ----- Message -----  From: Mary Pereira MA  Sent: 3/23/2022   4:51 PM CDT  To: Mahnaz DIXON Staff    Type: Needs Medical Advice  Who Called:  Pam White Call Back Number: [phone]  Additional Information: PT needs therapy protocol for patient.

## 2022-03-24 NOTE — TELEPHONE ENCOUNTER
Contacted Radha from [facility]. Informed her of Dr. Joya orders for Mr. Pete. PT eval and treat; Skilled nursing, V/S, Dressing change with first dressing change on Friday 3/25/22. Clean surgical site with betadine, apply clean dry dressing QOD or PRN soilage. PT/INR on 3/23/22, 3/25/22, 3/28/22, then twice a week x 3weeks. Please send all coumadin levels to [facility] coumadin clinic. Radha will contact [facility] coumadin clinic and let them know she will collect PT/INR on Friday 3/25/22. Radha verbalized understanding.

## 2022-03-24 NOTE — TELEPHONE ENCOUNTER
----- Message from Radha Venegas MA sent at 3/23/2022  4:58 PM CDT -----  Donald , just spoke to the  of [facility] and they are needing advice about 2 warfrain orders and pt therapy   ----- Message -----  From: Mary Pereira MA  Sent: 3/23/2022   4:51 PM CDT  To: Mahnaz DIXON Staff    Type: Needs Medical Advice  Who Called:  Pam White Call Back Number: [phone]  Additional Information: PT needs therapy protocol for patient.

## 2022-03-30 ENCOUNTER — EXTERNAL HOME HEALTH (OUTPATIENT)
Dept: HOME HEALTH SERVICES | Facility: HOSPITAL | Age: 77
End: 2022-03-30
Payer: MEDICARE

## 2022-04-01 ENCOUNTER — PATIENT MESSAGE (OUTPATIENT)
Dept: FAMILY MEDICINE | Facility: CLINIC | Age: 77
End: 2022-04-01
Payer: MEDICARE

## 2022-04-01 RX ORDER — BUPROPION HYDROCHLORIDE 150 MG/1
150 TABLET ORAL DAILY
Qty: 90 TABLET | Refills: 3 | Status: SHIPPED | OUTPATIENT
Start: 2022-04-01 | End: 2023-05-13

## 2022-04-01 RX ORDER — LOSARTAN POTASSIUM 50 MG/1
50 TABLET ORAL DAILY
Qty: 90 TABLET | Refills: 3 | Status: SHIPPED | OUTPATIENT
Start: 2022-04-01 | End: 2022-04-04 | Stop reason: SDUPTHER

## 2022-04-01 RX ORDER — BUPROPION HYDROCHLORIDE 150 MG/1
150 TABLET, EXTENDED RELEASE ORAL DAILY
Qty: 30 TABLET | Refills: 0 | Status: SHIPPED | OUTPATIENT
Start: 2022-04-01 | End: 2023-01-26

## 2022-04-01 RX ORDER — LOSARTAN POTASSIUM 50 MG/1
50 TABLET ORAL DAILY
Qty: 30 TABLET | Refills: 0 | Status: SHIPPED | OUTPATIENT
Start: 2022-04-01 | End: 2022-04-04

## 2022-04-01 NOTE — TELEPHONE ENCOUNTER
No new care gaps identified.  Powered by [employer]. Reference number: [id number].   4/01/2022 11:40:45 AM CDT

## 2022-04-02 NOTE — TELEPHONE ENCOUNTER
This Rx Request does not qualify for assessment with the [facility]   Please review protocol details and the Care Due Message for extra clinical information    Reasons Rx Request may be deferred:  Patient has been seen in the ED/Hospital since the last PCP visit  CHANGE OF PHARMACY REQUEST    Note composed:11:49 AM 04/02/2022

## 2022-04-02 NOTE — TELEPHONE ENCOUNTER
No new care gaps identified.  Powered by [employer]. Reference number: [id number].   4/01/2022 8:21:44 PM CDT

## 2022-04-04 RX ORDER — LOSARTAN POTASSIUM 50 MG/1
TABLET ORAL
Qty: 90 TABLET | Refills: 3 | Status: SHIPPED | OUTPATIENT
Start: 2022-04-04 | End: 2023-04-13

## 2022-04-05 ENCOUNTER — OFFICE VISIT (OUTPATIENT)
Dept: FAMILY MEDICINE | Facility: CLINIC | Age: 77
End: 2022-04-05
Attending: FAMILY MEDICINE
Payer: MEDICARE

## 2022-04-05 VITALS
HEART RATE: 82 BPM | HEIGHT: 70 IN | SYSTOLIC BLOOD PRESSURE: 123 MMHG | TEMPERATURE: 98 F | OXYGEN SATURATION: 96 % | RESPIRATION RATE: 18 BRPM | BODY MASS INDEX: 33.96 KG/M2 | WEIGHT: 237.19 LBS | DIASTOLIC BLOOD PRESSURE: 70 MMHG

## 2022-04-05 DIAGNOSIS — F11.20 UNCOMPLICATED OPIOID DEPENDENCE: ICD-10-CM

## 2022-04-05 DIAGNOSIS — M19.011 PRIMARY OSTEOARTHRITIS OF RIGHT SHOULDER: ICD-10-CM

## 2022-04-05 DIAGNOSIS — M48.02 SPINAL STENOSIS IN CERVICAL REGION: ICD-10-CM

## 2022-04-05 DIAGNOSIS — G89.4 CHRONIC PAIN SYNDROME: Primary | ICD-10-CM

## 2022-04-05 DIAGNOSIS — M48.061 SPINAL STENOSIS, LUMBAR REGION, WITHOUT NEUROGENIC CLAUDICATION: ICD-10-CM

## 2022-04-05 PROCEDURE — 3008F BODY MASS INDEX DOCD: CPT | Mod: CPTII,S$GLB,, | Performed by: FAMILY MEDICINE

## 2022-04-05 PROCEDURE — 4010F ACE/ARB THERAPY RXD/TAKEN: CPT | Mod: CPTII,S$GLB,, | Performed by: FAMILY MEDICINE

## 2022-04-05 PROCEDURE — 3078F DIAST BP <80 MM HG: CPT | Mod: CPTII,S$GLB,, | Performed by: FAMILY MEDICINE

## 2022-04-05 PROCEDURE — 1125F PR PAIN SEVERITY QUANTIFIED, PAIN PRESENT: ICD-10-PCS | Mod: CPTII,S$GLB,, | Performed by: FAMILY MEDICINE

## 2022-04-05 PROCEDURE — 99999 PR PBB SHADOW E&M-EST. PATIENT-LVL V: ICD-10-PCS | Mod: PBBFAC,,, | Performed by: FAMILY MEDICINE

## 2022-04-05 PROCEDURE — 4010F PR ACE/ARB THEARPY RXD/TAKEN: ICD-10-PCS | Mod: CPTII,S$GLB,, | Performed by: FAMILY MEDICINE

## 2022-04-05 PROCEDURE — 99213 OFFICE O/P EST LOW 20 MIN: CPT | Mod: S$GLB,,, | Performed by: FAMILY MEDICINE

## 2022-04-05 PROCEDURE — 3288F PR FALLS RISK ASSESSMENT DOCUMENTED: ICD-10-PCS | Mod: CPTII,S$GLB,, | Performed by: FAMILY MEDICINE

## 2022-04-05 PROCEDURE — 1160F RVW MEDS BY RX/DR IN RCRD: CPT | Mod: CPTII,S$GLB,, | Performed by: FAMILY MEDICINE

## 2022-04-05 PROCEDURE — 1160F PR REVIEW ALL MEDS BY PRESCRIBER/CLIN PHARMACIST DOCUMENTED: ICD-10-PCS | Mod: CPTII,S$GLB,, | Performed by: FAMILY MEDICINE

## 2022-04-05 PROCEDURE — 99213 PR OFFICE/OUTPT VISIT, EST, LEVL III, 20-29 MIN: ICD-10-PCS | Mod: S$GLB,,, | Performed by: FAMILY MEDICINE

## 2022-04-05 PROCEDURE — 1101F PT FALLS ASSESS-DOCD LE1/YR: CPT | Mod: CPTII,S$GLB,, | Performed by: FAMILY MEDICINE

## 2022-04-05 PROCEDURE — 3074F SYST BP LT 130 MM HG: CPT | Mod: CPTII,S$GLB,, | Performed by: FAMILY MEDICINE

## 2022-04-05 PROCEDURE — 99499 UNLISTED E&M SERVICE: CPT | Mod: S$GLB,,, | Performed by: FAMILY MEDICINE

## 2022-04-05 PROCEDURE — 1101F PR PT FALLS ASSESS DOC 0-1 FALLS W/OUT INJ PAST YR: ICD-10-PCS | Mod: CPTII,S$GLB,, | Performed by: FAMILY MEDICINE

## 2022-04-05 PROCEDURE — 3074F PR MOST RECENT SYSTOLIC BLOOD PRESSURE < 130 MM HG: ICD-10-PCS | Mod: CPTII,S$GLB,, | Performed by: FAMILY MEDICINE

## 2022-04-05 PROCEDURE — 3288F FALL RISK ASSESSMENT DOCD: CPT | Mod: CPTII,S$GLB,, | Performed by: FAMILY MEDICINE

## 2022-04-05 PROCEDURE — 99999 PR PBB SHADOW E&M-EST. PATIENT-LVL V: CPT | Mod: PBBFAC,,, | Performed by: FAMILY MEDICINE

## 2022-04-05 PROCEDURE — 1159F MED LIST DOCD IN RCRD: CPT | Mod: CPTII,S$GLB,, | Performed by: FAMILY MEDICINE

## 2022-04-05 PROCEDURE — 1159F PR MEDICATION LIST DOCUMENTED IN MEDICAL RECORD: ICD-10-PCS | Mod: CPTII,S$GLB,, | Performed by: FAMILY MEDICINE

## 2022-04-05 PROCEDURE — 99499 RISK ADDL DX/OHS AUDIT: ICD-10-PCS | Mod: S$GLB,,, | Performed by: FAMILY MEDICINE

## 2022-04-05 PROCEDURE — 3008F PR BODY MASS INDEX (BMI) DOCUMENTED: ICD-10-PCS | Mod: CPTII,S$GLB,, | Performed by: FAMILY MEDICINE

## 2022-04-05 PROCEDURE — 3078F PR MOST RECENT DIASTOLIC BLOOD PRESSURE < 80 MM HG: ICD-10-PCS | Mod: CPTII,S$GLB,, | Performed by: FAMILY MEDICINE

## 2022-04-05 PROCEDURE — 1125F AMNT PAIN NOTED PAIN PRSNT: CPT | Mod: CPTII,S$GLB,, | Performed by: FAMILY MEDICINE

## 2022-04-05 RX ORDER — OXYCODONE AND ACETAMINOPHEN 10; 325 MG/1; MG/1
1 TABLET ORAL EVERY 4 HOURS PRN
Qty: 120 TABLET | Refills: 0 | Status: SHIPPED | OUTPATIENT
Start: 2022-05-05 | End: 2022-06-29

## 2022-04-05 RX ORDER — MORPHINE SULFATE 60 MG/1
60 TABLET, FILM COATED, EXTENDED RELEASE ORAL 2 TIMES DAILY
Qty: 60 TABLET | Refills: 0 | Status: SHIPPED | OUTPATIENT
Start: 2022-06-03 | End: 2022-06-29

## 2022-04-05 RX ORDER — MORPHINE SULFATE 60 MG/1
60 TABLET, FILM COATED, EXTENDED RELEASE ORAL 2 TIMES DAILY
Qty: 60 TABLET | Refills: 0 | Status: SHIPPED | OUTPATIENT
Start: 2022-05-05 | End: 2022-06-29

## 2022-04-05 RX ORDER — OXYCODONE AND ACETAMINOPHEN 10; 325 MG/1; MG/1
1 TABLET ORAL EVERY 4 HOURS PRN
Qty: 120 TABLET | Refills: 0 | Status: SHIPPED | OUTPATIENT
Start: 2022-06-03 | End: 2022-06-29

## 2022-04-05 RX ORDER — OXYCODONE AND ACETAMINOPHEN 10; 325 MG/1; MG/1
1 TABLET ORAL EVERY 4 HOURS PRN
Qty: 120 TABLET | Refills: 0 | Status: SHIPPED | OUTPATIENT
Start: 2022-04-05 | End: 2022-06-29

## 2022-04-05 RX ORDER — MORPHINE SULFATE 60 MG/1
60 TABLET, FILM COATED, EXTENDED RELEASE ORAL 2 TIMES DAILY
Qty: 60 TABLET | Refills: 0 | Status: SHIPPED | OUTPATIENT
Start: 2022-04-05 | End: 2022-06-29

## 2022-04-05 NOTE — PROGRESS NOTES
Subjective:       Patient ID: Sam Pete is a 76 y.o. male.    Chief Complaint: Chronic Pain (Pt states that he Is here for his 3 mo follow up for pain, pt denies any new symptoms or concerns pt had shoulder replacement surgery two weeks ago)    76-year-old male coming in for renewal opioids for chronic pain disorder.  He has a history of spinal stenosis of cervical and lumbar pain without radiculopathy, chronic bilateral low back pain with bilateral sciatica and osteoarthritis of the right shoulder status post reverse shoulder replacement two weeks ago.  He is on his usual pain medication and was not given anything in addition after surgery.  He is complaining of a pain from the apex of the shoulder radiating to about mid humerus and occasionally with some tingling down to the 4th and 5th fingers of the right hand.  This occurs randomly and without any precipitation my motion or activity.  He is in an immobilizer.  He has had no problems with constipation or nausea with the opioids and has no drowsiness confusion or impaired cognition.  With his surgery his activities have been curtailed and he needs assistance with ADLs.    Past Medical History:  No date: Anticoagulant long-term use      Comment:  ASA 81 mg  No date: Arthritis  No date: Cataract      Comment:  OU  No date: Chronic low back pain  02/08/2011: Complete rupture of rotator cuff  07/08/2015: DDD (degenerative disc disease), lumbar  09/09/2015: Degeneration of lumbar or lumbosacral intervertebral disc  No date: ED (erectile dysfunction)  No date: GERD (gastroesophageal reflux disease)  No date: Hypertension  06/26/2017: Lumbar pseudoarthrosis  06/26/2017: Lumbar stenosis  No date: Obesity  07/08/2015: Spondylosis of lumbar region without myelopathy or   radiculopathy  1997: Stroke      Comment:  basal ganglia, left sided weakness, resolved  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  07/08/2015: Thoracic or lumbosacral neuritis or radiculitis    Past  Surgical History:  No date: APPENDECTOMY  3/22/2022: ARTHROPLASTY OF SHOULDER; Right      Comment:  Procedure: ARTHROPLASTY, SHOULDER BRENNAN RIGHT SHOULDER                HUMERAL HEAD RESURFACING;  Surgeon: Frankie Joya MD;               Location: [facility] OR;  Service: Orthopedics;  Laterality:                Right;  No date: BACK SURGERY      Comment:  4- back surgery  12/16/2021: CAUDAL EPIDURAL STEROID INJECTION; N/A      Comment:  Procedure: Injection-steroid-epidural-caudal;  Surgeon:                Aram Terrell MD;  Location: [facility] OR;  Service: Pain                Management;  Laterality: N/A;  02/02/2022: CAUDAL EPIDURAL STEROID INJECTION; N/A      Comment:  Procedure: INJECTION, STEROID, SPINE, EPIDURAL, CAUDAL;                Surgeon: Aram Terrell MD;  Location: [facility] OR;  Service:                Pain Management;  Laterality: N/A;  07/12/2004: COLONOSCOPY      Comment:  CHILO.   Redundant tortuous colon, otherwise normal.  02/25/2019: COLONOSCOPY; N/A      Comment:  Procedure: COLONOSCOPY;  Surgeon: Gilbert Rodriguez Jr., MD;  Location: [facility];  Service: Endoscopy;                 Laterality: N/A;  No date: Epidural steroid injection      Comment:  Pain management  07/12/2004: ESOPHAGOGASTRODUODENOSCOPY      Comment:  CHILO.  No date: FRACTURE SURGERY; Left      Comment:  wrist / forearm, total of 5  12/12/2019: INSERTION OF DORSAL COLUMN NERVE STIMULATOR FOR TRIAL; N/A      Comment:  Procedure: INSERTION, NEUROSTIMULATOR, SPINAL CORD,                DORSAL COLUMN, FOR TRIAL;  Surgeon: Evan Lyles MD;                 Location: [facility] OR;  Service: Pain Management;                 Laterality: N/A;  02/06/2013: JOINT REPLACEMENT      Comment:  ( rt hip 2007), left hip   No date: JOINT REPLACEMENT; Left      Comment:  total knee  No date: KNEE ARTHROSCOPY W/ DEBRIDEMENT      Comment:  bilateral knees , total of six  No date: KNEE SURGERY  02/12/2020: LAMINECTOMY USING MINIMALLY INVASIVE  TECHNIQUE; N/A      Comment:  Procedure: LAMINECTOMY, SPINE, MINIMALLY INVASIVE /                 PLACEMENT OF SPINAL CORD STIMULATOR;  Surgeon: Darlene Max MD;  Location: [facility];  Service: Neurosurgery;                 Laterality: N/A;  No date: Nervbe Block injection      Comment:  Pain management  No date: TOTAL THYROIDECTOMY    Current Outpatient Medications on File Prior to Visit:  albuterol (PROVENTIL/VENTOLIN HFA) 90 mcg/actuation inhaler, Inhale 2 puffs into the lungs every 6 (six) hours as needed for Wheezing. Rescue, Disp: 18 g, Rfl: 0  albuterol (PROVENTIL/VENTOLIN HFA) 90 mcg/actuation inhaler, EVERY 4 HOURS AS NEEDED as needed for, Disp: , Rfl:   amLODIPine (NORVASC) 10 MG tablet, TAKE 1 TABLET EVERY DAY, Disp: 90 tablet, Rfl: 3  aspirin 81 MG Chew, Take 81 mg by mouth once daily., Disp: , Rfl:   atorvastatin (LIPITOR) 40 MG tablet, Take 1 tablet (40 mg total) by mouth once daily., Disp: 90 tablet, Rfl: 3  buPROPion (WELLBUTRIN SR) 150 MG TBSR 12 hr tablet, Take 1 tablet (150 mg total) by mouth once daily., Disp: 30 tablet, Rfl: 0  buPROPion (WELLBUTRIN XL) 150 MG TB24 tablet, Take 1 tablet (150 mg total) by mouth once daily., Disp: 90 tablet, Rfl: 3  calcitRIOL (ROCALTROL) 0.5 MCG Cap, Take 1 capsule (0.5 mcg total) by mouth once daily., Disp: 30 capsule, Rfl: 0  calcitRIOL (ROCALTROL) 0.5 MCG Cap, Take 1 capsule(s) by oral route ,  1 time per day , for 30 days, Disp: 30 capsule, Rfl: 2  calcitRIOL (ROCALTROL) 0.5 MCG Cap, Take 1 capsule(s) by oral route ,  1 time per day , for 30 days, Disp: 30 capsule, Rfl: 2  cephALEXin (KEFLEX) 500 MG capsule, Take 1 capsule (500 mg total) by mouth every 6 (six) hours., Disp: 20 capsule, Rfl: 0  COVID-19 test specimen collect Misc, TEST AS DIRECTED TODAY, Disp: , Rfl:   KENALOG 40 mg/mL injection, , Disp: , Rfl:   levothyroxine (SYNTHROID) 125 MCG tablet, Take 1 tablet(s) by oral route ,  1 time per day , for 30 days, Disp: 30 tablet, Rfl:  0  levothyroxine (SYNTHROID) 125 MCG tablet, Take 1 tablet(s) by oral route ,  1 time per day , for 7 days, Disp: 7 tablet, Rfl: 0  levothyroxine (SYNTHROID) 150 MCG tablet, Take 1 tablet(s) by oral route ,  1 time per day , for 30 days, Disp: 30 tablet, Rfl: 1  losartan (COZAAR) 50 MG tablet, TAKE 1 TABLET(50 MG) BY MOUTH EVERY DAY, Disp: 90 tablet, Rfl: 3  naloxone (NARCAN) 0.4 mg/mL injection, Inject 1 mL (0.4 mg total) into the vein as needed (overdose)., Disp: 2 mL, Rfl: 5  omeprazole (PRILOSEC OTC) 20 MG tablet, 40 mg., Disp: , Rfl:   ondansetron (ZOFRAN-ODT) 8 MG TbDL, Take 1 tablet (8 mg total) by mouth 2 (two) times daily as needed for nausea for 5days, Disp: 10 tablet, Rfl: 0  oxyCODONE-acetaminophen (PERCOCET)  mg per tablet, Take 1 tablet by mouth every 4 to 6 hours as needed for Pain., Disp: 42 tablet, Rfl: 0  pregabalin (LYRICA) 75 MG capsule, Take 1 capsule (75 mg total) by mouth 2 (two) times daily., Disp: 180 capsule, Rfl: 1  saw/vit E/sod lisa/lyc/beta/pyg (PROSTATE HEALTH ORAL), Take 2 capsules by mouth once daily. With saw palmetto, Disp: , Rfl:   sildenafil (REVATIO) 20 mg Tab, Take 1-5 daily as needed for ED, Disp: 10 tablet, Rfl: 5  tiZANidine (ZANAFLEX) 4 MG tablet, Take 1 tablet (4 mg total) by mouth 3 (three) times daily as needed (muscle spasm)., Disp: 90 tablet, Rfl: 3  UNABLE TO FIND, Take by mouth once daily. Vitafusion multivites gummies, Disp: , Rfl:   (DISCONTINUED) morphine (MS CONTIN) 60 MG 12 hr tablet, Take 1 tablet (60 mg total) by mouth 2 (two) times daily., Disp: 60 tablet, Rfl: 0  (DISCONTINUED) morphine (MS CONTIN) 60 MG 12 hr tablet, Take 1 tablet (60 mg total) by mouth 2 (two) times daily., Disp: 60 tablet, Rfl: 0  (DISCONTINUED) oxyCODONE-acetaminophen (PERCOCET)  mg per tablet, Take 1 tablet by mouth every 4 (four) hours as needed for Pain., Disp: 120 tablet, Rfl: 0  (DISCONTINUED) oxyCODONE-acetaminophen (PERCOCET)  mg per tablet, Take 1 tablet by mouth  every 4 (four) hours as needed for Pain., Disp: 120 tablet, Rfl: 0  (DISCONTINUED) oxyCODONE-acetaminophen (PERCOCET)  mg per tablet, EVERY 6 HOURS AS NEEDED as needed for, Disp: , Rfl:   (DISCONTINUED) warfarin (COUMADIN) 5 MG tablet, Take 1 tablet (5 mg total) by mouth Daily., Disp: 30 tablet, Rfl: 0    Current Facility-Administered Medications on File Prior to Visit:  diphenhydrAMINE injection 12.5 mg, 12.5 mg, Intravenous, Once PRN, Enrique Rosales MD  electrolyte-S (ISOLYTE), , Intravenous, Continuous, Enrique Rosales MD  HYDROmorphone (PF) injection 0.2 mg, 0.2 mg, Intravenous, Q5 Min PRN, Enrique Rosales MD  HYDROmorphone (PF) injection 0.2 mg, 0.2 mg, Intravenous, Q5 Min PRN, Enrique Rosales MD  lactated ringers infusion, , Intravenous, Once PRN, Aram Terrell MD  lactated ringers infusion, 10 mL/hr, Intravenous, Continuous, Enrique Rosales MD, Last Rate: 500 mL/hr at 03/22/22 1419, Rate Change at 03/22/22 1419  lorazepam injection 0.25 mg, 0.25 mg, Intravenous, Once PRN, Enrique Rosales MD  prochlorperazine injection Soln 5 mg, 5 mg, Intravenous, Q30 Min PRN, Enrique Rosales MD  sodium chloride 0.9% flush 3 mL, 3 mL, Intravenous, Q8H, Enrique Rosales MD          Review of Systems   Musculoskeletal: Positive for arthralgias, back pain, joint swelling (Postoperative swelling right shoulder) and neck pain.       Objective:      Physical Exam  Vitals and nursing note reviewed.   Constitutional:       General: He is not in acute distress.     Appearance: Normal appearance. He is obese. He is not ill-appearing, toxic-appearing or diaphoretic.      Comments: Good blood pressure control  Obese with a BMI of 34.0 he is down 3.1 lb from his January 5, 2022 visit   Musculoskeletal:      Right shoulder: Tenderness (Tender over deltoid and deltoid tendon to mid humerus closely approximates his pain he describes) present.      Comments: Right shoulder mildly warm to touch    Neurological:      Mental Status: He is alert and oriented to person, place, and time.   Psychiatric:         Mood and Affect: Mood normal.         Behavior: Behavior normal.         Thought Content: Thought content normal.         Judgment: Judgment normal.         Assessment:       1. Chronic pain syndrome    2. Uncomplicated opioid dependence    3. Primary osteoarthritis of right shoulder    4. Spinal stenosis in cervical region    5. Spinal stenosis, lumbar region, without neurogenic claudication        Plan:       1. Chronic pain syndrome  The  (Prescription Monitoring Program) website was checked with no inappropriate activity found.    2. Uncomplicated opioid dependence  - morphine (MS CONTIN) 60 MG 12 hr tablet; Take 1 tablet (60 mg total) by mouth 2 (two) times daily.  Dispense: 60 tablet; Refill: 0  - oxyCODONE-acetaminophen (PERCOCET)  mg per tablet; Take 1 tablet by mouth every 4 (four) hours as needed for Pain.  Dispense: 120 tablet; Refill: 0  - morphine (MS CONTIN) 60 MG 12 hr tablet; Take 1 tablet (60 mg total) by mouth 2 (two) times daily.  Dispense: 60 tablet; Refill: 0  - oxyCODONE-acetaminophen (PERCOCET)  mg per tablet; Take 1 tablet by mouth every 4 (four) hours as needed for Pain.  Dispense: 120 tablet; Refill: 0  - morphine (MS CONTIN) 60 MG 12 hr tablet; Take 1 tablet (60 mg total) by mouth 2 (two) times daily.  Dispense: 60 tablet; Refill: 0  - oxyCODONE-acetaminophen (PERCOCET)  mg per tablet; Take 1 tablet by mouth every 4 (four) hours as needed for Pain.  Dispense: 120 tablet; Refill: 0    3. Primary osteoarthritis of right shoulder  Status post reverse shoulder replacement by Dr. Joya    4. Spinal stenosis in cervical region  Stable    5. Spinal stenosis, lumbar region, without neurogenic claudication  Stable

## 2022-04-07 ENCOUNTER — OFFICE VISIT (OUTPATIENT)
Dept: ORTHOPEDICS | Facility: CLINIC | Age: 77
End: 2022-04-07
Payer: MEDICARE

## 2022-04-07 DIAGNOSIS — M25.519 SHOULDER PAIN, UNSPECIFIED CHRONICITY, UNSPECIFIED LATERALITY: Primary | ICD-10-CM

## 2022-04-07 PROCEDURE — 4010F PR ACE/ARB THEARPY RXD/TAKEN: ICD-10-PCS | Mod: CPTII,S$GLB,, | Performed by: ORTHOPAEDIC SURGERY

## 2022-04-07 PROCEDURE — 99024 POSTOP FOLLOW-UP VISIT: CPT | Mod: S$GLB,,, | Performed by: ORTHOPAEDIC SURGERY

## 2022-04-07 PROCEDURE — 4010F ACE/ARB THERAPY RXD/TAKEN: CPT | Mod: CPTII,S$GLB,, | Performed by: ORTHOPAEDIC SURGERY

## 2022-04-07 PROCEDURE — 99024 PR POST-OP FOLLOW-UP VISIT: ICD-10-PCS | Mod: S$GLB,,, | Performed by: ORTHOPAEDIC SURGERY

## 2022-04-07 NOTE — PROGRESS NOTES
2 weeks post arthroplasty.  Doing well.  Wound without signs of infection.  Skin closure device  removed.  Continue with DVT prophylaxis and physical therapy.  Follow up in 4 weeks with x-rays.

## 2022-04-12 NOTE — TELEPHONE ENCOUNTER
No new care gaps identified.  Powered by [employer]. Reference number: [id number].   4/12/2022 4:16:50 PM CDT

## 2022-04-13 RX ORDER — LOSARTAN POTASSIUM 50 MG/1
TABLET ORAL
Qty: 90 TABLET | Refills: 3 | OUTPATIENT
Start: 2022-04-13

## 2022-04-29 ENCOUNTER — TELEPHONE (OUTPATIENT)
Dept: FAMILY MEDICINE | Facility: CLINIC | Age: 77
End: 2022-04-29
Payer: MEDICARE
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
## 2022-05-02 ENCOUNTER — DOCUMENT SCAN (OUTPATIENT)
Dept: HOME HEALTH SERVICES | Facility: HOSPITAL | Age: 77
End: 2022-05-02
Payer: MEDICARE

## 2022-05-04 DIAGNOSIS — M19.011 PRIMARY OSTEOARTHRITIS OF RIGHT SHOULDER: Primary | ICD-10-CM

## 2022-05-04 DIAGNOSIS — Z98.890 S/P SHOULDER SURGERY: ICD-10-CM

## 2022-05-05 ENCOUNTER — HOSPITAL ENCOUNTER (OUTPATIENT)
Dept: RADIOLOGY | Facility: HOSPITAL | Age: 77
Discharge: HOME | End: 2022-05-05
Attending: ORTHOPAEDIC SURGERY
Payer: MEDICARE

## 2022-05-05 ENCOUNTER — OFFICE VISIT (OUTPATIENT)
Dept: ORTHOPEDICS | Facility: CLINIC | Age: 77
End: 2022-05-05
Payer: MEDICARE

## 2022-05-05 VITALS — WEIGHT: 237 LBS | BODY MASS INDEX: 33.93 KG/M2 | HEIGHT: 70 IN

## 2022-05-05 DIAGNOSIS — Z98.890 S/P SHOULDER SURGERY: Primary | ICD-10-CM

## 2022-05-05 DIAGNOSIS — M19.011 PRIMARY OSTEOARTHRITIS OF RIGHT SHOULDER: ICD-10-CM

## 2022-05-05 DIAGNOSIS — Z98.890 S/P SHOULDER SURGERY: ICD-10-CM

## 2022-05-05 PROCEDURE — 73060 X-RAY EXAM OF HUMERUS: CPT | Mod: TC,PO,RT

## 2022-05-05 PROCEDURE — 99024 POSTOP FOLLOW-UP VISIT: CPT | Mod: S$GLB,,, | Performed by: ORTHOPAEDIC SURGERY

## 2022-05-05 PROCEDURE — 1125F AMNT PAIN NOTED PAIN PRSNT: CPT | Mod: CPTII,S$GLB,, | Performed by: ORTHOPAEDIC SURGERY

## 2022-05-05 PROCEDURE — 1159F MED LIST DOCD IN RCRD: CPT | Mod: CPTII,S$GLB,, | Performed by: ORTHOPAEDIC SURGERY

## 2022-05-05 PROCEDURE — 99999 PR PBB SHADOW E&M-EST. PATIENT-LVL III: CPT | Mod: PBBFAC,,, | Performed by: ORTHOPAEDIC SURGERY

## 2022-05-05 PROCEDURE — 1101F PR PT FALLS ASSESS DOC 0-1 FALLS W/OUT INJ PAST YR: ICD-10-PCS | Mod: CPTII,S$GLB,, | Performed by: ORTHOPAEDIC SURGERY

## 2022-05-05 PROCEDURE — 3288F PR FALLS RISK ASSESSMENT DOCUMENTED: ICD-10-PCS | Mod: CPTII,S$GLB,, | Performed by: ORTHOPAEDIC SURGERY

## 2022-05-05 PROCEDURE — 1101F PT FALLS ASSESS-DOCD LE1/YR: CPT | Mod: CPTII,S$GLB,, | Performed by: ORTHOPAEDIC SURGERY

## 2022-05-05 PROCEDURE — 99999 PR PBB SHADOW E&M-EST. PATIENT-LVL III: ICD-10-PCS | Mod: PBBFAC,,, | Performed by: ORTHOPAEDIC SURGERY

## 2022-05-05 PROCEDURE — 73060 X-RAY EXAM OF HUMERUS: CPT | Mod: 26,RT,, | Performed by: RADIOLOGY

## 2022-05-05 PROCEDURE — 1159F PR MEDICATION LIST DOCUMENTED IN MEDICAL RECORD: ICD-10-PCS | Mod: CPTII,S$GLB,, | Performed by: ORTHOPAEDIC SURGERY

## 2022-05-05 PROCEDURE — 3288F FALL RISK ASSESSMENT DOCD: CPT | Mod: CPTII,S$GLB,, | Performed by: ORTHOPAEDIC SURGERY

## 2022-05-05 PROCEDURE — 4010F PR ACE/ARB THEARPY RXD/TAKEN: ICD-10-PCS | Mod: CPTII,S$GLB,, | Performed by: ORTHOPAEDIC SURGERY

## 2022-05-05 PROCEDURE — 99024 PR POST-OP FOLLOW-UP VISIT: ICD-10-PCS | Mod: S$GLB,,, | Performed by: ORTHOPAEDIC SURGERY

## 2022-05-05 PROCEDURE — 73060 XR HUMERUS 2 VIEW RIGHT: ICD-10-PCS | Mod: 26,RT,, | Performed by: RADIOLOGY

## 2022-05-05 PROCEDURE — 4010F ACE/ARB THERAPY RXD/TAKEN: CPT | Mod: CPTII,S$GLB,, | Performed by: ORTHOPAEDIC SURGERY

## 2022-05-05 PROCEDURE — 1125F PR PAIN SEVERITY QUANTIFIED, PAIN PRESENT: ICD-10-PCS | Mod: CPTII,S$GLB,, | Performed by: ORTHOPAEDIC SURGERY

## 2022-05-05 NOTE — PROGRESS NOTES
Six weeks out from right shoulder hemiarthroplasty doing well    Incision healing nicely hand is functioning well, relatively well-preserved motion of the shoulder    X-rays show well placed components    Plan:  Continue therapy advance outpatient therapy, follow up in about 6 weeks time as needed

## 2022-05-27 NOTE — TELEPHONE ENCOUNTER
----- Message from Tonya Sutton sent at 2018 11:48 AM CDT -----  Contact: Patient  Sam, patient [phone] calling because he needs a refill on pregabalin (LYRICA) 75 MG capsule (), patient is completely out. Please advise. Thanks.    [facility]/pharmacy #[id number]  [address]  [place] [address]  Phone: [phone] Fax: [phone]    
[facility]  
[place]

## 2022-05-30 ENCOUNTER — OFFICE VISIT (OUTPATIENT)
Dept: URGENT CARE | Facility: CLINIC | Age: 77
End: 2022-05-30
Payer: MEDICARE

## 2022-05-30 VITALS
HEIGHT: 70 IN | DIASTOLIC BLOOD PRESSURE: 78 MMHG | BODY MASS INDEX: 33.93 KG/M2 | WEIGHT: 237 LBS | OXYGEN SATURATION: 95 % | SYSTOLIC BLOOD PRESSURE: 118 MMHG | TEMPERATURE: 98 F | HEART RATE: 73 BPM | RESPIRATION RATE: 18 BRPM

## 2022-05-30 DIAGNOSIS — L73.9 FOLLICULITIS: Primary | ICD-10-CM

## 2022-05-30 PROCEDURE — 3074F PR MOST RECENT SYSTOLIC BLOOD PRESSURE < 130 MM HG: ICD-10-PCS | Mod: CPTII,S$GLB,, | Performed by: INTERNAL MEDICINE

## 2022-05-30 PROCEDURE — 1159F PR MEDICATION LIST DOCUMENTED IN MEDICAL RECORD: ICD-10-PCS | Mod: CPTII,S$GLB,, | Performed by: INTERNAL MEDICINE

## 2022-05-30 PROCEDURE — 4010F PR ACE/ARB THEARPY RXD/TAKEN: ICD-10-PCS | Mod: CPTII,S$GLB,, | Performed by: INTERNAL MEDICINE

## 2022-05-30 PROCEDURE — 99214 PR OFFICE/OUTPT VISIT, EST, LEVL IV, 30-39 MIN: ICD-10-PCS | Mod: S$GLB,,, | Performed by: INTERNAL MEDICINE

## 2022-05-30 PROCEDURE — 4010F ACE/ARB THERAPY RXD/TAKEN: CPT | Mod: CPTII,S$GLB,, | Performed by: INTERNAL MEDICINE

## 2022-05-30 PROCEDURE — 3074F SYST BP LT 130 MM HG: CPT | Mod: CPTII,S$GLB,, | Performed by: INTERNAL MEDICINE

## 2022-05-30 PROCEDURE — 3078F PR MOST RECENT DIASTOLIC BLOOD PRESSURE < 80 MM HG: ICD-10-PCS | Mod: CPTII,S$GLB,, | Performed by: INTERNAL MEDICINE

## 2022-05-30 PROCEDURE — 3078F DIAST BP <80 MM HG: CPT | Mod: CPTII,S$GLB,, | Performed by: INTERNAL MEDICINE

## 2022-05-30 PROCEDURE — 1125F PR PAIN SEVERITY QUANTIFIED, PAIN PRESENT: ICD-10-PCS | Mod: CPTII,S$GLB,, | Performed by: INTERNAL MEDICINE

## 2022-05-30 PROCEDURE — 1125F AMNT PAIN NOTED PAIN PRSNT: CPT | Mod: CPTII,S$GLB,, | Performed by: INTERNAL MEDICINE

## 2022-05-30 PROCEDURE — 99214 OFFICE O/P EST MOD 30 MIN: CPT | Mod: S$GLB,,, | Performed by: INTERNAL MEDICINE

## 2022-05-30 PROCEDURE — 1159F MED LIST DOCD IN RCRD: CPT | Mod: CPTII,S$GLB,, | Performed by: INTERNAL MEDICINE

## 2022-05-30 RX ORDER — MUPIROCIN 20 MG/G
OINTMENT TOPICAL
Qty: 22 G | Refills: 1 | Status: ON HOLD | OUTPATIENT
Start: 2022-05-30 | End: 2023-07-23 | Stop reason: HOSPADM

## 2022-05-30 RX ORDER — SULFAMETHOXAZOLE AND TRIMETHOPRIM 800; 160 MG/1; MG/1
1 TABLET ORAL 2 TIMES DAILY
Qty: 14 TABLET | Refills: 0 | Status: SHIPPED | OUTPATIENT
Start: 2022-05-30 | End: 2022-06-06

## 2022-05-30 NOTE — PROGRESS NOTES
"Subjective:       Patient ID: Sam Pete is a 77 y.o. male.    Vitals:  height is 5' 10" (1.778 m) and weight is 107.5 kg (237 lb). His temperature is 98.3 °F (36.8 °C). His blood pressure is 118/78 and his pulse is 73. His respiration is 18 and oxygen saturation is 95%.     Chief Complaint: Abscess    Pt presents to clinic today with a abscess on the right arm axillary region that started Tuesday. Pain level 06/10. No temperature noted.     Abscess  Chronicity:  NewProgression Since Onset: unchanged  Location:  Shoulder/arm  Characteristics: painful and redness    Pain Scale:  6/10  Treatments Tried:  Nothing  Relieved by:  Nothing  Worsened by:  Nothing      Skin: Positive for erythema and abscess.       Objective:      Physical Exam   Constitutional: He is oriented to person, place, and time. He appears well-developed. He is cooperative.  Non-toxic appearance. He does not appear ill. No distress.   HENT:   Head: Normocephalic and atraumatic.   Ears:   Right Ear: Hearing normal.   Left Ear: Hearing normal.   Nose: No mucosal edema, rhinorrhea, nasal deformity or congestion. No epistaxis. Right sinus exhibits no maxillary sinus tenderness and no frontal sinus tenderness. Left sinus exhibits no maxillary sinus tenderness and no frontal sinus tenderness.   Mouth/Throat: Uvula is midline and mucous membranes are normal. No trismus in the jaw. Normal dentition. No uvula swelling. No posterior oropharyngeal edema.   Eyes: Conjunctivae and lids are normal. No scleral icterus.   Neck: Trachea normal and phonation normal. Neck supple. No edema present. No erythema present. No neck rigidity present.   Cardiovascular: Normal rate, regular rhythm, normal heart sounds and normal pulses.   Pulmonary/Chest: Effort normal and breath sounds normal. No respiratory distress. He has no decreased breath sounds. He has no rhonchi.   Abdominal: Normal appearance.   Musculoskeletal: Normal range of motion.         General: No " deformity. Normal range of motion.   Neurological: He is alert and oriented to person, place, and time. He exhibits normal muscle tone. Coordination normal.   Skin: Skin is warm, dry, intact, not diaphoretic and not pale. erythema Bruising: small lesion axilla c/w folliculitis.   Psychiatric: His speech is normal and behavior is normal. Judgment and thought content normal.   Nursing note and vitals reviewed.        Assessment:       1. Folliculitis          Plan:         Folliculitis  -     sulfamethoxazole-trimethoprim 800-160mg (BACTRIM DS) 800-160 mg Tab; Take 1 tablet by mouth 2 (two) times daily. for 7 days  Dispense: 14 tablet; Refill: 0  -     mupirocin (BACTROBAN) 2 % ointment; Apply to affected area 3 times daily  Dispense: 22 g; Refill: 1      Patient Instructions   Warm compresses      If your condition worsens we recommend that you receive another evaluation at the emergency room immediately or contact your primary medical clinics after hours call service to discuss your concerns. You must understand that you've received an Urgent Care treatment only and that you may be released before all of your medical problems are known or treated. You, the patient, will arrange for follow up care as instructed.  Drink plenty of Fluids  Wash hands frequently using mild antibacterial soap lathering for at least 15 seconds then rinse  Get plenty of Rest  Follow up in 1-2 weeks with Primary Care physician if not significantly better.   If you are not allergic please take Tylenol every 4-6 hours as needed and/or Ibuprofen every 6-8 hours as needed, over the counter for pain or fever.      My notes were dictated with [employer]. Any misspellings or nonsensical grammar should be attributed to its use and allowances made for errors and typographic syntactical error(s).

## 2022-05-30 NOTE — PATIENT INSTRUCTIONS
Warm compresses      If your condition worsens we recommend that you receive another evaluation at the emergency room immediately or contact your primary medical clinics after hours call service to discuss your concerns. You must understand that you've received an Urgent Care treatment only and that you may be released before all of your medical problems are known or treated. You, the patient, will arrange for follow up care as instructed.  Drink plenty of Fluids  Wash hands frequently using mild antibacterial soap lathering for at least 15 seconds then rinse  Get plenty of Rest  Follow up in 1-2 weeks with Primary Care physician if not significantly better.   If you are not allergic please take Tylenol every 4-6 hours as needed and/or Ibuprofen every 6-8 hours as needed, over the counter for pain or fever.

## 2022-05-31 ENCOUNTER — TELEPHONE (OUTPATIENT)
Dept: FAMILY MEDICINE | Facility: CLINIC | Age: 77
End: 2022-05-31
Payer: MEDICARE

## 2022-05-31 RX ORDER — BUPROPION HYDROCHLORIDE 150 MG/1
TABLET, EXTENDED RELEASE ORAL
Qty: 30 TABLET | Refills: 0 | OUTPATIENT
Start: 2022-05-31

## 2022-05-31 NOTE — TELEPHONE ENCOUNTER
Refill Routing Note   Medication(s) are not appropriate for processing by [facility] for the following reason(s):      - Medication is a new start (<3 months)    [facility] action(s):  Defer          Medication reconciliation completed: No     Appointments  past 12m or future 3m with PCP    Date Provider   Last Visit   4/5/2022 Ty Montalvo MD   Next Visit   7/6/2022 yT Montalvo MD   ED visits in past 90 days: 0        Note composed:3:41 PM 05/31/2022

## 2022-05-31 NOTE — TELEPHONE ENCOUNTER
----- Message from Leena Powell, Patient Care Assistant sent at 5/31/2022  9:37 AM CDT -----  Contact: Pt  Type: Needs Medical Advice    Who Called: Pt  Best Call Back Number: [phone]    Inquiry/Question: Pt is calling to speak with the nurse in regards to a prescription that is coming up due soon. Please call back and advise. Thank you~

## 2022-05-31 NOTE — TELEPHONE ENCOUNTER
Care Due:                  Date            Visit Type   Department     Provider  --------------------------------------------------------------------------------                                EP -                              PRIMARY      [facility]  Last Visit: 04-      CARE ([facility])   PRACTICE       Ty Montalvo                              EP -                              PRIMARY      [facility]  Next Visit: 07-      CARE ([facility])   PRACTICE       Ty Padron  Test          Frequency    Reason                     Performed    Due Date  --------------------------------------------------------------------------------    Lipid Panel.  12 months..  atorvastatin.............  01-   01-    [facility] Embedded Care Gaps. Reference number: [id number]. 5/31/2022   11:00:23 AM CDT

## 2022-06-06 ENCOUNTER — TELEPHONE (OUTPATIENT)
Dept: ADMINISTRATIVE | Facility: CLINIC | Age: 77
End: 2022-06-06
Payer: MEDICARE

## 2022-06-06 ENCOUNTER — DOCUMENT SCAN (OUTPATIENT)
Dept: HOME HEALTH SERVICES | Facility: HOSPITAL | Age: 77
End: 2022-06-06
Payer: MEDICARE

## 2022-06-11 DIAGNOSIS — M48.02 SPINAL STENOSIS IN CERVICAL REGION: ICD-10-CM

## 2022-06-11 DIAGNOSIS — M48.061 SPINAL STENOSIS, LUMBAR REGION, WITHOUT NEUROGENIC CLAUDICATION: ICD-10-CM

## 2022-06-11 NOTE — TELEPHONE ENCOUNTER
No new care gaps identified.  [facility] Embedded Care Gaps. Reference number: [id number]. 6/11/2022   10:07:43 AM ELLIOTT

## 2022-06-13 RX ORDER — TIZANIDINE 4 MG/1
4 TABLET ORAL 3 TIMES DAILY PRN
Qty: 270 TABLET | Refills: 1 | Status: SHIPPED | OUTPATIENT
Start: 2022-06-13 | End: 2023-08-24

## 2022-06-20 ENCOUNTER — OFFICE VISIT (OUTPATIENT)
Dept: URGENT CARE | Facility: CLINIC | Age: 77
End: 2022-06-20
Payer: MEDICARE

## 2022-06-20 VITALS
OXYGEN SATURATION: 96 % | HEART RATE: 76 BPM | RESPIRATION RATE: 16 BRPM | WEIGHT: 230 LBS | SYSTOLIC BLOOD PRESSURE: 129 MMHG | TEMPERATURE: 99 F | HEIGHT: 70 IN | BODY MASS INDEX: 32.93 KG/M2 | DIASTOLIC BLOOD PRESSURE: 76 MMHG

## 2022-06-20 DIAGNOSIS — L02.419 AXILLARY ABSCESS: Primary | ICD-10-CM

## 2022-06-20 PROCEDURE — 99213 PR OFFICE/OUTPT VISIT, EST, LEVL III, 20-29 MIN: ICD-10-PCS | Mod: 25,S$GLB,, | Performed by: EMERGENCY MEDICINE

## 2022-06-20 PROCEDURE — 4010F PR ACE/ARB THEARPY RXD/TAKEN: ICD-10-PCS | Mod: CPTII,S$GLB,, | Performed by: EMERGENCY MEDICINE

## 2022-06-20 PROCEDURE — 1159F MED LIST DOCD IN RCRD: CPT | Mod: CPTII,S$GLB,, | Performed by: EMERGENCY MEDICINE

## 2022-06-20 PROCEDURE — 3074F PR MOST RECENT SYSTOLIC BLOOD PRESSURE < 130 MM HG: ICD-10-PCS | Mod: CPTII,S$GLB,, | Performed by: EMERGENCY MEDICINE

## 2022-06-20 PROCEDURE — 3074F SYST BP LT 130 MM HG: CPT | Mod: CPTII,S$GLB,, | Performed by: EMERGENCY MEDICINE

## 2022-06-20 PROCEDURE — 1160F RVW MEDS BY RX/DR IN RCRD: CPT | Mod: CPTII,S$GLB,, | Performed by: EMERGENCY MEDICINE

## 2022-06-20 PROCEDURE — 1160F PR REVIEW ALL MEDS BY PRESCRIBER/CLIN PHARMACIST DOCUMENTED: ICD-10-PCS | Mod: CPTII,S$GLB,, | Performed by: EMERGENCY MEDICINE

## 2022-06-20 PROCEDURE — 1159F PR MEDICATION LIST DOCUMENTED IN MEDICAL RECORD: ICD-10-PCS | Mod: CPTII,S$GLB,, | Performed by: EMERGENCY MEDICINE

## 2022-06-20 PROCEDURE — 3078F PR MOST RECENT DIASTOLIC BLOOD PRESSURE < 80 MM HG: ICD-10-PCS | Mod: CPTII,S$GLB,, | Performed by: EMERGENCY MEDICINE

## 2022-06-20 PROCEDURE — 10060 INCISION & DRAINAGE: ICD-10-PCS | Mod: S$GLB,,, | Performed by: EMERGENCY MEDICINE

## 2022-06-20 PROCEDURE — 3078F DIAST BP <80 MM HG: CPT | Mod: CPTII,S$GLB,, | Performed by: EMERGENCY MEDICINE

## 2022-06-20 PROCEDURE — 99213 OFFICE O/P EST LOW 20 MIN: CPT | Mod: 25,S$GLB,, | Performed by: EMERGENCY MEDICINE

## 2022-06-20 PROCEDURE — 10060 I&D ABSCESS SIMPLE/SINGLE: CPT | Mod: S$GLB,,, | Performed by: EMERGENCY MEDICINE

## 2022-06-20 PROCEDURE — 4010F ACE/ARB THERAPY RXD/TAKEN: CPT | Mod: CPTII,S$GLB,, | Performed by: EMERGENCY MEDICINE

## 2022-06-20 RX ORDER — SULFAMETHOXAZOLE AND TRIMETHOPRIM 800; 160 MG/1; MG/1
1 TABLET ORAL 2 TIMES DAILY
Qty: 10 TABLET | Refills: 0 | Status: SHIPPED | OUTPATIENT
Start: 2022-06-20 | End: 2022-06-25

## 2022-06-20 NOTE — PROCEDURES
"Incision & Drainage    Date/Time: 6/20/2022 5:30 PM  Performed by: Manuel Mejia MD  Authorized by: Manuel Mejia MD     Time out: Immediately prior to procedure a "time out" was called to verify the correct patient, procedure, equipment, support staff and site/side marked as required.    Consent Done?:  Yes (Verbal)    Type:  Abscess  Body area:  Upper extremity  Location details:  Left arm  Anesthesia:  Local infiltration  Local anesthetic: lidocaine 2% without epinephrine  Anesthetic total (ml):  1  Scalpel size:  11  Incision type:  Single straight  Incision depth: subcutaneous    Complexity:  Simple  Drainage:  Pus  Drainage amount:  Moderate  Wound treatment:  Wound left open  Patient tolerance:  Patient tolerated the procedure well with no immediate complications    4 x 4 and tape applied.      "

## 2022-06-20 NOTE — PROGRESS NOTES
"Subjective:       Patient ID: Sam Pete is a 77 y.o. male.    Vitals:  height is 5' 10" (1.778 m) and weight is 104.3 kg (230 lb). His temperature is 98.5 °F (36.9 °C). His blood pressure is 129/76 and his pulse is 76. His respiration is 16 and oxygen saturation is 96%.     Chief Complaint: Abscess    Patient presents to clinic with an abscess under right armpit. Patient was seen at Urgent Care on May 30, for the same symptoms. Patient states since office visit symptoms was returned and are worsening. Symptoms: redness, pain and tenderness in the affected area.    Abscess  Chronicity:  NewProgression Since Onset: worsening after initial improvement  Location:  Shoulder/arm  Characteristics: itching, painful, redness, dryness and swelling    Pain Scale:  6/10      Skin: Positive for abscess. Negative for erythema.       Objective:      Physical Exam   Constitutional: He is oriented to person, place, and time. He appears well-developed.   HENT:   Head: Normocephalic and atraumatic. Head is without abrasion, without contusion and without laceration.   Ears:   Right Ear: External ear normal.   Left Ear: External ear normal.   Mouth/Throat: Oropharynx is clear and moist and mucous membranes are normal.   Eyes: Conjunctivae, EOM and lids are normal.   Neck: Trachea normal and phonation normal. Neck supple.   Cardiovascular: Normal rate and regular rhythm.   Pulmonary/Chest: Effort normal. No respiratory distress.   Musculoskeletal: Normal range of motion.         General: Normal range of motion.   Neurological: He is alert and oriented to person, place, and time.   Skin: Skin is warm, dry, intact and no rash. Capillary refill takes less than 2 seconds. No abrasion, No burn, No bruising, No erythema and No ecchymosis         Comments: Left axillary abscess approximately 3 x 3 cm present with pointing.  Minimal surrounding erythema.   Psychiatric: His speech is normal and behavior is normal. Judgment and thought " content normal.   Nursing note and vitals reviewed.  Incision and drainage of abscess performed.  The patient will be placed on antibiotic.      Assessment:       1. Axillary abscess          Plan:         Axillary abscess  -     Incision & Drainage  -     sulfamethoxazole-trimethoprim 800-160mg (BACTRIM DS) 800-160 mg Tab; Take 1 tablet by mouth 2 (two) times daily. for 5 days  Dispense: 10 tablet; Refill: 0

## 2022-06-22 ENCOUNTER — PATIENT MESSAGE (OUTPATIENT)
Dept: ORTHOPEDICS | Facility: CLINIC | Age: 77
End: 2022-06-22
Payer: MEDICARE

## 2022-06-23 ENCOUNTER — HOSPITAL ENCOUNTER (OUTPATIENT)
Dept: RADIOLOGY | Facility: HOSPITAL | Age: 77
Discharge: HOME | End: 2022-06-23
Attending: ORTHOPAEDIC SURGERY
Payer: MEDICARE

## 2022-06-23 ENCOUNTER — OFFICE VISIT (OUTPATIENT)
Dept: ORTHOPEDICS | Facility: CLINIC | Age: 77
End: 2022-06-23
Payer: MEDICARE

## 2022-06-23 VITALS — HEIGHT: 70 IN | WEIGHT: 230 LBS | BODY MASS INDEX: 32.93 KG/M2

## 2022-06-23 DIAGNOSIS — Z98.890 S/P SHOULDER SURGERY: Primary | ICD-10-CM

## 2022-06-23 DIAGNOSIS — M79.601 RIGHT ARM PAIN: Primary | ICD-10-CM

## 2022-06-23 DIAGNOSIS — Z98.890 S/P SHOULDER SURGERY: ICD-10-CM

## 2022-06-23 PROCEDURE — 73030 X-RAY EXAM OF SHOULDER: CPT | Mod: 26,RT,, | Performed by: RADIOLOGY

## 2022-06-23 PROCEDURE — 3288F PR FALLS RISK ASSESSMENT DOCUMENTED: ICD-10-PCS | Mod: CPTII,S$GLB,, | Performed by: ORTHOPAEDIC SURGERY

## 2022-06-23 PROCEDURE — 99213 PR OFFICE/OUTPT VISIT, EST, LEVL III, 20-29 MIN: ICD-10-PCS | Mod: S$GLB,,, | Performed by: ORTHOPAEDIC SURGERY

## 2022-06-23 PROCEDURE — 1160F RVW MEDS BY RX/DR IN RCRD: CPT | Mod: CPTII,S$GLB,, | Performed by: ORTHOPAEDIC SURGERY

## 2022-06-23 PROCEDURE — 1159F MED LIST DOCD IN RCRD: CPT | Mod: CPTII,S$GLB,, | Performed by: ORTHOPAEDIC SURGERY

## 2022-06-23 PROCEDURE — 99213 OFFICE O/P EST LOW 20 MIN: CPT | Mod: S$GLB,,, | Performed by: ORTHOPAEDIC SURGERY

## 2022-06-23 PROCEDURE — 4010F ACE/ARB THERAPY RXD/TAKEN: CPT | Mod: CPTII,S$GLB,, | Performed by: ORTHOPAEDIC SURGERY

## 2022-06-23 PROCEDURE — 99999 PR PBB SHADOW E&M-EST. PATIENT-LVL IV: CPT | Mod: PBBFAC,,, | Performed by: ORTHOPAEDIC SURGERY

## 2022-06-23 PROCEDURE — 73030 XR SHOULDER TRAUMA 3 VIEW RIGHT: ICD-10-PCS | Mod: 26,RT,, | Performed by: RADIOLOGY

## 2022-06-23 PROCEDURE — 1160F PR REVIEW ALL MEDS BY PRESCRIBER/CLIN PHARMACIST DOCUMENTED: ICD-10-PCS | Mod: CPTII,S$GLB,, | Performed by: ORTHOPAEDIC SURGERY

## 2022-06-23 PROCEDURE — 1159F PR MEDICATION LIST DOCUMENTED IN MEDICAL RECORD: ICD-10-PCS | Mod: CPTII,S$GLB,, | Performed by: ORTHOPAEDIC SURGERY

## 2022-06-23 PROCEDURE — 73030 X-RAY EXAM OF SHOULDER: CPT | Mod: TC,PO,RT

## 2022-06-23 PROCEDURE — 4010F PR ACE/ARB THEARPY RXD/TAKEN: ICD-10-PCS | Mod: CPTII,S$GLB,, | Performed by: ORTHOPAEDIC SURGERY

## 2022-06-23 PROCEDURE — 3288F FALL RISK ASSESSMENT DOCD: CPT | Mod: CPTII,S$GLB,, | Performed by: ORTHOPAEDIC SURGERY

## 2022-06-23 PROCEDURE — 1101F PT FALLS ASSESS-DOCD LE1/YR: CPT | Mod: CPTII,S$GLB,, | Performed by: ORTHOPAEDIC SURGERY

## 2022-06-23 PROCEDURE — 99999 PR PBB SHADOW E&M-EST. PATIENT-LVL IV: ICD-10-PCS | Mod: PBBFAC,,, | Performed by: ORTHOPAEDIC SURGERY

## 2022-06-23 PROCEDURE — 1101F PR PT FALLS ASSESS DOC 0-1 FALLS W/OUT INJ PAST YR: ICD-10-PCS | Mod: CPTII,S$GLB,, | Performed by: ORTHOPAEDIC SURGERY

## 2022-06-23 NOTE — PROGRESS NOTES
77 years old 3 months out from shoulder MRI arthroplasty was doing well until just a couple days ago fell asleep in a chair noticed some pain in his arm into his hand with numbness.  He has had a few episodes like this over the past couple days that last for about 30 minutes currently he is asymptomatic    Exam shows negative Tinel's at the wrist, weakly positive Tinel's at the cubital tunnel, hand is functioning well no deficits scar well healed about the shoulder    X-rays show well placed components    Assessment:  Neuritis possible cubital tunnel syndrome right upper extremity    Plan:  Symptomatic care he has been symptomatic 1st couple days time observation avoid resting elbows on hard surfaces and keeping elbow in a flexed position, follow-up as needed

## 2022-06-27 ENCOUNTER — TELEPHONE (OUTPATIENT)
Dept: PAIN MEDICINE | Facility: CLINIC | Age: 77
End: 2022-06-27
Payer: MEDICARE

## 2022-06-27 ENCOUNTER — OFFICE VISIT (OUTPATIENT)
Dept: SPINE | Facility: CLINIC | Age: 77
End: 2022-06-27
Payer: MEDICARE

## 2022-06-27 VITALS — HEIGHT: 70 IN | WEIGHT: 230 LBS | BODY MASS INDEX: 32.93 KG/M2 | RESPIRATION RATE: 18 BRPM

## 2022-06-27 DIAGNOSIS — M54.16 LUMBAR RADICULOPATHY: Primary | ICD-10-CM

## 2022-06-27 DIAGNOSIS — M54.42 CHRONIC BILATERAL LOW BACK PAIN WITH BILATERAL SCIATICA: Primary | ICD-10-CM

## 2022-06-27 DIAGNOSIS — G89.29 CHRONIC BILATERAL LOW BACK PAIN WITH BILATERAL SCIATICA: Primary | ICD-10-CM

## 2022-06-27 DIAGNOSIS — M54.41 CHRONIC BILATERAL LOW BACK PAIN WITH BILATERAL SCIATICA: Primary | ICD-10-CM

## 2022-06-27 PROCEDURE — 4010F PR ACE/ARB THEARPY RXD/TAKEN: ICD-10-PCS | Mod: CPTII,S$GLB,, | Performed by: PHYSICAL MEDICINE & REHABILITATION

## 2022-06-27 PROCEDURE — 99213 PR OFFICE/OUTPT VISIT, EST, LEVL III, 20-29 MIN: ICD-10-PCS | Mod: S$GLB,,, | Performed by: PHYSICAL MEDICINE & REHABILITATION

## 2022-06-27 PROCEDURE — 1125F AMNT PAIN NOTED PAIN PRSNT: CPT | Mod: CPTII,S$GLB,, | Performed by: PHYSICAL MEDICINE & REHABILITATION

## 2022-06-27 PROCEDURE — 1101F PT FALLS ASSESS-DOCD LE1/YR: CPT | Mod: CPTII,S$GLB,, | Performed by: PHYSICAL MEDICINE & REHABILITATION

## 2022-06-27 PROCEDURE — 1159F MED LIST DOCD IN RCRD: CPT | Mod: CPTII,S$GLB,, | Performed by: PHYSICAL MEDICINE & REHABILITATION

## 2022-06-27 PROCEDURE — 1160F PR REVIEW ALL MEDS BY PRESCRIBER/CLIN PHARMACIST DOCUMENTED: ICD-10-PCS | Mod: CPTII,S$GLB,, | Performed by: PHYSICAL MEDICINE & REHABILITATION

## 2022-06-27 PROCEDURE — 3288F PR FALLS RISK ASSESSMENT DOCUMENTED: ICD-10-PCS | Mod: CPTII,S$GLB,, | Performed by: PHYSICAL MEDICINE & REHABILITATION

## 2022-06-27 PROCEDURE — 1101F PR PT FALLS ASSESS DOC 0-1 FALLS W/OUT INJ PAST YR: ICD-10-PCS | Mod: CPTII,S$GLB,, | Performed by: PHYSICAL MEDICINE & REHABILITATION

## 2022-06-27 PROCEDURE — 1159F PR MEDICATION LIST DOCUMENTED IN MEDICAL RECORD: ICD-10-PCS | Mod: CPTII,S$GLB,, | Performed by: PHYSICAL MEDICINE & REHABILITATION

## 2022-06-27 PROCEDURE — 4010F ACE/ARB THERAPY RXD/TAKEN: CPT | Mod: CPTII,S$GLB,, | Performed by: PHYSICAL MEDICINE & REHABILITATION

## 2022-06-27 PROCEDURE — 99213 OFFICE O/P EST LOW 20 MIN: CPT | Mod: S$GLB,,, | Performed by: PHYSICAL MEDICINE & REHABILITATION

## 2022-06-27 PROCEDURE — 1160F RVW MEDS BY RX/DR IN RCRD: CPT | Mod: CPTII,S$GLB,, | Performed by: PHYSICAL MEDICINE & REHABILITATION

## 2022-06-27 PROCEDURE — 3288F FALL RISK ASSESSMENT DOCD: CPT | Mod: CPTII,S$GLB,, | Performed by: PHYSICAL MEDICINE & REHABILITATION

## 2022-06-27 PROCEDURE — 1125F PR PAIN SEVERITY QUANTIFIED, PAIN PRESENT: ICD-10-PCS | Mod: CPTII,S$GLB,, | Performed by: PHYSICAL MEDICINE & REHABILITATION

## 2022-06-27 NOTE — PROGRESS NOTES
SUBJECTIVE:    Patient ID: Sam Pete is a 77 y.o. male.    Chief Complaint: Back Pain    He presents today with recurrent familiar low back pain at the lumbosacral junction with right greater than left leg discomfort radiating into the calves.  Historically has had good results from caudal injections.  He is interested in repeating that procedure.  Pain level is 8/10.  Of note in the interim since I saw him he had a right shoulder hemiarthroplasty.  He is doing well from that standpoint        Past Medical History:   Diagnosis Date    Anticoagulant long-term use     ASA 81 mg    Arthritis     Cataract     OU    Chronic low back pain     Complete rupture of rotator cuff 2011    DDD (degenerative disc disease), lumbar 2015    Degeneration of lumbar or lumbosacral intervertebral disc 2015    ED (erectile dysfunction)     GERD (gastroesophageal reflux disease)     Hypertension     Lumbar pseudoarthrosis 2017    Lumbar stenosis 2017    Obesity     Spondylosis of lumbar region without myelopathy or radiculopathy 2015    Stroke     basal ganglia, left sided weakness, resolved    Testicular hypofunction     Thoracic or lumbosacral neuritis or radiculitis 2015     Social History     Socioeconomic History    Marital status:    Tobacco Use    Smoking status: Former Smoker     Packs/day: 1.00     Years: 30.00     Pack years: 30.00     Start date: 8/3/1963     Quit date: 1992     Years since quittin.5    Smokeless tobacco: Never Used   Substance and Sexual Activity    Alcohol use: Not Currently     Comment: On occasion    Drug use: Yes     Types: Oxycodone, Morphine     Past Surgical History:   Procedure Laterality Date    APPENDECTOMY      ARTHROPLASTY OF SHOULDER Right 3/22/2022    Procedure: ARTHROPLASTY, SHOULDER BRENNAN RIGHT SHOULDER HUMERAL HEAD RESURFACING;  Surgeon: Frankie Joya MD;  Location: [facility] OR;  Service: Orthopedics;   Laterality: Right;    BACK SURGERY      4- back surgery    CAUDAL EPIDURAL STEROID INJECTION N/A 12/16/2021    Procedure: Injection-steroid-epidural-caudal;  Surgeon: Aram Terrell MD;  Location: [facility] OR;  Service: Pain Management;  Laterality: N/A;    CAUDAL EPIDURAL STEROID INJECTION N/A 02/02/2022    Procedure: INJECTION, STEROID, SPINE, EPIDURAL, CAUDAL;  Surgeon: Aram Terrell MD;  Location: [facility] OR;  Service: Pain Management;  Laterality: N/A;    COLONOSCOPY  07/12/2004    [facility].   Redundant tortuous colon, otherwise normal.    COLONOSCOPY N/A 02/25/2019    Procedure: COLONOSCOPY;  Surgeon: Gilbert Rodriguez Jr., MD;  Location: [facility] ENDO;  Service: Endoscopy;  Laterality: N/A;    Epidural steroid injection      Pain management    ESOPHAGOGASTRODUODENOSCOPY  07/12/2004    [facility].    FRACTURE SURGERY Left     wrist / forearm, total of 5    INSERTION OF DORSAL COLUMN NERVE STIMULATOR FOR TRIAL N/A 12/12/2019    Procedure: INSERTION, NEUROSTIMULATOR, SPINAL CORD, DORSAL COLUMN, FOR TRIAL;  Surgeon: Evan Lyles MD;  Location: [facility] OR;  Service: Pain Management;  Laterality: N/A;    JOINT REPLACEMENT  02/06/2013    ( rt hip 2007), left hip     JOINT REPLACEMENT Left     total knee    KNEE ARTHROSCOPY W/ DEBRIDEMENT      bilateral knees , total of six    KNEE SURGERY      LAMINECTOMY USING MINIMALLY INVASIVE TECHNIQUE N/A 02/12/2020    Procedure: LAMINECTOMY, SPINE, MINIMALLY INVASIVE /  PLACEMENT OF SPINAL CORD STIMULATOR;  Surgeon: Darlene Max MD;  Location: [facility] OR;  Service: Neurosurgery;  Laterality: N/A;    Nervbe Block injection      Pain management    TOTAL THYROIDECTOMY       Family History   Problem Relation Age of Onset    Hypertension Father     Heart disease Father     Drug abuse Daughter     Hypertension Son     Lung disease Sister     COPD Sister     Hypertension Sister     Diverticulitis Sister     Gout Sister     Kidney disease Sister     No Known Problems Mother      "Eczema Neg Hx     Lupus Neg Hx     Psoriasis Neg Hx     Melanoma Neg Hx      Vitals:    06/27/22 1340   Resp: 18   Weight: 104.3 kg (230 lb)   Height: 5' 10" (1.778 m)       Review of Systems   Constitutional: Negative for chills, diaphoresis, fatigue, fever and unexpected weight change.   HENT: Negative for trouble swallowing.    Eyes: Negative for visual disturbance.   Respiratory: Negative for shortness of breath.    Cardiovascular: Negative for chest pain.   Gastrointestinal: Negative for abdominal pain, constipation, diarrhea, nausea and vomiting.   Genitourinary: Negative for difficulty urinating.   Musculoskeletal: Negative for arthralgias, back pain, gait problem, joint swelling, myalgias, neck pain and neck stiffness.   Neurological: Negative for dizziness, speech difficulty, weakness, light-headedness, numbness and headaches.          Objective:      Physical Exam  Neurological:      Mental Status: He is alert and oriented to person, place, and time.      Comments: Mild tenderness lumbar paraspinous musculature with no palpable masses  Deep tendon reflexes are absent at the left knee trace at the right and both ankles  Strength is normal in both lower extremities             Assessment:       1. Chronic bilateral low back pain with bilateral sciatica           Plan:     presents with recurrent familiar low back pain and bilateral leg pain.  We will get him set up for repeat caudal injection.  He can follow up with me afterward      Chronic bilateral low back pain with bilateral sciatica          "

## 2022-06-27 NOTE — H&P (VIEW-ONLY)
SUBJECTIVE:    Patient ID: Sam Pete is a 77 y.o. male.    Chief Complaint: Back Pain    He presents today with recurrent familiar low back pain at the lumbosacral junction with right greater than left leg discomfort radiating into the calves.  Historically has had good results from caudal injections.  He is interested in repeating that procedure.  Pain level is 8/10.  Of note in the interim since I saw him he had a right shoulder hemiarthroplasty.  He is doing well from that standpoint        Past Medical History:   Diagnosis Date    Anticoagulant long-term use     ASA 81 mg    Arthritis     Cataract     OU    Chronic low back pain     Complete rupture of rotator cuff 2011    DDD (degenerative disc disease), lumbar 2015    Degeneration of lumbar or lumbosacral intervertebral disc 2015    ED (erectile dysfunction)     GERD (gastroesophageal reflux disease)     Hypertension     Lumbar pseudoarthrosis 2017    Lumbar stenosis 2017    Obesity     Spondylosis of lumbar region without myelopathy or radiculopathy 2015    Stroke     basal ganglia, left sided weakness, resolved    Testicular hypofunction     Thoracic or lumbosacral neuritis or radiculitis 2015     Social History     Socioeconomic History    Marital status:    Tobacco Use    Smoking status: Former Smoker     Packs/day: 1.00     Years: 30.00     Pack years: 30.00     Start date: 8/3/1963     Quit date: 1992     Years since quittin.5    Smokeless tobacco: Never Used   Substance and Sexual Activity    Alcohol use: Not Currently     Comment: On occasion    Drug use: Yes     Types: Oxycodone, Morphine     Past Surgical History:   Procedure Laterality Date    APPENDECTOMY      ARTHROPLASTY OF SHOULDER Right 3/22/2022    Procedure: ARTHROPLASTY, SHOULDER BRENNAN RIGHT SHOULDER HUMERAL HEAD RESURFACING;  Surgeon: Frankie Joya MD;  Location: [facility] OR;  Service: Orthopedics;   Laterality: Right;    BACK SURGERY      4- back surgery    CAUDAL EPIDURAL STEROID INJECTION N/A 12/16/2021    Procedure: Injection-steroid-epidural-caudal;  Surgeon: Aram Terrell MD;  Location: [facility] OR;  Service: Pain Management;  Laterality: N/A;    CAUDAL EPIDURAL STEROID INJECTION N/A 02/02/2022    Procedure: INJECTION, STEROID, SPINE, EPIDURAL, CAUDAL;  Surgeon: Aram Terrell MD;  Location: [facility] OR;  Service: Pain Management;  Laterality: N/A;    COLONOSCOPY  07/12/2004    [facility].   Redundant tortuous colon, otherwise normal.    COLONOSCOPY N/A 02/25/2019    Procedure: COLONOSCOPY;  Surgeon: Gilbert Rodriguez Jr., MD;  Location: [facility] ENDO;  Service: Endoscopy;  Laterality: N/A;    Epidural steroid injection      Pain management    ESOPHAGOGASTRODUODENOSCOPY  07/12/2004    [facility].    FRACTURE SURGERY Left     wrist / forearm, total of 5    INSERTION OF DORSAL COLUMN NERVE STIMULATOR FOR TRIAL N/A 12/12/2019    Procedure: INSERTION, NEUROSTIMULATOR, SPINAL CORD, DORSAL COLUMN, FOR TRIAL;  Surgeon: Evan Lyles MD;  Location: [facility] OR;  Service: Pain Management;  Laterality: N/A;    JOINT REPLACEMENT  02/06/2013    ( rt hip 2007), left hip     JOINT REPLACEMENT Left     total knee    KNEE ARTHROSCOPY W/ DEBRIDEMENT      bilateral knees , total of six    KNEE SURGERY      LAMINECTOMY USING MINIMALLY INVASIVE TECHNIQUE N/A 02/12/2020    Procedure: LAMINECTOMY, SPINE, MINIMALLY INVASIVE /  PLACEMENT OF SPINAL CORD STIMULATOR;  Surgeon: Darlene Max MD;  Location: [facility] OR;  Service: Neurosurgery;  Laterality: N/A;    Nervbe Block injection      Pain management    TOTAL THYROIDECTOMY       Family History   Problem Relation Age of Onset    Hypertension Father     Heart disease Father     Drug abuse Daughter     Hypertension Son     Lung disease Sister     COPD Sister     Hypertension Sister     Diverticulitis Sister     Gout Sister     Kidney disease Sister     No Known Problems Mother      "Eczema Neg Hx     Lupus Neg Hx     Psoriasis Neg Hx     Melanoma Neg Hx      Vitals:    06/27/22 1340   Resp: 18   Weight: 104.3 kg (230 lb)   Height: 5' 10" (1.778 m)       Review of Systems   Constitutional: Negative for chills, diaphoresis, fatigue, fever and unexpected weight change.   HENT: Negative for trouble swallowing.    Eyes: Negative for visual disturbance.   Respiratory: Negative for shortness of breath.    Cardiovascular: Negative for chest pain.   Gastrointestinal: Negative for abdominal pain, constipation, diarrhea, nausea and vomiting.   Genitourinary: Negative for difficulty urinating.   Musculoskeletal: Negative for arthralgias, back pain, gait problem, joint swelling, myalgias, neck pain and neck stiffness.   Neurological: Negative for dizziness, speech difficulty, weakness, light-headedness, numbness and headaches.          Objective:      Physical Exam  Neurological:      Mental Status: He is alert and oriented to person, place, and time.      Comments: Mild tenderness lumbar paraspinous musculature with no palpable masses  Deep tendon reflexes are absent at the left knee trace at the right and both ankles  Strength is normal in both lower extremities             Assessment:       1. Chronic bilateral low back pain with bilateral sciatica           Plan:     presents with recurrent familiar low back pain and bilateral leg pain.  We will get him set up for repeat caudal injection.  He can follow up with me afterward      Chronic bilateral low back pain with bilateral sciatica          "

## 2022-06-27 NOTE — TELEPHONE ENCOUNTER
----- Message from Nikita Landrum MD sent at 6/27/2022  1:57 PM CDT -----  Please schedule for caudal epidural steroid injection

## 2022-06-28 DIAGNOSIS — F11.20 UNCOMPLICATED OPIOID DEPENDENCE: ICD-10-CM

## 2022-06-28 RX ORDER — OXYCODONE AND ACETAMINOPHEN 10; 325 MG/1; MG/1
1 TABLET ORAL EVERY 4 HOURS PRN
Qty: 120 TABLET | Refills: 0 | OUTPATIENT
Start: 2022-06-28

## 2022-06-28 RX ORDER — MORPHINE SULFATE 60 MG/1
60 TABLET, FILM COATED, EXTENDED RELEASE ORAL 2 TIMES DAILY
Qty: 60 TABLET | Refills: 0 | OUTPATIENT
Start: 2022-06-28

## 2022-06-28 NOTE — TELEPHONE ENCOUNTER
No new care gaps identified.  [facility] Embedded Care Gaps. Reference number: [id number]. 6/28/2022   9:08:49 AM MAGGIET

## 2022-06-29 ENCOUNTER — PATIENT MESSAGE (OUTPATIENT)
Dept: FAMILY MEDICINE | Facility: CLINIC | Age: 77
End: 2022-06-29
Payer: MEDICARE

## 2022-06-29 ENCOUNTER — TELEPHONE (OUTPATIENT)
Dept: FAMILY MEDICINE | Facility: CLINIC | Age: 77
End: 2022-06-29
Payer: MEDICARE

## 2022-06-29 DIAGNOSIS — F11.20 UNCOMPLICATED OPIOID DEPENDENCE: ICD-10-CM

## 2022-06-29 RX ORDER — OXYCODONE AND ACETAMINOPHEN 10; 325 MG/1; MG/1
1 TABLET ORAL EVERY 4 HOURS PRN
Qty: 120 TABLET | Refills: 0 | Status: SHIPPED | OUTPATIENT
Start: 2022-06-29 | End: 2022-07-06 | Stop reason: SDUPTHER

## 2022-06-29 RX ORDER — MORPHINE SULFATE 60 MG/1
60 TABLET, FILM COATED, EXTENDED RELEASE ORAL 2 TIMES DAILY
Qty: 60 TABLET | Refills: 0 | Status: CANCELLED | OUTPATIENT
Start: 2022-06-29

## 2022-06-29 RX ORDER — MORPHINE SULFATE 60 MG/1
60 TABLET, FILM COATED, EXTENDED RELEASE ORAL 2 TIMES DAILY
Qty: 60 TABLET | Refills: 0 | Status: SHIPPED | OUTPATIENT
Start: 2022-06-29 | End: 2022-07-06 | Stop reason: SDUPTHER

## 2022-06-29 RX ORDER — OXYCODONE AND ACETAMINOPHEN 10; 325 MG/1; MG/1
1 TABLET ORAL EVERY 4 HOURS PRN
Qty: 120 TABLET | Refills: 0 | OUTPATIENT
Start: 2022-06-29

## 2022-06-29 RX ORDER — OXYCODONE AND ACETAMINOPHEN 10; 325 MG/1; MG/1
1 TABLET ORAL EVERY 4 HOURS PRN
Qty: 120 TABLET | Refills: 0 | Status: CANCELLED | OUTPATIENT
Start: 2022-06-29

## 2022-06-29 NOTE — TELEPHONE ENCOUNTER
No new care gaps identified.  [facility] Embedded Care Gaps. Reference number: [id number]. 6/29/2022   12:16:04 PM CDT

## 2022-06-29 NOTE — TELEPHONE ENCOUNTER
No new care gaps identified.  [facility] Embedded Care Gaps. Reference number: [id number]. 6/29/2022   1:25:45 PM CDT

## 2022-06-29 NOTE — TELEPHONE ENCOUNTER
----- Message from Clarence Yo sent at 6/29/2022 12:17 PM CDT -----  Contact: ROBERT PEARL [[id number]]  Type:  RX Refill Request    Who Called: ROBERT PEARL [[id number]]    Refill or New Rx: Refill     RX Name and Strength:oxyCODONE-acetaminophen (PERCOCET)  mg per tablet  morphine (MS CONTIN) 60 MG 12 hr tablet    How is the patient currently taking it? (ex. 1XDay):    Is this a 30 day or 90 day RX:    Preferred Pharmacy with phone number:[facility] [place]    Local or Mail Order: Local     Ordering Provider:    Would the patient rather a call back or a response via [facility]?     Best Call Back Number: [phone] (mobile)    Additional Information:  The patient states that he needs these 2 medications filled early because he will be going out of town 7/2

## 2022-06-29 NOTE — TELEPHONE ENCOUNTER
Patient comment: Leaving  July 1 for the 4th weekend.    LR--6-3-22  LOV--4-5-22  FOV--7-6-22  Patient requesting to fill prescription early due to travel plans listed above. Please advise. Thank you.

## 2022-06-29 NOTE — TELEPHONE ENCOUNTER
No new care gaps identified.  [facility] Embedded Care Gaps. Reference number: [id number]. 6/29/2022   1:24:40 PM CDT

## 2022-07-06 ENCOUNTER — OFFICE VISIT (OUTPATIENT)
Dept: FAMILY MEDICINE | Facility: CLINIC | Age: 77
End: 2022-07-06
Attending: FAMILY MEDICINE
Payer: MEDICARE

## 2022-07-06 VITALS
HEIGHT: 70 IN | BODY MASS INDEX: 35 KG/M2 | RESPIRATION RATE: 18 BRPM | WEIGHT: 244.5 LBS | HEART RATE: 70 BPM | DIASTOLIC BLOOD PRESSURE: 84 MMHG | OXYGEN SATURATION: 96 % | TEMPERATURE: 98 F | SYSTOLIC BLOOD PRESSURE: 136 MMHG

## 2022-07-06 DIAGNOSIS — M48.02 SPINAL STENOSIS IN CERVICAL REGION: ICD-10-CM

## 2022-07-06 DIAGNOSIS — E66.01 SEVERE OBESITY (BMI 35.0-35.9 WITH COMORBIDITY): ICD-10-CM

## 2022-07-06 DIAGNOSIS — Z98.890 S/P PARATHYROIDECTOMY: ICD-10-CM

## 2022-07-06 DIAGNOSIS — Z23 NEED FOR ZOSTER VACCINE: ICD-10-CM

## 2022-07-06 DIAGNOSIS — R60.9 DEPENDENT EDEMA: ICD-10-CM

## 2022-07-06 DIAGNOSIS — G89.4 CHRONIC PAIN SYNDROME: Primary | ICD-10-CM

## 2022-07-06 DIAGNOSIS — I10 ESSENTIAL (PRIMARY) HYPERTENSION: ICD-10-CM

## 2022-07-06 DIAGNOSIS — F11.20 UNCOMPLICATED OPIOID DEPENDENCE: ICD-10-CM

## 2022-07-06 DIAGNOSIS — M48.061 SPINAL STENOSIS, LUMBAR REGION, WITHOUT NEUROGENIC CLAUDICATION: ICD-10-CM

## 2022-07-06 DIAGNOSIS — E04.2 MULTIPLE THYROID NODULES: ICD-10-CM

## 2022-07-06 DIAGNOSIS — E89.0 POSTOPERATIVE HYPOTHYROIDISM: ICD-10-CM

## 2022-07-06 DIAGNOSIS — E78.49 OTHER HYPERLIPIDEMIA: ICD-10-CM

## 2022-07-06 DIAGNOSIS — Z90.89 S/P PARATHYROIDECTOMY: ICD-10-CM

## 2022-07-06 DIAGNOSIS — I48.0 PAF (PAROXYSMAL ATRIAL FIBRILLATION): ICD-10-CM

## 2022-07-06 PROCEDURE — 1101F PT FALLS ASSESS-DOCD LE1/YR: CPT | Mod: CPTII,S$GLB,, | Performed by: FAMILY MEDICINE

## 2022-07-06 PROCEDURE — 99999 PR PBB SHADOW E&M-EST. PATIENT-LVL V: CPT | Mod: PBBFAC,,, | Performed by: FAMILY MEDICINE

## 2022-07-06 PROCEDURE — 4010F ACE/ARB THERAPY RXD/TAKEN: CPT | Mod: CPTII,S$GLB,, | Performed by: FAMILY MEDICINE

## 2022-07-06 PROCEDURE — 1159F MED LIST DOCD IN RCRD: CPT | Mod: CPTII,S$GLB,, | Performed by: FAMILY MEDICINE

## 2022-07-06 PROCEDURE — 1125F PR PAIN SEVERITY QUANTIFIED, PAIN PRESENT: ICD-10-PCS | Mod: CPTII,S$GLB,, | Performed by: FAMILY MEDICINE

## 2022-07-06 PROCEDURE — 4010F PR ACE/ARB THEARPY RXD/TAKEN: ICD-10-PCS | Mod: CPTII,S$GLB,, | Performed by: FAMILY MEDICINE

## 2022-07-06 PROCEDURE — 1160F PR REVIEW ALL MEDS BY PRESCRIBER/CLIN PHARMACIST DOCUMENTED: ICD-10-PCS | Mod: CPTII,S$GLB,, | Performed by: FAMILY MEDICINE

## 2022-07-06 PROCEDURE — 99214 PR OFFICE/OUTPT VISIT, EST, LEVL IV, 30-39 MIN: ICD-10-PCS | Mod: S$GLB,,, | Performed by: FAMILY MEDICINE

## 2022-07-06 PROCEDURE — 3075F SYST BP GE 130 - 139MM HG: CPT | Mod: CPTII,S$GLB,, | Performed by: FAMILY MEDICINE

## 2022-07-06 PROCEDURE — 99214 OFFICE O/P EST MOD 30 MIN: CPT | Mod: S$GLB,,, | Performed by: FAMILY MEDICINE

## 2022-07-06 PROCEDURE — 1160F RVW MEDS BY RX/DR IN RCRD: CPT | Mod: CPTII,S$GLB,, | Performed by: FAMILY MEDICINE

## 2022-07-06 PROCEDURE — 1159F PR MEDICATION LIST DOCUMENTED IN MEDICAL RECORD: ICD-10-PCS | Mod: CPTII,S$GLB,, | Performed by: FAMILY MEDICINE

## 2022-07-06 PROCEDURE — 3079F DIAST BP 80-89 MM HG: CPT | Mod: CPTII,S$GLB,, | Performed by: FAMILY MEDICINE

## 2022-07-06 PROCEDURE — 99999 PR PBB SHADOW E&M-EST. PATIENT-LVL V: ICD-10-PCS | Mod: PBBFAC,,, | Performed by: FAMILY MEDICINE

## 2022-07-06 PROCEDURE — 3288F FALL RISK ASSESSMENT DOCD: CPT | Mod: CPTII,S$GLB,, | Performed by: FAMILY MEDICINE

## 2022-07-06 PROCEDURE — 3075F PR MOST RECENT SYSTOLIC BLOOD PRESS GE 130-139MM HG: ICD-10-PCS | Mod: CPTII,S$GLB,, | Performed by: FAMILY MEDICINE

## 2022-07-06 PROCEDURE — 1101F PR PT FALLS ASSESS DOC 0-1 FALLS W/OUT INJ PAST YR: ICD-10-PCS | Mod: CPTII,S$GLB,, | Performed by: FAMILY MEDICINE

## 2022-07-06 PROCEDURE — 1125F AMNT PAIN NOTED PAIN PRSNT: CPT | Mod: CPTII,S$GLB,, | Performed by: FAMILY MEDICINE

## 2022-07-06 PROCEDURE — 3079F PR MOST RECENT DIASTOLIC BLOOD PRESSURE 80-89 MM HG: ICD-10-PCS | Mod: CPTII,S$GLB,, | Performed by: FAMILY MEDICINE

## 2022-07-06 PROCEDURE — 3288F PR FALLS RISK ASSESSMENT DOCUMENTED: ICD-10-PCS | Mod: CPTII,S$GLB,, | Performed by: FAMILY MEDICINE

## 2022-07-06 RX ORDER — CALCITRIOL 0.5 UG/1
0.5 CAPSULE ORAL DAILY
Qty: 30 CAPSULE | Refills: 0 | Status: SHIPPED | OUTPATIENT
Start: 2022-07-06 | End: 2023-01-26

## 2022-07-06 RX ORDER — OXYCODONE AND ACETAMINOPHEN 10; 325 MG/1; MG/1
1 TABLET ORAL EVERY 4 HOURS PRN
Qty: 120 TABLET | Refills: 0 | Status: SHIPPED | OUTPATIENT
Start: 2022-07-29 | End: 2022-10-11

## 2022-07-06 RX ORDER — NALOXONE HYDROCHLORIDE 0.4 MG/ML
0.4 INJECTION, SOLUTION INTRAMUSCULAR; INTRAVENOUS; SUBCUTANEOUS
Qty: 2 ML | Refills: 5 | Status: SHIPPED | OUTPATIENT
Start: 2022-07-06 | End: 2023-11-14 | Stop reason: SDUPTHER

## 2022-07-06 RX ORDER — CALCITRIOL 0.5 UG/1
0.5 CAPSULE ORAL DAILY
Qty: 90 CAPSULE | Refills: 3 | Status: SHIPPED | OUTPATIENT
Start: 2022-07-06 | End: 2023-05-24

## 2022-07-06 RX ORDER — ZOSTER VACCINE RECOMBINANT, ADJUVANTED 50 MCG/0.5
KIT INTRAMUSCULAR
Qty: 1 EACH | Refills: 1 | Status: SHIPPED | OUTPATIENT
Start: 2022-07-06 | End: 2023-01-26

## 2022-07-06 RX ORDER — LEVOTHYROXINE SODIUM 150 UG/1
150 TABLET ORAL DAILY
Qty: 90 TABLET | Refills: 0 | Status: SHIPPED | OUTPATIENT
Start: 2022-07-06 | End: 2022-09-07

## 2022-07-06 RX ORDER — MORPHINE SULFATE 60 MG/1
60 TABLET, FILM COATED, EXTENDED RELEASE ORAL 2 TIMES DAILY
Qty: 60 TABLET | Refills: 0 | Status: SHIPPED | OUTPATIENT
Start: 2022-08-26 | End: 2022-10-11

## 2022-07-06 RX ORDER — MORPHINE SULFATE 60 MG/1
60 TABLET, FILM COATED, EXTENDED RELEASE ORAL 2 TIMES DAILY
Qty: 60 TABLET | Refills: 0 | Status: SHIPPED | OUTPATIENT
Start: 2022-09-23 | End: 2022-10-11

## 2022-07-06 RX ORDER — MORPHINE SULFATE 60 MG/1
60 TABLET, FILM COATED, EXTENDED RELEASE ORAL 2 TIMES DAILY
Qty: 60 TABLET | Refills: 0 | Status: SHIPPED | OUTPATIENT
Start: 2022-07-29 | End: 2022-10-11

## 2022-07-06 RX ORDER — FUROSEMIDE 40 MG/1
40 TABLET ORAL DAILY
Qty: 30 TABLET | Refills: 5 | Status: SHIPPED | OUTPATIENT
Start: 2022-07-06 | End: 2023-01-03 | Stop reason: SDUPTHER

## 2022-07-06 RX ORDER — OXYCODONE AND ACETAMINOPHEN 10; 325 MG/1; MG/1
1 TABLET ORAL EVERY 4 HOURS PRN
Qty: 120 TABLET | Refills: 0 | Status: SHIPPED | OUTPATIENT
Start: 2022-09-23 | End: 2022-10-11

## 2022-07-06 RX ORDER — OXYCODONE AND ACETAMINOPHEN 10; 325 MG/1; MG/1
1 TABLET ORAL EVERY 4 HOURS PRN
Qty: 120 TABLET | Refills: 0 | Status: SHIPPED | OUTPATIENT
Start: 2022-08-26 | End: 2022-10-11

## 2022-07-06 NOTE — PROGRESS NOTES
Subjective:       Patient ID: Sam Pete is a 77 y.o. male.    Chief Complaint: Chronic Pain (Pt states that he is here for his 3 mo fu )    77-year-old male coming in for renewal of pain medications for chronic pain disorder secondary to cervical and lumbar spinal stenosis, failed back surgery, low back pain and arthritis of the elbow.  The patient underwent a total thyroidectomy in March of this year by an outside physician at an outside facility for multiple thyroid nodules but he is sure there was no cancer.  He was placed on calcitriol and 125 mcg Synthroid after the surgery.  Thirty days later he was given another seven 125 mcg Synthroid and then at the end of that he was stepped up to 150 mcg Synthroid with a 30 day supply that he ran out of approximately two months ago.  The patient is complaining of feeling cold, he has been gaining weight, he has moderately severe dependent edema that he is unable to clear and he is having worse constipation than he usually has problems with with his opioids.  He also had a right shoulder surgery and is still undergoing physical therapy with severe pain and restricted motion.  We discussed starting to wean down from his opioids but he is not able to do that at this time until he completes physical therapy so were going to keep him at the same dose for the next three months.  He is due for a urine drug screen and due for a Narcan refill.  He is also due for a lipid panel.  We will check a baseline TSH today and starting back on the 150 mcg Synthroid and then recheck that in 6 to 8 weeks for dose adjustment.  He has additional history paroxysmal atrial fibrillation, hypertension, hyperlipidemia on Lipitor, BPH with urinary complaints, ED, reflux and osteoarthritis.  He is status post a right total hip in 2007, left total hip in 2013, and a left total knee.  He has had four back operations and the total thyroidectomy.    Past Medical History:  No date: Anticoagulant  long-term use      Comment:  ASA 81 mg  No date: Arthritis  No date: Cataract      Comment:  OU  No date: Chronic low back pain  02/08/2011: Complete rupture of rotator cuff  07/08/2015: DDD (degenerative disc disease), lumbar  09/09/2015: Degeneration of lumbar or lumbosacral intervertebral disc  No date: ED (erectile dysfunction)  No date: GERD (gastroesophageal reflux disease)  No date: Hypertension  06/26/2017: Lumbar pseudoarthrosis  06/26/2017: Lumbar stenosis  No date: Obesity  07/08/2015: Spondylosis of lumbar region without myelopathy or   radiculopathy  1997: Stroke      Comment:  basal ganglia, left sided weakness, resolved  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  07/08/2015: Thoracic or lumbosacral neuritis or radiculitis    Past Surgical History:  No date: APPENDECTOMY  3/22/2022: ARTHROPLASTY OF SHOULDER; Right      Comment:  Procedure: ARTHROPLASTY, SHOULDER BRENNAN RIGHT SHOULDER                HUMERAL HEAD RESURFACING;  Surgeon: Frankie Joya MD;               Location: [facility] OR;  Service: Orthopedics;  Laterality:                Right;  No date: BACK SURGERY      Comment:  4- back surgery  12/16/2021: CAUDAL EPIDURAL STEROID INJECTION; N/A      Comment:  Procedure: Injection-steroid-epidural-caudal;  Surgeon:                Aram Terrell MD;  Location: [facility] OR;  Service: Pain                Management;  Laterality: N/A;  02/02/2022: CAUDAL EPIDURAL STEROID INJECTION; N/A      Comment:  Procedure: INJECTION, STEROID, SPINE, EPIDURAL, CAUDAL;                Surgeon: Aram Terrell MD;  Location: [facility] OR;  Service:                Pain Management;  Laterality: N/A;  07/12/2004: COLONOSCOPY      Comment:  CHILO.   Redundant tortuous colon, otherwise normal.  02/25/2019: COLONOSCOPY; N/A      Comment:  Procedure: COLONOSCOPY;  Surgeon: Gilbert Rodriguez Jr., MD;  Location: [facility] ENDO;  Service: Endoscopy;                 Laterality: N/A;  No date: Epidural steroid injection      Comment:  Pain  management  07/12/2004: ESOPHAGOGASTRODUODENOSCOPY      Comment:  CHILO.  No date: FRACTURE SURGERY; Left      Comment:  wrist / forearm, total of 5  12/12/2019: INSERTION OF DORSAL COLUMN NERVE STIMULATOR FOR TRIAL; N/A      Comment:  Procedure: INSERTION, NEUROSTIMULATOR, SPINAL CORD,                DORSAL COLUMN, FOR TRIAL;  Surgeon: Evan Lyles MD;                 Location: [facility] OR;  Service: Pain Management;                 Laterality: N/A;  02/06/2013: JOINT REPLACEMENT      Comment:  ( rt hip 2007), left hip   No date: JOINT REPLACEMENT; Left      Comment:  total knee  No date: KNEE ARTHROSCOPY W/ DEBRIDEMENT      Comment:  bilateral knees , total of six  No date: KNEE SURGERY  02/12/2020: LAMINECTOMY USING MINIMALLY INVASIVE TECHNIQUE; N/A      Comment:  Procedure: LAMINECTOMY, SPINE, MINIMALLY INVASIVE /                 PLACEMENT OF SPINAL CORD STIMULATOR;  Surgeon: Darlene Max MD;  Location: [facility] OR;  Service: Neurosurgery;                 Laterality: N/A;  No date: Nervbe Block injection      Comment:  Pain management  No date: TOTAL THYROIDECTOMY    Current Outpatient Medications on File Prior to Visit:  albuterol (PROVENTIL/VENTOLIN HFA) 90 mcg/actuation inhaler, EVERY 4 HOURS AS NEEDED as needed for, Disp: , Rfl:   amLODIPine (NORVASC) 10 MG tablet, TAKE 1 TABLET EVERY DAY, Disp: 90 tablet, Rfl: 3  aspirin 81 MG Chew, Take 81 mg by mouth once daily., Disp: , Rfl:   atorvastatin (LIPITOR) 40 MG tablet, Take 1 tablet (40 mg total) by mouth once daily., Disp: 90 tablet, Rfl: 3  buPROPion (WELLBUTRIN SR) 150 MG TBSR 12 hr tablet, Take 1 tablet (150 mg total) by mouth once daily., Disp: 30 tablet, Rfl: 0  buPROPion (WELLBUTRIN XL) 150 MG TB24 tablet, Take 1 tablet (150 mg total) by mouth once daily., Disp: 90 tablet, Rfl: 3  losartan (COZAAR) 50 MG tablet, TAKE 1 TABLET(50 MG) BY MOUTH EVERY DAY, Disp: 90 tablet, Rfl: 3  mupirocin (BACTROBAN) 2 % ointment, Apply to affected area 3 times  daily, Disp: 22 g, Rfl: 1  omeprazole (PRILOSEC OTC) 20 MG tablet, 40 mg., Disp: , Rfl:   pregabalin (LYRICA) 75 MG capsule, Take 1 capsule (75 mg total) by mouth 2 (two) times daily., Disp: 180 capsule, Rfl: 1  saw/vit E/sod lisa/lyc/beta/pyg (PROSTATE HEALTH ORAL), Take 2 capsules by mouth once daily. With saw palmetto, Disp: , Rfl:   sildenafil (REVATIO) 20 mg Tab, Take 1-5 daily as needed for ED, Disp: 10 tablet, Rfl: 5  tiZANidine (ZANAFLEX) 4 MG tablet, Take 1 tablet (4 mg total) by mouth 3 (three) times daily as needed (muscle spasm)., Disp: 270 tablet, Rfl: 1  UNABLE TO FIND, Take by mouth once daily. Vitafusion multivites gummies, Disp: , Rfl:   (DISCONTINUED) morphine (MS CONTIN) 60 MG 12 hr tablet, Take 1 tablet (60 mg total) by mouth 2 (two) times daily., Disp: 60 tablet, Rfl: 0  (DISCONTINUED) naloxone (NARCAN) 0.4 mg/mL injection, Inject 1 mL (0.4 mg total) into the vein as needed (overdose)., Disp: 2 mL, Rfl: 5  (DISCONTINUED) oxyCODONE-acetaminophen (PERCOCET)  mg per tablet, Take 1 tablet by mouth every 4 (four) hours as needed for Pain., Disp: 120 tablet, Rfl: 0  cephALEXin (KEFLEX) 500 MG capsule, Take 1 capsule (500 mg total) by mouth every 6 (six) hours. (Patient not taking: Reported on 7/6/2022), Disp: 20 capsule, Rfl: 0  COVID-19 test specimen collect Misc, TEST AS DIRECTED TODAY, Disp: , Rfl:   KENALOG 40 mg/mL injection, , Disp: , Rfl:   ondansetron (ZOFRAN-ODT) 8 MG TbDL, Take 1 tablet (8 mg total) by mouth 2 (two) times daily as needed for nausea for 5days (Patient not taking: Reported on 7/6/2022), Disp: 10 tablet, Rfl: 0        Review of Systems   Respiratory: Negative for chest tightness and shortness of breath.    Cardiovascular: Positive for leg swelling. Negative for chest pain and palpitations.   Gastrointestinal: Positive for constipation. Negative for diarrhea, nausea and vomiting.   Endocrine: Positive for cold intolerance. Negative for heat intolerance, polydipsia and  polyuria.   Musculoskeletal: Positive for arthralgias, back pain, neck pain and neck stiffness.   Psychiatric/Behavioral: Positive for confusion and decreased concentration.       Objective:      Physical Exam  Vitals and nursing note reviewed.   Constitutional:       Comments: Fair blood pressure control  Severe obesity with a BMI of 35.1 he is up 7.3 lb from his April 5, 2022 visit   Cardiovascular:      Rate and Rhythm: Normal rate and regular rhythm.      Heart sounds: Normal heart sounds. No murmur heard.    No friction rub. No gallop.   Pulmonary:      Effort: Pulmonary effort is normal. No respiratory distress.      Breath sounds: Normal breath sounds. No stridor. No wheezing, rhonchi or rales.   Musculoskeletal:         General: Swelling present.      Right lower leg: Edema (2+ edema) present.      Left lower leg: Edema (2+ edema) present.   Neurological:      General: No focal deficit present.      Mental Status: He is oriented to person, place, and time. Mental status is at baseline.   Psychiatric:         Mood and Affect: Mood normal.         Behavior: Behavior normal.         Thought Content: Thought content normal.         Judgment: Judgment normal.         Assessment:       1. Chronic pain syndrome    2. Spinal stenosis, lumbar region, without neurogenic claudication    3. Spinal stenosis in cervical region    4. PAF (paroxysmal atrial fibrillation)    5. Other hyperlipidemia    6. Essential (primary) hypertension    7. Postoperative hypothyroidism    8. Multiple thyroid nodules    9. S/P parathyroidectomy    10. Uncomplicated opioid dependence    11. Dependent edema    12. Need for zoster vaccine    13. Severe obesity (BMI 35.0-35.9 with comorbidity)        Plan:       1. Chronic pain syndrome   checked with no inappropriate activity found.  Urine drug screen ordered, Narcan refilled  - Toxicology screen, urine    2. Spinal stenosis, lumbar region, without neurogenic claudication  Stable    3.  Spinal stenosis in cervical region  Stable    4. PAF (paroxysmal atrial fibrillation)  Normal sinus rhythm    5. Other hyperlipidemia  Lipid panel in CMP ordered and pending for possible dose adjustment of Lipitor 40  - Lipid Panel; Future  - Comprehensive Metabolic Panel; Future    6. Essential (primary) hypertension  Adequate control.  Should improve with weight loss which I expect to occur with correction of hypothyroidism.  Will add Lasix 40 mg daily to help get rid of the dependent edema along with replacing thyroid  - CBC Auto Differential; Future  - Lipid Panel; Future  - Comprehensive Metabolic Panel; Future    7. Postoperative hypothyroidism  Has not been on thyroid supplementation for approximately two months.  Will get baseline TSH and initiate 150 mcg of Synthroid starting today after the lab is drawn.  Recheck TSH in 6 to 8 weeks and adjust dose accordingly  - TSH; Future  - CBC Auto Differential; Future  - Comprehensive Metabolic Panel; Future  - US Soft Tissue Head Neck Thyroid; Future  - levothyroxine (SYNTHROID) 150 MCG tablet; Take 1 tablet (150 mcg total) by mouth once daily.  Dispense: 90 tablet; Refill: 0  - TSH; Future    8. Multiple thyroid nodules  Check ultrasound to confirm complete removal  - US Soft Tissue Head Neck Thyroid; Future    9. S/P parathyroidectomy  Will check calcium level on chemistry panel and continue calcitriol  - calcitRIOL (ROCALTROL) 0.5 MCG Cap; Take 1 capsule (0.5 mcg total) by mouth once daily.  Dispense: 30 capsule; Refill: 0  - calcitRIOL (ROCALTROL) 0.5 MCG Cap; Take 1 capsule (0.5 mcg total) by mouth once daily.  Dispense: 90 capsule; Refill: 3    10. Uncomplicated opioid dependence  Continue morphine and Percocet at current dose.  At next visit in three months if he is doing well with the shoulder a plan to start reducing MS Contin going to 30 mg daily for three months than 15 after that if tolerated  - naloxone (NARCAN) 0.4 mg/mL injection; Inject 1 mL (0.4 mg  total) into the vein as needed (overdose).  Dispense: 2 mL; Refill: 5  - morphine (MS CONTIN) 60 MG 12 hr tablet; Take 1 tablet (60 mg total) by mouth 2 (two) times daily.  Dispense: 60 tablet; Refill: 0  - oxyCODONE-acetaminophen (PERCOCET)  mg per tablet; Take 1 tablet by mouth every 4 (four) hours as needed for Pain.  Dispense: 120 tablet; Refill: 0  - morphine (MS CONTIN) 60 MG 12 hr tablet; Take 1 tablet (60 mg total) by mouth 2 (two) times daily.  Dispense: 60 tablet; Refill: 0  - oxyCODONE-acetaminophen (PERCOCET)  mg per tablet; Take 1 tablet by mouth every 4 (four) hours as needed for Pain.  Dispense: 120 tablet; Refill: 0  - morphine (MS CONTIN) 60 MG 12 hr tablet; Take 1 tablet (60 mg total) by mouth 2 (two) times daily.  Dispense: 60 tablet; Refill: 0  - oxyCODONE-acetaminophen (PERCOCET)  mg per tablet; Take 1 tablet by mouth every 4 (four) hours as needed for Pain.  Dispense: 120 tablet; Refill: 0    11. Dependent edema  See above  - furosemide (LASIX) 40 MG tablet; Take 1 tablet (40 mg total) by mouth once daily.  Dispense: 30 tablet; Refill: 5    12. Need for zoster vaccine  Shingrix vaccine sent to pharmacy  - varicella-zoster [id number], PF, (SHINGRIX, PF,) 50 mcg/0.5 mL injection; Give one dose IM now, repeat times one in 2-6 months  Dispense: 1 each; Refill: 1    13. Severe obesity (BMI 35.0-35.9 with comorbidity)

## 2022-07-11 ENCOUNTER — OFFICE VISIT (OUTPATIENT)
Dept: SURGERY | Facility: CLINIC | Age: 77
End: 2022-07-11
Payer: MEDICARE

## 2022-07-11 ENCOUNTER — OFFICE VISIT (OUTPATIENT)
Dept: FAMILY MEDICINE | Facility: CLINIC | Age: 77
End: 2022-07-11
Payer: MEDICARE

## 2022-07-11 ENCOUNTER — HOSPITAL ENCOUNTER (OUTPATIENT)
Dept: RADIOLOGY | Facility: HOSPITAL | Age: 77
Discharge: HOME | End: 2022-07-11
Attending: FAMILY MEDICINE
Payer: MEDICARE

## 2022-07-11 VITALS
HEART RATE: 70 BPM | SYSTOLIC BLOOD PRESSURE: 134 MMHG | BODY MASS INDEX: 34.09 KG/M2 | DIASTOLIC BLOOD PRESSURE: 74 MMHG | WEIGHT: 238.13 LBS | OXYGEN SATURATION: 96 % | HEIGHT: 70 IN

## 2022-07-11 VITALS
SYSTOLIC BLOOD PRESSURE: 137 MMHG | HEIGHT: 70 IN | HEART RATE: 76 BPM | BODY MASS INDEX: 34.15 KG/M2 | WEIGHT: 238.56 LBS | DIASTOLIC BLOOD PRESSURE: 72 MMHG | TEMPERATURE: 98 F

## 2022-07-11 DIAGNOSIS — E89.0 POSTOPERATIVE HYPOTHYROIDISM: ICD-10-CM

## 2022-07-11 DIAGNOSIS — E04.2 MULTIPLE THYROID NODULES: ICD-10-CM

## 2022-07-11 DIAGNOSIS — R26.9 ABNORMALITY OF GAIT AND MOBILITY: ICD-10-CM

## 2022-07-11 DIAGNOSIS — I70.0 ATHEROSCLEROSIS OF AORTA: ICD-10-CM

## 2022-07-11 DIAGNOSIS — Z00.00 ENCOUNTER FOR PREVENTIVE HEALTH EXAMINATION: Primary | ICD-10-CM

## 2022-07-11 DIAGNOSIS — I48.0 PAF (PAROXYSMAL ATRIAL FIBRILLATION): ICD-10-CM

## 2022-07-11 DIAGNOSIS — E66.9 OBESITY (BMI 30.0-34.9): ICD-10-CM

## 2022-07-11 DIAGNOSIS — L02.419 AXILLARY ABSCESS: Primary | ICD-10-CM

## 2022-07-11 PROBLEM — R05.9 COUGH: Status: RESOLVED | Noted: 2020-03-25 | Resolved: 2022-07-11

## 2022-07-11 PROBLEM — L73.9 FOLLICULITIS: Status: RESOLVED | Noted: 2022-05-30 | Resolved: 2022-07-11

## 2022-07-11 PROBLEM — L03.90 CELLULITIS: Status: RESOLVED | Noted: 2021-06-30 | Resolved: 2022-07-11

## 2022-07-11 PROCEDURE — G0439 PPPS, SUBSEQ VISIT: HCPCS | Mod: S$GLB,,, | Performed by: NURSE PRACTITIONER

## 2022-07-11 PROCEDURE — 1125F PR PAIN SEVERITY QUANTIFIED, PAIN PRESENT: ICD-10-PCS | Mod: CPTII,S$GLB,, | Performed by: NURSE PRACTITIONER

## 2022-07-11 PROCEDURE — G0439 PR MEDICARE ANNUAL WELLNESS SUBSEQUENT VISIT: ICD-10-PCS | Mod: S$GLB,,, | Performed by: NURSE PRACTITIONER

## 2022-07-11 PROCEDURE — 4010F ACE/ARB THERAPY RXD/TAKEN: CPT | Mod: CPTII,S$GLB,, | Performed by: SURGERY

## 2022-07-11 PROCEDURE — 3288F PR FALLS RISK ASSESSMENT DOCUMENTED: ICD-10-PCS | Mod: CPTII,S$GLB,, | Performed by: SURGERY

## 2022-07-11 PROCEDURE — 99999 PR PBB SHADOW E&M-EST. PATIENT-LVL V: ICD-10-PCS | Mod: PBBFAC,,, | Performed by: SURGERY

## 2022-07-11 PROCEDURE — 99999 PR PBB SHADOW E&M-EST. PATIENT-LVL V: CPT | Mod: PBBFAC,,, | Performed by: SURGERY

## 2022-07-11 PROCEDURE — 1159F MED LIST DOCD IN RCRD: CPT | Mod: CPTII,S$GLB,, | Performed by: SURGERY

## 2022-07-11 PROCEDURE — 4010F PR ACE/ARB THEARPY RXD/TAKEN: ICD-10-PCS | Mod: CPTII,S$GLB,, | Performed by: NURSE PRACTITIONER

## 2022-07-11 PROCEDURE — 3288F FALL RISK ASSESSMENT DOCD: CPT | Mod: CPTII,S$GLB,, | Performed by: NURSE PRACTITIONER

## 2022-07-11 PROCEDURE — 3078F DIAST BP <80 MM HG: CPT | Mod: CPTII,S$GLB,, | Performed by: NURSE PRACTITIONER

## 2022-07-11 PROCEDURE — 1160F PR REVIEW ALL MEDS BY PRESCRIBER/CLIN PHARMACIST DOCUMENTED: ICD-10-PCS | Mod: CPTII,S$GLB,, | Performed by: SURGERY

## 2022-07-11 PROCEDURE — 1101F PR PT FALLS ASSESS DOC 0-1 FALLS W/OUT INJ PAST YR: ICD-10-PCS | Mod: CPTII,S$GLB,, | Performed by: SURGERY

## 2022-07-11 PROCEDURE — 1101F PT FALLS ASSESS-DOCD LE1/YR: CPT | Mod: CPTII,S$GLB,, | Performed by: SURGERY

## 2022-07-11 PROCEDURE — 99203 OFFICE O/P NEW LOW 30 MIN: CPT | Mod: S$GLB,,, | Performed by: SURGERY

## 2022-07-11 PROCEDURE — 1101F PR PT FALLS ASSESS DOC 0-1 FALLS W/OUT INJ PAST YR: ICD-10-PCS | Mod: CPTII,S$GLB,, | Performed by: NURSE PRACTITIONER

## 2022-07-11 PROCEDURE — 1160F RVW MEDS BY RX/DR IN RCRD: CPT | Mod: CPTII,S$GLB,, | Performed by: SURGERY

## 2022-07-11 PROCEDURE — 3078F DIAST BP <80 MM HG: CPT | Mod: CPTII,S$GLB,, | Performed by: SURGERY

## 2022-07-11 PROCEDURE — 99999 PR PBB SHADOW E&M-EST. PATIENT-LVL V: CPT | Mod: PBBFAC,,, | Performed by: NURSE PRACTITIONER

## 2022-07-11 PROCEDURE — 3075F PR MOST RECENT SYSTOLIC BLOOD PRESS GE 130-139MM HG: ICD-10-PCS | Mod: CPTII,S$GLB,, | Performed by: SURGERY

## 2022-07-11 PROCEDURE — 1101F PT FALLS ASSESS-DOCD LE1/YR: CPT | Mod: CPTII,S$GLB,, | Performed by: NURSE PRACTITIONER

## 2022-07-11 PROCEDURE — 3078F PR MOST RECENT DIASTOLIC BLOOD PRESSURE < 80 MM HG: ICD-10-PCS | Mod: CPTII,S$GLB,, | Performed by: NURSE PRACTITIONER

## 2022-07-11 PROCEDURE — 99203 PR OFFICE/OUTPT VISIT, NEW, LEVL III, 30-44 MIN: ICD-10-PCS | Mod: S$GLB,,, | Performed by: SURGERY

## 2022-07-11 PROCEDURE — 76536 US SOFT TISSUE HEAD NECK THYROID: ICD-10-PCS | Mod: 26,,, | Performed by: RADIOLOGY

## 2022-07-11 PROCEDURE — 3075F SYST BP GE 130 - 139MM HG: CPT | Mod: CPTII,S$GLB,, | Performed by: NURSE PRACTITIONER

## 2022-07-11 PROCEDURE — 3078F PR MOST RECENT DIASTOLIC BLOOD PRESSURE < 80 MM HG: ICD-10-PCS | Mod: CPTII,S$GLB,, | Performed by: SURGERY

## 2022-07-11 PROCEDURE — 1125F PR PAIN SEVERITY QUANTIFIED, PAIN PRESENT: ICD-10-PCS | Mod: CPTII,S$GLB,, | Performed by: SURGERY

## 2022-07-11 PROCEDURE — 76536 US EXAM OF HEAD AND NECK: CPT | Mod: TC,PO

## 2022-07-11 PROCEDURE — 1125F AMNT PAIN NOTED PAIN PRSNT: CPT | Mod: CPTII,S$GLB,, | Performed by: NURSE PRACTITIONER

## 2022-07-11 PROCEDURE — 76536 US EXAM OF HEAD AND NECK: CPT | Mod: 26,,, | Performed by: RADIOLOGY

## 2022-07-11 PROCEDURE — 3075F SYST BP GE 130 - 139MM HG: CPT | Mod: CPTII,S$GLB,, | Performed by: SURGERY

## 2022-07-11 PROCEDURE — 1159F PR MEDICATION LIST DOCUMENTED IN MEDICAL RECORD: ICD-10-PCS | Mod: CPTII,S$GLB,, | Performed by: SURGERY

## 2022-07-11 PROCEDURE — 4010F PR ACE/ARB THEARPY RXD/TAKEN: ICD-10-PCS | Mod: CPTII,S$GLB,, | Performed by: SURGERY

## 2022-07-11 PROCEDURE — 3288F PR FALLS RISK ASSESSMENT DOCUMENTED: ICD-10-PCS | Mod: CPTII,S$GLB,, | Performed by: NURSE PRACTITIONER

## 2022-07-11 PROCEDURE — 3288F FALL RISK ASSESSMENT DOCD: CPT | Mod: CPTII,S$GLB,, | Performed by: SURGERY

## 2022-07-11 PROCEDURE — 3075F PR MOST RECENT SYSTOLIC BLOOD PRESS GE 130-139MM HG: ICD-10-PCS | Mod: CPTII,S$GLB,, | Performed by: NURSE PRACTITIONER

## 2022-07-11 PROCEDURE — 1125F AMNT PAIN NOTED PAIN PRSNT: CPT | Mod: CPTII,S$GLB,, | Performed by: SURGERY

## 2022-07-11 PROCEDURE — 99999 PR PBB SHADOW E&M-EST. PATIENT-LVL V: ICD-10-PCS | Mod: PBBFAC,,, | Performed by: NURSE PRACTITIONER

## 2022-07-11 PROCEDURE — 4010F ACE/ARB THERAPY RXD/TAKEN: CPT | Mod: CPTII,S$GLB,, | Performed by: NURSE PRACTITIONER

## 2022-07-11 RX ORDER — OMEPRAZOLE 40 MG/1
1 CAPSULE, DELAYED RELEASE ORAL DAILY
COMMUNITY
Start: 2022-06-06 | End: 2023-01-26

## 2022-07-11 NOTE — PROGRESS NOTES
"  Sam Pete presented for a  Medicare AWV and comprehensive Health Risk Assessment today. The following components were reviewed and updated:    · Medical history  · Family History  · Social history  · Allergies and Current Medications  · Health Risk Assessment  · Health Maintenance  · Care Team         ** See Completed Assessments for Annual Wellness Visit within the encounter summary.**         The following assessments were completed:  · Living Situation  · CAGE  · Depression Screening  · Timed Get Up and Go  · Whisper Test  · Cognitive Function Screening          · Nutrition Screening  · ADL Screening  · PAQ Screening        Vitals:    07/11/22 1305   BP: 134/74   BP Location: Left arm   Patient Position: Sitting   BP Method: Medium (Manual)   Pulse: 70   SpO2: 96%   Weight: 108 kg (238 lb 1.6 oz)   Height: 5' 10" (1.778 m)     Body mass index is 34.16 kg/m².  Physical Exam  Vitals reviewed.   Constitutional:       General: He is not in acute distress.  HENT:      Head: Normocephalic.   Cardiovascular:      Rate and Rhythm: Normal rate.   Pulmonary:      Effort: Pulmonary effort is normal. No respiratory distress.   Skin:     General: Skin is warm.   Neurological:      General: No focal deficit present.      Mental Status: He is alert.   Psychiatric:         Mood and Affect: Mood normal.               Diagnoses and health risks identified today and associated recommendations/orders:    1. Encounter for preventive health examination  Reviewed health maintenance and provided recommendations   Recommend COVID booster   All additional health maintenance is UTD    2. Atherosclerosis of aorta  Continue to monitor  Followed by Ty Montalvo MD   CXR 3/3/22.      3. PAF (paroxysmal atrial fibrillation)  Continue to monitor  Followed by Ty Montalvo MD .      4. Obesity (BMI 30.0-34.9)  Continue to monitor  Followed by Ty Montalvo MD   Encourage healthy food chocies.      5. Abnormality of gait and " mobility  Continue to monitor  Followed by Ty Montalvo MD   Continue to ambulate with cane.        Provided Sam with a 5-10 year written screening schedule and personal prevention plan. Recommendations were developed using the USPSTF age appropriate recommendations. Education, counseling, and referrals were provided as needed. After Visit Summary printed and given to patient which includes a list of additional screenings\tests needed.    Follow up in one year    Esperanza Krishnamurthy NP  I offered to discuss advanced care planning, including how to pick a person who would make decisions for you if you were unable to make them for yourself, called a health care power of , and what kind of decisions you might make such as use of life sustaining treatments such as ventilators and tube feeding when faced with a life limiting illness recorded on a living will that they will need to know. (How you want to be cared for as you near the end of your natural life)     X  Patient has advanced directives written and agrees to provide copies to the institution.

## 2022-07-11 NOTE — PATIENT INSTRUCTIONS
Counseling and Referral of Other Preventative  (Italic type indicates deductible and co-insurance are waived)    Patient Name: Sam Pete  Today's Date: 7/11/2022    Health Maintenance       Date Due Completion Date    Lipid Panel 01/14/2022 1/14/2021    COVID-19 Vaccine (4 - Booster for Pfizer series) 02/15/2022 10/15/2021    Urine Drug Screen 07/05/2022 1/5/2022    Override on 4/15/2019: Done    Shingles Vaccine (2 of 2) 08/31/2022 7/6/2022    Influenza Vaccine (1) 09/01/2022 10/5/2021    Naloxone Prescription 07/06/2023 7/6/2022    High Dose Statin 07/11/2023 7/11/2022    TETANUS VACCINE 10/14/2025 10/14/2015    Colorectal Cancer Screening 02/25/2029 2/25/2019    Override on 7/12/2004: Done (report in legacy)        No orders of the defined types were placed in this encounter.    The following information is provided to all patients.  This information is to help you find resources for any of the problems found today that may be affecting your health:                Living healthy guide: www.[facility].[place].gov      Understanding Diabetes: www.diabetes.org      Eating healthy: www.cdc.gov/healthyweight      CDC home safety checklist: www.cdc.gov/steadi/patient.html      Agency on Aging: www.goea.[place].[facility]      Alcoholics anonymous (AA): www.aa.org      Physical Activity: www.mir.nih.gov/cd0bbgx      Tobacco use: www.quitwithusla.org

## 2022-07-11 NOTE — PROGRESS NOTES
Subjective:       Patient ID: Sam Pete is a 77 y.o. male.    Chief Complaint: Consult (Cyst under left arm)      HPI 77-year-old male presents in follow-up after incision and drainage of a left axillary abscess.  He was initially seen at urgent care and placed on antibiotics.  This then resolved.  It recurred he required incision and drainage at the urgent care.  Has now completely resolved in the does not appear to be any residual mass.  It is no longer draining.    Past Medical History:   Diagnosis Date    Anticoagulant long-term use     ASA 81 mg    Arthritis     Cataract     OU    Chronic low back pain     Complete rupture of rotator cuff 02/08/2011    DDD (degenerative disc disease), lumbar 07/08/2015    Degeneration of lumbar or lumbosacral intervertebral disc 09/09/2015    ED (erectile dysfunction)     GERD (gastroesophageal reflux disease)     Hypertension     Lumbar pseudoarthrosis 06/26/2017    Lumbar stenosis 06/26/2017    Obesity     Spondylosis of lumbar region without myelopathy or radiculopathy 07/08/2015    Stroke 1997    basal ganglia, left sided weakness, resolved    Testicular hypofunction     Thoracic or lumbosacral neuritis or radiculitis 07/08/2015     Past Surgical History:   Procedure Laterality Date    APPENDECTOMY      ARTHROPLASTY OF SHOULDER Right 3/22/2022    Procedure: ARTHROPLASTY, SHOULDER BRENNAN RIGHT SHOULDER HUMERAL HEAD RESURFACING;  Surgeon: Frankie Joya MD;  Location: [facility] OR;  Service: Orthopedics;  Laterality: Right;    BACK SURGERY      4- back surgery    CAUDAL EPIDURAL STEROID INJECTION N/A 12/16/2021    Procedure: Injection-steroid-epidural-caudal;  Surgeon: Aram Terrell MD;  Location: [facility] OR;  Service: Pain Management;  Laterality: N/A;    CAUDAL EPIDURAL STEROID INJECTION N/A 02/02/2022    Procedure: INJECTION, STEROID, SPINE, EPIDURAL, CAUDAL;  Surgeon: Aram Terrell MD;  Location: [facility] OR;  Service: Pain Management;  Laterality: N/A;     COLONOSCOPY  07/12/2004    [facility].   Redundant tortuous colon, otherwise normal.    COLONOSCOPY N/A 02/25/2019    Procedure: COLONOSCOPY;  Surgeon: Gilbert Rodriguez Jr., MD;  Location: [facility] ENDO;  Service: Endoscopy;  Laterality: N/A;    Epidural steroid injection      Pain management    ESOPHAGOGASTRODUODENOSCOPY  07/12/2004    [facility].    FRACTURE SURGERY Left     wrist / forearm, total of 5    INSERTION OF DORSAL COLUMN NERVE STIMULATOR FOR TRIAL N/A 12/12/2019    Procedure: INSERTION, NEUROSTIMULATOR, SPINAL CORD, DORSAL COLUMN, FOR TRIAL;  Surgeon: Evan Lyles MD;  Location: [facility] OR;  Service: Pain Management;  Laterality: N/A;    JOINT REPLACEMENT  02/06/2013    ( rt hip 2007), left hip     JOINT REPLACEMENT Left     total knee    KNEE ARTHROSCOPY W/ DEBRIDEMENT      bilateral knees , total of six    KNEE SURGERY      LAMINECTOMY USING MINIMALLY INVASIVE TECHNIQUE N/A 02/12/2020    Procedure: LAMINECTOMY, SPINE, MINIMALLY INVASIVE /  PLACEMENT OF SPINAL CORD STIMULATOR;  Surgeon: Darlene Max MD;  Location: [facility] OR;  Service: Neurosurgery;  Laterality: N/A;    Nervbe Block injection      Pain management    TOTAL THYROIDECTOMY           Current Outpatient Medications:     amLODIPine (NORVASC) 10 MG tablet, TAKE 1 TABLET EVERY DAY, Disp: 90 tablet, Rfl: 3    aspirin 81 MG Chew, Take 81 mg by mouth once daily., Disp: , Rfl:     atorvastatin (LIPITOR) 40 MG tablet, Take 1 tablet (40 mg total) by mouth once daily., Disp: 90 tablet, Rfl: 3    buPROPion (WELLBUTRIN SR) 150 MG TBSR 12 hr tablet, Take 1 tablet (150 mg total) by mouth once daily., Disp: 30 tablet, Rfl: 0    buPROPion (WELLBUTRIN XL) 150 MG TB24 tablet, Take 1 tablet (150 mg total) by mouth once daily., Disp: 90 tablet, Rfl: 3    calcitRIOL (ROCALTROL) 0.5 MCG Cap, Take 1 capsule (0.5 mcg total) by mouth once daily., Disp: 30 capsule, Rfl: 0    calcitRIOL (ROCALTROL) 0.5 MCG Cap, Take 1 capsule (0.5 mcg total) by mouth once daily.,  Disp: 90 capsule, Rfl: 3    COVID-19 test specimen collect Misc, TEST AS DIRECTED TODAY, Disp: , Rfl:     furosemide (LASIX) 40 MG tablet, Take 1 tablet (40 mg total) by mouth once daily., Disp: 30 tablet, Rfl: 5    levothyroxine (SYNTHROID) 150 MCG tablet, Take 1 tablet (150 mcg total) by mouth once daily., Disp: 90 tablet, Rfl: 0    losartan (COZAAR) 50 MG tablet, TAKE 1 TABLET(50 MG) BY MOUTH EVERY DAY, Disp: 90 tablet, Rfl: 3    [START ON 7/29/2022] morphine (MS CONTIN) 60 MG 12 hr tablet, Take 1 tablet (60 mg total) by mouth 2 (two) times daily., Disp: 60 tablet, Rfl: 0    [START ON 8/26/2022] morphine (MS CONTIN) 60 MG 12 hr tablet, Take 1 tablet (60 mg total) by mouth 2 (two) times daily., Disp: 60 tablet, Rfl: 0    [START ON 9/23/2022] morphine (MS CONTIN) 60 MG 12 hr tablet, Take 1 tablet (60 mg total) by mouth 2 (two) times daily., Disp: 60 tablet, Rfl: 0    omeprazole (PRILOSEC OTC) 20 MG tablet, 40 mg., Disp: , Rfl:     omeprazole (PRILOSEC) 40 MG capsule, Take 1 capsule by mouth once daily at 6am., Disp: , Rfl:     pregabalin (LYRICA) 75 MG capsule, Take 1 capsule (75 mg total) by mouth 2 (two) times daily., Disp: 180 capsule, Rfl: 1    saw/vit E/sod lisa/lyc/beta/pyg (PROSTATE HEALTH ORAL), Take 2 capsules by mouth once daily. With saw palmetto, Disp: , Rfl:     UNABLE TO FIND, Take by mouth once daily. Vitafusion multivites gummies, Disp: , Rfl:     varicella-zoster gE-AS01B, PF, (SHINGRIX, PF,) 50 mcg/0.5 mL injection, Give one dose IM now, repeat times one in 2-6 months, Disp: 1 each, Rfl: 1    albuterol (PROVENTIL/VENTOLIN HFA) 90 mcg/actuation inhaler, EVERY 4 HOURS AS NEEDED as needed for, Disp: , Rfl:     cephALEXin (KEFLEX) 500 MG capsule, Take 1 capsule (500 mg total) by mouth every 6 (six) hours. (Patient not taking: No sig reported), Disp: 20 capsule, Rfl: 0    KENALOG 40 mg/mL injection, , Disp: , Rfl:     mupirocin (BACTROBAN) 2 % ointment, Apply to affected area 3 times  daily (Patient not taking: Reported on 7/11/2022), Disp: 22 g, Rfl: 1    naloxone (NARCAN) 0.4 mg/mL injection, Inject 1 mL (0.4 mg total) into the vein as needed (overdose). (Patient not taking: Reported on 7/11/2022), Disp: 2 mL, Rfl: 5    ondansetron (ZOFRAN-ODT) 8 MG TbDL, Take 1 tablet (8 mg total) by mouth 2 (two) times daily as needed for nausea for 5days (Patient not taking: No sig reported), Disp: 10 tablet, Rfl: 0    [START ON 7/29/2022] oxyCODONE-acetaminophen (PERCOCET)  mg per tablet, Take 1 tablet by mouth every 4 (four) hours as needed for Pain. (Patient not taking: Reported on 7/11/2022), Disp: 120 tablet, Rfl: 0    [START ON 8/26/2022] oxyCODONE-acetaminophen (PERCOCET)  mg per tablet, Take 1 tablet by mouth every 4 (four) hours as needed for Pain. (Patient not taking: Reported on 7/11/2022), Disp: 120 tablet, Rfl: 0    [START ON 9/23/2022] oxyCODONE-acetaminophen (PERCOCET)  mg per tablet, Take 1 tablet by mouth every 4 (four) hours as needed for Pain. (Patient not taking: Reported on 7/11/2022), Disp: 120 tablet, Rfl: 0    sildenafil (REVATIO) 20 mg Tab, Take 1-5 daily as needed for ED (Patient not taking: Reported on 7/11/2022), Disp: 10 tablet, Rfl: 5    tiZANidine (ZANAFLEX) 4 MG tablet, Take 1 tablet (4 mg total) by mouth 3 (three) times daily as needed (muscle spasm). (Patient not taking: Reported on 7/11/2022), Disp: 270 tablet, Rfl: 1  No current facility-administered medications for this visit.    Facility-Administered Medications Ordered in Other Visits:     diphenhydrAMINE injection 12.5 mg, 12.5 mg, Intravenous, Once PRN, Enrique Rosales MD    electrolyte-S (ISOLYTE), , Intravenous, Continuous, Enrique Rosales MD    HYDROmorphone (PF) injection 0.2 mg, 0.2 mg, Intravenous, Q5 Min PRN, Enrique Rosales MD    HYDROmorphone (PF) injection 0.2 mg, 0.2 mg, Intravenous, Q5 Min PRN, Enrique Rosales MD    lactated ringers infusion, , Intravenous,  Once PRN, Aram Terrell MD    lactated ringers infusion, 10 mL/hr, Intravenous, Continuous, Enrique Rosales MD, Last Rate: 500 mL/hr at 22 1419, Rate Change at 22 1419    lorazepam injection 0.25 mg, 0.25 mg, Intravenous, Once PRN, Enrique Rosales MD    prochlorperazine injection Soln 5 mg, 5 mg, Intravenous, Q30 Min PRN, Enrique Rosales MD    sodium chloride 0.9% flush 3 mL, 3 mL, Intravenous, Q8H, Enrique Rosales MD    Review of patient's allergies indicates:   Allergen Reactions    Xyzal [levocetirizine] Other (See Comments)     Knocked him out        Family History   Problem Relation Age of Onset    Hypertension Father     Heart disease Father     Drug abuse Daughter     Hypertension Son     Lung disease Sister     COPD Sister     Hypertension Sister     Diverticulitis Sister     Gout Sister     Kidney disease Sister     No Known Problems Mother     Eczema Neg Hx     Lupus Neg Hx     Psoriasis Neg Hx     Melanoma Neg Hx      Social History     Socioeconomic History    Marital status:    Tobacco Use    Smoking status: Former Smoker     Packs/day: 1.00     Years: 30.00     Pack years: 30.00     Start date: 8/3/1963     Quit date: 1992     Years since quittin.5    Smokeless tobacco: Never Used   Substance and Sexual Activity    Alcohol use: Not Currently     Comment: On occasion    Drug use: Yes     Types: Oxycodone, Morphine       Review of Systems   Respiratory: Negative.    Cardiovascular: Negative.    Gastrointestinal: Negative.      Objective:     Physical Exam  Constitutional:       General: He is not in acute distress.     Appearance: He is well-developed.   HENT:      Head: Normocephalic and atraumatic.   Eyes:      General: No scleral icterus.     Conjunctiva/sclera: Conjunctivae normal.      Pupils: Pupils are equal, round, and reactive to light.      Comments: Localize edematous reaction of the face bilaterally inferior to the eyes.   No evidence of infection.   Neck:      Thyroid: No thyromegaly.   Cardiovascular:      Rate and Rhythm: Normal rate and regular rhythm.      Heart sounds: Normal heart sounds. No murmur heard.  Pulmonary:      Effort: Pulmonary effort is normal. No respiratory distress.      Breath sounds: Normal breath sounds. No wheezing or rales.   Abdominal:      General: Bowel sounds are normal. There is no distension.      Palpations: Abdomen is soft.      Tenderness: There is no abdominal tenderness.      Hernia: No hernia is present.   Musculoskeletal:         General: No tenderness. Normal range of motion.      Cervical back: Normal range of motion and neck supple.   Lymphadenopathy:      Cervical: No cervical adenopathy.   Skin:     General: Skin is warm and dry.      Findings: No erythema or rash.      Comments: For site of previous incision and drainage of the left axilla is healed.  There is no residual mass.  There is no drainage.  There is no evidence of active infection.   Neurological:      Mental Status: He is alert and oriented to person, place, and time.      Cranial Nerves: No cranial nerve deficit.   Psychiatric:         Behavior: Behavior normal.         Judgment: Judgment normal.       Assessment:     Encounter Diagnosis   Name Primary?    Axillary abscess Yes       Plan:      1. Continue observation of the left axilla.  If the mass recurs this can be excised or if an abscess develops he will return for drainage.  2. Plans to follow-up with his ENT about the facial edema.

## 2022-07-12 ENCOUNTER — LAB VISIT (OUTPATIENT)
Dept: LAB | Facility: HOSPITAL | Age: 77
End: 2022-07-12
Attending: FAMILY MEDICINE
Payer: MEDICARE

## 2022-07-12 DIAGNOSIS — I10 ESSENTIAL (PRIMARY) HYPERTENSION: ICD-10-CM

## 2022-07-12 DIAGNOSIS — E89.0 POSTOPERATIVE HYPOTHYROIDISM: ICD-10-CM

## 2022-07-12 DIAGNOSIS — E78.49 OTHER HYPERLIPIDEMIA: ICD-10-CM

## 2022-07-12 LAB
ALBUMIN SERPL BCP-MCNC: 4.4 G/DL (ref 3.5–5.2)
ALP SERPL-CCNC: 64 U/L (ref 55–135)
ALT SERPL W/O P-5'-P-CCNC: 31 U/L (ref 10–44)
ANION GAP SERPL CALC-SCNC: 11 MMOL/L (ref 8–16)
AST SERPL-CCNC: 58 U/L (ref 10–40)
BASOPHILS # BLD AUTO: 0.07 K/UL (ref 0–0.2)
BASOPHILS NFR BLD: 1 % (ref 0–1.9)
BILIRUB SERPL-MCNC: 0.5 MG/DL (ref 0.1–1)
BUN SERPL-MCNC: 15 MG/DL (ref 8–23)
CALCIUM SERPL-MCNC: 9.9 MG/DL (ref 8.7–10.5)
CHLORIDE SERPL-SCNC: 97 MMOL/L (ref 95–110)
CHOLEST SERPL-MCNC: 182 MG/DL (ref 120–199)
CHOLEST/HDLC SERPL: 2.8 {RATIO} (ref 2–5)
CO2 SERPL-SCNC: 31 MMOL/L (ref 23–29)
CREAT SERPL-MCNC: 1.3 MG/DL (ref 0.5–1.4)
DIFFERENTIAL METHOD: ABNORMAL
EOSINOPHIL # BLD AUTO: 0.2 K/UL (ref 0–0.5)
EOSINOPHIL NFR BLD: 2.6 % (ref 0–8)
ERYTHROCYTE [DISTWIDTH] IN BLOOD BY AUTOMATED COUNT: 17.4 % (ref 11.5–14.5)
EST. GFR  (AFRICAN AMERICAN): >60 ML/MIN/1.73 M^2
EST. GFR  (NON AFRICAN AMERICAN): 53.4 ML/MIN/1.73 M^2
GLUCOSE SERPL-MCNC: 134 MG/DL (ref 70–110)
HCT VFR BLD AUTO: 36.6 % (ref 40–54)
HDLC SERPL-MCNC: 65 MG/DL (ref 40–75)
HDLC SERPL: 35.7 % (ref 20–50)
HGB BLD-MCNC: 11.1 G/DL (ref 14–18)
IMM GRANULOCYTES # BLD AUTO: 0.01 K/UL (ref 0–0.04)
IMM GRANULOCYTES NFR BLD AUTO: 0.1 % (ref 0–0.5)
LDLC SERPL CALC-MCNC: 83 MG/DL (ref 63–159)
LYMPHOCYTES # BLD AUTO: 2.7 K/UL (ref 1–4.8)
LYMPHOCYTES NFR BLD: 39.3 % (ref 18–48)
MCH RBC QN AUTO: 24.6 PG (ref 27–31)
MCHC RBC AUTO-ENTMCNC: 30.3 G/DL (ref 32–36)
MCV RBC AUTO: 81 FL (ref 82–98)
MONOCYTES # BLD AUTO: 0.6 K/UL (ref 0.3–1)
MONOCYTES NFR BLD: 8 % (ref 4–15)
NEUTROPHILS # BLD AUTO: 3.4 K/UL (ref 1.8–7.7)
NEUTROPHILS NFR BLD: 49 % (ref 38–73)
NONHDLC SERPL-MCNC: 117 MG/DL
NRBC BLD-RTO: 0 /100 WBC
PLATELET # BLD AUTO: 320 K/UL (ref 150–450)
PMV BLD AUTO: 9.7 FL (ref 9.2–12.9)
POTASSIUM SERPL-SCNC: 4 MMOL/L (ref 3.5–5.1)
PROT SERPL-MCNC: 8.8 G/DL (ref 6–8.4)
RBC # BLD AUTO: 4.52 M/UL (ref 4.6–6.2)
SODIUM SERPL-SCNC: 139 MMOL/L (ref 136–145)
T4 FREE SERPL-MCNC: 0.6 NG/DL (ref 0.71–1.51)
TRIGL SERPL-MCNC: 170 MG/DL (ref 30–150)
TSH SERPL DL<=0.005 MIU/L-ACNC: 91.11 UIU/ML (ref 0.4–4)
WBC # BLD AUTO: 6.87 K/UL (ref 3.9–12.7)

## 2022-07-12 PROCEDURE — 85025 COMPLETE CBC W/AUTO DIFF WBC: CPT | Performed by: FAMILY MEDICINE

## 2022-07-12 PROCEDURE — 84443 ASSAY THYROID STIM HORMONE: CPT | Performed by: FAMILY MEDICINE

## 2022-07-12 PROCEDURE — 36415 COLL VENOUS BLD VENIPUNCTURE: CPT | Mod: PO | Performed by: FAMILY MEDICINE

## 2022-07-12 PROCEDURE — 80061 LIPID PANEL: CPT | Performed by: FAMILY MEDICINE

## 2022-07-12 PROCEDURE — 84439 ASSAY OF FREE THYROXINE: CPT | Performed by: FAMILY MEDICINE

## 2022-07-12 PROCEDURE — 80053 COMPREHEN METABOLIC PANEL: CPT | Performed by: FAMILY MEDICINE

## 2022-07-13 ENCOUNTER — PATIENT MESSAGE (OUTPATIENT)
Dept: ADMINISTRATIVE | Facility: OTHER | Age: 77
End: 2022-07-13
Payer: MEDICARE

## 2022-07-14 ENCOUNTER — TELEPHONE (OUTPATIENT)
Dept: FAMILY MEDICINE | Facility: CLINIC | Age: 77
End: 2022-07-14
Payer: MEDICARE

## 2022-07-14 DIAGNOSIS — E03.9 ACQUIRED HYPOTHYROIDISM: Primary | ICD-10-CM

## 2022-07-14 NOTE — LETTER
February 5, 2020      Evan Lyles MD  [address] [facility] [address] [place] [address]           [facility] - Neurosurgery 7th Fl  [address]  [place] [address]  Phone: [phone]          Patient: Sam Pete   MR Number: [id number]   YOB: 1945   Date of Visit: 2/5/2020       Dear Dr. Evan Lyles:    Thank you for referring Sam Pete to me for evaluation. Attached you will find relevant portions of my assessment and plan of care.    If you have questions, please do not hesitate to call me. I look forward to following Sam Pete along with you.    Sincerely,    Darlene Max MD    Enclosure  CC:  No Recipients    If you would like to receive this communication electronically, please contact [email or web address] or [phone] to request more information on [facility] access.    For providers and/or their staff who would like to refer a patient to [facility], please contact us through our one-stop-shop provider referral line, [facility], at [phone].    If you feel you have received this communication in error or would no longer like to receive these types of communications, please e-mail [email or web address]          Call to discuss results in 2 weeks; [phone]

## 2022-07-14 NOTE — TELEPHONE ENCOUNTER
----- Message from EMILY Farmer sent at 7/13/2022 11:56 AM CDT -----  Please call patient with lab results.  I need to verify that he is taking his Synthroid 150 mcg on an empty stomach with no other medications.  His thyroid is way out of control.  The rest of his labs look stable.  His triglycerides have elevated since last year, please also verify he is taking Lipitor.

## 2022-07-14 NOTE — TELEPHONE ENCOUNTER
Patient states he is taking Atorvastatin 40 mg daily as prescribed. Also states he is taking Levothyroxine 150 mcg everyday. But admits he is not taking it on an empty stomach separate from other medications. Will send follow up message to Dr. Montalvo for notification.

## 2022-07-15 ENCOUNTER — TELEPHONE (OUTPATIENT)
Dept: FAMILY MEDICINE | Facility: CLINIC | Age: 77
End: 2022-07-15
Payer: MEDICARE

## 2022-07-15 ENCOUNTER — OFFICE VISIT (OUTPATIENT)
Dept: DERMATOLOGY | Facility: CLINIC | Age: 77
End: 2022-07-15
Payer: MEDICARE

## 2022-07-15 VITALS — BODY MASS INDEX: 34.07 KG/M2 | WEIGHT: 238 LBS | HEIGHT: 70 IN

## 2022-07-15 DIAGNOSIS — L57.0 ACTINIC KERATOSES: ICD-10-CM

## 2022-07-15 DIAGNOSIS — H02.849 SWELLING OF EYELID, UNSPECIFIED LATERALITY: Primary | ICD-10-CM

## 2022-07-15 DIAGNOSIS — E89.0 POSTABLATIVE HYPOTHYROIDISM: ICD-10-CM

## 2022-07-15 PROCEDURE — 99999 PR PBB SHADOW E&M-EST. PATIENT-LVL IV: CPT | Mod: PBBFAC,,, | Performed by: DERMATOLOGY

## 2022-07-15 PROCEDURE — 99213 OFFICE O/P EST LOW 20 MIN: CPT | Mod: 25,S$GLB,, | Performed by: DERMATOLOGY

## 2022-07-15 PROCEDURE — 99999 PR PBB SHADOW E&M-EST. PATIENT-LVL IV: ICD-10-PCS | Mod: PBBFAC,,, | Performed by: DERMATOLOGY

## 2022-07-15 PROCEDURE — 3288F FALL RISK ASSESSMENT DOCD: CPT | Mod: CPTII,S$GLB,, | Performed by: DERMATOLOGY

## 2022-07-15 PROCEDURE — 3288F PR FALLS RISK ASSESSMENT DOCUMENTED: ICD-10-PCS | Mod: CPTII,S$GLB,, | Performed by: DERMATOLOGY

## 2022-07-15 PROCEDURE — 1126F AMNT PAIN NOTED NONE PRSNT: CPT | Mod: CPTII,S$GLB,, | Performed by: DERMATOLOGY

## 2022-07-15 PROCEDURE — 1160F PR REVIEW ALL MEDS BY PRESCRIBER/CLIN PHARMACIST DOCUMENTED: ICD-10-PCS | Mod: CPTII,S$GLB,, | Performed by: DERMATOLOGY

## 2022-07-15 PROCEDURE — 1101F PR PT FALLS ASSESS DOC 0-1 FALLS W/OUT INJ PAST YR: ICD-10-PCS | Mod: CPTII,S$GLB,, | Performed by: DERMATOLOGY

## 2022-07-15 PROCEDURE — 1159F MED LIST DOCD IN RCRD: CPT | Mod: CPTII,S$GLB,, | Performed by: DERMATOLOGY

## 2022-07-15 PROCEDURE — 1159F PR MEDICATION LIST DOCUMENTED IN MEDICAL RECORD: ICD-10-PCS | Mod: CPTII,S$GLB,, | Performed by: DERMATOLOGY

## 2022-07-15 PROCEDURE — 4010F ACE/ARB THERAPY RXD/TAKEN: CPT | Mod: CPTII,S$GLB,, | Performed by: DERMATOLOGY

## 2022-07-15 PROCEDURE — 4010F PR ACE/ARB THEARPY RXD/TAKEN: ICD-10-PCS | Mod: CPTII,S$GLB,, | Performed by: DERMATOLOGY

## 2022-07-15 PROCEDURE — 1160F RVW MEDS BY RX/DR IN RCRD: CPT | Mod: CPTII,S$GLB,, | Performed by: DERMATOLOGY

## 2022-07-15 PROCEDURE — 99213 PR OFFICE/OUTPT VISIT, EST, LEVL III, 20-29 MIN: ICD-10-PCS | Mod: 25,S$GLB,, | Performed by: DERMATOLOGY

## 2022-07-15 PROCEDURE — 1101F PT FALLS ASSESS-DOCD LE1/YR: CPT | Mod: CPTII,S$GLB,, | Performed by: DERMATOLOGY

## 2022-07-15 PROCEDURE — 17003 DESTRUCT PREMALG LES 2-14: CPT | Mod: 59,S$GLB,, | Performed by: DERMATOLOGY

## 2022-07-15 PROCEDURE — 17000 DESTRUCT PREMALG LESION: CPT | Mod: 59,S$GLB,, | Performed by: DERMATOLOGY

## 2022-07-15 PROCEDURE — 1126F PR PAIN SEVERITY QUANTIFIED, NO PAIN PRESENT: ICD-10-PCS | Mod: CPTII,S$GLB,, | Performed by: DERMATOLOGY

## 2022-07-15 PROCEDURE — 17000 PR DESTRUCTION(LASER SURGERY,CRYOSURGERY,CHEMOSURGERY),PREMALIGNANT LESIONS,FIRST LESION: ICD-10-PCS | Mod: 59,S$GLB,, | Performed by: DERMATOLOGY

## 2022-07-15 PROCEDURE — 17003 DESTRUCTION, PREMALIGNANT LESIONS; SECOND THROUGH 14 LESIONS: ICD-10-PCS | Mod: 59,S$GLB,, | Performed by: DERMATOLOGY

## 2022-07-15 NOTE — PROGRESS NOTES
"  Subjective:       Patient ID:  Sam Pete is a 77 y.o. male who presents for   Chief Complaint   Patient presents with    Skin Check     ubsc    bags under eyes     LOV- 11/30/21- Dr. Foss- ak, sk, angioma. Nevi    Here today for an UBSC  C/o bags under his eyes, "just came up a few weeks ago", denies eyelid swelling, no redness or rash  Otherwise in usual state of health  Noticed thyroid dysfunction one week ago (Dr wagner), had stopped taking his thyroid supplement    Shoulder replacement end of March  Thyroidectomy end of February    Has no hx of NMSC  Has no fhx of MM    Current Outpatient Medications:     albuterol (PROVENTIL/VENTOLIN HFA) 90 mcg/actuation inhaler, EVERY 4 HOURS AS NEEDED as needed for, Disp: , Rfl:     amLODIPine (NORVASC) 10 MG tablet, TAKE 1 TABLET EVERY DAY, Disp: 90 tablet, Rfl: 3    aspirin 81 MG Chew, Take 81 mg by mouth once daily., Disp: , Rfl:     atorvastatin (LIPITOR) 40 MG tablet, Take 1 tablet (40 mg total) by mouth once daily., Disp: 90 tablet, Rfl: 3    buPROPion (WELLBUTRIN SR) 150 MG TBSR 12 hr tablet, Take 1 tablet (150 mg total) by mouth once daily., Disp: 30 tablet, Rfl: 0    buPROPion (WELLBUTRIN XL) 150 MG TB24 tablet, Take 1 tablet (150 mg total) by mouth once daily., Disp: 90 tablet, Rfl: 3    calcitRIOL (ROCALTROL) 0.5 MCG Cap, Take 1 capsule (0.5 mcg total) by mouth once daily., Disp: 30 capsule, Rfl: 0    calcitRIOL (ROCALTROL) 0.5 MCG Cap, Take 1 capsule (0.5 mcg total) by mouth once daily., Disp: 90 capsule, Rfl: 3    furosemide (LASIX) 40 MG tablet, Take 1 tablet (40 mg total) by mouth once daily., Disp: 30 tablet, Rfl: 5    levothyroxine (SYNTHROID) 150 MCG tablet, Take 1 tablet (150 mcg total) by mouth once daily., Disp: 90 tablet, Rfl: 0    losartan (COZAAR) 50 MG tablet, TAKE 1 TABLET(50 MG) BY MOUTH EVERY DAY, Disp: 90 tablet, Rfl: 3    mupirocin (BACTROBAN) 2 % ointment, Apply to affected area 3 times daily, Disp: 22 g, Rfl: 1    " naloxone (NARCAN) 0.4 mg/mL injection, Inject 1 mL (0.4 mg total) into the vein as needed (overdose)., Disp: 2 mL, Rfl: 5    omeprazole (PRILOSEC OTC) 20 MG tablet, 40 mg., Disp: , Rfl:     omeprazole (PRILOSEC) 40 MG capsule, Take 1 capsule by mouth once daily at 6am., Disp: , Rfl:     ondansetron (ZOFRAN-ODT) 8 MG TbDL, Take 1 tablet (8 mg total) by mouth 2 (two) times daily as needed for nausea for 5days, Disp: 10 tablet, Rfl: 0      pregabalin (LYRICA) 75 MG capsule, Take 1 capsule (75 mg total) by mouth 2 (two) times daily., Disp: 180 capsule, Rfl: 1    saw/vit E/sod lisa/lyc/beta/pyg (PROSTATE HEALTH ORAL), Take 2 capsules by mouth once daily. With saw palmetto, Disp: , Rfl:     sildenafil (REVATIO) 20 mg Tab, Take 1-5 daily as needed for ED, Disp: 10 tablet, Rfl: 5    tiZANidine (ZANAFLEX) 4 MG tablet, Take 1 tablet (4 mg total) by mouth 3 (three) times daily as needed (muscle spasm)., Disp: 270 tablet, Rfl: 1    UNABLE TO FIND, Take by mouth once daily. Vitafusion multivites gummies, Disp: , Rfl:     cephALEXin (KEFLEX) 500 MG capsule, Take 1 capsule (500 mg total) by mouth every 6 (six) hours. (Patient not taking: Reported on 7/15/2022), Disp: 20 capsule, Rfl: 0    COVID-19 test specimen collect Misc, TEST AS DIRECTED TODAY, Disp: , Rfl:     KENALOG 40 mg/mL injection, , Disp: , Rfl:     varicella-zoster gE-AS01B, PF, (SHINGRIX, PF,) 50 mcg/0.5 mL injection, Give one dose IM now, repeat times one in 2-6 months (Patient not taking: Reported on 7/15/2022), Disp: 1 each, Rfl: 1        Review of Systems   Constitutional: Negative for fever, chills and fatigue.   Respiratory: Negative for cough and shortness of breath.    Skin: Positive for activity-related sunscreen use and wears hat. Negative for itching, rash, dry skin and daily sunscreen use.   Hematologic/Lymphatic: Bruises/bleeds easily.        Bruises easily        Objective:    Physical Exam   Constitutional: He appears well-developed and  well-nourished. No distress.   Neurological: He is alert and oriented to person, place, and time. He is not disoriented.   Psychiatric: He has a normal mood and affect.   Skin:   Areas Examined (abnormalities noted in diagram):   Head / Face Inspection Performed  Neck Inspection Performed  Chest / Axilla Inspection Performed                           Diagram Legend     Erythematous scaling macule/papule c/w actinic keratosis       Vascular papule c/w angioma      Pigmented verrucoid papule/plaque c/w seborrheic keratosis      Yellow umbilicated papule c/w sebaceous hyperplasia      Irregularly shaped tan macule c/w lentigo     1-2 mm smooth white papules consistent with Milia      Movable subcutaneous cyst with punctum c/w epidermal inclusion cyst      Subcutaneous movable cyst c/w pilar cyst      Firm pink to brown papule c/w dermatofibroma      Pedunculated fleshy papule(s) c/w skin tag(s)      Evenly pigmented macule c/w junctional nevus     Mildly variegated pigmented, slightly irregular-bordered macule c/w mildly atypical nevus      Flesh colored to evenly pigmented papule c/w intradermal nevus       Pink pearly papule/plaque c/w basal cell carcinoma      Erythematous hyperkeratotic cursted plaque c/w SCC      Surgical scar with no sign of skin cancer recurrence      Open and closed comedones      Inflammatory papules and pustules      Verrucoid papule consistent consistent with wart     Erythematous eczematous patches and plaques     Dystrophic onycholytic nail with subungual debris c/w onychomycosis     Umbilicated papule    Erythematous-base heme-crusted tan verrucoid plaque consistent with inflamed seborrheic keratosis     Erythematous Silvery Scaling Plaque c/w Psoriasis     See annotation     Latest Reference Range & Units 07/12/22 11:19   TSH 0.400 - 4.000 uIU/mL 91.112 (H)   Free T4 0.71 - 1.51 ng/dL 0.60 (L)   (H): Data is abnormally high  (L): Data is abnormally low              Assessment / Plan:         Swelling of eyelid, unspecified laterality  Lower lids, dependent  Suspect tie to current hypothyroidism (myxedema), advised patient to never stop supplementing, take early am on empty stomach, no food or drink for 30min following intake    Actinic keratoses  Cryosurgery Procedure Note    Verbal consent from the patient is obtained and the patient is aware of the precancerous quality and need for treatment of these lesions. Liquid nitrogen cryosurgery is applied to the 2 actinic keratoses, as detailed in the physical exam, to produce a freeze injury. The patient is aware that blisters may form and is instructed on wound care with gentle cleansing and use of vaseline ointment to keep moist until healed. The patient is supplied a handout on cryosurgery and is instructed to call if lesions do not completely resolve.    Postablative hypothyroidism  Last tsh >90, PCP monitoring             Follow up if symptoms worsen or fail to improve.

## 2022-07-15 NOTE — TELEPHONE ENCOUNTER
----- Message from Linn Patel sent at 7/14/2022  4:28 PM CDT -----  Regarding: pt wife called  Name of Who is Calling: ROBERT PEARL [[id number]]       What is the request in detail: pt is requesting to speak with the nurse about test results. Please advise       Can the clinic reply by [facility]: No      What Number to Call Back if not in [facility]: [phone] (home)

## 2022-07-18 NOTE — TELEPHONE ENCOUNTER
He needs to take his thyroid alone with water only on an empty stomach and nothing else for at least 30 minutes afterwards.  Repeat the TSH in six weeks.  Order is in

## 2022-07-19 PROBLEM — E66.01 SEVERE OBESITY (BMI 35.0-35.9 WITH COMORBIDITY): Status: RESOLVED | Noted: 2022-07-06 | Resolved: 2022-07-19

## 2022-07-19 NOTE — TELEPHONE ENCOUNTER
I spoke to the patient regarding the instructions for his thyroid medication. Patient voiced understanding. Follow up labs scheduled for 8/31/22.

## 2022-07-21 DIAGNOSIS — D60.0: Primary | ICD-10-CM

## 2022-07-22 ENCOUNTER — HOSPITAL ENCOUNTER (OUTPATIENT)
Facility: AMBULARY SURGERY CENTER | Age: 77
Discharge: HOME | End: 2022-07-22
Attending: ANESTHESIOLOGY | Admitting: ANESTHESIOLOGY
Payer: MEDICARE

## 2022-07-22 ENCOUNTER — TELEPHONE (OUTPATIENT)
Dept: FAMILY MEDICINE | Facility: CLINIC | Age: 77
End: 2022-07-22
Payer: MEDICARE

## 2022-07-22 DIAGNOSIS — M54.16 LUMBAR RADICULITIS: Primary | ICD-10-CM

## 2022-07-22 PROCEDURE — 62323 NJX INTERLAMINAR LMBR/SAC: CPT | Performed by: ANESTHESIOLOGY

## 2022-07-22 PROCEDURE — 62323 NJX INTERLAMINAR LMBR/SAC: CPT | Mod: ,,, | Performed by: ANESTHESIOLOGY

## 2022-07-22 PROCEDURE — 62323 PR INJ LUMBAR/SACRAL, W/IMAGING GUIDANCE: ICD-10-PCS | Mod: ,,, | Performed by: ANESTHESIOLOGY

## 2022-07-22 RX ORDER — SODIUM CHLORIDE, SODIUM LACTATE, POTASSIUM CHLORIDE, CALCIUM CHLORIDE 600; 310; 30; 20 MG/100ML; MG/100ML; MG/100ML; MG/100ML
INJECTION, SOLUTION INTRAVENOUS ONCE AS NEEDED
Status: COMPLETED | OUTPATIENT
Start: 2022-07-22 | End: 2022-07-22

## 2022-07-22 RX ORDER — FENTANYL CITRATE 50 UG/ML
INJECTION, SOLUTION INTRAMUSCULAR; INTRAVENOUS
Status: DISCONTINUED | OUTPATIENT
Start: 2022-07-22 | End: 2022-07-22 | Stop reason: HOSPADM

## 2022-07-22 RX ORDER — DEXAMETHASONE SODIUM PHOSPHATE 10 MG/ML
INJECTION INTRAMUSCULAR; INTRAVENOUS
Status: DISCONTINUED | OUTPATIENT
Start: 2022-07-22 | End: 2022-07-22 | Stop reason: HOSPADM

## 2022-07-22 RX ORDER — SODIUM CHLORIDE 0.9 % (FLUSH) 0.9 %
SYRINGE (ML) INJECTION
Status: DISCONTINUED | OUTPATIENT
Start: 2022-07-22 | End: 2022-07-22 | Stop reason: HOSPADM

## 2022-07-22 RX ORDER — MIDAZOLAM HYDROCHLORIDE 1 MG/ML
INJECTION INTRAMUSCULAR; INTRAVENOUS
Status: DISCONTINUED | OUTPATIENT
Start: 2022-07-22 | End: 2022-07-22 | Stop reason: HOSPADM

## 2022-07-22 RX ORDER — LIDOCAINE HYDROCHLORIDE 10 MG/ML
INJECTION, SOLUTION EPIDURAL; INFILTRATION; INTRACAUDAL; PERINEURAL
Status: DISCONTINUED | OUTPATIENT
Start: 2022-07-22 | End: 2022-07-22 | Stop reason: HOSPADM

## 2022-07-22 RX ADMIN — SODIUM CHLORIDE, SODIUM LACTATE, POTASSIUM CHLORIDE, CALCIUM CHLORIDE: 600; 310; 30; 20 INJECTION, SOLUTION INTRAVENOUS at 10:07

## 2022-07-22 NOTE — PLAN OF CARE
Discharge instructions given to pt/wife, verbalized understanding.  Tolerating PO fluids.  IV removed.  Able to maintain RA  o2 sats above 92%.  Ambulating out per cane to wife  with RN in no distress.

## 2022-07-22 NOTE — TELEPHONE ENCOUNTER
Call placed to patient for notification. Patient verbalized understanding. Patient states he may have a beer every once in a while, but generally does not drink much alcohol. Advised patient Hematology will be contacting him to assist with scheduling his appointment, as our department is unable to schedule for this particular specialty area. Will send follow up message to Dr. Montalvo for notification.

## 2022-07-22 NOTE — OP NOTE
PROCEDURE DATE: 7/22/2022    PROCEDURE:  Caudal epidural steroid injection under fluoroscopy.    Diagnosis: Lumbar radiculitis    Post Op diagnosis: Same    PHYSICIAN: Aram Terrell M.D.    MEDICATIONS INJECTED:  10 mg of dexamethasone and 4 ml of sterile, preservative-free NaCl.    LOCAL ANESTHETIC GIVEN:  Lidocaine 1%, 2 ml total    SEDATION MEDICATIONS: RN IV sedation    ESTIMATED BLOOD LOSS:  None    COMPLICATIONS:  None    TECHNIQUE:   After the patient was placed in prone position, the patient was prepped and draped in the usual sterile fashion using ChloraPrep and sterile towels.  Appropriate anatomic landmarks were determined by identifying the sacral hiatus in the lateral fluoroscopic view.  Local anesthetic was given via a 25g 1.5 inch needle by raising a wheal and infiltrating down to the periosteum.  A 3.5 inch 20 gauge touhy needle was introduced thru the sacral hiatus.  2ml of contrast was injected to confirm placement in the appropriate area and that there was no vascular uptake.  The medication was then injected slowly.  The patient tolerated the procedure well.    The patient was monitored after the procedure.  Patient was given post procedure and discharge instructions to follow at home.  The patient was discharged in a stable condition

## 2022-07-22 NOTE — DISCHARGE SUMMARY
[facility]  Discharge Note  Short Stay    Procedure(s) (LRB):  Injection-steroid-epidural-caudal (N/A)    OUTCOME: Patient tolerated treatment/procedure well without complication and is now ready for discharge.    DISPOSITION: Home or Self Care    FINAL DIAGNOSIS:  <principal problem not specified>    FOLLOWUP: In clinic    DISCHARGE INSTRUCTIONS:    Discharge Procedure Orders   Notify your health care provider if you experience any of the following:  temperature >100.4     Notify your health care provider if you experience any of the following:  severe uncontrolled pain     Notify your health care provider if you experience any of the following:  redness, tenderness, or signs of infection (pain, swelling, redness, odor or green/yellow discharge around incision site)     Activity as tolerated        TIME SPENT ON DISCHARGE: 30 minutes

## 2022-07-22 NOTE — TELEPHONE ENCOUNTER
----- Message from Ty Montalvo MD sent at 7/21/2022  8:35 PM CDT -----  His blood count is mildly anemic and slowly declining over many years since about 2007. The white cells and platelets are not affected.  This appears to be limited to the red blood cells and would look like thalassemia except it started 15 years ago.  Thalassemia would have been present since childhood or teens.  I would suggest a hematology consult.    The cholesterol levels are good with a mild increase in the triglycerides, no changes are needed in the Lipitor.    His blood sugar was in the diabetic range at 134, was he fasting.  Kidney functions and electrolytes are good but the AST liver enzyme is elevated.  This might be an indicator of alcohol use.  How much has he been drinking?      Result sent to patient by e-mail    Consult in for Hematology

## 2022-07-25 ENCOUNTER — IMMUNIZATION (OUTPATIENT)
Dept: FAMILY MEDICINE | Facility: CLINIC | Age: 77
End: 2022-07-25
Payer: MEDICARE

## 2022-07-25 ENCOUNTER — PATIENT MESSAGE (OUTPATIENT)
Dept: SPINE | Facility: CLINIC | Age: 77
End: 2022-07-25
Payer: MEDICARE

## 2022-07-25 ENCOUNTER — TELEPHONE (OUTPATIENT)
Dept: HEMATOLOGY/ONCOLOGY | Facility: CLINIC | Age: 77
End: 2022-07-25
Payer: MEDICARE

## 2022-07-25 DIAGNOSIS — Z23 NEED FOR VACCINATION: Primary | ICD-10-CM

## 2022-07-25 PROCEDURE — 0054A COVID-19, MRNA, LNP-S, PF, 30 MCG/0.3 ML DOSE VACCINE (PFIZER): CPT | Mod: PBBFAC | Performed by: RADIOLOGY

## 2022-07-25 NOTE — TELEPHONE ENCOUNTER
Received referral note from [facility] navigator.  Pt prefer to schedule in [place].  Called and sched hem appt, pt refused first avail and choosen a Monday benign hem appt.  First avail w Dr Melchor 8/8/ at 1pm.  Confirmed jina w/ pt.

## 2022-07-26 VITALS
TEMPERATURE: 98 F | DIASTOLIC BLOOD PRESSURE: 62 MMHG | SYSTOLIC BLOOD PRESSURE: 129 MMHG | RESPIRATION RATE: 18 BRPM | HEART RATE: 71 BPM | WEIGHT: 238.13 LBS | BODY MASS INDEX: 34.16 KG/M2 | OXYGEN SATURATION: 94 %

## 2022-08-31 ENCOUNTER — LAB VISIT (OUTPATIENT)
Dept: LAB | Facility: HOSPITAL | Age: 77
End: 2022-08-31
Attending: FAMILY MEDICINE
Payer: MEDICARE

## 2022-08-31 DIAGNOSIS — E03.9 ACQUIRED HYPOTHYROIDISM: ICD-10-CM

## 2022-08-31 PROCEDURE — 84443 ASSAY THYROID STIM HORMONE: CPT | Performed by: FAMILY MEDICINE

## 2022-08-31 PROCEDURE — 36415 COLL VENOUS BLD VENIPUNCTURE: CPT | Mod: PO | Performed by: FAMILY MEDICINE

## 2022-08-31 PROCEDURE — 84439 ASSAY OF FREE THYROXINE: CPT | Performed by: FAMILY MEDICINE

## 2022-09-01 ENCOUNTER — PATIENT MESSAGE (OUTPATIENT)
Dept: HEMATOLOGY/ONCOLOGY | Facility: CLINIC | Age: 77
End: 2022-09-01
Payer: MEDICARE

## 2022-09-01 ENCOUNTER — TELEPHONE (OUTPATIENT)
Dept: HEMATOLOGY/ONCOLOGY | Facility: CLINIC | Age: 77
End: 2022-09-01
Payer: MEDICARE

## 2022-09-01 LAB
T4 FREE SERPL-MCNC: 0.88 NG/DL (ref 0.71–1.51)
TSH SERPL DL<=0.005 MIU/L-ACNC: 17.38 UIU/ML (ref 0.4–4)

## 2022-09-01 NOTE — TELEPHONE ENCOUNTER
ABBIE .   Several attempts were made to contact the pt to inform him Dr Melchor has resigned and we will have to reschedule his appt.    Msg also sent in his  pt portal.     Pt returned call about appt on 9/12 w/ iJll.  Confirmed with him this appt is good per the clinic.   (Checked leela Cali and Jill will be in at this time see pt's).

## 2022-09-02 ENCOUNTER — PATIENT MESSAGE (OUTPATIENT)
Dept: FAMILY MEDICINE | Facility: CLINIC | Age: 77
End: 2022-09-02
Payer: MEDICARE

## 2022-09-06 NOTE — TELEPHONE ENCOUNTER
The opioids may slow the absorption down a little bit by reducing GI motility but that would occur with the he took it with them or not.  I would recommend that we increase the dosage from 150 mcg to 175 mcg.

## 2022-09-07 ENCOUNTER — PATIENT MESSAGE (OUTPATIENT)
Dept: FAMILY MEDICINE | Facility: CLINIC | Age: 77
End: 2022-09-07
Payer: MEDICARE

## 2022-09-07 DIAGNOSIS — E89.0 POSTOPERATIVE HYPOTHYROIDISM: ICD-10-CM

## 2022-09-07 RX ORDER — LEVOTHYROXINE SODIUM 175 UG/1
175 TABLET ORAL DAILY
Qty: 90 TABLET | Refills: 1 | Status: SHIPPED | OUTPATIENT
Start: 2022-09-07 | End: 2022-11-10

## 2022-09-07 NOTE — TELEPHONE ENCOUNTER
Levothyroxine 175 mcg sent to [facility] in [place].  Order in for TSH in eight weeks.

## 2022-09-09 ENCOUNTER — TELEPHONE (OUTPATIENT)
Dept: HEMATOLOGY/ONCOLOGY | Facility: CLINIC | Age: 77
End: 2022-09-09
Payer: MEDICARE

## 2022-09-12 ENCOUNTER — OFFICE VISIT (OUTPATIENT)
Dept: HEMATOLOGY/ONCOLOGY | Facility: CLINIC | Age: 77
End: 2022-09-12
Payer: MEDICARE

## 2022-09-12 ENCOUNTER — LAB VISIT (OUTPATIENT)
Dept: LAB | Facility: HOSPITAL | Age: 77
End: 2022-09-12
Attending: INTERNAL MEDICINE
Payer: MEDICARE

## 2022-09-12 VITALS
OXYGEN SATURATION: 95 % | BODY MASS INDEX: 32.95 KG/M2 | HEIGHT: 70 IN | TEMPERATURE: 98 F | RESPIRATION RATE: 18 BRPM | DIASTOLIC BLOOD PRESSURE: 95 MMHG | SYSTOLIC BLOOD PRESSURE: 136 MMHG | WEIGHT: 230.19 LBS | HEART RATE: 111 BPM

## 2022-09-12 DIAGNOSIS — D50.9 MICROCYTIC ANEMIA: ICD-10-CM

## 2022-09-12 DIAGNOSIS — D50.9 MICROCYTIC ANEMIA: Primary | ICD-10-CM

## 2022-09-12 LAB
ALBUMIN SERPL BCP-MCNC: 4 G/DL (ref 3.5–5.2)
ALP SERPL-CCNC: 61 U/L (ref 55–135)
ALT SERPL W/O P-5'-P-CCNC: 18 U/L (ref 10–44)
ANION GAP SERPL CALC-SCNC: 14 MMOL/L (ref 8–16)
AST SERPL-CCNC: 20 U/L (ref 10–40)
BASOPHILS # BLD AUTO: 0.06 K/UL (ref 0–0.2)
BASOPHILS NFR BLD: 0.7 % (ref 0–1.9)
BILIRUB SERPL-MCNC: 0.3 MG/DL (ref 0.1–1)
BUN SERPL-MCNC: 19 MG/DL (ref 8–23)
CALCIUM SERPL-MCNC: 9.3 MG/DL (ref 8.7–10.5)
CHLORIDE SERPL-SCNC: 102 MMOL/L (ref 95–110)
CO2 SERPL-SCNC: 22 MMOL/L (ref 23–29)
CREAT SERPL-MCNC: 1 MG/DL (ref 0.5–1.4)
DIFFERENTIAL METHOD: ABNORMAL
EOSINOPHIL # BLD AUTO: 0.2 K/UL (ref 0–0.5)
EOSINOPHIL NFR BLD: 1.8 % (ref 0–8)
ERYTHROCYTE [DISTWIDTH] IN BLOOD BY AUTOMATED COUNT: 16.9 % (ref 11.5–14.5)
EST. GFR  (NO RACE VARIABLE): >60 ML/MIN/1.73 M^2
GLUCOSE SERPL-MCNC: 132 MG/DL (ref 70–110)
HCT VFR BLD AUTO: 35.5 % (ref 40–54)
HGB BLD-MCNC: 11 G/DL (ref 14–18)
IMM GRANULOCYTES # BLD AUTO: 0.02 K/UL (ref 0–0.04)
IMM GRANULOCYTES NFR BLD AUTO: 0.2 % (ref 0–0.5)
IRON SERPL-MCNC: 22 UG/DL (ref 45–160)
LDH SERPL L TO P-CCNC: 190 U/L (ref 110–260)
LYMPHOCYTES # BLD AUTO: 2.3 K/UL (ref 1–4.8)
LYMPHOCYTES NFR BLD: 26.8 % (ref 18–48)
MCH RBC QN AUTO: 24.7 PG (ref 27–31)
MCHC RBC AUTO-ENTMCNC: 31 G/DL (ref 32–36)
MCV RBC AUTO: 80 FL (ref 82–98)
MONOCYTES # BLD AUTO: 0.8 K/UL (ref 0.3–1)
MONOCYTES NFR BLD: 9.1 % (ref 4–15)
NEUTROPHILS # BLD AUTO: 5.3 K/UL (ref 1.8–7.7)
NEUTROPHILS NFR BLD: 61.4 % (ref 38–73)
NRBC BLD-RTO: 0 /100 WBC
PATH REV BLD -IMP: NORMAL
PLATELET # BLD AUTO: 322 K/UL (ref 150–450)
PMV BLD AUTO: 9 FL (ref 9.2–12.9)
POTASSIUM SERPL-SCNC: 4.3 MMOL/L (ref 3.5–5.1)
PROT SERPL-MCNC: 7.8 G/DL (ref 6–8.4)
RBC # BLD AUTO: 4.45 M/UL (ref 4.6–6.2)
SATURATED IRON: 5 % (ref 20–50)
SODIUM SERPL-SCNC: 138 MMOL/L (ref 136–145)
TOTAL IRON BINDING CAPACITY: 481 UG/DL (ref 250–450)
TRANSFERRIN SERPL-MCNC: 325 MG/DL (ref 200–375)
WBC # BLD AUTO: 8.68 K/UL (ref 3.9–12.7)

## 2022-09-12 PROCEDURE — 99999 PR PBB SHADOW E&M-EST. PATIENT-LVL V: CPT | Mod: PBBFAC,,, | Performed by: INTERNAL MEDICINE

## 2022-09-12 PROCEDURE — 85025 COMPLETE CBC W/AUTO DIFF WBC: CPT | Mod: PN | Performed by: INTERNAL MEDICINE

## 2022-09-12 PROCEDURE — 85060 PATHOLOGIST REVIEW: ICD-10-PCS | Mod: ,,, | Performed by: PATHOLOGY

## 2022-09-12 PROCEDURE — 3288F PR FALLS RISK ASSESSMENT DOCUMENTED: ICD-10-PCS | Mod: CPTII,S$GLB,, | Performed by: INTERNAL MEDICINE

## 2022-09-12 PROCEDURE — 4010F PR ACE/ARB THEARPY RXD/TAKEN: ICD-10-PCS | Mod: CPTII,S$GLB,, | Performed by: INTERNAL MEDICINE

## 2022-09-12 PROCEDURE — 82728 ASSAY OF FERRITIN: CPT | Performed by: INTERNAL MEDICINE

## 2022-09-12 PROCEDURE — 1159F PR MEDICATION LIST DOCUMENTED IN MEDICAL RECORD: ICD-10-PCS | Mod: CPTII,S$GLB,, | Performed by: INTERNAL MEDICINE

## 2022-09-12 PROCEDURE — 83010 ASSAY OF HAPTOGLOBIN QUANT: CPT | Performed by: INTERNAL MEDICINE

## 2022-09-12 PROCEDURE — 3080F PR MOST RECENT DIASTOLIC BLOOD PRESSURE >= 90 MM HG: ICD-10-PCS | Mod: CPTII,S$GLB,, | Performed by: INTERNAL MEDICINE

## 2022-09-12 PROCEDURE — 3075F SYST BP GE 130 - 139MM HG: CPT | Mod: CPTII,S$GLB,, | Performed by: INTERNAL MEDICINE

## 2022-09-12 PROCEDURE — 3288F FALL RISK ASSESSMENT DOCD: CPT | Mod: CPTII,S$GLB,, | Performed by: INTERNAL MEDICINE

## 2022-09-12 PROCEDURE — 3080F DIAST BP >= 90 MM HG: CPT | Mod: CPTII,S$GLB,, | Performed by: INTERNAL MEDICINE

## 2022-09-12 PROCEDURE — 99999 PR PBB SHADOW E&M-EST. PATIENT-LVL V: ICD-10-PCS | Mod: PBBFAC,,, | Performed by: INTERNAL MEDICINE

## 2022-09-12 PROCEDURE — 84238 ASSAY NONENDOCRINE RECEPTOR: CPT | Performed by: INTERNAL MEDICINE

## 2022-09-12 PROCEDURE — 1159F MED LIST DOCD IN RCRD: CPT | Mod: CPTII,S$GLB,, | Performed by: INTERNAL MEDICINE

## 2022-09-12 PROCEDURE — 99204 PR OFFICE/OUTPT VISIT, NEW, LEVL IV, 45-59 MIN: ICD-10-PCS | Mod: S$GLB,,, | Performed by: INTERNAL MEDICINE

## 2022-09-12 PROCEDURE — 3075F PR MOST RECENT SYSTOLIC BLOOD PRESS GE 130-139MM HG: ICD-10-PCS | Mod: CPTII,S$GLB,, | Performed by: INTERNAL MEDICINE

## 2022-09-12 PROCEDURE — 1125F PR PAIN SEVERITY QUANTIFIED, PAIN PRESENT: ICD-10-PCS | Mod: CPTII,S$GLB,, | Performed by: INTERNAL MEDICINE

## 2022-09-12 PROCEDURE — 85060 BLOOD SMEAR INTERPRETATION: CPT | Mod: ,,, | Performed by: PATHOLOGY

## 2022-09-12 PROCEDURE — 4010F ACE/ARB THERAPY RXD/TAKEN: CPT | Mod: CPTII,S$GLB,, | Performed by: INTERNAL MEDICINE

## 2022-09-12 PROCEDURE — 1101F PT FALLS ASSESS-DOCD LE1/YR: CPT | Mod: CPTII,S$GLB,, | Performed by: INTERNAL MEDICINE

## 2022-09-12 PROCEDURE — 84466 ASSAY OF TRANSFERRIN: CPT | Performed by: INTERNAL MEDICINE

## 2022-09-12 PROCEDURE — 1160F PR REVIEW ALL MEDS BY PRESCRIBER/CLIN PHARMACIST DOCUMENTED: ICD-10-PCS | Mod: CPTII,S$GLB,, | Performed by: INTERNAL MEDICINE

## 2022-09-12 PROCEDURE — 99204 OFFICE O/P NEW MOD 45 MIN: CPT | Mod: S$GLB,,, | Performed by: INTERNAL MEDICINE

## 2022-09-12 PROCEDURE — 1125F AMNT PAIN NOTED PAIN PRSNT: CPT | Mod: CPTII,S$GLB,, | Performed by: INTERNAL MEDICINE

## 2022-09-12 PROCEDURE — 83615 LACTATE (LD) (LDH) ENZYME: CPT | Mod: PN | Performed by: INTERNAL MEDICINE

## 2022-09-12 PROCEDURE — 80053 COMPREHEN METABOLIC PANEL: CPT | Mod: PN | Performed by: INTERNAL MEDICINE

## 2022-09-12 PROCEDURE — 1101F PR PT FALLS ASSESS DOC 0-1 FALLS W/OUT INJ PAST YR: ICD-10-PCS | Mod: CPTII,S$GLB,, | Performed by: INTERNAL MEDICINE

## 2022-09-12 PROCEDURE — 1160F RVW MEDS BY RX/DR IN RCRD: CPT | Mod: CPTII,S$GLB,, | Performed by: INTERNAL MEDICINE

## 2022-09-12 NOTE — Clinical Note
In CBC with pathologist review the peripheral smear, CMP, LDH, haptoglobin, iron studies and return to clinic to review results at earliest convenience.

## 2022-09-12 NOTE — PROGRESS NOTES
Chief complaint:  Microcytic anemia    History of present illness:  The patient is a 77-year-old white gentleman who presents for evaluation of microcytic anemia.  Patient has had previously normal hemoglobin hematocrit.  However, since 2013, patient has had mild anemia with hemoglobins ranging 12-13.  More recently, hemoglobins have run 10-11.  Patient did have thyroidectomy in February for benign pathology.  Patient denies signs/symptoms of GI blood loss.  Patient had colonoscopy three years ago which was remarkable for nonbleeding internal hemorrhoids.  No specimens were biopsied and no follow-up endoscopy was recommended in light of his age.  Patient uses PPI daily.    Past medical history:   1. Benign prostatic hypertrophy  2.  Erectile dysfunction   3. Cervical/lumbar spinal stenosis  4.  Hereditary, idiopathic, peripheral neuropathy  5.  Hypertension  6.  Obesity   7. Paroxysmal atrial fibrillation   8. Failed back surgery syndrome/chronic pain/opioid dependence  9.  Hyperlipidemia  10.  Gastroesophageal reflux disease  11.  Osteoarthritis of multiple sites  12. Chronic anticoagulation use  13.  Status post thyroidectomy  14.  Status post back surgery x6  15.  Status post appy  16.  Status post left total knee arthroplasty.  17.  Status post right total shoulder arthroplasty  18.  Status post bilateral total hip arthroplasty    Allergies:  1.  Xyzal    Medications:    Current Outpatient Medications:     albuterol (PROVENTIL/VENTOLIN HFA) 90 mcg/actuation inhaler, EVERY 4 HOURS AS NEEDED as needed for, Disp: , Rfl:     amLODIPine (NORVASC) 10 MG tablet, TAKE 1 TABLET EVERY DAY, Disp: 90 tablet, Rfl: 3    aspirin 81 MG Chew, Take 81 mg by mouth once daily., Disp: , Rfl:     atorvastatin (LIPITOR) 40 MG tablet, TAKE 1 TABLET (40 MG TOTAL) BY MOUTH ONCE DAILY., Disp: 90 tablet, Rfl: 3    buPROPion (WELLBUTRIN SR) 150 MG TBSR 12 hr tablet, Take 1 tablet (150 mg total) by mouth once daily., Disp: 30 tablet, Rfl:  0    buPROPion (WELLBUTRIN XL) 150 MG TB24 tablet, Take 1 tablet (150 mg total) by mouth once daily., Disp: 90 tablet, Rfl: 3    calcitRIOL (ROCALTROL) 0.5 MCG Cap, Take 1 capsule (0.5 mcg total) by mouth once daily., Disp: 30 capsule, Rfl: 0    calcitRIOL (ROCALTROL) 0.5 MCG Cap, Take 1 capsule (0.5 mcg total) by mouth once daily., Disp: 90 capsule, Rfl: 3    cephALEXin (KEFLEX) 500 MG capsule, Take 1 capsule (500 mg total) by mouth every 6 (six) hours., Disp: 20 capsule, Rfl: 0    COVID-19 test specimen collect Misc, TEST AS DIRECTED TODAY, Disp: , Rfl:     furosemide (LASIX) 40 MG tablet, Take 1 tablet (40 mg total) by mouth once daily., Disp: 30 tablet, Rfl: 5    KENALOG 40 mg/mL injection, , Disp: , Rfl:     levothyroxine (SYNTHROID, LEVOTHROID) 175 MCG tablet, Take 1 tablet (175 mcg total) by mouth once daily., Disp: 90 tablet, Rfl: 1    losartan (COZAAR) 50 MG tablet, TAKE 1 TABLET(50 MG) BY MOUTH EVERY DAY, Disp: 90 tablet, Rfl: 3    morphine (MS CONTIN) 60 MG 12 hr tablet, Take 1 tablet (60 mg total) by mouth 2 (two) times daily., Disp: 60 tablet, Rfl: 0    morphine (MS CONTIN) 60 MG 12 hr tablet, Take 1 tablet (60 mg total) by mouth 2 (two) times daily., Disp: 60 tablet, Rfl: 0    [START ON 9/23/2022] morphine (MS CONTIN) 60 MG 12 hr tablet, Take 1 tablet (60 mg total) by mouth 2 (two) times daily., Disp: 60 tablet, Rfl: 0    mupirocin (BACTROBAN) 2 % ointment, Apply to affected area 3 times daily, Disp: 22 g, Rfl: 1    naloxone (NARCAN) 0.4 mg/mL injection, Inject 1 mL (0.4 mg total) into the vein as needed (overdose)., Disp: 2 mL, Rfl: 5    omeprazole (PRILOSEC OTC) 20 MG tablet, 40 mg., Disp: , Rfl:     omeprazole (PRILOSEC) 40 MG capsule, Take 1 capsule by mouth once daily at 6am., Disp: , Rfl:     ondansetron (ZOFRAN-ODT) 8 MG TbDL, Take 1 tablet (8 mg total) by mouth 2 (two) times daily as needed for nausea for 5days, Disp: 10 tablet, Rfl: 0    oxyCODONE-acetaminophen (PERCOCET)  mg per tablet,  Take 1 tablet by mouth every 4 (four) hours as needed for Pain., Disp: 120 tablet, Rfl: 0    oxyCODONE-acetaminophen (PERCOCET)  mg per tablet, Take 1 tablet by mouth every 4 (four) hours as needed for Pain., Disp: 120 tablet, Rfl: 0    [START ON 9/23/2022] oxyCODONE-acetaminophen (PERCOCET)  mg per tablet, Take 1 tablet by mouth every 4 (four) hours as needed for Pain., Disp: 120 tablet, Rfl: 0    pregabalin (LYRICA) 75 MG capsule, Take 1 capsule (75 mg total) by mouth 2 (two) times daily., Disp: 180 capsule, Rfl: 1    saw/vit E/sod lisa/lyc/beta/pyg (PROSTATE HEALTH ORAL), Take 2 capsules by mouth once daily. With saw palmetto, Disp: , Rfl:     sildenafil (REVATIO) 20 mg Tab, Take 1-5 daily as needed for ED, Disp: 10 tablet, Rfl: 5    tiZANidine (ZANAFLEX) 4 MG tablet, Take 1 tablet (4 mg total) by mouth 3 (three) times daily as needed (muscle spasm)., Disp: 270 tablet, Rfl: 1    UNABLE TO FIND, Take by mouth once daily. Vitafusion multivites gummies, Disp: , Rfl:     varicella-zoster [id number]-[id number], PF, (SHINGRIX, PF,) 50 mcg/0.5 mL injection, Give one dose IM now, repeat times one in 2-6 months, Disp: 1 each, Rfl: 1  No current facility-administered medications for this visit.    Facility-Administered Medications Ordered in Other Visits:     diphenhydrAMINE injection 12.5 mg, 12.5 mg, Intravenous, Once PRN, Enrique Rosales MD    electrolyte-S (ISOLYTE), , Intravenous, Continuous, Enrique Rosales MD    HYDROmorphone (PF) injection 0.2 mg, 0.2 mg, Intravenous, Q5 Min PRN, Enrique Rosales MD    HYDROmorphone (PF) injection 0.2 mg, 0.2 mg, Intravenous, Q5 Min PRN, Enrique Rosales MD    lactated ringers infusion, , Intravenous, Once PRN, Aram Terrell MD    lactated ringers infusion, 10 mL/hr, Intravenous, Continuous, Enrique Rosales MD, Last Rate: 500 mL/hr at 03/22/22 1419, Rate Change at 03/22/22 1419    lorazepam injection 0.25 mg, 0.25 mg, Intravenous, Once PRN, Enrique Rosales,  MD    prochlorperazine injection Soln 5 mg, 5 mg, Intravenous, Q30 Min PRN, Enrique Rosales MD    sodium chloride 0.9% flush 3 mL, 3 mL, Intravenous, Q8H, Enrique Rosales MD     Family/social history:  Patient is a former smoker who has not consumed tobacco products since .    Patient uses alcohol 1 time per week.    Patient is cared for primarily by his wife.    Both patient's parents are  due to coronary disease.  No family history of malignancy.      Physical examination:   Well-developed, obese, elderly, white gentleman, in no acute distress, who has a weight of 230 lb.    VITAL SIGNS: Documented  and reviewed this visit.  HEENT: Normocephalic, atraumatic. Oral mucosa pink and moist. Lips without lesions. Tongue midline. Oropharynx clear. Nonicteric sclerae.   NECK: Supple, no adenopathy. No carotid bruits, thyromegaly or thyroid nodule.  Well approximated/healed thyroidectomy scar.  HEART: Regular rate and rhythm without murmur, gallop or rub.   LUNGS: Clear to auscultation bilaterally. Normal respiratory effort.   ABDOMEN: Soft, nontender, nondistended with positive normoactive bowel sounds, no hepatosplenomegaly.   EXTREMITIES: No cyanosis, clubbing or edema. Distal pulses are intact.   AXILLAE AND GROIN: No palpable pathologic lymphadenopathy is appreciated.   SKIN: Intact/turgor normal   NEUROLOGIC: Cranial nerves II-XII grossly intact. Motor: Good muscle bulk and tone. Strength/sensory 5/5 throughout. Gait stable.     Laboratory:  Most recent studies were obtained on 2022.    White count 6.9, hemoglobin 11.1, hematocrit 36.6, platelets 320, MCV 81, RDW 17.4.    Differential is remarkable for 49% granulocytes, 39% lymphocytes, 8% monocytes, 3% eosinophils, 1% basophils, absolute neutrophil count is 3400.    Sodium 139, potassium 4, chloride 97, CO2 31, BUN 15, creatinine 1.3, glucose 134, calcium 9.9, AST 58, the remainder liver function tests are within normal limits, GFR is  53.  Most recent TSH was obtained on 08/31/2022 and returned elevated at 17.375 (91.112).  Patient's Synthroid dosing has been adjusted accordingly.    Impression:  1. Microcytic anemia most consistent with iron deficiency which may have been exacerbated by patient's region thyroidectomy.      Plan:   1. Obtain CBC with pathologist review the peripheral smear, CMP, LDH, haptoglobin, and iron studies.  2.  Return to clinic to review results at earliest convenience.      This note was created using voice recognition software and may contain grammatical errors.

## 2022-09-12 NOTE — LETTER
September 12, 2022        Ty Montalvo MD  [address] [facility] [address]  [address]  [place] [address]             [facility]  [address] [place] [address]  Phone: [phone]  Fax: [phone]   Patient: Sam Pete   MR Number: [id number]   YOB: 1945   Date of Visit: 9/12/2022       Dear Dr. Montalvo:    Thank you for referring Sam Pete to me for evaluation of microcytic anemia. Below are the relevant portions of my assessment and plan of care.  If you have questions, please do not hesitate to call me. I look forward to following Sam along with you.    Sincerely,      MD NUVIA Barron MD Lowell B. Shih, MD Jessica Harvey, PA-C J. David Linson, OD Erin E. Biro, MD John F. Vu, MD Caroline E. Lee, MD Jason M. Guillot, MD Michael A. Braxton, MD

## 2022-09-13 ENCOUNTER — PATIENT MESSAGE (OUTPATIENT)
Dept: HEMATOLOGY/ONCOLOGY | Facility: CLINIC | Age: 77
End: 2022-09-13
Payer: MEDICARE

## 2022-09-13 DIAGNOSIS — D50.9 IRON DEFICIENCY ANEMIA, UNSPECIFIED IRON DEFICIENCY ANEMIA TYPE: ICD-10-CM

## 2022-09-13 LAB
FERRITIN SERPL-MCNC: 34 NG/ML (ref 20–300)
HAPTOGLOB SERPL-MCNC: 205 MG/DL (ref 30–250)
PATH REV BLD -IMP: NORMAL

## 2022-09-13 RX ORDER — SODIUM CHLORIDE 0.9 % (FLUSH) 0.9 %
10 SYRINGE (ML) INJECTION
Status: CANCELLED | OUTPATIENT
Start: 2022-09-15

## 2022-09-13 RX ORDER — SODIUM CHLORIDE 0.9 % (FLUSH) 0.9 %
10 SYRINGE (ML) INJECTION
Status: CANCELLED | OUTPATIENT
Start: 2022-09-13

## 2022-09-13 RX ORDER — FAMOTIDINE 10 MG/ML
20 INJECTION INTRAVENOUS DAILY
Status: CANCELLED
Start: 2022-09-13

## 2022-09-13 RX ORDER — DIPHENHYDRAMINE HYDROCHLORIDE 50 MG/ML
25 INJECTION INTRAMUSCULAR; INTRAVENOUS
Status: CANCELLED
Start: 2022-09-15

## 2022-09-13 RX ORDER — DEXAMETHASONE SODIUM PHOSPHATE 4 MG/ML
4 INJECTION, SOLUTION INTRA-ARTICULAR; INTRALESIONAL; INTRAMUSCULAR; INTRAVENOUS; SOFT TISSUE
Status: CANCELLED
Start: 2022-09-15

## 2022-09-13 RX ORDER — HEPARIN 100 UNIT/ML
500 SYRINGE INTRAVENOUS
Status: CANCELLED | OUTPATIENT
Start: 2022-09-13

## 2022-09-13 RX ORDER — HEPARIN 100 UNIT/ML
500 SYRINGE INTRAVENOUS
Status: CANCELLED | OUTPATIENT
Start: 2022-09-15

## 2022-09-13 RX ORDER — FAMOTIDINE 10 MG/ML
20 INJECTION INTRAVENOUS
Status: CANCELLED
Start: 2022-09-15

## 2022-09-14 ENCOUNTER — HOSPITAL ENCOUNTER (OUTPATIENT)
Dept: RADIOLOGY | Facility: HOSPITAL | Age: 77
Discharge: HOME | End: 2022-09-14
Attending: STUDENT IN AN ORGANIZED HEALTH CARE EDUCATION/TRAINING PROGRAM
Payer: MEDICARE

## 2022-09-14 ENCOUNTER — OFFICE VISIT (OUTPATIENT)
Dept: FAMILY MEDICINE | Facility: CLINIC | Age: 77
End: 2022-09-14
Payer: MEDICARE

## 2022-09-14 VITALS
HEART RATE: 87 BPM | TEMPERATURE: 98 F | OXYGEN SATURATION: 96 % | DIASTOLIC BLOOD PRESSURE: 64 MMHG | WEIGHT: 228.31 LBS | SYSTOLIC BLOOD PRESSURE: 128 MMHG | BODY MASS INDEX: 32.76 KG/M2

## 2022-09-14 DIAGNOSIS — R07.9 RIGHT-SIDED CHEST PAIN: ICD-10-CM

## 2022-09-14 DIAGNOSIS — R07.9 RIGHT-SIDED CHEST PAIN: Primary | ICD-10-CM

## 2022-09-14 LAB — STFR SERPL-MCNC: 8.5 MG/L (ref 1.8–4.6)

## 2022-09-14 PROCEDURE — 3078F PR MOST RECENT DIASTOLIC BLOOD PRESSURE < 80 MM HG: ICD-10-PCS | Mod: CPTII,S$GLB,, | Performed by: STUDENT IN AN ORGANIZED HEALTH CARE EDUCATION/TRAINING PROGRAM

## 2022-09-14 PROCEDURE — 3074F PR MOST RECENT SYSTOLIC BLOOD PRESSURE < 130 MM HG: ICD-10-PCS | Mod: CPTII,S$GLB,, | Performed by: STUDENT IN AN ORGANIZED HEALTH CARE EDUCATION/TRAINING PROGRAM

## 2022-09-14 PROCEDURE — 1101F PT FALLS ASSESS-DOCD LE1/YR: CPT | Mod: CPTII,S$GLB,, | Performed by: STUDENT IN AN ORGANIZED HEALTH CARE EDUCATION/TRAINING PROGRAM

## 2022-09-14 PROCEDURE — 3288F PR FALLS RISK ASSESSMENT DOCUMENTED: ICD-10-PCS | Mod: CPTII,S$GLB,, | Performed by: STUDENT IN AN ORGANIZED HEALTH CARE EDUCATION/TRAINING PROGRAM

## 2022-09-14 PROCEDURE — 99213 OFFICE O/P EST LOW 20 MIN: CPT | Mod: S$GLB,,, | Performed by: STUDENT IN AN ORGANIZED HEALTH CARE EDUCATION/TRAINING PROGRAM

## 2022-09-14 PROCEDURE — 71100 X-RAY EXAM RIBS UNI 2 VIEWS: CPT | Mod: TC,FY,PO,RT

## 2022-09-14 PROCEDURE — 71100 XR RIBS 2 VIEW RIGHT: ICD-10-PCS | Mod: 26,RT,, | Performed by: RADIOLOGY

## 2022-09-14 PROCEDURE — 3288F FALL RISK ASSESSMENT DOCD: CPT | Mod: CPTII,S$GLB,, | Performed by: STUDENT IN AN ORGANIZED HEALTH CARE EDUCATION/TRAINING PROGRAM

## 2022-09-14 PROCEDURE — 99999 PR PBB SHADOW E&M-EST. PATIENT-LVL III: CPT | Mod: PBBFAC,,, | Performed by: STUDENT IN AN ORGANIZED HEALTH CARE EDUCATION/TRAINING PROGRAM

## 2022-09-14 PROCEDURE — 4010F ACE/ARB THERAPY RXD/TAKEN: CPT | Mod: CPTII,S$GLB,, | Performed by: STUDENT IN AN ORGANIZED HEALTH CARE EDUCATION/TRAINING PROGRAM

## 2022-09-14 PROCEDURE — 71100 X-RAY EXAM RIBS UNI 2 VIEWS: CPT | Mod: 26,RT,, | Performed by: RADIOLOGY

## 2022-09-14 PROCEDURE — 3074F SYST BP LT 130 MM HG: CPT | Mod: CPTII,S$GLB,, | Performed by: STUDENT IN AN ORGANIZED HEALTH CARE EDUCATION/TRAINING PROGRAM

## 2022-09-14 PROCEDURE — 4010F PR ACE/ARB THEARPY RXD/TAKEN: ICD-10-PCS | Mod: CPTII,S$GLB,, | Performed by: STUDENT IN AN ORGANIZED HEALTH CARE EDUCATION/TRAINING PROGRAM

## 2022-09-14 PROCEDURE — 99999 PR PBB SHADOW E&M-EST. PATIENT-LVL III: ICD-10-PCS | Mod: PBBFAC,,, | Performed by: STUDENT IN AN ORGANIZED HEALTH CARE EDUCATION/TRAINING PROGRAM

## 2022-09-14 PROCEDURE — 1125F PR PAIN SEVERITY QUANTIFIED, PAIN PRESENT: ICD-10-PCS | Mod: CPTII,S$GLB,, | Performed by: STUDENT IN AN ORGANIZED HEALTH CARE EDUCATION/TRAINING PROGRAM

## 2022-09-14 PROCEDURE — 1101F PR PT FALLS ASSESS DOC 0-1 FALLS W/OUT INJ PAST YR: ICD-10-PCS | Mod: CPTII,S$GLB,, | Performed by: STUDENT IN AN ORGANIZED HEALTH CARE EDUCATION/TRAINING PROGRAM

## 2022-09-14 PROCEDURE — 99213 PR OFFICE/OUTPT VISIT, EST, LEVL III, 20-29 MIN: ICD-10-PCS | Mod: S$GLB,,, | Performed by: STUDENT IN AN ORGANIZED HEALTH CARE EDUCATION/TRAINING PROGRAM

## 2022-09-14 PROCEDURE — 1125F AMNT PAIN NOTED PAIN PRSNT: CPT | Mod: CPTII,S$GLB,, | Performed by: STUDENT IN AN ORGANIZED HEALTH CARE EDUCATION/TRAINING PROGRAM

## 2022-09-14 PROCEDURE — 3078F DIAST BP <80 MM HG: CPT | Mod: CPTII,S$GLB,, | Performed by: STUDENT IN AN ORGANIZED HEALTH CARE EDUCATION/TRAINING PROGRAM
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
## 2022-09-27 ENCOUNTER — OFFICE VISIT (OUTPATIENT)
Dept: PODIATRY | Facility: CLINIC | Age: 77
End: 2022-09-27
Payer: MEDICARE

## 2022-09-27 VITALS — HEIGHT: 70 IN | WEIGHT: 228.19 LBS | BODY MASS INDEX: 32.67 KG/M2

## 2022-09-27 DIAGNOSIS — B07.0 PLANTAR WART, RIGHT FOOT: Primary | ICD-10-CM

## 2022-09-27 PROCEDURE — 1159F PR MEDICATION LIST DOCUMENTED IN MEDICAL RECORD: ICD-10-PCS | Mod: CPTII,S$GLB,, | Performed by: STUDENT IN AN ORGANIZED HEALTH CARE EDUCATION/TRAINING PROGRAM

## 2022-09-27 PROCEDURE — 99203 PR OFFICE/OUTPT VISIT, NEW, LEVL III, 30-44 MIN: ICD-10-PCS | Mod: 25,S$GLB,, | Performed by: STUDENT IN AN ORGANIZED HEALTH CARE EDUCATION/TRAINING PROGRAM

## 2022-09-27 PROCEDURE — 99203 OFFICE O/P NEW LOW 30 MIN: CPT | Mod: 25,S$GLB,, | Performed by: STUDENT IN AN ORGANIZED HEALTH CARE EDUCATION/TRAINING PROGRAM

## 2022-09-27 PROCEDURE — 4010F ACE/ARB THERAPY RXD/TAKEN: CPT | Mod: CPTII,S$GLB,, | Performed by: STUDENT IN AN ORGANIZED HEALTH CARE EDUCATION/TRAINING PROGRAM

## 2022-09-27 PROCEDURE — 1159F MED LIST DOCD IN RCRD: CPT | Mod: CPTII,S$GLB,, | Performed by: STUDENT IN AN ORGANIZED HEALTH CARE EDUCATION/TRAINING PROGRAM

## 2022-09-27 PROCEDURE — 99999 PR PBB SHADOW E&M-EST. PATIENT-LVL III: CPT | Mod: PBBFAC,,, | Performed by: STUDENT IN AN ORGANIZED HEALTH CARE EDUCATION/TRAINING PROGRAM

## 2022-09-27 PROCEDURE — 1160F RVW MEDS BY RX/DR IN RCRD: CPT | Mod: CPTII,S$GLB,, | Performed by: STUDENT IN AN ORGANIZED HEALTH CARE EDUCATION/TRAINING PROGRAM

## 2022-09-27 PROCEDURE — 1125F PR PAIN SEVERITY QUANTIFIED, PAIN PRESENT: ICD-10-PCS | Mod: CPTII,S$GLB,, | Performed by: STUDENT IN AN ORGANIZED HEALTH CARE EDUCATION/TRAINING PROGRAM

## 2022-09-27 PROCEDURE — 4010F PR ACE/ARB THEARPY RXD/TAKEN: ICD-10-PCS | Mod: CPTII,S$GLB,, | Performed by: STUDENT IN AN ORGANIZED HEALTH CARE EDUCATION/TRAINING PROGRAM

## 2022-09-27 PROCEDURE — 1160F PR REVIEW ALL MEDS BY PRESCRIBER/CLIN PHARMACIST DOCUMENTED: ICD-10-PCS | Mod: CPTII,S$GLB,, | Performed by: STUDENT IN AN ORGANIZED HEALTH CARE EDUCATION/TRAINING PROGRAM

## 2022-09-27 PROCEDURE — 1125F AMNT PAIN NOTED PAIN PRSNT: CPT | Mod: CPTII,S$GLB,, | Performed by: STUDENT IN AN ORGANIZED HEALTH CARE EDUCATION/TRAINING PROGRAM

## 2022-09-27 PROCEDURE — 99999 PR PBB SHADOW E&M-EST. PATIENT-LVL III: ICD-10-PCS | Mod: PBBFAC,,, | Performed by: STUDENT IN AN ORGANIZED HEALTH CARE EDUCATION/TRAINING PROGRAM

## 2022-09-27 PROCEDURE — 17110 PR DESTRUCTION BENIGN LESIONS UP TO 14: ICD-10-PCS | Mod: S$GLB,,, | Performed by: STUDENT IN AN ORGANIZED HEALTH CARE EDUCATION/TRAINING PROGRAM

## 2022-09-27 PROCEDURE — 17110 DESTRUCTION B9 LES UP TO 14: CPT | Mod: S$GLB,,, | Performed by: STUDENT IN AN ORGANIZED HEALTH CARE EDUCATION/TRAINING PROGRAM
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
## 2022-09-28 ENCOUNTER — DOCUMENTATION ONLY (OUTPATIENT)
Dept: INFUSION THERAPY | Facility: HOSPITAL | Age: 77
End: 2022-09-28
Payer: MEDICARE

## 2022-09-28 NOTE — PROGRESS NOTES
[2:48 PM] Maribell Pete, can't come today. He and his wife said whoever they talked to were supposed to set it up for Friday at 230. Can he come at that time? I told him if that time didn't work, we would call them.         Moved to Friday at 2:30 as requested.

## 2022-09-30 ENCOUNTER — INFUSION (OUTPATIENT)
Dept: INFUSION THERAPY | Facility: HOSPITAL | Age: 77
End: 2022-09-30
Attending: INTERNAL MEDICINE
Payer: MEDICARE

## 2022-09-30 VITALS
BODY MASS INDEX: 33.11 KG/M2 | DIASTOLIC BLOOD PRESSURE: 76 MMHG | HEIGHT: 70 IN | RESPIRATION RATE: 18 BRPM | TEMPERATURE: 98 F | SYSTOLIC BLOOD PRESSURE: 142 MMHG | WEIGHT: 231.25 LBS | HEART RATE: 74 BPM

## 2022-09-30 DIAGNOSIS — D50.9 IRON DEFICIENCY ANEMIA, UNSPECIFIED IRON DEFICIENCY ANEMIA TYPE: Primary | ICD-10-CM

## 2022-09-30 PROCEDURE — A4216 STERILE WATER/SALINE, 10 ML: HCPCS | Mod: PN | Performed by: INTERNAL MEDICINE

## 2022-09-30 PROCEDURE — 96375 TX/PRO/DX INJ NEW DRUG ADDON: CPT | Mod: PN

## 2022-09-30 PROCEDURE — 25000003 PHARM REV CODE 250: Mod: PN | Performed by: INTERNAL MEDICINE

## 2022-09-30 PROCEDURE — 96365 THER/PROPH/DIAG IV INF INIT: CPT | Mod: PN

## 2022-09-30 PROCEDURE — 63600175 PHARM REV CODE 636 W HCPCS: Mod: PN | Performed by: INTERNAL MEDICINE

## 2022-09-30 RX ORDER — FAMOTIDINE 10 MG/ML
20 INJECTION INTRAVENOUS
Status: CANCELLED
Start: 2022-10-07

## 2022-09-30 RX ORDER — DIPHENHYDRAMINE HYDROCHLORIDE 50 MG/ML
25 INJECTION INTRAMUSCULAR; INTRAVENOUS
Status: COMPLETED | OUTPATIENT
Start: 2022-09-30 | End: 2022-09-30

## 2022-09-30 RX ORDER — DEXAMETHASONE SODIUM PHOSPHATE 4 MG/ML
4 INJECTION, SOLUTION INTRA-ARTICULAR; INTRALESIONAL; INTRAMUSCULAR; INTRAVENOUS; SOFT TISSUE
Status: CANCELLED
Start: 2022-10-07

## 2022-09-30 RX ORDER — FAMOTIDINE 10 MG/ML
20 INJECTION INTRAVENOUS
Status: COMPLETED | OUTPATIENT
Start: 2022-09-30 | End: 2022-09-30

## 2022-09-30 RX ORDER — SODIUM CHLORIDE 0.9 % (FLUSH) 0.9 %
10 SYRINGE (ML) INJECTION
Status: DISCONTINUED | OUTPATIENT
Start: 2022-09-30 | End: 2022-09-30 | Stop reason: HOSPADM

## 2022-09-30 RX ORDER — HEPARIN 100 UNIT/ML
500 SYRINGE INTRAVENOUS
Status: CANCELLED | OUTPATIENT
Start: 2022-10-07

## 2022-09-30 RX ORDER — SODIUM CHLORIDE 0.9 % (FLUSH) 0.9 %
10 SYRINGE (ML) INJECTION
Status: CANCELLED | OUTPATIENT
Start: 2022-10-07

## 2022-09-30 RX ORDER — DIPHENHYDRAMINE HYDROCHLORIDE 50 MG/ML
25 INJECTION INTRAMUSCULAR; INTRAVENOUS
Status: CANCELLED
Start: 2022-10-07

## 2022-09-30 RX ORDER — DEXAMETHASONE SODIUM PHOSPHATE 4 MG/ML
4 INJECTION, SOLUTION INTRA-ARTICULAR; INTRALESIONAL; INTRAMUSCULAR; INTRAVENOUS; SOFT TISSUE
Status: COMPLETED | OUTPATIENT
Start: 2022-09-30 | End: 2022-09-30

## 2022-09-30 RX ADMIN — SODIUM CHLORIDE: 0.9 INJECTION, SOLUTION INTRAVENOUS at 03:09

## 2022-09-30 RX ADMIN — IRON SUCROSE 200 MG: 20 INJECTION, SOLUTION INTRAVENOUS at 03:09

## 2022-09-30 RX ADMIN — Medication 10 ML: at 03:09

## 2022-09-30 RX ADMIN — DIPHENHYDRAMINE HYDROCHLORIDE 25 MG: 50 INJECTION INTRAMUSCULAR; INTRAVENOUS at 03:09

## 2022-09-30 RX ADMIN — DEXAMETHASONE SODIUM PHOSPHATE 4 MG: 4 INJECTION INTRA-ARTICULAR; INTRALESIONAL; INTRAMUSCULAR; INTRAVENOUS; SOFT TISSUE at 03:09

## 2022-09-30 RX ADMIN — FAMOTIDINE 20 MG: 10 INJECTION INTRAVENOUS at 03:09

## 2022-09-30 NOTE — PLAN OF CARE
Problem: Adult Inpatient Plan of Care  Goal: Plan of Care Review  9/30/2022 1736 by Anastasia Pacheco, RN  Outcome: Ongoing, Progressing  Flowsheets (Taken 9/30/2022 1730)  Plan of Care Reviewed With:   patient   spouse   Pt tolerated his Venofer infusion well, NAD. No new c/o voiced. Pt observed for 30 minutes post infusion. Pt given a schedule and reviewed, pt verbalized understanding. Pt ambulated out of the clinic without difficulty accompanied by his wife.

## 2022-09-30 NOTE — PLAN OF CARE
Problem: Fatigue  Goal: Improved Activity Tolerance  Intervention: Promote Improved Energy  Flowsheets (Taken 9/30/2022 1555)  Fatigue Management:   activity schedule adjusted   paced activity encouraged   fatigue-related activity identified   frequent rest breaks encouraged  Sleep/Rest Enhancement:   regular sleep/rest pattern promoted   relaxation techniques promoted   natural light exposure provided  Activity Management:   Ambulated -L4   Ambulated in swann - L4   Up in stretcher chair - L1     Problem: Adult Inpatient Plan of Care  Goal: Patient-Specific Goal (Individualized)  Outcome: Ongoing, Progressing  Flowsheets (Taken 9/30/2022 1555)  Anxieties, Fears or Concerns: First Venofer infusion  Individualized Care Needs: recliner, pillow, tv  Patient-Specific Goals (Include Timeframe): no s/s of reaction during treatment

## 2022-10-03 ENCOUNTER — PATIENT MESSAGE (OUTPATIENT)
Dept: HEMATOLOGY/ONCOLOGY | Facility: CLINIC | Age: 77
End: 2022-10-03
Payer: MEDICARE

## 2022-10-05 ENCOUNTER — INFUSION (OUTPATIENT)
Dept: INFUSION THERAPY | Facility: HOSPITAL | Age: 77
End: 2022-10-05
Attending: INTERNAL MEDICINE
Payer: MEDICARE

## 2022-10-05 VITALS
TEMPERATURE: 98 F | WEIGHT: 231.06 LBS | HEART RATE: 79 BPM | HEIGHT: 70 IN | DIASTOLIC BLOOD PRESSURE: 74 MMHG | BODY MASS INDEX: 33.08 KG/M2 | RESPIRATION RATE: 18 BRPM | SYSTOLIC BLOOD PRESSURE: 144 MMHG

## 2022-10-05 DIAGNOSIS — D50.9 IRON DEFICIENCY ANEMIA, UNSPECIFIED IRON DEFICIENCY ANEMIA TYPE: Primary | ICD-10-CM

## 2022-10-05 PROCEDURE — 63600175 PHARM REV CODE 636 W HCPCS: Mod: PN | Performed by: INTERNAL MEDICINE

## 2022-10-05 PROCEDURE — A4216 STERILE WATER/SALINE, 10 ML: HCPCS | Mod: PN | Performed by: INTERNAL MEDICINE

## 2022-10-05 PROCEDURE — 96365 THER/PROPH/DIAG IV INF INIT: CPT | Mod: PN

## 2022-10-05 PROCEDURE — 96375 TX/PRO/DX INJ NEW DRUG ADDON: CPT | Mod: PN

## 2022-10-05 PROCEDURE — 25000003 PHARM REV CODE 250: Mod: PN | Performed by: INTERNAL MEDICINE

## 2022-10-05 RX ORDER — DEXAMETHASONE SODIUM PHOSPHATE 4 MG/ML
4 INJECTION, SOLUTION INTRA-ARTICULAR; INTRALESIONAL; INTRAMUSCULAR; INTRAVENOUS; SOFT TISSUE
Status: COMPLETED | OUTPATIENT
Start: 2022-10-05 | End: 2022-10-05

## 2022-10-05 RX ORDER — SODIUM CHLORIDE 0.9 % (FLUSH) 0.9 %
10 SYRINGE (ML) INJECTION
Status: CANCELLED | OUTPATIENT
Start: 2022-10-12

## 2022-10-05 RX ORDER — DIPHENHYDRAMINE HYDROCHLORIDE 50 MG/ML
25 INJECTION INTRAMUSCULAR; INTRAVENOUS
Status: CANCELLED
Start: 2022-10-12

## 2022-10-05 RX ORDER — DIPHENHYDRAMINE HYDROCHLORIDE 50 MG/ML
25 INJECTION INTRAMUSCULAR; INTRAVENOUS
Status: COMPLETED | OUTPATIENT
Start: 2022-10-05 | End: 2022-10-05

## 2022-10-05 RX ORDER — SODIUM CHLORIDE 0.9 % (FLUSH) 0.9 %
10 SYRINGE (ML) INJECTION
Status: DISCONTINUED | OUTPATIENT
Start: 2022-10-05 | End: 2022-10-05 | Stop reason: HOSPADM

## 2022-10-05 RX ORDER — DEXAMETHASONE SODIUM PHOSPHATE 4 MG/ML
4 INJECTION, SOLUTION INTRA-ARTICULAR; INTRALESIONAL; INTRAMUSCULAR; INTRAVENOUS; SOFT TISSUE
Status: CANCELLED
Start: 2022-10-12

## 2022-10-05 RX ORDER — FAMOTIDINE 10 MG/ML
20 INJECTION INTRAVENOUS
Status: COMPLETED | OUTPATIENT
Start: 2022-10-05 | End: 2022-10-05

## 2022-10-05 RX ORDER — FAMOTIDINE 10 MG/ML
20 INJECTION INTRAVENOUS
Status: CANCELLED
Start: 2022-10-12

## 2022-10-05 RX ORDER — HEPARIN 100 UNIT/ML
500 SYRINGE INTRAVENOUS
Status: CANCELLED | OUTPATIENT
Start: 2022-10-12

## 2022-10-05 RX ADMIN — Medication 10 ML: at 03:10

## 2022-10-05 RX ADMIN — FAMOTIDINE 20 MG: 10 INJECTION INTRAVENOUS at 03:10

## 2022-10-05 RX ADMIN — DIPHENHYDRAMINE HYDROCHLORIDE 25 MG: 50 INJECTION INTRAMUSCULAR; INTRAVENOUS at 03:10

## 2022-10-05 RX ADMIN — IRON SUCROSE 200 MG: 20 INJECTION, SOLUTION INTRAVENOUS at 03:10

## 2022-10-05 RX ADMIN — SODIUM CHLORIDE: 0.9 INJECTION, SOLUTION INTRAVENOUS at 03:10

## 2022-10-05 RX ADMIN — DEXAMETHASONE SODIUM PHOSPHATE 4 MG: 4 INJECTION INTRA-ARTICULAR; INTRALESIONAL; INTRAMUSCULAR; INTRAVENOUS; SOFT TISSUE at 03:10

## 2022-10-05 NOTE — PLAN OF CARE
Problem: Adult Inpatient Plan of Care  Goal: Patient-Specific Goal (Individualized)  Outcome: Ongoing, Progressing  Flowsheets (Taken 10/5/2022 1530)  Anxieties, Fears or Concerns: none  Individualized Care Needs: recliner, pillow, tv, warm blanket  Patient-Specific Goals (Include Timeframe): no s/s of reaction during treatment     Problem: Fatigue  Goal: Improved Activity Tolerance  Intervention: Promote Improved Energy  Flowsheets (Taken 10/5/2022 1530)  Fatigue Management:   activity schedule adjusted   paced activity encouraged   fatigue-related activity identified   frequent rest breaks encouraged  Sleep/Rest Enhancement:   relaxation techniques promoted   regular sleep/rest pattern promoted   natural light exposure provided  Activity Management:   Ambulated -L4   Ambulated to bathroom - L4   Ambulated in swann - L4   Up in stretcher chair - L1     Problem: Adult Inpatient Plan of Care  Goal: Plan of Care Review  Outcome: Ongoing, Progressing  Flowsheets (Taken 10/5/2022 1715)  Plan of Care Reviewed With:   patient   spouse   Pt tolerated his Venofer infusion well, NAD. No new c/o voiced. Pt was observed for 30 minutes post infusion. Pt given a schedule and reviewed, pt verbalized understanding. Pt ambulated out of the clinic without difficulty.

## 2022-10-06 ENCOUNTER — OFFICE VISIT (OUTPATIENT)
Dept: PODIATRY | Facility: CLINIC | Age: 77
End: 2022-10-06
Payer: MEDICARE

## 2022-10-06 VITALS — WEIGHT: 231.06 LBS | HEIGHT: 70 IN | BODY MASS INDEX: 33.08 KG/M2

## 2022-10-06 DIAGNOSIS — B07.0 PLANTAR WART, RIGHT FOOT: Primary | ICD-10-CM

## 2022-10-06 PROCEDURE — 1159F PR MEDICATION LIST DOCUMENTED IN MEDICAL RECORD: ICD-10-PCS | Mod: CPTII,S$GLB,, | Performed by: STUDENT IN AN ORGANIZED HEALTH CARE EDUCATION/TRAINING PROGRAM

## 2022-10-06 PROCEDURE — 99999 PR PBB SHADOW E&M-EST. PATIENT-LVL IV: CPT | Mod: PBBFAC,,, | Performed by: STUDENT IN AN ORGANIZED HEALTH CARE EDUCATION/TRAINING PROGRAM

## 2022-10-06 PROCEDURE — 99999 PR PBB SHADOW E&M-EST. PATIENT-LVL IV: ICD-10-PCS | Mod: PBBFAC,,, | Performed by: STUDENT IN AN ORGANIZED HEALTH CARE EDUCATION/TRAINING PROGRAM

## 2022-10-06 PROCEDURE — 1125F AMNT PAIN NOTED PAIN PRSNT: CPT | Mod: CPTII,S$GLB,, | Performed by: STUDENT IN AN ORGANIZED HEALTH CARE EDUCATION/TRAINING PROGRAM

## 2022-10-06 PROCEDURE — 1159F MED LIST DOCD IN RCRD: CPT | Mod: CPTII,S$GLB,, | Performed by: STUDENT IN AN ORGANIZED HEALTH CARE EDUCATION/TRAINING PROGRAM

## 2022-10-06 PROCEDURE — 4010F ACE/ARB THERAPY RXD/TAKEN: CPT | Mod: CPTII,S$GLB,, | Performed by: STUDENT IN AN ORGANIZED HEALTH CARE EDUCATION/TRAINING PROGRAM

## 2022-10-06 PROCEDURE — 99213 OFFICE O/P EST LOW 20 MIN: CPT | Mod: S$GLB,,, | Performed by: STUDENT IN AN ORGANIZED HEALTH CARE EDUCATION/TRAINING PROGRAM

## 2022-10-06 PROCEDURE — 1125F PR PAIN SEVERITY QUANTIFIED, PAIN PRESENT: ICD-10-PCS | Mod: CPTII,S$GLB,, | Performed by: STUDENT IN AN ORGANIZED HEALTH CARE EDUCATION/TRAINING PROGRAM

## 2022-10-06 PROCEDURE — 1160F PR REVIEW ALL MEDS BY PRESCRIBER/CLIN PHARMACIST DOCUMENTED: ICD-10-PCS | Mod: CPTII,S$GLB,, | Performed by: STUDENT IN AN ORGANIZED HEALTH CARE EDUCATION/TRAINING PROGRAM

## 2022-10-06 PROCEDURE — 4010F PR ACE/ARB THEARPY RXD/TAKEN: ICD-10-PCS | Mod: CPTII,S$GLB,, | Performed by: STUDENT IN AN ORGANIZED HEALTH CARE EDUCATION/TRAINING PROGRAM

## 2022-10-06 PROCEDURE — 1160F RVW MEDS BY RX/DR IN RCRD: CPT | Mod: CPTII,S$GLB,, | Performed by: STUDENT IN AN ORGANIZED HEALTH CARE EDUCATION/TRAINING PROGRAM

## 2022-10-06 PROCEDURE — 99213 PR OFFICE/OUTPT VISIT, EST, LEVL III, 20-29 MIN: ICD-10-PCS | Mod: S$GLB,,, | Performed by: STUDENT IN AN ORGANIZED HEALTH CARE EDUCATION/TRAINING PROGRAM

## 2022-10-06 NOTE — PROGRESS NOTES
Chief Complaint   Patient presents with    Plantar Warts           HPI:   Sam Pete is a 77 y.o. male with concerns of  right foot pain, which appears to be present for months but worse over the last several days to week  Patient reports no acute injury to the area.    Patient states the pain occurs with direct pressure.      Treatment tried at home: nothing    10/6/22: Patient returns with some worsening pain over the last few days.    Patient Active Problem List   Diagnosis    Hypertrophy of prostate with urinary obstruction and other lower urinary tract symptoms (LUTS)    Impotence of organic origin    Spinal stenosis in cervical region    Spinal stenosis, lumbar region, without neurogenic claudication    Hereditary and idiopathic peripheral neuropathy    Chronic pain low back and neck with spinal stenosis, djd left arm.  pain contract renewed 9/10/14    Essential (primary) hypertension    Facet arthropathy, lumbar    Obesity (BMI 30.0-34.9)    Atherosclerosis of aorta    Opioid dependence    PAF (paroxysmal atrial fibrillation)    Colon cancer screening    Failed back surgical syndrome    Lumbar radiculopathy    Chronic pain syndrome    Chronic back pain    Other hyperlipidemia    Gastroesophageal reflux disease without esophagitis    At risk for falls    Primary osteoarthritis of right shoulder    Long term (current) use of anticoagulants    REAL (iron deficiency anemia)    Right-sided chest pain         Current Outpatient Medications on File Prior to Visit   Medication Sig Dispense Refill    albuterol (PROVENTIL/VENTOLIN HFA) 90 mcg/actuation inhaler EVERY 4 HOURS AS NEEDED as needed for      amLODIPine (NORVASC) 10 MG tablet TAKE 1 TABLET EVERY DAY 90 tablet 3    aspirin 81 MG Chew Take 81 mg by mouth once daily.      atorvastatin (LIPITOR) 40 MG tablet TAKE 1 TABLET (40 MG TOTAL) BY MOUTH ONCE DAILY. 90 tablet 3    buPROPion (WELLBUTRIN SR) 150 MG TBSR 12 hr tablet Take 1 tablet (150 mg total) by  mouth once daily. 30 tablet 0    buPROPion (WELLBUTRIN XL) 150 MG TB24 tablet Take 1 tablet (150 mg total) by mouth once daily. 90 tablet 3    calcitRIOL (ROCALTROL) 0.5 MCG Cap Take 1 capsule (0.5 mcg total) by mouth once daily. 30 capsule 0    calcitRIOL (ROCALTROL) 0.5 MCG Cap Take 1 capsule (0.5 mcg total) by mouth once daily. 90 capsule 3    cephALEXin (KEFLEX) 500 MG capsule Take 1 capsule (500 mg total) by mouth every 6 (six) hours. 20 capsule 0    COVID-19 test specimen collect Misc TEST AS DIRECTED TODAY      furosemide (LASIX) 40 MG tablet Take 1 tablet (40 mg total) by mouth once daily. 30 tablet 5    KENALOG 40 mg/mL injection       levothyroxine (SYNTHROID, LEVOTHROID) 175 MCG tablet Take 1 tablet (175 mcg total) by mouth once daily. 90 tablet 1    losartan (COZAAR) 50 MG tablet TAKE 1 TABLET(50 MG) BY MOUTH EVERY DAY 90 tablet 3    morphine (MS CONTIN) 60 MG 12 hr tablet Take 1 tablet (60 mg total) by mouth 2 (two) times daily. 60 tablet 0    morphine (MS CONTIN) 60 MG 12 hr tablet Take 1 tablet (60 mg total) by mouth 2 (two) times daily. 60 tablet 0    morphine (MS CONTIN) 60 MG 12 hr tablet Take 1 tablet (60 mg total) by mouth 2 (two) times daily. 60 tablet 0    mupirocin (BACTROBAN) 2 % ointment Apply to affected area 3 times daily 22 g 1    naloxone (NARCAN) 0.4 mg/mL injection Inject 1 mL (0.4 mg total) into the vein as needed (overdose). 2 mL 5    omeprazole (PRILOSEC OTC) 20 MG tablet 40 mg.      omeprazole (PRILOSEC) 40 MG capsule Take 1 capsule by mouth once daily at 6am.      ondansetron (ZOFRAN-ODT) 8 MG TbDL Take 1 tablet (8 mg total) by mouth 2 (two) times daily as needed for nausea for 5days 10 tablet 0    oxyCODONE-acetaminophen (PERCOCET)  mg per tablet Take 1 tablet by mouth every 4 (four) hours as needed for Pain. 120 tablet 0    oxyCODONE-acetaminophen (PERCOCET)  mg per tablet Take 1 tablet by mouth every 4 (four) hours as needed for Pain. 120 tablet 0     oxyCODONE-acetaminophen (PERCOCET)  mg per tablet Take 1 tablet by mouth every 4 (four) hours as needed for Pain. 120 tablet 0    pregabalin (LYRICA) 75 MG capsule Take 1 capsule (75 mg total) by mouth 2 (two) times daily. 180 capsule 1    saw/vit E/sod lisa/lyc/beta/pyg (PROSTATE HEALTH ORAL) Take 2 capsules by mouth once daily. With saw palmetto      sildenafil (REVATIO) 20 mg Tab Take 1-5 daily as needed for ED 10 tablet 5    tiZANidine (ZANAFLEX) 4 MG tablet Take 1 tablet (4 mg total) by mouth 3 (three) times daily as needed (muscle spasm). 270 tablet 1    UNABLE TO FIND Take by mouth once daily. Vitafusion multivites gummies      varicella-zoster gE-AS01B, PF, (SHINGRIX, PF,) 50 mcg/0.5 mL injection Give one dose IM now, repeat times one in 2-6 months 1 each 1     Current Facility-Administered Medications on File Prior to Visit   Medication Dose Route Frequency Provider Last Rate Last Admin    diphenhydrAMINE injection 12.5 mg  12.5 mg Intravenous Once PRN Enrique Rosales MD        electrolyte-S (ISOLYTE)   Intravenous Continuous Enrique Rosales MD        HYDROmorphone (PF) injection 0.2 mg  0.2 mg Intravenous Q5 Min PRN Enrique Rosales MD        HYDROmorphone (PF) injection 0.2 mg  0.2 mg Intravenous Q5 Min PRN Enrique Rosales MD        lactated ringers infusion   Intravenous Once PRN Aram Terrell MD        lactated ringers infusion  10 mL/hr Intravenous Continuous Enrique Rosales  mL/hr at 03/22/22 1419 Rate Change at 03/22/22 1419    lorazepam injection 0.25 mg  0.25 mg Intravenous Once PRN Enrique Rosales MD        prochlorperazine injection Soln 5 mg  5 mg Intravenous Q30 Min PRN Enrique Rosales MD        sodium chloride 0.9% flush 3 mL  3 mL Intravenous Q8H Enrique Rosales MD        [DISCONTINUED] sodium chloride 0.9% flush 10 mL  10 mL Intravenous PRN Anupam Melchor MD   10 mL at 10/05/22 1500         Review of patient's allergies indicates:   Allergen  "Reactions    Xyzal [levocetirizine] Other (See Comments)     Knocked him out              ROS:   General ROS: negative  Respiratory ROS: no cough, shortness of breath, or wheezing  Cardiovascular ROS: no chest pain or dyspnea on exertion  Musculoskeletal ROS: negative  Neurological ROS: no TIA or stroke symptoms  Dermatological ROS: negative        EXAM:     Vitals:    10/06/22 1311   Weight: 104.8 kg (231 lb 0.7 oz)   Height: 5' 10" (1.778 m)        General: Patient is WD/WN, alert and oriented x 3 and in no apparent distress.     Right  Lower extremity exam:      Vascular:   Dorsalis pedis and posterior tibial pulses are 2/4 .    3 seconds capillary refill time   Toes are warm to touch.     There is no edema.       Neurological:    Light touch, proprioception, and sharp/dull sensation are all intact.           Dermatological:    Partial thickness wound at the R submet 5. No signs of infection.       Musculoskeletal:    Muscle strength is 5/5 in all groups .    Metatarsophalangeal, subtalar, and ankle range of motion are within normal limits without crepitus.     N/a      ASSESSMENT/PLAN:    Problem List Items Addressed This Visit    None        I counseled the patient on the patient's conditions, their implications and medical management.     The blistering agent has caused a small partial thickness wound at the 5th met, R foot. The loose skin was removed and the foot was dressed.    He will continue wound care daily.    F/u 1 month    Uziel Valadez DPM      "

## 2022-10-10 ENCOUNTER — TELEPHONE (OUTPATIENT)
Dept: FAMILY MEDICINE | Facility: CLINIC | Age: 77
End: 2022-10-10
Payer: MEDICARE

## 2022-10-10 NOTE — TELEPHONE ENCOUNTER
..I called the patient to confirm his appointment that he has with Dr. Montalvo on 10/11/2022 at 3:40pm, no answer left voicemail to return our call. I will send the patient a portal message as well.

## 2022-10-11 ENCOUNTER — OFFICE VISIT (OUTPATIENT)
Dept: FAMILY MEDICINE | Facility: CLINIC | Age: 77
End: 2022-10-11
Attending: FAMILY MEDICINE
Payer: MEDICARE

## 2022-10-11 VITALS
WEIGHT: 225.88 LBS | HEIGHT: 70 IN | DIASTOLIC BLOOD PRESSURE: 78 MMHG | OXYGEN SATURATION: 95 % | RESPIRATION RATE: 18 BRPM | BODY MASS INDEX: 32.34 KG/M2 | SYSTOLIC BLOOD PRESSURE: 123 MMHG | TEMPERATURE: 98 F | HEART RATE: 80 BPM

## 2022-10-11 DIAGNOSIS — F11.20 UNCOMPLICATED OPIOID DEPENDENCE: Primary | ICD-10-CM

## 2022-10-11 DIAGNOSIS — M19.011 PRIMARY OSTEOARTHRITIS OF RIGHT SHOULDER: ICD-10-CM

## 2022-10-11 DIAGNOSIS — G60.9 HEREDITARY AND IDIOPATHIC PERIPHERAL NEUROPATHY: ICD-10-CM

## 2022-10-11 DIAGNOSIS — G89.4 CHRONIC PAIN SYNDROME: ICD-10-CM

## 2022-10-11 PROCEDURE — 99213 OFFICE O/P EST LOW 20 MIN: CPT | Mod: S$GLB,,, | Performed by: FAMILY MEDICINE

## 2022-10-11 PROCEDURE — 3288F FALL RISK ASSESSMENT DOCD: CPT | Mod: CPTII,S$GLB,, | Performed by: FAMILY MEDICINE

## 2022-10-11 PROCEDURE — 1160F PR REVIEW ALL MEDS BY PRESCRIBER/CLIN PHARMACIST DOCUMENTED: ICD-10-PCS | Mod: CPTII,S$GLB,, | Performed by: FAMILY MEDICINE

## 2022-10-11 PROCEDURE — 4010F PR ACE/ARB THEARPY RXD/TAKEN: ICD-10-PCS | Mod: CPTII,S$GLB,, | Performed by: FAMILY MEDICINE

## 2022-10-11 PROCEDURE — 3074F SYST BP LT 130 MM HG: CPT | Mod: CPTII,S$GLB,, | Performed by: FAMILY MEDICINE

## 2022-10-11 PROCEDURE — 99499 UNLISTED E&M SERVICE: CPT | Mod: HCNC,S$GLB,, | Performed by: FAMILY MEDICINE

## 2022-10-11 PROCEDURE — 1160F RVW MEDS BY RX/DR IN RCRD: CPT | Mod: CPTII,S$GLB,, | Performed by: FAMILY MEDICINE

## 2022-10-11 PROCEDURE — 1101F PT FALLS ASSESS-DOCD LE1/YR: CPT | Mod: CPTII,S$GLB,, | Performed by: FAMILY MEDICINE

## 2022-10-11 PROCEDURE — 1125F PR PAIN SEVERITY QUANTIFIED, PAIN PRESENT: ICD-10-PCS | Mod: CPTII,S$GLB,, | Performed by: FAMILY MEDICINE

## 2022-10-11 PROCEDURE — 99999 PR PBB SHADOW E&M-EST. PATIENT-LVL V: CPT | Mod: PBBFAC,,, | Performed by: FAMILY MEDICINE

## 2022-10-11 PROCEDURE — 1125F AMNT PAIN NOTED PAIN PRSNT: CPT | Mod: CPTII,S$GLB,, | Performed by: FAMILY MEDICINE

## 2022-10-11 PROCEDURE — 1159F PR MEDICATION LIST DOCUMENTED IN MEDICAL RECORD: ICD-10-PCS | Mod: CPTII,S$GLB,, | Performed by: FAMILY MEDICINE

## 2022-10-11 PROCEDURE — 1101F PR PT FALLS ASSESS DOC 0-1 FALLS W/OUT INJ PAST YR: ICD-10-PCS | Mod: CPTII,S$GLB,, | Performed by: FAMILY MEDICINE

## 2022-10-11 PROCEDURE — 99213 PR OFFICE/OUTPT VISIT, EST, LEVL III, 20-29 MIN: ICD-10-PCS | Mod: S$GLB,,, | Performed by: FAMILY MEDICINE

## 2022-10-11 PROCEDURE — 3288F PR FALLS RISK ASSESSMENT DOCUMENTED: ICD-10-PCS | Mod: CPTII,S$GLB,, | Performed by: FAMILY MEDICINE

## 2022-10-11 PROCEDURE — 3078F PR MOST RECENT DIASTOLIC BLOOD PRESSURE < 80 MM HG: ICD-10-PCS | Mod: CPTII,S$GLB,, | Performed by: FAMILY MEDICINE

## 2022-10-11 PROCEDURE — 4010F ACE/ARB THERAPY RXD/TAKEN: CPT | Mod: CPTII,S$GLB,, | Performed by: FAMILY MEDICINE

## 2022-10-11 PROCEDURE — 3078F DIAST BP <80 MM HG: CPT | Mod: CPTII,S$GLB,, | Performed by: FAMILY MEDICINE

## 2022-10-11 PROCEDURE — 3074F PR MOST RECENT SYSTOLIC BLOOD PRESSURE < 130 MM HG: ICD-10-PCS | Mod: CPTII,S$GLB,, | Performed by: FAMILY MEDICINE

## 2022-10-11 PROCEDURE — 1159F MED LIST DOCD IN RCRD: CPT | Mod: CPTII,S$GLB,, | Performed by: FAMILY MEDICINE

## 2022-10-11 PROCEDURE — 99999 PR PBB SHADOW E&M-EST. PATIENT-LVL V: ICD-10-PCS | Mod: PBBFAC,,, | Performed by: FAMILY MEDICINE

## 2022-10-11 RX ORDER — MORPHINE SULFATE 30 MG/1
30 TABLET, FILM COATED, EXTENDED RELEASE ORAL 3 TIMES DAILY
Qty: 90 TABLET | Refills: 0 | Status: SHIPPED | OUTPATIENT
Start: 2022-11-30 | End: 2023-01-26 | Stop reason: SDUPTHER

## 2022-10-11 RX ORDER — OXYCODONE AND ACETAMINOPHEN 10; 325 MG/1; MG/1
1 TABLET ORAL EVERY 4 HOURS PRN
Qty: 120 TABLET | Refills: 0 | Status: SHIPPED | OUTPATIENT
Start: 2022-10-28 | End: 2023-01-26

## 2022-10-11 RX ORDER — OXYCODONE AND ACETAMINOPHEN 10; 325 MG/1; MG/1
1 TABLET ORAL EVERY 4 HOURS PRN
Qty: 120 TABLET | Refills: 0 | Status: SHIPPED | OUTPATIENT
Start: 2022-11-30 | End: 2023-01-26

## 2022-10-11 RX ORDER — MORPHINE SULFATE 30 MG/1
30 TABLET, FILM COATED, EXTENDED RELEASE ORAL 3 TIMES DAILY
Qty: 90 TABLET | Refills: 0 | Status: SHIPPED | OUTPATIENT
Start: 2022-10-28 | End: 2023-01-26 | Stop reason: SDUPTHER

## 2022-10-11 RX ORDER — OXYCODONE AND ACETAMINOPHEN 10; 325 MG/1; MG/1
1 TABLET ORAL EVERY 4 HOURS PRN
Qty: 120 TABLET | Refills: 0 | Status: SHIPPED | OUTPATIENT
Start: 2022-12-30 | End: 2023-01-04 | Stop reason: SDUPTHER

## 2022-10-11 RX ORDER — MORPHINE SULFATE 30 MG/1
30 TABLET, FILM COATED, EXTENDED RELEASE ORAL 3 TIMES DAILY
Qty: 90 TABLET | Refills: 0 | Status: SHIPPED | OUTPATIENT
Start: 2022-12-30 | End: 2023-01-26 | Stop reason: SDUPTHER

## 2022-10-11 NOTE — PROGRESS NOTES
Subjective:       Patient ID: Sam Pete is a 77 y.o. male.    Chief Complaint: Pain (Pt states that he is here for his 3 mo fu )    77-year-old male coming in to renew opioids for chronic pain syndrome.  He has a combination of arthritis of the right shoulder with recent surgery and still ongoing physical therapy, hereditary idiopathic peripheral neuropathy, cervical and lumbar spinal stenosis with radiculopathy and opioid dependence.  In addition he has hypertension, hyperlipidemia, paroxysmal atrial fibrillation, BPH with lower urinary tract symptoms, chronic anticoagulation, reflux without esophagitis, and failed back surgery.  He reports that his shoulder pain has greatly improved and he is now able to almost fully abduct the right shoulder.  He is still participating in physical therapy at this time.  He is interested in reducing his opioid load but is somewhat concerned as he had a flare up of his chronic back pain yesterday with no new injury or overuse that he is aware of.  It is moderate in severity at this time and as we discussed options he did wish to pursue a 25% reduction in the MS Contin, currently taking 60 mg b.i.d. with Percocet also.    He has no other complaints and is feeling well otherwise.    Past Medical History:  No date: Anticoagulant long-term use      Comment:  ASA 81 mg  No date: Arthritis  No date: Cataract      Comment:  OU  No date: Chronic low back pain  02/08/2011: Complete rupture of rotator cuff  07/08/2015: DDD (degenerative disc disease), lumbar  09/09/2015: Degeneration of lumbar or lumbosacral intervertebral disc  No date: ED (erectile dysfunction)  No date: GERD (gastroesophageal reflux disease)  No date: Hypertension  06/26/2017: Lumbar pseudoarthrosis  06/26/2017: Lumbar stenosis  No date: Obesity  07/08/2015: Spondylosis of lumbar region without myelopathy or   radiculopathy  1997: Stroke      Comment:  basal ganglia, left sided weakness, resolved  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  07/08/2015: Thoracic or lumbosacral neuritis or radiculitis    Past Surgical History:  No date: APPENDECTOMY  03/22/2022: ARTHROPLASTY OF SHOULDER; Right      Comment:  Procedure: ARTHROPLASTY, SHOULDER BRENNAN RIGHT SHOULDER                HUMERAL HEAD RESURFACING;  Surgeon: Frankie Joya MD;               Location: [facility];  Service: Orthopedics;  Laterality:                Right;  No date: BACK SURGERY      Comment:  4- back surgery  12/16/2021: CAUDAL EPIDURAL STEROID INJECTION; N/A      Comment:  Procedure: Injection-steroid-epidural-caudal;  Surgeon:                Aram Terrell MD;  Location: [facility];  Service: Pain                Management;  Laterality: N/A;  02/02/2022: CAUDAL EPIDURAL STEROID INJECTION; N/A      Comment:  Procedure: INJECTION, STEROID, SPINE, EPIDURAL, CAUDAL;                Surgeon: Aram Terrell MD;  Location: [facility];  Service:                Pain Management;  Laterality: N/A;  7/22/2022: CAUDAL EPIDURAL STEROID INJECTION; N/A      Comment:  Procedure: Injection-steroid-epidural-caudal;  Surgeon:                Aram Terrell MD;  Location: [facility];  Service: Pain                Management;  Laterality: N/A;  Caudal CARLOS   07/12/2004: COLONOSCOPY      Comment:  CHILO.   Redundant tortuous colon, otherwise normal.  02/25/2019: COLONOSCOPY; N/A      Comment:  Procedure: COLONOSCOPY;  Surgeon: Gilbert Rodriguez Jr., MD;  Location: [facility];  Service: Endoscopy;                 Laterality: N/A;  No date: Epidural steroid injection      Comment:  Pain management  07/12/2004: ESOPHAGOGASTRODUODENOSCOPY      Comment:  CHILO.  No date: FRACTURE SURGERY; Left      Comment:  wrist / forearm, total of 5  12/12/2019: INSERTION OF DORSAL COLUMN NERVE STIMULATOR FOR TRIAL; N/A      Comment:  Procedure: INSERTION, NEUROSTIMULATOR, SPINAL CORD,                DORSAL COLUMN, FOR TRIAL;  Surgeon: Evan Lyles MD;                 Location: [facility];  Service: Pain  Management;                 Laterality: N/A;  02/06/2013: JOINT REPLACEMENT      Comment:  ( rt hip 2007), left hip   No date: JOINT REPLACEMENT; Left      Comment:  total knee  No date: KNEE ARTHROSCOPY W/ DEBRIDEMENT      Comment:  bilateral knees , total of six  No date: KNEE SURGERY  02/12/2020: LAMINECTOMY USING MINIMALLY INVASIVE TECHNIQUE; N/A      Comment:  Procedure: LAMINECTOMY, SPINE, MINIMALLY INVASIVE /                 PLACEMENT OF SPINAL CORD STIMULATOR;  Surgeon: Darlene Max MD;  Location: [facility];  Service: Neurosurgery;                 Laterality: N/A;  No date: Nervbe Block injection      Comment:  Pain management  03/28/2022: TOTAL SHOULDER ARTHROPLASTY; Right      Comment:  Dr Joya  No date: TOTAL THYROIDECTOMY  03/07/2022: TOTAL THYROIDECTOMY; Bilateral      Comment:  Dr Mendoza    Current Outpatient Medications on File Prior to Visit:  albuterol (PROVENTIL/VENTOLIN HFA) 90 mcg/actuation inhaler, EVERY 4 HOURS AS NEEDED as needed for, Disp: , Rfl:   amLODIPine (NORVASC) 10 MG tablet, TAKE 1 TABLET EVERY DAY, Disp: 90 tablet, Rfl: 3  amLODIPine (NORVASC) 10 MG tablet, Take one tablet by mouth daily, Disp: 90 tablet, Rfl: 0  aspirin 81 MG Chew, Take 81 mg by mouth once daily., Disp: , Rfl:   atorvastatin (LIPITOR) 40 MG tablet, TAKE 1 TABLET (40 MG TOTAL) BY MOUTH ONCE DAILY., Disp: 90 tablet, Rfl: 3  buPROPion (WELLBUTRIN SR) 150 MG TBSR 12 hr tablet, Take 1 tablet (150 mg total) by mouth once daily., Disp: 30 tablet, Rfl: 0  buPROPion (WELLBUTRIN XL) 150 MG TB24 tablet, Take 1 tablet (150 mg total) by mouth once daily., Disp: 90 tablet, Rfl: 3  calcitRIOL (ROCALTROL) 0.5 MCG Cap, Take 1 capsule (0.5 mcg total) by mouth once daily., Disp: 30 capsule, Rfl: 0  calcitRIOL (ROCALTROL) 0.5 MCG Cap, Take 1 capsule (0.5 mcg total) by mouth once daily., Disp: 90 capsule, Rfl: 3  cephALEXin (KEFLEX) 500 MG capsule, Take 1 capsule (500 mg total) by mouth every 6 (six) hours., Disp: 20  capsule, Rfl: 0  COVID-19 test specimen collect Misc, TEST AS DIRECTED TODAY, Disp: , Rfl:   furosemide (LASIX) 40 MG tablet, Take 1 tablet (40 mg total) by mouth once daily., Disp: 30 tablet, Rfl: 5  KENALOG 40 mg/mL injection, , Disp: , Rfl:   levothyroxine (SYNTHROID, LEVOTHROID) 175 MCG tablet, Take 1 tablet (175 mcg total) by mouth once daily., Disp: 90 tablet, Rfl: 1  losartan (COZAAR) 50 MG tablet, TAKE 1 TABLET(50 MG) BY MOUTH EVERY DAY, Disp: 90 tablet, Rfl: 3  mupirocin (BACTROBAN) 2 % ointment, Apply to affected area 3 times daily, Disp: 22 g, Rfl: 1  naloxone (NARCAN) 0.4 mg/mL injection, Inject 1 mL (0.4 mg total) into the vein as needed (overdose)., Disp: 2 mL, Rfl: 5  omeprazole (PRILOSEC OTC) 20 MG tablet, 40 mg., Disp: , Rfl:   omeprazole (PRILOSEC) 40 MG capsule, Take 1 capsule by mouth once daily at 6am., Disp: , Rfl:   omeprazole (PRILOSEC) 40 MG capsule, Take one capsule by mouth daily, Disp: 90 capsule, Rfl: 0  ondansetron (ZOFRAN-ODT) 8 MG TbDL, Take 1 tablet (8 mg total) by mouth 2 (two) times daily as needed for nausea for 5days, Disp: 10 tablet, Rfl: 0  pregabalin (LYRICA) 75 MG capsule, Take 1 capsule (75 mg total) by mouth 2 (two) times daily., Disp: 180 capsule, Rfl: 1  saw/vit E/sod lisa/lyc/beta/pyg (PROSTATE HEALTH ORAL), Take 2 capsules by mouth once daily. With saw palmetto, Disp: , Rfl:   sildenafil (REVATIO) 20 mg Tab, Take 1-5 daily as needed for ED, Disp: 10 tablet, Rfl: 5  tiZANidine (ZANAFLEX) 4 MG tablet, Take 1 tablet (4 mg total) by mouth 3 (three) times daily as needed (muscle spasm)., Disp: 270 tablet, Rfl: 1  UNABLE TO FIND, Take by mouth once daily. Vitafusion multivites gummies, Disp: , Rfl:   varicella-zoster gE-AS01B, PF, (SHINGRIX, PF,) 50 mcg/0.5 mL injection, Give one dose IM now, repeat times one in 2-6 months, Disp: 1 each, Rfl: 1  [DISCONTINUED] morphine (MS CONTIN) 60 MG 12 hr tablet, Take 1 tablet (60 mg total) by mouth 2 (two) times daily., Disp: 60 tablet,  Rfl: 0  [DISCONTINUED] morphine (MS CONTIN) 60 MG 12 hr tablet, Take 1 tablet (60 mg total) by mouth 2 (two) times daily., Disp: 60 tablet, Rfl: 0  [DISCONTINUED] morphine (MS CONTIN) 60 MG 12 hr tablet, Take 1 tablet (60 mg total) by mouth 2 (two) times daily., Disp: 60 tablet, Rfl: 0  [DISCONTINUED] oxyCODONE-acetaminophen (PERCOCET)  mg per tablet, Take 1 tablet by mouth every 4 (four) hours as needed for Pain., Disp: 120 tablet, Rfl: 0  [DISCONTINUED] oxyCODONE-acetaminophen (PERCOCET)  mg per tablet, Take 1 tablet by mouth every 4 (four) hours as needed for Pain., Disp: 120 tablet, Rfl: 0  [DISCONTINUED] oxyCODONE-acetaminophen (PERCOCET)  mg per tablet, Take 1 tablet by mouth every 4 (four) hours as needed for Pain., Disp: 120 tablet, Rfl: 0    Current Facility-Administered Medications on File Prior to Visit:  diphenhydrAMINE injection 12.5 mg, 12.5 mg, Intravenous, Once PRN, Enrique Rosales MD  electrolyte-S (ISOLYTE), , Intravenous, Continuous, Enrique Rosales MD  HYDROmorphone (PF) injection 0.2 mg, 0.2 mg, Intravenous, Q5 Min PRN, Enrique Rosales MD  HYDROmorphone (PF) injection 0.2 mg, 0.2 mg, Intravenous, Q5 Min PRN, Enrique Rosales MD  lactated ringers infusion, , Intravenous, Once PRN, Aram Terrell MD  lactated ringers infusion, 10 mL/hr, Intravenous, Continuous, Enrique Rosales MD, Last Rate: 500 mL/hr at 03/22/22 1419, Rate Change at 03/22/22 1419  lorazepam injection 0.25 mg, 0.25 mg, Intravenous, Once PRN, Enrique Rosales MD  prochlorperazine injection Soln 5 mg, 5 mg, Intravenous, Q30 Min PRN, Enrique Rosales MD  sodium chloride 0.9% flush 3 mL, 3 mL, Intravenous, Q8H, Enrique Rosales MD          Review of Systems   Gastrointestinal:  Negative for constipation and nausea.   Musculoskeletal:  Positive for arthralgias (Improving), back pain (Worsening flare up with no known cause) and neck pain. Negative for joint swelling.    Psychiatric/Behavioral:  Negative for confusion, decreased concentration and dysphoric mood.      Objective:      Physical Exam  Vitals and nursing note reviewed.   Constitutional:       General: He is not in acute distress.     Appearance: Normal appearance. He is obese. He is not ill-appearing, toxic-appearing or diaphoretic.      Comments: Good blood pressure control   Normal pulse with regular rhythm   Obese with a BMI of 32.4.  Weight is down 18.7 lb from his previous weight July 6, 2022   Neurological:      General: No focal deficit present.      Mental Status: He is alert and oriented to person, place, and time.   Psychiatric:         Mood and Affect: Mood normal.         Behavior: Behavior normal.         Thought Content: Thought content normal.         Judgment: Judgment normal.       Assessment:       1. Uncomplicated opioid dependence    2. Primary osteoarthritis of right shoulder    3. Chronic pain syndrome    4. Hereditary and idiopathic peripheral neuropathy          Plan:       1. Uncomplicated opioid dependence  Change MS Contin 60 mg b.i.d. to 30 mg 3 times daily.  He has the option of taking two in the morning and one in the evening.  After three months at that level will reduce to 30 mg b.i.d. and then probably go to 15 mg b.i.d..  After that we can discontinue the MS Contin and possibly work on weaning down the Percocet if possible.   checked with no inappropriate activity  - oxyCODONE-acetaminophen (PERCOCET)  mg per tablet; Take 1 tablet by mouth every 4 (four) hours as needed for Pain.  Dispense: 120 tablet; Refill: 0  - morphine (MS CONTIN) 30 MG 12 hr tablet; Take 1 tablet (30 mg total) by mouth 3 (three) times daily. Take two tablets (60mg) in the morning and one tablet (30mg) in the evening  Dispense: 90 tablet; Refill: 0  - oxyCODONE-acetaminophen (PERCOCET)  mg per tablet; Take 1 tablet by mouth every 4 (four) hours as needed for Pain.  Dispense: 120 tablet; Refill: 0  -  morphine (MS CONTIN) 30 MG 12 hr tablet; Take 1 tablet (30 mg total) by mouth 3 (three) times daily. Take two tablets (60mg) in the morning and one tablet (30mg) in the evening  Dispense: 90 tablet; Refill: 0  - oxyCODONE-acetaminophen (PERCOCET)  mg per tablet; Take 1 tablet by mouth every 4 (four) hours as needed for Pain.  Dispense: 120 tablet; Refill: 0  - morphine (MS CONTIN) 30 MG 12 hr tablet; Take 1 tablet (30 mg total) by mouth 3 (three) times daily. Take two tablets (60mg) in the morning and one tablet (30mg) in the evening  Dispense: 90 tablet; Refill: 0    2. Primary osteoarthritis of right shoulder  Much improved    3. Chronic pain syndrome  See above    4. Hereditary and idiopathic peripheral neuropathy  See above

## 2022-10-12 ENCOUNTER — INFUSION (OUTPATIENT)
Dept: INFUSION THERAPY | Facility: HOSPITAL | Age: 77
End: 2022-10-12
Attending: INTERNAL MEDICINE
Payer: MEDICARE

## 2022-10-12 VITALS
WEIGHT: 228.81 LBS | RESPIRATION RATE: 18 BRPM | HEIGHT: 70 IN | DIASTOLIC BLOOD PRESSURE: 77 MMHG | BODY MASS INDEX: 32.76 KG/M2 | HEART RATE: 75 BPM | SYSTOLIC BLOOD PRESSURE: 128 MMHG | TEMPERATURE: 98 F

## 2022-10-12 DIAGNOSIS — D50.9 IRON DEFICIENCY ANEMIA, UNSPECIFIED IRON DEFICIENCY ANEMIA TYPE: Primary | ICD-10-CM

## 2022-10-12 PROCEDURE — 96375 TX/PRO/DX INJ NEW DRUG ADDON: CPT | Mod: PN

## 2022-10-12 PROCEDURE — 96365 THER/PROPH/DIAG IV INF INIT: CPT | Mod: PN

## 2022-10-12 PROCEDURE — 63600175 PHARM REV CODE 636 W HCPCS: Mod: PN | Performed by: INTERNAL MEDICINE

## 2022-10-12 PROCEDURE — 25000003 PHARM REV CODE 250: Mod: PN | Performed by: INTERNAL MEDICINE

## 2022-10-12 RX ORDER — DIPHENHYDRAMINE HYDROCHLORIDE 50 MG/ML
25 INJECTION INTRAMUSCULAR; INTRAVENOUS
Status: CANCELLED
Start: 2022-10-19

## 2022-10-12 RX ORDER — DIPHENHYDRAMINE HYDROCHLORIDE 50 MG/ML
25 INJECTION INTRAMUSCULAR; INTRAVENOUS
Status: COMPLETED | OUTPATIENT
Start: 2022-10-12 | End: 2022-10-12

## 2022-10-12 RX ORDER — DEXAMETHASONE SODIUM PHOSPHATE 4 MG/ML
4 INJECTION, SOLUTION INTRA-ARTICULAR; INTRALESIONAL; INTRAMUSCULAR; INTRAVENOUS; SOFT TISSUE
Status: COMPLETED | OUTPATIENT
Start: 2022-10-12 | End: 2022-10-12

## 2022-10-12 RX ORDER — SODIUM CHLORIDE 0.9 % (FLUSH) 0.9 %
10 SYRINGE (ML) INJECTION
Status: CANCELLED | OUTPATIENT
Start: 2022-10-19

## 2022-10-12 RX ORDER — SODIUM CHLORIDE 0.9 % (FLUSH) 0.9 %
10 SYRINGE (ML) INJECTION
Status: DISCONTINUED | OUTPATIENT
Start: 2022-10-12 | End: 2022-10-12 | Stop reason: HOSPADM

## 2022-10-12 RX ORDER — HEPARIN 100 UNIT/ML
500 SYRINGE INTRAVENOUS
Status: CANCELLED | OUTPATIENT
Start: 2022-10-19

## 2022-10-12 RX ORDER — FAMOTIDINE 10 MG/ML
20 INJECTION INTRAVENOUS
Status: COMPLETED | OUTPATIENT
Start: 2022-10-12 | End: 2022-10-12

## 2022-10-12 RX ORDER — DEXAMETHASONE SODIUM PHOSPHATE 4 MG/ML
4 INJECTION, SOLUTION INTRA-ARTICULAR; INTRALESIONAL; INTRAMUSCULAR; INTRAVENOUS; SOFT TISSUE
Status: CANCELLED
Start: 2022-10-19

## 2022-10-12 RX ORDER — FAMOTIDINE 10 MG/ML
20 INJECTION INTRAVENOUS
Status: CANCELLED
Start: 2022-10-19

## 2022-10-12 RX ADMIN — FAMOTIDINE 20 MG: 10 INJECTION INTRAVENOUS at 02:10

## 2022-10-12 RX ADMIN — IRON SUCROSE 200 MG: 20 INJECTION, SOLUTION INTRAVENOUS at 02:10

## 2022-10-12 RX ADMIN — SODIUM CHLORIDE: 0.9 INJECTION, SOLUTION INTRAVENOUS at 01:10

## 2022-10-12 RX ADMIN — DEXAMETHASONE SODIUM PHOSPHATE 4 MG: 4 INJECTION INTRA-ARTICULAR; INTRALESIONAL; INTRAMUSCULAR; INTRAVENOUS; SOFT TISSUE at 02:10

## 2022-10-12 RX ADMIN — DIPHENHYDRAMINE HYDROCHLORIDE 25 MG: 50 INJECTION INTRAMUSCULAR; INTRAVENOUS at 02:10

## 2022-10-12 NOTE — PLAN OF CARE
Problem: Adult Inpatient Plan of Care  Goal: Patient-Specific Goal (Individualized)  Outcome: Ongoing, Progressing  Flowsheets (Taken 10/12/2022 1604)  Anxieties, Fears or Concerns: None  Individualized Care Needs: Recliner, blanket  Patient-Specific Goals (Include Timeframe): Infection prevention during treatment.     Problem: Fatigue  Goal: Improved Activity Tolerance  Intervention: Promote Improved Energy  Flowsheets (Taken 10/12/2022 1604)  Fatigue Management:   activity schedule adjusted   frequent rest breaks encouraged   paced activity encouraged   fatigue-related activity identified  Sleep/Rest Enhancement:   regular sleep/rest pattern promoted   relaxation techniques promoted  Activity Management:   Ambulated -L4   Ambulated in swann - L4     Problem: Adult Inpatient Plan of Care  Goal: Plan of Care Review  Outcome: Ongoing, Progressing  Flowsheets (Taken 10/12/2022 1604)  Plan of Care Reviewed With: patient  Tolerated treatment with no known distress.  Ambulated from infusion center with use of cane.

## 2022-10-19 ENCOUNTER — INFUSION (OUTPATIENT)
Dept: INFUSION THERAPY | Facility: HOSPITAL | Age: 77
End: 2022-10-19
Attending: INTERNAL MEDICINE
Payer: MEDICARE

## 2022-10-19 VITALS
HEIGHT: 70 IN | WEIGHT: 227.38 LBS | TEMPERATURE: 98 F | SYSTOLIC BLOOD PRESSURE: 141 MMHG | DIASTOLIC BLOOD PRESSURE: 74 MMHG | HEART RATE: 76 BPM | RESPIRATION RATE: 18 BRPM | BODY MASS INDEX: 32.55 KG/M2

## 2022-10-19 DIAGNOSIS — D50.9 IRON DEFICIENCY ANEMIA, UNSPECIFIED IRON DEFICIENCY ANEMIA TYPE: Primary | ICD-10-CM

## 2022-10-19 PROCEDURE — 96365 THER/PROPH/DIAG IV INF INIT: CPT | Mod: PN

## 2022-10-19 PROCEDURE — 96375 TX/PRO/DX INJ NEW DRUG ADDON: CPT | Mod: PN

## 2022-10-19 PROCEDURE — 25000003 PHARM REV CODE 250: Mod: PN | Performed by: INTERNAL MEDICINE

## 2022-10-19 PROCEDURE — 63600175 PHARM REV CODE 636 W HCPCS: Mod: PN | Performed by: INTERNAL MEDICINE

## 2022-10-19 RX ORDER — FAMOTIDINE 10 MG/ML
20 INJECTION INTRAVENOUS
Status: COMPLETED | OUTPATIENT
Start: 2022-10-19 | End: 2022-10-19

## 2022-10-19 RX ORDER — HEPARIN 100 UNIT/ML
500 SYRINGE INTRAVENOUS
Status: CANCELLED | OUTPATIENT
Start: 2022-10-26

## 2022-10-19 RX ORDER — FAMOTIDINE 10 MG/ML
20 INJECTION INTRAVENOUS
Status: CANCELLED
Start: 2022-10-26

## 2022-10-19 RX ORDER — DEXAMETHASONE SODIUM PHOSPHATE 4 MG/ML
4 INJECTION, SOLUTION INTRA-ARTICULAR; INTRALESIONAL; INTRAMUSCULAR; INTRAVENOUS; SOFT TISSUE
Status: COMPLETED | OUTPATIENT
Start: 2022-10-19 | End: 2022-10-19

## 2022-10-19 RX ORDER — DEXAMETHASONE SODIUM PHOSPHATE 4 MG/ML
4 INJECTION, SOLUTION INTRA-ARTICULAR; INTRALESIONAL; INTRAMUSCULAR; INTRAVENOUS; SOFT TISSUE
Status: CANCELLED
Start: 2022-10-26

## 2022-10-19 RX ORDER — SODIUM CHLORIDE 0.9 % (FLUSH) 0.9 %
10 SYRINGE (ML) INJECTION
Status: CANCELLED | OUTPATIENT
Start: 2022-10-26

## 2022-10-19 RX ORDER — DIPHENHYDRAMINE HYDROCHLORIDE 50 MG/ML
25 INJECTION INTRAMUSCULAR; INTRAVENOUS
Status: COMPLETED | OUTPATIENT
Start: 2022-10-19 | End: 2022-10-19

## 2022-10-19 RX ORDER — DIPHENHYDRAMINE HYDROCHLORIDE 50 MG/ML
25 INJECTION INTRAMUSCULAR; INTRAVENOUS
Status: CANCELLED
Start: 2022-10-26

## 2022-10-19 RX ORDER — SODIUM CHLORIDE 0.9 % (FLUSH) 0.9 %
10 SYRINGE (ML) INJECTION
Status: DISCONTINUED | OUTPATIENT
Start: 2022-10-19 | End: 2022-10-19 | Stop reason: HOSPADM

## 2022-10-19 RX ADMIN — SODIUM CHLORIDE: 0.9 INJECTION, SOLUTION INTRAVENOUS at 02:10

## 2022-10-19 RX ADMIN — DEXAMETHASONE SODIUM PHOSPHATE 4 MG: 4 INJECTION INTRA-ARTICULAR; INTRALESIONAL; INTRAMUSCULAR; INTRAVENOUS; SOFT TISSUE at 02:10

## 2022-10-19 RX ADMIN — DIPHENHYDRAMINE HYDROCHLORIDE 25 MG: 50 INJECTION INTRAMUSCULAR; INTRAVENOUS at 02:10

## 2022-10-19 RX ADMIN — IRON SUCROSE 200 MG: 20 INJECTION, SOLUTION INTRAVENOUS at 02:10

## 2022-10-19 RX ADMIN — FAMOTIDINE 20 MG: 10 INJECTION INTRAVENOUS at 02:10

## 2022-10-19 NOTE — PLAN OF CARE
Problem: Adult Inpatient Plan of Care  Goal: Patient-Specific Goal (Individualized)  Outcome: Ongoing, Progressing  Flowsheets (Taken 10/19/2022 1610)  Anxieties, Fears or Concerns: None  Individualized Care Needs: Recliner, blanket  Patient-Specific Goals (Include Timeframe): Infection prevention during treatment.     Problem: Fatigue  Goal: Improved Activity Tolerance  Intervention: Promote Improved Energy  Flowsheets (Taken 10/19/2022 1610)  Fatigue Management:   activity schedule adjusted   frequent rest breaks encouraged   paced activity encouraged   fatigue-related activity identified  Sleep/Rest Enhancement:   regular sleep/rest pattern promoted   relaxation techniques promoted  Activity Management:   Ambulated -L4   Ambulated in swann - L4     Problem: Adult Inpatient Plan of Care  Goal: Plan of Care Review  Outcome: Ongoing, Progressing  Flowsheets (Taken 10/19/2022 1610)  Plan of Care Reviewed With: patient  Tolerated treatment with no known distress.  Ambulated from infusion center with steady gait.

## 2022-10-28 ENCOUNTER — INFUSION (OUTPATIENT)
Dept: INFUSION THERAPY | Facility: HOSPITAL | Age: 77
End: 2022-10-28
Attending: INTERNAL MEDICINE
Payer: MEDICARE

## 2022-10-28 VITALS
DIASTOLIC BLOOD PRESSURE: 86 MMHG | SYSTOLIC BLOOD PRESSURE: 139 MMHG | HEIGHT: 70 IN | RESPIRATION RATE: 18 BRPM | HEART RATE: 75 BPM | WEIGHT: 227.06 LBS | TEMPERATURE: 98 F | BODY MASS INDEX: 32.51 KG/M2

## 2022-10-28 DIAGNOSIS — D50.9 IRON DEFICIENCY ANEMIA, UNSPECIFIED IRON DEFICIENCY ANEMIA TYPE: Primary | ICD-10-CM

## 2022-10-28 PROCEDURE — 96375 TX/PRO/DX INJ NEW DRUG ADDON: CPT | Mod: PN

## 2022-10-28 PROCEDURE — 63600175 PHARM REV CODE 636 W HCPCS: Mod: PN | Performed by: INTERNAL MEDICINE

## 2022-10-28 PROCEDURE — 96365 THER/PROPH/DIAG IV INF INIT: CPT | Mod: PN

## 2022-10-28 PROCEDURE — 25000003 PHARM REV CODE 250: Mod: PN | Performed by: INTERNAL MEDICINE

## 2022-10-28 RX ORDER — FAMOTIDINE 10 MG/ML
20 INJECTION INTRAVENOUS
Status: COMPLETED | OUTPATIENT
Start: 2022-10-28 | End: 2022-10-28

## 2022-10-28 RX ORDER — DEXAMETHASONE SODIUM PHOSPHATE 4 MG/ML
4 INJECTION, SOLUTION INTRA-ARTICULAR; INTRALESIONAL; INTRAMUSCULAR; INTRAVENOUS; SOFT TISSUE
Status: CANCELLED
Start: 2022-11-02

## 2022-10-28 RX ORDER — SODIUM CHLORIDE 0.9 % (FLUSH) 0.9 %
10 SYRINGE (ML) INJECTION
Status: CANCELLED | OUTPATIENT
Start: 2022-11-02

## 2022-10-28 RX ORDER — HEPARIN 100 UNIT/ML
500 SYRINGE INTRAVENOUS
Status: CANCELLED | OUTPATIENT
Start: 2022-11-02

## 2022-10-28 RX ORDER — SODIUM CHLORIDE 0.9 % (FLUSH) 0.9 %
10 SYRINGE (ML) INJECTION
Status: DISCONTINUED | OUTPATIENT
Start: 2022-10-28 | End: 2022-10-28 | Stop reason: HOSPADM

## 2022-10-28 RX ORDER — FAMOTIDINE 10 MG/ML
20 INJECTION INTRAVENOUS
Status: CANCELLED
Start: 2022-11-02

## 2022-10-28 RX ORDER — DIPHENHYDRAMINE HYDROCHLORIDE 50 MG/ML
25 INJECTION INTRAMUSCULAR; INTRAVENOUS
Status: COMPLETED | OUTPATIENT
Start: 2022-10-28 | End: 2022-10-28

## 2022-10-28 RX ORDER — DEXAMETHASONE SODIUM PHOSPHATE 4 MG/ML
4 INJECTION, SOLUTION INTRA-ARTICULAR; INTRALESIONAL; INTRAMUSCULAR; INTRAVENOUS; SOFT TISSUE
Status: COMPLETED | OUTPATIENT
Start: 2022-10-28 | End: 2022-10-28

## 2022-10-28 RX ORDER — DIPHENHYDRAMINE HYDROCHLORIDE 50 MG/ML
25 INJECTION INTRAMUSCULAR; INTRAVENOUS
Status: CANCELLED
Start: 2022-11-02

## 2022-10-28 RX ADMIN — SODIUM CHLORIDE: 0.9 INJECTION, SOLUTION INTRAVENOUS at 03:10

## 2022-10-28 RX ADMIN — DIPHENHYDRAMINE HYDROCHLORIDE 25 MG: 50 INJECTION INTRAMUSCULAR; INTRAVENOUS at 03:10

## 2022-10-28 RX ADMIN — IRON SUCROSE 200 MG: 20 INJECTION, SOLUTION INTRAVENOUS at 03:10

## 2022-10-28 RX ADMIN — DEXAMETHASONE SODIUM PHOSPHATE 4 MG: 4 INJECTION INTRA-ARTICULAR; INTRALESIONAL; INTRAMUSCULAR; INTRAVENOUS; SOFT TISSUE at 03:10

## 2022-10-28 RX ADMIN — FAMOTIDINE 20 MG: 10 INJECTION INTRAVENOUS at 03:10

## 2022-10-28 NOTE — PLAN OF CARE
Problem: Adult Inpatient Plan of Care  Goal: Patient-Specific Goal (Individualized)  Outcome: Ongoing, Progressing  Flowsheets (Taken 10/28/2022 1720)  Anxieties, Fears or Concerns: None  Individualized Care Needs: Recliner, blanket  Patient-Specific Goals (Include Timeframe): Infection prevention during treatment.     Problem: Fatigue  Goal: Improved Activity Tolerance  Intervention: Promote Improved Energy  Flowsheets (Taken 10/28/2022 1720)  Fatigue Management:   activity schedule adjusted   frequent rest breaks encouraged   fatigue-related activity identified   paced activity encouraged  Sleep/Rest Enhancement:   regular sleep/rest pattern promoted   relaxation techniques promoted  Activity Management:   Ambulated -L4   Ambulated in swann - L4     Problem: Adult Inpatient Plan of Care  Goal: Plan of Care Review  Outcome: Ongoing, Progressing  Flowsheets (Taken 10/28/2022 1720)  Plan of Care Reviewed With: patient  Tolerated treatment with no known distress.  Ambulated from infusion center with steady gait.

## 2022-10-31 ENCOUNTER — PATIENT MESSAGE (OUTPATIENT)
Dept: HEMATOLOGY/ONCOLOGY | Facility: CLINIC | Age: 77
End: 2022-10-31
Payer: MEDICARE

## 2022-10-31 DIAGNOSIS — D50.9 IRON DEFICIENCY ANEMIA, UNSPECIFIED IRON DEFICIENCY ANEMIA TYPE: Primary | ICD-10-CM

## 2022-11-08 ENCOUNTER — TELEPHONE (OUTPATIENT)
Dept: FAMILY MEDICINE | Facility: CLINIC | Age: 77
End: 2022-11-08
Payer: MEDICARE

## 2022-11-08 ENCOUNTER — LAB VISIT (OUTPATIENT)
Dept: LAB | Facility: HOSPITAL | Age: 77
End: 2022-11-08
Attending: FAMILY MEDICINE
Payer: MEDICARE

## 2022-11-08 DIAGNOSIS — E89.0 POSTOPERATIVE HYPOTHYROIDISM: ICD-10-CM

## 2022-11-08 LAB
T4 FREE SERPL-MCNC: 0.79 NG/DL (ref 0.71–1.51)
TSH SERPL DL<=0.005 MIU/L-ACNC: 20.63 UIU/ML (ref 0.4–4)

## 2022-11-08 PROCEDURE — 84439 ASSAY OF FREE THYROXINE: CPT | Performed by: FAMILY MEDICINE

## 2022-11-08 PROCEDURE — 84443 ASSAY THYROID STIM HORMONE: CPT | Performed by: FAMILY MEDICINE

## 2022-11-08 PROCEDURE — 36415 COLL VENOUS BLD VENIPUNCTURE: CPT | Mod: PO | Performed by: FAMILY MEDICINE

## 2022-11-08 NOTE — TELEPHONE ENCOUNTER
----- Message from Esperanza Rodriguez sent at 11/8/2022 12:21 PM CST -----  Regarding: Blood Work  Mr. Pete is inquiring about having his iron levels checked. Can someone give him a call please.

## 2022-11-08 NOTE — TELEPHONE ENCOUNTER
He just completed a series of iron infusions.  He is scheduled to see Hematology on December 6 with a CBC to follow-up.  It is too soon to check the iron levels now, they have not had time to stabilize.

## 2022-11-09 ENCOUNTER — OFFICE VISIT (OUTPATIENT)
Dept: PODIATRY | Facility: CLINIC | Age: 77
End: 2022-11-09
Payer: MEDICARE

## 2022-11-09 ENCOUNTER — PATIENT MESSAGE (OUTPATIENT)
Dept: FAMILY MEDICINE | Facility: CLINIC | Age: 77
End: 2022-11-09
Payer: MEDICARE

## 2022-11-09 VITALS — WEIGHT: 227.06 LBS | BODY MASS INDEX: 32.51 KG/M2 | HEIGHT: 70 IN

## 2022-11-09 DIAGNOSIS — B07.0 PLANTAR WART, RIGHT FOOT: Primary | ICD-10-CM

## 2022-11-09 PROCEDURE — 1101F PR PT FALLS ASSESS DOC 0-1 FALLS W/OUT INJ PAST YR: ICD-10-PCS | Mod: CPTII,S$GLB,, | Performed by: STUDENT IN AN ORGANIZED HEALTH CARE EDUCATION/TRAINING PROGRAM

## 2022-11-09 PROCEDURE — 99999 PR PBB SHADOW E&M-EST. PATIENT-LVL IV: CPT | Mod: PBBFAC,,, | Performed by: STUDENT IN AN ORGANIZED HEALTH CARE EDUCATION/TRAINING PROGRAM

## 2022-11-09 PROCEDURE — 1101F PT FALLS ASSESS-DOCD LE1/YR: CPT | Mod: CPTII,S$GLB,, | Performed by: STUDENT IN AN ORGANIZED HEALTH CARE EDUCATION/TRAINING PROGRAM

## 2022-11-09 PROCEDURE — 1125F PR PAIN SEVERITY QUANTIFIED, PAIN PRESENT: ICD-10-PCS | Mod: CPTII,S$GLB,, | Performed by: STUDENT IN AN ORGANIZED HEALTH CARE EDUCATION/TRAINING PROGRAM

## 2022-11-09 PROCEDURE — 1160F RVW MEDS BY RX/DR IN RCRD: CPT | Mod: CPTII,S$GLB,, | Performed by: STUDENT IN AN ORGANIZED HEALTH CARE EDUCATION/TRAINING PROGRAM

## 2022-11-09 PROCEDURE — 99499 UNLISTED E&M SERVICE: CPT | Mod: S$GLB,,, | Performed by: STUDENT IN AN ORGANIZED HEALTH CARE EDUCATION/TRAINING PROGRAM

## 2022-11-09 PROCEDURE — 99499 NO LOS: ICD-10-PCS | Mod: S$GLB,,, | Performed by: STUDENT IN AN ORGANIZED HEALTH CARE EDUCATION/TRAINING PROGRAM

## 2022-11-09 PROCEDURE — 1159F MED LIST DOCD IN RCRD: CPT | Mod: CPTII,S$GLB,, | Performed by: STUDENT IN AN ORGANIZED HEALTH CARE EDUCATION/TRAINING PROGRAM

## 2022-11-09 PROCEDURE — 4010F PR ACE/ARB THEARPY RXD/TAKEN: ICD-10-PCS | Mod: CPTII,S$GLB,, | Performed by: STUDENT IN AN ORGANIZED HEALTH CARE EDUCATION/TRAINING PROGRAM

## 2022-11-09 PROCEDURE — 1125F AMNT PAIN NOTED PAIN PRSNT: CPT | Mod: CPTII,S$GLB,, | Performed by: STUDENT IN AN ORGANIZED HEALTH CARE EDUCATION/TRAINING PROGRAM

## 2022-11-09 PROCEDURE — 1159F PR MEDICATION LIST DOCUMENTED IN MEDICAL RECORD: ICD-10-PCS | Mod: CPTII,S$GLB,, | Performed by: STUDENT IN AN ORGANIZED HEALTH CARE EDUCATION/TRAINING PROGRAM

## 2022-11-09 PROCEDURE — 99999 PR PBB SHADOW E&M-EST. PATIENT-LVL IV: ICD-10-PCS | Mod: PBBFAC,,, | Performed by: STUDENT IN AN ORGANIZED HEALTH CARE EDUCATION/TRAINING PROGRAM

## 2022-11-09 PROCEDURE — 3288F PR FALLS RISK ASSESSMENT DOCUMENTED: ICD-10-PCS | Mod: CPTII,S$GLB,, | Performed by: STUDENT IN AN ORGANIZED HEALTH CARE EDUCATION/TRAINING PROGRAM

## 2022-11-09 PROCEDURE — 3288F FALL RISK ASSESSMENT DOCD: CPT | Mod: CPTII,S$GLB,, | Performed by: STUDENT IN AN ORGANIZED HEALTH CARE EDUCATION/TRAINING PROGRAM

## 2022-11-09 PROCEDURE — 17000 PR DESTRUCTION(LASER SURGERY,CRYOSURGERY,CHEMOSURGERY),PREMALIGNANT LESIONS,FIRST LESION: ICD-10-PCS | Mod: S$GLB,,, | Performed by: STUDENT IN AN ORGANIZED HEALTH CARE EDUCATION/TRAINING PROGRAM

## 2022-11-09 PROCEDURE — 4010F ACE/ARB THERAPY RXD/TAKEN: CPT | Mod: CPTII,S$GLB,, | Performed by: STUDENT IN AN ORGANIZED HEALTH CARE EDUCATION/TRAINING PROGRAM

## 2022-11-09 PROCEDURE — 17000 DESTRUCT PREMALG LESION: CPT | Mod: S$GLB,,, | Performed by: STUDENT IN AN ORGANIZED HEALTH CARE EDUCATION/TRAINING PROGRAM

## 2022-11-09 PROCEDURE — 1160F PR REVIEW ALL MEDS BY PRESCRIBER/CLIN PHARMACIST DOCUMENTED: ICD-10-PCS | Mod: CPTII,S$GLB,, | Performed by: STUDENT IN AN ORGANIZED HEALTH CARE EDUCATION/TRAINING PROGRAM

## 2022-11-09 NOTE — PROGRESS NOTES
No chief complaint on file.          HPI:   Sam Pete is a 77 y.o. male with concerns of  right foot pain, which appears to be present for months but worse over the last several days to week  Patient reports no acute injury to the area.    Patient states the pain occurs with direct pressure.      Treatment tried at home: nothing    10/6/22: Patient returns with some worsening pain over the last few days.    11/09/2022: Patient returns today for worsening foot pain again.    Patient Active Problem List   Diagnosis    Hypertrophy of prostate with urinary obstruction and other lower urinary tract symptoms (LUTS)    Impotence of organic origin    Spinal stenosis in cervical region    Spinal stenosis, lumbar region, without neurogenic claudication    Hereditary and idiopathic peripheral neuropathy    Chronic pain low back and neck with spinal stenosis, djd left arm.  pain contract renewed 9/10/14    Essential (primary) hypertension    Facet arthropathy, lumbar    Obesity (BMI 30.0-34.9)    Atherosclerosis of aorta    Opioid dependence    PAF (paroxysmal atrial fibrillation)    Colon cancer screening    Failed back surgical syndrome    Lumbar radiculopathy    Chronic pain syndrome    Chronic back pain    Other hyperlipidemia    Gastroesophageal reflux disease without esophagitis    At risk for falls    Primary osteoarthritis of right shoulder    Long term (current) use of anticoagulants    REAL (iron deficiency anemia)    Right-sided chest pain         Current Outpatient Medications on File Prior to Visit   Medication Sig Dispense Refill    albuterol (PROVENTIL/VENTOLIN HFA) 90 mcg/actuation inhaler EVERY 4 HOURS AS NEEDED as needed for      amLODIPine (NORVASC) 10 MG tablet TAKE 1 TABLET EVERY DAY 90 tablet 3    amLODIPine (NORVASC) 10 MG tablet Take one tablet by mouth daily 90 tablet 0    aspirin 81 MG Chew Take 81 mg by mouth once daily.      atorvastatin (LIPITOR) 40 MG tablet TAKE 1 TABLET (40 MG TOTAL) BY  MOUTH ONCE DAILY. 90 tablet 3    buPROPion (WELLBUTRIN SR) 150 MG TBSR 12 hr tablet Take 1 tablet (150 mg total) by mouth once daily. 30 tablet 0    buPROPion (WELLBUTRIN XL) 150 MG TB24 tablet Take 1 tablet (150 mg total) by mouth once daily. 90 tablet 3    calcitRIOL (ROCALTROL) 0.5 MCG Cap Take 1 capsule (0.5 mcg total) by mouth once daily. 30 capsule 0    calcitRIOL (ROCALTROL) 0.5 MCG Cap Take 1 capsule (0.5 mcg total) by mouth once daily. 90 capsule 3    cephALEXin (KEFLEX) 500 MG capsule Take 1 capsule (500 mg total) by mouth every 6 (six) hours. 20 capsule 0    COVID-19 test specimen collect Misc TEST AS DIRECTED TODAY      furosemide (LASIX) 40 MG tablet Take 1 tablet (40 mg total) by mouth once daily. 30 tablet 5    KENALOG 40 mg/mL injection       levothyroxine (SYNTHROID, LEVOTHROID) 175 MCG tablet Take 1 tablet (175 mcg total) by mouth once daily. 90 tablet 1    losartan (COZAAR) 50 MG tablet TAKE 1 TABLET(50 MG) BY MOUTH EVERY DAY 90 tablet 3    morphine (MS CONTIN) 30 MG 12 hr tablet Take two tablets (60mg) by mouth in the morning and one tablet (30mg) in the evening 90 tablet 0    [START ON 11/30/2022] morphine (MS CONTIN) 30 MG 12 hr tablet Take 1 tablet (30 mg total) by mouth 3 (three) times daily. Take two tablets (60mg) in the morning and one tablet (30mg) in the evening 90 tablet 0    [START ON 12/30/2022] morphine (MS CONTIN) 30 MG 12 hr tablet Take 1 tablet (30 mg total) by mouth 3 (three) times daily. Take two tablets (60mg) in the morning and one tablet (30mg) in the evening 90 tablet 0    mupirocin (BACTROBAN) 2 % ointment Apply to affected area 3 times daily 22 g 1    naloxone (NARCAN) 0.4 mg/mL injection Inject 1 mL (0.4 mg total) into the vein as needed (overdose). 2 mL 5    omeprazole (PRILOSEC OTC) 20 MG tablet 40 mg.      omeprazole (PRILOSEC) 40 MG capsule Take 1 capsule by mouth once daily at 6am.      omeprazole (PRILOSEC) 40 MG capsule Take one capsule by mouth daily 90 capsule 0     ondansetron (ZOFRAN-ODT) 8 MG TbDL Take 1 tablet (8 mg total) by mouth 2 (two) times daily as needed for nausea for 5days 10 tablet 0    oxyCODONE-acetaminophen (PERCOCET)  mg per tablet Take 1 tablet by mouth every 4 (four) hours as needed for Pain. 120 tablet 0    [START ON 11/30/2022] oxyCODONE-acetaminophen (PERCOCET)  mg per tablet Take 1 tablet by mouth every 4 (four) hours as needed for Pain. 120 tablet 0    [START ON 12/30/2022] oxyCODONE-acetaminophen (PERCOCET)  mg per tablet Take 1 tablet by mouth every 4 (four) hours as needed for Pain. 120 tablet 0    pregabalin (LYRICA) 75 MG capsule Take 1 capsule (75 mg total) by mouth 2 (two) times daily. 180 capsule 1    saw/vit E/sod lisa/lyc/beta/pyg (PROSTATE HEALTH ORAL) Take 2 capsules by mouth once daily. With saw palmetto      sildenafil (REVATIO) 20 mg Tab Take 1-5 daily as needed for ED 10 tablet 5    tiZANidine (ZANAFLEX) 4 MG tablet Take 1 tablet (4 mg total) by mouth 3 (three) times daily as needed (muscle spasm). 270 tablet 1    UNABLE TO FIND Take by mouth once daily. Vitafusion multivites gummies      varicella-zoster [id number], PF, (SHINGRIX, PF,) 50 mcg/0.5 mL injection Give one dose IM now, repeat times one in 2-6 months 1 each 1     Current Facility-Administered Medications on File Prior to Visit   Medication Dose Route Frequency Provider Last Rate Last Admin    diphenhydrAMINE injection 12.5 mg  12.5 mg Intravenous Once PRN Enrique Rosales MD        electrolyte-S (ISOLYTE)   Intravenous Continuous Enrique Rosales MD        HYDROmorphone (PF) injection 0.2 mg  0.2 mg Intravenous Q5 Min PRN Enrique Rosales MD        HYDROmorphone (PF) injection 0.2 mg  0.2 mg Intravenous Q5 Min PRN Enrique Rosales MD        lactated ringers infusion   Intravenous Once PRN Aram Terrell MD        lactated ringers infusion  10 mL/hr Intravenous Continuous Enrique Rosales  mL/hr at 03/22/22 1419 Rate Change at 03/22/22  "1419    lorazepam injection 0.25 mg  0.25 mg Intravenous Once PRN Enrique Rosales MD        prochlorperazine injection Soln 5 mg  5 mg Intravenous Q30 Min PRN Enrique Rosales MD        sodium chloride 0.9% flush 3 mL  3 mL Intravenous Q8H Enrique Rosales MD             Review of patient's allergies indicates:   Allergen Reactions    Xyzal [levocetirizine] Other (See Comments)     Knocked him out              ROS:   General ROS: negative  Respiratory ROS: no cough, shortness of breath, or wheezing  Cardiovascular ROS: no chest pain or dyspnea on exertion  Musculoskeletal ROS: negative  Neurological ROS: no TIA or stroke symptoms  Dermatological ROS: negative        EXAM:     Vitals:    11/09/22 1522   Weight: 103 kg (227 lb 1.2 oz)   Height: 5' 10" (1.778 m)        General: Patient is WD/WN, alert and oriented x 3 and in no apparent distress.     Right  Lower extremity exam:      Vascular:   Dorsalis pedis and posterior tibial pulses are 2/4 .    3 seconds capillary refill time   Toes are warm to touch.     There is no edema.       Neurological:    Light touch, proprioception, and sharp/dull sensation are all intact.           Dermatological:    Wound is healed but patient still has a small central core correlating with a wart      Musculoskeletal:    Muscle strength is 5/5 in all groups .    Metatarsophalangeal, subtalar, and ankle range of motion are within normal limits without crepitus.     N/a      ASSESSMENT/PLAN:    Problem List Items Addressed This Visit    None  Visit Diagnoses       Plantar wart, right foot    -  Primary                I counseled the patient on the patient's conditions, their implications and medical management.     Wound is healed but the wart is still there.  We would go forward with another blistering agent treatment today.  Patient to follow-up in 3 weeks for an additional evaluation.    Patient's verbal consent the right foot wart was debrided and salinocaine and " trichloracetic acid was applied.  Patient to keep this clean and dry for 24 hours before removing it.  Patient tolerated the procedure without any complications.    Uziel Valadez DPM

## 2022-11-10 ENCOUNTER — TELEPHONE (OUTPATIENT)
Dept: FAMILY MEDICINE | Facility: CLINIC | Age: 77
End: 2022-11-10
Payer: MEDICARE

## 2022-11-10 DIAGNOSIS — E89.0 POSTOPERATIVE HYPOTHYROIDISM: ICD-10-CM

## 2022-11-10 RX ORDER — LEVOTHYROXINE SODIUM 200 UG/1
200 TABLET ORAL DAILY
Qty: 90 TABLET | Refills: 1 | Status: SHIPPED | OUTPATIENT
Start: 2022-11-10 | End: 2023-01-17

## 2022-11-10 RX ORDER — LEVOTHYROXINE SODIUM 200 UG/1
200 TABLET ORAL DAILY
Qty: 90 TABLET | Refills: 1 | Status: SHIPPED | OUTPATIENT
Start: 2022-11-10 | End: 2022-11-10 | Stop reason: SDUPTHER

## 2022-11-10 NOTE — TELEPHONE ENCOUNTER
----- Message from Crista Méndez MA sent at 11/10/2022  9:36 AM CST -----  Called [facility] and verified that they have been filling levothyroxine 175 mcg.

## 2022-11-10 NOTE — TELEPHONE ENCOUNTER
Levothyroxine changed to 200 mcg daily and sent to [facility] [place].      TSH order in for 6 to 8 weeks

## 2022-11-10 NOTE — TELEPHONE ENCOUNTER
Call placed to patient for notification. Patient verbalized understanding. Follow up lab appointment scheduled. Patient agreed to appointment date, time, and location ([facility] lab per patient's request).

## 2022-11-21 ENCOUNTER — OFFICE VISIT (OUTPATIENT)
Dept: URGENT CARE | Facility: CLINIC | Age: 77
End: 2022-11-21
Payer: MEDICARE

## 2022-11-21 VITALS
WEIGHT: 227 LBS | BODY MASS INDEX: 32.5 KG/M2 | SYSTOLIC BLOOD PRESSURE: 123 MMHG | HEART RATE: 74 BPM | TEMPERATURE: 98 F | OXYGEN SATURATION: 96 % | HEIGHT: 70 IN | DIASTOLIC BLOOD PRESSURE: 77 MMHG | RESPIRATION RATE: 16 BRPM

## 2022-11-21 DIAGNOSIS — R09.81 SINUS CONGESTION: ICD-10-CM

## 2022-11-21 DIAGNOSIS — J02.9 SORE THROAT: ICD-10-CM

## 2022-11-21 DIAGNOSIS — J06.9 VIRAL URI: Primary | ICD-10-CM

## 2022-11-21 LAB
CTP QC/QA: YES
CTP QC/QA: YES
POC MOLECULAR INFLUENZA A AGN: NEGATIVE
POC MOLECULAR INFLUENZA B AGN: NEGATIVE
SARS-COV-2 RDRP RESP QL NAA+PROBE: NEGATIVE

## 2022-11-21 PROCEDURE — 1126F PR PAIN SEVERITY QUANTIFIED, NO PAIN PRESENT: ICD-10-PCS | Mod: CPTII,S$GLB,, | Performed by: NURSE PRACTITIONER

## 2022-11-21 PROCEDURE — 3078F PR MOST RECENT DIASTOLIC BLOOD PRESSURE < 80 MM HG: ICD-10-PCS | Mod: CPTII,S$GLB,, | Performed by: NURSE PRACTITIONER

## 2022-11-21 PROCEDURE — 3074F SYST BP LT 130 MM HG: CPT | Mod: CPTII,S$GLB,, | Performed by: NURSE PRACTITIONER

## 2022-11-21 PROCEDURE — 4010F PR ACE/ARB THEARPY RXD/TAKEN: ICD-10-PCS | Mod: CPTII,S$GLB,, | Performed by: NURSE PRACTITIONER

## 2022-11-21 PROCEDURE — 99213 PR OFFICE/OUTPT VISIT, EST, LEVL III, 20-29 MIN: ICD-10-PCS | Mod: S$GLB,,, | Performed by: NURSE PRACTITIONER

## 2022-11-21 PROCEDURE — 1160F RVW MEDS BY RX/DR IN RCRD: CPT | Mod: CPTII,S$GLB,, | Performed by: NURSE PRACTITIONER

## 2022-11-21 PROCEDURE — 87502 INFLUENZA DNA AMP PROBE: CPT | Mod: QW,S$GLB,, | Performed by: NURSE PRACTITIONER

## 2022-11-21 PROCEDURE — 3078F DIAST BP <80 MM HG: CPT | Mod: CPTII,S$GLB,, | Performed by: NURSE PRACTITIONER

## 2022-11-21 PROCEDURE — 4010F ACE/ARB THERAPY RXD/TAKEN: CPT | Mod: CPTII,S$GLB,, | Performed by: NURSE PRACTITIONER

## 2022-11-21 PROCEDURE — 87635: ICD-10-PCS | Mod: QW,S$GLB,, | Performed by: NURSE PRACTITIONER

## 2022-11-21 PROCEDURE — 1160F PR REVIEW ALL MEDS BY PRESCRIBER/CLIN PHARMACIST DOCUMENTED: ICD-10-PCS | Mod: CPTII,S$GLB,, | Performed by: NURSE PRACTITIONER

## 2022-11-21 PROCEDURE — 1159F MED LIST DOCD IN RCRD: CPT | Mod: CPTII,S$GLB,, | Performed by: NURSE PRACTITIONER

## 2022-11-21 PROCEDURE — 1126F AMNT PAIN NOTED NONE PRSNT: CPT | Mod: CPTII,S$GLB,, | Performed by: NURSE PRACTITIONER

## 2022-11-21 PROCEDURE — 87502 POCT INFLUENZA A/B MOLECULAR: ICD-10-PCS | Mod: QW,S$GLB,, | Performed by: NURSE PRACTITIONER

## 2022-11-21 PROCEDURE — 87635 SARS-COV-2 COVID-19 AMP PRB: CPT | Mod: QW,S$GLB,, | Performed by: NURSE PRACTITIONER

## 2022-11-21 PROCEDURE — 1159F PR MEDICATION LIST DOCUMENTED IN MEDICAL RECORD: ICD-10-PCS | Mod: CPTII,S$GLB,, | Performed by: NURSE PRACTITIONER

## 2022-11-21 PROCEDURE — 99213 OFFICE O/P EST LOW 20 MIN: CPT | Mod: S$GLB,,, | Performed by: NURSE PRACTITIONER

## 2022-11-21 PROCEDURE — 3074F PR MOST RECENT SYSTOLIC BLOOD PRESSURE < 130 MM HG: ICD-10-PCS | Mod: CPTII,S$GLB,, | Performed by: NURSE PRACTITIONER

## 2022-11-21 RX ORDER — FLUTICASONE PROPIONATE 50 MCG
1 SPRAY, SUSPENSION (ML) NASAL DAILY PRN
Qty: 16 G | Refills: 0 | Status: SHIPPED | OUTPATIENT
Start: 2022-11-21 | End: 2023-07-17 | Stop reason: CLARIF

## 2022-11-21 NOTE — PROGRESS NOTES
"Subjective:       Patient ID: Sam Pete is a 77 y.o. male.    Vitals:  height is 5' 10" (1.778 m) and weight is 103 kg (227 lb). His oral temperature is 97.5 °F (36.4 °C). His blood pressure is 123/77 and his pulse is 74. His respiration is 16 and oxygen saturation is 96%.     Chief Complaint: Sinus Problem    Patient presents today with sore throat and congestion that began about 3 days ago.  Patient also complains of body aches.  Patient has been taking Mucinex DM.    Provider note begins below:  Patient denies fever, chills, cp, sob, nausea or abdominal pain. Awake and alert. Speaking in full sentences. Afebrile.    Sinus Problem  This is a new problem. The current episode started in the past 7 days. Associated symptoms include congestion, sinus pressure and a sore throat. Pertinent negatives include no chills, coughing, ear pain, neck pain, shortness of breath or sneezing.     Constitution: Negative. Negative for chills, fatigue and fever.   HENT:  Positive for congestion, sinus pressure and sore throat. Negative for ear pain, ear discharge and facial swelling.    Neck: Negative for neck pain, neck stiffness and painful lymph nodes.   Cardiovascular: Negative.  Negative for chest pain and sob on exertion.   Eyes: Negative.  Negative for eye itching, eye pain and eye redness.   Respiratory: Negative.  Negative for chest tightness, cough, shortness of breath, wheezing and asthma.    Gastrointestinal: Negative.  Negative for abdominal pain, nausea and vomiting.   Endocrine: negative. excessive thirst.   Genitourinary: Negative.  Negative for dysuria, frequency, urgency and flank pain.   Musculoskeletal: Negative.  Negative for pain, trauma, joint pain and joint swelling.   Skin: Negative.  Negative for rash, wound, lesion and hives.   Allergic/Immunologic: Negative.  Negative for eczema, asthma, hives, itching and sneezing.   Neurological: Negative.  Negative for dizziness, passing out, disorientation and " altered mental status.   Hematologic/Lymphatic: Negative.  Negative for swollen lymph nodes.   Psychiatric/Behavioral: Negative.  Negative for altered mental status, disorientation and confusion.      Objective:      Physical Exam   Constitutional: He is oriented to person, place, and time. He appears well-developed. He is cooperative.  Non-toxic appearance. He does not appear ill. No distress.   HENT:   Head: Normocephalic and atraumatic.   Ears:   Right Ear: Hearing, tympanic membrane, external ear and ear canal normal. impacted cerumen  Left Ear: Hearing, tympanic membrane, external ear and ear canal normal. impacted cerumen  Nose: Congestion present. No mucosal edema, rhinorrhea or nasal deformity. No epistaxis. Right sinus exhibits no maxillary sinus tenderness and no frontal sinus tenderness. Left sinus exhibits no maxillary sinus tenderness and no frontal sinus tenderness.   Mouth/Throat: Uvula is midline and mucous membranes are normal. No trismus in the jaw. Normal dentition. No uvula swelling. Posterior oropharyngeal erythema (Mild.) present. No oropharyngeal exudate or posterior oropharyngeal edema.   Eyes: Conjunctivae and lids are normal. No scleral icterus.   Neck: Trachea normal and phonation normal. Neck supple. No edema present. No erythema present. No neck rigidity present.   Cardiovascular: Normal rate, regular rhythm, normal heart sounds and normal pulses.   Pulmonary/Chest: Effort normal and breath sounds normal. No stridor. No respiratory distress. He has no decreased breath sounds. He has no wheezes. He has no rhonchi. He has no rales. He exhibits no tenderness.   Abdominal: Normal appearance. Soft. flat abdomen There is no abdominal tenderness. There is no rebound, no guarding, no left CVA tenderness and no right CVA tenderness.   Musculoskeletal: Normal range of motion.         General: No deformity. Normal range of motion.   Lymphadenopathy:     He has no cervical adenopathy.    Neurological: no focal deficit. He is alert and oriented to person, place, and time. He exhibits normal muscle tone. Coordination normal.   Skin: Skin is warm, dry, intact, not diaphoretic and not pale.   Psychiatric: His speech is normal and behavior is normal. Mood, judgment and thought content normal.   Nursing note and vitals reviewed.  The following results have been reviewed with the patient:  LABS-  Results for orders placed or performed in visit on 11/21/22   POCT Influenza A/B MOLECULAR   Result Value Ref Range    POC Molecular Influenza A Ag Negative Negative, Not Reported    POC Molecular Influenza B Ag Negative Negative, Not Reported     Acceptable Yes    POCT COVID-19 Rapid Screening   Result Value Ref Range    POC Rapid COVID Negative Negative     Acceptable Yes      *Note: Due to a large number of results and/or encounters for the requested time period, some results have not been displayed. A complete set of results can be found in Results Review.        IMAGING-  No results found.        Assessment:       1. Viral URI    2. Sore throat    3. Sinus congestion          Plan:       FOLLOWUP  Follow up if symptoms worsen or fail to improve, for PLEASE CONTACT PCP OR CONTACT THE EMERGENCY ROOM..     PATIENT INSTRUCTIONS  Patient Instructions   INSTRUCTIONS:  - Rest.  - Drink plenty of fluids.  - Take Tylenol and/or Ibuprofen as directed as needed for fever/pain.  Do not take more than the recommended dose.  - follow up with your PCP within the next 1-2 weeks as needed.  - You must understand that you have received an Urgent Care treatment only and that you may be released before all of your medical problems are known or treated.   - You, the patient, will arrange for follow up care as instructed.   - If your condition worsens or fails to improve we recommend that you receive another evaluation at the ER immediately or contact your PCP to discuss your concerns.   - You can  call [phone] or [phone] to help schedule an appointment with the appropriate provider.     -If you smoke cigarettes, it would be beneficial for you to stop.    OVER THE COUNTER RECOMMENDATIONS/SUGGESTIONS.     Make sure to stay well hydrated.     Use Nasal Saline to mechanically move any post nasal drip from your eustachian tube or from the back of your throat.     Use warm salt water gargles to ease your throat pain. Warm salt water gargles as needed for sore throat-  1/2 tsp salt to 1 cup warm water, gargle as desired.     Use an antihistamine such as Claritin, Zyrtec or Allegra to dry you out.      Use pseudoephedrine (behind the counter) to decongest. Pseudoephedrine  30 mg up to 240 mg /day. It can raise your blood pressure and give you palpitations.     Use mucinex (guaifenisin) to break up mucous up to 2400mg/day to loosen any mucous.   The mucinex DM pill has a cough suppressant that can be sedating. It can be used at night to stop the tickle at the back of your throat.  You can use Mucinex D (it has guaifenesin and a high dose of pseudoephedrine) in the mornings to help decongest.        Use Afrin in each nare for no longer than 3 days, as it is addictive. It can also dry out your mucous membranes and cause elevated blood pressure. This is especially useful if you are flying.     Use Flonase 1-2 sprays/nostril per day. It is a local acting steroid nasal spray, if you develop a bloody nose, stop using the medication immediately.     Sometimes Nyquil at night is beneficial to help you get some rest, however it is sedating and it does have an antihistamine, and tylenol.     Honey is a natural cough suppressant that can be used.     Tylenol up to 4,000 mg a day is safe for short periods and can be used for body aches, pain, and fever. However in high doses and prolonged use it can cause liver irritation.     Ibuprofen is a non-steroidal anti-inflammatory that can be used for body aches, pain, and  fever.However it can also cause stomach irritation if over used.         THANK YOU FOR ALLOWING ME TO PARTICIPATE IN YOUR HEALTHCARE,     Nikita Delong, GERSON     Viral URI    Sore throat  -     POCT Influenza A/B MOLECULAR  -     POCT COVID-19 Rapid Screening    Sinus congestion  -     fluticasone propionate (FLONASE) 50 mcg/actuation nasal spray; 1 spray (50 mcg total) by Each Nostril route daily as needed for Rhinitis.  Dispense: 16 g; Refill: 0

## 2022-11-21 NOTE — PATIENT INSTRUCTIONS
INSTRUCTIONS:  - Rest.  - Drink plenty of fluids.  - Take Tylenol and/or Ibuprofen as directed as needed for fever/pain.  Do not take more than the recommended dose.  - follow up with your PCP within the next 1-2 weeks as needed.  - You must understand that you have received an Urgent Care treatment only and that you may be released before all of your medical problems are known or treated.   - You, the patient, will arrange for follow up care as instructed.   - If your condition worsens or fails to improve we recommend that you receive another evaluation at the ER immediately or contact your PCP to discuss your concerns.   - You can call [phone] or [phone] to help schedule an appointment with the appropriate provider.     -If you smoke cigarettes, it would be beneficial for you to stop.    OVER THE COUNTER RECOMMENDATIONS/SUGGESTIONS.     Make sure to stay well hydrated.     Use Nasal Saline to mechanically move any post nasal drip from your eustachian tube or from the back of your throat.     Use warm salt water gargles to ease your throat pain. Warm salt water gargles as needed for sore throat-  1/2 tsp salt to 1 cup warm water, gargle as desired.     Use an antihistamine such as Claritin, Zyrtec or Allegra to dry you out.      Use pseudoephedrine (behind the counter) to decongest. Pseudoephedrine  30 mg up to 240 mg /day. It can raise your blood pressure and give you palpitations.     Use mucinex (guaifenisin) to break up mucous up to 2400mg/day to loosen any mucous.   The mucinex DM pill has a cough suppressant that can be sedating. It can be used at night to stop the tickle at the back of your throat.  You can use Mucinex D (it has guaifenesin and a high dose of pseudoephedrine) in the mornings to help decongest.        Use Afrin in each nare for no longer than 3 days, as it is addictive. It can also dry out your mucous membranes and cause elevated blood pressure. This is especially useful if you  are flying.     Use Flonase 1-2 sprays/nostril per day. It is a local acting steroid nasal spray, if you develop a bloody nose, stop using the medication immediately.     Sometimes Nyquil at night is beneficial to help you get some rest, however it is sedating and it does have an antihistamine, and tylenol.     Honey is a natural cough suppressant that can be used.     Tylenol up to 4,000 mg a day is safe for short periods and can be used for body aches, pain, and fever. However in high doses and prolonged use it can cause liver irritation.     Ibuprofen is a non-steroidal anti-inflammatory that can be used for body aches, pain, and fever.However it can also cause stomach irritation if over used.

## 2022-11-24 ENCOUNTER — PATIENT MESSAGE (OUTPATIENT)
Dept: PODIATRY | Facility: CLINIC | Age: 77
End: 2022-11-24
Payer: MEDICARE

## 2022-12-01 ENCOUNTER — OFFICE VISIT (OUTPATIENT)
Dept: PODIATRY | Facility: CLINIC | Age: 77
End: 2022-12-01
Payer: MEDICARE

## 2022-12-01 DIAGNOSIS — B07.0 PLANTAR WART, RIGHT FOOT: Primary | ICD-10-CM

## 2022-12-01 PROCEDURE — 1159F MED LIST DOCD IN RCRD: CPT | Mod: CPTII,S$GLB,, | Performed by: STUDENT IN AN ORGANIZED HEALTH CARE EDUCATION/TRAINING PROGRAM

## 2022-12-01 PROCEDURE — 1125F PR PAIN SEVERITY QUANTIFIED, PAIN PRESENT: ICD-10-PCS | Mod: CPTII,S$GLB,, | Performed by: STUDENT IN AN ORGANIZED HEALTH CARE EDUCATION/TRAINING PROGRAM

## 2022-12-01 PROCEDURE — 99499 UNLISTED E&M SERVICE: CPT | Mod: S$GLB,,, | Performed by: STUDENT IN AN ORGANIZED HEALTH CARE EDUCATION/TRAINING PROGRAM

## 2022-12-01 PROCEDURE — 99999 PR PBB SHADOW E&M-EST. PATIENT-LVL II: ICD-10-PCS | Mod: PBBFAC,,, | Performed by: STUDENT IN AN ORGANIZED HEALTH CARE EDUCATION/TRAINING PROGRAM

## 2022-12-01 PROCEDURE — 1159F PR MEDICATION LIST DOCUMENTED IN MEDICAL RECORD: ICD-10-PCS | Mod: CPTII,S$GLB,, | Performed by: STUDENT IN AN ORGANIZED HEALTH CARE EDUCATION/TRAINING PROGRAM

## 2022-12-01 PROCEDURE — 17000 DESTRUCT PREMALG LESION: CPT | Mod: S$GLB,,, | Performed by: STUDENT IN AN ORGANIZED HEALTH CARE EDUCATION/TRAINING PROGRAM

## 2022-12-01 PROCEDURE — 1160F PR REVIEW ALL MEDS BY PRESCRIBER/CLIN PHARMACIST DOCUMENTED: ICD-10-PCS | Mod: CPTII,S$GLB,, | Performed by: STUDENT IN AN ORGANIZED HEALTH CARE EDUCATION/TRAINING PROGRAM

## 2022-12-01 PROCEDURE — 1160F RVW MEDS BY RX/DR IN RCRD: CPT | Mod: CPTII,S$GLB,, | Performed by: STUDENT IN AN ORGANIZED HEALTH CARE EDUCATION/TRAINING PROGRAM

## 2022-12-01 PROCEDURE — 17000 PR DESTRUCTION(LASER SURGERY,CRYOSURGERY,CHEMOSURGERY),PREMALIGNANT LESIONS,FIRST LESION: ICD-10-PCS | Mod: S$GLB,,, | Performed by: STUDENT IN AN ORGANIZED HEALTH CARE EDUCATION/TRAINING PROGRAM

## 2022-12-01 PROCEDURE — 99499 NO LOS: ICD-10-PCS | Mod: S$GLB,,, | Performed by: STUDENT IN AN ORGANIZED HEALTH CARE EDUCATION/TRAINING PROGRAM

## 2022-12-01 PROCEDURE — 4010F PR ACE/ARB THEARPY RXD/TAKEN: ICD-10-PCS | Mod: CPTII,S$GLB,, | Performed by: STUDENT IN AN ORGANIZED HEALTH CARE EDUCATION/TRAINING PROGRAM

## 2022-12-01 PROCEDURE — 4010F ACE/ARB THERAPY RXD/TAKEN: CPT | Mod: CPTII,S$GLB,, | Performed by: STUDENT IN AN ORGANIZED HEALTH CARE EDUCATION/TRAINING PROGRAM

## 2022-12-01 PROCEDURE — 1125F AMNT PAIN NOTED PAIN PRSNT: CPT | Mod: CPTII,S$GLB,, | Performed by: STUDENT IN AN ORGANIZED HEALTH CARE EDUCATION/TRAINING PROGRAM

## 2022-12-01 PROCEDURE — 99999 PR PBB SHADOW E&M-EST. PATIENT-LVL II: CPT | Mod: PBBFAC,,, | Performed by: STUDENT IN AN ORGANIZED HEALTH CARE EDUCATION/TRAINING PROGRAM

## 2022-12-01 NOTE — PROGRESS NOTES
Chief Complaint   Patient presents with    Plantar Warts             HPI:   Sam Pete is a 77 y.o. male with concerns of  right foot pain, which appears to be present for months but worse over the last several days to week  Patient reports no acute injury to the area.    Patient states the pain occurs with direct pressure.      Treatment tried at home: nothing    10/6/22: Patient returns with some worsening pain over the last few days.    11/09/2022: Patient returns today for worsening foot pain again.    12/1/22:Return for wart treatment.     Patient Active Problem List   Diagnosis    Hypertrophy of prostate with urinary obstruction and other lower urinary tract symptoms (LUTS)    Impotence of organic origin    Spinal stenosis in cervical region    Spinal stenosis, lumbar region, without neurogenic claudication    Hereditary and idiopathic peripheral neuropathy    Chronic pain low back and neck with spinal stenosis, djd left arm.  pain contract renewed 9/10/14    Essential (primary) hypertension    Facet arthropathy, lumbar    Obesity (BMI 30.0-34.9)    Atherosclerosis of aorta    Opioid dependence    PAF (paroxysmal atrial fibrillation)    Colon cancer screening    Failed back surgical syndrome    Lumbar radiculopathy    Chronic pain syndrome    Chronic back pain    Other hyperlipidemia    Gastroesophageal reflux disease without esophagitis    At risk for falls    Primary osteoarthritis of right shoulder    Long term (current) use of anticoagulants    REAL (iron deficiency anemia)    Right-sided chest pain         Current Outpatient Medications on File Prior to Visit   Medication Sig Dispense Refill    albuterol (PROVENTIL/VENTOLIN HFA) 90 mcg/actuation inhaler EVERY 4 HOURS AS NEEDED as needed for      amLODIPine (NORVASC) 10 MG tablet TAKE 1 TABLET EVERY DAY 90 tablet 3    amLODIPine (NORVASC) 10 MG tablet Take one tablet by mouth daily 90 tablet 0    aspirin 81 MG Chew Take 81 mg by mouth once daily.       atorvastatin (LIPITOR) 40 MG tablet TAKE 1 TABLET (40 MG TOTAL) BY MOUTH ONCE DAILY. 90 tablet 3    buPROPion (WELLBUTRIN SR) 150 MG TBSR 12 hr tablet Take 1 tablet (150 mg total) by mouth once daily. 30 tablet 0    buPROPion (WELLBUTRIN XL) 150 MG TB24 tablet Take 1 tablet (150 mg total) by mouth once daily. 90 tablet 3    calcitRIOL (ROCALTROL) 0.5 MCG Cap Take 1 capsule (0.5 mcg total) by mouth once daily. 30 capsule 0    calcitRIOL (ROCALTROL) 0.5 MCG Cap Take 1 capsule (0.5 mcg total) by mouth once daily. 90 capsule 3    cephALEXin (KEFLEX) 500 MG capsule Take 1 capsule (500 mg total) by mouth every 6 (six) hours. 20 capsule 0    COVID-19 test specimen collect Misc TEST AS DIRECTED TODAY      fluticasone propionate (FLONASE) 50 mcg/actuation nasal spray 1 spray (50 mcg total) by Each Nostril route daily as needed for Rhinitis. 16 g 0    furosemide (LASIX) 40 MG tablet Take 1 tablet (40 mg total) by mouth once daily. 30 tablet 5    KENALOG 40 mg/mL injection       levothyroxine (SYNTHROID) 200 MCG tablet Take 1 tablet (200 mcg total) by mouth once daily. 90 tablet 1    losartan (COZAAR) 50 MG tablet TAKE 1 TABLET(50 MG) BY MOUTH EVERY DAY 90 tablet 3    morphine (MS CONTIN) 30 MG 12 hr tablet Take two tablets (60mg) by mouth in the morning and one tablet (30mg) in the evening 90 tablet 0    morphine (MS CONTIN) 30 MG 12 hr tablet Take 1 tablet (30 mg total) by mouth 3 (three) times daily. Take two tablets (60mg) in the morning and one tablet (30mg) in the evening 90 tablet 0    [START ON 12/30/2022] morphine (MS CONTIN) 30 MG 12 hr tablet Take 1 tablet (30 mg total) by mouth 3 (three) times daily. Take two tablets (60mg) in the morning and one tablet (30mg) in the evening 90 tablet 0    mupirocin (BACTROBAN) 2 % ointment Apply to affected area 3 times daily 22 g 1    naloxone (NARCAN) 0.4 mg/mL injection Inject 1 mL (0.4 mg total) into the vein as needed (overdose). 2 mL 5    omeprazole (PRILOSEC OTC) 20 MG  tablet 40 mg.      omeprazole (PRILOSEC) 40 MG capsule Take 1 capsule by mouth once daily at 6am.      omeprazole (PRILOSEC) 40 MG capsule Take one capsule by mouth daily 90 capsule 0    ondansetron (ZOFRAN-ODT) 8 MG TbDL Take 1 tablet (8 mg total) by mouth 2 (two) times daily as needed for nausea for 5days 10 tablet 0    oxyCODONE-acetaminophen (PERCOCET)  mg per tablet Take 1 tablet by mouth every 4 (four) hours as needed for Pain. 120 tablet 0    oxyCODONE-acetaminophen (PERCOCET)  mg per tablet Take 1 tablet by mouth every 4 (four) hours as needed for Pain. 120 tablet 0    [START ON 12/30/2022] oxyCODONE-acetaminophen (PERCOCET)  mg per tablet Take 1 tablet by mouth every 4 (four) hours as needed for Pain. 120 tablet 0    pregabalin (LYRICA) 75 MG capsule Take 1 capsule (75 mg total) by mouth 2 (two) times daily. 180 capsule 1    saw/vit E/sod lisa/lyc/beta/pyg (PROSTATE HEALTH ORAL) Take 2 capsules by mouth once daily. With saw palmetto      sildenafil (REVATIO) 20 mg Tab Take 1-5 daily as needed for ED 10 tablet 5    tiZANidine (ZANAFLEX) 4 MG tablet Take 1 tablet (4 mg total) by mouth 3 (three) times daily as needed (muscle spasm). 270 tablet 1    UNABLE TO FIND Take by mouth once daily. Vitafusion multivites gummies      varicella-zoster [id number], PF, (SHINGRIX, PF,) 50 mcg/0.5 mL injection Give one dose IM now, repeat times one in 2-6 months 1 each 1     Current Facility-Administered Medications on File Prior to Visit   Medication Dose Route Frequency Provider Last Rate Last Admin    diphenhydrAMINE injection 12.5 mg  12.5 mg Intravenous Once PRN Enrique Rosales MD        electrolyte-S (ISOLYTE)   Intravenous Continuous Enrique Rosales MD        HYDROmorphone (PF) injection 0.2 mg  0.2 mg Intravenous Q5 Min PRN Enrique Rosales MD        HYDROmorphone (PF) injection 0.2 mg  0.2 mg Intravenous Q5 Min PRN Enrique Rosales MD        lactated ringers infusion   Intravenous  Once PRN Aram Terrell MD        lactated ringers infusion  10 mL/hr Intravenous Continuous Enrique Rosales  mL/hr at 03/22/22 1419 Rate Change at 03/22/22 1419    lorazepam injection 0.25 mg  0.25 mg Intravenous Once PRN Enrique Rosales MD        prochlorperazine injection Soln 5 mg  5 mg Intravenous Q30 Min PRN Enrique Rosales MD        sodium chloride 0.9% flush 3 mL  3 mL Intravenous Q8H Enrique Rosales MD             Review of patient's allergies indicates:   Allergen Reactions    Xyzal [levocetirizine] Other (See Comments)     Knocked him out              ROS:   General ROS: negative  Respiratory ROS: no cough, shortness of breath, or wheezing  Cardiovascular ROS: no chest pain or dyspnea on exertion  Musculoskeletal ROS: negative  Neurological ROS: no TIA or stroke symptoms  Dermatological ROS: negative        EXAM:     There were no vitals filed for this visit.       General: Patient is WD/WN, alert and oriented x 3 and in no apparent distress.     Right  Lower extremity exam:      Vascular:   Dorsalis pedis and posterior tibial pulses are 2/4 .    3 seconds capillary refill time   Toes are warm to touch.     There is no edema.       Neurological:    Light touch, proprioception, and sharp/dull sensation are all intact.           Dermatological:    Wound is healed but patient still has a small central core correlating with a wart      Musculoskeletal:    Muscle strength is 5/5 in all groups .    Metatarsophalangeal, subtalar, and ankle range of motion are within normal limits without crepitus.     N/a      ASSESSMENT/PLAN:    Problem List Items Addressed This Visit    None  Visit Diagnoses       Plantar wart, right foot    -  Primary                  I counseled the patient on the patient's conditions, their implications and medical management.     Wart is still there.  We would go forward with another blistering agent treatment today.  Patient to follow-up in 3 weeks for an additional  evaluation.    Patient's verbal consent the right foot wart was debrided and salinocaine and trichloracetic acid was applied.  Patient to keep this clean and dry for 24 hours before removing it.  Patient tolerated the procedure without any complications.    Uziel Valadez DPM

## 2022-12-06 ENCOUNTER — LAB VISIT (OUTPATIENT)
Dept: LAB | Facility: HOSPITAL | Age: 77
End: 2022-12-06
Attending: NURSE PRACTITIONER
Payer: MEDICARE

## 2022-12-06 ENCOUNTER — TELEPHONE (OUTPATIENT)
Dept: HEMATOLOGY/ONCOLOGY | Facility: CLINIC | Age: 77
End: 2022-12-06
Payer: MEDICARE

## 2022-12-06 DIAGNOSIS — D50.9 IRON DEFICIENCY ANEMIA, UNSPECIFIED IRON DEFICIENCY ANEMIA TYPE: ICD-10-CM

## 2022-12-06 LAB
BASOPHILS # BLD AUTO: 0.03 K/UL (ref 0–0.2)
BASOPHILS NFR BLD: 0.4 % (ref 0–1.9)
DIFFERENTIAL METHOD: ABNORMAL
EOSINOPHIL # BLD AUTO: 0.2 K/UL (ref 0–0.5)
EOSINOPHIL NFR BLD: 3.1 % (ref 0–8)
ERYTHROCYTE [DISTWIDTH] IN BLOOD BY AUTOMATED COUNT: 19.9 % (ref 11.5–14.5)
HCT VFR BLD AUTO: 38.1 % (ref 40–54)
HGB BLD-MCNC: 12.3 G/DL (ref 14–18)
IMM GRANULOCYTES # BLD AUTO: 0.02 K/UL (ref 0–0.04)
IMM GRANULOCYTES NFR BLD AUTO: 0.3 % (ref 0–0.5)
LYMPHOCYTES # BLD AUTO: 3.5 K/UL (ref 1–4.8)
LYMPHOCYTES NFR BLD: 45.2 % (ref 18–48)
MCH RBC QN AUTO: 27.6 PG (ref 27–31)
MCHC RBC AUTO-ENTMCNC: 32.3 G/DL (ref 32–36)
MCV RBC AUTO: 86 FL (ref 82–98)
MONOCYTES # BLD AUTO: 0.8 K/UL (ref 0.3–1)
MONOCYTES NFR BLD: 10.3 % (ref 4–15)
NEUTROPHILS # BLD AUTO: 3.1 K/UL (ref 1.8–7.7)
NEUTROPHILS NFR BLD: 40.7 % (ref 38–73)
NRBC BLD-RTO: 0 /100 WBC
PLATELET # BLD AUTO: 277 K/UL (ref 150–450)
PMV BLD AUTO: 8.9 FL (ref 9.2–12.9)
RBC # BLD AUTO: 4.45 M/UL (ref 4.6–6.2)
WBC # BLD AUTO: 7.7 K/UL (ref 3.9–12.7)

## 2022-12-06 PROCEDURE — 85025 COMPLETE CBC W/AUTO DIFF WBC: CPT | Mod: PN | Performed by: INTERNAL MEDICINE

## 2022-12-06 PROCEDURE — 36415 COLL VENOUS BLD VENIPUNCTURE: CPT | Mod: PN | Performed by: INTERNAL MEDICINE

## 2022-12-07 ENCOUNTER — TELEPHONE (OUTPATIENT)
Dept: HEMATOLOGY/ONCOLOGY | Facility: CLINIC | Age: 77
End: 2022-12-07
Payer: MEDICARE

## 2022-12-07 NOTE — TELEPHONE ENCOUNTER
Assisted pt rescheduling appt with FRANCO Bennett NP.----- Message from Wilver Christopher sent at 12/7/2022  3:06 PM CST -----  Type: Need Medical Advice  Who Called: Sam White callback number: [phone]  Additional Info: Patient forgot about appointment on 12/6, he would like to reschedule, he did come to do his blood work  Please call to further assist, Thanks

## 2022-12-13 ENCOUNTER — OFFICE VISIT (OUTPATIENT)
Dept: DERMATOLOGY | Facility: CLINIC | Age: 77
End: 2022-12-13
Payer: MEDICARE

## 2022-12-13 DIAGNOSIS — D36.10 NEUROFIBROMA: ICD-10-CM

## 2022-12-13 DIAGNOSIS — D22.9 MULTIPLE BENIGN NEVI: ICD-10-CM

## 2022-12-13 DIAGNOSIS — L57.0 AK (ACTINIC KERATOSIS): Primary | ICD-10-CM

## 2022-12-13 DIAGNOSIS — L82.1 SEBORRHEIC KERATOSES: ICD-10-CM

## 2022-12-13 DIAGNOSIS — L57.8 ACTINIC SKIN DAMAGE: ICD-10-CM

## 2022-12-13 PROCEDURE — 4010F ACE/ARB THERAPY RXD/TAKEN: CPT | Mod: HCNC,CPTII,S$GLB, | Performed by: STUDENT IN AN ORGANIZED HEALTH CARE EDUCATION/TRAINING PROGRAM

## 2022-12-13 PROCEDURE — 1159F MED LIST DOCD IN RCRD: CPT | Mod: HCNC,CPTII,S$GLB, | Performed by: STUDENT IN AN ORGANIZED HEALTH CARE EDUCATION/TRAINING PROGRAM

## 2022-12-13 PROCEDURE — 99999 PR PBB SHADOW E&M-EST. PATIENT-LVL IV: ICD-10-PCS | Mod: PBBFAC,HCNC,, | Performed by: STUDENT IN AN ORGANIZED HEALTH CARE EDUCATION/TRAINING PROGRAM

## 2022-12-13 PROCEDURE — 99213 PR OFFICE/OUTPT VISIT, EST, LEVL III, 20-29 MIN: ICD-10-PCS | Mod: 25,HCNC,S$GLB, | Performed by: STUDENT IN AN ORGANIZED HEALTH CARE EDUCATION/TRAINING PROGRAM

## 2022-12-13 PROCEDURE — 17003 DESTRUCT PREMALG LES 2-14: CPT | Mod: HCNC,S$GLB,, | Performed by: STUDENT IN AN ORGANIZED HEALTH CARE EDUCATION/TRAINING PROGRAM

## 2022-12-13 PROCEDURE — 1160F PR REVIEW ALL MEDS BY PRESCRIBER/CLIN PHARMACIST DOCUMENTED: ICD-10-PCS | Mod: HCNC,CPTII,S$GLB, | Performed by: STUDENT IN AN ORGANIZED HEALTH CARE EDUCATION/TRAINING PROGRAM

## 2022-12-13 PROCEDURE — 1159F PR MEDICATION LIST DOCUMENTED IN MEDICAL RECORD: ICD-10-PCS | Mod: HCNC,CPTII,S$GLB, | Performed by: STUDENT IN AN ORGANIZED HEALTH CARE EDUCATION/TRAINING PROGRAM

## 2022-12-13 PROCEDURE — 1126F PR PAIN SEVERITY QUANTIFIED, NO PAIN PRESENT: ICD-10-PCS | Mod: HCNC,CPTII,S$GLB, | Performed by: STUDENT IN AN ORGANIZED HEALTH CARE EDUCATION/TRAINING PROGRAM

## 2022-12-13 PROCEDURE — 99999 PR PBB SHADOW E&M-EST. PATIENT-LVL IV: CPT | Mod: PBBFAC,HCNC,, | Performed by: STUDENT IN AN ORGANIZED HEALTH CARE EDUCATION/TRAINING PROGRAM

## 2022-12-13 PROCEDURE — 1160F RVW MEDS BY RX/DR IN RCRD: CPT | Mod: HCNC,CPTII,S$GLB, | Performed by: STUDENT IN AN ORGANIZED HEALTH CARE EDUCATION/TRAINING PROGRAM

## 2022-12-13 PROCEDURE — 3288F PR FALLS RISK ASSESSMENT DOCUMENTED: ICD-10-PCS | Mod: HCNC,CPTII,S$GLB, | Performed by: STUDENT IN AN ORGANIZED HEALTH CARE EDUCATION/TRAINING PROGRAM

## 2022-12-13 PROCEDURE — 3288F FALL RISK ASSESSMENT DOCD: CPT | Mod: HCNC,CPTII,S$GLB, | Performed by: STUDENT IN AN ORGANIZED HEALTH CARE EDUCATION/TRAINING PROGRAM

## 2022-12-13 PROCEDURE — 1101F PT FALLS ASSESS-DOCD LE1/YR: CPT | Mod: HCNC,CPTII,S$GLB, | Performed by: STUDENT IN AN ORGANIZED HEALTH CARE EDUCATION/TRAINING PROGRAM

## 2022-12-13 PROCEDURE — 17000 DESTRUCT PREMALG LESION: CPT | Mod: HCNC,S$GLB,, | Performed by: STUDENT IN AN ORGANIZED HEALTH CARE EDUCATION/TRAINING PROGRAM

## 2022-12-13 PROCEDURE — 99213 OFFICE O/P EST LOW 20 MIN: CPT | Mod: 25,HCNC,S$GLB, | Performed by: STUDENT IN AN ORGANIZED HEALTH CARE EDUCATION/TRAINING PROGRAM

## 2022-12-13 PROCEDURE — 17003 DESTRUCTION, PREMALIGNANT LESIONS; SECOND THROUGH 14 LESIONS: ICD-10-PCS | Mod: HCNC,S$GLB,, | Performed by: STUDENT IN AN ORGANIZED HEALTH CARE EDUCATION/TRAINING PROGRAM

## 2022-12-13 PROCEDURE — 1101F PR PT FALLS ASSESS DOC 0-1 FALLS W/OUT INJ PAST YR: ICD-10-PCS | Mod: HCNC,CPTII,S$GLB, | Performed by: STUDENT IN AN ORGANIZED HEALTH CARE EDUCATION/TRAINING PROGRAM

## 2022-12-13 PROCEDURE — 17000 PR DESTRUCTION(LASER SURGERY,CRYOSURGERY,CHEMOSURGERY),PREMALIGNANT LESIONS,FIRST LESION: ICD-10-PCS | Mod: HCNC,S$GLB,, | Performed by: STUDENT IN AN ORGANIZED HEALTH CARE EDUCATION/TRAINING PROGRAM

## 2022-12-13 PROCEDURE — 4010F PR ACE/ARB THEARPY RXD/TAKEN: ICD-10-PCS | Mod: HCNC,CPTII,S$GLB, | Performed by: STUDENT IN AN ORGANIZED HEALTH CARE EDUCATION/TRAINING PROGRAM

## 2022-12-13 PROCEDURE — 1126F AMNT PAIN NOTED NONE PRSNT: CPT | Mod: HCNC,CPTII,S$GLB, | Performed by: STUDENT IN AN ORGANIZED HEALTH CARE EDUCATION/TRAINING PROGRAM

## 2022-12-13 NOTE — PROGRESS NOTES
Subjective:       Patient ID:  Sam Pete is a 77 y.o. male who presents for   Chief Complaint   Patient presents with    Skin Check     UBSE.     LOV:    Patient here today for skin check, UBSE.    Many Ak's treated with Cryo at last visit.     Final Pathologic Diagnosis Skin, vertex scalp, shave biopsy:   -VERRUCOUS KERATOSIS WITH FOCAL ACTINIC KERATOSIS   Comment: Interp By Baljeet Magaña M.D., Signed on 12/03/2021 at 11:50      Derm Hx:  No Phx of NMSC  No Fhx of melanoma      Review of Systems   Constitutional:  Negative for fever, chills and fatigue.   Skin:  Positive for activity-related sunscreen use and wears hat. Negative for daily sunscreen use.   Hematologic/Lymphatic: Bruises/bleeds easily.        Bruises easily      Objective:    Physical Exam   Constitutional: He appears well-developed and well-nourished. No distress.   Neurological: He is alert and oriented to person, place, and time. He is not disoriented.   Psychiatric: He has a normal mood and affect.   Skin:   Areas Examined (abnormalities noted in diagram):   Scalp / Hair Palpated and Inspected  Head / Face Inspection Performed  Neck Inspection Performed  Chest / Axilla Inspection Performed  Abdomen Inspection Performed  Back Inspection Performed  RUE Inspected  LUE Inspection Performed  Nails and Digits Inspection Performed                     Diagram Legend     Erythematous scaling macule/papule c/w actinic keratosis       Vascular papule c/w angioma      Pigmented verrucoid papule/plaque c/w seborrheic keratosis      Yellow umbilicated papule c/w sebaceous hyperplasia      Irregularly shaped tan macule c/w lentigo     1-2 mm smooth white papules consistent with Milia      Movable subcutaneous cyst with punctum c/w epidermal inclusion cyst      Subcutaneous movable cyst c/w pilar cyst      Firm pink to brown papule c/w dermatofibroma      Pedunculated fleshy papule(s) c/w skin tag(s)      Evenly pigmented macule c/w junctional nevus      Mildly variegated pigmented, slightly irregular-bordered macule c/w mildly atypical nevus      Flesh colored to evenly pigmented papule c/w intradermal nevus       Pink pearly papule/plaque c/w basal cell carcinoma      Erythematous hyperkeratotic cursted plaque c/w SCC      Surgical scar with no sign of skin cancer recurrence      Open and closed comedones      Inflammatory papules and pustules      Verrucoid papule consistent consistent with wart     Erythematous eczematous patches and plaques     Dystrophic onycholytic nail with subungual debris c/w onychomycosis     Umbilicated papule    Erythematous-base heme-crusted tan verrucoid plaque consistent with inflamed seborrheic keratosis     Erythematous Silvery Scaling Plaque c/w Psoriasis     See annotation      Assessment / Plan:        AK (actinic keratosis)  Cryosurgery Procedure Note    Verbal consent from the patient is obtained and the patient is aware of the precancerous quality and need for treatment of these lesions. Liquid nitrogen cryosurgery is applied to the 8 actinic keratoses, as detailed in the physical exam, to produce a freeze injury. The patient is aware that blisters may form and is instructed on wound care with gentle cleansing and use of vaseline ointment to keep moist until healed. The patient is supplied a handout on cryosurgery and is instructed to call if lesions do not completely resolve.    Seborrheic keratoses  These are benign inherited growths without a malignant potential. Reassurance given to patient. No treatment is necessary.     Multiple benign nevi  Careful dermoscopy evaluation of nevi performed with none identified as needing biopsy today  Monitor for new mole or moles that are becoming bigger, darker, irritated, or developing irregular borders.     Actinic skin damage  Upper body skin examination performed today including at least 9 points as noted in physical examination. No lesions suspicious for malignancy noted.  Patient  instructed in importance in daily broad spectrum sun protection of at least spf 30. Mineral sunscreen ingredients preferred (Zinc +/- Titanium) and can be found OTC.   Recommend Elta MD for daily use on face and neck.  Patient encouraged to wear hat for all outdoor exposure.   Also discussed sun avoidance and use of protective clothing.    Neurofibroma  - reassurance         1 year  No follow-ups on file.

## 2022-12-13 NOTE — PATIENT INSTRUCTIONS

## 2022-12-14 ENCOUNTER — TELEPHONE (OUTPATIENT)
Dept: PAIN MEDICINE | Facility: CLINIC | Age: 77
End: 2022-12-14
Payer: MEDICARE

## 2022-12-14 ENCOUNTER — OFFICE VISIT (OUTPATIENT)
Dept: SPINE | Facility: CLINIC | Age: 77
End: 2022-12-14
Payer: MEDICARE

## 2022-12-14 VITALS — HEIGHT: 70 IN | WEIGHT: 227 LBS | BODY MASS INDEX: 32.5 KG/M2

## 2022-12-14 DIAGNOSIS — M54.16 LUMBAR RADICULOPATHY: Primary | ICD-10-CM

## 2022-12-14 DIAGNOSIS — G89.29 CHRONIC BILATERAL LOW BACK PAIN WITH BILATERAL SCIATICA: Primary | ICD-10-CM

## 2022-12-14 DIAGNOSIS — M54.42 CHRONIC BILATERAL LOW BACK PAIN WITH BILATERAL SCIATICA: Primary | ICD-10-CM

## 2022-12-14 DIAGNOSIS — M54.41 CHRONIC BILATERAL LOW BACK PAIN WITH BILATERAL SCIATICA: Primary | ICD-10-CM

## 2022-12-14 PROCEDURE — 1159F PR MEDICATION LIST DOCUMENTED IN MEDICAL RECORD: ICD-10-PCS | Mod: HCNC,CPTII,S$GLB, | Performed by: PHYSICAL MEDICINE & REHABILITATION

## 2022-12-14 PROCEDURE — 1160F PR REVIEW ALL MEDS BY PRESCRIBER/CLIN PHARMACIST DOCUMENTED: ICD-10-PCS | Mod: HCNC,CPTII,S$GLB, | Performed by: PHYSICAL MEDICINE & REHABILITATION

## 2022-12-14 PROCEDURE — 99213 OFFICE O/P EST LOW 20 MIN: CPT | Mod: HCNC,S$GLB,, | Performed by: PHYSICAL MEDICINE & REHABILITATION

## 2022-12-14 PROCEDURE — 1125F PR PAIN SEVERITY QUANTIFIED, PAIN PRESENT: ICD-10-PCS | Mod: HCNC,CPTII,S$GLB, | Performed by: PHYSICAL MEDICINE & REHABILITATION

## 2022-12-14 PROCEDURE — 4010F ACE/ARB THERAPY RXD/TAKEN: CPT | Mod: HCNC,CPTII,S$GLB, | Performed by: PHYSICAL MEDICINE & REHABILITATION

## 2022-12-14 PROCEDURE — 3288F PR FALLS RISK ASSESSMENT DOCUMENTED: ICD-10-PCS | Mod: HCNC,CPTII,S$GLB, | Performed by: PHYSICAL MEDICINE & REHABILITATION

## 2022-12-14 PROCEDURE — 1101F PR PT FALLS ASSESS DOC 0-1 FALLS W/OUT INJ PAST YR: ICD-10-PCS | Mod: HCNC,CPTII,S$GLB, | Performed by: PHYSICAL MEDICINE & REHABILITATION

## 2022-12-14 PROCEDURE — 3288F FALL RISK ASSESSMENT DOCD: CPT | Mod: HCNC,CPTII,S$GLB, | Performed by: PHYSICAL MEDICINE & REHABILITATION

## 2022-12-14 PROCEDURE — 1125F AMNT PAIN NOTED PAIN PRSNT: CPT | Mod: HCNC,CPTII,S$GLB, | Performed by: PHYSICAL MEDICINE & REHABILITATION

## 2022-12-14 PROCEDURE — 1101F PT FALLS ASSESS-DOCD LE1/YR: CPT | Mod: HCNC,CPTII,S$GLB, | Performed by: PHYSICAL MEDICINE & REHABILITATION

## 2022-12-14 PROCEDURE — 1159F MED LIST DOCD IN RCRD: CPT | Mod: HCNC,CPTII,S$GLB, | Performed by: PHYSICAL MEDICINE & REHABILITATION

## 2022-12-14 PROCEDURE — 4010F PR ACE/ARB THEARPY RXD/TAKEN: ICD-10-PCS | Mod: HCNC,CPTII,S$GLB, | Performed by: PHYSICAL MEDICINE & REHABILITATION

## 2022-12-14 PROCEDURE — 99213 PR OFFICE/OUTPT VISIT, EST, LEVL III, 20-29 MIN: ICD-10-PCS | Mod: HCNC,S$GLB,, | Performed by: PHYSICAL MEDICINE & REHABILITATION

## 2022-12-14 PROCEDURE — 1160F RVW MEDS BY RX/DR IN RCRD: CPT | Mod: HCNC,CPTII,S$GLB, | Performed by: PHYSICAL MEDICINE & REHABILITATION

## 2022-12-14 NOTE — PROGRESS NOTES
SUBJECTIVE:    Patient ID: Sam Pete is a 77 y.o. male.    Chief Complaint: Low-back Pain and Leg Pain (nestor leg pain)    He presents today with ongoing complaints of familiar low back pain at the lumbosacral junction radiating down the posterior portion both legs.  Had a previous good response to caudal epidural steroid injections in July of this year.  He describes about a 30% improvement but more importantly he had improvement in his functional mobility after the procedure.  Current pain level is 7 out 10.  Bowel and bladder remain intact        Past Medical History:   Diagnosis Date    Anticoagulant long-term use     ASA 81 mg    Arthritis     Cataract     OU    Chronic low back pain     Complete rupture of rotator cuff 2011    DDD (degenerative disc disease), lumbar 2015    Degeneration of lumbar or lumbosacral intervertebral disc 2015    ED (erectile dysfunction)     GERD (gastroesophageal reflux disease)     Hypertension     Lumbar pseudoarthrosis 2017    Lumbar stenosis 2017    Obesity     Spondylosis of lumbar region without myelopathy or radiculopathy 2015    Stroke     basal ganglia, left sided weakness, resolved    Testicular hypofunction     Thoracic or lumbosacral neuritis or radiculitis 2015     Social History     Socioeconomic History    Marital status:    Tobacco Use    Smoking status: Former     Packs/day: 1.00     Years: 30.00     Pack years: 30.00     Types: Cigarettes     Start date: 8/3/1963     Quit date: 1992     Years since quittin.9    Smokeless tobacco: Never   Substance and Sexual Activity    Alcohol use: Not Currently     Comment: On occasion    Drug use: Yes     Types: Oxycodone, Morphine     Social Determinants of Health     Financial Resource Strain: Low Risk     Difficulty of Paying Living Expenses: Not hard at all   Food Insecurity: No Food Insecurity    Worried About Running Out of Food in the Last Year: Never  true    Ran Out of Food in the Last Year: Never true   Transportation Needs: No Transportation Needs    Lack of Transportation (Medical): No    Lack of Transportation (Non-Medical): No   Physical Activity: Insufficiently Active    Days of Exercise per Week: 2 days    Minutes of Exercise per Session: 30 min   Stress: Stress Concern Present    Feeling of Stress : To some extent   Social Connections: Socially Integrated    Frequency of Communication with Friends and Family: More than three times a week    Frequency of Social Gatherings with Friends and Family: Twice a week    Attends Worship Services: More than 4 times per year    Active Member of Clubs or Organizations: Yes    Attends Club or Organization Meetings: More than 4 times per year    Marital Status:    Housing Stability: Low Risk     Unable to Pay for Housing in the Last Year: No    Number of Places Lived in the Last Year: 1    Unstable Housing in the Last Year: No     Past Surgical History:   Procedure Laterality Date    APPENDECTOMY      ARTHROPLASTY OF SHOULDER Right 03/22/2022    Procedure: ARTHROPLASTY, SHOULDER BRENNAN RIGHT SHOULDER HUMERAL HEAD RESURFACING;  Surgeon: Frankie Joya MD;  Location: [facility] OR;  Service: Orthopedics;  Laterality: Right;    BACK SURGERY      4- back surgery    CAUDAL EPIDURAL STEROID INJECTION N/A 12/16/2021    Procedure: Injection-steroid-epidural-caudal;  Surgeon: Aram Terrell MD;  [facility];  Service: Pain Management;  Laterality: N/A;    CAUDAL EPIDURAL STEROID INJECTION N/A 02/02/2022    Procedure: INJECTION, STEROID, SPINE, EPIDURAL, CAUDAL;  Surgeon: Aram Terrell MD;  [facility];  Service: Pain Management;  Laterality: N/A;    CAUDAL EPIDURAL STEROID INJECTION N/A 7/22/2022    Procedure: Injection-steroid-epidural-caudal;  Surgeon: Aram Terrell MD;  [facility];  Service: Pain Management;  Laterality: N/A;  Caudal CARLOS     COLONOSCOPY  07/12/2004    [facility].   Redundant tortuous colon,  "otherwise normal.    COLONOSCOPY N/A 02/25/2019    Procedure: COLONOSCOPY;  Surgeon: Gilbert Rodriguez Jr., MD;  Location: [facility] ENDO;  Service: Endoscopy;  Laterality: N/A;    Epidural steroid injection      Pain management    ESOPHAGOGASTRODUODENOSCOPY  07/12/2004    [facility].    FRACTURE SURGERY Left     wrist / forearm, total of 5    INSERTION OF DORSAL COLUMN NERVE STIMULATOR FOR TRIAL N/A 12/12/2019    Procedure: INSERTION, NEUROSTIMULATOR, SPINAL CORD, DORSAL COLUMN, FOR TRIAL;  Surgeon: Evan Lyles MD;  Location: [facility] OR;  Service: Pain Management;  Laterality: N/A;    JOINT REPLACEMENT  02/06/2013    ( rt hip 2007), left hip     JOINT REPLACEMENT Left     total knee    KNEE ARTHROSCOPY W/ DEBRIDEMENT      bilateral knees , total of six    KNEE SURGERY      LAMINECTOMY USING MINIMALLY INVASIVE TECHNIQUE N/A 02/12/2020    Procedure: LAMINECTOMY, SPINE, MINIMALLY INVASIVE /  PLACEMENT OF SPINAL CORD STIMULATOR;  Surgeon: Darlene Max MD;  Location: [facility] [place], operated by [facility] OR;  Service: Neurosurgery;  Laterality: N/A;    Nervbe Block injection      Pain management    TOTAL SHOULDER ARTHROPLASTY Right 03/28/2022    Dr Joya    TOTAL THYROIDECTOMY      TOTAL THYROIDECTOMY Bilateral 03/07/2022    Dr Mendoza     Family History   Problem Relation Age of Onset    Hypertension Father     Heart disease Father     Drug abuse Daughter     Hypertension Son     Lung disease Sister     COPD Sister     Hypertension Sister     Diverticulitis Sister     Gout Sister     Kidney disease Sister     No Known Problems Mother     Eczema Neg Hx     Lupus Neg Hx     Psoriasis Neg Hx     Melanoma Neg Hx      Vitals:    12/14/22 1031   Weight: 103 kg (227 lb)   Height: 5' 10" (1.778 m)       Review of Systems   Constitutional:  Negative for chills, diaphoresis, fatigue, fever and unexpected weight change.   HENT:  Negative for trouble swallowing.    Eyes:  Negative for visual disturbance.   Respiratory:  Negative for shortness of breath.  "   Cardiovascular:  Negative for chest pain.   Gastrointestinal:  Negative for abdominal pain, constipation, diarrhea, nausea and vomiting.   Genitourinary:  Negative for difficulty urinating.   Musculoskeletal:  Negative for arthralgias, back pain, gait problem, joint swelling, myalgias, neck pain and neck stiffness.   Neurological:  Negative for dizziness, speech difficulty, weakness, light-headedness, numbness and headaches.        Objective:      Physical Exam  Neurological:      Mental Status: He is alert and oriented to person, place, and time.      Comments: He is awake and in no acute distress  Mild tenderness to palpation lumbar paraspinous musculature with no palpable masses  Deep tendon reflexes are trace at both knees and both ankles  Strength is normal in both lower extremities  Straight leg raise negative bilaterally           Assessment:       1. Chronic bilateral low back pain with bilateral sciatica             Plan:     He presents with familiar low back pain and bilateral leg pain.  Previous favorable response to caudal epidural injections.  We repeat that procedure.  Follow-up with me afterwards      Chronic bilateral low back pain with bilateral sciatica

## 2022-12-14 NOTE — TELEPHONE ENCOUNTER
----- Message from Nikita Landrum MD sent at 12/14/2022 10:45 AM CST -----  Please schedule for caudal epidural steroid injection

## 2022-12-15 ENCOUNTER — OFFICE VISIT (OUTPATIENT)
Dept: HEMATOLOGY/ONCOLOGY | Facility: CLINIC | Age: 77
End: 2022-12-15
Payer: MEDICARE

## 2022-12-15 DIAGNOSIS — D50.9 IRON DEFICIENCY ANEMIA, UNSPECIFIED IRON DEFICIENCY ANEMIA TYPE: Primary | ICD-10-CM

## 2022-12-15 PROCEDURE — 1160F PR REVIEW ALL MEDS BY PRESCRIBER/CLIN PHARMACIST DOCUMENTED: ICD-10-PCS | Mod: HCNC,CPTII,S$GLB, | Performed by: NURSE PRACTITIONER

## 2022-12-15 PROCEDURE — 99499 UNLISTED E&M SERVICE: CPT | Mod: HCNC,S$GLB,, | Performed by: NURSE PRACTITIONER

## 2022-12-15 PROCEDURE — 99214 OFFICE O/P EST MOD 30 MIN: CPT | Mod: HCNC,S$GLB,, | Performed by: NURSE PRACTITIONER

## 2022-12-15 PROCEDURE — 1160F RVW MEDS BY RX/DR IN RCRD: CPT | Mod: HCNC,CPTII,S$GLB, | Performed by: NURSE PRACTITIONER

## 2022-12-15 PROCEDURE — 1159F PR MEDICATION LIST DOCUMENTED IN MEDICAL RECORD: ICD-10-PCS | Mod: HCNC,CPTII,S$GLB, | Performed by: NURSE PRACTITIONER

## 2022-12-15 PROCEDURE — 99499 RISK ADDL DX/OHS AUDIT: ICD-10-PCS | Mod: HCNC,S$GLB,, | Performed by: NURSE PRACTITIONER

## 2022-12-15 PROCEDURE — 1159F MED LIST DOCD IN RCRD: CPT | Mod: HCNC,CPTII,S$GLB, | Performed by: NURSE PRACTITIONER

## 2022-12-15 PROCEDURE — 99214 PR OFFICE/OUTPT VISIT, EST, LEVL IV, 30-39 MIN: ICD-10-PCS | Mod: HCNC,S$GLB,, | Performed by: NURSE PRACTITIONER

## 2022-12-15 PROCEDURE — 99999 PR PBB SHADOW E&M-EST. PATIENT-LVL V: CPT | Mod: PBBFAC,HCNC,, | Performed by: NURSE PRACTITIONER

## 2022-12-15 PROCEDURE — 4010F PR ACE/ARB THEARPY RXD/TAKEN: ICD-10-PCS | Mod: HCNC,CPTII,S$GLB, | Performed by: NURSE PRACTITIONER

## 2022-12-15 PROCEDURE — 99999 PR PBB SHADOW E&M-EST. PATIENT-LVL V: ICD-10-PCS | Mod: PBBFAC,HCNC,, | Performed by: NURSE PRACTITIONER

## 2022-12-15 PROCEDURE — 4010F ACE/ARB THERAPY RXD/TAKEN: CPT | Mod: HCNC,CPTII,S$GLB, | Performed by: NURSE PRACTITIONER

## 2022-12-15 NOTE — PROGRESS NOTES
Subjective:      Name: Sam Pete  : 1945  MRN: [id number]    CC:  Review of labs post Venofer    HPI:   Sam Pete is a 77 y.o. male presents for evaluation of REAL.  Hx of Anticoagulation, AFIB, GERD, DDD, Arthritis, HTN, Lumbar stenosis, Stroke, Testicular hypofunction.    Consulted by Dr. Melchor in September of this year for microcytic anemia.  Previously normal hemoglobin hematocrit.  However, since , patient has had mild anemia with hemoglobins ranging 12-13.    More recently, hemoglobins have run 10-11.    Patient did have thyroidectomy in February for benign pathology.    Colonoscopy three years ago which was remarkable for nonbleeding internal hemorrhoids.  No specimens were biopsied and no follow-up endoscopy was recommended in light of his age.   Patient uses PPI daily.    Received IV Venofer x 5 doses between  - 10/28/2022    TODAY:  12/15/22  He presents to the clinic today for interval evaluation & review of labs post iron infusions.  He reports feeling less fatigued after the 3rd infusion (5 total), but that now seems to be fading a little.  Denies SOB, CP, bleeding, blurred vision, headaches, pica, etc.    Past Medical History:   Diagnosis Date    Anticoagulant long-term use     ASA 81 mg    Arthritis     Cataract     OU    Chronic low back pain     Complete rupture of rotator cuff 2011    DDD (degenerative disc disease), lumbar 2015    Degeneration of lumbar or lumbosacral intervertebral disc 2015    ED (erectile dysfunction)     GERD (gastroesophageal reflux disease)     Hypertension     Lumbar pseudoarthrosis 2017    Lumbar stenosis 2017    Obesity     Spondylosis of lumbar region without myelopathy or radiculopathy 2015    Stroke 1997    basal ganglia, left sided weakness, resolved    Testicular hypofunction     Thoracic or lumbosacral neuritis or radiculitis 2015       Past Surgical History:   Procedure Laterality Date     APPENDECTOMY      ARTHROPLASTY OF SHOULDER Right 03/22/2022    Procedure: ARTHROPLASTY, SHOULDER BRENNAN RIGHT SHOULDER HUMERAL HEAD RESURFACING;  Surgeon: Frankie Joya MD;  Location: [facility] OR;  Service: Orthopedics;  Laterality: Right;    BACK SURGERY      4- back surgery    CAUDAL EPIDURAL STEROID INJECTION N/A 12/16/2021    Procedure: Injection-steroid-epidural-caudal;  Surgeon: Aram Terrell MD;  Location: [facility] OR;  Service: Pain Management;  Laterality: N/A;    CAUDAL EPIDURAL STEROID INJECTION N/A 02/02/2022    Procedure: INJECTION, STEROID, SPINE, EPIDURAL, CAUDAL;  Surgeon: Aram Terrell MD;  Location: [facility] OR;  Service: Pain Management;  Laterality: N/A;    CAUDAL EPIDURAL STEROID INJECTION N/A 7/22/2022    Procedure: Injection-steroid-epidural-caudal;  Surgeon: Aram Terrell MD;  Location: [facility] OR;  Service: Pain Management;  Laterality: N/A;  Caudal CARLOS     COLONOSCOPY  07/12/2004    [facility].   Redundant tortuous colon, otherwise normal.    COLONOSCOPY N/A 02/25/2019    Procedure: COLONOSCOPY;  Surgeon: Gilbert Rodriguez Jr., MD;  Location: [facility] ENDO;  Service: Endoscopy;  Laterality: N/A;    Epidural steroid injection      Pain management    ESOPHAGOGASTRODUODENOSCOPY  07/12/2004    [facility].    FRACTURE SURGERY Left     wrist / forearm, total of 5    INSERTION OF DORSAL COLUMN NERVE STIMULATOR FOR TRIAL N/A 12/12/2019    Procedure: INSERTION, NEUROSTIMULATOR, SPINAL CORD, DORSAL COLUMN, FOR TRIAL;  Surgeon: Evan Lyles MD;  Location: [facility] OR;  Service: Pain Management;  Laterality: N/A;    JOINT REPLACEMENT  02/06/2013    ( rt hip 2007), left hip     JOINT REPLACEMENT Left     total knee    KNEE ARTHROSCOPY W/ DEBRIDEMENT      bilateral knees , total of six    KNEE SURGERY      LAMINECTOMY USING MINIMALLY INVASIVE TECHNIQUE N/A 02/12/2020    Procedure: LAMINECTOMY, SPINE, MINIMALLY INVASIVE /  PLACEMENT OF SPINAL CORD STIMULATOR;  Surgeon: Darlene Max MD;  Location: [facility] OR;  Service: Neurosurgery;   Laterality: N/A;    Nervbe Block injection      Pain management    TOTAL SHOULDER ARTHROPLASTY Right 2022    Dr Joya    TOTAL THYROIDECTOMY      TOTAL THYROIDECTOMY Bilateral 2022    Dr Mendoza       Family History   Problem Relation Age of Onset    Hypertension Father     Heart disease Father     Drug abuse Daughter     Hypertension Son     Lung disease Sister     COPD Sister     Hypertension Sister     Diverticulitis Sister     Gout Sister     Kidney disease Sister     No Known Problems Mother     Eczema Neg Hx     Lupus Neg Hx     Psoriasis Neg Hx     Melanoma Neg Hx        Social History     Socioeconomic History    Marital status:    Tobacco Use    Smoking status: Former     Packs/day: 1.00     Years: 30.00     Pack years: 30.00     Types: Cigarettes     Start date: 8/3/1963     Quit date: 1992     Years since quittin.9    Smokeless tobacco: Never   Substance and Sexual Activity    Alcohol use: Not Currently     Comment: On occasion    Drug use: Yes     Types: Oxycodone, Morphine     Social Determinants of Health     Financial Resource Strain: Low Risk     Difficulty of Paying Living Expenses: Not hard at all   Food Insecurity: No Food Insecurity    Worried About Running Out of Food in the Last Year: Never true    Ran Out of Food in the Last Year: Never true   Transportation Needs: No Transportation Needs    Lack of Transportation (Medical): No    Lack of Transportation (Non-Medical): No   Physical Activity: Insufficiently Active    Days of Exercise per Week: 2 days    Minutes of Exercise per Session: 30 min   Stress: Stress Concern Present    Feeling of Stress : To some extent   Social Connections: Socially Integrated    Frequency of Communication with Friends and Family: More than three times a week    Frequency of Social Gatherings with Friends and Family: Twice a week    Attends Jain Services: More than 4 times per year    Active Member of Clubs or Organizations: Yes     "Attends Club or Organization Meetings: More than 4 times per year    Marital Status:    Housing Stability: Low Risk     Unable to Pay for Housing in the Last Year: No    Number of Places Lived in the Last Year: 1    Unstable Housing in the Last Year: No       Review of patient's allergies indicates:   Allergen Reactions    Xyzal [levocetirizine] Other (See Comments)     Knocked him out        Review of Systems   All other systems reviewed and are negative.         Objective:     Vitals:    12/15/22 1039   BP: (!) (P) 145/62   BP Location: (P) Left arm   Patient Position: (P) Sitting   BP Method: (P) Medium (Automatic)   Pulse: (P) 97   Resp: (P) 18   Temp: (P) 97.4 °F (36.3 °C)   TempSrc: (P) Temporal   SpO2: (!) (P) 91%   Weight: (P) 104.2 kg (229 lb 13.3 oz)   Height: (P) 5' 10" (1.778 m)        Physical Exam  Vitals reviewed.   Constitutional:       General: He is not in acute distress.  HENT:      Head: Normocephalic and atraumatic.   Eyes:      Conjunctiva/sclera: Conjunctivae normal.   Cardiovascular:      Rate and Rhythm: Normal rate and regular rhythm.      Pulses: Normal pulses.   Pulmonary:      Effort: Pulmonary effort is normal.      Breath sounds: No wheezing.   Musculoskeletal:      Cervical back: Neck supple.      Right lower leg: Edema present.      Left lower leg: Edema present.   Lymphadenopathy:      Cervical: No cervical adenopathy.   Neurological:      Mental Status: He is alert and oriented to person, place, and time.   Psychiatric:         Behavior: Behavior normal.         Thought Content: Thought content normal.           Current Outpatient Medications on File Prior to Visit   Medication Sig    albuterol (PROVENTIL/VENTOLIN HFA) 90 mcg/actuation inhaler EVERY 4 HOURS AS NEEDED as needed for    amLODIPine (NORVASC) 10 MG tablet TAKE 1 TABLET EVERY DAY    amLODIPine (NORVASC) 10 MG tablet Take one tablet by mouth daily    aspirin 81 MG Chew Take 81 mg by mouth once daily.    " atorvastatin (LIPITOR) 40 MG tablet TAKE 1 TABLET (40 MG TOTAL) BY MOUTH ONCE DAILY.    buPROPion (WELLBUTRIN SR) 150 MG TBSR 12 hr tablet Take 1 tablet (150 mg total) by mouth once daily.    buPROPion (WELLBUTRIN XL) 150 MG TB24 tablet Take 1 tablet (150 mg total) by mouth once daily.    calcitRIOL (ROCALTROL) 0.5 MCG Cap Take 1 capsule (0.5 mcg total) by mouth once daily.    calcitRIOL (ROCALTROL) 0.5 MCG Cap Take 1 capsule (0.5 mcg total) by mouth once daily.    cephALEXin (KEFLEX) 500 MG capsule Take 1 capsule (500 mg total) by mouth every 6 (six) hours.    COVID-19 test specimen collect Misc TEST AS DIRECTED TODAY    fluticasone propionate (FLONASE) 50 mcg/actuation nasal spray 1 spray (50 mcg total) by Each Nostril route daily as needed for Rhinitis.    furosemide (LASIX) 40 MG tablet Take 1 tablet (40 mg total) by mouth once daily.    KENALOG 40 mg/mL injection     levothyroxine (SYNTHROID) 200 MCG tablet Take 1 tablet (200 mcg total) by mouth once daily.    losartan (COZAAR) 50 MG tablet TAKE 1 TABLET(50 MG) BY MOUTH EVERY DAY    morphine (MS CONTIN) 30 MG 12 hr tablet Take two tablets (60mg) by mouth in the morning and one tablet (30mg) in the evening    morphine (MS CONTIN) 30 MG 12 hr tablet Take 1 tablet (30 mg total) by mouth 3 (three) times daily. Take two tablets (60mg) in the morning and one tablet (30mg) in the evening    [START ON 12/30/2022] morphine (MS CONTIN) 30 MG 12 hr tablet Take 1 tablet (30 mg total) by mouth 3 (three) times daily. Take two tablets (60mg) in the morning and one tablet (30mg) in the evening    mupirocin (BACTROBAN) 2 % ointment Apply to affected area 3 times daily    naloxone (NARCAN) 0.4 mg/mL injection Inject 1 mL (0.4 mg total) into the vein as needed (overdose).    omeprazole (PRILOSEC OTC) 20 MG tablet 40 mg.    omeprazole (PRILOSEC) 40 MG capsule Take 1 capsule by mouth once daily at 6am.    omeprazole (PRILOSEC) 40 MG capsule Take one capsule by mouth daily     ondansetron (ZOFRAN-ODT) 8 MG TbDL Take 1 tablet (8 mg total) by mouth 2 (two) times daily as needed for nausea for 5days    oxyCODONE-acetaminophen (PERCOCET)  mg per tablet Take 1 tablet by mouth every 4 (four) hours as needed for Pain.    oxyCODONE-acetaminophen (PERCOCET)  mg per tablet Take 1 tablet by mouth every 4 (four) hours as needed for Pain.    [START ON 12/30/2022] oxyCODONE-acetaminophen (PERCOCET)  mg per tablet Take 1 tablet by mouth every 4 (four) hours as needed for Pain.    pregabalin (LYRICA) 75 MG capsule Take 1 capsule (75 mg total) by mouth 2 (two) times daily.    saw/vit E/sod lisa/lyc/beta/pyg (PROSTATE HEALTH ORAL) Take 2 capsules by mouth once daily. With saw palmetto    sildenafil (REVATIO) 20 mg Tab Take 1-5 daily as needed for ED    tiZANidine (ZANAFLEX) 4 MG tablet Take 1 tablet (4 mg total) by mouth 3 (three) times daily as needed (muscle spasm).    UNABLE TO FIND Take by mouth once daily. Vitafusion multivites gummies    varicella-zoster [id number]-AS01B, PF, (SHINGRIX, PF,) 50 mcg/0.5 mL injection Give one dose IM now, repeat times one in 2-6 months     Current Facility-Administered Medications on File Prior to Visit   Medication    diphenhydrAMINE injection 12.5 mg    electrolyte-S (ISOLYTE)    HYDROmorphone (PF) injection 0.2 mg    HYDROmorphone (PF) injection 0.2 mg    lactated ringers infusion    lactated ringers infusion    lorazepam injection 0.25 mg    prochlorperazine injection Soln 5 mg    sodium chloride 0.9% flush 3 mL       CBC:  Lab Results   Component Value Date    WBC 7.70 12/06/2022    HGB 12.3 (L) 12/06/2022    HCT 38.1 (L) 12/06/2022    MCV 86 12/06/2022     12/06/2022     Hemoglobin improved from 11.0 to 12.3 in 3 months  MCV improved from 80 to 86    CMP:  Sodium   Date Value Ref Range Status   09/12/2022 138 136 - 145 mmol/L Final     Potassium   Date Value Ref Range Status   09/12/2022 4.3 3.5 - 5.1 mmol/L Final     Chloride   Date Value Ref  Range Status   09/12/2022 102 95 - 110 mmol/L Final     CO2   Date Value Ref Range Status   09/12/2022 22 (L) 23 - 29 mmol/L Final     Glucose   Date Value Ref Range Status   09/12/2022 132 (H) 70 - 110 mg/dL Final     BUN   Date Value Ref Range Status   09/12/2022 19 8 - 23 mg/dL Final     Creatinine   Date Value Ref Range Status   09/12/2022 1.0 0.5 - 1.4 mg/dL Final   01/31/2013 0.8 0.5 - 1.4 mg/dL Final     Calcium   Date Value Ref Range Status   09/12/2022 9.3 8.7 - 10.5 mg/dL Final   01/31/2013 9.2 8.7 - 10.5 mg/dL Final     Total Protein   Date Value Ref Range Status   09/12/2022 7.8 6.0 - 8.4 g/dL Final     Albumin   Date Value Ref Range Status   09/12/2022 4.0 3.5 - 5.2 g/dL Final     Total Bilirubin   Date Value Ref Range Status   09/12/2022 0.3 0.1 - 1.0 mg/dL Final     Comment:     For infants and newborns, interpretation of results should be based  on gestational age, weight and in agreement with clinical  observations.    Premature Infant recommended reference ranges:  Up to 24 hours.............<8.0 mg/dL  Up to 48 hours............<12.0 mg/dL  3-5 days..................<15.0 mg/dL  6-29 days.................<15.0 mg/dL       Alkaline Phosphatase   Date Value Ref Range Status   09/12/2022 61 55 - 135 U/L Final     AST   Date Value Ref Range Status   09/12/2022 20 10 - 40 U/L Final     ALT   Date Value Ref Range Status   09/12/2022 18 10 - 44 U/L Final     Anion Gap   Date Value Ref Range Status   09/12/2022 14 8 - 16 mmol/L Final   01/31/2013 9 5 - 15 meq/L Final     eGFR if    Date Value Ref Range Status   07/12/2022 >60.0 >60 mL/min/1.73 m^2 Final     eGFR if non    Date Value Ref Range Status   07/12/2022 53.4 (A) >60 mL/min/1.73 m^2 Final     Comment:     Calculation used to obtain the estimated glomerular filtration  rate (eGFR) is the CKD-EPI equation.        All pertinent labs and imaging reviewed.    Assessment:       1. Iron deficiency anemia, unspecified iron  deficiency anemia type         Plan:     Iron deficiency anemia, unspecified iron deficiency anemia type       REAL:  some improvement post 5 infusions of Venofer; complete Venofer with additional 5 for total of 10.  F/U in 3 months after completion of 5th (10 total) Venofer with CBC, bmp, Iron studies/ferritin/stfr    Route Chart for Scheduling    Med Onc Chart Routing      Follow up with physician    Follow up with LORRIE . F/U 3 months after completion of (5 infusions) iron with CBC, BMP, iron studies/ferritin/sTFR   Infusion scheduling note Schedule for 5 additional weekly infusions of Venofer   Injection scheduling note    Labs    Imaging    Pharmacy appointment    Other referrals          Therapy Plan Information  Flushes  sodium chloride 0.9% 250 mL flush bag  Intravenous, 1 time a week  sodium chloride 0.9% flush 10 mL  10 mL, Intravenous, 1 time a week  heparin, porcine (PF) 100 unit/mL injection flush 500 Units  500 Units, Intravenous, 1 time a week  alteplase injection 2 mg  2 mg, Intra-Catheter, 1 time a week

## 2022-12-22 ENCOUNTER — OFFICE VISIT (OUTPATIENT)
Dept: PODIATRY | Facility: CLINIC | Age: 77
End: 2022-12-22
Payer: MEDICARE

## 2022-12-22 DIAGNOSIS — L90.9 FAT PAD ATROPHY OF FOOT: ICD-10-CM

## 2022-12-22 DIAGNOSIS — B07.0 PLANTAR WART, RIGHT FOOT: Primary | ICD-10-CM

## 2022-12-22 DIAGNOSIS — M77.41 METATARSALGIA, RIGHT FOOT: ICD-10-CM

## 2022-12-22 PROCEDURE — 1101F PR PT FALLS ASSESS DOC 0-1 FALLS W/OUT INJ PAST YR: ICD-10-PCS | Mod: HCNC,CPTII,S$GLB, | Performed by: STUDENT IN AN ORGANIZED HEALTH CARE EDUCATION/TRAINING PROGRAM

## 2022-12-22 PROCEDURE — 1160F PR REVIEW ALL MEDS BY PRESCRIBER/CLIN PHARMACIST DOCUMENTED: ICD-10-PCS | Mod: HCNC,CPTII,S$GLB, | Performed by: STUDENT IN AN ORGANIZED HEALTH CARE EDUCATION/TRAINING PROGRAM

## 2022-12-22 PROCEDURE — 99999 PR PBB SHADOW E&M-EST. PATIENT-LVL III: CPT | Mod: PBBFAC,HCNC,, | Performed by: STUDENT IN AN ORGANIZED HEALTH CARE EDUCATION/TRAINING PROGRAM

## 2022-12-22 PROCEDURE — 1101F PT FALLS ASSESS-DOCD LE1/YR: CPT | Mod: HCNC,CPTII,S$GLB, | Performed by: STUDENT IN AN ORGANIZED HEALTH CARE EDUCATION/TRAINING PROGRAM

## 2022-12-22 PROCEDURE — 4010F ACE/ARB THERAPY RXD/TAKEN: CPT | Mod: HCNC,CPTII,S$GLB, | Performed by: STUDENT IN AN ORGANIZED HEALTH CARE EDUCATION/TRAINING PROGRAM

## 2022-12-22 PROCEDURE — 1125F AMNT PAIN NOTED PAIN PRSNT: CPT | Mod: HCNC,CPTII,S$GLB, | Performed by: STUDENT IN AN ORGANIZED HEALTH CARE EDUCATION/TRAINING PROGRAM

## 2022-12-22 PROCEDURE — 1125F PR PAIN SEVERITY QUANTIFIED, PAIN PRESENT: ICD-10-PCS | Mod: HCNC,CPTII,S$GLB, | Performed by: STUDENT IN AN ORGANIZED HEALTH CARE EDUCATION/TRAINING PROGRAM

## 2022-12-22 PROCEDURE — 3288F FALL RISK ASSESSMENT DOCD: CPT | Mod: HCNC,CPTII,S$GLB, | Performed by: STUDENT IN AN ORGANIZED HEALTH CARE EDUCATION/TRAINING PROGRAM

## 2022-12-22 PROCEDURE — 3288F PR FALLS RISK ASSESSMENT DOCUMENTED: ICD-10-PCS | Mod: HCNC,CPTII,S$GLB, | Performed by: STUDENT IN AN ORGANIZED HEALTH CARE EDUCATION/TRAINING PROGRAM

## 2022-12-22 PROCEDURE — 1160F RVW MEDS BY RX/DR IN RCRD: CPT | Mod: HCNC,CPTII,S$GLB, | Performed by: STUDENT IN AN ORGANIZED HEALTH CARE EDUCATION/TRAINING PROGRAM

## 2022-12-22 PROCEDURE — 4010F PR ACE/ARB THEARPY RXD/TAKEN: ICD-10-PCS | Mod: HCNC,CPTII,S$GLB, | Performed by: STUDENT IN AN ORGANIZED HEALTH CARE EDUCATION/TRAINING PROGRAM

## 2022-12-22 PROCEDURE — 99213 PR OFFICE/OUTPT VISIT, EST, LEVL III, 20-29 MIN: ICD-10-PCS | Mod: HCNC,S$GLB,, | Performed by: STUDENT IN AN ORGANIZED HEALTH CARE EDUCATION/TRAINING PROGRAM

## 2022-12-22 PROCEDURE — 1159F MED LIST DOCD IN RCRD: CPT | Mod: HCNC,CPTII,S$GLB, | Performed by: STUDENT IN AN ORGANIZED HEALTH CARE EDUCATION/TRAINING PROGRAM

## 2022-12-22 PROCEDURE — 99999 PR PBB SHADOW E&M-EST. PATIENT-LVL III: ICD-10-PCS | Mod: PBBFAC,HCNC,, | Performed by: STUDENT IN AN ORGANIZED HEALTH CARE EDUCATION/TRAINING PROGRAM

## 2022-12-22 PROCEDURE — 1159F PR MEDICATION LIST DOCUMENTED IN MEDICAL RECORD: ICD-10-PCS | Mod: HCNC,CPTII,S$GLB, | Performed by: STUDENT IN AN ORGANIZED HEALTH CARE EDUCATION/TRAINING PROGRAM

## 2022-12-22 PROCEDURE — 99213 OFFICE O/P EST LOW 20 MIN: CPT | Mod: HCNC,S$GLB,, | Performed by: STUDENT IN AN ORGANIZED HEALTH CARE EDUCATION/TRAINING PROGRAM

## 2022-12-22 NOTE — PROGRESS NOTES
Chief Complaint   Patient presents with    Follow-up       HPI:   Sam Pete is a 77 y.o. male with concerns of  right foot pain, which appears to be present for months but worse over the last several days to week  Patient reports no acute injury to the area.    Patient states the pain occurs with direct pressure.      Treatment tried at home: nothing    10/6/22: Patient returns with some worsening pain over the last few days.    11/09/2022: Patient returns today for worsening foot pain again.    12/1/22:Return for wart treatment.     12/22/22: Return for f/u wart. Pain is better but not completely resolved.    Patient Active Problem List   Diagnosis    Hypertrophy of prostate with urinary obstruction and other lower urinary tract symptoms (LUTS)    Impotence of organic origin    Spinal stenosis in cervical region    Spinal stenosis, lumbar region, without neurogenic claudication    Hereditary and idiopathic peripheral neuropathy    Chronic pain low back and neck with spinal stenosis, djd left arm.  pain contract renewed 9/10/14    Essential (primary) hypertension    Facet arthropathy, lumbar    Obesity (BMI 30.0-34.9)    Atherosclerosis of aorta    Opioid dependence    PAF (paroxysmal atrial fibrillation)    Colon cancer screening    Failed back surgical syndrome    Lumbar radiculopathy    Chronic pain syndrome    Chronic back pain    Other hyperlipidemia    Gastroesophageal reflux disease without esophagitis    At risk for falls    Primary osteoarthritis of right shoulder    Long term (current) use of anticoagulants    REAL (iron deficiency anemia)    Right-sided chest pain         Current Outpatient Medications on File Prior to Visit   Medication Sig Dispense Refill    albuterol (PROVENTIL/VENTOLIN HFA) 90 mcg/actuation inhaler EVERY 4 HOURS AS NEEDED as needed for      amLODIPine (NORVASC) 10 MG tablet TAKE 1 TABLET EVERY DAY 90 tablet 3    amLODIPine (NORVASC) 10 MG tablet Take one tablet by mouth daily  90 tablet 0    aspirin 81 MG Chew Take 81 mg by mouth once daily.      atorvastatin (LIPITOR) 40 MG tablet TAKE 1 TABLET (40 MG TOTAL) BY MOUTH ONCE DAILY. 90 tablet 3    buPROPion (WELLBUTRIN SR) 150 MG TBSR 12 hr tablet Take 1 tablet (150 mg total) by mouth once daily. 30 tablet 0    buPROPion (WELLBUTRIN XL) 150 MG TB24 tablet Take 1 tablet (150 mg total) by mouth once daily. 90 tablet 3    calcitRIOL (ROCALTROL) 0.5 MCG Cap Take 1 capsule (0.5 mcg total) by mouth once daily. 30 capsule 0    calcitRIOL (ROCALTROL) 0.5 MCG Cap Take 1 capsule (0.5 mcg total) by mouth once daily. 90 capsule 3    cephALEXin (KEFLEX) 500 MG capsule Take 1 capsule (500 mg total) by mouth every 6 (six) hours. 20 capsule 0    COVID-19 test specimen collect Misc TEST AS DIRECTED TODAY      fluticasone propionate (FLONASE) 50 mcg/actuation nasal spray 1 spray (50 mcg total) by Each Nostril route daily as needed for Rhinitis. 16 g 0    furosemide (LASIX) 40 MG tablet Take 1 tablet (40 mg total) by mouth once daily. 30 tablet 5    KENALOG 40 mg/mL injection       levothyroxine (SYNTHROID) 200 MCG tablet Take 1 tablet (200 mcg total) by mouth once daily. 90 tablet 1    losartan (COZAAR) 50 MG tablet TAKE 1 TABLET(50 MG) BY MOUTH EVERY DAY 90 tablet 3    morphine (MS CONTIN) 30 MG 12 hr tablet Take two tablets (60mg) by mouth in the morning and one tablet (30mg) in the evening 90 tablet 0    morphine (MS CONTIN) 30 MG 12 hr tablet Take 1 tablet (30 mg total) by mouth 3 (three) times daily. Take two tablets (60mg) in the morning and one tablet (30mg) in the evening 90 tablet 0    [START ON 12/30/2022] morphine (MS CONTIN) 30 MG 12 hr tablet Take 1 tablet (30 mg total) by mouth 3 (three) times daily. Take two tablets (60mg) in the morning and one tablet (30mg) in the evening 90 tablet 0    mupirocin (BACTROBAN) 2 % ointment Apply to affected area 3 times daily 22 g 1    naloxone (NARCAN) 0.4 mg/mL injection Inject 1 mL (0.4 mg total) into the  vein as needed (overdose). 2 mL 5    omeprazole (PRILOSEC OTC) 20 MG tablet 40 mg.      omeprazole (PRILOSEC) 40 MG capsule Take 1 capsule by mouth once daily at 6am.      omeprazole (PRILOSEC) 40 MG capsule Take one capsule by mouth daily 90 capsule 0    ondansetron (ZOFRAN-ODT) 8 MG TbDL Take 1 tablet (8 mg total) by mouth 2 (two) times daily as needed for nausea for 5days 10 tablet 0    oxyCODONE-acetaminophen (PERCOCET)  mg per tablet Take 1 tablet by mouth every 4 (four) hours as needed for Pain. 120 tablet 0    oxyCODONE-acetaminophen (PERCOCET)  mg per tablet Take 1 tablet by mouth every 4 (four) hours as needed for Pain. 120 tablet 0    [START ON 12/30/2022] oxyCODONE-acetaminophen (PERCOCET)  mg per tablet Take 1 tablet by mouth every 4 (four) hours as needed for Pain. 120 tablet 0    pregabalin (LYRICA) 75 MG capsule Take 1 capsule (75 mg total) by mouth 2 (two) times daily. 180 capsule 1    saw/vit E/sod lisa/lyc/beta/pyg (PROSTATE HEALTH ORAL) Take 2 capsules by mouth once daily. With saw palmetto      sildenafil (REVATIO) 20 mg Tab Take 1-5 daily as needed for ED 10 tablet 5    tiZANidine (ZANAFLEX) 4 MG tablet Take 1 tablet (4 mg total) by mouth 3 (three) times daily as needed (muscle spasm). 270 tablet 1    UNABLE TO FIND Take by mouth once daily. Vitafusion multivites gummies      varicella-zoster gE-AS01B, PF, (SHINGRIX, PF,) 50 mcg/0.5 mL injection Give one dose IM now, repeat times one in 2-6 months 1 each 1     Current Facility-Administered Medications on File Prior to Visit   Medication Dose Route Frequency Provider Last Rate Last Admin    diphenhydrAMINE injection 12.5 mg  12.5 mg Intravenous Once PRN Enrique Rosales MD        electrolyte-S (ISOLYTE)   Intravenous Continuous Enrique Rosales MD        HYDROmorphone (PF) injection 0.2 mg  0.2 mg Intravenous Q5 Min PRN Enrique Rosales MD        HYDROmorphone (PF) injection 0.2 mg  0.2 mg Intravenous Q5 Min PRN  Enrique Rosales MD        lactated ringers infusion   Intravenous Once PRN Aram Terrell MD        lactated ringers infusion  10 mL/hr Intravenous Continuous Enrique Rosales  mL/hr at 03/22/22 1419 Rate Change at 03/22/22 1419    lorazepam injection 0.25 mg  0.25 mg Intravenous Once PRN Enrique Rosales MD        prochlorperazine injection Soln 5 mg  5 mg Intravenous Q30 Min PRN Enrique Rosales MD        sodium chloride 0.9% flush 3 mL  3 mL Intravenous Q8H Enrique Rosales MD             Review of patient's allergies indicates:   Allergen Reactions    Xyzal [levocetirizine] Other (See Comments)     Knocked him out              ROS:   General ROS: negative  Respiratory ROS: no cough, shortness of breath, or wheezing  Cardiovascular ROS: no chest pain or dyspnea on exertion  Musculoskeletal ROS: negative  Neurological ROS: no TIA or stroke symptoms  Dermatological ROS: negative        EXAM:     There were no vitals filed for this visit.       General: Patient is WD/WN, alert and oriented x 3 and in no apparent distress.     Right  Lower extremity exam:      Vascular:   Dorsalis pedis and posterior tibial pulses are 2/4 .    3 seconds capillary refill time   Toes are warm to touch.     There is no edema.       Neurological:    Light touch, proprioception, and sharp/dull sensation are all intact.           Dermatological:    Wound is healed but patient still has a small central core correlating with a wart      Musculoskeletal:    Muscle strength is 5/5 in all groups .    Metatarsophalangeal, subtalar, and ankle range of motion are within normal limits without crepitus.     N/a  Prominent submet 5 on the right w/ some fatpad atrophy. Digital contractures of the lesser toes.    ASSESSMENT/PLAN:    Problem List Items Addressed This Visit    None  Visit Diagnoses       Plantar wart, right foot    -  Primary    Fat pad atrophy of foot        Metatarsalgia, right foot                        I  counseled the patient on the patient's conditions, their implications and medical management.   Wart appears resolved. Metatarsal pads applied.   F/u as needed.    Uziel Valadez DPM

## 2023-01-01 NOTE — INTERVAL H&P NOTE
The patient has been examined and the H&P has been reviewed:    I concur with the findings and no changes have occurred since H&P was written.    Anesthesia/Surgery risks, benefits and alternative options discussed and understood by patient/family.    Active Hospital Problems    Diagnosis  POA    Chronic pain syndrome [G89.4]  Yes      Resolved Hospital Problems   No resolved problems to display.      0 -5.47 -9.19 -8.3

## 2023-01-03 ENCOUNTER — TELEPHONE (OUTPATIENT)
Dept: FAMILY MEDICINE | Facility: CLINIC | Age: 78
End: 2023-01-03
Payer: MEDICARE

## 2023-01-03 ENCOUNTER — LAB VISIT (OUTPATIENT)
Dept: LAB | Facility: HOSPITAL | Age: 78
End: 2023-01-03
Attending: FAMILY MEDICINE
Payer: MEDICARE

## 2023-01-03 DIAGNOSIS — R60.9 DEPENDENT EDEMA: ICD-10-CM

## 2023-01-03 DIAGNOSIS — E89.0 POSTOPERATIVE HYPOTHYROIDISM: ICD-10-CM

## 2023-01-03 LAB
T4 FREE SERPL-MCNC: 1.01 NG/DL (ref 0.71–1.51)
TSH SERPL DL<=0.005 MIU/L-ACNC: 5.95 UIU/ML (ref 0.4–4)

## 2023-01-03 PROCEDURE — 84439 ASSAY OF FREE THYROXINE: CPT | Mod: HCNC | Performed by: FAMILY MEDICINE

## 2023-01-03 PROCEDURE — 36415 COLL VENOUS BLD VENIPUNCTURE: CPT | Mod: HCNC,PO | Performed by: FAMILY MEDICINE

## 2023-01-03 PROCEDURE — 84443 ASSAY THYROID STIM HORMONE: CPT | Mod: HCNC | Performed by: FAMILY MEDICINE

## 2023-01-03 RX ORDER — OMEPRAZOLE 40 MG/1
CAPSULE, DELAYED RELEASE ORAL
Qty: 90 CAPSULE | Refills: 3 | Status: SHIPPED | OUTPATIENT
Start: 2023-01-03 | End: 2024-01-17 | Stop reason: SDUPTHER

## 2023-01-03 RX ORDER — FUROSEMIDE 40 MG/1
40 TABLET ORAL DAILY
Qty: 90 TABLET | Refills: 1 | Status: SHIPPED | OUTPATIENT
Start: 2023-01-03 | End: 2023-04-12 | Stop reason: SDUPTHER

## 2023-01-03 RX ORDER — AMLODIPINE BESYLATE 10 MG/1
TABLET ORAL
Qty: 90 TABLET | Refills: 3 | Status: ON HOLD | OUTPATIENT
Start: 2023-01-03 | End: 2023-07-23 | Stop reason: HOSPADM

## 2023-01-03 NOTE — TELEPHONE ENCOUNTER
No new care gaps identified.  [facility] Embedded Care Gaps. Reference number: [id number]. 1/03/2023   11:55:09 AM CST

## 2023-01-04 ENCOUNTER — PATIENT MESSAGE (OUTPATIENT)
Dept: HEMATOLOGY/ONCOLOGY | Facility: CLINIC | Age: 78
End: 2023-01-04
Payer: MEDICARE

## 2023-01-04 ENCOUNTER — TELEPHONE (OUTPATIENT)
Dept: FAMILY MEDICINE | Facility: CLINIC | Age: 78
End: 2023-01-04
Payer: MEDICARE

## 2023-01-04 DIAGNOSIS — F11.20 UNCOMPLICATED OPIOID DEPENDENCE: ICD-10-CM

## 2023-01-04 RX ORDER — HEPARIN 100 UNIT/ML
500 SYRINGE INTRAVENOUS
Status: CANCELLED | OUTPATIENT
Start: 2023-01-04

## 2023-01-04 RX ORDER — METHYLPREDNISOLONE SOD SUCC 125 MG
125 VIAL (EA) INJECTION ONCE AS NEEDED
Status: CANCELLED | OUTPATIENT
Start: 2023-01-04

## 2023-01-04 RX ORDER — EPINEPHRINE 0.3 MG/.3ML
0.3 INJECTION SUBCUTANEOUS ONCE AS NEEDED
Status: CANCELLED | OUTPATIENT
Start: 2023-01-04

## 2023-01-04 RX ORDER — OXYCODONE AND ACETAMINOPHEN 10; 325 MG/1; MG/1
1 TABLET ORAL EVERY 4 HOURS PRN
Qty: 68 TABLET | Refills: 0 | Status: SHIPPED | OUTPATIENT
Start: 2023-01-04 | End: 2023-01-04 | Stop reason: SDUPTHER

## 2023-01-04 RX ORDER — SODIUM CHLORIDE 0.9 % (FLUSH) 0.9 %
10 SYRINGE (ML) INJECTION
Status: CANCELLED | OUTPATIENT
Start: 2023-01-04

## 2023-01-04 RX ORDER — OXYCODONE AND ACETAMINOPHEN 10; 325 MG/1; MG/1
1 TABLET ORAL EVERY 4 HOURS PRN
Qty: 68 TABLET | Refills: 0 | Status: SHIPPED | OUTPATIENT
Start: 2023-01-04 | End: 2023-01-05 | Stop reason: SDUPTHER

## 2023-01-04 RX ORDER — DIPHENHYDRAMINE HYDROCHLORIDE 50 MG/ML
50 INJECTION INTRAMUSCULAR; INTRAVENOUS ONCE AS NEEDED
Status: CANCELLED | OUTPATIENT
Start: 2023-01-04

## 2023-01-04 NOTE — TELEPHONE ENCOUNTER
----- Message from Jeanne Boeckelman, MA sent at 1/4/2023 10:45 AM CST -----  I called the patient in regards to his lab results, patient states that he would like to wait and have his labs checked in 3 months before he decides to make any changes.  Patient also mentions that due to the issue with his pharmacy not having all of the percocet to fill his script that he would like to see if you could print out the remainder of his script where he could  the form and find a pharmacy that has his medication in stock. Patient states that he was only given 52 out of 120. Please advise.

## 2023-01-04 NOTE — TELEPHONE ENCOUNTER
I have tried to print the prescription twice.  It either will not print or it is sending it to an unknown printer instead of the one that the computer is set up for.    The problem with the pharmacy was a quota set by the Federal government.  Commonly this hits at the end of the year when they have dispensed their allotment and are not allowed to do anymore until January 1st.  No pharmacy is immune.

## 2023-01-05 ENCOUNTER — PATIENT MESSAGE (OUTPATIENT)
Dept: FAMILY MEDICINE | Facility: CLINIC | Age: 78
End: 2023-01-05
Payer: MEDICARE

## 2023-01-05 DIAGNOSIS — F11.20 UNCOMPLICATED OPIOID DEPENDENCE: ICD-10-CM

## 2023-01-05 RX ORDER — OXYCODONE AND ACETAMINOPHEN 10; 325 MG/1; MG/1
1 TABLET ORAL EVERY 4 HOURS PRN
Qty: 68 TABLET | Refills: 0 | Status: SHIPPED | OUTPATIENT
Start: 2023-01-05 | End: 2023-01-05 | Stop reason: SDUPTHER

## 2023-01-05 RX ORDER — OXYCODONE AND ACETAMINOPHEN 10; 325 MG/1; MG/1
1 TABLET ORAL EVERY 4 HOURS PRN
Qty: 68 TABLET | Refills: 0 | Status: SHIPPED | OUTPATIENT
Start: 2023-01-05 | End: 2023-02-27 | Stop reason: SDUPTHER

## 2023-01-09 ENCOUNTER — TELEPHONE (OUTPATIENT)
Dept: INFUSION THERAPY | Facility: HOSPITAL | Age: 78
End: 2023-01-09
Payer: MEDICARE

## 2023-01-09 NOTE — TELEPHONE ENCOUNTER
----- Message from Yudelka Henderson sent at 1/9/2023 10:08 AM CST -----  Type:  Patient Returning Call    Who Called:  Patient   Who Left Message for Patient:  Zeina  Does the patient know what this is regarding?:  yes  Best Call Back Number:    Additional Information:  Patient returning call

## 2023-01-16 ENCOUNTER — PATIENT MESSAGE (OUTPATIENT)
Dept: FAMILY MEDICINE | Facility: CLINIC | Age: 78
End: 2023-01-16
Payer: MEDICARE

## 2023-01-17 ENCOUNTER — PATIENT MESSAGE (OUTPATIENT)
Dept: FAMILY MEDICINE | Facility: CLINIC | Age: 78
End: 2023-01-17
Payer: MEDICARE

## 2023-01-17 DIAGNOSIS — E89.0 POSTOPERATIVE HYPOTHYROIDISM: ICD-10-CM

## 2023-01-17 RX ORDER — LEVOTHYROXINE SODIUM 112 UG/1
224 TABLET ORAL DAILY
Qty: 180 TABLET | Refills: 1 | Status: SHIPPED | OUTPATIENT
Start: 2023-01-17 | End: 2023-07-14

## 2023-01-18 NOTE — TELEPHONE ENCOUNTER
Call placed to patient. Lab Appointment scheduled for the date of 3-8-23. Patient agreed to appointment date, time, and location.

## 2023-01-20 ENCOUNTER — HOSPITAL ENCOUNTER (OUTPATIENT)
Facility: AMBULARY SURGERY CENTER | Age: 78
Discharge: HOME | End: 2023-01-20
Attending: ANESTHESIOLOGY | Admitting: ANESTHESIOLOGY
Payer: MEDICARE

## 2023-01-20 DIAGNOSIS — M54.16 LUMBAR RADICULITIS: ICD-10-CM

## 2023-01-20 PROCEDURE — 62323 NJX INTERLAMINAR LMBR/SAC: CPT | Mod: HCNC,,, | Performed by: ANESTHESIOLOGY

## 2023-01-20 PROCEDURE — 62323 PR INJ LUMBAR/SACRAL, W/IMAGING GUIDANCE: ICD-10-PCS | Mod: HCNC,,, | Performed by: ANESTHESIOLOGY

## 2023-01-20 PROCEDURE — 62323 NJX INTERLAMINAR LMBR/SAC: CPT | Performed by: ANESTHESIOLOGY

## 2023-01-20 RX ORDER — MIDAZOLAM HYDROCHLORIDE 1 MG/ML
INJECTION INTRAMUSCULAR; INTRAVENOUS
Status: DISCONTINUED | OUTPATIENT
Start: 2023-01-20 | End: 2023-01-20 | Stop reason: HOSPADM

## 2023-01-20 RX ORDER — SODIUM CHLORIDE, SODIUM LACTATE, POTASSIUM CHLORIDE, CALCIUM CHLORIDE 600; 310; 30; 20 MG/100ML; MG/100ML; MG/100ML; MG/100ML
INJECTION, SOLUTION INTRAVENOUS ONCE AS NEEDED
Status: COMPLETED | OUTPATIENT
Start: 2023-01-20 | End: 2023-01-20

## 2023-01-20 RX ORDER — SODIUM CHLORIDE 9 MG/ML
INJECTION, SOLUTION INTRAMUSCULAR; INTRAVENOUS; SUBCUTANEOUS
Status: DISCONTINUED | OUTPATIENT
Start: 2023-01-20 | End: 2023-01-20 | Stop reason: HOSPADM

## 2023-01-20 RX ORDER — DEXAMETHASONE SODIUM PHOSPHATE 10 MG/ML
INJECTION INTRAMUSCULAR; INTRAVENOUS
Status: DISCONTINUED | OUTPATIENT
Start: 2023-01-20 | End: 2023-01-20 | Stop reason: HOSPADM

## 2023-01-20 RX ORDER — LIDOCAINE HYDROCHLORIDE 10 MG/ML
INJECTION, SOLUTION EPIDURAL; INFILTRATION; INTRACAUDAL; PERINEURAL
Status: DISCONTINUED | OUTPATIENT
Start: 2023-01-20 | End: 2023-01-20 | Stop reason: HOSPADM

## 2023-01-20 RX ORDER — FENTANYL CITRATE 50 UG/ML
INJECTION, SOLUTION INTRAMUSCULAR; INTRAVENOUS
Status: DISCONTINUED | OUTPATIENT
Start: 2023-01-20 | End: 2023-01-20 | Stop reason: HOSPADM

## 2023-01-20 RX ADMIN — SODIUM CHLORIDE, SODIUM LACTATE, POTASSIUM CHLORIDE, CALCIUM CHLORIDE: 600; 310; 30; 20 INJECTION, SOLUTION INTRAVENOUS at 07:01

## 2023-01-20 NOTE — OP NOTE
PROCEDURE DATE: 1/20/2023    PROCEDURE:  Caudal epidural steroid injection under fluoroscopy.    Diagnosis: Lumbar radiculitis    Post Op diagnosis: Same    PHYSICIAN: Aram Terrell M.D.    MEDICATIONS INJECTED:  10 mg of dexamethasone and 4 ml of sterile, preservative-free NaCl.    LOCAL ANESTHETIC GIVEN:  Lidocaine 1%, 2 ml total    SEDATION MEDICATIONS: RN IV sedation    ESTIMATED BLOOD LOSS:  None    COMPLICATIONS:  None    TECHNIQUE:   After the patient was placed in prone position, the patient was prepped and draped in the usual sterile fashion using ChloraPrep and sterile towels.  Appropriate anatomic landmarks were determined by identifying the sacral hiatus in the lateral fluoroscopic view.  Local anesthetic was given via a 25g 1.5 inch needle by raising a wheal and infiltrating down to the periosteum.  A 3.54 inch 20 gauge touhy needle was introduced thru the sacral hiatus.  2ml of contrast was injected to confirm placement in the appropriate area and that there was no vascular uptake.  The medication was then injected slowly.  The patient tolerated the procedure well.    The patient was monitored after the procedure.  Patient was given post procedure and discharge instructions to follow at home.  The patient was discharged in a stable condition

## 2023-01-20 NOTE — PLAN OF CARE
Pt is awake and alert. Able to stand and walk without dizziness, weakness. Inj site no redness or swelling. Discussed DC instructions with pt and also spouse on DC. DC to car by WC, pt has cane also. Spouse here to take pt home.

## 2023-01-20 NOTE — DISCHARGE SUMMARY
[facility]  Discharge Note  Short Stay    Procedure(s) (LRB):  Injection-steroid-epidural-caudal (N/A)      OUTCOME: Patient tolerated treatment/procedure well without complication and is now ready for discharge.    DISPOSITION: Home or Self Care    FINAL DIAGNOSIS:  <principal problem not specified>    FOLLOWUP: In clinic    DISCHARGE INSTRUCTIONS:    Discharge Procedure Orders   Notify your health care provider if you experience any of the following:  temperature >100.4     Notify your health care provider if you experience any of the following:  severe uncontrolled pain     Notify your health care provider if you experience any of the following:  redness, tenderness, or signs of infection (pain, swelling, redness, odor or green/yellow discharge around incision site)     Activity as tolerated        TIME SPENT ON DISCHARGE:   30 minutes

## 2023-01-23 VITALS
RESPIRATION RATE: 18 BRPM | TEMPERATURE: 98 F | WEIGHT: 229.75 LBS | HEART RATE: 74 BPM | DIASTOLIC BLOOD PRESSURE: 78 MMHG | BODY MASS INDEX: 32.96 KG/M2 | SYSTOLIC BLOOD PRESSURE: 151 MMHG | OXYGEN SATURATION: 94 %

## 2023-01-23 NOTE — PROGRESS NOTES
Pt would like to get his sedation med in pre op next time. Pt feels like he didn't feel the effect of the med until after he left.

## 2023-01-24 ENCOUNTER — INFUSION (OUTPATIENT)
Dept: INFUSION THERAPY | Facility: HOSPITAL | Age: 78
End: 2023-01-24
Attending: NURSE PRACTITIONER
Payer: MEDICARE

## 2023-01-24 VITALS
SYSTOLIC BLOOD PRESSURE: 133 MMHG | TEMPERATURE: 98 F | DIASTOLIC BLOOD PRESSURE: 79 MMHG | RESPIRATION RATE: 18 BRPM | HEART RATE: 72 BPM

## 2023-01-24 DIAGNOSIS — D50.9 IRON DEFICIENCY ANEMIA, UNSPECIFIED IRON DEFICIENCY ANEMIA TYPE: Primary | ICD-10-CM

## 2023-01-24 PROCEDURE — 96365 THER/PROPH/DIAG IV INF INIT: CPT | Mod: HCNC,PN

## 2023-01-24 PROCEDURE — 63600175 PHARM REV CODE 636 W HCPCS: Mod: HCNC,PN | Performed by: NURSE PRACTITIONER

## 2023-01-24 PROCEDURE — 25000003 PHARM REV CODE 250: Mod: HCNC,PN | Performed by: NURSE PRACTITIONER

## 2023-01-24 RX ORDER — HEPARIN 100 UNIT/ML
500 SYRINGE INTRAVENOUS
Status: CANCELLED | OUTPATIENT
Start: 2023-01-24

## 2023-01-24 RX ORDER — DIPHENHYDRAMINE HYDROCHLORIDE 50 MG/ML
50 INJECTION INTRAMUSCULAR; INTRAVENOUS ONCE AS NEEDED
Status: CANCELLED | OUTPATIENT
Start: 2023-01-24

## 2023-01-24 RX ORDER — SODIUM CHLORIDE 9 MG/ML
1000 INJECTION, SOLUTION INTRAVENOUS
Status: DISCONTINUED | OUTPATIENT
Start: 2023-01-24 | End: 2023-01-24 | Stop reason: HOSPADM

## 2023-01-24 RX ORDER — METHYLPREDNISOLONE SOD SUCC 125 MG
125 VIAL (EA) INJECTION ONCE AS NEEDED
Status: CANCELLED | OUTPATIENT
Start: 2023-01-24

## 2023-01-24 RX ORDER — METHYLPREDNISOLONE SOD SUCC 125 MG
125 VIAL (EA) INJECTION ONCE AS NEEDED
Status: DISCONTINUED | OUTPATIENT
Start: 2023-01-24 | End: 2023-01-24 | Stop reason: HOSPADM

## 2023-01-24 RX ORDER — EPINEPHRINE 0.3 MG/.3ML
0.3 INJECTION SUBCUTANEOUS ONCE AS NEEDED
Status: CANCELLED | OUTPATIENT
Start: 2023-01-24

## 2023-01-24 RX ORDER — SODIUM CHLORIDE 0.9 % (FLUSH) 0.9 %
10 SYRINGE (ML) INJECTION
Status: CANCELLED | OUTPATIENT
Start: 2023-01-24

## 2023-01-24 RX ORDER — EPINEPHRINE 0.3 MG/.3ML
0.3 INJECTION SUBCUTANEOUS ONCE AS NEEDED
Status: DISCONTINUED | OUTPATIENT
Start: 2023-01-24 | End: 2023-01-24 | Stop reason: HOSPADM

## 2023-01-24 RX ORDER — DIPHENHYDRAMINE HYDROCHLORIDE 50 MG/ML
50 INJECTION INTRAMUSCULAR; INTRAVENOUS ONCE AS NEEDED
Status: DISCONTINUED | OUTPATIENT
Start: 2023-01-24 | End: 2023-01-24 | Stop reason: HOSPADM

## 2023-01-24 RX ORDER — SODIUM CHLORIDE 0.9 % (FLUSH) 0.9 %
10 SYRINGE (ML) INJECTION
Status: DISCONTINUED | OUTPATIENT
Start: 2023-01-24 | End: 2023-01-24 | Stop reason: HOSPADM

## 2023-01-24 RX ADMIN — SODIUM CHLORIDE: 9 INJECTION, SOLUTION INTRAVENOUS at 01:01

## 2023-01-24 RX ADMIN — IRON SUCROSE 200 MG: 20 INJECTION, SOLUTION INTRAVENOUS at 01:01

## 2023-01-25 DIAGNOSIS — F11.20 UNCOMPLICATED OPIOID DEPENDENCE: ICD-10-CM

## 2023-01-25 RX ORDER — OXYCODONE AND ACETAMINOPHEN 10; 325 MG/1; MG/1
1 TABLET ORAL EVERY 4 HOURS PRN
Qty: 120 TABLET | Refills: 0 | Status: CANCELLED | OUTPATIENT
Start: 2023-01-25

## 2023-01-25 NOTE — TELEPHONE ENCOUNTER
No new care gaps identified.  [facility] Embedded Care Gaps. Reference number: [id number]. 1/25/2023   2:48:35 PM CST

## 2023-01-26 ENCOUNTER — OFFICE VISIT (OUTPATIENT)
Dept: FAMILY MEDICINE | Facility: CLINIC | Age: 78
End: 2023-01-26
Attending: FAMILY MEDICINE
Payer: MEDICARE

## 2023-01-26 VITALS
TEMPERATURE: 98 F | SYSTOLIC BLOOD PRESSURE: 130 MMHG | HEIGHT: 70 IN | BODY MASS INDEX: 33.31 KG/M2 | OXYGEN SATURATION: 94 % | WEIGHT: 232.69 LBS | HEART RATE: 72 BPM | DIASTOLIC BLOOD PRESSURE: 60 MMHG

## 2023-01-26 DIAGNOSIS — M96.1 FAILED BACK SURGICAL SYNDROME: ICD-10-CM

## 2023-01-26 DIAGNOSIS — G89.4 CHRONIC PAIN SYNDROME: Primary | ICD-10-CM

## 2023-01-26 DIAGNOSIS — I48.0 PAF (PAROXYSMAL ATRIAL FIBRILLATION): ICD-10-CM

## 2023-01-26 DIAGNOSIS — M48.02 SPINAL STENOSIS IN CERVICAL REGION: ICD-10-CM

## 2023-01-26 DIAGNOSIS — M48.061 SPINAL STENOSIS, LUMBAR REGION, WITHOUT NEUROGENIC CLAUDICATION: ICD-10-CM

## 2023-01-26 DIAGNOSIS — F11.20 UNCOMPLICATED OPIOID DEPENDENCE: ICD-10-CM

## 2023-01-26 DIAGNOSIS — I70.0 ATHEROSCLEROSIS OF AORTA: ICD-10-CM

## 2023-01-26 DIAGNOSIS — I10 ESSENTIAL (PRIMARY) HYPERTENSION: ICD-10-CM

## 2023-01-26 PROCEDURE — 1160F PR REVIEW ALL MEDS BY PRESCRIBER/CLIN PHARMACIST DOCUMENTED: ICD-10-PCS | Mod: HCNC,CPTII,S$GLB, | Performed by: FAMILY MEDICINE

## 2023-01-26 PROCEDURE — 1125F AMNT PAIN NOTED PAIN PRSNT: CPT | Mod: HCNC,CPTII,S$GLB, | Performed by: FAMILY MEDICINE

## 2023-01-26 PROCEDURE — 1159F PR MEDICATION LIST DOCUMENTED IN MEDICAL RECORD: ICD-10-PCS | Mod: HCNC,CPTII,S$GLB, | Performed by: FAMILY MEDICINE

## 2023-01-26 PROCEDURE — 99999 PR PBB SHADOW E&M-EST. PATIENT-LVL III: ICD-10-PCS | Mod: PBBFAC,HCNC,, | Performed by: FAMILY MEDICINE

## 2023-01-26 PROCEDURE — 3288F FALL RISK ASSESSMENT DOCD: CPT | Mod: HCNC,CPTII,S$GLB, | Performed by: FAMILY MEDICINE

## 2023-01-26 PROCEDURE — 3078F DIAST BP <80 MM HG: CPT | Mod: HCNC,CPTII,S$GLB, | Performed by: FAMILY MEDICINE

## 2023-01-26 PROCEDURE — 3075F PR MOST RECENT SYSTOLIC BLOOD PRESS GE 130-139MM HG: ICD-10-PCS | Mod: HCNC,CPTII,S$GLB, | Performed by: FAMILY MEDICINE

## 2023-01-26 PROCEDURE — 99999 PR PBB SHADOW E&M-EST. PATIENT-LVL III: CPT | Mod: PBBFAC,HCNC,, | Performed by: FAMILY MEDICINE

## 2023-01-26 PROCEDURE — 1160F RVW MEDS BY RX/DR IN RCRD: CPT | Mod: HCNC,CPTII,S$GLB, | Performed by: FAMILY MEDICINE

## 2023-01-26 PROCEDURE — 99213 OFFICE O/P EST LOW 20 MIN: CPT | Mod: HCNC,S$GLB,, | Performed by: FAMILY MEDICINE

## 2023-01-26 PROCEDURE — 1101F PR PT FALLS ASSESS DOC 0-1 FALLS W/OUT INJ PAST YR: ICD-10-PCS | Mod: HCNC,CPTII,S$GLB, | Performed by: FAMILY MEDICINE

## 2023-01-26 PROCEDURE — 3075F SYST BP GE 130 - 139MM HG: CPT | Mod: HCNC,CPTII,S$GLB, | Performed by: FAMILY MEDICINE

## 2023-01-26 PROCEDURE — 1159F MED LIST DOCD IN RCRD: CPT | Mod: HCNC,CPTII,S$GLB, | Performed by: FAMILY MEDICINE

## 2023-01-26 PROCEDURE — 99213 PR OFFICE/OUTPT VISIT, EST, LEVL III, 20-29 MIN: ICD-10-PCS | Mod: HCNC,S$GLB,, | Performed by: FAMILY MEDICINE

## 2023-01-26 PROCEDURE — 3078F PR MOST RECENT DIASTOLIC BLOOD PRESSURE < 80 MM HG: ICD-10-PCS | Mod: HCNC,CPTII,S$GLB, | Performed by: FAMILY MEDICINE

## 2023-01-26 PROCEDURE — 3288F PR FALLS RISK ASSESSMENT DOCUMENTED: ICD-10-PCS | Mod: HCNC,CPTII,S$GLB, | Performed by: FAMILY MEDICINE

## 2023-01-26 PROCEDURE — 1125F PR PAIN SEVERITY QUANTIFIED, PAIN PRESENT: ICD-10-PCS | Mod: HCNC,CPTII,S$GLB, | Performed by: FAMILY MEDICINE

## 2023-01-26 PROCEDURE — 1101F PT FALLS ASSESS-DOCD LE1/YR: CPT | Mod: HCNC,CPTII,S$GLB, | Performed by: FAMILY MEDICINE

## 2023-01-26 RX ORDER — MORPHINE SULFATE 30 MG/1
30 TABLET, FILM COATED, EXTENDED RELEASE ORAL 3 TIMES DAILY
Qty: 90 TABLET | Refills: 0 | Status: SHIPPED | OUTPATIENT
Start: 2023-01-27 | End: 2023-04-24

## 2023-01-26 RX ORDER — MORPHINE SULFATE 30 MG/1
30 TABLET, FILM COATED, EXTENDED RELEASE ORAL 3 TIMES DAILY
Qty: 90 TABLET | Refills: 0 | Status: SHIPPED | OUTPATIENT
Start: 2023-03-27 | End: 2023-04-24

## 2023-01-26 RX ORDER — MORPHINE SULFATE 30 MG/1
30 TABLET, FILM COATED, EXTENDED RELEASE ORAL 3 TIMES DAILY
Qty: 90 TABLET | Refills: 0 | Status: SHIPPED | OUTPATIENT
Start: 2023-02-27 | End: 2023-04-24

## 2023-01-26 NOTE — PROGRESS NOTES
Subjective:       Patient ID: Sam Pete is a 77 y.o. male.    Chief Complaint: Chronic Pain    77-year-old male with a history of chronic pain disorder secondary to spinal stenosis of cervical and lumbar spine with two back operations as well as arthritis of the left arm with at least five surgeries and a left hip replacement who is opioid dependent comes in for refill of his medications.  We have been slowly trying to wean him off of MS Contin for several months and he is now down to 30 mg 3 times daily.  He reports that since I last saw him he has had two epidural/caudal procedures by Dr. Terrell that have been fairly successful and have greatly relieved the pain in the low back area.  On several occasions he actually reduced his MS Contin to just two pills a day and did fairly well.  He is not ready to commit to that dosage schedule but he is hopeful that he will be able to reduce if not entirely eliminate the MS Contin.  He would like to continue on the three a day for at least another month and he will see if he can cut it down to two a day by the end of that time.  The pain medications allow him to carry out his normal activities of daily living traveling, going to the [employer], spending time with grandchildren and doing projects in maintenance around his home.  He has not had any problems with constipation or cognitive impairment.  He also uses Percocet as a p.r.n. agent and the plan is if possible to wean him off the MS Contin and leave him with the Percocet.  He has additional history of hypertension, paroxysmal atrial fibrillation, hyperlipidemia, BPH with lower urinary tract symptoms, erectile dysfunction, chronic anticoagulation, iron deficiency anemia, reflux without esophagitis, osteoarthritis of the right shoulder and hypothyroidism status post thyroidectomy.  He also has been found to have atherosclerosis of the abdominal aorta which has not presented any symptoms or problems at this time.   This most recently was imaged on a chest x-ray showing calcifications in the arch of the aorta on March 3, 2022.    Past Medical History:  No date: Anticoagulant long-term use      Comment:  ASA 81 mg  No date: Arthritis  No date: Cataract      Comment:  OU  No date: Chronic low back pain  02/08/2011: Complete rupture of rotator cuff  07/08/2015: DDD (degenerative disc disease), lumbar  09/09/2015: Degeneration of lumbar or lumbosacral intervertebral disc  No date: ED (erectile dysfunction)  No date: GERD (gastroesophageal reflux disease)  No date: Hypertension  06/26/2017: Lumbar pseudoarthrosis  06/26/2017: Lumbar stenosis  No date: Obesity  07/08/2015: Spondylosis of lumbar region without myelopathy or   radiculopathy  1997: Stroke      Comment:  basal ganglia, left sided weakness, resolved  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  07/08/2015: Thoracic or lumbosacral neuritis or radiculitis    Past Surgical History:  No date: APPENDECTOMY  03/22/2022: ARTHROPLASTY OF SHOULDER; Right      Comment:  Procedure: ARTHROPLASTY, SHOULDER BRENNAN RIGHT SHOULDER                HUMERAL HEAD RESURFACING;  Surgeon: Frankie Joya MD;               Location: [facility] OR;  Service: Orthopedics;  Laterality:                Right;  No date: BACK SURGERY      Comment:  4- back surgery  12/16/2021: CAUDAL EPIDURAL STEROID INJECTION; N/A      Comment:  Procedure: Injection-steroid-epidural-caudal;  Surgeon:                Aram Terrell MD;  Location: [facility] OR;  Service: Pain                Management;  Laterality: N/A;  02/02/2022: CAUDAL EPIDURAL STEROID INJECTION; N/A      Comment:  Procedure: INJECTION, STEROID, SPINE, EPIDURAL, CAUDAL;                Surgeon: Aram Terrell MD;  Location: [facility] OR;  Service:                Pain Management;  Laterality: N/A;  7/22/2022: CAUDAL EPIDURAL STEROID INJECTION; N/A      Comment:  Procedure: Injection-steroid-epidural-caudal;  Surgeon:                Aram Terrell MD;  Location: [facility] OR;  Service: Pain                 Management;  Laterality: N/A;  Caudal CARLOS   1/20/2023: CAUDAL EPIDURAL STEROID INJECTION; N/A      Comment:  Procedure: Injection-steroid-epidural-caudal;  Surgeon:                Aram Terrell MD;  Location: [facility] OR;  Service: Pain                Management;  Laterality: N/A;  caudal ( melinda)  07/12/2004: COLONOSCOPY      Comment:  CHILO.   Redundant tortuous colon, otherwise normal.  02/25/2019: COLONOSCOPY; N/A      Comment:  Procedure: COLONOSCOPY;  Surgeon: Gilbert Rodriguez Jr., MD;  Location: [facility] ENDO;  Service: Endoscopy;                 Laterality: N/A;  No date: Epidural steroid injection      Comment:  Pain management  07/12/2004: ESOPHAGOGASTRODUODENOSCOPY      Comment:  CHILO.  No date: FRACTURE SURGERY; Left      Comment:  wrist / forearm, total of 5  12/12/2019: INSERTION OF DORSAL COLUMN NERVE STIMULATOR FOR TRIAL; N/A      Comment:  Procedure: INSERTION, NEUROSTIMULATOR, SPINAL CORD,                DORSAL COLUMN, FOR TRIAL;  Surgeon: Evan Lyles MD;                 Location: [facility] OR;  Service: Pain Management;                 Laterality: N/A;  02/06/2013: JOINT REPLACEMENT      Comment:  ( rt hip 2007), left hip   No date: JOINT REPLACEMENT; Left      Comment:  total knee  No date: KNEE ARTHROSCOPY W/ DEBRIDEMENT      Comment:  bilateral knees , total of six  No date: KNEE SURGERY  02/12/2020: LAMINECTOMY USING MINIMALLY INVASIVE TECHNIQUE; N/A      Comment:  Procedure: LAMINECTOMY, SPINE, MINIMALLY INVASIVE /                 PLACEMENT OF SPINAL CORD STIMULATOR;  Surgeon: Darlene Max MD;  Location: [facility] OR;  Service: Neurosurgery;                 Laterality: N/A;  No date: Nervbe Block injection      Comment:  Pain management  03/28/2022: TOTAL SHOULDER ARTHROPLASTY; Right      Comment:  Dr Joya  No date: TOTAL THYROIDECTOMY  03/07/2022: TOTAL THYROIDECTOMY; Bilateral      Comment:  Dr Mendoza    Current Outpatient Medications on File Prior to  Visit:  albuterol (PROVENTIL/VENTOLIN HFA) 90 mcg/actuation inhaler, EVERY 4 HOURS AS NEEDED as needed for, Disp: , Rfl:   amLODIPine (NORVASC) 10 MG tablet, Take one tablet by mouth daily, Disp: 90 tablet, Rfl: 3  aspirin 81 MG Chew, Take 81 mg by mouth once daily., Disp: , Rfl:   atorvastatin (LIPITOR) 40 MG tablet, TAKE 1 TABLET (40 MG TOTAL) BY MOUTH ONCE DAILY., Disp: 90 tablet, Rfl: 3  buPROPion (WELLBUTRIN XL) 150 MG TB24 tablet, Take 1 tablet (150 mg total) by mouth once daily., Disp: 90 tablet, Rfl: 3  calcitRIOL (ROCALTROL) 0.5 MCG Cap, Take 1 capsule (0.5 mcg total) by mouth once daily., Disp: 90 capsule, Rfl: 3  fluticasone propionate (FLONASE) 50 mcg/actuation nasal spray, 1 spray (50 mcg total) by Each Nostril route daily as needed for Rhinitis., Disp: 16 g, Rfl: 0  furosemide (LASIX) 40 MG tablet, Take 1 tablet (40 mg total) by mouth once daily., Disp: 90 tablet, Rfl: 1  levothyroxine (SYNTHROID) 112 MCG tablet, Take 2 tablets (224 mcg total) by mouth once daily., Disp: 180 tablet, Rfl: 1  losartan (COZAAR) 50 MG tablet, TAKE 1 TABLET(50 MG) BY MOUTH EVERY DAY, Disp: 90 tablet, Rfl: 3  mupirocin (BACTROBAN) 2 % ointment, Apply to affected area 3 times daily, Disp: 22 g, Rfl: 1  omeprazole (PRILOSEC) 40 MG capsule, Take one capsule by mouth daily, Disp: 90 capsule, Rfl: 3  ondansetron (ZOFRAN-ODT) 8 MG TbDL, Take 1 tablet (8 mg total) by mouth 2 (two) times daily as needed for nausea for 5days, Disp: 10 tablet, Rfl: 0  oxyCODONE-acetaminophen (PERCOCET)  mg per tablet, Take 1 tablet by mouth every 4 (four) hours as needed for Pain., Disp: 68 tablet, Rfl: 0  oxyCODONE-acetaminophen (PERCOCET)  mg per tablet, Take 1 tablet by mouth every 4 (four) hours as needed for Pain., Disp: 120 tablet, Rfl: 0  pregabalin (LYRICA) 75 MG capsule, Take 1 capsule (75 mg total) by mouth 2 (two) times daily., Disp: 180 capsule, Rfl: 1  saw/vit E/sod lisa/lyc/beta/pyg (PROSTATE HEALTH ORAL), Take 2 capsules by  mouth once daily. With saw palmetto, Disp: , Rfl:   sildenafil (REVATIO) 20 mg Tab, Take 1-5 daily as needed for ED, Disp: 10 tablet, Rfl: 5  tiZANidine (ZANAFLEX) 4 MG tablet, Take 1 tablet (4 mg total) by mouth 3 (three) times daily as needed (muscle spasm)., Disp: 270 tablet, Rfl: 1  UNABLE TO FIND, Take by mouth once daily. Vitafusion multivites gummies, Disp: , Rfl:   [DISCONTINUED] morphine (MS CONTIN) 30 MG 12 hr tablet, Take 1 tablet (30 mg total) by mouth 3 (three) times daily. Take two tablets (60mg) in the morning and one tablet (30mg) in the evening, Disp: 90 tablet, Rfl: 0  amLODIPine (NORVASC) 10 MG tablet, TAKE 1 TABLET EVERY DAY, Disp: 90 tablet, Rfl: 3  buPROPion (WELLBUTRIN SR) 150 MG TBSR 12 hr tablet, Take 1 tablet (150 mg total) by mouth once daily., Disp: 30 tablet, Rfl: 0  calcitRIOL (ROCALTROL) 0.5 MCG Cap, Take 1 capsule (0.5 mcg total) by mouth once daily., Disp: 30 capsule, Rfl: 0  cephALEXin (KEFLEX) 500 MG capsule, Take 1 capsule (500 mg total) by mouth every 6 (six) hours., Disp: 20 capsule, Rfl: 0  COVID-19 test specimen collect Misc, TEST AS DIRECTED TODAY, Disp: , Rfl:   KENALOG 40 mg/mL injection, , Disp: , Rfl:   naloxone (NARCAN) 0.4 mg/mL injection, Inject 1 mL (0.4 mg total) into the vein as needed (overdose). (Patient not taking: Reported on 1/26/2023), Disp: 2 mL, Rfl: 5  omeprazole (PRILOSEC) 40 MG capsule, Take 1 capsule by mouth once daily at 6am., Disp: , Rfl:   oxyCODONE-acetaminophen (PERCOCET)  mg per tablet, Take 1 tablet by mouth every 4 (four) hours as needed for Pain., Disp: 120 tablet, Rfl: 0  oxyCODONE-acetaminophen (PERCOCET)  mg per tablet, Take 1 tablet by mouth every 4 (four) hours as needed for Pain., Disp: 120 tablet, Rfl: 0  varicella-zoster [id number], PF, (SHINGRIX, PF,) 50 mcg/0.5 mL injection, Give one dose IM now, repeat times one in 2-6 months, Disp: 1 each, Rfl: 1  [DISCONTINUED] morphine (MS CONTIN) 30 MG 12 hr tablet, Take two tablets  (60mg) by mouth in the morning and one tablet (30mg) in the evening, Disp: 90 tablet, Rfl: 0  [DISCONTINUED] morphine (MS CONTIN) 30 MG 12 hr tablet, Take 1 tablet (30 mg total) by mouth 3 (three) times daily. Take two tablets (60mg) in the morning and one tablet (30mg) in the evening, Disp: 90 tablet, Rfl: 0    Current Facility-Administered Medications on File Prior to Visit:  diphenhydrAMINE injection 12.5 mg, 12.5 mg, Intravenous, Once PRN, Enrique Rosales MD  electrolyte-S (ISOLYTE), , Intravenous, Continuous, Enrique Rosales MD  HYDROmorphone (PF) injection 0.2 mg, 0.2 mg, Intravenous, Q5 Min PRN, Enrique Rosales MD  HYDROmorphone (PF) injection 0.2 mg, 0.2 mg, Intravenous, Q5 Min PRN, Enrique Rosales MD  lactated ringers infusion, , Intravenous, Once PRN, Aram Terrell MD  lactated ringers infusion, 10 mL/hr, Intravenous, Continuous, Enrique Rosales MD, Last Rate: 500 mL/hr at 03/22/22 1419, Rate Change at 03/22/22 1419  lorazepam injection 0.25 mg, 0.25 mg, Intravenous, Once PRN, Enrique Rosales MD  prochlorperazine injection Soln 5 mg, 5 mg, Intravenous, Q30 Min PRN, Enrique Rosales MD  sodium chloride 0.9% flush 3 mL, 3 mL, Intravenous, Q8H, Enrique Rosales MD          Review of Systems   Constitutional:  Negative for chills, diaphoresis and fever.   Respiratory:  Negative for cough, chest tightness and shortness of breath.    Gastrointestinal:  Negative for constipation, diarrhea and nausea.   Musculoskeletal:  Positive for arthralgias and back pain. Negative for joint swelling.   Psychiatric/Behavioral:  Negative for confusion, decreased concentration and dysphoric mood.      Objective:      Physical Exam  Vitals and nursing note reviewed.   Constitutional:       General: He is not in acute distress.     Appearance: Normal appearance. He is obese. He is not ill-appearing, toxic-appearing or diaphoretic.      Comments: Good blood pressure control   Normal pulse with  regular rhythm   Obese with a BMI of 33.4 he is up 6.9 lb from his last visit with me October 11, 2022   Cardiovascular:      Rate and Rhythm: Normal rate and regular rhythm.      Heart sounds: Normal heart sounds.   Pulmonary:      Effort: Pulmonary effort is normal.      Breath sounds: Normal breath sounds.   Neurological:      General: No focal deficit present.      Mental Status: He is alert and oriented to person, place, and time. Mental status is at baseline.   Psychiatric:         Mood and Affect: Mood normal.         Behavior: Behavior normal.         Thought Content: Thought content normal.         Judgment: Judgment normal.       Assessment:       1. Chronic pain syndrome    2. Uncomplicated opioid dependence    3. Spinal stenosis, lumbar region, without neurogenic claudication    4. Spinal stenosis in cervical region    5. Failed back surgical syndrome    6. PAF (paroxysmal atrial fibrillation)    7. Atherosclerosis of aorta    8. Essential (primary) hypertension          Plan:       1. Chronic pain syndrome   checked with no inappropriate activity    2. Uncomplicated opioid dependence  Refill sent to [facility].  The Percocet was refilled earlier today following a message yesterday  - morphine (MS CONTIN) 30 MG 12 hr tablet; Take 1 tablet (30 mg total) by mouth 3 (three) times daily. Take two tablets (60mg) in the morning and one tablet (30mg) in the evening  Dispense: 90 tablet; Refill: 0  - morphine (MS CONTIN) 30 MG 12 hr tablet; Take two tablets (60mg) by mouth in the morning and one tablet (30mg) in the evening  Dispense: 90 tablet; Refill: 0  - morphine (MS CONTIN) 30 MG 12 hr tablet; Take 1 tablet (30 mg total) by mouth 3 (three) times daily. Take two tablets (60mg) in the morning and one tablet (30mg) in the evening  Dispense: 90 tablet; Refill: 0    3. Spinal stenosis, lumbar region, without neurogenic claudication  Improving steadily    4. Spinal stenosis in cervical  region  Decreased range of motion but minimally painful at this time    5. Failed back surgical syndrome  Stable    6. PAF (paroxysmal atrial fibrillation)  Currently normal sinus rhythm and stable    7. Atherosclerosis of aorta  Asymptomatic    8. Essential (primary) hypertension  Good control with no changes needed on current medications

## 2023-01-31 ENCOUNTER — INFUSION (OUTPATIENT)
Dept: INFUSION THERAPY | Facility: HOSPITAL | Age: 78
End: 2023-01-31
Attending: NURSE PRACTITIONER
Payer: MEDICARE

## 2023-01-31 VITALS
TEMPERATURE: 98 F | BODY MASS INDEX: 33.31 KG/M2 | HEIGHT: 70 IN | WEIGHT: 232.69 LBS | HEART RATE: 80 BPM | DIASTOLIC BLOOD PRESSURE: 78 MMHG | SYSTOLIC BLOOD PRESSURE: 134 MMHG | RESPIRATION RATE: 18 BRPM | OXYGEN SATURATION: 97 %

## 2023-01-31 DIAGNOSIS — D50.9 IRON DEFICIENCY ANEMIA, UNSPECIFIED IRON DEFICIENCY ANEMIA TYPE: Primary | ICD-10-CM

## 2023-01-31 PROCEDURE — A4216 STERILE WATER/SALINE, 10 ML: HCPCS | Mod: HCNC,PN | Performed by: NURSE PRACTITIONER

## 2023-01-31 PROCEDURE — 96365 THER/PROPH/DIAG IV INF INIT: CPT | Mod: HCNC,PN

## 2023-01-31 PROCEDURE — 25000003 PHARM REV CODE 250: Mod: HCNC,PN | Performed by: NURSE PRACTITIONER

## 2023-01-31 PROCEDURE — 63600175 PHARM REV CODE 636 W HCPCS: Mod: HCNC,PN | Performed by: NURSE PRACTITIONER

## 2023-01-31 RX ORDER — DIPHENHYDRAMINE HYDROCHLORIDE 50 MG/ML
50 INJECTION INTRAMUSCULAR; INTRAVENOUS ONCE AS NEEDED
Status: CANCELLED | OUTPATIENT
Start: 2023-01-31

## 2023-01-31 RX ORDER — HEPARIN 100 UNIT/ML
500 SYRINGE INTRAVENOUS
Status: CANCELLED | OUTPATIENT
Start: 2023-01-31

## 2023-01-31 RX ORDER — SODIUM CHLORIDE 9 MG/ML
1000 INJECTION, SOLUTION INTRAVENOUS
Status: DISCONTINUED | OUTPATIENT
Start: 2023-01-31 | End: 2023-01-31 | Stop reason: HOSPADM

## 2023-01-31 RX ORDER — METHYLPREDNISOLONE SOD SUCC 125 MG
125 VIAL (EA) INJECTION ONCE AS NEEDED
Status: DISCONTINUED | OUTPATIENT
Start: 2023-01-31 | End: 2023-01-31 | Stop reason: HOSPADM

## 2023-01-31 RX ORDER — SODIUM CHLORIDE 0.9 % (FLUSH) 0.9 %
10 SYRINGE (ML) INJECTION
Status: DISCONTINUED | OUTPATIENT
Start: 2023-01-31 | End: 2023-01-31 | Stop reason: HOSPADM

## 2023-01-31 RX ORDER — EPINEPHRINE 0.3 MG/.3ML
0.3 INJECTION SUBCUTANEOUS ONCE AS NEEDED
Status: DISCONTINUED | OUTPATIENT
Start: 2023-01-31 | End: 2023-01-31 | Stop reason: HOSPADM

## 2023-01-31 RX ORDER — EPINEPHRINE 0.3 MG/.3ML
0.3 INJECTION SUBCUTANEOUS ONCE AS NEEDED
Status: CANCELLED | OUTPATIENT
Start: 2023-01-31

## 2023-01-31 RX ORDER — DIPHENHYDRAMINE HYDROCHLORIDE 50 MG/ML
50 INJECTION INTRAMUSCULAR; INTRAVENOUS ONCE AS NEEDED
Status: DISCONTINUED | OUTPATIENT
Start: 2023-01-31 | End: 2023-01-31 | Stop reason: HOSPADM

## 2023-01-31 RX ORDER — SODIUM CHLORIDE 0.9 % (FLUSH) 0.9 %
10 SYRINGE (ML) INJECTION
Status: CANCELLED | OUTPATIENT
Start: 2023-01-31

## 2023-01-31 RX ORDER — METHYLPREDNISOLONE SOD SUCC 125 MG
125 VIAL (EA) INJECTION ONCE AS NEEDED
Status: CANCELLED | OUTPATIENT
Start: 2023-01-31

## 2023-01-31 RX ADMIN — Medication 10 ML: at 03:01

## 2023-01-31 RX ADMIN — IRON SUCROSE 200 MG: 20 INJECTION, SOLUTION INTRAVENOUS at 02:01

## 2023-01-31 RX ADMIN — SODIUM CHLORIDE: 9 INJECTION, SOLUTION INTRAVENOUS at 02:01

## 2023-01-31 NOTE — PLAN OF CARE
Pt arrived to clinic for Venofer infusion and tolerated well with no changes throughout therapy, with being monitored 30 min post infusion. Pt aware of side effects and f/u appts and discharged to home in NAD with wife.

## 2023-01-31 NOTE — PLAN OF CARE
Problem: Adult Inpatient Plan of Care  Goal: Plan of Care Review  Outcome: Ongoing, Progressing  Flowsheets (Taken 1/31/2023 1557)  Plan of Care Reviewed With:   patient   spouse  Goal: Patient-Specific Goal (Individualized)  Outcome: Ongoing, Progressing  Flowsheets (Taken 1/31/2023 1557)  Anxieties, Fears or Concerns: none voiced  Individualized Care Needs: recliner, wife at chairside, education, conversation, pillow, blanket, snacks     Problem: Fatigue  Goal: Improved Activity Tolerance  Outcome: Ongoing, Progressing  Intervention: Promote Improved Energy  Flowsheets (Taken 1/31/2023 1557)  Fatigue Management:   paced activity encouraged   frequent rest breaks encouraged   fatigue-related activity identified  Sleep/Rest Enhancement:   therapeutic touch utilized   relaxation techniques promoted   noise level reduced   consistent schedule promoted   natural light exposure provided   awakenings minimized   family presence promoted  Activity Management:   Ambulated -L4   Ambulated to bathroom - L4   Up in chair - L3

## 2023-02-06 ENCOUNTER — OFFICE VISIT (OUTPATIENT)
Dept: OPTOMETRY | Facility: CLINIC | Age: 78
End: 2023-02-06
Payer: MEDICARE

## 2023-02-06 DIAGNOSIS — H52.203 MYOPIA WITH ASTIGMATISM AND PRESBYOPIA, BILATERAL: ICD-10-CM

## 2023-02-06 DIAGNOSIS — H52.4 MYOPIA WITH ASTIGMATISM AND PRESBYOPIA, BILATERAL: ICD-10-CM

## 2023-02-06 DIAGNOSIS — H25.13 AGE-RELATED NUCLEAR CATARACT, BILATERAL: Primary | ICD-10-CM

## 2023-02-06 DIAGNOSIS — H35.371 EPIRETINAL MEMBRANE (ERM) OF RIGHT EYE: ICD-10-CM

## 2023-02-06 DIAGNOSIS — H43.811 POSTERIOR VITREOUS DETACHMENT OF RIGHT EYE: ICD-10-CM

## 2023-02-06 DIAGNOSIS — H52.13 MYOPIA WITH ASTIGMATISM AND PRESBYOPIA, BILATERAL: ICD-10-CM

## 2023-02-06 PROCEDURE — 99999 PR PBB SHADOW E&M-EST. PATIENT-LVL III: ICD-10-PCS | Mod: PBBFAC,HCNC,,

## 2023-02-06 PROCEDURE — 1159F PR MEDICATION LIST DOCUMENTED IN MEDICAL RECORD: ICD-10-PCS | Mod: HCNC,CPTII,S$GLB,

## 2023-02-06 PROCEDURE — 92134 PR COMPUTERIZED OPHTHALMIC IMAGING RETINA: ICD-10-PCS | Mod: HCNC,S$GLB,,

## 2023-02-06 PROCEDURE — 1101F PR PT FALLS ASSESS DOC 0-1 FALLS W/OUT INJ PAST YR: ICD-10-PCS | Mod: HCNC,CPTII,S$GLB,

## 2023-02-06 PROCEDURE — 92015 PR REFRACTION: ICD-10-PCS | Mod: HCNC,S$GLB,,

## 2023-02-06 PROCEDURE — 99999 PR PBB SHADOW E&M-EST. PATIENT-LVL III: CPT | Mod: PBBFAC,HCNC,,

## 2023-02-06 PROCEDURE — 1101F PT FALLS ASSESS-DOCD LE1/YR: CPT | Mod: HCNC,CPTII,S$GLB,

## 2023-02-06 PROCEDURE — 92015 DETERMINE REFRACTIVE STATE: CPT | Mod: HCNC,S$GLB,,

## 2023-02-06 PROCEDURE — 3288F FALL RISK ASSESSMENT DOCD: CPT | Mod: HCNC,CPTII,S$GLB,

## 2023-02-06 PROCEDURE — 92014 PR EYE EXAM, EST PATIENT,COMPREHESV: ICD-10-PCS | Mod: HCNC,S$GLB,,

## 2023-02-06 PROCEDURE — 92134 CPTRZ OPH DX IMG PST SGM RTA: CPT | Mod: HCNC,S$GLB,,

## 2023-02-06 PROCEDURE — 1126F AMNT PAIN NOTED NONE PRSNT: CPT | Mod: HCNC,CPTII,S$GLB,

## 2023-02-06 PROCEDURE — 92014 COMPRE OPH EXAM EST PT 1/>: CPT | Mod: HCNC,S$GLB,,

## 2023-02-06 PROCEDURE — 1126F PR PAIN SEVERITY QUANTIFIED, NO PAIN PRESENT: ICD-10-PCS | Mod: HCNC,CPTII,S$GLB,

## 2023-02-06 PROCEDURE — 1159F MED LIST DOCD IN RCRD: CPT | Mod: HCNC,CPTII,S$GLB,

## 2023-02-06 PROCEDURE — 3288F PR FALLS RISK ASSESSMENT DOCUMENTED: ICD-10-PCS | Mod: HCNC,CPTII,S$GLB,

## 2023-02-06 NOTE — PROGRESS NOTES
HPI    Pt here for annual eye exam DLS- 01/06/21    Pt states his vision is stable, pt doesn't use specs to read.   HTN is well controlled on meds.   Occasional floaters no FOL.   Denies GTTS.   Last edited by Lyssa Angeles on 2/6/2023  2:58 PM.        ROS    Positive for: Eyes  Negative for: Constitutional, Gastrointestinal, Neurological, Skin,   Genitourinary, Musculoskeletal, HENT, Endocrine, Cardiovascular,   Respiratory, Psychiatric, Allergic/Imm, Heme/Lymph  Last edited by Todd Li, OD on 2/6/2023  3:24 PM.        Assessment /Plan     For exam results, see Encounter Report.    Age-related nuclear cataract, bilateral    Epiretinal membrane (ERM) of right eye  -     Posterior Segment OCT Retina-Both eyes    Posterior vitreous detachment of right eye    Myopia with astigmatism and presbyopia, bilateral      Mild. Not visually significant. Surgery not recommended at this time. Continue to monitor yearly.  Moderate ERM OD. BCVA 20/30+2. Baseline Mac OCT obtained today. Monitor yearly with repeat Mac OCT. Ed pt to call or message if changes in vision noted.  Ed pt on etiology of PVD and on signs and symptoms of RD. Ed to return asap if experienced.  Updated pt's spec rx and released final copy. Ed pt on change in rx and adaptation.    RTC: 1 year for comprehensive exam or sooner prn

## 2023-02-07 ENCOUNTER — INFUSION (OUTPATIENT)
Dept: INFUSION THERAPY | Facility: HOSPITAL | Age: 78
End: 2023-02-07
Attending: NURSE PRACTITIONER
Payer: MEDICARE

## 2023-02-07 VITALS
HEART RATE: 73 BPM | BODY MASS INDEX: 33.29 KG/M2 | TEMPERATURE: 98 F | HEIGHT: 70 IN | DIASTOLIC BLOOD PRESSURE: 74 MMHG | SYSTOLIC BLOOD PRESSURE: 132 MMHG | RESPIRATION RATE: 18 BRPM | WEIGHT: 232.56 LBS

## 2023-02-07 DIAGNOSIS — D50.9 IRON DEFICIENCY ANEMIA, UNSPECIFIED IRON DEFICIENCY ANEMIA TYPE: Primary | ICD-10-CM

## 2023-02-07 PROCEDURE — 25000003 PHARM REV CODE 250: Mod: HCNC,PN | Performed by: NURSE PRACTITIONER

## 2023-02-07 PROCEDURE — 63600175 PHARM REV CODE 636 W HCPCS: Mod: HCNC,PN | Performed by: NURSE PRACTITIONER

## 2023-02-07 PROCEDURE — 96365 THER/PROPH/DIAG IV INF INIT: CPT | Mod: HCNC,PN

## 2023-02-07 RX ORDER — EPINEPHRINE 0.3 MG/.3ML
0.3 INJECTION SUBCUTANEOUS ONCE AS NEEDED
Status: CANCELLED | OUTPATIENT
Start: 2023-02-07

## 2023-02-07 RX ORDER — METHYLPREDNISOLONE SOD SUCC 125 MG
125 VIAL (EA) INJECTION ONCE AS NEEDED
Status: CANCELLED | OUTPATIENT
Start: 2023-02-07

## 2023-02-07 RX ORDER — EPINEPHRINE 0.3 MG/.3ML
0.3 INJECTION SUBCUTANEOUS ONCE AS NEEDED
Status: DISCONTINUED | OUTPATIENT
Start: 2023-02-07 | End: 2023-02-07 | Stop reason: HOSPADM

## 2023-02-07 RX ORDER — METHYLPREDNISOLONE SOD SUCC 125 MG
125 VIAL (EA) INJECTION ONCE AS NEEDED
Status: DISCONTINUED | OUTPATIENT
Start: 2023-02-07 | End: 2023-02-07 | Stop reason: HOSPADM

## 2023-02-07 RX ORDER — SODIUM CHLORIDE 0.9 % (FLUSH) 0.9 %
10 SYRINGE (ML) INJECTION
Status: CANCELLED | OUTPATIENT
Start: 2023-02-07

## 2023-02-07 RX ORDER — SODIUM CHLORIDE 0.9 % (FLUSH) 0.9 %
10 SYRINGE (ML) INJECTION
Status: DISCONTINUED | OUTPATIENT
Start: 2023-02-07 | End: 2023-02-07 | Stop reason: HOSPADM

## 2023-02-07 RX ORDER — HEPARIN 100 UNIT/ML
500 SYRINGE INTRAVENOUS
Status: CANCELLED | OUTPATIENT
Start: 2023-02-07

## 2023-02-07 RX ORDER — SODIUM CHLORIDE 9 MG/ML
1000 INJECTION, SOLUTION INTRAVENOUS
Status: DISCONTINUED | OUTPATIENT
Start: 2023-02-07 | End: 2023-02-07 | Stop reason: HOSPADM

## 2023-02-07 RX ORDER — DIPHENHYDRAMINE HYDROCHLORIDE 50 MG/ML
50 INJECTION INTRAMUSCULAR; INTRAVENOUS ONCE AS NEEDED
Status: CANCELLED | OUTPATIENT
Start: 2023-02-07

## 2023-02-07 RX ORDER — DIPHENHYDRAMINE HYDROCHLORIDE 50 MG/ML
50 INJECTION INTRAMUSCULAR; INTRAVENOUS ONCE AS NEEDED
Status: DISCONTINUED | OUTPATIENT
Start: 2023-02-07 | End: 2023-02-07 | Stop reason: HOSPADM

## 2023-02-07 RX ORDER — HEPARIN 100 UNIT/ML
500 SYRINGE INTRAVENOUS
Status: DISCONTINUED | OUTPATIENT
Start: 2023-02-07 | End: 2023-02-07 | Stop reason: HOSPADM

## 2023-02-07 RX ADMIN — IRON SUCROSE 200 MG: 20 INJECTION, SOLUTION INTRAVENOUS at 02:02

## 2023-02-07 RX ADMIN — SODIUM CHLORIDE: 9 INJECTION, SOLUTION INTRAVENOUS at 01:02

## 2023-02-07 NOTE — PLAN OF CARE
Tolerated venofer well.  Observed pt for 30 minutes post infusion, no reactions noted.  No questions or concerns at this time.  Ambulated off unit in NAD.

## 2023-02-09 DIAGNOSIS — Z00.00 ENCOUNTER FOR MEDICARE ANNUAL WELLNESS EXAM: ICD-10-CM

## 2023-02-14 ENCOUNTER — INFUSION (OUTPATIENT)
Dept: INFUSION THERAPY | Facility: HOSPITAL | Age: 78
End: 2023-02-14
Attending: NURSE PRACTITIONER
Payer: MEDICARE

## 2023-02-14 VITALS
DIASTOLIC BLOOD PRESSURE: 74 MMHG | SYSTOLIC BLOOD PRESSURE: 129 MMHG | HEIGHT: 70 IN | WEIGHT: 232.56 LBS | BODY MASS INDEX: 33.29 KG/M2 | TEMPERATURE: 98 F | RESPIRATION RATE: 18 BRPM | HEART RATE: 77 BPM

## 2023-02-14 DIAGNOSIS — D50.9 IRON DEFICIENCY ANEMIA, UNSPECIFIED IRON DEFICIENCY ANEMIA TYPE: Primary | ICD-10-CM

## 2023-02-14 PROCEDURE — A4216 STERILE WATER/SALINE, 10 ML: HCPCS | Mod: HCNC,PN | Performed by: NURSE PRACTITIONER

## 2023-02-14 PROCEDURE — 96365 THER/PROPH/DIAG IV INF INIT: CPT | Mod: HCNC,PN

## 2023-02-14 PROCEDURE — 63600175 PHARM REV CODE 636 W HCPCS: Mod: HCNC,PN | Performed by: NURSE PRACTITIONER

## 2023-02-14 PROCEDURE — 25000003 PHARM REV CODE 250: Mod: HCNC,PN | Performed by: NURSE PRACTITIONER

## 2023-02-14 RX ORDER — METHYLPREDNISOLONE SOD SUCC 125 MG
125 VIAL (EA) INJECTION ONCE AS NEEDED
Status: CANCELLED | OUTPATIENT
Start: 2023-02-14

## 2023-02-14 RX ORDER — DIPHENHYDRAMINE HYDROCHLORIDE 50 MG/ML
50 INJECTION INTRAMUSCULAR; INTRAVENOUS ONCE AS NEEDED
Status: DISCONTINUED | OUTPATIENT
Start: 2023-02-14 | End: 2023-02-14 | Stop reason: HOSPADM

## 2023-02-14 RX ORDER — SODIUM CHLORIDE 9 MG/ML
1000 INJECTION, SOLUTION INTRAVENOUS
Status: DISCONTINUED | OUTPATIENT
Start: 2023-02-14 | End: 2023-02-14 | Stop reason: HOSPADM

## 2023-02-14 RX ORDER — EPINEPHRINE 0.3 MG/.3ML
0.3 INJECTION SUBCUTANEOUS ONCE AS NEEDED
Status: DISCONTINUED | OUTPATIENT
Start: 2023-02-14 | End: 2023-02-14 | Stop reason: HOSPADM

## 2023-02-14 RX ORDER — SODIUM CHLORIDE 0.9 % (FLUSH) 0.9 %
10 SYRINGE (ML) INJECTION
Status: DISCONTINUED | OUTPATIENT
Start: 2023-02-14 | End: 2023-02-14 | Stop reason: HOSPADM

## 2023-02-14 RX ORDER — HEPARIN 100 UNIT/ML
500 SYRINGE INTRAVENOUS
Status: CANCELLED | OUTPATIENT
Start: 2023-02-14

## 2023-02-14 RX ORDER — DIPHENHYDRAMINE HYDROCHLORIDE 50 MG/ML
50 INJECTION INTRAMUSCULAR; INTRAVENOUS ONCE AS NEEDED
Status: CANCELLED | OUTPATIENT
Start: 2023-02-14

## 2023-02-14 RX ORDER — EPINEPHRINE 0.3 MG/.3ML
0.3 INJECTION SUBCUTANEOUS ONCE AS NEEDED
Status: CANCELLED | OUTPATIENT
Start: 2023-02-14

## 2023-02-14 RX ORDER — SODIUM CHLORIDE 0.9 % (FLUSH) 0.9 %
10 SYRINGE (ML) INJECTION
Status: CANCELLED | OUTPATIENT
Start: 2023-02-14

## 2023-02-14 RX ORDER — METHYLPREDNISOLONE SOD SUCC 125 MG
125 VIAL (EA) INJECTION ONCE AS NEEDED
Status: DISCONTINUED | OUTPATIENT
Start: 2023-02-14 | End: 2023-02-14 | Stop reason: HOSPADM

## 2023-02-14 RX ADMIN — IRON SUCROSE 200 MG: 20 INJECTION, SOLUTION INTRAVENOUS at 01:02

## 2023-02-14 RX ADMIN — Medication 10 ML: at 03:02

## 2023-02-14 RX ADMIN — SODIUM CHLORIDE: 9 INJECTION, SOLUTION INTRAVENOUS at 01:02

## 2023-02-14 NOTE — PLAN OF CARE
Pt arrived to clinic for 4/5 Venofer infusion and tolerated well with no changes throughout therapy, with being monitored 30 min post infusion. Pt aware of side effects and f/u appts and discharged to home in NAD with wife.

## 2023-02-14 NOTE — PLAN OF CARE
Problem: Adult Inpatient Plan of Care  Goal: Plan of Care Review  Outcome: Ongoing, Progressing  Flowsheets (Taken 2/14/2023 1535)  Plan of Care Reviewed With:   patient   spouse  Goal: Patient-Specific Goal (Individualized)  Outcome: Ongoing, Progressing  Flowsheets (Taken 2/14/2023 1535)  Anxieties, Fears or Concerns: nervous with needles (PIV)  Individualized Care Needs: recliner, wife at chairside, education, conversation, pillow, blanket, snacks, tv, word search game     Problem: Fatigue  Goal: Improved Activity Tolerance  Outcome: Ongoing, Progressing  Intervention: Promote Improved Energy  Flowsheets (Taken 2/14/2023 1535)  Fatigue Management:   frequent rest breaks encouraged   paced activity encouraged   fatigue-related activity identified   activity assistance provided  Sleep/Rest Enhancement:   natural light exposure provided   consistent schedule promoted   relaxation techniques promoted   therapeutic touch utilized   noise level reduced   family presence promoted  Activity Management: Ambulated -L4

## 2023-02-22 ENCOUNTER — INFUSION (OUTPATIENT)
Dept: INFUSION THERAPY | Facility: HOSPITAL | Age: 78
End: 2023-02-22
Attending: NURSE PRACTITIONER
Payer: MEDICARE

## 2023-02-22 VITALS
HEIGHT: 70 IN | RESPIRATION RATE: 16 BRPM | BODY MASS INDEX: 33.29 KG/M2 | WEIGHT: 232.56 LBS | SYSTOLIC BLOOD PRESSURE: 123 MMHG | TEMPERATURE: 98 F | HEART RATE: 74 BPM | DIASTOLIC BLOOD PRESSURE: 72 MMHG

## 2023-02-22 DIAGNOSIS — D50.9 IRON DEFICIENCY ANEMIA, UNSPECIFIED IRON DEFICIENCY ANEMIA TYPE: Primary | ICD-10-CM

## 2023-02-22 PROCEDURE — 25000003 PHARM REV CODE 250: Mod: HCNC,PN | Performed by: NURSE PRACTITIONER

## 2023-02-22 PROCEDURE — 96365 THER/PROPH/DIAG IV INF INIT: CPT | Mod: HCNC,PN

## 2023-02-22 PROCEDURE — 63600175 PHARM REV CODE 636 W HCPCS: Mod: HCNC,PN | Performed by: NURSE PRACTITIONER

## 2023-02-22 RX ORDER — DIPHENHYDRAMINE HYDROCHLORIDE 50 MG/ML
50 INJECTION INTRAMUSCULAR; INTRAVENOUS ONCE AS NEEDED
OUTPATIENT
Start: 2023-02-22

## 2023-02-22 RX ORDER — EPINEPHRINE 0.3 MG/.3ML
0.3 INJECTION SUBCUTANEOUS ONCE AS NEEDED
OUTPATIENT
Start: 2023-02-22

## 2023-02-22 RX ORDER — HEPARIN 100 UNIT/ML
500 SYRINGE INTRAVENOUS
Status: DISCONTINUED | OUTPATIENT
Start: 2023-02-22 | End: 2023-02-22 | Stop reason: HOSPADM

## 2023-02-22 RX ORDER — METHYLPREDNISOLONE SOD SUCC 125 MG
125 VIAL (EA) INJECTION ONCE AS NEEDED
OUTPATIENT
Start: 2023-02-22

## 2023-02-22 RX ORDER — SODIUM CHLORIDE 0.9 % (FLUSH) 0.9 %
10 SYRINGE (ML) INJECTION
OUTPATIENT
Start: 2023-02-22

## 2023-02-22 RX ORDER — HEPARIN 100 UNIT/ML
500 SYRINGE INTRAVENOUS
OUTPATIENT
Start: 2023-02-22

## 2023-02-22 RX ADMIN — IRON SUCROSE 200 MG: 20 INJECTION, SOLUTION INTRAVENOUS at 01:02

## 2023-02-22 RX ADMIN — SODIUM CHLORIDE: 9 INJECTION, SOLUTION INTRAVENOUS at 01:02

## 2023-02-22 NOTE — PLAN OF CARE
Pt tolerated Venofer infusion well.  Pt stayed of observation for 30 minutes post infusion.  No s/s of infusion reaction noted.  Instructed to call MD with any problems

## 2023-02-27 ENCOUNTER — TELEPHONE (OUTPATIENT)
Dept: FAMILY MEDICINE | Facility: CLINIC | Age: 78
End: 2023-02-27
Payer: MEDICARE

## 2023-02-27 DIAGNOSIS — F11.20 UNCOMPLICATED OPIOID DEPENDENCE: ICD-10-CM

## 2023-02-27 RX ORDER — OXYCODONE AND ACETAMINOPHEN 10; 325 MG/1; MG/1
1 TABLET ORAL EVERY 4 HOURS PRN
Qty: 120 TABLET | Refills: 0 | OUTPATIENT
Start: 2023-02-27

## 2023-02-27 RX ORDER — OXYCODONE AND ACETAMINOPHEN 10; 325 MG/1; MG/1
1 TABLET ORAL EVERY 4 HOURS PRN
Qty: 120 TABLET | Refills: 0 | Status: SHIPPED | OUTPATIENT
Start: 2023-02-27 | End: 2023-03-28 | Stop reason: SDUPTHER

## 2023-02-27 NOTE — TELEPHONE ENCOUNTER
----- Message from Israel Pa sent at 2/27/2023 12:57 PM CST -----  Type: Needs Medical Advice  Who Called:  pt  Symptoms (please be specific):  pt said he need to speak to the nurse her name Kitty--said he need her to call he going out of town today and it have to do with his oxyCODONE-acetaminophen (PERCOCET)  mg per tablet rx--said this is his second time calling and he have not heard anything back from the office--please call and advise  Best Call Back Number: [phone] (home) [phone] (work)    Additional Information: thank you

## 2023-02-27 NOTE — TELEPHONE ENCOUNTER
No new care gaps identified.  [facility] Embedded Care Gaps. Reference number: [id number]. 2/27/2023   12:23:58 PM CST

## 2023-02-27 NOTE — TELEPHONE ENCOUNTER
Vane ORDOÑEZ Staff  Caller: Self (Today, 10:52 AM)    ----- Message from Vane De La Cruz sent at 2/27/2023 10:52 AM CST -----  Regarding: attn: Kitty  Contact: Self  Type: Needs Refill  Who Called:  Patient  oxyCODONE-acetaminophen (PERCOCET)  mg per tablet   Sig - Route: Take 1 tablet by mouth every 4 (four) hours as needed for Pain. - Oral   120 tablet  [id number] [place] [address] & [address] [address]  Phone: [phone] Fax: [phone]  local  Best Call Back Number: [phone]  Additional Information: Please call the pt back as soon as possible as pt requested. Thanks!

## 2023-02-27 NOTE — TELEPHONE ENCOUNTER
Patient notified 2 prior Percocet refill requests have been sent to  who is in with clinic patients at this time. Will give him a verbal request and call patient back when done.

## 2023-02-27 NOTE — TELEPHONE ENCOUNTER
Last office visit 1/26/23    [facility] site checked, no inappropriate activity found

## 2023-02-27 NOTE — TELEPHONE ENCOUNTER
Care Due:                  Date            Visit Type   Department     Provider  --------------------------------------------------------------------------------    Last Visit: None Found      None         None Found  Next Visit: None Scheduled  None         None Found                                                            Last  Test          Frequency    Reason                     Performed    Due Date  --------------------------------------------------------------------------------    Office Visit  12 months..  atorvastatin, buPROPion,   Not Found    Overdue                             furosemide,                             levothyroxine, losartan,                             omeprazole...............    [employer] Embedded Care Gaps. Reference number: [id number]. 2/27/2023   11:07:00 AM CST

## 2023-03-08 ENCOUNTER — LAB VISIT (OUTPATIENT)
Dept: LAB | Facility: HOSPITAL | Age: 78
End: 2023-03-08
Attending: FAMILY MEDICINE
Payer: MEDICARE

## 2023-03-08 DIAGNOSIS — E89.0 POSTOPERATIVE HYPOTHYROIDISM: ICD-10-CM

## 2023-03-08 PROCEDURE — 84443 ASSAY THYROID STIM HORMONE: CPT | Mod: HCNC | Performed by: FAMILY MEDICINE

## 2023-03-08 PROCEDURE — 36415 COLL VENOUS BLD VENIPUNCTURE: CPT | Mod: HCNC,PO | Performed by: FAMILY MEDICINE

## 2023-03-09 LAB — TSH SERPL DL<=0.005 MIU/L-ACNC: 1.09 UIU/ML (ref 0.4–4)

## 2023-03-17 ENCOUNTER — TELEPHONE (OUTPATIENT)
Dept: PAIN MEDICINE | Facility: CLINIC | Age: 78
End: 2023-03-17
Payer: MEDICARE

## 2023-03-17 ENCOUNTER — OFFICE VISIT (OUTPATIENT)
Dept: SPINE | Facility: CLINIC | Age: 78
End: 2023-03-17
Payer: MEDICARE

## 2023-03-17 VITALS — WEIGHT: 232.56 LBS | HEIGHT: 70 IN | BODY MASS INDEX: 33.29 KG/M2

## 2023-03-17 DIAGNOSIS — M54.16 LUMBAR RADICULOPATHY: Primary | ICD-10-CM

## 2023-03-17 DIAGNOSIS — G89.29 CHRONIC BILATERAL LOW BACK PAIN WITH BILATERAL SCIATICA: Primary | ICD-10-CM

## 2023-03-17 DIAGNOSIS — M54.42 CHRONIC BILATERAL LOW BACK PAIN WITH BILATERAL SCIATICA: Primary | ICD-10-CM

## 2023-03-17 DIAGNOSIS — M54.41 CHRONIC BILATERAL LOW BACK PAIN WITH BILATERAL SCIATICA: Primary | ICD-10-CM

## 2023-03-17 PROCEDURE — 99213 OFFICE O/P EST LOW 20 MIN: CPT | Mod: HCNC,S$GLB,, | Performed by: PHYSICAL MEDICINE & REHABILITATION

## 2023-03-17 PROCEDURE — 1160F RVW MEDS BY RX/DR IN RCRD: CPT | Mod: HCNC,CPTII,S$GLB, | Performed by: PHYSICAL MEDICINE & REHABILITATION

## 2023-03-17 PROCEDURE — 1159F MED LIST DOCD IN RCRD: CPT | Mod: HCNC,CPTII,S$GLB, | Performed by: PHYSICAL MEDICINE & REHABILITATION

## 2023-03-17 PROCEDURE — 1159F PR MEDICATION LIST DOCUMENTED IN MEDICAL RECORD: ICD-10-PCS | Mod: HCNC,CPTII,S$GLB, | Performed by: PHYSICAL MEDICINE & REHABILITATION

## 2023-03-17 PROCEDURE — 1125F PR PAIN SEVERITY QUANTIFIED, PAIN PRESENT: ICD-10-PCS | Mod: HCNC,CPTII,S$GLB, | Performed by: PHYSICAL MEDICINE & REHABILITATION

## 2023-03-17 PROCEDURE — 1101F PT FALLS ASSESS-DOCD LE1/YR: CPT | Mod: HCNC,CPTII,S$GLB, | Performed by: PHYSICAL MEDICINE & REHABILITATION

## 2023-03-17 PROCEDURE — 1160F PR REVIEW ALL MEDS BY PRESCRIBER/CLIN PHARMACIST DOCUMENTED: ICD-10-PCS | Mod: HCNC,CPTII,S$GLB, | Performed by: PHYSICAL MEDICINE & REHABILITATION

## 2023-03-17 PROCEDURE — 3288F FALL RISK ASSESSMENT DOCD: CPT | Mod: HCNC,CPTII,S$GLB, | Performed by: PHYSICAL MEDICINE & REHABILITATION

## 2023-03-17 PROCEDURE — 99213 PR OFFICE/OUTPT VISIT, EST, LEVL III, 20-29 MIN: ICD-10-PCS | Mod: HCNC,S$GLB,, | Performed by: PHYSICAL MEDICINE & REHABILITATION

## 2023-03-17 PROCEDURE — 1101F PR PT FALLS ASSESS DOC 0-1 FALLS W/OUT INJ PAST YR: ICD-10-PCS | Mod: HCNC,CPTII,S$GLB, | Performed by: PHYSICAL MEDICINE & REHABILITATION

## 2023-03-17 PROCEDURE — 1125F AMNT PAIN NOTED PAIN PRSNT: CPT | Mod: HCNC,CPTII,S$GLB, | Performed by: PHYSICAL MEDICINE & REHABILITATION

## 2023-03-17 PROCEDURE — 3288F PR FALLS RISK ASSESSMENT DOCUMENTED: ICD-10-PCS | Mod: HCNC,CPTII,S$GLB, | Performed by: PHYSICAL MEDICINE & REHABILITATION

## 2023-03-17 RX ORDER — LIDOCAINE 50 MG/G
1 PATCH TOPICAL DAILY
Qty: 30 PATCH | Refills: 2 | Status: SHIPPED | OUTPATIENT
Start: 2023-03-17 | End: 2023-07-17 | Stop reason: CLARIF

## 2023-03-17 NOTE — PROGRESS NOTES
SUBJECTIVE:    Patient ID: Sam Pete is a 77 y.o. male.    Chief Complaint: Follow-up (CARLOS F/U)    He is here for follow-up status post caudal epidural steroid injection on 2023 with Dr. Terrell.  Not much relief from that procedure.  About 10% overall.  Having said that it did help with the radiating leg discomfort.  Current complaint is ongoing low back pain at the lumbosacral junction and burning sensation in the plantar portion of both feet.  His pain level is 8 out 10.  He admits that he might have over done it cleaning out a house going up for sale        Past Medical History:   Diagnosis Date    Anticoagulant long-term use     ASA 81 mg    Arthritis     Cataract     OU    Chronic low back pain     Complete rupture of rotator cuff 2011    DDD (degenerative disc disease), lumbar 2015    Degeneration of lumbar or lumbosacral intervertebral disc 2015    ED (erectile dysfunction)     GERD (gastroesophageal reflux disease)     Hypertension     Lumbar pseudoarthrosis 2017    Lumbar stenosis 2017    Obesity     Spondylosis of lumbar region without myelopathy or radiculopathy 2015    Stroke 1997    basal ganglia, left sided weakness, resolved    Testicular hypofunction     Thoracic or lumbosacral neuritis or radiculitis 2015     Social History     Socioeconomic History    Marital status:    Tobacco Use    Smoking status: Former     Packs/day: 1.00     Years: 30.00     Pack years: 30.00     Types: Cigarettes     Start date: 8/3/1963     Quit date: 1992     Years since quittin.2    Smokeless tobacco: Never   Substance and Sexual Activity    Alcohol use: Not Currently     Comment: On occasion    Drug use: Yes     Types: Oxycodone, Morphine     Social Determinants of Health     Financial Resource Strain: Low Risk     Difficulty of Paying Living Expenses: Not hard at all   Food Insecurity: No Food Insecurity    Worried About Running Out of Food in the Last  Year: Never true    Ran Out of Food in the Last Year: Never true   Transportation Needs: No Transportation Needs    Lack of Transportation (Medical): No    Lack of Transportation (Non-Medical): No   Physical Activity: Insufficiently Active    Days of Exercise per Week: 2 days    Minutes of Exercise per Session: 30 min   Stress: Stress Concern Present    Feeling of Stress : To some extent   Social Connections: Socially Integrated    Frequency of Communication with Friends and Family: More than three times a week    Frequency of Social Gatherings with Friends and Family: Twice a week    Attends Hindu Services: More than 4 times per year    Active Member of Clubs or Organizations: Yes    Attends Club or Organization Meetings: More than 4 times per year    Marital Status:    Housing Stability: Low Risk     Unable to Pay for Housing in the Last Year: No    Number of Places Lived in the Last Year: 1    Unstable Housing in the Last Year: No     Past Surgical History:   Procedure Laterality Date    APPENDECTOMY      ARTHROPLASTY OF SHOULDER Right 03/22/2022    Procedure: ARTHROPLASTY, SHOULDER BRENNAN RIGHT SHOULDER HUMERAL HEAD RESURFACING;  Surgeon: Frankie Joya MD;  [facility] OR;  Service: Orthopedics;  Laterality: Right;    BACK SURGERY      4- back surgery    CAUDAL EPIDURAL STEROID INJECTION N/A 12/16/2021    Procedure: Injection-steroid-epidural-caudal;  Surgeon: Aram Terrell MD;  Location: [facility] OR;  Service: Pain Management;  Laterality: N/A;    CAUDAL EPIDURAL STEROID INJECTION N/A 02/02/2022    Procedure: INJECTION, STEROID, SPINE, EPIDURAL, CAUDAL;  Surgeon: Aram Terrell MD;  Location: [facility] OR;  Service: Pain Management;  Laterality: N/A;    CAUDAL EPIDURAL STEROID INJECTION N/A 7/22/2022    Procedure: Injection-steroid-epidural-caudal;  Surgeon: Aram Terrell MD;  Location: [facility] OR;  Service: Pain Management;  Laterality: N/A;  Caudal CARLOS     CAUDAL EPIDURAL STEROID INJECTION N/A 1/20/2023     "Procedure: Injection-steroid-epidural-caudal;  Surgeon: Aram Terrell MD;  Location: [facility] OR;  Service: Pain Management;  Laterality: N/A;  caudal ( melinda)    COLONOSCOPY  07/12/2004    CHILO.   Redundant tortuous colon, otherwise normal.    COLONOSCOPY N/A 02/25/2019    Procedure: COLONOSCOPY;  Surgeon: Gilbert Rodriguez Jr., MD;  Location: [facility] ENDO;  Service: Endoscopy;  Laterality: N/A;    Epidural steroid injection      Pain management    ESOPHAGOGASTRODUODENOSCOPY  07/12/2004    CHILO.    FRACTURE SURGERY Left     wrist / forearm, total of 5    INSERTION OF DORSAL COLUMN NERVE STIMULATOR FOR TRIAL N/A 12/12/2019    Procedure: INSERTION, NEUROSTIMULATOR, SPINAL CORD, DORSAL COLUMN, FOR TRIAL;  Surgeon: Evan Lyles MD;  Location: [facility] OR;  Service: Pain Management;  Laterality: N/A;    JOINT REPLACEMENT  02/06/2013    ( rt hip 2007), left hip     JOINT REPLACEMENT Left     total knee    KNEE ARTHROSCOPY W/ DEBRIDEMENT      bilateral knees , total of six    KNEE SURGERY      LAMINECTOMY USING MINIMALLY INVASIVE TECHNIQUE N/A 02/12/2020    Procedure: LAMINECTOMY, SPINE, MINIMALLY INVASIVE /  PLACEMENT OF SPINAL CORD STIMULATOR;  Surgeon: Darlene aMx MD;  Location: [facility] [place] OR;  Service: Neurosurgery;  Laterality: N/A;    Nervbe Block injection      Pain management    TOTAL SHOULDER ARTHROPLASTY Right 03/28/2022    Dr Joya    TOTAL THYROIDECTOMY      TOTAL THYROIDECTOMY Bilateral 03/07/2022    Dr Mendoza     Family History   Problem Relation Age of Onset    Hypertension Father     Heart disease Father     Drug abuse Daughter     Hypertension Son     Lung disease Sister     COPD Sister     Hypertension Sister     Diverticulitis Sister     Gout Sister     Kidney disease Sister     No Known Problems Mother     Eczema Neg Hx     Lupus Neg Hx     Psoriasis Neg Hx     Melanoma Neg Hx      Vitals:    03/17/23 1327   Weight: 105.5 kg (232 lb 9.4 oz)   Height: 5' 10" (1.778 m)       Review of Systems   Constitutional:  " Negative for chills, diaphoresis, fatigue, fever and unexpected weight change.   HENT:  Negative for trouble swallowing.    Eyes:  Negative for visual disturbance.   Respiratory:  Negative for shortness of breath.    Cardiovascular:  Negative for chest pain.   Gastrointestinal:  Negative for abdominal pain, constipation, diarrhea, nausea and vomiting.   Genitourinary:  Negative for difficulty urinating.   Musculoskeletal:  Negative for arthralgias, back pain, gait problem, joint swelling, myalgias, neck pain and neck stiffness.   Neurological:  Negative for dizziness, speech difficulty, weakness, light-headedness, numbness and headaches.        Objective:      Physical Exam  Neurological:      Mental Status: He is alert and oriented to person, place, and time.           Assessment:       1. Chronic bilateral low back pain with bilateral sciatica             Plan:     Modest improvement status post caudal epidural steroid injection.  In the past he has required 2 injections to receive any relief.  We will get him set up for 2nd injection.  In the interim try some Lidoderm patches.  Follow-up with me after the procedure      Chronic bilateral low back pain with bilateral sciatica  -     LIDOcaine (LIDODERM) 5 %; Place 1 patch onto the skin once daily. Remove & Discard patch within 12 hours or as directed by MD  Dispense: 30 patch; Refill: 2

## 2023-03-17 NOTE — TELEPHONE ENCOUNTER
----- Message from Nikita Landrum MD sent at 3/17/2023  1:39 PM CDT -----  Please schedule for caudal epidural injection

## 2023-03-20 ENCOUNTER — TELEPHONE (OUTPATIENT)
Dept: SPINE | Facility: CLINIC | Age: 78
End: 2023-03-20
Payer: MEDICARE

## 2023-03-28 DIAGNOSIS — F11.20 UNCOMPLICATED OPIOID DEPENDENCE: ICD-10-CM

## 2023-03-28 RX ORDER — OXYCODONE AND ACETAMINOPHEN 10; 325 MG/1; MG/1
1 TABLET ORAL EVERY 4 HOURS PRN
Qty: 120 TABLET | Refills: 0 | Status: SHIPPED | OUTPATIENT
Start: 2023-03-28 | End: 2023-04-24

## 2023-03-28 NOTE — TELEPHONE ENCOUNTER
Patient requesting refill of Oxycodone.  LR--2-27-23        LOV--1-26-23       FOV--4-24-23

## 2023-03-28 NOTE — TELEPHONE ENCOUNTER
Elaine ORDOÑEZ Staff  Caller: [phone] (Today, 11:43 AM)    ----- Message from Elaine Banks sent at 3/28/2023 11:43 AM CDT -----  Contact: [phone]  Type: Needs Medical Advice  Who Called:  Pt     Best Call Back Number: [phone]    Additional Information: Pt calling because pharmacy does not have his rx for his oxyCODONE-acetaminophen (PERCOCET)  mg per tablet.But they do have his other medications.Pls call back and advise.       [id number] [place] [address] & [address] [address]  Phone: [phone] Fax: [phone]

## 2023-04-12 DIAGNOSIS — R60.9 DEPENDENT EDEMA: ICD-10-CM

## 2023-04-12 RX ORDER — FUROSEMIDE 40 MG/1
40 TABLET ORAL DAILY
Qty: 90 TABLET | Refills: 1 | Status: SHIPPED | OUTPATIENT
Start: 2023-04-12 | End: 2024-01-17 | Stop reason: SDUPTHER

## 2023-04-12 RX ORDER — FUROSEMIDE 40 MG/1
40 TABLET ORAL DAILY
Qty: 90 TABLET | Refills: 1 | Status: CANCELLED | OUTPATIENT
Start: 2023-04-12 | End: 2024-04-11

## 2023-04-12 NOTE — TELEPHONE ENCOUNTER
Care Due:                  Date            Visit Type   Department     Provider  --------------------------------------------------------------------------------                                EP -                              PRIMARY      [facility]  Last Visit: 01-      CARE ([facility])   PRACTICE       Ty Montalvo                              EP -                              PRIMARY      [facility]  Next Visit: 04-      CARE ([facility])   PRACTICE       Ty Padron  Test          Frequency    Reason                     Performed    Due Date  --------------------------------------------------------------------------------    Lipid Panel.  12 months..  atorvastatin.............  07- 07-    [facility] Embedded Care Gaps. Reference number: [id number]. 4/12/2023   4:06:28 PM CDT

## 2023-04-20 ENCOUNTER — OFFICE VISIT (OUTPATIENT)
Dept: CARDIOLOGY | Facility: CLINIC | Age: 78
End: 2023-04-20
Payer: MEDICARE

## 2023-04-20 VITALS
BODY MASS INDEX: 32.98 KG/M2 | WEIGHT: 230.38 LBS | DIASTOLIC BLOOD PRESSURE: 76 MMHG | SYSTOLIC BLOOD PRESSURE: 132 MMHG | HEART RATE: 84 BPM | HEIGHT: 70 IN

## 2023-04-20 DIAGNOSIS — E66.9 OBESITY (BMI 30.0-34.9): ICD-10-CM

## 2023-04-20 DIAGNOSIS — R06.09 DOE (DYSPNEA ON EXERTION): ICD-10-CM

## 2023-04-20 DIAGNOSIS — R55 SYNCOPE AND COLLAPSE: ICD-10-CM

## 2023-04-20 DIAGNOSIS — I10 ESSENTIAL (PRIMARY) HYPERTENSION: Chronic | ICD-10-CM

## 2023-04-20 DIAGNOSIS — I36.1 NONRHEUMATIC TRICUSPID VALVE REGURGITATION: Chronic | ICD-10-CM

## 2023-04-20 DIAGNOSIS — E78.49 OTHER HYPERLIPIDEMIA: Chronic | ICD-10-CM

## 2023-04-20 DIAGNOSIS — Z82.49 FAMILY HISTORY OF EARLY CAD: Chronic | ICD-10-CM

## 2023-04-20 DIAGNOSIS — I48.0 PAF (PAROXYSMAL ATRIAL FIBRILLATION): ICD-10-CM

## 2023-04-20 DIAGNOSIS — I70.0 ATHEROSCLEROSIS OF AORTA: Primary | Chronic | ICD-10-CM

## 2023-04-20 PROCEDURE — 3075F SYST BP GE 130 - 139MM HG: CPT | Mod: HCNC,CPTII,S$GLB, | Performed by: INTERNAL MEDICINE

## 2023-04-20 PROCEDURE — 99204 OFFICE O/P NEW MOD 45 MIN: CPT | Mod: HCNC,25,S$GLB, | Performed by: INTERNAL MEDICINE

## 2023-04-20 PROCEDURE — 93010 EKG 12-LEAD: ICD-10-PCS | Mod: HCNC,S$GLB,, | Performed by: INTERNAL MEDICINE

## 2023-04-20 PROCEDURE — 3075F PR MOST RECENT SYSTOLIC BLOOD PRESS GE 130-139MM HG: ICD-10-PCS | Mod: HCNC,CPTII,S$GLB, | Performed by: INTERNAL MEDICINE

## 2023-04-20 PROCEDURE — 1101F PR PT FALLS ASSESS DOC 0-1 FALLS W/OUT INJ PAST YR: ICD-10-PCS | Mod: HCNC,CPTII,S$GLB, | Performed by: INTERNAL MEDICINE

## 2023-04-20 PROCEDURE — 93010 ELECTROCARDIOGRAM REPORT: CPT | Mod: HCNC,S$GLB,, | Performed by: INTERNAL MEDICINE

## 2023-04-20 PROCEDURE — 1101F PT FALLS ASSESS-DOCD LE1/YR: CPT | Mod: HCNC,CPTII,S$GLB, | Performed by: INTERNAL MEDICINE

## 2023-04-20 PROCEDURE — 99999 PR PBB SHADOW E&M-EST. PATIENT-LVL IV: CPT | Mod: PBBFAC,HCNC,, | Performed by: INTERNAL MEDICINE

## 2023-04-20 PROCEDURE — 3078F DIAST BP <80 MM HG: CPT | Mod: HCNC,CPTII,S$GLB, | Performed by: INTERNAL MEDICINE

## 2023-04-20 PROCEDURE — 99204 PR OFFICE/OUTPT VISIT, NEW, LEVL IV, 45-59 MIN: ICD-10-PCS | Mod: HCNC,25,S$GLB, | Performed by: INTERNAL MEDICINE

## 2023-04-20 PROCEDURE — 99999 PR PBB SHADOW E&M-EST. PATIENT-LVL IV: ICD-10-PCS | Mod: PBBFAC,HCNC,, | Performed by: INTERNAL MEDICINE

## 2023-04-20 PROCEDURE — 3288F FALL RISK ASSESSMENT DOCD: CPT | Mod: HCNC,CPTII,S$GLB, | Performed by: INTERNAL MEDICINE

## 2023-04-20 PROCEDURE — 3078F PR MOST RECENT DIASTOLIC BLOOD PRESSURE < 80 MM HG: ICD-10-PCS | Mod: HCNC,CPTII,S$GLB, | Performed by: INTERNAL MEDICINE

## 2023-04-20 PROCEDURE — 1126F PR PAIN SEVERITY QUANTIFIED, NO PAIN PRESENT: ICD-10-PCS | Mod: HCNC,CPTII,S$GLB, | Performed by: INTERNAL MEDICINE

## 2023-04-20 PROCEDURE — 93005 ELECTROCARDIOGRAM TRACING: CPT | Mod: HCNC,PO

## 2023-04-20 PROCEDURE — 3288F PR FALLS RISK ASSESSMENT DOCUMENTED: ICD-10-PCS | Mod: HCNC,CPTII,S$GLB, | Performed by: INTERNAL MEDICINE

## 2023-04-20 PROCEDURE — 1126F AMNT PAIN NOTED NONE PRSNT: CPT | Mod: HCNC,CPTII,S$GLB, | Performed by: INTERNAL MEDICINE

## 2023-04-20 NOTE — H&P (VIEW-ONLY)
[facility]  Ongoing MTM Outreach  Chart review. No previous DeRx visit with us. Per last PCP not DeRx/dose reduction there.     [facility] Meds: no new   oxyCODONE-acetaminophen -  mg  pregabalin - 75 MG  tiZANidine - 4 MG  Morphine 30 mg     Plan: No outreach. Follow up with PCP and PM    Geraldine Schneider PA-C (Trisha)  [facility]

## 2023-04-20 NOTE — PROGRESS NOTES
Subjective:    Patient ID:  Sam Pete is a 77 y.o. male who presents for Hyperlipidemia, Hypertension, and PAF        Hyperlipidemia  Associated symptoms include shortness of breath. Pertinent negatives include no chest pain or focal weakness.   Hypertension  Associated symptoms include shortness of breath. Pertinent negatives include no blurred vision, chest pain, malaise/fatigue, orthopnea, palpitations or PND.     NP EVALUATION, FH OF CAD, BACK SURGERY IN 2017, HAD ? PAF,LONE EPISODE,  ECHO MILD TR, WAS ON WARFARIN, HAD MINI STROKES IN LATE 90'S AND WAS ON PLAVIX, DR LORETO SHERWOOD,MULTIPLE ORTHOPEDIC SURGERIES,  SYNCOPAL EPISODE 3-4 MO AGO, WAS IN HIS TRUCK NO RECOLLECTION, LAST LDL 83, SEE ROS  Past Medical History:   Diagnosis Date    Anticoagulant long-term use     ASA 81 mg    Arthritis     Cataract     OU    Chronic low back pain     Complete rupture of rotator cuff 02/08/2011    DDD (degenerative disc disease), lumbar 07/08/2015    Degeneration of lumbar or lumbosacral intervertebral disc 09/09/2015    ED (erectile dysfunction)     GERD (gastroesophageal reflux disease)     Hypertension     Lumbar pseudoarthrosis 06/26/2017    Lumbar stenosis 06/26/2017    Obesity     Spondylosis of lumbar region without myelopathy or radiculopathy 07/08/2015    Stroke 1997    basal ganglia, left sided weakness, resolved    Testicular hypofunction     Thoracic or lumbosacral neuritis or radiculitis 07/08/2015     Past Surgical History:   Procedure Laterality Date    APPENDECTOMY      ARTHROPLASTY OF SHOULDER Right 03/22/2022    Procedure: ARTHROPLASTY, SHOULDER BRENNAN RIGHT SHOULDER HUMERAL HEAD RESURFACING;  Surgeon: Frankie Joya MD;  Location: [facility] OR;  Service: Orthopedics;  Laterality: Right;    BACK SURGERY      4- back surgery    CAUDAL EPIDURAL STEROID INJECTION N/A 12/16/2021    Procedure: Injection-steroid-epidural-caudal;  Surgeon: Aram Terrell MD;  Location: [facility] OR;  Service: Pain Management;   Laterality: N/A;    CAUDAL EPIDURAL STEROID INJECTION N/A 02/02/2022    Procedure: INJECTION, STEROID, SPINE, EPIDURAL, CAUDAL;  Surgeon: Aram Terrell MD;  Location: [facility] OR;  Service: Pain Management;  Laterality: N/A;    CAUDAL EPIDURAL STEROID INJECTION N/A 7/22/2022    Procedure: Injection-steroid-epidural-caudal;  Surgeon: Aram Terrell MD;  Location: [facility] OR;  Service: Pain Management;  Laterality: N/A;  Caudal CARLOS     CAUDAL EPIDURAL STEROID INJECTION N/A 1/20/2023    Procedure: Injection-steroid-epidural-caudal;  Surgeon: Aram Terrell MD;  Location: [facility] OR;  Service: Pain Management;  Laterality: N/A;  caudal ( melinda)    COLONOSCOPY  07/12/2004    [facility].   Redundant tortuous colon, otherwise normal.    COLONOSCOPY N/A 02/25/2019    Procedure: COLONOSCOPY;  Surgeon: Gilbert Rodriguez Jr., MD;  Location: [facility];  Service: Endoscopy;  Laterality: N/A;    Epidural steroid injection      Pain management    ESOPHAGOGASTRODUODENOSCOPY  07/12/2004    [facility]ILO.    FRACTURE SURGERY Left     wrist / forearm, total of 5    INSERTION OF DORSAL COLUMN NERVE STIMULATOR FOR TRIAL N/A 12/12/2019    Procedure: INSERTION, NEUROSTIMULATOR, SPINAL CORD, DORSAL COLUMN, FOR TRIAL;  Surgeon: Evan Lyles MD;  Location: [facility] OR;  Service: Pain Management;  Laterality: N/A;    JOINT REPLACEMENT  02/06/2013    ( rt hip 2007), left hip     JOINT REPLACEMENT Left     total knee    KNEE ARTHROSCOPY W/ DEBRIDEMENT      bilateral knees , total of six    KNEE SURGERY      LAMINECTOMY USING MINIMALLY INVASIVE TECHNIQUE N/A 02/12/2020    Procedure: LAMINECTOMY, SPINE, MINIMALLY INVASIVE /  PLACEMENT OF SPINAL CORD STIMULATOR;  Surgeon: Darlene Max MD;  Location: [facility] OR;  Service: Neurosurgery;  Laterality: N/A;    Nervbe Block injection      Pain management    TOTAL SHOULDER ARTHROPLASTY Right 03/28/2022    Dr Joya    TOTAL THYROIDECTOMY      TOTAL THYROIDECTOMY Bilateral 03/07/2022    Dr Mendoza     Family History   Problem Relation  Age of Onset    Hypertension Father     Heart disease Father     Drug abuse Daughter     Hypertension Son     Lung disease Sister     COPD Sister     Hypertension Sister     Diverticulitis Sister     Gout Sister     Kidney disease Sister     No Known Problems Mother     Eczema Neg Hx     Lupus Neg Hx     Psoriasis Neg Hx     Melanoma Neg Hx      Social History     Socioeconomic History    Marital status:    Tobacco Use    Smoking status: Former     Packs/day: 1.00     Years: 30.00     Pack years: 30.00     Types: Cigarettes     Start date: 8/3/1963     Quit date: 1992     Years since quittin.3    Smokeless tobacco: Never   Substance and Sexual Activity    Alcohol use: Not Currently     Comment: On occasion    Drug use: Yes     Types: Oxycodone, Morphine     Social Determinants of Health     Financial Resource Strain: Low Risk     Difficulty of Paying Living Expenses: Not hard at all   Food Insecurity: No Food Insecurity    Worried About Running Out of Food in the Last Year: Never true    Ran Out of Food in the Last Year: Never true   Transportation Needs: No Transportation Needs    Lack of Transportation (Medical): No    Lack of Transportation (Non-Medical): No   Physical Activity: Insufficiently Active    Days of Exercise per Week: 2 days    Minutes of Exercise per Session: 30 min   Stress: Stress Concern Present    Feeling of Stress : To some extent   Social Connections: Socially Integrated    Frequency of Communication with Friends and Family: More than three times a week    Frequency of Social Gatherings with Friends and Family: Twice a week    Attends Rastafari Services: More than 4 times per year    Active Member of Clubs or Organizations: Yes    Attends Club or Organization Meetings: More than 4 times per year    Marital Status:    Housing Stability: Low Risk     Unable to Pay for Housing in the Last Year: No    Number of Places Lived in the Last Year: 1    Unstable Housing in the Last  Year: No       Review of patient's allergies indicates:   Allergen Reactions    Xyzal [levocetirizine] Other (See Comments)     Knocked him out        Current Outpatient Medications:     albuterol (PROVENTIL/VENTOLIN HFA) 90 mcg/actuation inhaler, EVERY 4 HOURS AS NEEDED as needed for, Disp: , Rfl:     amLODIPine (NORVASC) 10 MG tablet, Take one tablet by mouth daily, Disp: 90 tablet, Rfl: 3    aspirin 81 MG Chew, Take 81 mg by mouth once daily., Disp: , Rfl:     atorvastatin (LIPITOR) 40 MG tablet, TAKE 1 TABLET (40 MG TOTAL) BY MOUTH ONCE DAILY., Disp: 90 tablet, Rfl: 3    buPROPion (WELLBUTRIN XL) 150 MG TB24 tablet, Take 1 tablet (150 mg total) by mouth once daily., Disp: 90 tablet, Rfl: 3    calcitRIOL (ROCALTROL) 0.5 MCG Cap, Take 1 capsule (0.5 mcg total) by mouth once daily., Disp: 90 capsule, Rfl: 3    fluticasone propionate (FLONASE) 50 mcg/actuation nasal spray, 1 spray (50 mcg total) by Each Nostril route daily as needed for Rhinitis., Disp: 16 g, Rfl: 0    furosemide (LASIX) 40 MG tablet, Take 1 tablet (40 mg total) by mouth once daily., Disp: 90 tablet, Rfl: 1    levothyroxine (SYNTHROID) 112 MCG tablet, Take 2 tablets (224 mcg total) by mouth once daily., Disp: 180 tablet, Rfl: 1    LIDOcaine (LIDODERM) 5 %, Place 1 patch onto the skin once daily. Remove & Discard patch within 12 hours or as directed by MD, Disp: 30 patch, Rfl: 2    losartan (COZAAR) 50 MG tablet, TAKE 1 TABLET(50 MG) BY MOUTH EVERY DAY, Disp: 90 tablet, Rfl: 1    morphine (MS CONTIN) 30 MG 12 hr tablet, Take 1 tablet (30 mg total) by mouth 3 (three) times daily. Take two tablets (60mg) in the morning and one tablet (30mg) in the evening, Disp: 90 tablet, Rfl: 0    morphine (MS CONTIN) 30 MG 12 hr tablet, Take two tablets (60mg) by mouth in the morning and one tablet (30mg) in the evening, Disp: 90 tablet, Rfl: 0    morphine (MS CONTIN) 30 MG 12 hr tablet, Take 1 tablet (30 mg total) by mouth 3 (three) times daily. Take two tablets  (60mg) in the morning and one tablet (30mg) in the evening, Disp: 90 tablet, Rfl: 0    mupirocin (BACTROBAN) 2 % ointment, Apply to affected area 3 times daily, Disp: 22 g, Rfl: 1    omeprazole (PRILOSEC) 40 MG capsule, Take one capsule by mouth daily, Disp: 90 capsule, Rfl: 3    ondansetron (ZOFRAN-ODT) 8 MG TbDL, Take 1 tablet (8 mg total) by mouth 2 (two) times daily as needed for nausea for 5days, Disp: 10 tablet, Rfl: 0    oxyCODONE-acetaminophen (PERCOCET)  mg per tablet, Take 1 tablet by mouth every 4 (four) hours as needed for Pain., Disp: 120 tablet, Rfl: 0    pregabalin (LYRICA) 75 MG capsule, Take 1 capsule (75 mg total) by mouth 2 (two) times daily., Disp: 180 capsule, Rfl: 1    saw/vit E/sod lisa/lyc/beta/pyg (PROSTATE HEALTH ORAL), Take 2 capsules by mouth once daily. With saw palmetto, Disp: , Rfl:     sildenafil (REVATIO) 20 mg Tab, Take 1-5 daily as needed for ED, Disp: 10 tablet, Rfl: 5    tiZANidine (ZANAFLEX) 4 MG tablet, Take 1 tablet (4 mg total) by mouth 3 (three) times daily as needed (muscle spasm)., Disp: 270 tablet, Rfl: 1    UNABLE TO FIND, Take by mouth once daily. Vitafusion multivites gummies, Disp: , Rfl:     naloxone (NARCAN) 0.4 mg/mL injection, Inject 1 mL (0.4 mg total) into the vein as needed (overdose). (Patient not taking: Reported on 1/26/2023), Disp: 2 mL, Rfl: 5  No current facility-administered medications for this visit.    Facility-Administered Medications Ordered in Other Visits:     diphenhydrAMINE injection 12.5 mg, 12.5 mg, Intravenous, Once PRN, Enrique Rosales MD    electrolyte-S (ISOLYTE), , Intravenous, Continuous, Enrique Rosales MD    HYDROmorphone (PF) injection 0.2 mg, 0.2 mg, Intravenous, Q5 Min PRN, Enrique Rosales MD    HYDROmorphone (PF) injection 0.2 mg, 0.2 mg, Intravenous, Q5 Min PRN, Enrique Rosales MD    lactated ringers infusion, , Intravenous, Once PRN, Aram Terrell MD    lactated ringers infusion, 10 mL/hr, Intravenous,  "Continuous, Enrique Rosales MD, Last Rate: 500 mL/hr at 03/22/22 1419, Rate Change at 03/22/22 1419    lorazepam injection 0.25 mg, 0.25 mg, Intravenous, Once PRN, Enrique Rosales MD    prochlorperazine injection Soln 5 mg, 5 mg, Intravenous, Q30 Min PRN, Enrique Rosales MD    sodium chloride 0.9% flush 3 mL, 3 mL, Intravenous, Q8H, Enrique Rosales MD    Review of Systems   Constitutional: Negative for chills, decreased appetite, diaphoresis, fever, malaise/fatigue and night sweats.   HENT:  Negative for congestion and nosebleeds.    Eyes:  Negative for blurred vision and visual disturbance.   Cardiovascular:  Positive for dyspnea on exertion and leg swelling (L). Negative for chest pain, claudication, cyanosis, irregular heartbeat, near-syncope, orthopnea, palpitations, paroxysmal nocturnal dyspnea and syncope.   Respiratory:  Positive for shortness of breath. Negative for cough and hemoptysis.    Endocrine: Negative for polyphagia and polyuria.   Skin:  Negative for color change and rash.   Musculoskeletal:  Positive for joint pain. Negative for back pain and falls.   Gastrointestinal:  Negative for abdominal pain, dysphagia, jaundice, melena and nausea.   Genitourinary:  Negative for dysuria and flank pain.   Neurological:  Positive for tremors (R HAND SINCE STROKE). Negative for brief paralysis, dizziness, focal weakness and light-headedness.   Psychiatric/Behavioral:  Negative for altered mental status and depression.    Allergic/Immunologic: Negative for persistent infections.      Objective:      Vitals:    04/20/23 1420   BP: 132/76   Pulse: 84   Weight: 104.5 kg (230 lb 6.1 oz)   Height: 5' 10" (1.778 m)   PainSc: 0-No pain     Body mass index is 33.06 kg/m².    Physical Exam  Constitutional:       Appearance: He is obese.   HENT:      Head: Normocephalic and atraumatic.   Eyes:      Extraocular Movements: Extraocular movements intact.      Conjunctiva/sclera: Conjunctivae normal. "   Neck:      Vascular: Normal carotid pulses. No carotid bruit or JVD.   Cardiovascular:      Rate and Rhythm: Normal rate and regular rhythm. No extrasystoles are present.     Pulses:           Carotid pulses are 2+ on the right side and 2+ on the left side.       Radial pulses are 2+ on the right side and 2+ on the left side.        Posterior tibial pulses are 2+ on the right side and 2+ on the left side.      Heart sounds: Murmur heard.   Systolic murmur is present with a grade of 1/6 at the upper right sternal border.     No friction rub. No S4 sounds.   Pulmonary:      Effort: Pulmonary effort is normal.      Breath sounds: Normal breath sounds and air entry.   Musculoskeletal:      Cervical back: Neck supple.      Right lower leg: Edema (TRACE) present.      Left lower le+ Edema present.      Comments: USES A CANE   Neurological:      Mental Status: He is alert and oriented to person, place, and time.      Cranial Nerves: Cranial nerves 2-12 are intact.   Psychiatric:         Mood and Affect: Mood normal.         Speech: Speech normal.         Behavior: Behavior normal.               ..    Chemistry        Component Value Date/Time     2022 1636    K 4.3 2022 1636     2022 1636    CO2 22 (L) 2022 1636    BUN 19 2022 1636    CREATININE 1.0 2022 1636    CREATININE 0.8 2013 1625     (H) 2022 1636        Component Value Date/Time    CALCIUM 9.3 2022 1636    CALCIUM 9.2 2013 1625    ALKPHOS 61 2022 1636    AST 20 2022 1636    ALT 18 2022 1636    BILITOT 0.3 2022 1636    ESTGFRAFRICA >60.0 2022 1119    EGFRNONAA 53.4 (A) 2022 1119            ..  Lab Results   Component Value Date    CHOL 182 2022    CHOL 127 2021    CHOL 135 2019     Lab Results   Component Value Date    HDL 65 2022    HDL 47 2021    HDL 45 2019     Lab Results   Component Value Date    LDLCALC  83.0 07/12/2022    LDLCALC 55.0 (L) 01/14/2021    LDLCALC 47.2 (L) 08/21/2019     Lab Results   Component Value Date    TRIG 170 (H) 07/12/2022    TRIG 125 01/14/2021    TRIG 214 (H) 08/21/2019     Lab Results   Component Value Date    CHOLHDL 35.7 07/12/2022    CHOLHDL 37.0 01/14/2021    CHOLHDL 33.3 08/21/2019     ..  Lab Results   Component Value Date    WBC 7.70 12/06/2022    HGB 12.3 (L) 12/06/2022    HCT 38.1 (L) 12/06/2022    MCV 86 12/06/2022     12/06/2022       Test(s) Reviewed  I have reviewed the following in detail:  [] Stress test   [] Angiography   [x] Echocardiogram   [x] Labs   [x] Other:       Assessment:         ICD-10-CM ICD-9-CM   1. Atherosclerosis of aorta  I70.0 440.0   2. Syncope and collapse  R55 780.2   3. COLEMAN (dyspnea on exertion)  R06.09 786.09   4. Essential (primary) hypertension  I10 401.9   5. PAF (paroxysmal atrial fibrillation)  I48.0 427.31   6. Obesity (BMI 30.0-34.9)  E66.9 278.00   7. Other hyperlipidemia  E78.49 272.4   8. Nonrheumatic tricuspid valve regurgitation  I36.1 424.2   9. Family history of early CAD  Z82.49 V17.3     Problem List Items Addressed This Visit          Cardiac/Vascular    Essential (primary) hypertension    Relevant Orders    IN OFFICE EKG 12-LEAD (to Muse) (Completed)    Atherosclerosis of aorta - Primary    Relevant Orders    Nuclear Stress - Cardiology Interpreted    PAF (paroxysmal atrial fibrillation)    Relevant Orders    Echo    Nuclear Stress - Cardiology Interpreted    Holter monitor - 72 hour    Other hyperlipidemia    COLEMAN (dyspnea on exertion)    Relevant Orders    Echo    Nuclear Stress - Cardiology Interpreted    Family history of early CAD    Nonrheumatic tricuspid valve regurgitation       Endocrine    Obesity (BMI 30.0-34.9)       Other    Syncope and collapse    Relevant Orders    Echo    Nuclear Stress - Cardiology Interpreted    CV Ultrasound Bilateral Doppler Carotid    Holter monitor - 72 hour        Plan:     EKG SR, WNL,  WILL NEED FURTHER EVALUATION CHECK ECHO NUCLEAR STRESS TEST ASSESS FOR ISCHEMIA CAROTID ULTRASOUND AND HOLTER MONITOR ASSESS ARRHYTHMIA ASSESS FOR ANGINA EQUIVALENT, NO OVERT HEART FAILURE NO TIA SYMPTOMS NO NEAR-SYNCOPE DIET EXERCISE WEIGHT LOSS RETURN TO CLINIC IN FEW WEEKS AFTER TESTS      Atherosclerosis of aorta  Comments:  NO EMBOLIZATION  Orders:  -     Nuclear Stress - Cardiology Interpreted; Future    Syncope and collapse  -     Echo  -     Nuclear Stress - Cardiology Interpreted; Future  -     CV Ultrasound Bilateral Doppler Carotid; Future  -     Holter monitor - 72 hour; Future    COLEMAN (dyspnea on exertion)  -     Echo  -     Nuclear Stress - Cardiology Interpreted; Future    Essential (primary) hypertension  -     IN OFFICE EKG 12-LEAD (to Muse)    PAF (paroxysmal atrial fibrillation)  -     Echo  -     Nuclear Stress - Cardiology Interpreted; Future  -     Holter monitor - 72 hour; Future    Obesity (BMI 30.0-34.9)    Other hyperlipidemia    Nonrheumatic tricuspid valve regurgitation    Family history of early CAD    RTC Low level/low impact aerobic exercise 5x's/wk. Heart healthy diet and risk factor modification.    See labs and med orders.    Aerobic exercise 5x's/wk. Heart healthy diet and risk factor modification.    See labs and med orders.

## 2023-04-21 ENCOUNTER — HOSPITAL ENCOUNTER (OUTPATIENT)
Facility: AMBULARY SURGERY CENTER | Age: 78
Discharge: HOME | End: 2023-04-21
Attending: ANESTHESIOLOGY | Admitting: ANESTHESIOLOGY
Payer: MEDICARE

## 2023-04-21 DIAGNOSIS — M54.16 LUMBAR RADICULITIS: ICD-10-CM

## 2023-04-21 PROCEDURE — 62323 PR INJ LUMBAR/SACRAL, W/IMAGING GUIDANCE: ICD-10-PCS | Mod: HCNC,,, | Performed by: ANESTHESIOLOGY

## 2023-04-21 PROCEDURE — 62323 NJX INTERLAMINAR LMBR/SAC: CPT | Mod: HCNC,,, | Performed by: ANESTHESIOLOGY

## 2023-04-21 PROCEDURE — 62323 NJX INTERLAMINAR LMBR/SAC: CPT | Performed by: ANESTHESIOLOGY

## 2023-04-21 RX ORDER — LIDOCAINE HYDROCHLORIDE 10 MG/ML
INJECTION, SOLUTION EPIDURAL; INFILTRATION; INTRACAUDAL; PERINEURAL
Status: DISCONTINUED | OUTPATIENT
Start: 2023-04-21 | End: 2023-04-21 | Stop reason: HOSPADM

## 2023-04-21 RX ORDER — FENTANYL CITRATE 50 UG/ML
INJECTION, SOLUTION INTRAMUSCULAR; INTRAVENOUS
Status: DISCONTINUED | OUTPATIENT
Start: 2023-04-21 | End: 2023-04-21 | Stop reason: HOSPADM

## 2023-04-21 RX ORDER — DEXAMETHASONE SODIUM PHOSPHATE 10 MG/ML
INJECTION INTRAMUSCULAR; INTRAVENOUS
Status: DISCONTINUED | OUTPATIENT
Start: 2023-04-21 | End: 2023-04-21 | Stop reason: HOSPADM

## 2023-04-21 RX ORDER — SODIUM CHLORIDE, SODIUM LACTATE, POTASSIUM CHLORIDE, CALCIUM CHLORIDE 600; 310; 30; 20 MG/100ML; MG/100ML; MG/100ML; MG/100ML
INJECTION, SOLUTION INTRAVENOUS ONCE AS NEEDED
Status: COMPLETED | OUTPATIENT
Start: 2023-04-21 | End: 2023-04-21

## 2023-04-21 RX ORDER — SODIUM CHLORIDE 0.9 % (FLUSH) 0.9 %
SYRINGE (ML) INJECTION
Status: DISCONTINUED | OUTPATIENT
Start: 2023-04-21 | End: 2023-04-21 | Stop reason: HOSPADM

## 2023-04-21 RX ORDER — MIDAZOLAM HYDROCHLORIDE 1 MG/ML
INJECTION INTRAMUSCULAR; INTRAVENOUS
Status: DISCONTINUED | OUTPATIENT
Start: 2023-04-21 | End: 2023-04-21 | Stop reason: HOSPADM

## 2023-04-21 RX ADMIN — SODIUM CHLORIDE, SODIUM LACTATE, POTASSIUM CHLORIDE, CALCIUM CHLORIDE: 600; 310; 30; 20 INJECTION, SOLUTION INTRAVENOUS at 12:04

## 2023-04-21 NOTE — OP NOTE
PROCEDURE DATE: 4/21/2023    PROCEDURE:  Caudal epidural steroid injection under fluoroscopy.    Diagnosis: Lumbar radiculitis    Post Op diagnosis: Same    PHYSICIAN: Aram Terrell M.D.    MEDICATIONS INJECTED:  10 mg of dexamethasone and 4 ml of sterile, preservative-free NaCl.    LOCAL ANESTHETIC GIVEN:  Lidocaine 1%, 2 ml total    SEDATION MEDICATIONS: RN IV sedation    ESTIMATED BLOOD LOSS:  None    COMPLICATIONS:  None    TECHNIQUE:   After the patient was placed in prone position, the patient was prepped and draped in the usual sterile fashion using ChloraPrep and sterile towels.  Appropriate anatomic landmarks were determined by identifying the sacral hiatus in the lateral fluoroscopic view.  Local anesthetic was given via a 25g 1.5 inch needle by raising a wheal and infiltrating down to the periosteum.  A 3.5 inch 20 gauge touhy needle was introduced thru the sacral hiatus.  2ml of contrast was injected to confirm placement in the appropriate area and that there was no vascular uptake.  The medication was then injected slowly.  The patient tolerated the procedure well.    The patient was monitored after the procedure.  Patient was given post procedure and discharge instructions to follow at home.  The patient was discharged in a stable condition\

## 2023-04-21 NOTE — DISCHARGE SUMMARY
[facility]  Discharge Note  Short Stay    Procedure(s) (LRB):  Injection-steroid-epidural-caudal (N/A)      OUTCOME: Patient tolerated treatment/procedure well without complication and is now ready for discharge.    DISPOSITION: Home or Self Care    FINAL DIAGNOSIS:  <principal problem not specified>    FOLLOWUP: In clinic    DISCHARGE INSTRUCTIONS:    Discharge Procedure Orders   Notify your health care provider if you experience any of the following:  temperature >100.4     Notify your health care provider if you experience any of the following:  severe uncontrolled pain     Notify your health care provider if you experience any of the following:  redness, tenderness, or signs of infection (pain, swelling, redness, odor or green/yellow discharge around incision site)     Activity as tolerated        TIME SPENT ON DISCHARGE:   30 minutes

## 2023-04-24 ENCOUNTER — OFFICE VISIT (OUTPATIENT)
Dept: FAMILY MEDICINE | Facility: CLINIC | Age: 78
End: 2023-04-24
Attending: FAMILY MEDICINE
Payer: MEDICARE

## 2023-04-24 VITALS
BODY MASS INDEX: 33.17 KG/M2 | DIASTOLIC BLOOD PRESSURE: 64 MMHG | HEIGHT: 70 IN | OXYGEN SATURATION: 95 % | TEMPERATURE: 98 F | SYSTOLIC BLOOD PRESSURE: 120 MMHG | WEIGHT: 231.69 LBS | HEART RATE: 89 BPM

## 2023-04-24 VITALS
HEART RATE: 67 BPM | RESPIRATION RATE: 18 BRPM | DIASTOLIC BLOOD PRESSURE: 65 MMHG | SYSTOLIC BLOOD PRESSURE: 111 MMHG | OXYGEN SATURATION: 97 % | TEMPERATURE: 98 F

## 2023-04-24 DIAGNOSIS — F11.20 UNCOMPLICATED OPIOID DEPENDENCE: ICD-10-CM

## 2023-04-24 DIAGNOSIS — M48.061 SPINAL STENOSIS, LUMBAR REGION, WITHOUT NEUROGENIC CLAUDICATION: ICD-10-CM

## 2023-04-24 DIAGNOSIS — G89.4 CHRONIC PAIN SYNDROME: Primary | ICD-10-CM

## 2023-04-24 DIAGNOSIS — M48.02 SPINAL STENOSIS IN CERVICAL REGION: ICD-10-CM

## 2023-04-24 DIAGNOSIS — I10 ESSENTIAL (PRIMARY) HYPERTENSION: ICD-10-CM

## 2023-04-24 PROCEDURE — 99999 PR PBB SHADOW E&M-EST. PATIENT-LVL III: CPT | Mod: PBBFAC,HCNC,, | Performed by: FAMILY MEDICINE

## 2023-04-24 PROCEDURE — 1159F MED LIST DOCD IN RCRD: CPT | Mod: HCNC,CPTII,S$GLB, | Performed by: FAMILY MEDICINE

## 2023-04-24 PROCEDURE — 3288F PR FALLS RISK ASSESSMENT DOCUMENTED: ICD-10-PCS | Mod: HCNC,CPTII,S$GLB, | Performed by: FAMILY MEDICINE

## 2023-04-24 PROCEDURE — 1159F PR MEDICATION LIST DOCUMENTED IN MEDICAL RECORD: ICD-10-PCS | Mod: HCNC,CPTII,S$GLB, | Performed by: FAMILY MEDICINE

## 2023-04-24 PROCEDURE — 3074F SYST BP LT 130 MM HG: CPT | Mod: HCNC,CPTII,S$GLB, | Performed by: FAMILY MEDICINE

## 2023-04-24 PROCEDURE — 1101F PT FALLS ASSESS-DOCD LE1/YR: CPT | Mod: HCNC,CPTII,S$GLB, | Performed by: FAMILY MEDICINE

## 2023-04-24 PROCEDURE — 99213 OFFICE O/P EST LOW 20 MIN: CPT | Mod: HCNC,S$GLB,, | Performed by: FAMILY MEDICINE

## 2023-04-24 PROCEDURE — 1125F AMNT PAIN NOTED PAIN PRSNT: CPT | Mod: HCNC,CPTII,S$GLB, | Performed by: FAMILY MEDICINE

## 2023-04-24 PROCEDURE — 1101F PR PT FALLS ASSESS DOC 0-1 FALLS W/OUT INJ PAST YR: ICD-10-PCS | Mod: HCNC,CPTII,S$GLB, | Performed by: FAMILY MEDICINE

## 2023-04-24 PROCEDURE — 99999 PR PBB SHADOW E&M-EST. PATIENT-LVL III: ICD-10-PCS | Mod: PBBFAC,HCNC,, | Performed by: FAMILY MEDICINE

## 2023-04-24 PROCEDURE — 1125F PR PAIN SEVERITY QUANTIFIED, PAIN PRESENT: ICD-10-PCS | Mod: HCNC,CPTII,S$GLB, | Performed by: FAMILY MEDICINE

## 2023-04-24 PROCEDURE — 3078F DIAST BP <80 MM HG: CPT | Mod: HCNC,CPTII,S$GLB, | Performed by: FAMILY MEDICINE

## 2023-04-24 PROCEDURE — 99213 PR OFFICE/OUTPT VISIT, EST, LEVL III, 20-29 MIN: ICD-10-PCS | Mod: HCNC,S$GLB,, | Performed by: FAMILY MEDICINE

## 2023-04-24 PROCEDURE — 3288F FALL RISK ASSESSMENT DOCD: CPT | Mod: HCNC,CPTII,S$GLB, | Performed by: FAMILY MEDICINE

## 2023-04-24 PROCEDURE — 1160F PR REVIEW ALL MEDS BY PRESCRIBER/CLIN PHARMACIST DOCUMENTED: ICD-10-PCS | Mod: HCNC,CPTII,S$GLB, | Performed by: FAMILY MEDICINE

## 2023-04-24 PROCEDURE — 3074F PR MOST RECENT SYSTOLIC BLOOD PRESSURE < 130 MM HG: ICD-10-PCS | Mod: HCNC,CPTII,S$GLB, | Performed by: FAMILY MEDICINE

## 2023-04-24 PROCEDURE — 3078F PR MOST RECENT DIASTOLIC BLOOD PRESSURE < 80 MM HG: ICD-10-PCS | Mod: HCNC,CPTII,S$GLB, | Performed by: FAMILY MEDICINE

## 2023-04-24 PROCEDURE — 1160F RVW MEDS BY RX/DR IN RCRD: CPT | Mod: HCNC,CPTII,S$GLB, | Performed by: FAMILY MEDICINE

## 2023-04-24 RX ORDER — MORPHINE SULFATE 30 MG/1
30 TABLET, FILM COATED, EXTENDED RELEASE ORAL 3 TIMES DAILY
Qty: 90 TABLET | Refills: 0 | Status: SHIPPED | OUTPATIENT
Start: 2023-06-26 | End: 2023-08-10

## 2023-04-24 RX ORDER — OXYCODONE AND ACETAMINOPHEN 10; 325 MG/1; MG/1
1 TABLET ORAL EVERY 4 HOURS PRN
Qty: 120 TABLET | Refills: 0 | Status: SHIPPED | OUTPATIENT
Start: 2023-04-27 | End: 2023-06-19 | Stop reason: SDUPTHER

## 2023-04-24 RX ORDER — MORPHINE SULFATE 30 MG/1
30 TABLET, FILM COATED, EXTENDED RELEASE ORAL 3 TIMES DAILY
Qty: 90 TABLET | Refills: 0 | Status: SHIPPED | OUTPATIENT
Start: 2023-05-23 | End: 2023-08-10

## 2023-04-24 RX ORDER — MORPHINE SULFATE 30 MG/1
30 TABLET, FILM COATED, EXTENDED RELEASE ORAL 3 TIMES DAILY
Qty: 90 TABLET | Refills: 0 | Status: SHIPPED | OUTPATIENT
Start: 2023-04-27 | End: 2023-06-19 | Stop reason: SDUPTHER

## 2023-04-24 RX ORDER — OXYCODONE AND ACETAMINOPHEN 10; 325 MG/1; MG/1
1 TABLET ORAL EVERY 4 HOURS PRN
Qty: 120 TABLET | Refills: 0 | Status: SHIPPED | OUTPATIENT
Start: 2023-05-23 | End: 2023-08-10

## 2023-04-24 RX ORDER — OXYCODONE AND ACETAMINOPHEN 10; 325 MG/1; MG/1
1 TABLET ORAL EVERY 4 HOURS PRN
Qty: 120 TABLET | Refills: 0 | Status: SHIPPED | OUTPATIENT
Start: 2023-06-26 | End: 2023-08-10

## 2023-04-24 NOTE — PROGRESS NOTES
Subjective:       Patient ID: Sam Pete is a 77 y.o. male.    Chief Complaint: Chronic Pain    77-year-old male coming in for renewal of opioids for chronic pain disorder secondary to spinal stenosis of cervical and lumbar spine peripheral neuropathy and degenerative joint disease of the left arm.  He has cut down to three MS Contin 30 mg per day and Percocet 10s four per day.  He does not feel capable at this time of reducing the MS Contin further but thinks he might be able to after another quarter.  In June he will be going to [facility] for a family member's graduation about the time his refill will be due.  Will try to get it approved by the pharmacy a little bit early.  He has been able to carry out his ADLs and enjoy family time and still maintain fairly good pain control without any significant constipation or cognitive impairment.  He does not need any further refills at this time.    Past Medical History:  No date: Anticoagulant long-term use      Comment:  ASA 81 mg  No date: Arthritis  No date: Cataract      Comment:  OU  No date: Chronic low back pain  02/08/2011: Complete rupture of rotator cuff  07/08/2015: DDD (degenerative disc disease), lumbar  09/09/2015: Degeneration of lumbar or lumbosacral intervertebral disc  No date: ED (erectile dysfunction)  No date: GERD (gastroesophageal reflux disease)  No date: Hypertension  06/26/2017: Lumbar pseudoarthrosis  06/26/2017: Lumbar stenosis  No date: Obesity  07/08/2015: Spondylosis of lumbar region without myelopathy or   radiculopathy  1997: Stroke      Comment:  basal ganglia, left sided weakness, resolved  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  07/08/2015: Thoracic or lumbosacral neuritis or radiculitis    Past Surgical History:  No date: APPENDECTOMY  03/22/2022: ARTHROPLASTY OF SHOULDER; Right      Comment:  Procedure: ARTHROPLASTY, SHOULDER BRENNAN RIGHT SHOULDER                HUMERAL HEAD RESURFACING;  Surgeon: Frankie Joya MD;                Location: [facility] OR;  Service: Orthopedics;  Laterality:                Right;  No date: BACK SURGERY      Comment:  4- back surgery  12/16/2021: CAUDAL EPIDURAL STEROID INJECTION; N/A      Comment:  Procedure: Injection-steroid-epidural-caudal;  Surgeon:                Aram Terrell MD;  Location: [facility] OR;  Service: Pain                Management;  Laterality: N/A;  02/02/2022: CAUDAL EPIDURAL STEROID INJECTION; N/A      Comment:  Procedure: INJECTION, STEROID, SPINE, EPIDURAL, CAUDAL;                Surgeon: Aram Terrell MD;  Location: [facility] OR;  Service:                Pain Management;  Laterality: N/A;  7/22/2022: CAUDAL EPIDURAL STEROID INJECTION; N/A      Comment:  Procedure: Injection-steroid-epidural-caudal;  Surgeon:                Aram Terrell MD;  Location: [facility] OR;  Service: Pain                Management;  Laterality: N/A;  Caudal CARLOS   1/20/2023: CAUDAL EPIDURAL STEROID INJECTION; N/A      Comment:  Procedure: Injection-steroid-epidural-caudal;  Surgeon:                Aram Terrell MD;  Location: [facility] OR;  Service: Pain                Management;  Laterality: N/A;  caudal ( melinda)  4/21/2023: CAUDAL EPIDURAL STEROID INJECTION; N/A      Comment:  Procedure: Injection-steroid-epidural-caudal;  Surgeon:                Aram Terrell MD;  Location: [facility] OR;  Service: Pain                Management;  Laterality: N/A;  caudal  07/12/2004: COLONOSCOPY      Comment:  CHILO.   Redundant tortuous colon, otherwise normal.  02/25/2019: COLONOSCOPY; N/A      Comment:  Procedure: COLONOSCOPY;  Surgeon: Gilbert Rodriguez Jr., MD;  Location: [facility];  Service: Endoscopy;                 Laterality: N/A;  No date: Epidural steroid injection      Comment:  Pain management  07/12/2004: ESOPHAGOGASTRODUODENOSCOPY      Comment:  CHILO.  No date: FRACTURE SURGERY; Left      Comment:  wrist / forearm, total of 5  12/12/2019: INSERTION OF DORSAL COLUMN NERVE STIMULATOR FOR TRIAL; N/A      Comment:  Procedure:  INSERTION, NEUROSTIMULATOR, SPINAL CORD,                DORSAL COLUMN, FOR TRIAL;  Surgeon: Evan Lyles MD;                 Location: [facility] OR;  Service: Pain Management;                 Laterality: N/A;  02/06/2013: JOINT REPLACEMENT      Comment:  ( rt hip 2007), left hip   No date: JOINT REPLACEMENT; Left      Comment:  total knee  No date: KNEE ARTHROSCOPY W/ DEBRIDEMENT      Comment:  bilateral knees , total of six  No date: KNEE SURGERY  02/12/2020: LAMINECTOMY USING MINIMALLY INVASIVE TECHNIQUE; N/A      Comment:  Procedure: LAMINECTOMY, SPINE, MINIMALLY INVASIVE /                 PLACEMENT OF SPINAL CORD STIMULATOR;  Surgeon: Darlene Max MD;  Location: [facility] OR;  Service: Neurosurgery;                 Laterality: N/A;  No date: Nervbe Block injection      Comment:  Pain management  03/28/2022: TOTAL SHOULDER ARTHROPLASTY; Right      Comment:  Dr Joya  No date: TOTAL THYROIDECTOMY  03/07/2022: TOTAL THYROIDECTOMY; Bilateral      Comment:  Dr Mendoza    Current Outpatient Medications on File Prior to Visit:  albuterol (PROVENTIL/VENTOLIN HFA) 90 mcg/actuation inhaler, EVERY 4 HOURS AS NEEDED as needed for, Disp: , Rfl:   amLODIPine (NORVASC) 10 MG tablet, Take one tablet by mouth daily, Disp: 90 tablet, Rfl: 3  aspirin 81 MG Chew, Take 81 mg by mouth once daily., Disp: , Rfl:   atorvastatin (LIPITOR) 40 MG tablet, TAKE 1 TABLET (40 MG TOTAL) BY MOUTH ONCE DAILY., Disp: 90 tablet, Rfl: 3  buPROPion (WELLBUTRIN XL) 150 MG TB24 tablet, Take 1 tablet (150 mg total) by mouth once daily., Disp: 90 tablet, Rfl: 3  calcitRIOL (ROCALTROL) 0.5 MCG Cap, Take 1 capsule (0.5 mcg total) by mouth once daily., Disp: 90 capsule, Rfl: 3  fluticasone propionate (FLONASE) 50 mcg/actuation nasal spray, 1 spray (50 mcg total) by Each Nostril route daily as needed for Rhinitis., Disp: 16 g, Rfl: 0  furosemide (LASIX) 40 MG tablet, Take 1 tablet (40 mg total) by mouth once daily., Disp: 90 tablet, Rfl:  1  levothyroxine (SYNTHROID) 112 MCG tablet, Take 2 tablets (224 mcg total) by mouth once daily., Disp: 180 tablet, Rfl: 1  LIDOcaine (LIDODERM) 5 %, Place 1 patch onto the skin once daily. Remove & Discard patch within 12 hours or as directed by MD, Disp: 30 patch, Rfl: 2  losartan (COZAAR) 50 MG tablet, TAKE 1 TABLET(50 MG) BY MOUTH EVERY DAY, Disp: 90 tablet, Rfl: 1  mupirocin (BACTROBAN) 2 % ointment, Apply to affected area 3 times daily, Disp: 22 g, Rfl: 1  naloxone (NARCAN) 0.4 mg/mL injection, Inject 1 mL (0.4 mg total) into the vein as needed (overdose)., Disp: 2 mL, Rfl: 5  omeprazole (PRILOSEC) 40 MG capsule, Take one capsule by mouth daily, Disp: 90 capsule, Rfl: 3  ondansetron (ZOFRAN-ODT) 8 MG TbDL, Take 1 tablet (8 mg total) by mouth 2 (two) times daily as needed for nausea for 5days, Disp: 10 tablet, Rfl: 0  pregabalin (LYRICA) 75 MG capsule, Take 1 capsule (75 mg total) by mouth 2 (two) times daily., Disp: 180 capsule, Rfl: 1  saw/vit E/sod lisa/lyc/beta/pyg (PROSTATE HEALTH ORAL), Take 2 capsules by mouth once daily. With saw palmetto, Disp: , Rfl:   sildenafil (REVATIO) 20 mg Tab, Take 1-5 daily as needed for ED, Disp: 10 tablet, Rfl: 5  tiZANidine (ZANAFLEX) 4 MG tablet, Take 1 tablet (4 mg total) by mouth 3 (three) times daily as needed (muscle spasm)., Disp: 270 tablet, Rfl: 1  UNABLE TO FIND, Take by mouth once daily. Vitafusion multivites gummies, Disp: , Rfl:   [DISCONTINUED] morphine (MS CONTIN) 30 MG 12 hr tablet, Take 1 tablet (30 mg total) by mouth 3 (three) times daily. Take two tablets (60mg) in the morning and one tablet (30mg) in the evening, Disp: 90 tablet, Rfl: 0  [DISCONTINUED] morphine (MS CONTIN) 30 MG 12 hr tablet, Take two tablets (60mg) by mouth in the morning and one tablet (30mg) in the evening, Disp: 90 tablet, Rfl: 0  [DISCONTINUED] morphine (MS CONTIN) 30 MG 12 hr tablet, Take 1 tablet (30 mg total) by mouth 3 (three) times daily. Take two tablets (60mg) in the morning  and one tablet (30mg) in the evening, Disp: 90 tablet, Rfl: 0  [DISCONTINUED] oxyCODONE-acetaminophen (PERCOCET)  mg per tablet, Take 1 tablet by mouth every 4 (four) hours as needed for Pain., Disp: 120 tablet, Rfl: 0    Current Facility-Administered Medications on File Prior to Visit:  diphenhydrAMINE injection 12.5 mg, 12.5 mg, Intravenous, Once PRN, Enrique Rosales MD  electrolyte-S (ISOLYTE), , Intravenous, Continuous, Enrique Rosales MD  HYDROmorphone (PF) injection 0.2 mg, 0.2 mg, Intravenous, Q5 Min PRN, Enrique Rosales MD  HYDROmorphone (PF) injection 0.2 mg, 0.2 mg, Intravenous, Q5 Min PRN, Enrique Rosales MD  lactated ringers infusion, , Intravenous, Once PRN, Aram Terrell MD  lactated ringers infusion, 10 mL/hr, Intravenous, Continuous, Enrique Rosales MD, Last Rate: 500 mL/hr at 03/22/22 1419, Rate Change at 03/22/22 1419  lorazepam injection 0.25 mg, 0.25 mg, Intravenous, Once PRN, Enrique Rosales MD  prochlorperazine injection Soln 5 mg, 5 mg, Intravenous, Q30 Min PRN, Enrique Rosales MD  sodium chloride 0.9% flush 3 mL, 3 mL, Intravenous, Q8H, Enrique Rosales MD          Review of Systems   Gastrointestinal:  Negative for constipation, diarrhea, nausea and vomiting.   Musculoskeletal:  Positive for arthralgias and back pain.   Psychiatric/Behavioral:  Negative for confusion, decreased concentration and dysphoric mood.      Objective:      Physical Exam  Vitals and nursing note reviewed.   Constitutional:       General: He is not in acute distress.     Appearance: Normal appearance. He is obese. He is not ill-appearing, toxic-appearing or diaphoretic.      Comments: Good blood pressure control  Normal pulse with regular rhythm  Obese with a BMI of 33.3 he is down 1 lb from his January 26, 2023 visit   Cardiovascular:      Rate and Rhythm: Normal rate and regular rhythm.      Heart sounds: Normal heart sounds.   Pulmonary:      Effort: Pulmonary effort is  normal.      Breath sounds: Normal breath sounds.   Neurological:      General: No focal deficit present.      Mental Status: He is alert and oriented to person, place, and time. Mental status is at baseline.   Psychiatric:         Mood and Affect: Mood normal.         Behavior: Behavior normal.         Thought Content: Thought content normal.       Assessment:       1. Chronic pain syndrome    2. Uncomplicated opioid dependence    3. Spinal stenosis in cervical region    4. Spinal stenosis, lumbar region, without neurogenic claudication    5. Essential (primary) hypertension        Plan:       1. Uncomplicated opioid dependence  The  (Prescription Monitoring Program) website was checked with no inappropriate activity found.  - morphine (MS CONTIN) 30 MG 12 hr tablet; Take 1 tablet (30 mg total) by mouth 3 (three) times daily. Take two tablets (60mg) in the morning and one tablet (30mg) in the evening  Dispense: 90 tablet; Refill: 0  - oxyCODONE-acetaminophen (PERCOCET)  mg per tablet; Take 1 tablet by mouth every 4 (four) hours as needed for Pain.  Dispense: 120 tablet; Refill: 0  - morphine (MS CONTIN) 30 MG 12 hr tablet; Take 1 tablet (30 mg total) by mouth 3 (three) times daily. Take two tablets (60mg) in the morning and one tablet (30mg) in the evening  Dispense: 90 tablet; Refill: 0  - oxyCODONE-acetaminophen (PERCOCET)  mg per tablet; Take 1 tablet by mouth every 4 (four) hours as needed for Pain.  Dispense: 120 tablet; Refill: 0  - morphine (MS CONTIN) 30 MG 12 hr tablet; Take 1 tablet (30 mg total) by mouth 3 (three) times daily. Take two tablets (60mg) in the morning and one tablet (30mg) in the evening  Dispense: 90 tablet; Refill: 0  - oxyCODONE-acetaminophen (PERCOCET)  mg per tablet; Take 1 tablet by mouth every 4 (four) hours as needed for Pain.  Dispense: 120 tablet; Refill: 0    2. Chronic pain syndrome  Stable will revisit reduction in the MS Contin at the next visit in three  months    3. Spinal stenosis in cervical region  Stable    4. Spinal stenosis, lumbar region, without neurogenic claudication  Stable    5. Essential (primary) hypertension  Good control no changes needed in amlodipine, Lasix, losartan

## 2023-05-04 ENCOUNTER — PES CALL (OUTPATIENT)
Dept: ADMINISTRATIVE | Facility: CLINIC | Age: 78
End: 2023-05-04
Payer: MEDICARE

## 2023-05-08 ENCOUNTER — PATIENT MESSAGE (OUTPATIENT)
Dept: CARDIOLOGY | Facility: HOSPITAL | Age: 78
End: 2023-05-08
Payer: MEDICARE

## 2023-05-11 ENCOUNTER — PATIENT MESSAGE (OUTPATIENT)
Dept: FAMILY MEDICINE | Facility: CLINIC | Age: 78
End: 2023-05-11
Payer: MEDICARE

## 2023-05-12 NOTE — TELEPHONE ENCOUNTER
Refill Routing Note   Medication(s) are not appropriate for processing by [facility] for the following reason(s):      Drug-disease interaction    [facility] action(s):  Defer None identified   Medication Therapy Plan: Opioid dependence; Uncomplicated opioid dependence    Pharmacist review requested: Yes     Appointments  past 12m or future 3m with PCP    Date Provider   Last Visit   4/24/2023 Ty Montalvo MD   Next Visit   8/10/2023 Ty Montalvo MD   ED visits in past 90 days: 0        Note composed:12:16 PM 05/12/2023

## 2023-05-12 NOTE — TELEPHONE ENCOUNTER
No care due was identified.  [facility] Embedded Care Due Messages. Reference number: [id number].   5/12/2023 12:05:27 PM CDT

## 2023-05-13 RX ORDER — BUPROPION HYDROCHLORIDE 150 MG/1
TABLET ORAL
Qty: 90 TABLET | Refills: 3 | Status: SHIPPED | OUTPATIENT
Start: 2023-05-13

## 2023-05-13 NOTE — TELEPHONE ENCOUNTER
Refill Decision Note   Sam Pete  is requesting a refill authorization.  Brief Assessment and Rationale for Refill:  Approve     Medication Therapy Plan:       Medication Reconciliation Completed: No   Comments:     No Care Gaps recommended.     Note composed:3:33 AM 05/13/2023

## 2023-05-16 ENCOUNTER — LAB VISIT (OUTPATIENT)
Dept: LAB | Facility: HOSPITAL | Age: 78
End: 2023-05-16
Attending: NURSE PRACTITIONER
Payer: MEDICARE

## 2023-05-16 DIAGNOSIS — D50.9 IRON DEFICIENCY ANEMIA, UNSPECIFIED IRON DEFICIENCY ANEMIA TYPE: ICD-10-CM

## 2023-05-16 LAB
ANION GAP SERPL CALC-SCNC: 12 MMOL/L (ref 8–16)
BASOPHILS # BLD AUTO: 0.03 K/UL (ref 0–0.2)
BASOPHILS NFR BLD: 0.4 % (ref 0–1.9)
BUN SERPL-MCNC: 11 MG/DL (ref 8–23)
CALCIUM SERPL-MCNC: 9 MG/DL (ref 8.7–10.5)
CHLORIDE SERPL-SCNC: 102 MMOL/L (ref 95–110)
CO2 SERPL-SCNC: 27 MMOL/L (ref 23–29)
CREAT SERPL-MCNC: 0.9 MG/DL (ref 0.5–1.4)
DIFFERENTIAL METHOD: ABNORMAL
EOSINOPHIL # BLD AUTO: 0.4 K/UL (ref 0–0.5)
EOSINOPHIL NFR BLD: 4.8 % (ref 0–8)
ERYTHROCYTE [DISTWIDTH] IN BLOOD BY AUTOMATED COUNT: 13.2 % (ref 11.5–14.5)
EST. GFR  (NO RACE VARIABLE): >60 ML/MIN/1.73 M^2
FERRITIN SERPL-MCNC: 416 NG/ML (ref 20–300)
GLUCOSE SERPL-MCNC: 136 MG/DL (ref 70–110)
HCT VFR BLD AUTO: 44.3 % (ref 40–54)
HGB BLD-MCNC: 14.4 G/DL (ref 14–18)
IMM GRANULOCYTES # BLD AUTO: 0.02 K/UL (ref 0–0.04)
IMM GRANULOCYTES NFR BLD AUTO: 0.3 % (ref 0–0.5)
IRON SERPL-MCNC: 93 UG/DL (ref 45–160)
LYMPHOCYTES # BLD AUTO: 2.4 K/UL (ref 1–4.8)
LYMPHOCYTES NFR BLD: 32.6 % (ref 18–48)
MCH RBC QN AUTO: 30.4 PG (ref 27–31)
MCHC RBC AUTO-ENTMCNC: 32.5 G/DL (ref 32–36)
MCV RBC AUTO: 94 FL (ref 82–98)
MONOCYTES # BLD AUTO: 0.8 K/UL (ref 0.3–1)
MONOCYTES NFR BLD: 10.8 % (ref 4–15)
NEUTROPHILS # BLD AUTO: 3.7 K/UL (ref 1.8–7.7)
NEUTROPHILS NFR BLD: 51.1 % (ref 38–73)
NRBC BLD-RTO: 0 /100 WBC
PLATELET # BLD AUTO: 264 K/UL (ref 150–450)
PMV BLD AUTO: 8.7 FL (ref 9.2–12.9)
POTASSIUM SERPL-SCNC: 4.5 MMOL/L (ref 3.5–5.1)
RBC # BLD AUTO: 4.73 M/UL (ref 4.6–6.2)
SATURATED IRON: 29 % (ref 20–50)
SODIUM SERPL-SCNC: 141 MMOL/L (ref 136–145)
TOTAL IRON BINDING CAPACITY: 326 UG/DL (ref 250–450)
TRANSFERRIN SERPL-MCNC: 220 MG/DL (ref 200–375)
WBC # BLD AUTO: 7.29 K/UL (ref 3.9–12.7)

## 2023-05-16 PROCEDURE — 80048 BASIC METABOLIC PNL TOTAL CA: CPT | Mod: PN | Performed by: NURSE PRACTITIONER

## 2023-05-16 PROCEDURE — 84238 ASSAY NONENDOCRINE RECEPTOR: CPT | Performed by: NURSE PRACTITIONER

## 2023-05-16 PROCEDURE — 85025 COMPLETE CBC W/AUTO DIFF WBC: CPT | Mod: PN | Performed by: NURSE PRACTITIONER

## 2023-05-16 PROCEDURE — 36415 COLL VENOUS BLD VENIPUNCTURE: CPT | Mod: PN | Performed by: NURSE PRACTITIONER

## 2023-05-16 PROCEDURE — 84466 ASSAY OF TRANSFERRIN: CPT | Performed by: NURSE PRACTITIONER

## 2023-05-16 PROCEDURE — 82728 ASSAY OF FERRITIN: CPT | Performed by: NURSE PRACTITIONER

## 2023-05-18 LAB — STFR SERPL-MCNC: 3 MG/L (ref 1.8–4.6)

## 2023-05-22 ENCOUNTER — OFFICE VISIT (OUTPATIENT)
Dept: HEMATOLOGY/ONCOLOGY | Facility: CLINIC | Age: 78
End: 2023-05-22
Payer: MEDICARE

## 2023-05-22 ENCOUNTER — CLINICAL SUPPORT (OUTPATIENT)
Dept: CARDIOLOGY | Facility: HOSPITAL | Age: 78
End: 2023-05-22
Attending: INTERNAL MEDICINE
Payer: MEDICARE

## 2023-05-22 VITALS
HEART RATE: 89 BPM | TEMPERATURE: 98 F | RESPIRATION RATE: 18 BRPM | BODY MASS INDEX: 33.23 KG/M2 | WEIGHT: 232.13 LBS | HEIGHT: 70 IN | OXYGEN SATURATION: 97 % | SYSTOLIC BLOOD PRESSURE: 120 MMHG | DIASTOLIC BLOOD PRESSURE: 74 MMHG

## 2023-05-22 VITALS — HEIGHT: 70 IN | BODY MASS INDEX: 33.21 KG/M2 | WEIGHT: 232 LBS

## 2023-05-22 DIAGNOSIS — D50.9 IRON DEFICIENCY ANEMIA, UNSPECIFIED IRON DEFICIENCY ANEMIA TYPE: Primary | ICD-10-CM

## 2023-05-22 DIAGNOSIS — I48.0 PAF (PAROXYSMAL ATRIAL FIBRILLATION): ICD-10-CM

## 2023-05-22 DIAGNOSIS — R55 SYNCOPE AND COLLAPSE: ICD-10-CM

## 2023-05-22 DIAGNOSIS — K21.9 GASTROESOPHAGEAL REFLUX DISEASE WITHOUT ESOPHAGITIS: ICD-10-CM

## 2023-05-22 LAB
ASCENDING AORTA: 2.94 CM
AV INDEX (PROSTH): 0.93
AV MEAN GRADIENT: 4 MMHG
AV PEAK GRADIENT: 8 MMHG
AV VALVE AREA: 3.86 CM2
AV VELOCITY RATIO: 0.88
BSA FOR ECHO PROCEDURE: 2.28 M2
CV ECHO LV RWT: 0.5 CM
DOP CALC AO PEAK VEL: 1.41 M/S
DOP CALC AO VTI: 29.6 CM
DOP CALC LVOT AREA: 4.2 CM2
DOP CALC LVOT DIAMETER: 2.3 CM
DOP CALC LVOT PEAK VEL: 1.24 M/S
DOP CALC LVOT STROKE VOLUME: 114.2 CM3
DOP CALCLVOT PEAK VEL VTI: 27.5 CM
E WAVE DECELERATION TIME: 239.99 MSEC
E/A RATIO: 1.07
E/E' RATIO: 10.88 M/S
ECHO LV POSTERIOR WALL: 1 CM (ref 0.6–1.1)
EJECTION FRACTION: 60 %
FRACTIONAL SHORTENING: 29 % (ref 28–44)
INTERVENTRICULAR SEPTUM: 1 CM (ref 0.6–1.1)
LA MAJOR: 4.62 CM
LA MINOR: 5.03 CM
LA WIDTH: 4.4 CM
LEFT ARM DIASTOLIC BLOOD PRESSURE: 74 MMHG
LEFT ARM SYSTOLIC BLOOD PRESSURE: 120 MMHG
LEFT ATRIUM SIZE: 3.23 CM
LEFT ATRIUM VOLUME INDEX: 26.2 ML/M2
LEFT ATRIUM VOLUME: 58.18 CM3
LEFT CBA DIAS: 18 CM/S
LEFT CBA SYS: 87 CM/S
LEFT CCA DIST DIAS: 17 CM/S
LEFT CCA DIST SYS: 88 CM/S
LEFT CCA MID DIAS: 14 CM/S
LEFT CCA MID SYS: 98 CM/S
LEFT CCA PROX DIAS: 11 CM/S
LEFT CCA PROX SYS: 106 CM/S
LEFT ECA DIAS: 15 CM/S
LEFT ECA SYS: 130 CM/S
LEFT ICA DIST DIAS: 17 CM/S
LEFT ICA DIST SYS: 64 CM/S
LEFT ICA MID DIAS: 25 CM/S
LEFT ICA MID SYS: 80 CM/S
LEFT ICA PROX DIAS: 13 CM/S
LEFT ICA PROX SYS: 77 CM/S
LEFT INTERNAL DIMENSION IN SYSTOLE: 2.85 CM (ref 2.1–4)
LEFT VENTRICLE DIASTOLIC VOLUME INDEX: 31.4 ML/M2
LEFT VENTRICLE DIASTOLIC VOLUME: 69.71 ML
LEFT VENTRICLE MASS INDEX: 57 G/M2
LEFT VENTRICLE SYSTOLIC VOLUME INDEX: 13.9 ML/M2
LEFT VENTRICLE SYSTOLIC VOLUME: 30.77 ML
LEFT VENTRICULAR INTERNAL DIMENSION IN DIASTOLE: 3.99 CM (ref 3.5–6)
LEFT VENTRICULAR MASS: 126.57 G
LEFT VERTEBRAL DIAS: 19 CM/S
LEFT VERTEBRAL SYS: 71 CM/S
LV LATERAL E/E' RATIO: 10.88 M/S
LV SEPTAL E/E' RATIO: 10.88 M/S
LVOT MG: 3.36 MMHG
LVOT MV: 0.88 CM/S
MV PEAK A VEL: 0.81 M/S
MV PEAK E VEL: 0.87 M/S
OHS CV CAROTID RIGHT ICA EDV HIGHEST: 24
OHS CV CAROTID ULTRASOUND LEFT ICA/CCA RATIO: 0.91
OHS CV CAROTID ULTRASOUND RIGHT ICA/CCA RATIO: 1.23
OHS CV PV CAROTID LEFT HIGHEST CCA: 106
OHS CV PV CAROTID LEFT HIGHEST ICA: 80
OHS CV PV CAROTID RIGHT HIGHEST CCA: 116
OHS CV PV CAROTID RIGHT HIGHEST ICA: 113
OHS CV US CAROTID LEFT HIGHEST EDV: 25
PISA TR MAX VEL: 2.95 M/S
PULM VEIN S/D RATIO: 1.27
PV PEAK D VEL: 0.45 M/S
PV PEAK S VEL: 0.57 M/S
RA MAJOR: 4.66 CM
RA PRESSURE: 3 MMHG
RA WIDTH: 2.8 CM
RIGHT ARM DIASTOLIC BLOOD PRESSURE: 74 MMHG
RIGHT ARM SYSTOLIC BLOOD PRESSURE: 120 MMHG
RIGHT CBA DIAS: 18 CM/S
RIGHT CBA SYS: 87 CM/S
RIGHT CCA DIST DIAS: 16 CM/S
RIGHT CCA DIST SYS: 92 CM/S
RIGHT CCA MID DIAS: 16 CM/S
RIGHT CCA MID SYS: 91 CM/S
RIGHT CCA PROX DIAS: 14 CM/S
RIGHT CCA PROX SYS: 116 CM/S
RIGHT ECA DIAS: 18 CM/S
RIGHT ECA SYS: 110 CM/S
RIGHT ICA DIST DIAS: 24 CM/S
RIGHT ICA DIST SYS: 88 CM/S
RIGHT ICA MID DIAS: 14 CM/S
RIGHT ICA MID SYS: 81 CM/S
RIGHT ICA PROX DIAS: 18 CM/S
RIGHT ICA PROX SYS: 113 CM/S
RIGHT VENTRICULAR END-DIASTOLIC DIMENSION: 3.9 CM
RIGHT VERTEBRAL DIAS: 14 CM/S
RIGHT VERTEBRAL SYS: 50 CM/S
RV TISSUE DOPPLER FREE WALL SYSTOLIC VELOCITY 1 (APICAL 4 CHAMBER VIEW): 0.01 CM/S
SINUS: 3.08 CM
STJ: 3.26 CM
TDI LATERAL: 0.08 M/S
TDI SEPTAL: 0.08 M/S
TDI: 0.08 M/S
TR MAX PG: 35 MMHG
TRICUSPID ANNULAR PLANE SYSTOLIC EXCURSION: 2.53 CM
TV REST PULMONARY ARTERY PRESSURE: 38 MMHG

## 2023-05-22 PROCEDURE — 93306 TTE W/DOPPLER COMPLETE: CPT | Mod: PO

## 2023-05-22 PROCEDURE — 99999 PR PBB SHADOW E&M-EST. PATIENT-LVL V: ICD-10-PCS | Mod: PBBFAC,,, | Performed by: NURSE PRACTITIONER

## 2023-05-22 PROCEDURE — 3074F PR MOST RECENT SYSTOLIC BLOOD PRESSURE < 130 MM HG: ICD-10-PCS | Mod: CPTII,S$GLB,, | Performed by: NURSE PRACTITIONER

## 2023-05-22 PROCEDURE — 99213 PR OFFICE/OUTPT VISIT, EST, LEVL III, 20-29 MIN: ICD-10-PCS | Mod: S$GLB,,, | Performed by: NURSE PRACTITIONER

## 2023-05-22 PROCEDURE — 3074F SYST BP LT 130 MM HG: CPT | Mod: CPTII,S$GLB,, | Performed by: NURSE PRACTITIONER

## 2023-05-22 PROCEDURE — 3078F DIAST BP <80 MM HG: CPT | Mod: CPTII,S$GLB,, | Performed by: NURSE PRACTITIONER

## 2023-05-22 PROCEDURE — 3288F FALL RISK ASSESSMENT DOCD: CPT | Mod: CPTII,S$GLB,, | Performed by: NURSE PRACTITIONER

## 2023-05-22 PROCEDURE — 93244 EXT ECG>48HR<7D REV&INTERPJ: CPT | Mod: ,,, | Performed by: INTERNAL MEDICINE

## 2023-05-22 PROCEDURE — 99215 OFFICE O/P EST HI 40 MIN: CPT | Mod: 25,PN | Performed by: NURSE PRACTITIONER

## 2023-05-22 PROCEDURE — 1101F PR PT FALLS ASSESS DOC 0-1 FALLS W/OUT INJ PAST YR: ICD-10-PCS | Mod: CPTII,S$GLB,, | Performed by: NURSE PRACTITIONER

## 2023-05-22 PROCEDURE — 1101F PT FALLS ASSESS-DOCD LE1/YR: CPT | Mod: CPTII,S$GLB,, | Performed by: NURSE PRACTITIONER

## 2023-05-22 PROCEDURE — 1160F PR REVIEW ALL MEDS BY PRESCRIBER/CLIN PHARMACIST DOCUMENTED: ICD-10-PCS | Mod: CPTII,S$GLB,, | Performed by: NURSE PRACTITIONER

## 2023-05-22 PROCEDURE — 1125F PR PAIN SEVERITY QUANTIFIED, PAIN PRESENT: ICD-10-PCS | Mod: CPTII,S$GLB,, | Performed by: NURSE PRACTITIONER

## 2023-05-22 PROCEDURE — 93244 HOLTER MONITOR - 72 HOUR: ICD-10-PCS | Mod: ,,, | Performed by: INTERNAL MEDICINE

## 2023-05-22 PROCEDURE — 1159F MED LIST DOCD IN RCRD: CPT | Mod: CPTII,S$GLB,, | Performed by: NURSE PRACTITIONER

## 2023-05-22 PROCEDURE — 99999 PR PBB SHADOW E&M-EST. PATIENT-LVL V: CPT | Mod: PBBFAC,,, | Performed by: NURSE PRACTITIONER

## 2023-05-22 PROCEDURE — 93880 EXTRACRANIAL BILAT STUDY: CPT | Mod: PO

## 2023-05-22 PROCEDURE — 3288F PR FALLS RISK ASSESSMENT DOCUMENTED: ICD-10-PCS | Mod: CPTII,S$GLB,, | Performed by: NURSE PRACTITIONER

## 2023-05-22 PROCEDURE — 1159F PR MEDICATION LIST DOCUMENTED IN MEDICAL RECORD: ICD-10-PCS | Mod: CPTII,S$GLB,, | Performed by: NURSE PRACTITIONER

## 2023-05-22 PROCEDURE — 93880 CV US DOPPLER CAROTID (CUPID ONLY): ICD-10-PCS | Mod: 26,,, | Performed by: INTERNAL MEDICINE

## 2023-05-22 PROCEDURE — 1125F AMNT PAIN NOTED PAIN PRSNT: CPT | Mod: CPTII,S$GLB,, | Performed by: NURSE PRACTITIONER

## 2023-05-22 PROCEDURE — 93242 EXT ECG>48HR<7D RECORDING: CPT | Mod: PO

## 2023-05-22 PROCEDURE — 93880 EXTRACRANIAL BILAT STUDY: CPT | Mod: 26,,, | Performed by: INTERNAL MEDICINE

## 2023-05-22 PROCEDURE — 99213 OFFICE O/P EST LOW 20 MIN: CPT | Mod: S$GLB,,, | Performed by: NURSE PRACTITIONER

## 2023-05-22 PROCEDURE — 3078F PR MOST RECENT DIASTOLIC BLOOD PRESSURE < 80 MM HG: ICD-10-PCS | Mod: CPTII,S$GLB,, | Performed by: NURSE PRACTITIONER

## 2023-05-22 PROCEDURE — 1160F RVW MEDS BY RX/DR IN RCRD: CPT | Mod: CPTII,S$GLB,, | Performed by: NURSE PRACTITIONER

## 2023-05-22 NOTE — PROGRESS NOTES
Subjective:      Name: Sam Pete  : 1945  MRN: [id number]    CC:  Review of labs (post Venofer)    HPI:   Sam Pete is a 78 y.o. male presents for evaluation of REAL.  Hx of Anticoagulation, AFIB, GERD, DDD, Arthritis, HTN, Lumbar stenosis, Stroke, Testicular hypofunction.    Consulted by Dr. Melchor in September of this year for microcytic anemia.  Previously normal hemoglobin hematocrit.  However, since , patient has had mild anemia with hemoglobins ranging 12-13.    More recently, hemoglobins have run 10-11.    Patient did have thyroidectomy in February for benign pathology.    Colonoscopy three years ago which was remarkable for nonbleeding internal hemorrhoids.  No specimens were biopsied and no follow-up endoscopy was recommended in light of his age.   Patient uses PPI daily.    Received IV Venofer x 5 doses between  - 10/28/2022  Required additional Venofer x 5:   - 23    TODAY:  23  He presents to the clinic today for interval evaluation & review of labs post iron infusions.  He reports some fatigue, but feels much better.  Denies SOB, CP, bleeding, blurred vision, headaches, pica, apnea, snoring, etc.    Past Medical History:   Diagnosis Date    Anticoagulant long-term use     ASA 81 mg    Arthritis     Cataract     OU    Chronic low back pain     Complete rupture of rotator cuff 2011    DDD (degenerative disc disease), lumbar 2015    Degeneration of lumbar or lumbosacral intervertebral disc 2015    ED (erectile dysfunction)     GERD (gastroesophageal reflux disease)     Hypertension     Lumbar pseudoarthrosis 2017    Lumbar stenosis 2017    Obesity     Spondylosis of lumbar region without myelopathy or radiculopathy 2015    Stroke 1997    basal ganglia, left sided weakness, resolved    Testicular hypofunction     Thoracic or lumbosacral neuritis or radiculitis 2015       Past Surgical History:   Procedure Laterality Date     APPENDECTOMY      ARTHROPLASTY OF SHOULDER Right 03/22/2022    Procedure: ARTHROPLASTY, SHOULDER BRENNAN RIGHT SHOULDER HUMERAL HEAD RESURFACING;  Surgeon: Frankie Joya MD;  Location: [facility];  Service: Orthopedics;  Laterality: Right;    BACK SURGERY      4- back surgery    CAUDAL EPIDURAL STEROID INJECTION N/A 12/16/2021    Procedure: Injection-steroid-epidural-caudal;  Surgeon: Aram Terrell MD;  Location: [facility] OR;  Service: Pain Management;  Laterality: N/A;    CAUDAL EPIDURAL STEROID INJECTION N/A 02/02/2022    Procedure: INJECTION, STEROID, SPINE, EPIDURAL, CAUDAL;  Surgeon: Aram Terrell MD;  Location: [facility] OR;  Service: Pain Management;  Laterality: N/A;    CAUDAL EPIDURAL STEROID INJECTION N/A 7/22/2022    Procedure: Injection-steroid-epidural-caudal;  Surgeon: Aram Terrell MD;  Location: [facility] OR;  Service: Pain Management;  Laterality: N/A;  Caudal CARLOS     CAUDAL EPIDURAL STEROID INJECTION N/A 1/20/2023    Procedure: Injection-steroid-epidural-caudal;  Surgeon: Aram Terrell MD;  Location: [facility] OR;  Service: Pain Management;  Laterality: N/A;  caudal ( melinda)    CAUDAL EPIDURAL STEROID INJECTION N/A 4/21/2023    Procedure: Injection-steroid-epidural-caudal;  Surgeon: Aram Terrell MD;  Location: [facility] OR;  Service: Pain Management;  Laterality: N/A;  caudal    COLONOSCOPY  07/12/2004    [facility].   Redundant tortuous colon, otherwise normal.    COLONOSCOPY N/A 02/25/2019    Procedure: COLONOSCOPY;  Surgeon: Gilbert Rodriguez Jr., MD;  Location: [facility];  Service: Endoscopy;  Laterality: N/A;    Epidural steroid injection      Pain management    ESOPHAGOGASTRODUODENOSCOPY  07/12/2004    [facility].    FRACTURE SURGERY Left     wrist / forearm, total of 5    INSERTION OF DORSAL COLUMN NERVE STIMULATOR FOR TRIAL N/A 12/12/2019    Procedure: INSERTION, NEUROSTIMULATOR, SPINAL CORD, DORSAL COLUMN, FOR TRIAL;  Surgeon: Evan Lyles MD;  Location: [facility] OR;  Service: Pain Management;  Laterality: N/A;    JOINT  REPLACEMENT  2013    ( rt hip ), left hip     JOINT REPLACEMENT Left     total knee    KNEE ARTHROSCOPY W/ DEBRIDEMENT      bilateral knees , total of six    KNEE SURGERY      LAMINECTOMY USING MINIMALLY INVASIVE TECHNIQUE N/A 2020    Procedure: LAMINECTOMY, SPINE, MINIMALLY INVASIVE /  PLACEMENT OF SPINAL CORD STIMULATOR;  Surgeon: Darlene Max MD;  Location: [facility];  Service: Neurosurgery;  Laterality: N/A;    Nervbe Block injection      Pain management    TOTAL SHOULDER ARTHROPLASTY Right 2022    Dr Joya    TOTAL THYROIDECTOMY      TOTAL THYROIDECTOMY Bilateral 2022    Dr Mendoza       Family History   Problem Relation Age of Onset    Hypertension Father     Heart disease Father     Drug abuse Daughter     Hypertension Son     Lung disease Sister     COPD Sister     Hypertension Sister     Diverticulitis Sister     Gout Sister     Kidney disease Sister     No Known Problems Mother     Eczema Neg Hx     Lupus Neg Hx     Psoriasis Neg Hx     Melanoma Neg Hx        Social History     Socioeconomic History    Marital status:    Tobacco Use    Smoking status: Former     Packs/day: 1.00     Years: 30.00     Pack years: 30.00     Types: Cigarettes     Start date: 8/3/1963     Quit date: 1992     Years since quittin.4    Smokeless tobacco: Never   Substance and Sexual Activity    Alcohol use: Not Currently     Comment: On occasion    Drug use: Yes     Types: Oxycodone, Morphine     Social Determinants of Health     Financial Resource Strain: Low Risk     Difficulty of Paying Living Expenses: Not hard at all   Food Insecurity: No Food Insecurity    Worried About Running Out of Food in the Last Year: Never true    Ran Out of Food in the Last Year: Never true   Transportation Needs: No Transportation Needs    Lack of Transportation (Medical): No    Lack of Transportation (Non-Medical): No   Physical Activity: Insufficiently Active    Days of Exercise per Week: 2 days     "Minutes of Exercise per Session: 30 min   Stress: Stress Concern Present    Feeling of Stress : To some extent   Social Connections: Socially Integrated    Frequency of Communication with Friends and Family: More than three times a week    Frequency of Social Gatherings with Friends and Family: Twice a week    Attends [employer] Services: More than 4 times per year    Active Member of Clubs or Organizations: Yes    Attends Club or Organization Meetings: More than 4 times per year    Marital Status:    Housing Stability: Low Risk     Unable to Pay for Housing in the Last Year: No    Number of Places Lived in the Last Year: 1    Unstable Housing in the Last Year: No       Review of patient's allergies indicates:   Allergen Reactions    Xyzal [levocetirizine] Other (See Comments)     Knocked him out        Review of Systems   All other systems reviewed and are negative.         Objective:     Vitals:    05/22/23 1128   BP: 120/74   BP Location: Right arm   Patient Position: Sitting   BP Method: Medium (Manual)   Pulse: 89   Resp: 18   Temp: 97.7 °F (36.5 °C)   TempSrc: Temporal   SpO2: 97%   Weight: 105.3 kg (232 lb 2.3 oz)   Height: 5' 10" (1.778 m)        Physical Exam  Vitals reviewed.   Constitutional:       General: He is not in acute distress.  HENT:      Head: Normocephalic and atraumatic.   Eyes:      Conjunctiva/sclera: Conjunctivae normal.   Cardiovascular:      Rate and Rhythm: Normal rate and regular rhythm.      Pulses: Normal pulses.   Pulmonary:      Effort: Pulmonary effort is normal.      Breath sounds: No wheezing.   Musculoskeletal:      Cervical back: Neck supple.   Lymphadenopathy:      Cervical: No cervical adenopathy.   Neurological:      Mental Status: He is alert and oriented to person, place, and time.   Psychiatric:         Behavior: Behavior normal.         Thought Content: Thought content normal.           Current Outpatient Medications on File Prior to Visit   Medication Sig    " albuterol (PROVENTIL/VENTOLIN HFA) 90 mcg/actuation inhaler EVERY 4 HOURS AS NEEDED as needed for    amLODIPine (NORVASC) 10 MG tablet Take one tablet by mouth daily    aspirin 81 MG Chew Take 81 mg by mouth once daily.    atorvastatin (LIPITOR) 40 MG tablet TAKE 1 TABLET (40 MG TOTAL) BY MOUTH ONCE DAILY.    buPROPion (WELLBUTRIN XL) 150 MG TB24 tablet Take 1 tablet (150 mg total) by mouth once daily.    calcitRIOL (ROCALTROL) 0.5 MCG Cap Take 1 capsule (0.5 mcg total) by mouth once daily.    fluticasone propionate (FLONASE) 50 mcg/actuation nasal spray 1 spray (50 mcg total) by Each Nostril route daily as needed for Rhinitis.    furosemide (LASIX) 40 MG tablet Take 1 tablet (40 mg total) by mouth once daily.    levothyroxine (SYNTHROID) 112 MCG tablet Take 2 tablets (224 mcg total) by mouth once daily.    losartan (COZAAR) 50 MG tablet TAKE 1 TABLET(50 MG) BY MOUTH EVERY DAY    morphine (MS CONTIN) 30 MG 12 hr tablet Take 1 tablet (30 mg total) by mouth 3 (three) times daily. Take two tablets (60mg) in the morning and one tablet (30mg) in the evening    [START ON 5/23/2023] morphine (MS CONTIN) 30 MG 12 hr tablet Take 1 tablet (30 mg total) by mouth 3 (three) times daily. Take two tablets (60mg) in the morning and one tablet (30mg) in the evening    [START ON 6/26/2023] morphine (MS CONTIN) 30 MG 12 hr tablet Take 1 tablet (30 mg total) by mouth 3 (three) times daily. Take two tablets (60mg) in the morning and one tablet (30mg) in the evening    mupirocin (BACTROBAN) 2 % ointment Apply to affected area 3 times daily    omeprazole (PRILOSEC) 40 MG capsule Take one capsule by mouth daily    oxyCODONE-acetaminophen (PERCOCET)  mg per tablet Take 1 tablet by mouth every 4 (four) hours as needed for Pain.    [START ON 5/23/2023] oxyCODONE-acetaminophen (PERCOCET)  mg per tablet Take 1 tablet by mouth every 4 (four) hours as needed for Pain.    [START ON 6/26/2023] oxyCODONE-acetaminophen (PERCOCET) 10325  mg per tablet Take 1 tablet by mouth every 4 (four) hours as needed for Pain.    saw/vit E/sod lisa/lyc/beta/pyg (PROSTATE HEALTH ORAL) Take 2 capsules by mouth once daily. With saw palmetto    sildenafil (REVATIO) 20 mg Tab Take 1-5 daily as needed for ED    tiZANidine (ZANAFLEX) 4 MG tablet Take 1 tablet (4 mg total) by mouth 3 (three) times daily as needed (muscle spasm).    UNABLE TO FIND Take by mouth once daily. Vitafusion multivites gummies    LIDOcaine (LIDODERM) 5 % Place 1 patch onto the skin once daily. Remove & Discard patch within 12 hours or as directed by MD (Patient not taking: Reported on 5/22/2023)    naloxone (NARCAN) 0.4 mg/mL injection Inject 1 mL (0.4 mg total) into the vein as needed (overdose). (Patient not taking: Reported on 5/22/2023)    ondansetron (ZOFRAN-ODT) 8 MG TbDL Take 1 tablet (8 mg total) by mouth 2 (two) times daily as needed for nausea for 5days (Patient not taking: Reported on 5/22/2023)    pregabalin (LYRICA) 75 MG capsule Take 1 capsule (75 mg total) by mouth 2 (two) times daily. (Patient not taking: Reported on 5/22/2023)     Current Facility-Administered Medications on File Prior to Visit   Medication    diphenhydrAMINE injection 12.5 mg    electrolyte-S (ISOLYTE)    HYDROmorphone (PF) injection 0.2 mg    HYDROmorphone (PF) injection 0.2 mg    lactated ringers infusion    lactated ringers infusion    lorazepam injection 0.25 mg    prochlorperazine injection Soln 5 mg    sodium chloride 0.9% flush 3 mL       CBC:  Lab Results   Component Value Date    WBC 7.29 05/16/2023    HGB 14.4 05/16/2023    HCT 44.3 05/16/2023    MCV 94 05/16/2023     05/16/2023        CMP:  Sodium   Date Value Ref Range Status   05/16/2023 141 136 - 145 mmol/L Final     Potassium   Date Value Ref Range Status   05/16/2023 4.5 3.5 - 5.1 mmol/L Final     Chloride   Date Value Ref Range Status   05/16/2023 102 95 - 110 mmol/L Final     CO2   Date Value Ref Range Status   05/16/2023 27 23 - 29  mmol/L Final     Glucose   Date Value Ref Range Status   05/16/2023 136 (H) 70 - 110 mg/dL Final     BUN   Date Value Ref Range Status   05/16/2023 11 8 - 23 mg/dL Final     Creatinine   Date Value Ref Range Status   05/16/2023 0.9 0.5 - 1.4 mg/dL Final   01/31/2013 0.8 0.5 - 1.4 mg/dL Final     Calcium   Date Value Ref Range Status   05/16/2023 9.0 8.7 - 10.5 mg/dL Final   01/31/2013 9.2 8.7 - 10.5 mg/dL Final     Total Protein   Date Value Ref Range Status   09/12/2022 7.8 6.0 - 8.4 g/dL Final     Albumin   Date Value Ref Range Status   09/12/2022 4.0 3.5 - 5.2 g/dL Final     Total Bilirubin   Date Value Ref Range Status   09/12/2022 0.3 0.1 - 1.0 mg/dL Final     Comment:     For infants and newborns, interpretation of results should be based  on gestational age, weight and in agreement with clinical  observations.    Premature Infant recommended reference ranges:  Up to 24 hours.............<8.0 mg/dL  Up to 48 hours............<12.0 mg/dL  3-5 days..................<15.0 mg/dL  6-29 days.................<15.0 mg/dL       Alkaline Phosphatase   Date Value Ref Range Status   09/12/2022 61 55 - 135 U/L Final     AST   Date Value Ref Range Status   09/12/2022 20 10 - 40 U/L Final     ALT   Date Value Ref Range Status   09/12/2022 18 10 - 44 U/L Final     Anion Gap   Date Value Ref Range Status   05/16/2023 12 8 - 16 mmol/L Final   01/31/2013 9 5 - 15 meq/L Final     eGFR if    Date Value Ref Range Status   07/12/2022 >60.0 >60 mL/min/1.73 m^2 Final     eGFR if non    Date Value Ref Range Status   07/12/2022 53.4 (A) >60 mL/min/1.73 m^2 Final     Comment:     Calculation used to obtain the estimated glomerular filtration  rate (eGFR) is the CKD-EPI equation.        Lab Results   Component Value Date    IRON 93 05/16/2023    TRANSFERRIN 220 05/16/2023    TIBC 326 05/16/2023    FESATURATED 29 05/16/2023      Lab Results   Component Value Date    FERRITIN 416 (H) 05/16/2023     sTFR =  3.0    All pertinent labs reviewed.    Assessment:       1. Iron deficiency anemia, unspecified iron deficiency anemia type    2. Gastroesophageal reflux disease without esophagitis           Plan:     Iron deficiency anemia, unspecified iron deficiency anemia type    Gastroesophageal reflux disease without esophagitis       REAL:  completed 10 infusions of Venofer; resolved; f/u as indicated.  F/U with PCP  GERD - continue PPI; follow up with PCP    Route Chart for Scheduling    Med Onc Chart Routing      Follow up with physician    Follow up with LORRIE No follow up needed.   Infusion scheduling note    Injection scheduling note    Labs    Imaging    Pharmacy appointment    Other referrals             Therapy Plan Information  Flushes  sodium chloride 0.9% flush 10 mL  10 mL, Intravenous, Every visit  heparin, porcine (PF) 100 unit/mL injection flush 500 Units  500 Units, Intravenous, Every visit  alteplase injection 2 mg  2 mg, Intra-Catheter, Every visit  PRN Medications  EPINEPHrine (EPIPEN) 0.3 mg/0.3 mL pen injection 0.3 mg  0.3 mg, Intramuscular, Every visit  diphenhydrAMINE injection 50 mg  50 mg, Intravenous, Every visit  methylPREDNISolone sodium succinate injection 125 mg  125 mg, Intravenous, Every visit  sodium chloride 0.9% bolus 1,000 mL 1,000 mL  1,000 mL, Intravenous, Every visit     20 minutes were spent in coordination of patient's care, record review and counseling.

## 2023-05-24 DIAGNOSIS — Z98.890 S/P PARATHYROIDECTOMY: ICD-10-CM

## 2023-05-24 DIAGNOSIS — Z90.89 S/P PARATHYROIDECTOMY: ICD-10-CM

## 2023-05-24 RX ORDER — CALCITRIOL 0.5 UG/1
CAPSULE ORAL
Qty: 90 CAPSULE | Refills: 3 | Status: SHIPPED | OUTPATIENT
Start: 2023-05-24

## 2023-05-29 ENCOUNTER — PATIENT MESSAGE (OUTPATIENT)
Dept: RADIOLOGY | Facility: HOSPITAL | Age: 78
End: 2023-05-29
Payer: MEDICARE

## 2023-05-31 ENCOUNTER — HOSPITAL ENCOUNTER (OUTPATIENT)
Dept: RADIOLOGY | Facility: HOSPITAL | Age: 78
Discharge: HOME | End: 2023-05-31
Attending: INTERNAL MEDICINE
Payer: MEDICARE

## 2023-05-31 ENCOUNTER — CLINICAL SUPPORT (OUTPATIENT)
Dept: CARDIOLOGY | Facility: HOSPITAL | Age: 78
End: 2023-05-31
Attending: INTERNAL MEDICINE
Payer: MEDICARE

## 2023-05-31 VITALS — HEIGHT: 70 IN | WEIGHT: 232 LBS | BODY MASS INDEX: 33.21 KG/M2

## 2023-05-31 DIAGNOSIS — I70.0 ATHEROSCLEROSIS OF AORTA: Chronic | ICD-10-CM

## 2023-05-31 DIAGNOSIS — R06.09 DOE (DYSPNEA ON EXERTION): ICD-10-CM

## 2023-05-31 DIAGNOSIS — R55 SYNCOPE AND COLLAPSE: ICD-10-CM

## 2023-05-31 DIAGNOSIS — I48.0 PAF (PAROXYSMAL ATRIAL FIBRILLATION): ICD-10-CM

## 2023-05-31 PROCEDURE — 93016 CV STRESS TEST SUPVJ ONLY: CPT | Mod: ,,, | Performed by: INTERNAL MEDICINE

## 2023-05-31 PROCEDURE — 93016 NUCLEAR STRESS - CARDIOLOGY INTERPRETED (CUPID ONLY): ICD-10-PCS | Mod: ,,, | Performed by: INTERNAL MEDICINE

## 2023-05-31 PROCEDURE — 93018 CV STRESS TEST I&R ONLY: CPT | Mod: ,,, | Performed by: INTERNAL MEDICINE

## 2023-05-31 PROCEDURE — A9502 TC99M TETROFOSMIN: HCPCS | Mod: PO

## 2023-05-31 PROCEDURE — 93017 CV STRESS TEST TRACING ONLY: CPT | Mod: PO

## 2023-05-31 PROCEDURE — 93018 PR CARDIAC STRESS TST,INTERP/REPT ONLY: ICD-10-PCS | Mod: ,,, | Performed by: INTERNAL MEDICINE

## 2023-05-31 PROCEDURE — 78452 NUCLEAR STRESS - CARDIOLOGY INTERPRETED (CUPID ONLY): ICD-10-PCS | Mod: 26,,, | Performed by: INTERNAL MEDICINE

## 2023-05-31 PROCEDURE — 63600175 PHARM REV CODE 636 W HCPCS: Mod: PO | Performed by: INTERNAL MEDICINE

## 2023-05-31 PROCEDURE — 78452 HT MUSCLE IMAGE SPECT MULT: CPT | Mod: 26,,, | Performed by: INTERNAL MEDICINE

## 2023-05-31 RX ORDER — REGADENOSON 0.08 MG/ML
0.4 INJECTION, SOLUTION INTRAVENOUS
Status: COMPLETED | OUTPATIENT
Start: 2023-05-31 | End: 2023-05-31

## 2023-05-31 RX ADMIN — REGADENOSON 0.4 MG: 0.08 INJECTION, SOLUTION INTRAVENOUS at 02:05

## 2023-06-01 ENCOUNTER — PATIENT MESSAGE (OUTPATIENT)
Dept: FAMILY MEDICINE | Facility: CLINIC | Age: 78
End: 2023-06-01
Payer: MEDICARE

## 2023-06-01 LAB
CV PHARM DOSE: 0.4 MG
CV STRESS BASE HR: 71 BPM
DIASTOLIC BLOOD PRESSURE: 82 MMHG
NUC REST EJECTION FRACTION: 85
OHS CV CPX 1 MINUTE RECOVERY HEART RATE: 87 BPM
OHS CV CPX 85 PERCENT MAX PREDICTED HEART RATE MALE: 122
OHS CV CPX MAX PREDICTED HEART RATE: 144
OHS CV CPX PATIENT IS FEMALE: 0
OHS CV CPX PATIENT IS MALE: 1
OHS CV CPX PEAK DIASTOLIC BLOOD PRESSURE: 82 MMHG
OHS CV CPX PEAK HEAR RATE: 90 BPM
OHS CV CPX PEAK RATE PRESSURE PRODUCT: NORMAL
OHS CV CPX PEAK SYSTOLIC BLOOD PRESSURE: 145 MMHG
OHS CV CPX PERCENT MAX PREDICTED HEART RATE ACHIEVED: 63
OHS CV CPX RATE PRESSURE PRODUCT PRESENTING: NORMAL
OHS CV PHARM TIME: 1436 MIN
SYSTOLIC BLOOD PRESSURE: 145 MMHG

## 2023-06-05 ENCOUNTER — OFFICE VISIT (OUTPATIENT)
Dept: SPINE | Facility: CLINIC | Age: 78
End: 2023-06-05
Payer: MEDICARE

## 2023-06-05 ENCOUNTER — TELEPHONE (OUTPATIENT)
Dept: PAIN MEDICINE | Facility: CLINIC | Age: 78
End: 2023-06-05
Payer: MEDICARE

## 2023-06-05 VITALS — WEIGHT: 231.94 LBS | HEIGHT: 70 IN | BODY MASS INDEX: 33.21 KG/M2

## 2023-06-05 DIAGNOSIS — M54.41 CHRONIC BILATERAL LOW BACK PAIN WITH BILATERAL SCIATICA: Primary | ICD-10-CM

## 2023-06-05 DIAGNOSIS — M54.16 LUMBAR RADICULOPATHY: Primary | ICD-10-CM

## 2023-06-05 DIAGNOSIS — M54.42 CHRONIC BILATERAL LOW BACK PAIN WITH BILATERAL SCIATICA: Primary | ICD-10-CM

## 2023-06-05 DIAGNOSIS — G89.29 CHRONIC BILATERAL LOW BACK PAIN WITH BILATERAL SCIATICA: Primary | ICD-10-CM

## 2023-06-05 PROCEDURE — 1125F PR PAIN SEVERITY QUANTIFIED, PAIN PRESENT: ICD-10-PCS | Mod: CPTII,S$GLB,, | Performed by: PHYSICAL MEDICINE & REHABILITATION

## 2023-06-05 PROCEDURE — 99213 OFFICE O/P EST LOW 20 MIN: CPT | Mod: S$GLB,,, | Performed by: PHYSICAL MEDICINE & REHABILITATION

## 2023-06-05 PROCEDURE — 1125F AMNT PAIN NOTED PAIN PRSNT: CPT | Mod: CPTII,S$GLB,, | Performed by: PHYSICAL MEDICINE & REHABILITATION

## 2023-06-05 PROCEDURE — 3288F FALL RISK ASSESSMENT DOCD: CPT | Mod: CPTII,S$GLB,, | Performed by: PHYSICAL MEDICINE & REHABILITATION

## 2023-06-05 PROCEDURE — 3288F PR FALLS RISK ASSESSMENT DOCUMENTED: ICD-10-PCS | Mod: CPTII,S$GLB,, | Performed by: PHYSICAL MEDICINE & REHABILITATION

## 2023-06-05 PROCEDURE — 1101F PT FALLS ASSESS-DOCD LE1/YR: CPT | Mod: CPTII,S$GLB,, | Performed by: PHYSICAL MEDICINE & REHABILITATION

## 2023-06-05 PROCEDURE — 1160F RVW MEDS BY RX/DR IN RCRD: CPT | Mod: CPTII,S$GLB,, | Performed by: PHYSICAL MEDICINE & REHABILITATION

## 2023-06-05 PROCEDURE — 99213 PR OFFICE/OUTPT VISIT, EST, LEVL III, 20-29 MIN: ICD-10-PCS | Mod: S$GLB,,, | Performed by: PHYSICAL MEDICINE & REHABILITATION

## 2023-06-05 PROCEDURE — 1101F PR PT FALLS ASSESS DOC 0-1 FALLS W/OUT INJ PAST YR: ICD-10-PCS | Mod: CPTII,S$GLB,, | Performed by: PHYSICAL MEDICINE & REHABILITATION

## 2023-06-05 PROCEDURE — 1160F PR REVIEW ALL MEDS BY PRESCRIBER/CLIN PHARMACIST DOCUMENTED: ICD-10-PCS | Mod: CPTII,S$GLB,, | Performed by: PHYSICAL MEDICINE & REHABILITATION

## 2023-06-05 PROCEDURE — 1159F MED LIST DOCD IN RCRD: CPT | Mod: CPTII,S$GLB,, | Performed by: PHYSICAL MEDICINE & REHABILITATION

## 2023-06-05 PROCEDURE — 1159F PR MEDICATION LIST DOCUMENTED IN MEDICAL RECORD: ICD-10-PCS | Mod: CPTII,S$GLB,, | Performed by: PHYSICAL MEDICINE & REHABILITATION

## 2023-06-05 NOTE — PROGRESS NOTES
SUBJECTIVE:    Patient ID: Sam Pete is a 78 y.o. male.    Chief Complaint: Follow-up (CARLOS follow up)    He is here for follow-up status post caudal epidural injection 2023 with Dr. Terrell.  Only about 5% improvement in his back pain symptoms.  Pain level is 7/10.  He says he is interested in physical therapy.  He wants to repeat the injection.  Has no new or progressive problems        Past Medical History:   Diagnosis Date    Anticoagulant long-term use     ASA 81 mg    Arthritis     Cataract     OU    Chronic low back pain     Complete rupture of rotator cuff 2011    DDD (degenerative disc disease), lumbar 2015    Degeneration of lumbar or lumbosacral intervertebral disc 2015    ED (erectile dysfunction)     GERD (gastroesophageal reflux disease)     Hypertension     Lumbar pseudoarthrosis 2017    Lumbar stenosis 2017    Obesity     Spondylosis of lumbar region without myelopathy or radiculopathy 2015    Stroke     basal ganglia, left sided weakness, resolved    Testicular hypofunction     Thoracic or lumbosacral neuritis or radiculitis 2015     Social History     Socioeconomic History    Marital status:    Tobacco Use    Smoking status: Former     Packs/day: 1.00     Years: 30.00     Pack years: 30.00     Types: Cigarettes     Start date: 8/3/1963     Quit date: 1992     Years since quittin.4    Smokeless tobacco: Never   Substance and Sexual Activity    Alcohol use: Not Currently     Comment: On occasion    Drug use: Yes     Types: Oxycodone, Morphine     Social Determinants of Health     Financial Resource Strain: Low Risk     Difficulty of Paying Living Expenses: Not hard at all   Food Insecurity: No Food Insecurity    Worried About Running Out of Food in the Last Year: Never true    Ran Out of Food in the Last Year: Never true   Transportation Needs: No Transportation Needs    Lack of Transportation (Medical): No    Lack of  Transportation (Non-Medical): No   Physical Activity: Insufficiently Active    Days of Exercise per Week: 2 days    Minutes of Exercise per Session: 30 min   Stress: Stress Concern Present    Feeling of Stress : To some extent   Social Connections: Socially Integrated    Frequency of Communication with Friends and Family: More than three times a week    Frequency of Social Gatherings with Friends and Family: Twice a week    Attends [employer] Services: More than 4 times per year    Active Member of Clubs or Organizations: Yes    Attends Club or Organization Meetings: More than 4 times per year    Marital Status:    Housing Stability: Low Risk     Unable to Pay for Housing in the Last Year: No    Number of Places Lived in the Last Year: 1    Unstable Housing in the Last Year: No     Past Surgical History:   Procedure Laterality Date    APPENDECTOMY      ARTHROPLASTY OF SHOULDER Right 03/22/2022    Procedure: ARTHROPLASTY, SHOULDER BRENNAN RIGHT SHOULDER HUMERAL HEAD RESURFACING;  Surgeon: Frankie Joya MD;  Location: [facility] OR;  Service: Orthopedics;  Laterality: Right;    BACK SURGERY      4- back surgery    CAUDAL EPIDURAL STEROID INJECTION N/A 12/16/2021    Procedure: Injection-steroid-epidural-caudal;  Surgeon: Aram Terrell MD;  Location: [facility] OR;  Service: Pain Management;  Laterality: N/A;    CAUDAL EPIDURAL STEROID INJECTION N/A 02/02/2022    Procedure: INJECTION, STEROID, SPINE, EPIDURAL, CAUDAL;  Surgeon: Aram Terrell MD;  Location: [facility] OR;  Service: Pain Management;  Laterality: N/A;    CAUDAL EPIDURAL STEROID INJECTION N/A 7/22/2022    Procedure: Injection-steroid-epidural-caudal;  Surgeon: Aram Terrell MD;  Location: [facility] OR;  Service: Pain Management;  Laterality: N/A;  Caudal CARLOS     CAUDAL EPIDURAL STEROID INJECTION N/A 1/20/2023    Procedure: Injection-steroid-epidural-caudal;  Surgeon: Aram Terrell MD;  Location: [facility] OR;  Service: Pain Management;  Laterality: N/A;  caudal ( melinda)    CAUDAL  "EPIDURAL STEROID INJECTION N/A 4/21/2023    Procedure: Injection-steroid-epidural-caudal;  Surgeon: Aram Terrell MD;  Location: [facility] OR;  Service: Pain Management;  Laterality: N/A;  caudal    COLONOSCOPY  07/12/2004    [facility].   Redundant tortuous colon, otherwise normal.    COLONOSCOPY N/A 02/25/2019    Procedure: COLONOSCOPY;  Surgeon: Gilbert Rodriguez Jr., MD;  Location: [facility];  Service: Endoscopy;  Laterality: N/A;    Epidural steroid injection      Pain management    ESOPHAGOGASTRODUODENOSCOPY  07/12/2004    [facility].    FRACTURE SURGERY Left     wrist / forearm, total of 5    INSERTION OF DORSAL COLUMN NERVE STIMULATOR FOR TRIAL N/A 12/12/2019    Procedure: INSERTION, NEUROSTIMULATOR, SPINAL CORD, DORSAL COLUMN, FOR TRIAL;  Surgeon: Evan Lyles MD;  Location: [facility] OR;  Service: Pain Management;  Laterality: N/A;    JOINT REPLACEMENT  02/06/2013    ( rt hip 2007), left hip     JOINT REPLACEMENT Left     total knee    KNEE ARTHROSCOPY W/ DEBRIDEMENT      bilateral knees , total of six    KNEE SURGERY      LAMINECTOMY USING MINIMALLY INVASIVE TECHNIQUE N/A 02/12/2020    Procedure: LAMINECTOMY, SPINE, MINIMALLY INVASIVE /  PLACEMENT OF SPINAL CORD STIMULATOR;  Surgeon: Darlene Max MD;  Location: [facility] OR;  Service: Neurosurgery;  Laterality: N/A;    Nervbe Block injection      Pain management    TOTAL SHOULDER ARTHROPLASTY Right 03/28/2022    Dr Joya    TOTAL THYROIDECTOMY      TOTAL THYROIDECTOMY Bilateral 03/07/2022    Dr Mendoza     Family History   Problem Relation Age of Onset    Hypertension Father     Heart disease Father     Drug abuse Daughter     Hypertension Son     Lung disease Sister     COPD Sister     Hypertension Sister     Diverticulitis Sister     Gout Sister     Kidney disease Sister     No Known Problems Mother     Eczema Neg Hx     Lupus Neg Hx     Psoriasis Neg Hx     Melanoma Neg Hx      Vitals:    06/05/23 1459   Weight: 105.2 kg (231 lb 14.8 oz)   Height: 5' 10" (1.778 m) "       Review of Systems   Constitutional:  Negative for chills, diaphoresis, fatigue, fever and unexpected weight change.   HENT:  Negative for trouble swallowing.    Eyes:  Negative for visual disturbance.   Respiratory:  Negative for shortness of breath.    Cardiovascular:  Negative for chest pain.   Gastrointestinal:  Negative for abdominal pain, constipation, diarrhea, nausea and vomiting.   Genitourinary:  Negative for difficulty urinating.   Musculoskeletal:  Negative for arthralgias, back pain, gait problem, joint swelling, myalgias, neck pain and neck stiffness.   Neurological:  Negative for dizziness, speech difficulty, weakness, light-headedness, numbness and headaches.        Objective:      Physical Exam  Neurological:      Mental Status: He is alert and oriented to person, place, and time.           Assessment:       1. Chronic bilateral low back pain with bilateral sciatica           Plan:     We will get him scheduled to repeat the caudal injection.  If he fails that then I would have no additional recommendations.  I agree with physical therapy.  He can follow up with me after the procedure      Chronic bilateral low back pain with bilateral sciatica  -     Ambulatory referral/consult to Physical/Occupational Therapy; Future; Expected date: 06/12/2023

## 2023-06-05 NOTE — TELEPHONE ENCOUNTER
----- Message from Nikita Landrum MD sent at 6/5/2023  4:15 PM CDT -----  Please schedule for caudal epidural injection

## 2023-06-06 LAB
OHS CV EVENT MONITOR DAY: 0
OHS CV HOLTER LENGTH DECIMAL HOURS: 72
OHS CV HOLTER LENGTH HOURS: 72
OHS CV HOLTER LENGTH MINUTES: 0
OHS CV HOLTER SINUS AVERAGE HR: 83
OHS CV HOLTER SINUS MAX HR: 123
OHS CV HOLTER SINUS MIN HR: 66

## 2023-06-19 DIAGNOSIS — F11.20 UNCOMPLICATED OPIOID DEPENDENCE: ICD-10-CM

## 2023-06-19 NOTE — TELEPHONE ENCOUNTER
Spoke with pt. Pt stated he is going out of town Friday & will not be able to  his refill on 6/26. Pt wants to know if you can send in his refill for Friday 6/23 so he has it before he goes on vacation. Please advise. Medications pended.

## 2023-06-19 NOTE — TELEPHONE ENCOUNTER
----- Message from Carlo Bautisat sent at 6/19/2023  2:44 PM CDT -----  Regarding: Return Call  Contact: patient  Type:  Patient Returning Call    Who Called:Patient  Who Left Message for Patient:office staff  Does the patient know what this is regarding?:medication date/time change  Would the patient rather a call back or a response via [facility]? call  Best Call Back Number:[phone]  Additional Information: Please call patient to advise.

## 2023-06-19 NOTE — TELEPHONE ENCOUNTER
Care Due:                  Date            Visit Type   Department     Provider  --------------------------------------------------------------------------------                                EP -                              PRIMARY      [facility]  Last Visit: 04-      CARE ([facility])   PRACTICE       Ty Montalvo                              EP -                              PRIMARY      [facility]  Next Visit: 08-      CARE ([facility])   PRACTICE       Ty Montalvo                                                            Last  Test          Frequency    Reason                     Performed    Due Date  --------------------------------------------------------------------------------    CMP.........  12 months..  atorvastatin.............  09- 09-    Lipid Panel.  12 months..  atorvastatin.............  07- 07-    [facility] Embedded Care Due Messages. Reference number: [id number].   6/19/2023 4:06:57 PM CDT

## 2023-06-20 ENCOUNTER — OFFICE VISIT (OUTPATIENT)
Dept: CARDIOLOGY | Facility: CLINIC | Age: 78
End: 2023-06-20
Payer: MEDICARE

## 2023-06-20 VITALS
HEART RATE: 79 BPM | DIASTOLIC BLOOD PRESSURE: 76 MMHG | WEIGHT: 238.13 LBS | BODY MASS INDEX: 34.09 KG/M2 | SYSTOLIC BLOOD PRESSURE: 127 MMHG | HEIGHT: 70 IN

## 2023-06-20 DIAGNOSIS — R06.02 SOB (SHORTNESS OF BREATH): Chronic | ICD-10-CM

## 2023-06-20 DIAGNOSIS — E66.9 OBESITY (BMI 30.0-34.9): Chronic | ICD-10-CM

## 2023-06-20 DIAGNOSIS — I70.0 ATHEROSCLEROSIS OF AORTA: Chronic | ICD-10-CM

## 2023-06-20 DIAGNOSIS — E78.49 OTHER HYPERLIPIDEMIA: Chronic | ICD-10-CM

## 2023-06-20 DIAGNOSIS — R94.39 ABNORMAL NUCLEAR STRESS TEST: Primary | ICD-10-CM

## 2023-06-20 DIAGNOSIS — I10 ESSENTIAL (PRIMARY) HYPERTENSION: Chronic | ICD-10-CM

## 2023-06-20 DIAGNOSIS — I47.10 PSVT (PAROXYSMAL SUPRAVENTRICULAR TACHYCARDIA): ICD-10-CM

## 2023-06-20 PROCEDURE — 3078F DIAST BP <80 MM HG: CPT | Mod: CPTII,S$GLB,, | Performed by: INTERNAL MEDICINE

## 2023-06-20 PROCEDURE — 3074F SYST BP LT 130 MM HG: CPT | Mod: CPTII,S$GLB,, | Performed by: INTERNAL MEDICINE

## 2023-06-20 PROCEDURE — 1159F PR MEDICATION LIST DOCUMENTED IN MEDICAL RECORD: ICD-10-PCS | Mod: CPTII,S$GLB,, | Performed by: INTERNAL MEDICINE

## 2023-06-20 PROCEDURE — 99214 OFFICE O/P EST MOD 30 MIN: CPT | Mod: S$GLB,,, | Performed by: INTERNAL MEDICINE

## 2023-06-20 PROCEDURE — 3074F PR MOST RECENT SYSTOLIC BLOOD PRESSURE < 130 MM HG: ICD-10-PCS | Mod: CPTII,S$GLB,, | Performed by: INTERNAL MEDICINE

## 2023-06-20 PROCEDURE — 3078F PR MOST RECENT DIASTOLIC BLOOD PRESSURE < 80 MM HG: ICD-10-PCS | Mod: CPTII,S$GLB,, | Performed by: INTERNAL MEDICINE

## 2023-06-20 PROCEDURE — 3288F PR FALLS RISK ASSESSMENT DOCUMENTED: ICD-10-PCS | Mod: CPTII,S$GLB,, | Performed by: INTERNAL MEDICINE

## 2023-06-20 PROCEDURE — 1101F PR PT FALLS ASSESS DOC 0-1 FALLS W/OUT INJ PAST YR: ICD-10-PCS | Mod: CPTII,S$GLB,, | Performed by: INTERNAL MEDICINE

## 2023-06-20 PROCEDURE — 1101F PT FALLS ASSESS-DOCD LE1/YR: CPT | Mod: CPTII,S$GLB,, | Performed by: INTERNAL MEDICINE

## 2023-06-20 PROCEDURE — 99999 PR PBB SHADOW E&M-EST. PATIENT-LVL V: ICD-10-PCS | Mod: PBBFAC,,, | Performed by: INTERNAL MEDICINE

## 2023-06-20 PROCEDURE — 1126F PR PAIN SEVERITY QUANTIFIED, NO PAIN PRESENT: ICD-10-PCS | Mod: CPTII,S$GLB,, | Performed by: INTERNAL MEDICINE

## 2023-06-20 PROCEDURE — 3288F FALL RISK ASSESSMENT DOCD: CPT | Mod: CPTII,S$GLB,, | Performed by: INTERNAL MEDICINE

## 2023-06-20 PROCEDURE — 99999 PR PBB SHADOW E&M-EST. PATIENT-LVL V: CPT | Mod: PBBFAC,,, | Performed by: INTERNAL MEDICINE

## 2023-06-20 PROCEDURE — 99214 PR OFFICE/OUTPT VISIT, EST, LEVL IV, 30-39 MIN: ICD-10-PCS | Mod: S$GLB,,, | Performed by: INTERNAL MEDICINE

## 2023-06-20 PROCEDURE — 1126F AMNT PAIN NOTED NONE PRSNT: CPT | Mod: CPTII,S$GLB,, | Performed by: INTERNAL MEDICINE

## 2023-06-20 PROCEDURE — 1159F MED LIST DOCD IN RCRD: CPT | Mod: CPTII,S$GLB,, | Performed by: INTERNAL MEDICINE

## 2023-06-20 RX ORDER — NAPROXEN SODIUM 220 MG/1
81 TABLET, FILM COATED ORAL ONCE
Status: CANCELLED | OUTPATIENT
Start: 2023-06-20 | End: 2023-06-20

## 2023-06-20 RX ORDER — CLOPIDOGREL BISULFATE 75 MG/1
300 TABLET ORAL ONCE
Status: CANCELLED | OUTPATIENT
Start: 2023-06-20 | End: 2023-06-20

## 2023-06-20 RX ORDER — SODIUM CHLORIDE 9 MG/ML
INJECTION, SOLUTION INTRAVENOUS ONCE
Status: CANCELLED | OUTPATIENT
Start: 2023-06-20 | End: 2023-06-20

## 2023-06-20 RX ORDER — METOPROLOL SUCCINATE 25 MG/1
12.5 TABLET, EXTENDED RELEASE ORAL DAILY
Qty: 45 TABLET | Refills: 0 | Status: SHIPPED | OUTPATIENT
Start: 2023-06-20 | End: 2023-08-25 | Stop reason: SDUPTHER

## 2023-06-20 NOTE — H&P (VIEW-ONLY)
Subjective:    Patient ID:  Sam Pete is a 78 y.o. male who presents for Atherosclerosis of aorta        HPI  DISCUSSED TEST ECHO NORMAL LV FUNCTION MILDLY SCLEROTIC AORTIC VALVE WITH NO STENOSIS, MILD BILATERAL CAROTID PLAQUE NO STENOSIS, HOLTER SHORT RUNS OF PSVT PVCS AND PACS, ABNORMAL NUCLEAR STRESS TEST WITH MODERATE REVERSIBLE ISCHEMIA, BP OK, SEE ROS    Past Medical History:   Diagnosis Date    Anticoagulant long-term use     ASA 81 mg    Arthritis     Cataract     OU    Chronic low back pain     Complete rupture of rotator cuff 02/08/2011    DDD (degenerative disc disease), lumbar 07/08/2015    Degeneration of lumbar or lumbosacral intervertebral disc 09/09/2015    ED (erectile dysfunction)     GERD (gastroesophageal reflux disease)     Hypertension     Lumbar pseudoarthrosis 06/26/2017    Lumbar stenosis 06/26/2017    Obesity     Spondylosis of lumbar region without myelopathy or radiculopathy 07/08/2015    Stroke 1997    basal ganglia, left sided weakness, resolved    Testicular hypofunction     Thoracic or lumbosacral neuritis or radiculitis 07/08/2015     Past Surgical History:   Procedure Laterality Date    APPENDECTOMY      ARTHROPLASTY OF SHOULDER Right 03/22/2022    Procedure: ARTHROPLASTY, SHOULDER BRENNAN RIGHT SHOULDER HUMERAL HEAD RESURFACING;  Surgeon: Frankie Joya MD;  Location: [facility] OR;  Service: Orthopedics;  Laterality: Right;    BACK SURGERY      4- back surgery    CAUDAL EPIDURAL STEROID INJECTION N/A 12/16/2021    Procedure: Injection-steroid-epidural-caudal;  Surgeon: Aram Terrell MD;  Location: [facility] OR;  Service: Pain Management;  Laterality: N/A;    CAUDAL EPIDURAL STEROID INJECTION N/A 02/02/2022    Procedure: INJECTION, STEROID, SPINE, EPIDURAL, CAUDAL;  Surgeon: Aram Terrell MD;  Location: [facility] OR;  Service: Pain Management;  Laterality: N/A;    CAUDAL EPIDURAL STEROID INJECTION N/A 7/22/2022    Procedure: Injection-steroid-epidural-caudal;  Surgeon: Aram Terrell MD;   Location: [facility] OR;  Service: Pain Management;  Laterality: N/A;  Caudal CARLOS     CAUDAL EPIDURAL STEROID INJECTION N/A 1/20/2023    Procedure: Injection-steroid-epidural-caudal;  Surgeon: Aram Terrell MD;  Location: [facility] OR;  Service: Pain Management;  Laterality: N/A;  caudal ( melinda)    CAUDAL EPIDURAL STEROID INJECTION N/A 4/21/2023    Procedure: Injection-steroid-epidural-caudal;  Surgeon: Aram Terrell MD;  Location: [facility] OR;  Service: Pain Management;  Laterality: N/A;  caudal    COLONOSCOPY  07/12/2004    [facility].   Redundant tortuous colon, otherwise normal.    COLONOSCOPY N/A 02/25/2019    Procedure: COLONOSCOPY;  Surgeon: Gilbert Rodriguez Jr., MD;  Location: [facility];  Service: Endoscopy;  Laterality: N/A;    Epidural steroid injection      Pain management    ESOPHAGOGASTRODUODENOSCOPY  07/12/2004    [facility]O.    FRACTURE SURGERY Left     wrist / forearm, total of 5    INSERTION OF DORSAL COLUMN NERVE STIMULATOR FOR TRIAL N/A 12/12/2019    Procedure: INSERTION, NEUROSTIMULATOR, SPINAL CORD, DORSAL COLUMN, FOR TRIAL;  Surgeon: Evan Lyles MD;  Location: [facility] OR;  Service: Pain Management;  Laterality: N/A;    JOINT REPLACEMENT  02/06/2013    ( rt hip 2007), left hip     JOINT REPLACEMENT Left     total knee    KNEE ARTHROSCOPY W/ DEBRIDEMENT      bilateral knees , total of six    KNEE SURGERY      LAMINECTOMY USING MINIMALLY INVASIVE TECHNIQUE N/A 02/12/2020    Procedure: LAMINECTOMY, SPINE, MINIMALLY INVASIVE /  PLACEMENT OF SPINAL CORD STIMULATOR;  Surgeon: Darlene Max MD;  Location: [facility] OR;  Service: Neurosurgery;  Laterality: N/A;    Nervbe Block injection      Pain management    TOTAL SHOULDER ARTHROPLASTY Right 03/28/2022    Dr Joya    TOTAL THYROIDECTOMY      TOTAL THYROIDECTOMY Bilateral 03/07/2022    Dr Mendoza     Family History   Problem Relation Age of Onset    Hypertension Father     Heart disease Father     Drug abuse Daughter     Hypertension Son     Lung disease Sister     COPD Sister      Hypertension Sister     Diverticulitis Sister     Gout Sister     Kidney disease Sister     No Known Problems Mother     Eczema Neg Hx     Lupus Neg Hx     Psoriasis Neg Hx     Melanoma Neg Hx      Social History     Socioeconomic History    Marital status:    Tobacco Use    Smoking status: Former     Packs/day: 1.00     Years: 30.00     Pack years: 30.00     Types: Cigarettes     Start date: 8/3/1963     Quit date: 1992     Years since quittin.4    Smokeless tobacco: Never   Substance and Sexual Activity    Alcohol use: Not Currently     Comment: On occasion    Drug use: Yes     Types: Oxycodone, Morphine     Social Determinants of Health     Financial Resource Strain: Low Risk     Difficulty of Paying Living Expenses: Not hard at all   Food Insecurity: No Food Insecurity    Worried About Running Out of Food in the Last Year: Never true    Ran Out of Food in the Last Year: Never true   Transportation Needs: No Transportation Needs    Lack of Transportation (Medical): No    Lack of Transportation (Non-Medical): No   Physical Activity: Insufficiently Active    Days of Exercise per Week: 2 days    Minutes of Exercise per Session: 30 min   Stress: Stress Concern Present    Feeling of Stress : To some extent   Social Connections: Socially Integrated    Frequency of Communication with Friends and Family: More than three times a week    Frequency of Social Gatherings with Friends and Family: Twice a week    Attends [employer] Services: More than 4 times per year    Active Member of Clubs or Organizations: Yes    Attends Club or Organization Meetings: More than 4 times per year    Marital Status:    Housing Stability: Low Risk     Unable to Pay for Housing in the Last Year: No    Number of Places Lived in the Last Year: 1    Unstable Housing in the Last Year: No       Review of patient's allergies indicates:   Allergen Reactions    Xyzal [levocetirizine] Other (See Comments)     Knocked him out         Current Outpatient Medications:     albuterol (PROVENTIL/VENTOLIN HFA) 90 mcg/actuation inhaler, EVERY 4 HOURS AS NEEDED as needed for, Disp: , Rfl:     amLODIPine (NORVASC) 10 MG tablet, Take one tablet by mouth daily, Disp: 90 tablet, Rfl: 3    aspirin 81 MG Chew, Take 81 mg by mouth once daily., Disp: , Rfl:     atorvastatin (LIPITOR) 40 MG tablet, TAKE 1 TABLET (40 MG TOTAL) BY MOUTH ONCE DAILY., Disp: 90 tablet, Rfl: 3    buPROPion (WELLBUTRIN XL) 150 MG TB24 tablet, Take 1 tablet (150 mg total) by mouth once daily., Disp: 90 tablet, Rfl: 3    calcitRIOL (ROCALTROL) 0.5 MCG Cap, TAKE 1 CAPSULE ONE TIME DAILY, Disp: 90 capsule, Rfl: 3    fluticasone propionate (FLONASE) 50 mcg/actuation nasal spray, 1 spray (50 mcg total) by Each Nostril route daily as needed for Rhinitis., Disp: 16 g, Rfl: 0    furosemide (LASIX) 40 MG tablet, Take 1 tablet (40 mg total) by mouth once daily., Disp: 90 tablet, Rfl: 1    levothyroxine (SYNTHROID) 112 MCG tablet, Take 2 tablets (224 mcg total) by mouth once daily., Disp: 180 tablet, Rfl: 1    LIDOcaine (LIDODERM) 5 %, Place 1 patch onto the skin once daily. Remove & Discard patch within 12 hours or as directed by MD, Disp: 30 patch, Rfl: 2    losartan (COZAAR) 50 MG tablet, TAKE 1 TABLET(50 MG) BY MOUTH EVERY DAY, Disp: 90 tablet, Rfl: 1    morphine (MS CONTIN) 30 MG 12 hr tablet, Take 1 tablet (30 mg total) by mouth 3 (three) times daily. Take two tablets (60mg) in the morning and one tablet (30mg) in the evening, Disp: 90 tablet, Rfl: 0    morphine (MS CONTIN) 30 MG 12 hr tablet, Take 1 tablet (30 mg total) by mouth 3 (three) times daily. Take two tablets (60mg) in the morning and one tablet (30mg) in the evening, Disp: 90 tablet, Rfl: 0    [START ON 6/26/2023] morphine (MS CONTIN) 30 MG 12 hr tablet, Take 1 tablet (30 mg total) by mouth 3 (three) times daily. Take two tablets (60mg) in the morning and one tablet (30mg) in the evening, Disp: 90 tablet, Rfl: 0    mupirocin  (BACTROBAN) 2 % ointment, Apply to affected area 3 times daily, Disp: 22 g, Rfl: 1    naloxone (NARCAN) 0.4 mg/mL injection, Inject 1 mL (0.4 mg total) into the vein as needed (overdose)., Disp: 2 mL, Rfl: 5    omeprazole (PRILOSEC) 40 MG capsule, Take one capsule by mouth daily, Disp: 90 capsule, Rfl: 3    ondansetron (ZOFRAN-ODT) 8 MG TbDL, Take 1 tablet (8 mg total) by mouth 2 (two) times daily as needed for nausea for 5days, Disp: 10 tablet, Rfl: 0    oxyCODONE-acetaminophen (PERCOCET)  mg per tablet, Take 1 tablet by mouth every 4 (four) hours as needed for Pain., Disp: 120 tablet, Rfl: 0    oxyCODONE-acetaminophen (PERCOCET)  mg per tablet, Take 1 tablet by mouth every 4 (four) hours as needed for Pain., Disp: 120 tablet, Rfl: 0    [START ON 6/26/2023] oxyCODONE-acetaminophen (PERCOCET)  mg per tablet, Take 1 tablet by mouth every 4 (four) hours as needed for Pain., Disp: 120 tablet, Rfl: 0    pregabalin (LYRICA) 75 MG capsule, Take 1 capsule (75 mg total) by mouth 2 (two) times daily., Disp: 180 capsule, Rfl: 1    saw/vit E/sod lisa/lyc/beta/pyg (PROSTATE HEALTH ORAL), Take 2 capsules by mouth once daily. With saw palmetto, Disp: , Rfl:     sildenafil (REVATIO) 20 mg Tab, Take 1-5 daily as needed for ED, Disp: 10 tablet, Rfl: 5    tiZANidine (ZANAFLEX) 4 MG tablet, Take 1 tablet (4 mg total) by mouth 3 (three) times daily as needed (muscle spasm)., Disp: 270 tablet, Rfl: 1    UNABLE TO FIND, Take by mouth once daily. Vitafusion multivites gummies, Disp: , Rfl:     metoprolol succinate (TOPROL-XL) 25 MG 24 hr tablet, Take 0.5 tablets (12.5 mg total) by mouth once daily., Disp: 45 tablet, Rfl: 0  No current facility-administered medications for this visit.    Facility-Administered Medications Ordered in Other Visits:     diphenhydrAMINE injection 12.5 mg, 12.5 mg, Intravenous, Once PRN, Enrique Rosales MD    electrolyte-S (ISOLYTE), , Intravenous, Continuous, Enrique Rosales MD     HYDROmorphone (PF) injection 0.2 mg, 0.2 mg, Intravenous, Q5 Min PRN, Enrique Rosales MD    HYDROmorphone (PF) injection 0.2 mg, 0.2 mg, Intravenous, Q5 Min PRN, Enrique Rosales MD    lactated ringers infusion, , Intravenous, Once PRN, Aram Terrell MD    lactated ringers infusion, 10 mL/hr, Intravenous, Continuous, Enrique Rosales MD, Last Rate: 500 mL/hr at 03/22/22 1419, Rate Change at 03/22/22 1419    lorazepam injection 0.25 mg, 0.25 mg, Intravenous, Once PRN, Enrique Rosales MD    prochlorperazine injection Soln 5 mg, 5 mg, Intravenous, Q30 Min PRN, Enrique Rosales MD    sodium chloride 0.9% flush 3 mL, 3 mL, Intravenous, Q8H, Enrique Rosales MD    Review of Systems   Constitutional: Negative for chills, decreased appetite, diaphoresis, fever, malaise/fatigue and night sweats.   HENT:  Negative for congestion and nosebleeds.    Eyes:  Negative for blurred vision and visual disturbance.   Cardiovascular:  Positive for dyspnea on exertion. Negative for chest pain, claudication, cyanosis, irregular heartbeat, leg swelling (L), near-syncope, orthopnea, palpitations, paroxysmal nocturnal dyspnea and syncope.   Respiratory:  Positive for shortness of breath. Negative for cough and hemoptysis.    Endocrine: Negative for polyphagia and polyuria.   Skin:  Negative for color change and rash.   Musculoskeletal:  Positive for arthritis. Negative for back pain and falls.   Gastrointestinal:  Negative for abdominal pain, dysphagia, jaundice, melena and nausea.   Genitourinary:  Negative for dysuria and flank pain.   Neurological:  Negative for brief paralysis, dizziness, focal weakness, light-headedness and tremors (R HAND SINCE STROKE).   Psychiatric/Behavioral:  Negative for altered mental status and depression.    Allergic/Immunologic: Negative for hives and persistent infections.      Objective:      Vitals:    06/20/23 1356   BP: 127/76   Pulse: 79   Weight: 108 kg (238 lb 1.6 oz)   Height:  "5' 10" (1.778 m)   PainSc: 0-No pain     Body mass index is 34.16 kg/m².    Physical Exam  Constitutional:       Appearance: He is obese.   HENT:      Head: Normocephalic and atraumatic.   Eyes:      Extraocular Movements: Extraocular movements intact.      Conjunctiva/sclera: Conjunctivae normal.   Neck:      Vascular: Normal carotid pulses. No carotid bruit or JVD.   Cardiovascular:      Rate and Rhythm: Normal rate and regular rhythm. No extrasystoles are present.     Pulses:           Carotid pulses are 2+ on the right side and 2+ on the left side.       Radial pulses are 2+ on the right side and 2+ on the left side.        Posterior tibial pulses are 2+ on the right side and 2+ on the left side.      Heart sounds: Murmur heard.   Systolic murmur is present with a grade of 1/6.     No friction rub. No S4 sounds.   Pulmonary:      Effort: Pulmonary effort is normal.      Breath sounds: Normal breath sounds and air entry.   Musculoskeletal:        Arms:       Cervical back: Neck supple.      Right lower leg: Edema (TRACE) present.      Left lower leg: No edema.      Comments: USES A CANE, BACK STIMULATOR   Neurological:      Mental Status: He is alert and oriented to person, place, and time.      Cranial Nerves: Cranial nerves 2-12 are intact.   Psychiatric:         Mood and Affect: Mood normal.         Speech: Speech normal.         Behavior: Behavior normal.               ..    Chemistry        Component Value Date/Time     05/16/2023 1202    K 4.5 05/16/2023 1202     05/16/2023 1202    CO2 27 05/16/2023 1202    BUN 11 05/16/2023 1202    CREATININE 0.9 05/16/2023 1202    CREATININE 0.8 01/31/2013 1625     (H) 05/16/2023 1202        Component Value Date/Time    CALCIUM 9.0 05/16/2023 1202    CALCIUM 9.2 01/31/2013 1625    ALKPHOS 61 09/12/2022 1636    AST 20 09/12/2022 1636    ALT 18 09/12/2022 1636    BILITOT 0.3 09/12/2022 1636    ESTGFRAFRICA >60.0 07/12/2022 1119    EGFRNONAA 53.4 (A) " 07/12/2022 1119            ..  Lab Results   Component Value Date    CHOL 182 07/12/2022    CHOL 127 01/14/2021    CHOL 135 08/21/2019     Lab Results   Component Value Date    HDL 65 07/12/2022    HDL 47 01/14/2021    HDL 45 08/21/2019     Lab Results   Component Value Date    LDLCALC 83.0 07/12/2022    LDLCALC 55.0 (L) 01/14/2021    LDLCALC 47.2 (L) 08/21/2019     Lab Results   Component Value Date    TRIG 170 (H) 07/12/2022    TRIG 125 01/14/2021    TRIG 214 (H) 08/21/2019     Lab Results   Component Value Date    CHOLHDL 35.7 07/12/2022    CHOLHDL 37.0 01/14/2021    CHOLHDL 33.3 08/21/2019     ..  Lab Results   Component Value Date    WBC 7.29 05/16/2023    HGB 14.4 05/16/2023    HCT 44.3 05/16/2023    MCV 94 05/16/2023     05/16/2023       Test(s) Reviewed  I have reviewed the following in detail:  [x] Stress test   [] Angiography   [x] Echocardiogram   [] Labs   [x] Other:       Assessment:         ICD-10-CM ICD-9-CM   1. Abnormal nuclear stress test  R94.39 794.39   2. PSVT (paroxysmal supraventricular tachycardia)  I47.1 427.0   3. SOB (shortness of breath)  R06.02 786.05   4. Atherosclerosis of aorta  I70.0 440.0   5. Essential (primary) hypertension  I10 401.9   6. Other hyperlipidemia  E78.49 272.4   7. Obesity (BMI 30.0-34.9)  E66.9 278.00     Problem List Items Addressed This Visit          Cardiac/Vascular    Essential (primary) hypertension    Atherosclerosis of aorta    Relevant Orders    Vital signs    Cardiac Monitoring - Adult    Basic metabolic panel    Full code    Other hyperlipidemia    Abnormal nuclear stress test - Primary    Relevant Orders    Case Request-Cath Lab: Angiogram, Coronary, with Left Heart Cath (Completed)    Vital signs    Cardiac Monitoring - Adult    Basic metabolic panel    Full code       Endocrine    Obesity (BMI 30.0-34.9)     Other Visit Diagnoses       PSVT (paroxysmal supraventricular tachycardia)        SHORT RUNS, BETA-BLOCKER    Relevant Orders    Case  Request-Cath Lab: Angiogram, Coronary, with Left Heart Cath (Completed)    SOB (shortness of breath)  (Chronic)       Relevant Orders    Case Request-Cath Lab: Angiogram, Coronary, with Left Heart Cath (Completed)    Vital signs    Cardiac Monitoring - Adult    Basic metabolic panel    Full code             Plan:        ADD BETA-BLOCKER FOR ARRHYTHMIA ANGINA EQUIVALENT, MODERATE RISK NUCLEAR STRESS TEST IN VIEW OF SYMPTOMS RECOMMEND ANGIOGRAPHY RISK AND ALTERNATIVE EXPLAINED PATIENT DETAILS INFORMED CONSENT WAS OBTAINED, CLASS 2-3 ANGINA EQUIVALENT NO OVERT HEART FAILURE RELATIVELY BENIGN ARRHYTHMIA DIET EXERCISE WEIGHT LOSS INCREASE ACTIVITY, RETURN TO CLINIC IN FEW WEEKS AFTER TESTS  Abnormal nuclear stress test  Comments:  WILL NEED FURTHER EVALUATION  Orders:  -     Case Request-Cath Lab: Angiogram, Coronary, with Left Heart Cath; Standing  -     Establish IV access and maintain saline lock; Standing  -     Hold Warfarin; Standing  -     Hold Enoxaparin/subcutaneous Heparin; Standing  -     Hold Heparin drip; Standing  -     Height and weight; Standing  -     Verify informed consent; Standing  -     Vital signs; Standing  -     Cardiac Monitoring - Adult; Standing  -     Pulse Oximetry Q4H; Standing  -     Diet NPO; Standing  -     CBC auto differential; Standing  -     Basic metabolic panel; Standing  -     Full code; Standing    PSVT (paroxysmal supraventricular tachycardia)  Comments:  SHORT RUNS, BETA-BLOCKER  Orders:  -     Case Request-Cath Lab: Angiogram, Coronary, with Left Heart Cath; Standing    SOB (shortness of breath)  -     Case Request-Cath Lab: Angiogram, Coronary, with Left Heart Cath; Standing  -     Establish IV access and maintain saline lock; Standing  -     Hold Warfarin; Standing  -     Hold Enoxaparin/subcutaneous Heparin; Standing  -     Hold Heparin drip; Standing  -     Height and weight; Standing  -     Verify informed consent; Standing  -     Vital signs; Standing  -     Cardiac  Monitoring - Adult; Standing  -     Pulse Oximetry Q4H; Standing  -     Diet NPO; Standing  -     CBC auto differential; Standing  -     Basic metabolic panel; Standing  -     Full code; Standing    Atherosclerosis of aorta  Comments:  NO EMBOLIZATION  Orders:  -     Establish IV access and maintain saline lock; Standing  -     Hold Warfarin; Standing  -     Hold Enoxaparin/subcutaneous Heparin; Standing  -     Hold Heparin drip; Standing  -     Height and weight; Standing  -     Verify informed consent; Standing  -     Vital signs; Standing  -     Cardiac Monitoring - Adult; Standing  -     Pulse Oximetry Q4H; Standing  -     Diet NPO; Standing  -     CBC auto differential; Standing  -     Basic metabolic panel; Standing  -     Full code; Standing    Essential (primary) hypertension  Comments:  CONTROLLED    Other hyperlipidemia    Obesity (BMI 30.0-34.9)    Other orders  -     0.9%  NaCl infusion  -     aspirin chewable tablet 81 mg  -     clopidogreL tablet 300 mg  -     metoprolol succinate (TOPROL-XL) 25 MG 24 hr tablet; Take 0.5 tablets (12.5 mg total) by mouth once daily.  Dispense: 45 tablet; Refill: 0    RTC Low level/low impact aerobic exercise 5x's/wk. Heart healthy diet and risk factor modification.    See labs and med orders.    Aerobic exercise 5x's/wk. Heart healthy diet and risk factor modification.    See labs and med orders.

## 2023-06-20 NOTE — PATIENT INSTRUCTIONS
Angiogram 7/13 at 11 am    Arrive for procedure at: [place]    You will receive a phone call from [facility] Pre-Op Department with further instructions prior to your scheduled procedure.    Notify the nurse if you are ALLERGIC TO IODINE.    FASTING: You MAY NOT have anything to eat or drink AFTER MIDNIGHT the day before your procedure. If your procedure is scheduled in the afternoon, you may have a LIGHT BREAKFAST 6-8 hours prior to your procedure.  For example: Two slices of toast; black coffee or black tea.    MEDICATIONS: You may take your regular morning medications with water. If there are any medications that you should not take, you will be instructed to hold them for that morning.    CARDIOLOGY PRE-PROCEDURE MEDICATION ORDERS:  ** Please hold any medications that are checked below:    HOLD   # OF DAYS TO HOLD  Coumadin   Consult with Coumadin Clinic   Xarelto    _DAY BEFORE & DAY OF_  Pradaxa  _ DAY BEFORE & DAY OF _  Eliquis   _ DAY BEFORE & DAY OF _  Metformin    Day before procedure & morning of procedure  Short acting insulin   Morning of procedure    CONTINUE the Following Medications   Plavix      Effient     Aspirin    WHAT TO EXPECT:    How long will the procedure take?  The procedure will take an average of 1 - 2 hours to perform.  After the procedure, you will need to lay flat for around 4 - 6 hours to minimize bleeding from the puncture site. If the wrist is accessed you will need to keep your arm still as instructed by the nurse.    When can I go home?  You may be able to be discharged home that same afternoon if there were no complications.  If you have one of the following: balloon; stent; pacemaker or defibrillator procedures, you may spend one night for observation.  Your doctor will determine your discharge based upon your progress.  The results of your procedure will be discussed with you before you are discharged.  Any further testing or procedures will be scheduled for  you either before you leave or you will be instructed to call for a future appointment.      TRANSPORTATION:  PLEASE ARRANGE TO HAVE SOMEONE DRIVE YOU HOME FOLLOWING YOUR PROCEDURE, YOU WILL NOT BE ALLOWED TO DRIVE.

## 2023-06-20 NOTE — PROGRESS NOTES
Subjective:    Patient ID:  Sam Pete is a 78 y.o. male who presents for Atherosclerosis of aorta        HPI  DISCUSSED TEST ECHO NORMAL LV FUNCTION MILDLY SCLEROTIC AORTIC VALVE WITH NO STENOSIS, MILD BILATERAL CAROTID PLAQUE NO STENOSIS, HOLTER SHORT RUNS OF PSVT PVCS AND PACS, ABNORMAL NUCLEAR STRESS TEST WITH MODERATE REVERSIBLE ISCHEMIA, BP OK, SEE ROS    Past Medical History:   Diagnosis Date    Anticoagulant long-term use     ASA 81 mg    Arthritis     Cataract     OU    Chronic low back pain     Complete rupture of rotator cuff 02/08/2011    DDD (degenerative disc disease), lumbar 07/08/2015    Degeneration of lumbar or lumbosacral intervertebral disc 09/09/2015    ED (erectile dysfunction)     GERD (gastroesophageal reflux disease)     Hypertension     Lumbar pseudoarthrosis 06/26/2017    Lumbar stenosis 06/26/2017    Obesity     Spondylosis of lumbar region without myelopathy or radiculopathy 07/08/2015    Stroke 1997    basal ganglia, left sided weakness, resolved    Testicular hypofunction     Thoracic or lumbosacral neuritis or radiculitis 07/08/2015     Past Surgical History:   Procedure Laterality Date    APPENDECTOMY      ARTHROPLASTY OF SHOULDER Right 03/22/2022    Procedure: ARTHROPLASTY, SHOULDER BRENNAN RIGHT SHOULDER HUMERAL HEAD RESURFACING;  Surgeon: Frankie Joya MD;  Location: [facility] OR;  Service: Orthopedics;  Laterality: Right;    BACK SURGERY      4- back surgery    CAUDAL EPIDURAL STEROID INJECTION N/A 12/16/2021    Procedure: Injection-steroid-epidural-caudal;  Surgeon: Aram Terrell MD;  Location: [facility] OR;  Service: Pain Management;  Laterality: N/A;    CAUDAL EPIDURAL STEROID INJECTION N/A 02/02/2022    Procedure: INJECTION, STEROID, SPINE, EPIDURAL, CAUDAL;  Surgeon: Aram Terrell MD;  Location: [facility] OR;  Service: Pain Management;  Laterality: N/A;    CAUDAL EPIDURAL STEROID INJECTION N/A 7/22/2022    Procedure: Injection-steroid-epidural-caudal;  Surgeon: Aram Terrell MD;   Location: [facility] OR;  Service: Pain Management;  Laterality: N/A;  Caudal CARLOS     CAUDAL EPIDURAL STEROID INJECTION N/A 1/20/2023    Procedure: Injection-steroid-epidural-caudal;  Surgeon: Aram Terrell MD;  Location: [facility] OR;  Service: Pain Management;  Laterality: N/A;  caudal ( melinda)    CAUDAL EPIDURAL STEROID INJECTION N/A 4/21/2023    Procedure: Injection-steroid-epidural-caudal;  Surgeon: Aram Terrell MD;  Location: [facility] OR;  Service: Pain Management;  Laterality: N/A;  caudal    COLONOSCOPY  07/12/2004    [facility].   Redundant tortuous colon, otherwise normal.    COLONOSCOPY N/A 02/25/2019    Procedure: COLONOSCOPY;  Surgeon: Gilbert Rodriguez Jr., MD;  Location: [facility];  Service: Endoscopy;  Laterality: N/A;    Epidural steroid injection      Pain management    ESOPHAGOGASTRODUODENOSCOPY  07/12/2004    [facility]ILO.    FRACTURE SURGERY Left     wrist / forearm, total of 5    INSERTION OF DORSAL COLUMN NERVE STIMULATOR FOR TRIAL N/A 12/12/2019    Procedure: INSERTION, NEUROSTIMULATOR, SPINAL CORD, DORSAL COLUMN, FOR TRIAL;  Surgeon: Evan Lyles MD;  Location: [facility] OR;  Service: Pain Management;  Laterality: N/A;    JOINT REPLACEMENT  02/06/2013    ( rt hip 2007), left hip     JOINT REPLACEMENT Left     total knee    KNEE ARTHROSCOPY W/ DEBRIDEMENT      bilateral knees , total of six    KNEE SURGERY      LAMINECTOMY USING MINIMALLY INVASIVE TECHNIQUE N/A 02/12/2020    Procedure: LAMINECTOMY, SPINE, MINIMALLY INVASIVE /  PLACEMENT OF SPINAL CORD STIMULATOR;  Surgeon: Darlene Max MD;  Location: [facility] OR;  Service: Neurosurgery;  Laterality: N/A;    Nervbe Block injection      Pain management    TOTAL SHOULDER ARTHROPLASTY Right 03/28/2022    Dr Joya    TOTAL THYROIDECTOMY      TOTAL THYROIDECTOMY Bilateral 03/07/2022    Dr Mendoza     Family History   Problem Relation Age of Onset    Hypertension Father     Heart disease Father     Drug abuse Daughter     Hypertension Son     Lung disease Sister     COPD Sister      Hypertension Sister     Diverticulitis Sister     Gout Sister     Kidney disease Sister     No Known Problems Mother     Eczema Neg Hx     Lupus Neg Hx     Psoriasis Neg Hx     Melanoma Neg Hx      Social History     Socioeconomic History    Marital status:    Tobacco Use    Smoking status: Former     Packs/day: 1.00     Years: 30.00     Pack years: 30.00     Types: Cigarettes     Start date: 8/3/1963     Quit date: 1992     Years since quittin.4    Smokeless tobacco: Never   Substance and Sexual Activity    Alcohol use: Not Currently     Comment: On occasion    Drug use: Yes     Types: Oxycodone, Morphine     Social Determinants of Health     Financial Resource Strain: Low Risk     Difficulty of Paying Living Expenses: Not hard at all   Food Insecurity: No Food Insecurity    Worried About Running Out of Food in the Last Year: Never true    Ran Out of Food in the Last Year: Never true   Transportation Needs: No Transportation Needs    Lack of Transportation (Medical): No    Lack of Transportation (Non-Medical): No   Physical Activity: Insufficiently Active    Days of Exercise per Week: 2 days    Minutes of Exercise per Session: 30 min   Stress: Stress Concern Present    Feeling of Stress : To some extent   Social Connections: Socially Integrated    Frequency of Communication with Friends and Family: More than three times a week    Frequency of Social Gatherings with Friends and Family: Twice a week    Attends Hindu Services: More than 4 times per year    Active Member of Clubs or Organizations: Yes    Attends Club or Organization Meetings: More than 4 times per year    Marital Status:    Housing Stability: Low Risk     Unable to Pay for Housing in the Last Year: No    Number of Places Lived in the Last Year: 1    Unstable Housing in the Last Year: No       Review of patient's allergies indicates:   Allergen Reactions    Xyzal [levocetirizine] Other (See Comments)     Knocked him out         Current Outpatient Medications:     albuterol (PROVENTIL/VENTOLIN HFA) 90 mcg/actuation inhaler, EVERY 4 HOURS AS NEEDED as needed for, Disp: , Rfl:     amLODIPine (NORVASC) 10 MG tablet, Take one tablet by mouth daily, Disp: 90 tablet, Rfl: 3    aspirin 81 MG Chew, Take 81 mg by mouth once daily., Disp: , Rfl:     atorvastatin (LIPITOR) 40 MG tablet, TAKE 1 TABLET (40 MG TOTAL) BY MOUTH ONCE DAILY., Disp: 90 tablet, Rfl: 3    buPROPion (WELLBUTRIN XL) 150 MG TB24 tablet, Take 1 tablet (150 mg total) by mouth once daily., Disp: 90 tablet, Rfl: 3    calcitRIOL (ROCALTROL) 0.5 MCG Cap, TAKE 1 CAPSULE ONE TIME DAILY, Disp: 90 capsule, Rfl: 3    fluticasone propionate (FLONASE) 50 mcg/actuation nasal spray, 1 spray (50 mcg total) by Each Nostril route daily as needed for Rhinitis., Disp: 16 g, Rfl: 0    furosemide (LASIX) 40 MG tablet, Take 1 tablet (40 mg total) by mouth once daily., Disp: 90 tablet, Rfl: 1    levothyroxine (SYNTHROID) 112 MCG tablet, Take 2 tablets (224 mcg total) by mouth once daily., Disp: 180 tablet, Rfl: 1    LIDOcaine (LIDODERM) 5 %, Place 1 patch onto the skin once daily. Remove & Discard patch within 12 hours or as directed by MD, Disp: 30 patch, Rfl: 2    losartan (COZAAR) 50 MG tablet, TAKE 1 TABLET(50 MG) BY MOUTH EVERY DAY, Disp: 90 tablet, Rfl: 1    morphine (MS CONTIN) 30 MG 12 hr tablet, Take 1 tablet (30 mg total) by mouth 3 (three) times daily. Take two tablets (60mg) in the morning and one tablet (30mg) in the evening, Disp: 90 tablet, Rfl: 0    morphine (MS CONTIN) 30 MG 12 hr tablet, Take 1 tablet (30 mg total) by mouth 3 (three) times daily. Take two tablets (60mg) in the morning and one tablet (30mg) in the evening, Disp: 90 tablet, Rfl: 0    [START ON 6/26/2023] morphine (MS CONTIN) 30 MG 12 hr tablet, Take 1 tablet (30 mg total) by mouth 3 (three) times daily. Take two tablets (60mg) in the morning and one tablet (30mg) in the evening, Disp: 90 tablet, Rfl: 0    mupirocin  (BACTROBAN) 2 % ointment, Apply to affected area 3 times daily, Disp: 22 g, Rfl: 1    naloxone (NARCAN) 0.4 mg/mL injection, Inject 1 mL (0.4 mg total) into the vein as needed (overdose)., Disp: 2 mL, Rfl: 5    omeprazole (PRILOSEC) 40 MG capsule, Take one capsule by mouth daily, Disp: 90 capsule, Rfl: 3    ondansetron (ZOFRAN-ODT) 8 MG TbDL, Take 1 tablet (8 mg total) by mouth 2 (two) times daily as needed for nausea for 5days, Disp: 10 tablet, Rfl: 0    oxyCODONE-acetaminophen (PERCOCET)  mg per tablet, Take 1 tablet by mouth every 4 (four) hours as needed for Pain., Disp: 120 tablet, Rfl: 0    oxyCODONE-acetaminophen (PERCOCET)  mg per tablet, Take 1 tablet by mouth every 4 (four) hours as needed for Pain., Disp: 120 tablet, Rfl: 0    [START ON 6/26/2023] oxyCODONE-acetaminophen (PERCOCET)  mg per tablet, Take 1 tablet by mouth every 4 (four) hours as needed for Pain., Disp: 120 tablet, Rfl: 0    pregabalin (LYRICA) 75 MG capsule, Take 1 capsule (75 mg total) by mouth 2 (two) times daily., Disp: 180 capsule, Rfl: 1    saw/vit E/sod lisa/lyc/beta/pyg (PROSTATE HEALTH ORAL), Take 2 capsules by mouth once daily. With saw palmetto, Disp: , Rfl:     sildenafil (REVATIO) 20 mg Tab, Take 1-5 daily as needed for ED, Disp: 10 tablet, Rfl: 5    tiZANidine (ZANAFLEX) 4 MG tablet, Take 1 tablet (4 mg total) by mouth 3 (three) times daily as needed (muscle spasm)., Disp: 270 tablet, Rfl: 1    UNABLE TO FIND, Take by mouth once daily. Vitafusion multivites gummies, Disp: , Rfl:     metoprolol succinate (TOPROL-XL) 25 MG 24 hr tablet, Take 0.5 tablets (12.5 mg total) by mouth once daily., Disp: 45 tablet, Rfl: 0  No current facility-administered medications for this visit.    Facility-Administered Medications Ordered in Other Visits:     diphenhydrAMINE injection 12.5 mg, 12.5 mg, Intravenous, Once PRN, Enrique Rosales MD    electrolyte-S (ISOLYTE), , Intravenous, Continuous, Enrique Rosales MD     HYDROmorphone (PF) injection 0.2 mg, 0.2 mg, Intravenous, Q5 Min PRN, Enrique Rosales MD    HYDROmorphone (PF) injection 0.2 mg, 0.2 mg, Intravenous, Q5 Min PRN, Enrique Rosales MD    lactated ringers infusion, , Intravenous, Once PRN, Aram Terrell MD    lactated ringers infusion, 10 mL/hr, Intravenous, Continuous, Enrique Rosales MD, Last Rate: 500 mL/hr at 03/22/22 1419, Rate Change at 03/22/22 1419    lorazepam injection 0.25 mg, 0.25 mg, Intravenous, Once PRN, Enrique Rosales MD    prochlorperazine injection Soln 5 mg, 5 mg, Intravenous, Q30 Min PRN, Enrique Rosales MD    sodium chloride 0.9% flush 3 mL, 3 mL, Intravenous, Q8H, Enrique Rosales MD    Review of Systems   Constitutional: Negative for chills, decreased appetite, diaphoresis, fever, malaise/fatigue and night sweats.   HENT:  Negative for congestion and nosebleeds.    Eyes:  Negative for blurred vision and visual disturbance.   Cardiovascular:  Positive for dyspnea on exertion. Negative for chest pain, claudication, cyanosis, irregular heartbeat, leg swelling (L), near-syncope, orthopnea, palpitations, paroxysmal nocturnal dyspnea and syncope.   Respiratory:  Positive for shortness of breath. Negative for cough and hemoptysis.    Endocrine: Negative for polyphagia and polyuria.   Skin:  Negative for color change and rash.   Musculoskeletal:  Positive for arthritis. Negative for back pain and falls.   Gastrointestinal:  Negative for abdominal pain, dysphagia, jaundice, melena and nausea.   Genitourinary:  Negative for dysuria and flank pain.   Neurological:  Negative for brief paralysis, dizziness, focal weakness, light-headedness and tremors (R HAND SINCE STROKE).   Psychiatric/Behavioral:  Negative for altered mental status and depression.    Allergic/Immunologic: Negative for hives and persistent infections.      Objective:      Vitals:    06/20/23 1356   BP: 127/76   Pulse: 79   Weight: 108 kg (238 lb 1.6 oz)   Height:  "5' 10" (1.778 m)   PainSc: 0-No pain     Body mass index is 34.16 kg/m².    Physical Exam  Constitutional:       Appearance: He is obese.   HENT:      Head: Normocephalic and atraumatic.   Eyes:      Extraocular Movements: Extraocular movements intact.      Conjunctiva/sclera: Conjunctivae normal.   Neck:      Vascular: Normal carotid pulses. No carotid bruit or JVD.   Cardiovascular:      Rate and Rhythm: Normal rate and regular rhythm. No extrasystoles are present.     Pulses:           Carotid pulses are 2+ on the right side and 2+ on the left side.       Radial pulses are 2+ on the right side and 2+ on the left side.        Posterior tibial pulses are 2+ on the right side and 2+ on the left side.      Heart sounds: Murmur heard.   Systolic murmur is present with a grade of 1/6.     No friction rub. No S4 sounds.   Pulmonary:      Effort: Pulmonary effort is normal.      Breath sounds: Normal breath sounds and air entry.   Musculoskeletal:        Arms:       Cervical back: Neck supple.      Right lower leg: Edema (TRACE) present.      Left lower leg: No edema.      Comments: USES A CANE, BACK STIMULATOR   Neurological:      Mental Status: He is alert and oriented to person, place, and time.      Cranial Nerves: Cranial nerves 2-12 are intact.   Psychiatric:         Mood and Affect: Mood normal.         Speech: Speech normal.         Behavior: Behavior normal.               ..    Chemistry        Component Value Date/Time     05/16/2023 1202    K 4.5 05/16/2023 1202     05/16/2023 1202    CO2 27 05/16/2023 1202    BUN 11 05/16/2023 1202    CREATININE 0.9 05/16/2023 1202    CREATININE 0.8 01/31/2013 1625     (H) 05/16/2023 1202        Component Value Date/Time    CALCIUM 9.0 05/16/2023 1202    CALCIUM 9.2 01/31/2013 1625    ALKPHOS 61 09/12/2022 1636    AST 20 09/12/2022 1636    ALT 18 09/12/2022 1636    BILITOT 0.3 09/12/2022 1636    ESTGFRAFRICA >60.0 07/12/2022 1119    EGFRNONAA 53.4 (A) " 07/12/2022 1119            ..  Lab Results   Component Value Date    CHOL 182 07/12/2022    CHOL 127 01/14/2021    CHOL 135 08/21/2019     Lab Results   Component Value Date    HDL 65 07/12/2022    HDL 47 01/14/2021    HDL 45 08/21/2019     Lab Results   Component Value Date    LDLCALC 83.0 07/12/2022    LDLCALC 55.0 (L) 01/14/2021    LDLCALC 47.2 (L) 08/21/2019     Lab Results   Component Value Date    TRIG 170 (H) 07/12/2022    TRIG 125 01/14/2021    TRIG 214 (H) 08/21/2019     Lab Results   Component Value Date    CHOLHDL 35.7 07/12/2022    CHOLHDL 37.0 01/14/2021    CHOLHDL 33.3 08/21/2019     ..  Lab Results   Component Value Date    WBC 7.29 05/16/2023    HGB 14.4 05/16/2023    HCT 44.3 05/16/2023    MCV 94 05/16/2023     05/16/2023       Test(s) Reviewed  I have reviewed the following in detail:  [x] Stress test   [] Angiography   [x] Echocardiogram   [] Labs   [x] Other:       Assessment:         ICD-10-CM ICD-9-CM   1. Abnormal nuclear stress test  R94.39 794.39   2. PSVT (paroxysmal supraventricular tachycardia)  I47.1 427.0   3. SOB (shortness of breath)  R06.02 786.05   4. Atherosclerosis of aorta  I70.0 440.0   5. Essential (primary) hypertension  I10 401.9   6. Other hyperlipidemia  E78.49 272.4   7. Obesity (BMI 30.0-34.9)  E66.9 278.00     Problem List Items Addressed This Visit          Cardiac/Vascular    Essential (primary) hypertension    Atherosclerosis of aorta    Relevant Orders    Vital signs    Cardiac Monitoring - Adult    Basic metabolic panel    Full code    Other hyperlipidemia    Abnormal nuclear stress test - Primary    Relevant Orders    Case Request-Cath Lab: Angiogram, Coronary, with Left Heart Cath (Completed)    Vital signs    Cardiac Monitoring - Adult    Basic metabolic panel    Full code       Endocrine    Obesity (BMI 30.0-34.9)     Other Visit Diagnoses       PSVT (paroxysmal supraventricular tachycardia)        SHORT RUNS, BETA-BLOCKER    Relevant Orders    Case  Request-Cath Lab: Angiogram, Coronary, with Left Heart Cath (Completed)    SOB (shortness of breath)  (Chronic)       Relevant Orders    Case Request-Cath Lab: Angiogram, Coronary, with Left Heart Cath (Completed)    Vital signs    Cardiac Monitoring - Adult    Basic metabolic panel    Full code             Plan:        ADD BETA-BLOCKER FOR ARRHYTHMIA ANGINA EQUIVALENT, MODERATE RISK NUCLEAR STRESS TEST IN VIEW OF SYMPTOMS RECOMMEND ANGIOGRAPHY RISK AND ALTERNATIVE EXPLAINED PATIENT DETAILS INFORMED CONSENT WAS OBTAINED, CLASS 2-3 ANGINA EQUIVALENT NO OVERT HEART FAILURE RELATIVELY BENIGN ARRHYTHMIA DIET EXERCISE WEIGHT LOSS INCREASE ACTIVITY, RETURN TO CLINIC IN FEW WEEKS AFTER TESTS  Abnormal nuclear stress test  Comments:  WILL NEED FURTHER EVALUATION  Orders:  -     Case Request-Cath Lab: Angiogram, Coronary, with Left Heart Cath; Standing  -     Establish IV access and maintain saline lock; Standing  -     Hold Warfarin; Standing  -     Hold Enoxaparin/subcutaneous Heparin; Standing  -     Hold Heparin drip; Standing  -     Height and weight; Standing  -     Verify informed consent; Standing  -     Vital signs; Standing  -     Cardiac Monitoring - Adult; Standing  -     Pulse Oximetry Q4H; Standing  -     Diet NPO; Standing  -     CBC auto differential; Standing  -     Basic metabolic panel; Standing  -     Full code; Standing    PSVT (paroxysmal supraventricular tachycardia)  Comments:  SHORT RUNS, BETA-BLOCKER  Orders:  -     Case Request-Cath Lab: Angiogram, Coronary, with Left Heart Cath; Standing    SOB (shortness of breath)  -     Case Request-Cath Lab: Angiogram, Coronary, with Left Heart Cath; Standing  -     Establish IV access and maintain saline lock; Standing  -     Hold Warfarin; Standing  -     Hold Enoxaparin/subcutaneous Heparin; Standing  -     Hold Heparin drip; Standing  -     Height and weight; Standing  -     Verify informed consent; Standing  -     Vital signs; Standing  -     Cardiac  Monitoring - Adult; Standing  -     Pulse Oximetry Q4H; Standing  -     Diet NPO; Standing  -     CBC auto differential; Standing  -     Basic metabolic panel; Standing  -     Full code; Standing    Atherosclerosis of aorta  Comments:  NO EMBOLIZATION  Orders:  -     Establish IV access and maintain saline lock; Standing  -     Hold Warfarin; Standing  -     Hold Enoxaparin/subcutaneous Heparin; Standing  -     Hold Heparin drip; Standing  -     Height and weight; Standing  -     Verify informed consent; Standing  -     Vital signs; Standing  -     Cardiac Monitoring - Adult; Standing  -     Pulse Oximetry Q4H; Standing  -     Diet NPO; Standing  -     CBC auto differential; Standing  -     Basic metabolic panel; Standing  -     Full code; Standing    Essential (primary) hypertension  Comments:  CONTROLLED    Other hyperlipidemia    Obesity (BMI 30.0-34.9)    Other orders  -     0.9%  NaCl infusion  -     aspirin chewable tablet 81 mg  -     clopidogreL tablet 300 mg  -     metoprolol succinate (TOPROL-XL) 25 MG 24 hr tablet; Take 0.5 tablets (12.5 mg total) by mouth once daily.  Dispense: 45 tablet; Refill: 0    RTC Low level/low impact aerobic exercise 5x's/wk. Heart healthy diet and risk factor modification.    See labs and med orders.    Aerobic exercise 5x's/wk. Heart healthy diet and risk factor modification.    See labs and med orders.

## 2023-06-21 ENCOUNTER — PATIENT MESSAGE (OUTPATIENT)
Dept: FAMILY MEDICINE | Facility: CLINIC | Age: 78
End: 2023-06-21
Payer: MEDICARE

## 2023-06-21 RX ORDER — MORPHINE SULFATE 30 MG/1
30 TABLET, FILM COATED, EXTENDED RELEASE ORAL 3 TIMES DAILY
Qty: 90 TABLET | Refills: 0 | Status: SHIPPED | OUTPATIENT
Start: 2023-06-22 | End: 2023-07-11 | Stop reason: SDUPTHER

## 2023-06-21 RX ORDER — OXYCODONE AND ACETAMINOPHEN 10; 325 MG/1; MG/1
1 TABLET ORAL EVERY 4 HOURS PRN
Qty: 120 TABLET | Refills: 0 | Status: SHIPPED | OUTPATIENT
Start: 2023-06-22 | End: 2023-07-11 | Stop reason: SDUPTHER

## 2023-06-21 NOTE — TELEPHONE ENCOUNTER
site checked, no inappropriate activity found    Done.  I set it up for Thursday 6/22/23 instead of Friday to avoid delaying his trip if there is a supply glitch    Please pay attention.  I don't believe he wanted it sent to [facility] mail order pharmacy.

## 2023-06-22 ENCOUNTER — PES CALL (OUTPATIENT)
Dept: ADMINISTRATIVE | Facility: CLINIC | Age: 78
End: 2023-06-22
Payer: MEDICARE

## 2023-07-06 NOTE — DISCHARGE INSTRUCTIONS
Pain injection instructions:     This procedure may take a couple weeks to relieve pain  You may get some pain relief from the local anesthetic initally.   Steroids can have side effects of flushed face or nervous feeling.    No driving for 24 hrs.   Activity as tolerated- gradually increase activities.  Dont lift over 10 lbs for 24 hrs   No heat at injection sites for 2 full days. No heating pads, hot tubs, saunas, or swimming in any body of water or pool for 2 full days.  Use ice pack for mild swelling and for comfort , apply for 20 minutes, remove for 20 minute intervals. No direct contact of ice itself  to skin.  May shower today.  Do not allow shower water to beat on injections site(s) for 2 full days. No tub baths for two full days.      Resume Aspirin, Plavix, or Coumadin the day after the procedure unless otherwise instructed.   If diabetic,monitor your glucose carefully as steroids can increase your glucose level    Seek immediate medical help for:     Severe increase in pain not relieved by medication or ice or any new pain in the region .    Prolonged (more than 24 hrs)or increasing weakness or numbness in the legs or arms. - it is normal to have weakness/numbness for up to 8 hrs.   Fever above 100 degrees F , Drainage,redness,active bleeding, or increased swelling at the injection site.  Headache that increases when sitting up, but decreases when lying down.  New shortness of breath, chest pain, or breathing problems.    After Surgery:  Always be aware that any surgery can cause these symptoms:    Pain- Medication can be prescribed for pain to decrease your pain but may not completely take your pain away. Over the Counter pain medicine my be enough and you can always use Ice and rest to help ease pain.    Bleeding- a little bleeding after a surgery is usually within normal.  If there is a lot of blood you need to notify your MD.  Emergency treatments of bleeding are cold application, elevation of the  bleeding site and compression.    Infection- Infection after surgery is NOT a normal occurrence.  Signs of infection are fever, swelling, hot to touch the incision.  If this occurs notify your MD immediately.    Nausea- this can be common after a surgery especially if you have had anesthesia medicine or are taking pain medicine.  Steroids have a side effect of nausea sometimes. Staying on clear liquids, bland foods, gingerale, or over the counter anti nausea medicines can help.  If you vomit more than once, notify your MD.  Anti Nausea medicines can be prescribed.

## 2023-07-11 ENCOUNTER — HOSPITAL ENCOUNTER (OUTPATIENT)
Facility: HOSPITAL | Age: 78
Discharge: HOME | End: 2023-07-11
Attending: ANESTHESIOLOGY | Admitting: ANESTHESIOLOGY
Payer: MEDICARE

## 2023-07-11 DIAGNOSIS — F11.20 UNCOMPLICATED OPIOID DEPENDENCE: ICD-10-CM

## 2023-07-11 DIAGNOSIS — M54.16 LUMBAR RADICULITIS: ICD-10-CM

## 2023-07-11 PROCEDURE — 25500020 PHARM REV CODE 255: Performed by: ANESTHESIOLOGY

## 2023-07-11 PROCEDURE — 63600175 PHARM REV CODE 636 W HCPCS: Performed by: ANESTHESIOLOGY

## 2023-07-11 PROCEDURE — A4216 STERILE WATER/SALINE, 10 ML: HCPCS | Performed by: ANESTHESIOLOGY

## 2023-07-11 PROCEDURE — 25000003 PHARM REV CODE 250: Performed by: ANESTHESIOLOGY

## 2023-07-11 PROCEDURE — 62323 NJX INTERLAMINAR LMBR/SAC: CPT | Performed by: ANESTHESIOLOGY

## 2023-07-11 PROCEDURE — 62323 NJX INTERLAMINAR LMBR/SAC: CPT | Mod: ,,, | Performed by: ANESTHESIOLOGY

## 2023-07-11 PROCEDURE — 62323 PR INJ LUMBAR/SACRAL, W/IMAGING GUIDANCE: ICD-10-PCS | Mod: ,,, | Performed by: ANESTHESIOLOGY

## 2023-07-11 RX ORDER — LIDOCAINE HYDROCHLORIDE 10 MG/ML
1 INJECTION, SOLUTION EPIDURAL; INFILTRATION; INTRACAUDAL; PERINEURAL ONCE
Status: COMPLETED | OUTPATIENT
Start: 2023-07-11 | End: 2023-07-11

## 2023-07-11 RX ORDER — MIDAZOLAM HYDROCHLORIDE 1 MG/ML
INJECTION INTRAMUSCULAR; INTRAVENOUS
Status: DISCONTINUED | OUTPATIENT
Start: 2023-07-11 | End: 2023-07-11 | Stop reason: HOSPADM

## 2023-07-11 RX ORDER — SODIUM CHLORIDE, SODIUM LACTATE, POTASSIUM CHLORIDE, CALCIUM CHLORIDE 600; 310; 30; 20 MG/100ML; MG/100ML; MG/100ML; MG/100ML
INJECTION, SOLUTION INTRAVENOUS CONTINUOUS
Status: ACTIVE | OUTPATIENT
Start: 2023-07-11

## 2023-07-11 RX ORDER — DEXAMETHASONE SODIUM PHOSPHATE 10 MG/ML
INJECTION INTRAMUSCULAR; INTRAVENOUS
Status: DISCONTINUED | OUTPATIENT
Start: 2023-07-11 | End: 2023-07-11 | Stop reason: HOSPADM

## 2023-07-11 RX ORDER — OXYCODONE AND ACETAMINOPHEN 10; 325 MG/1; MG/1
1 TABLET ORAL EVERY 4 HOURS PRN
Qty: 120 TABLET | Refills: 0 | Status: SHIPPED | OUTPATIENT
Start: 2023-07-14 | End: 2023-08-10

## 2023-07-11 RX ORDER — FENTANYL CITRATE 50 UG/ML
INJECTION, SOLUTION INTRAMUSCULAR; INTRAVENOUS
Status: DISCONTINUED | OUTPATIENT
Start: 2023-07-11 | End: 2023-07-11 | Stop reason: HOSPADM

## 2023-07-11 RX ORDER — MORPHINE SULFATE 30 MG/1
30 TABLET, FILM COATED, EXTENDED RELEASE ORAL 3 TIMES DAILY
Qty: 90 TABLET | Refills: 0 | Status: SHIPPED | OUTPATIENT
Start: 2023-07-14 | End: 2023-08-10

## 2023-07-11 RX ORDER — LIDOCAINE HYDROCHLORIDE 10 MG/ML
INJECTION, SOLUTION EPIDURAL; INFILTRATION; INTRACAUDAL; PERINEURAL
Status: DISCONTINUED | OUTPATIENT
Start: 2023-07-11 | End: 2023-07-11 | Stop reason: HOSPADM

## 2023-07-11 RX ORDER — SODIUM CHLORIDE 9 MG/ML
INJECTION, SOLUTION INTRAMUSCULAR; INTRAVENOUS; SUBCUTANEOUS
Status: DISCONTINUED | OUTPATIENT
Start: 2023-07-11 | End: 2023-07-11 | Stop reason: HOSPADM

## 2023-07-11 RX ADMIN — LIDOCAINE HYDROCHLORIDE 10 MG: 10 INJECTION, SOLUTION EPIDURAL; INFILTRATION; INTRACAUDAL; PERINEURAL at 10:07

## 2023-07-11 RX ADMIN — SODIUM CHLORIDE, POTASSIUM CHLORIDE, SODIUM LACTATE AND CALCIUM CHLORIDE: 600; 310; 30; 20 INJECTION, SOLUTION INTRAVENOUS at 10:07

## 2023-07-11 NOTE — DISCHARGE SUMMARY
[facility] - Periop Services  Discharge Note  Short Stay    Procedure(s) (LRB):  Injection-steroid-epidural-caudal (N/A)      OUTCOME: Patient tolerated treatment/procedure well without complication and is now ready for discharge.    DISPOSITION: Home or Self Care    FINAL DIAGNOSIS:  <principal problem not specified>    FOLLOWUP: In clinic    DISCHARGE INSTRUCTIONS:    Discharge Procedure Orders   Notify your health care provider if you experience any of the following:  temperature >100.4     Notify your health care provider if you experience any of the following:  severe uncontrolled pain     Notify your health care provider if you experience any of the following:  redness, tenderness, or signs of infection (pain, swelling, redness, odor or green/yellow discharge around incision site)     Activity as tolerated        TIME SPENT ON DISCHARGE:   30 minutes

## 2023-07-11 NOTE — PLAN OF CARE
Reviewed AVS. Verbalized understanding. Denies further questions , needs or complaints. Able to hover BLE above bed for 15 seconds. Home via private vehicle with wife. To exit via wc.

## 2023-07-11 NOTE — TELEPHONE ENCOUNTER
No care due was identified.  [facility] Embedded Care Due Messages. Reference number: [id number].   7/11/2023 1:23:40 PM CDT

## 2023-07-11 NOTE — OP NOTE
PROCEDURE DATE: 7/11/2023    PROCEDURE:  Caudal epidural steroid injection under fluoroscopy.    Diagnosis: Lumbar radiculitis    Post Op diagnosis: Same    PHYSICIAN: Aram Terrell M.D.    MEDICATIONS INJECTED:  10 mg of dexamethasone and 4 ml of sterile, preservative-free NaCl.    LOCAL ANESTHETIC GIVEN:  Lidocaine 1%, 2 ml total    SEDATION MEDICATIONS: RN IV sedation    ESTIMATED BLOOD LOSS:  None    COMPLICATIONS:  None    TECHNIQUE:   After the patient was placed in prone position, the patient was prepped and draped in the usual sterile fashion using ChloraPrep and sterile towels.  Appropriate anatomic landmarks were determined by identifying the sacral hiatus in the lateral fluoroscopic view.  Local anesthetic was given via a 25g 1.5 inch needle by raising a wheal and infiltrating down to the periosteum.  A 3.5 inch 20 gauge touhy needle was introduced thru the sacral hiatus.  1ml of contrast was injected to confirm placement in the appropriate area and that there was no vascular uptake.  The medication was then injected slowly.  The patient tolerated the procedure well.    The patient was monitored after the procedure.  Patient was given post procedure and discharge instructions to follow at home.  The patient was discharged in a stable condition

## 2023-07-12 ENCOUNTER — TELEPHONE (OUTPATIENT)
Dept: CARDIOLOGY | Facility: CLINIC | Age: 78
End: 2023-07-12
Payer: MEDICARE

## 2023-07-12 VITALS
HEART RATE: 66 BPM | BODY MASS INDEX: 34.07 KG/M2 | DIASTOLIC BLOOD PRESSURE: 58 MMHG | RESPIRATION RATE: 20 BRPM | OXYGEN SATURATION: 94 % | WEIGHT: 238 LBS | SYSTOLIC BLOOD PRESSURE: 117 MMHG | TEMPERATURE: 98 F | HEIGHT: 70 IN

## 2023-07-12 NOTE — TELEPHONE ENCOUNTER
----- Message from Gio Barroso sent at 7/12/2023 12:17 PM CDT -----  Contact: Pt  Type: Needs Medical Advice    Who Called:Pt  Best Call Back Number:[phone]'    Additional Information Requesting a call back regarding Pt was calling to speak with office in regards to there stint place ment pt stated they have some questions please call when available Thank you  Please Advise-Thank you

## 2023-07-12 NOTE — TELEPHONE ENCOUNTER
Pt worried about recovery from angiogram scheduled for tomorrow; advised he should be good to go on his vacation next week

## 2023-07-14 DIAGNOSIS — E89.0 POSTOPERATIVE HYPOTHYROIDISM: ICD-10-CM

## 2023-07-14 DIAGNOSIS — I25.10 CORONARY ARTERY DISEASE, UNSPECIFIED VESSEL OR LESION TYPE, UNSPECIFIED WHETHER ANGINA PRESENT, UNSPECIFIED WHETHER NATIVE OR TRANSPLANTED HEART: Primary | ICD-10-CM

## 2023-07-14 RX ORDER — LEVOTHYROXINE SODIUM 112 UG/1
TABLET ORAL
Qty: 180 TABLET | Refills: 2 | Status: SHIPPED | OUTPATIENT
Start: 2023-07-14

## 2023-07-14 NOTE — TELEPHONE ENCOUNTER
Refill Decision Note   Sam Pete  is requesting a refill authorization.  Brief Assessment and Rationale for Refill:  Approve     Medication Therapy Plan:       Medication Reconciliation Completed: No   Comments:     No Care Gaps recommended.     Note composed:11:52 AM 07/14/2023

## 2023-07-14 NOTE — TELEPHONE ENCOUNTER
No care due was identified.  [facility] Embedded Care Due Messages. Reference number: [id number].   7/14/2023 10:10:29 AM CDT

## 2023-07-17 PROBLEM — I25.10 CORONARY ARTERY DISEASE INVOLVING NATIVE CORONARY ARTERY OF NATIVE HEART: Status: ACTIVE | Noted: 2023-07-17

## 2023-07-17 PROBLEM — I27.20 MILD PULMONARY HYPERTENSION: Status: ACTIVE | Noted: 2023-07-17

## 2023-07-17 PROBLEM — Z86.73 H/O: CVA (CEREBROVASCULAR ACCIDENT): Status: ACTIVE | Noted: 2023-07-17

## 2023-07-21 PROBLEM — Z95.1 S/P CABG (CORONARY ARTERY BYPASS GRAFT): Status: ACTIVE | Noted: 2023-07-21

## 2023-07-23 ENCOUNTER — PATIENT MESSAGE (OUTPATIENT)
Dept: FAMILY MEDICINE | Facility: CLINIC | Age: 78
End: 2023-07-23
Payer: MEDICARE

## 2023-07-23 DIAGNOSIS — D50.9 IRON DEFICIENCY ANEMIA, UNSPECIFIED IRON DEFICIENCY ANEMIA TYPE: ICD-10-CM

## 2023-07-23 DIAGNOSIS — F11.20 UNCOMPLICATED OPIOID DEPENDENCE: Primary | ICD-10-CM

## 2023-07-23 DIAGNOSIS — E78.5 HYPERLIPIDEMIA, UNSPECIFIED HYPERLIPIDEMIA TYPE: ICD-10-CM

## 2023-07-23 DIAGNOSIS — I10 ESSENTIAL (PRIMARY) HYPERTENSION: ICD-10-CM

## 2023-07-24 ENCOUNTER — TELEPHONE (OUTPATIENT)
Dept: VASCULAR SURGERY | Facility: CLINIC | Age: 78
End: 2023-07-24
Payer: MEDICARE

## 2023-07-24 DIAGNOSIS — I25.10 CORONARY ARTERY DISEASE, UNSPECIFIED VESSEL OR LESION TYPE, UNSPECIFIED WHETHER ANGINA PRESENT, UNSPECIFIED WHETHER NATIVE OR TRANSPLANTED HEART: Primary | ICD-10-CM

## 2023-07-24 DIAGNOSIS — Z95.1 S/P CABG (CORONARY ARTERY BYPASS GRAFT): ICD-10-CM

## 2023-07-24 NOTE — TELEPHONE ENCOUNTER
----- Message from Magnus Burnett sent at 7/24/2023  8:06 AM CDT -----  Regarding: appt  Type:  Sooner Appointment Request    Caller is requesting a sooner appointment.  Caller declined first available appointment listed below.  Caller will not accept being placed on the waitlist and is requesting a message be sent to doctor.    Name of Caller:  pt  When is the first available appointment?  Dept book  Symptoms:  hospital discharge  Best Call Back Number:  [phone]    Additional Information:  pt needs a 3wk f/u appt. Please call to discuss.

## 2023-08-09 ENCOUNTER — LAB VISIT (OUTPATIENT)
Dept: LAB | Facility: HOSPITAL | Age: 78
End: 2023-08-09
Attending: FAMILY MEDICINE
Payer: MEDICARE

## 2023-08-09 DIAGNOSIS — E78.5 HYPERLIPIDEMIA, UNSPECIFIED HYPERLIPIDEMIA TYPE: ICD-10-CM

## 2023-08-09 DIAGNOSIS — I10 ESSENTIAL (PRIMARY) HYPERTENSION: ICD-10-CM

## 2023-08-09 DIAGNOSIS — D50.9 IRON DEFICIENCY ANEMIA, UNSPECIFIED IRON DEFICIENCY ANEMIA TYPE: ICD-10-CM

## 2023-08-09 LAB
ALBUMIN SERPL BCP-MCNC: 3.9 G/DL (ref 3.5–5.2)
ALP SERPL-CCNC: 85 U/L (ref 55–135)
ALT SERPL W/O P-5'-P-CCNC: 17 U/L (ref 10–44)
ANION GAP SERPL CALC-SCNC: 13 MMOL/L (ref 8–16)
AST SERPL-CCNC: 17 U/L (ref 10–40)
BASOPHILS # BLD AUTO: 0.05 K/UL (ref 0–0.2)
BASOPHILS NFR BLD: 0.7 % (ref 0–1.9)
BILIRUB SERPL-MCNC: 0.5 MG/DL (ref 0.1–1)
BUN SERPL-MCNC: 15 MG/DL (ref 8–23)
CALCIUM SERPL-MCNC: 9.7 MG/DL (ref 8.7–10.5)
CHLORIDE SERPL-SCNC: 101 MMOL/L (ref 95–110)
CHOLEST SERPL-MCNC: 143 MG/DL (ref 120–199)
CHOLEST/HDLC SERPL: 3 {RATIO} (ref 2–5)
CO2 SERPL-SCNC: 29 MMOL/L (ref 23–29)
CREAT SERPL-MCNC: 1.4 MG/DL (ref 0.5–1.4)
DIFFERENTIAL METHOD: ABNORMAL
EOSINOPHIL # BLD AUTO: 0.2 K/UL (ref 0–0.5)
EOSINOPHIL NFR BLD: 3 % (ref 0–8)
ERYTHROCYTE [DISTWIDTH] IN BLOOD BY AUTOMATED COUNT: 14.7 % (ref 11.5–14.5)
EST. GFR  (NO RACE VARIABLE): 52.1 ML/MIN/1.73 M^2
GLUCOSE SERPL-MCNC: 122 MG/DL (ref 70–110)
HCT VFR BLD AUTO: 40.4 % (ref 40–54)
HDLC SERPL-MCNC: 48 MG/DL (ref 40–75)
HDLC SERPL: 33.6 % (ref 20–50)
HGB BLD-MCNC: 12.2 G/DL (ref 14–18)
IMM GRANULOCYTES # BLD AUTO: 0.02 K/UL (ref 0–0.04)
IMM GRANULOCYTES NFR BLD AUTO: 0.3 % (ref 0–0.5)
IRON SERPL-MCNC: 200 UG/DL (ref 45–160)
LDLC SERPL CALC-MCNC: 57.8 MG/DL (ref 63–159)
LYMPHOCYTES # BLD AUTO: 2.9 K/UL (ref 1–4.8)
LYMPHOCYTES NFR BLD: 38.1 % (ref 18–48)
MCH RBC QN AUTO: 29.9 PG (ref 27–31)
MCHC RBC AUTO-ENTMCNC: 30.2 G/DL (ref 32–36)
MCV RBC AUTO: 99 FL (ref 82–98)
MONOCYTES # BLD AUTO: 0.9 K/UL (ref 0.3–1)
MONOCYTES NFR BLD: 11.4 % (ref 4–15)
NEUTROPHILS # BLD AUTO: 3.6 K/UL (ref 1.8–7.7)
NEUTROPHILS NFR BLD: 46.5 % (ref 38–73)
NONHDLC SERPL-MCNC: 95 MG/DL
NRBC BLD-RTO: 0 /100 WBC
PLATELET # BLD AUTO: 489 K/UL (ref 150–450)
PMV BLD AUTO: 9 FL (ref 9.2–12.9)
POTASSIUM SERPL-SCNC: 4.8 MMOL/L (ref 3.5–5.1)
PROT SERPL-MCNC: 8.1 G/DL (ref 6–8.4)
RBC # BLD AUTO: 4.08 M/UL (ref 4.6–6.2)
SATURATED IRON: 51 % (ref 20–50)
SODIUM SERPL-SCNC: 143 MMOL/L (ref 136–145)
TOTAL IRON BINDING CAPACITY: 394 UG/DL (ref 250–450)
TRANSFERRIN SERPL-MCNC: 266 MG/DL (ref 200–375)
TRIGL SERPL-MCNC: 186 MG/DL (ref 30–150)
WBC # BLD AUTO: 7.66 K/UL (ref 3.9–12.7)

## 2023-08-09 PROCEDURE — 85025 COMPLETE CBC W/AUTO DIFF WBC: CPT | Mod: HCNC | Performed by: FAMILY MEDICINE

## 2023-08-09 PROCEDURE — 80061 LIPID PANEL: CPT | Mod: HCNC | Performed by: FAMILY MEDICINE

## 2023-08-09 PROCEDURE — 84466 ASSAY OF TRANSFERRIN: CPT | Mod: HCNC | Performed by: FAMILY MEDICINE

## 2023-08-09 PROCEDURE — 80053 COMPREHEN METABOLIC PANEL: CPT | Mod: HCNC | Performed by: FAMILY MEDICINE

## 2023-08-09 PROCEDURE — 36415 COLL VENOUS BLD VENIPUNCTURE: CPT | Mod: HCNC,PO | Performed by: FAMILY MEDICINE

## 2023-08-10 ENCOUNTER — OFFICE VISIT (OUTPATIENT)
Dept: FAMILY MEDICINE | Facility: CLINIC | Age: 78
End: 2023-08-10
Attending: FAMILY MEDICINE
Payer: MEDICARE

## 2023-08-10 VITALS
OXYGEN SATURATION: 97 % | TEMPERATURE: 99 F | HEART RATE: 69 BPM | BODY MASS INDEX: 35.71 KG/M2 | SYSTOLIC BLOOD PRESSURE: 129 MMHG | HEIGHT: 67 IN | RESPIRATION RATE: 18 BRPM | WEIGHT: 227.5 LBS | DIASTOLIC BLOOD PRESSURE: 80 MMHG

## 2023-08-10 DIAGNOSIS — F11.20 UNCOMPLICATED OPIOID DEPENDENCE: ICD-10-CM

## 2023-08-10 DIAGNOSIS — E66.01 SEVERE OBESITY (BMI 35.0-35.9 WITH COMORBIDITY): ICD-10-CM

## 2023-08-10 PROCEDURE — 3079F PR MOST RECENT DIASTOLIC BLOOD PRESSURE 80-89 MM HG: ICD-10-PCS | Mod: HCNC,CPTII,S$GLB, | Performed by: FAMILY MEDICINE

## 2023-08-10 PROCEDURE — 1101F PT FALLS ASSESS-DOCD LE1/YR: CPT | Mod: HCNC,CPTII,S$GLB, | Performed by: FAMILY MEDICINE

## 2023-08-10 PROCEDURE — 3074F PR MOST RECENT SYSTOLIC BLOOD PRESSURE < 130 MM HG: ICD-10-PCS | Mod: HCNC,CPTII,S$GLB, | Performed by: FAMILY MEDICINE

## 2023-08-10 PROCEDURE — 99213 PR OFFICE/OUTPT VISIT, EST, LEVL III, 20-29 MIN: ICD-10-PCS | Mod: HCNC,S$GLB,, | Performed by: FAMILY MEDICINE

## 2023-08-10 PROCEDURE — 1160F RVW MEDS BY RX/DR IN RCRD: CPT | Mod: HCNC,CPTII,S$GLB, | Performed by: FAMILY MEDICINE

## 2023-08-10 PROCEDURE — 3288F FALL RISK ASSESSMENT DOCD: CPT | Mod: HCNC,CPTII,S$GLB, | Performed by: FAMILY MEDICINE

## 2023-08-10 PROCEDURE — 3074F SYST BP LT 130 MM HG: CPT | Mod: HCNC,CPTII,S$GLB, | Performed by: FAMILY MEDICINE

## 2023-08-10 PROCEDURE — 1125F AMNT PAIN NOTED PAIN PRSNT: CPT | Mod: HCNC,CPTII,S$GLB, | Performed by: FAMILY MEDICINE

## 2023-08-10 PROCEDURE — 99213 OFFICE O/P EST LOW 20 MIN: CPT | Mod: HCNC,S$GLB,, | Performed by: FAMILY MEDICINE

## 2023-08-10 PROCEDURE — 3079F DIAST BP 80-89 MM HG: CPT | Mod: HCNC,CPTII,S$GLB, | Performed by: FAMILY MEDICINE

## 2023-08-10 PROCEDURE — 1125F PR PAIN SEVERITY QUANTIFIED, PAIN PRESENT: ICD-10-PCS | Mod: HCNC,CPTII,S$GLB, | Performed by: FAMILY MEDICINE

## 2023-08-10 PROCEDURE — 1159F MED LIST DOCD IN RCRD: CPT | Mod: HCNC,CPTII,S$GLB, | Performed by: FAMILY MEDICINE

## 2023-08-10 PROCEDURE — 1101F PR PT FALLS ASSESS DOC 0-1 FALLS W/OUT INJ PAST YR: ICD-10-PCS | Mod: HCNC,CPTII,S$GLB, | Performed by: FAMILY MEDICINE

## 2023-08-10 PROCEDURE — 99999 PR PBB SHADOW E&M-EST. PATIENT-LVL IV: ICD-10-PCS | Mod: PBBFAC,HCNC,, | Performed by: FAMILY MEDICINE

## 2023-08-10 PROCEDURE — 1111F DSCHRG MED/CURRENT MED MERGE: CPT | Mod: HCNC,CPTII,S$GLB, | Performed by: FAMILY MEDICINE

## 2023-08-10 PROCEDURE — 99999 PR PBB SHADOW E&M-EST. PATIENT-LVL IV: CPT | Mod: PBBFAC,HCNC,, | Performed by: FAMILY MEDICINE

## 2023-08-10 PROCEDURE — 1160F PR REVIEW ALL MEDS BY PRESCRIBER/CLIN PHARMACIST DOCUMENTED: ICD-10-PCS | Mod: HCNC,CPTII,S$GLB, | Performed by: FAMILY MEDICINE

## 2023-08-10 PROCEDURE — 3288F PR FALLS RISK ASSESSMENT DOCUMENTED: ICD-10-PCS | Mod: HCNC,CPTII,S$GLB, | Performed by: FAMILY MEDICINE

## 2023-08-10 PROCEDURE — 1159F PR MEDICATION LIST DOCUMENTED IN MEDICAL RECORD: ICD-10-PCS | Mod: HCNC,CPTII,S$GLB, | Performed by: FAMILY MEDICINE

## 2023-08-10 PROCEDURE — 1111F PR DISCHARGE MEDS RECONCILED W/ CURRENT OUTPATIENT MED LIST: ICD-10-PCS | Mod: HCNC,CPTII,S$GLB, | Performed by: FAMILY MEDICINE

## 2023-08-10 RX ORDER — OXYCODONE AND ACETAMINOPHEN 10; 325 MG/1; MG/1
1 TABLET ORAL EVERY 4 HOURS PRN
Qty: 120 TABLET | Refills: 0 | Status: SHIPPED | OUTPATIENT
Start: 2023-10-25 | End: 2023-10-20 | Stop reason: SDUPTHER

## 2023-08-10 RX ORDER — OXYCODONE AND ACETAMINOPHEN 10; 325 MG/1; MG/1
1 TABLET ORAL EVERY 4 HOURS PRN
Qty: 120 TABLET | Refills: 0 | Status: SHIPPED | OUTPATIENT
Start: 2023-09-25 | End: 2023-09-12

## 2023-08-10 RX ORDER — OXYCODONE AND ACETAMINOPHEN 10; 325 MG/1; MG/1
1 TABLET ORAL EVERY 4 HOURS PRN
Qty: 120 TABLET | Refills: 0 | Status: SHIPPED | OUTPATIENT
Start: 2023-08-25 | End: 2023-09-12

## 2023-08-10 RX ORDER — MORPHINE SULFATE 30 MG/1
30 TABLET, FILM COATED, EXTENDED RELEASE ORAL 3 TIMES DAILY
Qty: 90 TABLET | Refills: 0 | Status: SHIPPED | OUTPATIENT
Start: 2023-09-25 | End: 2023-09-12

## 2023-08-10 RX ORDER — MORPHINE SULFATE 30 MG/1
30 TABLET, FILM COATED, EXTENDED RELEASE ORAL 3 TIMES DAILY
Qty: 90 TABLET | Refills: 0 | Status: SHIPPED | OUTPATIENT
Start: 2023-10-25 | End: 2023-10-20 | Stop reason: SDUPTHER

## 2023-08-10 RX ORDER — MORPHINE SULFATE 30 MG/1
30 TABLET, FILM COATED, EXTENDED RELEASE ORAL 3 TIMES DAILY
Qty: 90 TABLET | Refills: 0 | Status: SHIPPED | OUTPATIENT
Start: 2023-08-25 | End: 2023-09-12

## 2023-08-10 RX ORDER — LOSARTAN POTASSIUM 50 MG/1
50 TABLET ORAL DAILY
COMMUNITY
End: 2023-11-06 | Stop reason: SDUPTHER

## 2023-08-10 NOTE — PROGRESS NOTES
Subjective:       Patient ID: Sam Pete is a 78 y.o. male.    Chief Complaint: Chronic Pain Syndrome (Pt states that he is here for his 3 mo fu )    78-year-old male with a history of chronic pain disorder secondary to spinal stenosis of cervical and lumbar spine with hereditary and idiopathic peripheral neuropathy and degenerative arthritis with previous laminectomy in February 2020, left total knee, right total shoulder in March of 2022, and multiple other surgeries.  The patient was having problems of shortness of breath and had a syncopal episode in his vehicle several months ago.  He was seen by Dr. Eugene with echocardiogram and a nuclear stress test indicating concern for blockages eventually culminating in four vessel bypass surgery July 19, 2023.  The patient is now about three weeks postop and doing well.  He was told not to do any lifting postop but a few days ago he was looking for a shirt in his closet and somewhat absent minded only pushed the other shirts a side with his left arm and felt a sudden pain in the left anterior chest wall right along the lower border of the left pectoralis muscle.  It is still sore to touch but has improved and he has not noted any change in rhythm, shortness of breath, or dyspnea on exertion.  He still has extensive bruising along the right chest wall but the left chest has already cleared up from similar bruising postop.  The patient is actually here for renewal of his opioids for chronic pain taking MS Contin 30 mg two in the morning and one in the evening and Percocet 10 mg 3 to 4 daily PRN.  He says that prior to his bypass surgery he had actually wean the MS Contin down to one twice daily but after surgery it was stepped up to 60 mg b.i.d. in the immediate postoperative.  And then reduced back to three daily on discharge and he has not tried to reduce it further in the postop period.  He does not feel it possible at this time to reduce it further but he does  intend to continue weaning down once he fully stabilizes.    Past Medical History:  07/2023: Abnormal nuclear stress test  No date: Anticoagulant long-term use      Comment:  ASA 81 mg  No date: Arthritis  No date: Cataract      Comment:  OU  No date: Chronic low back pain  02/08/2011: Complete rupture of rotator cuff  07/08/2015: DDD (degenerative disc disease), lumbar  09/09/2015: Degeneration of lumbar or lumbosacral intervertebral disc  No date: ED (erectile dysfunction)  No date: GERD (gastroesophageal reflux disease)  No date: Hypertension  No date: Hypothyroidism, unspecified  06/26/2017: Lumbar pseudoarthrosis  06/26/2017: Lumbar stenosis  No date: Obesity  07/2023: PSVT (paroxysmal supraventricular tachycardia)  07/2023: SOB (shortness of breath)  07/08/2015: Spondylosis of lumbar region without myelopathy or   radiculopathy  1997: Stroke      Comment:  basal ganglia, left sided weakness, resolved  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  07/08/2015: Thoracic or lumbosacral neuritis or radiculitis    Past Surgical History:  07/13/2023: ANGIOGRAM, CORONARY, WITH LEFT HEART CATHETERIZATION      Comment:  Procedure: Left Heart Cath;  Surgeon: Salvador Eugene MD;               Location: [facility];  Service: Cardiology;;  No date: APPENDECTOMY  03/22/2022: ARTHROPLASTY OF SHOULDER; Right      Comment:  Procedure: ARTHROPLASTY, SHOULDER BRENNAN RIGHT SHOULDER                HUMERAL HEAD RESURFACING;  Surgeon: Frankie Joya MD;               Location: [facility] OR;  Service: Orthopedics;  Laterality:                Right;  No date: BACK SURGERY      Comment:  4- back surgery  12/16/2021: CAUDAL EPIDURAL STEROID INJECTION; N/A      Comment:  Procedure: Injection-steroid-epidural-caudal;  Surgeon:                Aram Terrell MD;  Location: [facility] OR;  Service: Pain                Management;  Laterality: N/A;  02/02/2022: CAUDAL EPIDURAL STEROID INJECTION; N/A      Comment:  Procedure: INJECTION, STEROID, SPINE, EPIDURAL, CAUDAL;                 Surgeon: Aram Terrell MD;  Location: [facility] OR;  Service:                Pain Management;  Laterality: N/A;  07/22/2022: CAUDAL EPIDURAL STEROID INJECTION; N/A      Comment:  Procedure: Injection-steroid-epidural-caudal;  Surgeon:                Aram Terrell MD;  Location: [facility] OR;  Service: Pain                Management;  Laterality: N/A;  Caudal CARLOS   01/20/2023: CAUDAL EPIDURAL STEROID INJECTION; N/A      Comment:  Procedure: Injection-steroid-epidural-caudal;  Surgeon:                Aram Terrell MD;  Location: [facility] OR;  Service: Pain                Management;  Laterality: N/A;  caudal ( melinda)  04/21/2023: CAUDAL EPIDURAL STEROID INJECTION; N/A      Comment:  Procedure: Injection-steroid-epidural-caudal;  Surgeon:                Aram Terrell MD;  Location: [facility] OR;  Service: Pain                Management;  Laterality: N/A;  caudal  07/11/2023: CAUDAL EPIDURAL STEROID INJECTION; N/A      Comment:  Procedure: Injection-steroid-epidural-caudal;  Surgeon:                Aram Terrell MD;  Location: [facility] OR;  Service: Pain                Management;  Laterality: N/A;  caudal  07/12/2004: COLONOSCOPY      Comment:  CHILO.   Redundant tortuous colon, otherwise normal.  02/25/2019: COLONOSCOPY; N/A      Comment:  Procedure: COLONOSCOPY;  Surgeon: Gilbert Rodriguez Jr., MD;  Location: [facility];  Service: Endoscopy;                 Laterality: N/A;  07/13/2023: CORONARY ANGIOGRAPHY; N/A      Comment:  Procedure: ANGIOGRAM, CORONARY ARTERY;  Surgeon: Salvador Eugene MD;  Location: [facility] CATH;  Service: Cardiology;                 Laterality: N/A;  7/19/2023: CORONARY ARTERY BYPASS GRAFT (CABG); N/A      Comment:  Procedure: CORONARY ARTERY BYPASS GRAFT (CABG)- x 4;                 Surgeon: Sandrine Wagner MD;  Location: [facility] OR;  Service:                Cardiothoracic;  Laterality: N/A;  7/19/2023: ENDOSCOPIC HARVEST OF VEIN; Left      Comment:  Procedure:  HARVEST-VEIN-ENDOVASCULAR;  Surgeon: Sandrine Wagner MD;  Location: [facility] OR;  Service: Cardiothoracic;                Laterality: Left;  No date: Epidural steroid injection      Comment:  Pain management  07/12/2004: ESOPHAGOGASTRODUODENOSCOPY      Comment:  CHILO.  7/19/2023: EXCLUSION, LEFT ATRIAL APPENDAGE, OPEN, AS PART OF OPEN   CHEST SURGERY; N/A      Comment:  Procedure: EXCLUSION, LEFT ATRIAL APPENDAGE, OPEN, AS                PART OF OPEN CHEST SURGERY;  Surgeon: Sandrine Wagner MD;                 Location: [facility] OR;  Service: Cardiothoracic;  Laterality:               N/A;  No date: FRACTURE SURGERY; Left      Comment:  wrist / forearm, total of 5  12/12/2019: INSERTION OF DORSAL COLUMN NERVE STIMULATOR FOR TRIAL; N/A      Comment:  Procedure: INSERTION, NEUROSTIMULATOR, SPINAL CORD,                DORSAL COLUMN, FOR TRIAL;  Surgeon: Evan Lyles MD;                 Location: [facility] OR;  Service: Pain Management;                 Laterality: N/A;  02/06/2013: JOINT REPLACEMENT      Comment:  ( rt hip 2007), left hip   No date: JOINT REPLACEMENT; Left      Comment:  total knee  No date: KNEE ARTHROSCOPY W/ DEBRIDEMENT      Comment:  bilateral knees , total of six  No date: KNEE SURGERY  02/12/2020: LAMINECTOMY USING MINIMALLY INVASIVE TECHNIQUE; N/A      Comment:  Procedure: LAMINECTOMY, SPINE, MINIMALLY INVASIVE /                 PLACEMENT OF SPINAL CORD STIMULATOR;  Surgeon: Darlene Max MD;  Location: [facility] OR;  Service: Neurosurgery;                 Laterality: N/A;  No date: Nervbe Block injection      Comment:  Pain management  No date: SPINAL CORD STIMULATOR IMPLANT  03/28/2022: TOTAL SHOULDER ARTHROPLASTY; Right      Comment:  Dr Joya  03/07/2022: TOTAL THYROIDECTOMY; Bilateral      Comment:  Dr Mendoza    Current Outpatient Medications on File Prior to Visit:  amiodarone (PACERONE) 200 MG Tab, Take 1 tablet (200 mg total) by mouth 2 (two) times daily., Disp: 60 tablet,  Rfl: 0  aspirin 81 MG Chew, Take 81 mg by mouth once daily., Disp: , Rfl:   atorvastatin (LIPITOR) 40 MG tablet, TAKE 1 TABLET (40 MG TOTAL) BY MOUTH ONCE DAILY., Disp: 90 tablet, Rfl: 3  buPROPion (WELLBUTRIN XL) 150 MG TB24 tablet, Take 1 tablet (150 mg total) by mouth once daily., Disp: 90 tablet, Rfl: 3  calcitRIOL (ROCALTROL) 0.5 MCG Cap, TAKE 1 CAPSULE ONE TIME DAILY, Disp: 90 capsule, Rfl: 3  furosemide (LASIX) 40 MG tablet, Take 1 tablet (40 mg total) by mouth once daily., Disp: 90 tablet, Rfl: 1  levothyroxine (SYNTHROID) 112 MCG tablet, TAKE 2 TABLETS(224 MCG) BY MOUTH EVERY DAY, Disp: 180 tablet, Rfl: 2  losartan (COZAAR) 50 MG tablet, Take 50 mg by mouth once daily. Take 25 mg daily if bp is 140 or above, Disp: , Rfl:   metoprolol succinate (TOPROL-XL) 25 MG 24 hr tablet, Take 0.5 tablets (12.5 mg total) by mouth once daily., Disp: 45 tablet, Rfl: 0  naloxone (NARCAN) 0.4 mg/mL injection, Inject 1 mL (0.4 mg total) into the vein as needed (overdose)., Disp: 2 mL, Rfl: 5  omeprazole (PRILOSEC) 40 MG capsule, Take one capsule by mouth daily, Disp: 90 capsule, Rfl: 3  tiZANidine (ZANAFLEX) 4 MG tablet, Take 1 tablet (4 mg total) by mouth 3 (three) times daily as needed (muscle spasm). (Patient taking differently: Take 4 mg by mouth every evening.), Disp: 270 tablet, Rfl: 1  UNABLE TO FIND, Take by mouth once daily. Vitafusion multivites gummies, Disp: , Rfl:   [DISCONTINUED] morphine (MS CONTIN) 30 MG 12 hr tablet, Take 1 tablet (30 mg total) by mouth 3 (three) times daily. Take two tablets (60mg) in the morning and one tablet (30mg) in the evening, Disp: 90 tablet, Rfl: 0  [DISCONTINUED] morphine (MS CONTIN) 30 MG 12 hr tablet, Take 1 tablet (30 mg total) by mouth 3 (three) times daily. Take two tablets (60mg) in the morning and one tablet (30mg) in the evening, Disp: 90 tablet, Rfl: 0  [DISCONTINUED] morphine (MS CONTIN) 30 MG 12 hr tablet, Take 1 tablet (30 mg total) by mouth 3 (three) times daily. Take  two tablets (60mg) in the morning and one tablet (30mg) in the evening, Disp: 90 tablet, Rfl: 0  [DISCONTINUED] oxyCODONE-acetaminophen (PERCOCET)  mg per tablet, Take 1 tablet by mouth every 4 (four) hours as needed for Pain., Disp: 120 tablet, Rfl: 0  [DISCONTINUED] oxyCODONE-acetaminophen (PERCOCET)  mg per tablet, Take 1 tablet by mouth every 4 (four) hours as needed for Pain., Disp: 120 tablet, Rfl: 0  [DISCONTINUED] oxyCODONE-acetaminophen (PERCOCET)  mg per tablet, Take 1 tablet by mouth every 4 (four) hours as needed for Pain., Disp: 120 tablet, Rfl: 0  [DISCONTINUED] pregabalin (LYRICA) 75 MG capsule, Take 1 capsule (75 mg total) by mouth 2 (two) times daily. (Patient not taking: Reported on 8/10/2023), Disp: 180 capsule, Rfl: 1    Current Facility-Administered Medications on File Prior to Visit:  diphenhydrAMINE injection 12.5 mg, 12.5 mg, Intravenous, Once PRN, Enrique Rosales MD  electrolyte-S (ISOLYTE), , Intravenous, Continuous, Enrique Rosales MD  HYDROmorphone (PF) injection 0.2 mg, 0.2 mg, Intravenous, Q5 Min PRN, Enrique Rosales MD  HYDROmorphone (PF) injection 0.2 mg, 0.2 mg, Intravenous, Q5 Min PRN, Enrique Rosales MD  lactated ringers infusion, , Intravenous, Once PRN, Aram Terrell MD  lactated ringers infusion, 10 mL/hr, Intravenous, Continuous, Enrique Rosales MD, Stopped at 07/19/23 0959  lactated ringers infusion, , Intravenous, Continuous, Aram Terrell MD, Last Rate: 25 mL/hr at 07/11/23 1012, New Bag at 07/11/23 1012  lorazepam injection 0.25 mg, 0.25 mg, Intravenous, Once PRN, Enrique Rosales MD  prochlorperazine injection Soln 5 mg, 5 mg, Intravenous, Q30 Min PRN, Enrique Rosales MD  sodium chloride 0.9% flush 3 mL, 3 mL, Intravenous, Q8H, Enrique Rosales MD            Review of Systems   Constitutional:  Negative for chills, diaphoresis and fever.   Respiratory:  Negative for cough, chest tightness and shortness of  breath.    Cardiovascular:  Positive for chest pain (Superficial left chest pain as above). Negative for palpitations.   Gastrointestinal:  Negative for constipation, diarrhea, nausea and vomiting.   Musculoskeletal:  Positive for arthralgias, back pain and neck pain.   Neurological:  Negative for weakness and numbness.   Hematological:  Bruises/bleeds easily (Extensive postop bruising of the right chest wall).       Objective:      Physical Exam  Vitals and nursing note reviewed.   Constitutional:       General: He is not in acute distress.     Appearance: Normal appearance. He is obese. He is not ill-appearing, toxic-appearing or diaphoretic.      Comments: Good blood pressure control  Normal pulse with a regular rhythm  Severe obesity with a BMI of 35.6 he is down 4.2 lb from his last visit with me April 24, 2023   HENT:      Head: Normocephalic and atraumatic.   Eyes:      Extraocular Movements: Extraocular movements intact.      Pupils: Pupils are equal, round, and reactive to light.   Neck:      Vascular: No carotid bruit.   Cardiovascular:      Rate and Rhythm: Normal rate and regular rhythm.      Heart sounds: Normal heart sounds. No murmur heard.     No friction rub.   Pulmonary:      Effort: Pulmonary effort is normal. No respiratory distress.      Breath sounds: Normal breath sounds. No stridor. No wheezing, rhonchi or rales.   Chest:      Chest wall: Tenderness present.       Musculoskeletal:      Cervical back: Normal range of motion and neck supple. No rigidity or tenderness.   Lymphadenopathy:      Cervical: No cervical adenopathy.   Skin:     Coloration: Skin is not jaundiced or pale.      Findings: Bruising present. No erythema.          Neurological:      Mental Status: He is alert and oriented to person, place, and time.   Psychiatric:         Mood and Affect: Mood normal.         Behavior: Behavior normal.         Thought Content: Thought content normal.         Assessment:       1. Uncomplicated  opioid dependence    2. Severe obesity (BMI 35.0-35.9 with comorbidity)        Plan:       1. Uncomplicated opioid dependence  The  (Prescription Monitoring Program) website was checked with no inappropriate activity found.  I had an extensive discussion with the patient and his wife regarding my intention to wean him off the MS Contin if possible and then try to at least reduce the Percocet.  He indicated understanding and agreement and will continue to work towards that end  - morphine (MS CONTIN) 30 MG 12 hr tablet; Take 1 tablet (30 mg total) by mouth 3 (three) times daily. Take two tablets (60mg) in the morning and one tablet (30mg) in the evening  Dispense: 90 tablet; Refill: 0  - oxyCODONE-acetaminophen (PERCOCET)  mg per tablet; Take 1 tablet by mouth every 4 (four) hours as needed for Pain.  Dispense: 120 tablet; Refill: 0  - morphine (MS CONTIN) 30 MG 12 hr tablet; Take 1 tablet (30 mg total) by mouth 3 (three) times daily. Take two tablets (60mg) in the morning and one tablet (30mg) in the evening  Dispense: 90 tablet; Refill: 0  - oxyCODONE-acetaminophen (PERCOCET)  mg per tablet; Take 1 tablet by mouth every 4 (four) hours as needed for Pain.  Dispense: 120 tablet; Refill: 0  - morphine (MS CONTIN) 30 MG 12 hr tablet; Take 1 tablet (30 mg total) by mouth 3 (three) times daily. Take two tablets (60mg) in the morning and one tablet (30mg) in the evening  Dispense: 90 tablet; Refill: 0  - oxyCODONE-acetaminophen (PERCOCET)  mg per tablet; Take 1 tablet by mouth every 4 (four) hours as needed for Pain.  Dispense: 120 tablet; Refill: 0    2. Severe obesity (BMI 35.0-35.9 with comorbidity)  Continue weight loss.  Patient will undoubtedly be enrolled in cardiac rehab in the not too distant future

## 2023-08-15 ENCOUNTER — TELEPHONE (OUTPATIENT)
Dept: CARDIOLOGY | Facility: CLINIC | Age: 78
End: 2023-08-15
Payer: MEDICARE

## 2023-08-15 ENCOUNTER — HOSPITAL ENCOUNTER (OUTPATIENT)
Dept: RADIOLOGY | Facility: HOSPITAL | Age: 78
Discharge: HOME | End: 2023-08-15
Attending: THORACIC SURGERY (CARDIOTHORACIC VASCULAR SURGERY)
Payer: MEDICARE

## 2023-08-15 ENCOUNTER — OFFICE VISIT (OUTPATIENT)
Dept: VASCULAR SURGERY | Facility: CLINIC | Age: 78
End: 2023-08-15
Payer: MEDICARE

## 2023-08-15 VITALS
SYSTOLIC BLOOD PRESSURE: 148 MMHG | HEART RATE: 72 BPM | BODY MASS INDEX: 34.77 KG/M2 | WEIGHT: 222 LBS | DIASTOLIC BLOOD PRESSURE: 85 MMHG

## 2023-08-15 DIAGNOSIS — G89.4 CHRONIC PAIN SYNDROME: ICD-10-CM

## 2023-08-15 DIAGNOSIS — Z95.1 S/P CABG (CORONARY ARTERY BYPASS GRAFT): ICD-10-CM

## 2023-08-15 DIAGNOSIS — I25.10 CORONARY ARTERY DISEASE, UNSPECIFIED VESSEL OR LESION TYPE, UNSPECIFIED WHETHER ANGINA PRESENT, UNSPECIFIED WHETHER NATIVE OR TRANSPLANTED HEART: ICD-10-CM

## 2023-08-15 DIAGNOSIS — Z95.1 HX OF CABG: Primary | ICD-10-CM

## 2023-08-15 PROCEDURE — 3288F FALL RISK ASSESSMENT DOCD: CPT | Mod: HCNC,CPTII,S$GLB, | Performed by: THORACIC SURGERY (CARDIOTHORACIC VASCULAR SURGERY)

## 2023-08-15 PROCEDURE — 71046 X-RAY EXAM CHEST 2 VIEWS: CPT | Mod: TC,HCNC,FY,PO

## 2023-08-15 PROCEDURE — 71046 XR CHEST PA AND LATERAL: ICD-10-PCS | Mod: 26,HCNC,, | Performed by: RADIOLOGY

## 2023-08-15 PROCEDURE — 1159F PR MEDICATION LIST DOCUMENTED IN MEDICAL RECORD: ICD-10-PCS | Mod: HCNC,CPTII,S$GLB, | Performed by: THORACIC SURGERY (CARDIOTHORACIC VASCULAR SURGERY)

## 2023-08-15 PROCEDURE — 99999 PR PBB SHADOW E&M-EST. PATIENT-LVL II: CPT | Mod: PBBFAC,HCNC,, | Performed by: THORACIC SURGERY (CARDIOTHORACIC VASCULAR SURGERY)

## 2023-08-15 PROCEDURE — 3079F PR MOST RECENT DIASTOLIC BLOOD PRESSURE 80-89 MM HG: ICD-10-PCS | Mod: HCNC,CPTII,S$GLB, | Performed by: THORACIC SURGERY (CARDIOTHORACIC VASCULAR SURGERY)

## 2023-08-15 PROCEDURE — 99024 PR POST-OP FOLLOW-UP VISIT: ICD-10-PCS | Mod: HCNC,S$GLB,, | Performed by: THORACIC SURGERY (CARDIOTHORACIC VASCULAR SURGERY)

## 2023-08-15 PROCEDURE — 99024 POSTOP FOLLOW-UP VISIT: CPT | Mod: HCNC,S$GLB,, | Performed by: THORACIC SURGERY (CARDIOTHORACIC VASCULAR SURGERY)

## 2023-08-15 PROCEDURE — 3077F SYST BP >= 140 MM HG: CPT | Mod: HCNC,CPTII,S$GLB, | Performed by: THORACIC SURGERY (CARDIOTHORACIC VASCULAR SURGERY)

## 2023-08-15 PROCEDURE — 1126F AMNT PAIN NOTED NONE PRSNT: CPT | Mod: HCNC,CPTII,S$GLB, | Performed by: THORACIC SURGERY (CARDIOTHORACIC VASCULAR SURGERY)

## 2023-08-15 PROCEDURE — 99999 PR PBB SHADOW E&M-EST. PATIENT-LVL II: ICD-10-PCS | Mod: PBBFAC,HCNC,, | Performed by: THORACIC SURGERY (CARDIOTHORACIC VASCULAR SURGERY)

## 2023-08-15 PROCEDURE — 1159F MED LIST DOCD IN RCRD: CPT | Mod: HCNC,CPTII,S$GLB, | Performed by: THORACIC SURGERY (CARDIOTHORACIC VASCULAR SURGERY)

## 2023-08-15 PROCEDURE — 3288F PR FALLS RISK ASSESSMENT DOCUMENTED: ICD-10-PCS | Mod: HCNC,CPTII,S$GLB, | Performed by: THORACIC SURGERY (CARDIOTHORACIC VASCULAR SURGERY)

## 2023-08-15 PROCEDURE — 3077F PR MOST RECENT SYSTOLIC BLOOD PRESSURE >= 140 MM HG: ICD-10-PCS | Mod: HCNC,CPTII,S$GLB, | Performed by: THORACIC SURGERY (CARDIOTHORACIC VASCULAR SURGERY)

## 2023-08-15 PROCEDURE — 1101F PT FALLS ASSESS-DOCD LE1/YR: CPT | Mod: HCNC,CPTII,S$GLB, | Performed by: THORACIC SURGERY (CARDIOTHORACIC VASCULAR SURGERY)

## 2023-08-15 PROCEDURE — 1101F PR PT FALLS ASSESS DOC 0-1 FALLS W/OUT INJ PAST YR: ICD-10-PCS | Mod: HCNC,CPTII,S$GLB, | Performed by: THORACIC SURGERY (CARDIOTHORACIC VASCULAR SURGERY)

## 2023-08-15 PROCEDURE — 71046 X-RAY EXAM CHEST 2 VIEWS: CPT | Mod: 26,HCNC,, | Performed by: RADIOLOGY

## 2023-08-15 PROCEDURE — 1126F PR PAIN SEVERITY QUANTIFIED, NO PAIN PRESENT: ICD-10-PCS | Mod: HCNC,CPTII,S$GLB, | Performed by: THORACIC SURGERY (CARDIOTHORACIC VASCULAR SURGERY)

## 2023-08-15 PROCEDURE — 3079F DIAST BP 80-89 MM HG: CPT | Mod: HCNC,CPTII,S$GLB, | Performed by: THORACIC SURGERY (CARDIOTHORACIC VASCULAR SURGERY)

## 2023-08-15 NOTE — PROGRESS NOTES
08/15/2023...    The patient is status post CABG x4 with ligation of the left atrial appendage 07/19/2023.  He had transient postoperative atrial fibrillation.  He was discharged home on POD #5.      The patient has chronic pain in has a pain management program.    The patient visits the office today accompanied by his wife.  He has no complaints.  Recently he pushed close aside in his closet and strained his left pectoralis muscle.  The discomfort is improving.  He denies any sternal instability.    The patient's activity level is normalizing.  He has changed his diet and is losing weight.  He denies angina or shortness of breath.    On physical exam, breath sounds are clear and equal.  He is in a regular rhythm.  The sternotomy wound is healing nicely-the bone is solid.  The chest tube sites are sealed.  His left lower extremity EVH sites are clean and dry.  There is resolving ecchymosis but no hematoma.      CXR today is clear-there are no pleural effusions.    The patient will follow-up with Dr. Eugene-cardiac rehab can be initiated.  I will see the patient for a final checkup in 6-8 weeks  Today, I have encouraged him to liberalize his activity level to include driving.

## 2023-08-17 ENCOUNTER — PATIENT MESSAGE (OUTPATIENT)
Dept: VASCULAR SURGERY | Facility: CLINIC | Age: 78
End: 2023-08-17
Payer: MEDICARE

## 2023-08-22 ENCOUNTER — OFFICE VISIT (OUTPATIENT)
Dept: SPINE | Facility: CLINIC | Age: 78
End: 2023-08-22
Payer: MEDICARE

## 2023-08-22 ENCOUNTER — OUTPATIENT CASE MANAGEMENT (OUTPATIENT)
Dept: ADMINISTRATIVE | Facility: OTHER | Age: 78
End: 2023-08-22
Payer: MEDICARE

## 2023-08-22 VITALS — WEIGHT: 222 LBS | HEIGHT: 67 IN | BODY MASS INDEX: 34.84 KG/M2

## 2023-08-22 DIAGNOSIS — G89.29 CHRONIC BILATERAL LOW BACK PAIN WITH BILATERAL SCIATICA: Primary | ICD-10-CM

## 2023-08-22 DIAGNOSIS — M54.41 CHRONIC BILATERAL LOW BACK PAIN WITH BILATERAL SCIATICA: Primary | ICD-10-CM

## 2023-08-22 DIAGNOSIS — M54.42 CHRONIC BILATERAL LOW BACK PAIN WITH BILATERAL SCIATICA: Primary | ICD-10-CM

## 2023-08-22 PROCEDURE — 1125F PR PAIN SEVERITY QUANTIFIED, PAIN PRESENT: ICD-10-PCS | Mod: HCNC,CPTII,S$GLB, | Performed by: PHYSICAL MEDICINE & REHABILITATION

## 2023-08-22 PROCEDURE — 1101F PT FALLS ASSESS-DOCD LE1/YR: CPT | Mod: HCNC,CPTII,S$GLB, | Performed by: PHYSICAL MEDICINE & REHABILITATION

## 2023-08-22 PROCEDURE — 1101F PR PT FALLS ASSESS DOC 0-1 FALLS W/OUT INJ PAST YR: ICD-10-PCS | Mod: HCNC,CPTII,S$GLB, | Performed by: PHYSICAL MEDICINE & REHABILITATION

## 2023-08-22 PROCEDURE — 1159F PR MEDICATION LIST DOCUMENTED IN MEDICAL RECORD: ICD-10-PCS | Mod: HCNC,CPTII,S$GLB, | Performed by: PHYSICAL MEDICINE & REHABILITATION

## 2023-08-22 PROCEDURE — 1160F RVW MEDS BY RX/DR IN RCRD: CPT | Mod: HCNC,CPTII,S$GLB, | Performed by: PHYSICAL MEDICINE & REHABILITATION

## 2023-08-22 PROCEDURE — 3288F FALL RISK ASSESSMENT DOCD: CPT | Mod: HCNC,CPTII,S$GLB, | Performed by: PHYSICAL MEDICINE & REHABILITATION

## 2023-08-22 PROCEDURE — 1125F AMNT PAIN NOTED PAIN PRSNT: CPT | Mod: HCNC,CPTII,S$GLB, | Performed by: PHYSICAL MEDICINE & REHABILITATION

## 2023-08-22 PROCEDURE — 1111F DSCHRG MED/CURRENT MED MERGE: CPT | Mod: HCNC,CPTII,S$GLB, | Performed by: PHYSICAL MEDICINE & REHABILITATION

## 2023-08-22 PROCEDURE — 1111F PR DISCHARGE MEDS RECONCILED W/ CURRENT OUTPATIENT MED LIST: ICD-10-PCS | Mod: HCNC,CPTII,S$GLB, | Performed by: PHYSICAL MEDICINE & REHABILITATION

## 2023-08-22 PROCEDURE — 3288F PR FALLS RISK ASSESSMENT DOCUMENTED: ICD-10-PCS | Mod: HCNC,CPTII,S$GLB, | Performed by: PHYSICAL MEDICINE & REHABILITATION

## 2023-08-22 PROCEDURE — 99213 OFFICE O/P EST LOW 20 MIN: CPT | Mod: HCNC,S$GLB,, | Performed by: PHYSICAL MEDICINE & REHABILITATION

## 2023-08-22 PROCEDURE — 1159F MED LIST DOCD IN RCRD: CPT | Mod: HCNC,CPTII,S$GLB, | Performed by: PHYSICAL MEDICINE & REHABILITATION

## 2023-08-22 PROCEDURE — 1160F PR REVIEW ALL MEDS BY PRESCRIBER/CLIN PHARMACIST DOCUMENTED: ICD-10-PCS | Mod: HCNC,CPTII,S$GLB, | Performed by: PHYSICAL MEDICINE & REHABILITATION

## 2023-08-22 PROCEDURE — 99213 PR OFFICE/OUTPT VISIT, EST, LEVL III, 20-29 MIN: ICD-10-PCS | Mod: HCNC,S$GLB,, | Performed by: PHYSICAL MEDICINE & REHABILITATION

## 2023-08-22 NOTE — PROGRESS NOTES
SUBJECTIVE:    Patient ID: Sam Pete is a 78 y.o. male.    Chief Complaint: Back Pain    For follow-up status post caudal epidural steroid injection on 2023 with Dr. Terrell.  In the interim since I saw him he underwent coronary artery bypass grafting.  He is doing very well from that standpoint and is satisfied with his quality of life with regards to his low back pain.  Pain level is 6/10 which she can tolerate.  He is currently satisfied with his quality of life.            Past Medical History:   Diagnosis Date    Abnormal nuclear stress test 2023    Anticoagulant long-term use     ASA 81 mg    Arthritis     Cataract     OU    Chronic low back pain     Complete rupture of rotator cuff 2011    DDD (degenerative disc disease), lumbar 2015    Degeneration of lumbar or lumbosacral intervertebral disc 2015    ED (erectile dysfunction)     GERD (gastroesophageal reflux disease)     Hypertension     Hypothyroidism, unspecified     Lumbar pseudoarthrosis 2017    Lumbar stenosis 2017    Obesity     PSVT (paroxysmal supraventricular tachycardia) 2023    SOB (shortness of breath) 2023    Spondylosis of lumbar region without myelopathy or radiculopathy 2015    Stroke 1997    basal ganglia, left sided weakness, resolved    Testicular hypofunction     Thoracic or lumbosacral neuritis or radiculitis 2015     Social History     Socioeconomic History    Marital status:    Tobacco Use    Smoking status: Former     Current packs/day: 0.00     Average packs/day: 1 pack/day for 30.0 years (30.0 ttl pk-yrs)     Types: Cigarettes     Start date: 8/3/1963     Quit date: 1992     Years since quittin.6    Smokeless tobacco: Never   Substance and Sexual Activity    Alcohol use: Not Currently     Comment: On occasion    Drug use: Yes     Types: Oxycodone, Morphine     Social Determinants of Health     Financial Resource Strain: Low Risk  (2022)    Overall  Financial Resource Strain (CARDIA)     Difficulty of Paying Living Expenses: Not hard at all   Food Insecurity: No Food Insecurity (7/11/2022)    Hunger Vital Sign     Worried About Running Out of Food in the Last Year: Never true     Ran Out of Food in the Last Year: Never true   Transportation Needs: No Transportation Needs (7/11/2022)    PRAPARE - Transportation     Lack of Transportation (Medical): No     Lack of Transportation (Non-Medical): No   Physical Activity: Insufficiently Active (7/11/2022)    Exercise Vital Sign     Days of Exercise per Week: 2 days     Minutes of Exercise per Session: 30 min   Stress: Stress Concern Present (7/11/2022)    Citizen of [place] [employer] Occupational Health - Occupational Stress Questionnaire     Feeling of Stress : To some extent   Social Connections: Socially Integrated (7/11/2022)    Social Connection and Isolation Panel [NHANES]     Frequency of Communication with Friends and Family: More than three times a week     Frequency of Social Gatherings with Friends and Family: Twice a week     Attends Islam Services: More than 4 times per year     Active Member of Clubs or Organizations: Yes     Attends Club or Organization Meetings: More than 4 times per year     Marital Status:    Housing Stability: Low Risk  (7/11/2022)    Housing Stability Vital Sign     Unable to Pay for Housing in the Last Year: No     Number of Places Lived in the Last Year: 1     Unstable Housing in the Last Year: No     Past Surgical History:   Procedure Laterality Date    ANGIOGRAM, CORONARY, WITH LEFT HEART CATHETERIZATION  07/13/2023    Procedure: Left Heart Cath;  Surgeon: Salvador Eugnee MD;  Location: [facility] CATH;  Service: Cardiology;;    APPENDECTOMY      ARTHROPLASTY OF SHOULDER Right 03/22/2022    Procedure: ARTHROPLASTY, SHOULDER BRENNAN RIGHT SHOULDER HUMERAL HEAD RESURFACING;  Surgeon: Frankie Joya MD;  Location: [facility] OR;  Service: Orthopedics;  Laterality: Right;    BACK SURGERY       4- back surgery    CAUDAL EPIDURAL STEROID INJECTION N/A 12/16/2021    Procedure: Injection-steroid-epidural-caudal;  Surgeon: Aram Terrell MD;  [facility]  Service: Pain Management;  Laterality: N/A;    CAUDAL EPIDURAL STEROID INJECTION N/A 02/02/2022    Procedure: INJECTION, STEROID, SPINE, EPIDURAL, CAUDAL;  Surgeon: Aram Terrell MD;  [facility];  Service: Pain Management;  Laterality: N/A;    CAUDAL EPIDURAL STEROID INJECTION N/A 07/22/2022    Procedure: Injection-steroid-epidural-caudal;  Surgeon: Aram Terrell MD;  [facility];  Service: Pain Management;  Laterality: N/A;  Caudal CARLOS     CAUDAL EPIDURAL STEROID INJECTION N/A 01/20/2023    Procedure: Injection-steroid-epidural-caudal;  Surgeon: Aram Terrell MD;  [facility];  Service: Pain Management;  Laterality: N/A;  caudal ( melinda)    CAUDAL EPIDURAL STEROID INJECTION N/A 04/21/2023    Procedure: Injection-steroid-epidural-caudal;  Surgeon: Aram Terrell MD;  [facility];  Service: Pain Management;  Laterality: N/A;  caudal    CAUDAL EPIDURAL STEROID INJECTION N/A 07/11/2023    Procedure: Injection-steroid-epidural-caudal;  Surgeon: Aram Terrell MD;  Location: [facility] OR;  Service: Pain Management;  Laterality: N/A;  caudal    COLONOSCOPY  07/12/2004    [facility].   Redundant tortuous colon, otherwise normal.    COLONOSCOPY N/A 02/25/2019    Procedure: COLONOSCOPY;  Surgeon: Gilbert Rodriguez Jr., MD;  Location: [facility] ENDO;  Service: Endoscopy;  Laterality: N/A;    CORONARY ANGIOGRAPHY N/A 07/13/2023    Procedure: ANGIOGRAM, CORONARY ARTERY;  Surgeon: Salvador Eugene MD;  Location: [facility] CATH;  Service: Cardiology;  Laterality: N/A;    CORONARY ARTERY BYPASS GRAFT (CABG) N/A 7/19/2023    Procedure: CORONARY ARTERY BYPASS GRAFT (CABG)- x 4;  Surgeon: Sandrine Wagner MD;  Location: [facility] OR;  Service: Cardiothoracic;  Laterality: N/A;    ENDOSCOPIC HARVEST OF VEIN Left 7/19/2023    Procedure: HARVEST-VEIN-ENDOVASCULAR;  Surgeon: Sandrine  "MD Kristy;  Location: [facility] OR;  Service: Cardiothoracic;  Laterality: Left;    Epidural steroid injection      Pain management    ESOPHAGOGASTRODUODENOSCOPY  07/12/2004    [facility].    EXCLUSION, LEFT ATRIAL APPENDAGE, OPEN, AS PART OF OPEN CHEST SURGERY N/A 7/19/2023    Procedure: EXCLUSION, LEFT ATRIAL APPENDAGE, OPEN, AS PART OF OPEN CHEST SURGERY;  Surgeon: Sandrine Wagner MD;  Location: [facility] OR;  Service: Cardiothoracic;  Laterality: N/A;    FRACTURE SURGERY Left     wrist / forearm, total of 5    INSERTION OF DORSAL COLUMN NERVE STIMULATOR FOR TRIAL N/A 12/12/2019    Procedure: INSERTION, NEUROSTIMULATOR, SPINAL CORD, DORSAL COLUMN, FOR TRIAL;  Surgeon: Evan Lyles MD;  Location: [facility] OR;  Service: Pain Management;  Laterality: N/A;    JOINT REPLACEMENT  02/06/2013    ( rt hip 2007), left hip     JOINT REPLACEMENT Left     total knee    KNEE ARTHROSCOPY W/ DEBRIDEMENT      bilateral knees , total of six    KNEE SURGERY      LAMINECTOMY USING MINIMALLY INVASIVE TECHNIQUE N/A 02/12/2020    Procedure: LAMINECTOMY, SPINE, MINIMALLY INVASIVE /  PLACEMENT OF SPINAL CORD STIMULATOR;  Surgeon: Darlene Max MD;  Location: [facility] OR;  Service: Neurosurgery;  Laterality: N/A;    Nervbe Block injection      Pain management    SPINAL CORD STIMULATOR IMPLANT      TOTAL SHOULDER ARTHROPLASTY Right 03/28/2022    Dr Joya    TOTAL THYROIDECTOMY Bilateral 03/07/2022    Dr Mendoza     Family History   Problem Relation Age of Onset    Hypertension Father     Heart disease Father     Drug abuse Daughter     Hypertension Son     Lung disease Sister     COPD Sister     Hypertension Sister     Diverticulitis Sister     Gout Sister     Kidney disease Sister     No Known Problems Mother     Eczema Neg Hx     Lupus Neg Hx     Psoriasis Neg Hx     Melanoma Neg Hx      Vitals:    08/22/23 1438   Weight: 100.7 kg (222 lb)   Height: 5' 7.01" (1.702 m)       Review of Systems   Constitutional:  Negative for chills, diaphoresis, fatigue, fever " and unexpected weight change.   HENT:  Negative for trouble swallowing.    Eyes:  Negative for visual disturbance.   Respiratory:  Negative for shortness of breath.    Cardiovascular:  Negative for chest pain.   Gastrointestinal:  Negative for abdominal pain, constipation, diarrhea, nausea and vomiting.   Genitourinary:  Negative for difficulty urinating.   Musculoskeletal:  Negative for arthralgias, back pain, gait problem, joint swelling, myalgias, neck pain and neck stiffness.   Neurological:  Negative for dizziness, speech difficulty, weakness, light-headedness, numbness and headaches.          Objective:      Physical Exam  Neurological:      Mental Status: He is alert and oriented to person, place, and time.      Comments: He has a well-healed median sternotomy scar             Assessment:       1. Chronic bilateral low back pain with bilateral sciatica           Plan:     Improved to his satisfaction status post caudal epidural steroid injection.  We can repeat that procedure as needed.  Follow-up here as needed      Chronic bilateral low back pain with bilateral sciatica

## 2023-08-23 NOTE — PROGRESS NOTES
Outpatient Care Management   - Low Risk Patient Assessment    Patient: Sam Pete  MRN:  [id number]  Date of Service:  8/23/2023  Completed by:  Brandi Farah LCSW  Referral Date: 07/19/2023    Reason for Visit   Patient presents with    Other     8/22/2023-pt is exercising on the treadmill; he requests a call back tomorrow at 3p  8/23/2023  1st attempt to complete Initial Assessment  for Outpatient Care Management, left message.      Social Work Assessment - Low/Mod Risk     8/23/23    Case Closure     8/23/23       Brief Summary:  received a referral from hospital provider. Completed assessment with pt who is recovering from open heart surgery. He maintains an active, independent lifestyle. Support system is good. No financial concerns. Pt inquired about exercises he can do other than walking. This SW encouraged pt to discuss with cardiologist at follow up appt tomorrow 8/24/23. No needs identified.

## 2023-08-23 NOTE — PROGRESS NOTES
Subjective:    Patient ID:  Sam Pete is a 78 y.o. male who presents for Follow-up (F/u cabg), Coronary Artery Disease, and Atrial Fibrillation        HPI  STATUS POST CARDIAC CATHETERIZATION SEVERE THREE-VESSEL CORONARY ARTERY DISEASE UNDERWENT CABG, DID WELL RECENT LABS LDL 58, HDL 48, TRIGLYCERIDE 186, DOING WELL, ON AMIO FOR PAF, JUST FINISHED WITH AMIO 200 BID, WALKING MORE,, SEE ROS    Past Medical History:   Diagnosis Date    Abnormal nuclear stress test 07/2023    Anticoagulant long-term use     ASA 81 mg    Arthritis     Cataract     OU    Chronic low back pain     Complete rupture of rotator cuff 02/08/2011    DDD (degenerative disc disease), lumbar 07/08/2015    Degeneration of lumbar or lumbosacral intervertebral disc 09/09/2015    ED (erectile dysfunction)     GERD (gastroesophageal reflux disease)     Hypertension     Hypothyroidism, unspecified     Lumbar pseudoarthrosis 06/26/2017    Lumbar stenosis 06/26/2017    Obesity     PSVT (paroxysmal supraventricular tachycardia) 07/2023    SOB (shortness of breath) 07/2023    Spondylosis of lumbar region without myelopathy or radiculopathy 07/08/2015    Stroke 1997    basal ganglia, left sided weakness, resolved    Testicular hypofunction     Thoracic or lumbosacral neuritis or radiculitis 07/08/2015     Past Surgical History:   Procedure Laterality Date    ANGIOGRAM, CORONARY, WITH LEFT HEART CATHETERIZATION  07/13/2023    Procedure: Left Heart Cath;  Surgeon: Salvador Eugene MD;  Location: [facility];  Service: Cardiology;;    APPENDECTOMY      ARTHROPLASTY OF SHOULDER Right 03/22/2022    Procedure: ARTHROPLASTY, SHOULDER BRENNAN RIGHT SHOULDER HUMERAL HEAD RESURFACING;  Surgeon: Frankie Joya MD;  Location: [facility] OR;  Service: Orthopedics;  Laterality: Right;    BACK SURGERY      4- back surgery    CAUDAL EPIDURAL STEROID INJECTION N/A 12/16/2021    Procedure: Injection-steroid-epidural-caudal;  Surgeon: Aram Terrell MD;   Location: [facility] OR;  Service: Pain Management;  Laterality: N/A;    CAUDAL EPIDURAL STEROID INJECTION N/A 02/02/2022    Procedure: INJECTION, STEROID, SPINE, EPIDURAL, CAUDAL;  Surgeon: Aram Terrell MD;  Location: [facility] OR;  Service: Pain Management;  Laterality: N/A;    CAUDAL EPIDURAL STEROID INJECTION N/A 07/22/2022    Procedure: Injection-steroid-epidural-caudal;  Surgeon: Aram Terrell MD;  Location: [facility] OR;  Service: Pain Management;  Laterality: N/A;  Caudal CARLOS     CAUDAL EPIDURAL STEROID INJECTION N/A 01/20/2023    Procedure: Injection-steroid-epidural-caudal;  Surgeon: Aram Terrell MD;  Location: [facility] OR;  Service: Pain Management;  Laterality: N/A;  caudal ( melinda)    CAUDAL EPIDURAL STEROID INJECTION N/A 04/21/2023    Procedure: Injection-steroid-epidural-caudal;  Surgeon: Aram Terrell MD;  Location: [facility] OR;  Service: Pain Management;  Laterality: N/A;  caudal    CAUDAL EPIDURAL STEROID INJECTION N/A 07/11/2023    Procedure: Injection-steroid-epidural-caudal;  Surgeon: Aram Terrell MD;  Location: [facility] OR;  Service: Pain Management;  Laterality: N/A;  caudal    COLONOSCOPY  07/12/2004    CHILO.   Redundant tortuous colon, otherwise normal.    COLONOSCOPY N/A 02/25/2019    Procedure: COLONOSCOPY;  Surgeon: Gilbert Rodriguez Jr., MD;  Location: [facility] ENDO;  Service: Endoscopy;  Laterality: N/A;    CORONARY ANGIOGRAPHY N/A 07/13/2023    Procedure: ANGIOGRAM, CORONARY ARTERY;  Surgeon: Salvador Eugene MD;  Location: [facility] CATH;  Service: Cardiology;  Laterality: N/A;    CORONARY ARTERY BYPASS GRAFT (CABG) N/A 7/19/2023    Procedure: CORONARY ARTERY BYPASS GRAFT (CABG)- x 4;  Surgeon: Sandrine Wagner MD;  Location: [facility] OR;  Service: Cardiothoracic;  Laterality: N/A;    ENDOSCOPIC HARVEST OF VEIN Left 7/19/2023    Procedure: HARVEST-VEIN-ENDOVASCULAR;  Surgeon: Sandrine Wagner MD;  Location: [facility] OR;  Service: Cardiothoracic;  Laterality: Left;    Epidural steroid injection      Pain management     ESOPHAGOGASTRODUODENOSCOPY  07/12/2004    CHILO.    EXCLUSION, LEFT ATRIAL APPENDAGE, OPEN, AS PART OF OPEN CHEST SURGERY N/A 7/19/2023    Procedure: EXCLUSION, LEFT ATRIAL APPENDAGE, OPEN, AS PART OF OPEN CHEST SURGERY;  Surgeon: Sandrine Wagner MD;  Location: [facility] OR;  Service: Cardiothoracic;  Laterality: N/A;    FRACTURE SURGERY Left     wrist / forearm, total of 5    INSERTION OF DORSAL COLUMN NERVE STIMULATOR FOR TRIAL N/A 12/12/2019    Procedure: INSERTION, NEUROSTIMULATOR, SPINAL CORD, DORSAL COLUMN, FOR TRIAL;  Surgeon: Evan Lyles MD;  Location: [facility] OR;  Service: Pain Management;  Laterality: N/A;    JOINT REPLACEMENT  02/06/2013    ( rt hip 2007), left hip     JOINT REPLACEMENT Left     total knee    KNEE ARTHROSCOPY W/ DEBRIDEMENT      bilateral knees , total of six    KNEE SURGERY      LAMINECTOMY USING MINIMALLY INVASIVE TECHNIQUE N/A 02/12/2020    Procedure: LAMINECTOMY, SPINE, MINIMALLY INVASIVE /  PLACEMENT OF SPINAL CORD STIMULATOR;  Surgeon: Darlene Max MD;  Location: [facility] OR;  Service: Neurosurgery;  Laterality: N/A;    Nervbe Block injection      Pain management    SPINAL CORD STIMULATOR IMPLANT      TOTAL SHOULDER ARTHROPLASTY Right 03/28/2022    Dr Joya    TOTAL THYROIDECTOMY Bilateral 03/07/2022    Dr Mendoza     Family History   Problem Relation Age of Onset    Hypertension Father     Heart disease Father     Drug abuse Daughter     Hypertension Son     Lung disease Sister     COPD Sister     Hypertension Sister     Diverticulitis Sister     Gout Sister     Kidney disease Sister     No Known Problems Mother     Eczema Neg Hx     Lupus Neg Hx     Psoriasis Neg Hx     Melanoma Neg Hx      Social History     Socioeconomic History    Marital status:    Tobacco Use    Smoking status: Former     Current packs/day: 0.00     Average packs/day: 1 pack/day for 30.0 years (30.0 ttl pk-yrs)     Types: Cigarettes     Start date: 8/3/1963     Quit date: 1/1/1992      Years since quittin.6    Smokeless tobacco: Never   Substance and Sexual Activity    Alcohol use: Not Currently     Comment: On occasion    Drug use: Yes     Types: Oxycodone, Morphine     Social Determinants of Health     Financial Resource Strain: Medium Risk (2023)    Overall Financial Resource Strain (CARDIA)     Difficulty of Paying Living Expenses: Somewhat hard   Food Insecurity: No Food Insecurity (2023)    Hunger Vital Sign     Worried About Running Out of Food in the Last Year: Never true     Ran Out of Food in the Last Year: Never true   Transportation Needs: No Transportation Needs (2023)    PRAPARE - Transportation     Lack of Transportation (Medical): No     Lack of Transportation (Non-Medical): No   Physical Activity: Sufficiently Active (2023)    Exercise Vital Sign     Days of Exercise per Week: 6 days     Minutes of Exercise per Session: 40 min   Stress: No Stress Concern Present (2023)    [employer] [place] [employer] - Occupational Stress Questionnaire     Feeling of Stress : Not at all   Social Connections: Socially Integrated (2023)    Social Connection and Isolation Panel [NHANES]     Frequency of Communication with Friends and Family: Three times a week     Frequency of Social Gatherings with Friends and Family: Once a week     Attends Catholic Services: More than 4 times per year     Active Member of Clubs or Organizations: Yes     Attends Club or Organization Meetings: More than 4 times per year     Marital Status:    Housing Stability: Low Risk  (2023)    Housing Stability Vital Sign     Unable to Pay for Housing in the Last Year: No     Number of Places Lived in the Last Year: 1     Unstable Housing in the Last Year: No       Review of patient's allergies indicates:   Allergen Reactions    Xyzal [levocetirizine] Other (See Comments)     Knocked him out        Current Outpatient Medications:     aspirin 81  MG Chew, Take 81 mg by mouth once daily., Disp: , Rfl:     atorvastatin (LIPITOR) 40 MG tablet, Take 1 tablet (40 mg total) by mouth once daily., Disp: 90 tablet, Rfl: 3    buPROPion (WELLBUTRIN XL) 150 MG TB24 tablet, Take 1 tablet (150 mg total) by mouth once daily., Disp: 90 tablet, Rfl: 3    calcitRIOL (ROCALTROL) 0.5 MCG Cap, TAKE 1 CAPSULE ONE TIME DAILY, Disp: 90 capsule, Rfl: 3    furosemide (LASIX) 40 MG tablet, Take 1 tablet (40 mg total) by mouth once daily., Disp: 90 tablet, Rfl: 1    levothyroxine (SYNTHROID) 112 MCG tablet, TAKE 2 TABLETS(224 MCG) BY MOUTH EVERY DAY, Disp: 180 tablet, Rfl: 2    losartan (COZAAR) 50 MG tablet, Take 50 mg by mouth once daily. Take 25 mg daily if bp is 140 or above, Disp: , Rfl:     morphine (MS CONTIN) 30 MG 12 hr tablet, Take 1 tablet (30 mg total) by mouth 3 (three) times daily. Take two tablets (60mg) in the morning and one tablet (30mg) in the evening, Disp: 90 tablet, Rfl: 0    [START ON 9/25/2023] morphine (MS CONTIN) 30 MG 12 hr tablet, Take 1 tablet (30 mg total) by mouth 3 (three) times daily. Take two tablets (60mg) in the morning and one tablet (30mg) in the evening, Disp: 90 tablet, Rfl: 0    [START ON 10/25/2023] morphine (MS CONTIN) 30 MG 12 hr tablet, Take 1 tablet (30 mg total) by mouth 3 (three) times daily. Take two tablets (60mg) in the morning and one tablet (30mg) in the evening, Disp: 90 tablet, Rfl: 0    naloxone (NARCAN) 0.4 mg/mL injection, Inject 1 mL (0.4 mg total) into the vein as needed (overdose)., Disp: 2 mL, Rfl: 5    omeprazole (PRILOSEC) 40 MG capsule, Take one capsule by mouth daily, Disp: 90 capsule, Rfl: 3    oxyCODONE-acetaminophen (PERCOCET)  mg per tablet, Take 1 tablet by mouth every 4 (four) hours as needed for Pain., Disp: 120 tablet, Rfl: 0    [START ON 9/25/2023] oxyCODONE-acetaminophen (PERCOCET)  mg per tablet, Take 1 tablet by mouth every 4 (four) hours as needed for Pain., Disp: 120 tablet, Rfl:  0    [START ON 10/25/2023] oxyCODONE-acetaminophen (PERCOCET)  mg per tablet, Take 1 tablet by mouth every 4 (four) hours as needed for Pain., Disp: 120 tablet, Rfl: 0    UNABLE TO FIND, Take by mouth once daily. Vitafusion multivites gummies, Disp: , Rfl:     metoprolol succinate (TOPROL-XL) 25 MG 24 hr tablet, Take 0.5 tablets (12.5 mg total) by mouth once daily., Disp: 45 tablet, Rfl: 0  No current facility-administered medications for this visit.    Facility-Administered Medications Ordered in Other Visits:     diphenhydrAMINE injection 12.5 mg, 12.5 mg, Intravenous, Once PRN, Enrique Rosales MD    electrolyte-S (ISOLYTE), , Intravenous, Continuous, Enrique Rosales MD    HYDROmorphone (PF) injection 0.2 mg, 0.2 mg, Intravenous, Q5 Min PRN, Enrique Rosales MD    HYDROmorphone (PF) injection 0.2 mg, 0.2 mg, Intravenous, Q5 Min PRN, Enrique Rosales MD    lactated ringers infusion, , Intravenous, Once PRN, Aram Terrell MD    lactated ringers infusion, 10 mL/hr, Intravenous, Continuous, Enrique Rosales MD, Stopped at 07/19/23 0959    lactated ringers infusion, , Intravenous, Continuous, Aram Terrell MD, Last Rate: 25 mL/hr at 07/11/23 1012, New Bag at 07/11/23 1012    lorazepam injection 0.25 mg, 0.25 mg, Intravenous, Once PRN, Enrique Rosales MD    prochlorperazine injection Soln 5 mg, 5 mg, Intravenous, Q30 Min PRN, Enrique Rosales MD    sodium chloride 0.9% flush 3 mL, 3 mL, Intravenous, Q8H, Enrique Rosales MD    Review of Systems   Constitutional: Negative for chills, decreased appetite, diaphoresis, fever, malaise/fatigue and night sweats.   HENT:  Negative for congestion and nosebleeds.    Eyes:  Negative for blurred vision and visual disturbance.   Cardiovascular:  Negative for chest pain, claudication, cyanosis, dyspnea on exertion, irregular heartbeat, leg swelling, near-syncope, orthopnea, palpitations, paroxysmal nocturnal dyspnea and  "syncope.   Respiratory:  Negative for cough, hemoptysis and shortness of breath.    Endocrine: Negative for polyphagia and polyuria.   Skin:  Negative for color change and rash.   Musculoskeletal:  Negative for back pain and falls.   Gastrointestinal:  Negative for abdominal pain, dysphagia, jaundice, melena and nausea.   Genitourinary:  Negative for dysuria and flank pain.   Neurological:  Negative for brief paralysis, dizziness, focal weakness and light-headedness.   Psychiatric/Behavioral:  Negative for altered mental status and depression.    Allergic/Immunologic: Negative for persistent infections.        Objective:      Vitals:    08/24/23 1626   BP: 127/71   Pulse: 78   Weight: 100.7 kg (222 lb 0.1 oz)   Height: 5' 7" (1.702 m)   PainSc: 0-No pain     Body mass index is 34.77 kg/m².    Physical Exam  Constitutional:       Appearance: Normal appearance. He is obese.   HENT:      Head: Normocephalic and atraumatic.   Eyes:      Extraocular Movements: Extraocular movements intact.      Pupils: Pupils are equal, round, and reactive to light.   Neck:      Vascular: No carotid bruit.   Cardiovascular:      Rate and Rhythm: Normal rate and regular rhythm.      Heart sounds:      No friction rub. No gallop.   Pulmonary:      Effort: Pulmonary effort is normal.      Breath sounds: Normal breath sounds.   Chest:       Abdominal:      Palpations: Abdomen is soft.      Tenderness: There is no abdominal tenderness.   Musculoskeletal:      Cervical back: Neck supple.      Right lower leg: No edema.      Left lower leg: No edema.   Skin:     Capillary Refill: Capillary refill takes less than 2 seconds.   Neurological:      General: No focal deficit present.      Mental Status: He is alert and oriented to person, place, and time.   Psychiatric:         Mood and Affect: Mood normal.         Behavior: Behavior normal.               ..    Chemistry        Component Value Date/Time     08/09/2023 1008    K 4.8 08/09/2023 " 1008     08/09/2023 1008    CO2 29 08/09/2023 1008    BUN 15 08/09/2023 1008    CREATININE 1.4 08/09/2023 1008    CREATININE 0.8 01/31/2013 1625     (H) 08/09/2023 1008        Component Value Date/Time    CALCIUM 9.7 08/09/2023 1008    CALCIUM 9.2 01/31/2013 1625    ALKPHOS 85 08/09/2023 1008    AST 17 08/09/2023 1008    ALT 17 08/09/2023 1008    BILITOT 0.5 08/09/2023 1008    ESTGFRAFRICA >60.0 07/12/2022 1119    EGFRNONAA 53.4 (A) 07/12/2022 1119            ..  Lab Results   Component Value Date    CHOL 143 08/09/2023    CHOL 182 07/12/2022    CHOL 127 01/14/2021     Lab Results   Component Value Date    HDL 48 08/09/2023    HDL 65 07/12/2022    HDL 47 01/14/2021     Lab Results   Component Value Date    LDLCALC 57.8 (L) 08/09/2023    LDLCALC 83.0 07/12/2022    LDLCALC 55.0 (L) 01/14/2021     Lab Results   Component Value Date    TRIG 186 (H) 08/09/2023    TRIG 170 (H) 07/12/2022    TRIG 125 01/14/2021     Lab Results   Component Value Date    CHOLHDL 33.6 08/09/2023    CHOLHDL 35.7 07/12/2022    CHOLHDL 37.0 01/14/2021     ..  Lab Results   Component Value Date    WBC 7.66 08/09/2023    HGB 12.2 (L) 08/09/2023    HCT 40.4 08/09/2023    MCV 99 (H) 08/09/2023     (H) 08/09/2023       Test(s) Reviewed  I have reviewed the following in detail:  [] Stress test   [x] Angiography   [] Echocardiogram   [x] Labs   [x] Other:       Assessment:         ICD-10-CM ICD-9-CM   1. Coronary artery disease involving native coronary artery of native heart without angina pectoris  I25.10 414.01   2. PAF (paroxysmal atrial fibrillation)  I48.0 427.31   3. Long term current use of amiodarone  Z79.899 V58.69   4. S/P CABG (coronary artery bypass graft)  Z95.1 V45.81   5. Mild pulmonary hypertension  I27.20 416.8   6. Atherosclerosis of aorta  I70.0 440.0   7. Essential (primary) hypertension  I10 401.9   8. Obesity (BMI 30.0-34.9)  E66.9 278.00     Problem List Items Addressed This Visit          Cardiac/Vascular     Essential (primary) hypertension    Atherosclerosis of aorta    PAF (paroxysmal atrial fibrillation)    Mild pulmonary hypertension    Coronary artery disease involving native coronary artery of native heart - Primary    Relevant Orders    Comprehensive Metabolic Panel    Cardiac Rehab Phase II    S/P CABG (coronary artery bypass graft)    Relevant Orders    Cardiac Rehab Phase II    Long term current use of amiodarone    Relevant Orders    Comprehensive Metabolic Panel    TSH       Endocrine    Obesity (BMI 30.0-34.9)        Plan:     DECREASE AMIODRAONE  QD , WAS STILL GETTING 200 B.I.D., START CARDIAC REHAB, WILL BE MONITORED FOR ARRHYTHMIA ALSO, ALL CV CLINICALLY STABLE, NO ANGINA, NO HF, NO TIA, NO CLINICAL ARRHYTHMIA,CONTINUE CURRENT MEDS, EDUCATION, DIET, EXERCISE , WEIGHT LOSS STAY WELL HYDRATED RETURN TO CLINIC IN 3 MO WITH LABS      Coronary artery disease involving native coronary artery of native heart without angina pectoris  -     Comprehensive Metabolic Panel; Future; Expected date: 11/24/2023  -     Cardiac Rehab Phase II; Future    PAF (paroxysmal atrial fibrillation)    Long term current use of amiodarone  -     Comprehensive Metabolic Panel; Future; Expected date: 11/24/2023  -     TSH; Future; Expected date: 11/24/2023    S/P CABG (coronary artery bypass graft)  -     Cardiac Rehab Phase II; Future    Mild pulmonary hypertension    Atherosclerosis of aorta  Comments:  NO EMBOLIZATION    Essential (primary) hypertension  Comments:  CONTROLLED    Obesity (BMI 30.0-34.9)    Other orders  -     metoprolol succinate (TOPROL-XL) 25 MG 24 hr tablet; Take 0.5 tablets (12.5 mg total) by mouth once daily.  Dispense: 45 tablet; Refill: 0    RTC Low level/low impact aerobic exercise 5x's/wk. Heart healthy diet and risk factor modification.    See labs and med orders.    Aerobic exercise 5x's/wk. Heart healthy diet and risk factor modification.    See labs and med orders.

## 2023-08-24 ENCOUNTER — OFFICE VISIT (OUTPATIENT)
Dept: CARDIOLOGY | Facility: CLINIC | Age: 78
End: 2023-08-24
Payer: MEDICARE

## 2023-08-24 VITALS
HEIGHT: 67 IN | DIASTOLIC BLOOD PRESSURE: 71 MMHG | WEIGHT: 222 LBS | HEART RATE: 78 BPM | BODY MASS INDEX: 34.84 KG/M2 | SYSTOLIC BLOOD PRESSURE: 127 MMHG

## 2023-08-24 DIAGNOSIS — Z79.899 LONG TERM CURRENT USE OF AMIODARONE: Chronic | ICD-10-CM

## 2023-08-24 DIAGNOSIS — I48.0 PAF (PAROXYSMAL ATRIAL FIBRILLATION): ICD-10-CM

## 2023-08-24 DIAGNOSIS — Z95.1 S/P CABG (CORONARY ARTERY BYPASS GRAFT): Chronic | ICD-10-CM

## 2023-08-24 DIAGNOSIS — I25.10 CORONARY ARTERY DISEASE INVOLVING NATIVE CORONARY ARTERY OF NATIVE HEART WITHOUT ANGINA PECTORIS: Primary | Chronic | ICD-10-CM

## 2023-08-24 DIAGNOSIS — E66.9 OBESITY (BMI 30.0-34.9): Chronic | ICD-10-CM

## 2023-08-24 DIAGNOSIS — I70.0 ATHEROSCLEROSIS OF AORTA: Chronic | ICD-10-CM

## 2023-08-24 DIAGNOSIS — I27.20 MILD PULMONARY HYPERTENSION: Chronic | ICD-10-CM

## 2023-08-24 DIAGNOSIS — I10 ESSENTIAL (PRIMARY) HYPERTENSION: Chronic | ICD-10-CM

## 2023-08-24 PROBLEM — E66.01 SEVERE OBESITY (BMI 35.0-35.9 WITH COMORBIDITY): Status: RESOLVED | Noted: 2022-07-06 | Resolved: 2023-08-24

## 2023-08-24 PROCEDURE — 99999 PR PBB SHADOW E&M-EST. PATIENT-LVL III: ICD-10-PCS | Mod: PBBFAC,HCNC,, | Performed by: INTERNAL MEDICINE

## 2023-08-24 PROCEDURE — 3078F DIAST BP <80 MM HG: CPT | Mod: HCNC,CPTII,S$GLB, | Performed by: INTERNAL MEDICINE

## 2023-08-24 PROCEDURE — 99214 PR OFFICE/OUTPT VISIT, EST, LEVL IV, 30-39 MIN: ICD-10-PCS | Mod: HCNC,S$GLB,, | Performed by: INTERNAL MEDICINE

## 2023-08-24 PROCEDURE — 1159F MED LIST DOCD IN RCRD: CPT | Mod: HCNC,CPTII,S$GLB, | Performed by: INTERNAL MEDICINE

## 2023-08-24 PROCEDURE — 3078F PR MOST RECENT DIASTOLIC BLOOD PRESSURE < 80 MM HG: ICD-10-PCS | Mod: HCNC,CPTII,S$GLB, | Performed by: INTERNAL MEDICINE

## 2023-08-24 PROCEDURE — 3074F PR MOST RECENT SYSTOLIC BLOOD PRESSURE < 130 MM HG: ICD-10-PCS | Mod: HCNC,CPTII,S$GLB, | Performed by: INTERNAL MEDICINE

## 2023-08-24 PROCEDURE — 99999 PR PBB SHADOW E&M-EST. PATIENT-LVL III: CPT | Mod: PBBFAC,HCNC,, | Performed by: INTERNAL MEDICINE

## 2023-08-24 PROCEDURE — 1101F PT FALLS ASSESS-DOCD LE1/YR: CPT | Mod: HCNC,CPTII,S$GLB, | Performed by: INTERNAL MEDICINE

## 2023-08-24 PROCEDURE — 1126F PR PAIN SEVERITY QUANTIFIED, NO PAIN PRESENT: ICD-10-PCS | Mod: HCNC,CPTII,S$GLB, | Performed by: INTERNAL MEDICINE

## 2023-08-24 PROCEDURE — 1159F PR MEDICATION LIST DOCUMENTED IN MEDICAL RECORD: ICD-10-PCS | Mod: HCNC,CPTII,S$GLB, | Performed by: INTERNAL MEDICINE

## 2023-08-24 PROCEDURE — 1101F PR PT FALLS ASSESS DOC 0-1 FALLS W/OUT INJ PAST YR: ICD-10-PCS | Mod: HCNC,CPTII,S$GLB, | Performed by: INTERNAL MEDICINE

## 2023-08-24 PROCEDURE — 1126F AMNT PAIN NOTED NONE PRSNT: CPT | Mod: HCNC,CPTII,S$GLB, | Performed by: INTERNAL MEDICINE

## 2023-08-24 PROCEDURE — 99214 OFFICE O/P EST MOD 30 MIN: CPT | Mod: HCNC,S$GLB,, | Performed by: INTERNAL MEDICINE

## 2023-08-24 PROCEDURE — 3288F PR FALLS RISK ASSESSMENT DOCUMENTED: ICD-10-PCS | Mod: HCNC,CPTII,S$GLB, | Performed by: INTERNAL MEDICINE

## 2023-08-24 PROCEDURE — 3288F FALL RISK ASSESSMENT DOCD: CPT | Mod: HCNC,CPTII,S$GLB, | Performed by: INTERNAL MEDICINE

## 2023-08-24 PROCEDURE — 3074F SYST BP LT 130 MM HG: CPT | Mod: HCNC,CPTII,S$GLB, | Performed by: INTERNAL MEDICINE

## 2023-08-25 DIAGNOSIS — Z95.1 S/P CABG (CORONARY ARTERY BYPASS GRAFT): Primary | ICD-10-CM

## 2023-08-25 RX ORDER — METOPROLOL SUCCINATE 25 MG/1
12.5 TABLET, EXTENDED RELEASE ORAL DAILY
Qty: 45 TABLET | Refills: 0 | Status: SHIPPED | OUTPATIENT
Start: 2023-08-25 | End: 2023-12-01

## 2023-09-05 ENCOUNTER — PATIENT MESSAGE (OUTPATIENT)
Dept: CARDIOLOGY | Facility: CLINIC | Age: 78
End: 2023-09-05
Payer: MEDICARE

## 2023-09-06 ENCOUNTER — PATIENT MESSAGE (OUTPATIENT)
Dept: VASCULAR SURGERY | Facility: CLINIC | Age: 78
End: 2023-09-06
Payer: MEDICARE

## 2023-09-12 ENCOUNTER — TELEPHONE (OUTPATIENT)
Dept: VASCULAR SURGERY | Facility: CLINIC | Age: 78
End: 2023-09-12
Payer: MEDICARE

## 2023-09-12 ENCOUNTER — OFFICE VISIT (OUTPATIENT)
Dept: VASCULAR SURGERY | Facility: CLINIC | Age: 78
End: 2023-09-12
Payer: MEDICARE

## 2023-09-12 ENCOUNTER — HOSPITAL ENCOUNTER (OUTPATIENT)
Dept: RADIOLOGY | Facility: HOSPITAL | Age: 78
Discharge: HOME | End: 2023-09-12
Attending: THORACIC SURGERY (CARDIOTHORACIC VASCULAR SURGERY)
Payer: MEDICARE

## 2023-09-12 VITALS
BODY MASS INDEX: 36.54 KG/M2 | HEART RATE: 73 BPM | SYSTOLIC BLOOD PRESSURE: 148 MMHG | DIASTOLIC BLOOD PRESSURE: 86 MMHG | WEIGHT: 232.81 LBS | HEIGHT: 67 IN

## 2023-09-12 DIAGNOSIS — I82.432 ACUTE EMBOLISM AND THROMBOSIS OF LEFT POPLITEAL VEIN: ICD-10-CM

## 2023-09-12 DIAGNOSIS — Z95.1 HX OF CABG: Primary | ICD-10-CM

## 2023-09-12 DIAGNOSIS — M79.89 SWELLING OF CALF: ICD-10-CM

## 2023-09-12 PROCEDURE — 3077F PR MOST RECENT SYSTOLIC BLOOD PRESSURE >= 140 MM HG: ICD-10-PCS | Mod: HCNC,CPTII,S$GLB, | Performed by: THORACIC SURGERY (CARDIOTHORACIC VASCULAR SURGERY)

## 2023-09-12 PROCEDURE — 3079F DIAST BP 80-89 MM HG: CPT | Mod: HCNC,CPTII,S$GLB, | Performed by: THORACIC SURGERY (CARDIOTHORACIC VASCULAR SURGERY)

## 2023-09-12 PROCEDURE — 99999 PR PBB SHADOW E&M-EST. PATIENT-LVL III: ICD-10-PCS | Mod: PBBFAC,HCNC,, | Performed by: THORACIC SURGERY (CARDIOTHORACIC VASCULAR SURGERY)

## 2023-09-12 PROCEDURE — 1101F PR PT FALLS ASSESS DOC 0-1 FALLS W/OUT INJ PAST YR: ICD-10-PCS | Mod: HCNC,CPTII,S$GLB, | Performed by: THORACIC SURGERY (CARDIOTHORACIC VASCULAR SURGERY)

## 2023-09-12 PROCEDURE — 93971 EXTREMITY STUDY: CPT | Mod: 26,HCNC,LT, | Performed by: RADIOLOGY

## 2023-09-12 PROCEDURE — 93971 EXTREMITY STUDY: CPT | Mod: TC,HCNC,PO,LT

## 2023-09-12 PROCEDURE — 1101F PT FALLS ASSESS-DOCD LE1/YR: CPT | Mod: HCNC,CPTII,S$GLB, | Performed by: THORACIC SURGERY (CARDIOTHORACIC VASCULAR SURGERY)

## 2023-09-12 PROCEDURE — 99024 PR POST-OP FOLLOW-UP VISIT: ICD-10-PCS | Mod: HCNC,S$GLB,, | Performed by: THORACIC SURGERY (CARDIOTHORACIC VASCULAR SURGERY)

## 2023-09-12 PROCEDURE — 1159F MED LIST DOCD IN RCRD: CPT | Mod: HCNC,CPTII,S$GLB, | Performed by: THORACIC SURGERY (CARDIOTHORACIC VASCULAR SURGERY)

## 2023-09-12 PROCEDURE — 3079F PR MOST RECENT DIASTOLIC BLOOD PRESSURE 80-89 MM HG: ICD-10-PCS | Mod: HCNC,CPTII,S$GLB, | Performed by: THORACIC SURGERY (CARDIOTHORACIC VASCULAR SURGERY)

## 2023-09-12 PROCEDURE — 99999 PR PBB SHADOW E&M-EST. PATIENT-LVL III: CPT | Mod: PBBFAC,HCNC,, | Performed by: THORACIC SURGERY (CARDIOTHORACIC VASCULAR SURGERY)

## 2023-09-12 PROCEDURE — 1125F AMNT PAIN NOTED PAIN PRSNT: CPT | Mod: HCNC,CPTII,S$GLB, | Performed by: THORACIC SURGERY (CARDIOTHORACIC VASCULAR SURGERY)

## 2023-09-12 PROCEDURE — 93971 US LOWER EXTREMITY VEINS LEFT: ICD-10-PCS | Mod: 26,HCNC,LT, | Performed by: RADIOLOGY

## 2023-09-12 PROCEDURE — 3288F FALL RISK ASSESSMENT DOCD: CPT | Mod: HCNC,CPTII,S$GLB, | Performed by: THORACIC SURGERY (CARDIOTHORACIC VASCULAR SURGERY)

## 2023-09-12 PROCEDURE — 3077F SYST BP >= 140 MM HG: CPT | Mod: HCNC,CPTII,S$GLB, | Performed by: THORACIC SURGERY (CARDIOTHORACIC VASCULAR SURGERY)

## 2023-09-12 PROCEDURE — 99024 POSTOP FOLLOW-UP VISIT: CPT | Mod: HCNC,S$GLB,, | Performed by: THORACIC SURGERY (CARDIOTHORACIC VASCULAR SURGERY)

## 2023-09-12 PROCEDURE — 1125F PR PAIN SEVERITY QUANTIFIED, PAIN PRESENT: ICD-10-PCS | Mod: HCNC,CPTII,S$GLB, | Performed by: THORACIC SURGERY (CARDIOTHORACIC VASCULAR SURGERY)

## 2023-09-12 PROCEDURE — 1159F PR MEDICATION LIST DOCUMENTED IN MEDICAL RECORD: ICD-10-PCS | Mod: HCNC,CPTII,S$GLB, | Performed by: THORACIC SURGERY (CARDIOTHORACIC VASCULAR SURGERY)

## 2023-09-12 PROCEDURE — 3288F PR FALLS RISK ASSESSMENT DOCUMENTED: ICD-10-PCS | Mod: HCNC,CPTII,S$GLB, | Performed by: THORACIC SURGERY (CARDIOTHORACIC VASCULAR SURGERY)

## 2023-09-12 NOTE — PROGRESS NOTES
09/12/2023...    The patient is status post CABG x4 with left lower extremity EVH 07/19/2023.  His postoperative course was smooth and progressive.      The patient called the office a couple of days ago complaining of left calf pain and swelling.    The patient visits the office today accompanied by his wife.  They went to the [place] on 9/2/2023.  They parked very far away from the [facility] and walked at least 10,000 steps trying to find their car.    Since then, the patient states that the left calf has been swollen and acutely painful.  He describes pins sticking in me.  The discomfort is worse with ambulation and is relieved with elevation.    Today, the left calf is minimally more swollen than the right calf.  The EVH sites have healed nicely there is no erythema or drainage.    I doubt the patient has a deep venous thrombosis but we will send him for an urgent lower extremity evaluation.    Have encouraged the patient to keep his legs elevated while at rest    Addendum- The patient had a lower extremity venous ultrasound today-there is no evidence of deep venous thrombosis.  The patient has been notified.

## 2023-09-12 NOTE — TELEPHONE ENCOUNTER
Pt informed no DVT/ Blood clot in LLE. Pt advised to keep leg elevated and to wear compression sock. Pt verbalized understanding.

## 2023-09-23 ENCOUNTER — IMMUNIZATION (OUTPATIENT)
Dept: FAMILY MEDICINE | Facility: CLINIC | Age: 78
End: 2023-09-23
Payer: MEDICARE

## 2023-09-23 PROCEDURE — 90694 FLU VACCINE - QUADRIVALENT - ADJUVANTED: ICD-10-PCS | Mod: HCNC,S$GLB,, | Performed by: FAMILY MEDICINE

## 2023-09-23 PROCEDURE — G0008 FLU VACCINE - QUADRIVALENT - ADJUVANTED: ICD-10-PCS | Mod: HCNC,S$GLB,, | Performed by: FAMILY MEDICINE

## 2023-09-23 PROCEDURE — G0008 ADMIN INFLUENZA VIRUS VAC: HCPCS | Mod: HCNC,S$GLB,, | Performed by: FAMILY MEDICINE

## 2023-09-23 PROCEDURE — 90694 VACC AIIV4 NO PRSRV 0.5ML IM: CPT | Mod: HCNC,S$GLB,, | Performed by: FAMILY MEDICINE

## 2023-10-03 ENCOUNTER — OFFICE VISIT (OUTPATIENT)
Dept: VASCULAR SURGERY | Facility: CLINIC | Age: 78
End: 2023-10-03
Payer: MEDICARE

## 2023-10-03 VITALS
DIASTOLIC BLOOD PRESSURE: 83 MMHG | BODY MASS INDEX: 36.29 KG/M2 | HEIGHT: 67 IN | SYSTOLIC BLOOD PRESSURE: 128 MMHG | HEART RATE: 70 BPM | WEIGHT: 231.25 LBS

## 2023-10-03 DIAGNOSIS — Z95.1 HX OF CABG: Primary | ICD-10-CM

## 2023-10-03 PROCEDURE — 1159F MED LIST DOCD IN RCRD: CPT | Mod: HCNC,CPTII,S$GLB, | Performed by: THORACIC SURGERY (CARDIOTHORACIC VASCULAR SURGERY)

## 2023-10-03 PROCEDURE — 1101F PR PT FALLS ASSESS DOC 0-1 FALLS W/OUT INJ PAST YR: ICD-10-PCS | Mod: HCNC,CPTII,S$GLB, | Performed by: THORACIC SURGERY (CARDIOTHORACIC VASCULAR SURGERY)

## 2023-10-03 PROCEDURE — 1159F PR MEDICATION LIST DOCUMENTED IN MEDICAL RECORD: ICD-10-PCS | Mod: HCNC,CPTII,S$GLB, | Performed by: THORACIC SURGERY (CARDIOTHORACIC VASCULAR SURGERY)

## 2023-10-03 PROCEDURE — 1125F PR PAIN SEVERITY QUANTIFIED, PAIN PRESENT: ICD-10-PCS | Mod: HCNC,CPTII,S$GLB, | Performed by: THORACIC SURGERY (CARDIOTHORACIC VASCULAR SURGERY)

## 2023-10-03 PROCEDURE — 3079F PR MOST RECENT DIASTOLIC BLOOD PRESSURE 80-89 MM HG: ICD-10-PCS | Mod: HCNC,CPTII,S$GLB, | Performed by: THORACIC SURGERY (CARDIOTHORACIC VASCULAR SURGERY)

## 2023-10-03 PROCEDURE — 1101F PT FALLS ASSESS-DOCD LE1/YR: CPT | Mod: HCNC,CPTII,S$GLB, | Performed by: THORACIC SURGERY (CARDIOTHORACIC VASCULAR SURGERY)

## 2023-10-03 PROCEDURE — 3288F FALL RISK ASSESSMENT DOCD: CPT | Mod: HCNC,CPTII,S$GLB, | Performed by: THORACIC SURGERY (CARDIOTHORACIC VASCULAR SURGERY)

## 2023-10-03 PROCEDURE — 99999 PR PBB SHADOW E&M-EST. PATIENT-LVL III: CPT | Mod: PBBFAC,HCNC,, | Performed by: THORACIC SURGERY (CARDIOTHORACIC VASCULAR SURGERY)

## 2023-10-03 PROCEDURE — 3074F PR MOST RECENT SYSTOLIC BLOOD PRESSURE < 130 MM HG: ICD-10-PCS | Mod: HCNC,CPTII,S$GLB, | Performed by: THORACIC SURGERY (CARDIOTHORACIC VASCULAR SURGERY)

## 2023-10-03 PROCEDURE — 3079F DIAST BP 80-89 MM HG: CPT | Mod: HCNC,CPTII,S$GLB, | Performed by: THORACIC SURGERY (CARDIOTHORACIC VASCULAR SURGERY)

## 2023-10-03 PROCEDURE — 99024 PR POST-OP FOLLOW-UP VISIT: ICD-10-PCS | Mod: HCNC,S$GLB,, | Performed by: THORACIC SURGERY (CARDIOTHORACIC VASCULAR SURGERY)

## 2023-10-03 PROCEDURE — 99999 PR PBB SHADOW E&M-EST. PATIENT-LVL III: ICD-10-PCS | Mod: PBBFAC,HCNC,, | Performed by: THORACIC SURGERY (CARDIOTHORACIC VASCULAR SURGERY)

## 2023-10-03 PROCEDURE — 3074F SYST BP LT 130 MM HG: CPT | Mod: HCNC,CPTII,S$GLB, | Performed by: THORACIC SURGERY (CARDIOTHORACIC VASCULAR SURGERY)

## 2023-10-03 PROCEDURE — 3288F PR FALLS RISK ASSESSMENT DOCUMENTED: ICD-10-PCS | Mod: HCNC,CPTII,S$GLB, | Performed by: THORACIC SURGERY (CARDIOTHORACIC VASCULAR SURGERY)

## 2023-10-03 PROCEDURE — 1125F AMNT PAIN NOTED PAIN PRSNT: CPT | Mod: HCNC,CPTII,S$GLB, | Performed by: THORACIC SURGERY (CARDIOTHORACIC VASCULAR SURGERY)

## 2023-10-03 PROCEDURE — 99024 POSTOP FOLLOW-UP VISIT: CPT | Mod: HCNC,S$GLB,, | Performed by: THORACIC SURGERY (CARDIOTHORACIC VASCULAR SURGERY)

## 2023-10-03 NOTE — PROGRESS NOTES
10/03/2023...    The patient is status post CABG x4 with ligation of the left atrial appendage 07/19/2023.  He had transient postoperative atrial fibrillation.  He was discharged home on the 5th postoperative day.    The patient was last in the office 09/12/2023 after walking a long distance, he developed left calf swelling.  Urgent venous duplex confirmed no evidence of deep venous thrombosis.    The patient visits the office today for his final checkup.  He is unaccompanied.    The patient looks and feels well.  He keeps track of his steps and manages over 5000 steps every day.  I have encouraged him to increase the goal.    The patient denies angina but admits to minor exertional dyspnea.  I reassured him that he is still deconditioned after major surgery.    On physical exam, this sternotomy wound has healed completely.  The bone is solid.  His left lower extremity is not edematous today.  The EVH site is healed.    Today, I will discharge the patient from the cardiac surgical clinic.  He will continue cardiology care with Dr. Eugene.

## 2023-10-16 ENCOUNTER — TELEPHONE (OUTPATIENT)
Dept: FAMILY MEDICINE | Facility: CLINIC | Age: 78
End: 2023-10-16
Payer: MEDICARE

## 2023-10-17 ENCOUNTER — OFFICE VISIT (OUTPATIENT)
Dept: FAMILY MEDICINE | Facility: CLINIC | Age: 78
End: 2023-10-17
Attending: FAMILY MEDICINE
Payer: MEDICARE

## 2023-10-17 VITALS
OXYGEN SATURATION: 96 % | BODY MASS INDEX: 36.43 KG/M2 | HEART RATE: 79 BPM | HEIGHT: 67 IN | WEIGHT: 232.13 LBS | SYSTOLIC BLOOD PRESSURE: 141 MMHG | TEMPERATURE: 98 F | DIASTOLIC BLOOD PRESSURE: 83 MMHG

## 2023-10-17 DIAGNOSIS — Z00.00 ENCOUNTER FOR PREVENTIVE HEALTH EXAMINATION: Primary | ICD-10-CM

## 2023-10-17 DIAGNOSIS — R26.9 ABNORMALITY OF GAIT AND MOBILITY: ICD-10-CM

## 2023-10-17 DIAGNOSIS — F11.20 UNCOMPLICATED OPIOID DEPENDENCE: ICD-10-CM

## 2023-10-17 DIAGNOSIS — E66.01 SEVERE OBESITY (BMI 35.0-39.9) WITH COMORBIDITY: ICD-10-CM

## 2023-10-17 DIAGNOSIS — Z95.1 S/P CABG (CORONARY ARTERY BYPASS GRAFT): ICD-10-CM

## 2023-10-17 DIAGNOSIS — Z00.00 ENCOUNTER FOR MEDICARE ANNUAL WELLNESS EXAM: ICD-10-CM

## 2023-10-17 DIAGNOSIS — I48.0 PAF (PAROXYSMAL ATRIAL FIBRILLATION): ICD-10-CM

## 2023-10-17 DIAGNOSIS — I10 ESSENTIAL (PRIMARY) HYPERTENSION: ICD-10-CM

## 2023-10-17 DIAGNOSIS — I70.0 ATHEROSCLEROSIS OF AORTA: ICD-10-CM

## 2023-10-17 PROCEDURE — 3079F PR MOST RECENT DIASTOLIC BLOOD PRESSURE 80-89 MM HG: ICD-10-PCS | Mod: HCNC,CPTII,S$GLB, | Performed by: NURSE PRACTITIONER

## 2023-10-17 PROCEDURE — 3288F FALL RISK ASSESSMENT DOCD: CPT | Mod: HCNC,CPTII,S$GLB, | Performed by: NURSE PRACTITIONER

## 2023-10-17 PROCEDURE — 3079F DIAST BP 80-89 MM HG: CPT | Mod: HCNC,CPTII,S$GLB, | Performed by: NURSE PRACTITIONER

## 2023-10-17 PROCEDURE — 99999 PR PBB SHADOW E&M-EST. PATIENT-LVL V: ICD-10-PCS | Mod: PBBFAC,HCNC,, | Performed by: NURSE PRACTITIONER

## 2023-10-17 PROCEDURE — 1159F MED LIST DOCD IN RCRD: CPT | Mod: HCNC,CPTII,S$GLB, | Performed by: NURSE PRACTITIONER

## 2023-10-17 PROCEDURE — 1101F PR PT FALLS ASSESS DOC 0-1 FALLS W/OUT INJ PAST YR: ICD-10-PCS | Mod: HCNC,CPTII,S$GLB, | Performed by: NURSE PRACTITIONER

## 2023-10-17 PROCEDURE — 1160F PR REVIEW ALL MEDS BY PRESCRIBER/CLIN PHARMACIST DOCUMENTED: ICD-10-PCS | Mod: HCNC,CPTII,S$GLB, | Performed by: NURSE PRACTITIONER

## 2023-10-17 PROCEDURE — 1125F PR PAIN SEVERITY QUANTIFIED, PAIN PRESENT: ICD-10-PCS | Mod: HCNC,CPTII,S$GLB, | Performed by: NURSE PRACTITIONER

## 2023-10-17 PROCEDURE — 99999 PR PBB SHADOW E&M-EST. PATIENT-LVL V: CPT | Mod: PBBFAC,HCNC,, | Performed by: NURSE PRACTITIONER

## 2023-10-17 PROCEDURE — 3077F PR MOST RECENT SYSTOLIC BLOOD PRESSURE >= 140 MM HG: ICD-10-PCS | Mod: HCNC,CPTII,S$GLB, | Performed by: NURSE PRACTITIONER

## 2023-10-17 PROCEDURE — 3288F PR FALLS RISK ASSESSMENT DOCUMENTED: ICD-10-PCS | Mod: HCNC,CPTII,S$GLB, | Performed by: NURSE PRACTITIONER

## 2023-10-17 PROCEDURE — 1170F FXNL STATUS ASSESSED: CPT | Mod: HCNC,CPTII,S$GLB, | Performed by: NURSE PRACTITIONER

## 2023-10-17 PROCEDURE — 1101F PT FALLS ASSESS-DOCD LE1/YR: CPT | Mod: HCNC,CPTII,S$GLB, | Performed by: NURSE PRACTITIONER

## 2023-10-17 PROCEDURE — 3077F SYST BP >= 140 MM HG: CPT | Mod: HCNC,CPTII,S$GLB, | Performed by: NURSE PRACTITIONER

## 2023-10-17 PROCEDURE — 1160F RVW MEDS BY RX/DR IN RCRD: CPT | Mod: HCNC,CPTII,S$GLB, | Performed by: NURSE PRACTITIONER

## 2023-10-17 PROCEDURE — G0439 PPPS, SUBSEQ VISIT: HCPCS | Mod: HCNC,S$GLB,, | Performed by: NURSE PRACTITIONER

## 2023-10-17 PROCEDURE — 1125F AMNT PAIN NOTED PAIN PRSNT: CPT | Mod: HCNC,CPTII,S$GLB, | Performed by: NURSE PRACTITIONER

## 2023-10-17 PROCEDURE — 1170F PR FUNCTIONAL STATUS ASSESSED: ICD-10-PCS | Mod: HCNC,CPTII,S$GLB, | Performed by: NURSE PRACTITIONER

## 2023-10-17 PROCEDURE — 1159F PR MEDICATION LIST DOCUMENTED IN MEDICAL RECORD: ICD-10-PCS | Mod: HCNC,CPTII,S$GLB, | Performed by: NURSE PRACTITIONER

## 2023-10-17 PROCEDURE — G0439 PR MEDICARE ANNUAL WELLNESS SUBSEQUENT VISIT: ICD-10-PCS | Mod: HCNC,S$GLB,, | Performed by: NURSE PRACTITIONER

## 2023-10-17 NOTE — PATIENT INSTRUCTIONS
Counseling and Referral of Other Preventative  (Italic type indicates deductible and co-insurance are waived)    Patient Name: Sam Pete  Today's Date: 10/17/2023    Health Maintenance       Date Due Completion Date    COVID-19 Vaccine (5 - 2023-24 season) 09/01/2023 7/25/2022    Lipid Panel 08/09/2024 8/9/2023    TETANUS VACCINE 10/14/2025 10/14/2015    Colonoscopy 02/25/2029 2/25/2019    Override on 7/12/2004: Done (report in legacy)        No orders of the defined types were placed in this encounter.      The following information is provided to all patients.  This information is to help you find resources for any of the problems found today that may be affecting your health:                Living healthy guide: www.[facility].[place].gov      Understanding Diabetes: www.diabetes.org      Eating healthy: www.cdc.gov/healthyweight      CDC home safety checklist: www.cdc.gov/steadi/patient.html      Agency on Aging: www.goea.[place].[facility]      Alcoholics anonymous (AA): www.aa.org      Physical Activity: www.mir.nih.gov/wz2wndw      Tobacco use: www.quitwithusla.org

## 2023-10-17 NOTE — PROGRESS NOTES
"  Sam Pete presented for a  Medicare AWV and comprehensive Health Risk Assessment today. The following components were reviewed and updated:    Medical history  Family History  Social history  Allergies and Current Medications  Health Risk Assessment  Health Maintenance  Care Team         ** See Completed Assessments for Annual Wellness Visit within the encounter summary.**         The following assessments were completed:  Living Situation  CAGE  Depression Screening  Timed Get Up and Go  Whisper Test  Cognitive Function Screening  Nutrition Screening  ADL Screening  PAQ Screening    Clock in media     Vitals:    10/17/23 1352   BP: (!) 141/83   Pulse: 79   Temp: 97.9 °F (36.6 °C)   TempSrc: Oral   SpO2: 96%   Weight: 105.3 kg (232 lb 2.3 oz)   Height: 5' 7" (1.702 m)     Body mass index is 36.36 kg/m².  Physical Exam  Constitutional:       Appearance: He is well-developed.   HENT:      Head: Normocephalic and atraumatic.      Right Ear: Hearing normal.      Left Ear: Hearing normal.      Nose: Nose normal.   Eyes:      General: Lids are normal.      Conjunctiva/sclera: Conjunctivae normal.      Pupils: Pupils are equal, round, and reactive to light.   Cardiovascular:      Rate and Rhythm: Normal rate.   Pulmonary:      Effort: Pulmonary effort is normal.   Abdominal:      Palpations: Abdomen is soft.   Musculoskeletal:         General: Normal range of motion.      Cervical back: Normal range of motion and neck supple.   Skin:     General: Skin is warm and dry.   Neurological:      Mental Status: He is alert and oriented to person, place, and time.               Diagnoses and health risks identified today and associated recommendations/orders:    1. Encounter for preventive health examination  Discussed health maintenance guidelines appropriate for age.    Review for Opioid Screening: Patient does have rx for Opioids, risk score   0, other treatments tried   Review for Substance Use Disorders: Patient does " "not use substance.      2. Encounter for Medicare annual wellness exam    - Ambulatory Referral/Consult to Enhanced Annual Wellness Visit (eAWV)    3. Uncomplicated opioid dependence  Stable, continue to monitor     4. PAF (paroxysmal atrial fibrillation)  Stable, continue current medication   Followed by cardiology     5. Severe obesity (BMI 35.0-39.9) with comorbidity  Uncontrolled, Counseled patient on his ideal body weight, health consequences of being obese and current recommendations including weekly exercise and a heart healthy diet.  Current BMI is:Estimated body mass index is 36.36 kg/m² as calculated from the following:    Height as of this encounter: 5' 7" (1.702 m).    Weight as of this encounter: 105.3 kg (232 lb 2.3 oz)..  Patient is aware that ideal BMI < 25 or Weight in (lb) to have BMI = 25: 159.3.      6. Abnormality of gait and mobility  Stable, continue use of assistive devices     7. Essential (primary) hypertension  Mildly elevated today  Low sodium diet, continue current meds  Sees PCP early November for follow up     8. S/P CABG (coronary artery bypass graft)  Stable, continue current meds   Followed by cardiology     9. Atherosclerosis of aorta  Stable, continue to monitor   Followed by pcp       Provided Sam with a 5-10 year written screening schedule and personal prevention plan. Recommendations were developed using the USPSTF age appropriate recommendations. Education, counseling, and referrals were provided as needed. After Visit Summary printed and given to patient which includes a list of additional screenings\tests needed.    Follow up for One year for Annual Wellness Visit.    Livier Morris NP      I offered to discuss advanced care planning, including how to pick a person who would make decisions for you if you were unable to make them for yourself, called a health care power of , and what kind of decisions you might make such as use of life sustaining treatments such " as ventilators and tube feeding when faced with a life limiting illness recorded on a living will that they will need to know. (How you want to be cared for as you near the end of your natural life)     X  Patient has advanced directives on file, which we reviewed, and they do not wish to make changes.

## 2023-10-20 ENCOUNTER — PATIENT MESSAGE (OUTPATIENT)
Dept: FAMILY MEDICINE | Facility: CLINIC | Age: 78
End: 2023-10-20
Payer: MEDICARE

## 2023-10-20 ENCOUNTER — TELEPHONE (OUTPATIENT)
Dept: FAMILY MEDICINE | Facility: CLINIC | Age: 78
End: 2023-10-20

## 2023-10-20 DIAGNOSIS — F11.20 UNCOMPLICATED OPIOID DEPENDENCE: ICD-10-CM

## 2023-10-20 RX ORDER — MORPHINE SULFATE 30 MG/1
30 TABLET, FILM COATED, EXTENDED RELEASE ORAL 3 TIMES DAILY
Qty: 21 TABLET | Refills: 0 | Status: SHIPPED | OUTPATIENT
Start: 2023-10-25 | End: 2023-11-06

## 2023-10-20 RX ORDER — OXYCODONE AND ACETAMINOPHEN 10; 325 MG/1; MG/1
1 TABLET ORAL EVERY 4 HOURS PRN
Qty: 42 TABLET | Refills: 0 | Status: SHIPPED | OUTPATIENT
Start: 2023-10-25 | End: 2023-11-06 | Stop reason: SDUPTHER

## 2023-10-20 NOTE — TELEPHONE ENCOUNTER
Spoke with Lis.   They were able to approve an early refill. For 10/21/2023   Patient notify he had an early refill on 7/17/2023   His insurance will not approve any more early this year refills only 2 X a year.   He has some remaining from last time. For further refills je will have to wait until the date prescribed.    No

## 2023-10-20 NOTE — TELEPHONE ENCOUNTER
is out of the office today 10/20/23. Patients next refill is 10/25- he would like to get this earlier as he is leaving for vacation this week coming.

## 2023-10-20 NOTE — TELEPHONE ENCOUNTER
Left message on machine to call.     ----- Message from Yane Ojeda sent at 10/20/2023 10:40 AM CDT -----  Type:  RX Refill Request    Who Called:  Pt    Refill or New Rx:  refill    RX Name and Strength:  oxyCODONE-acetaminophen (PERCOCET)  mg per tablet and morphine (MS CONTIN) 30 MG 12 hr tablet    How is the patient currently taking it? (ex. 1XDay):  as directed    Is this a 30 day or 90 day RX:  90    Preferred Pharmacy with phone number:    [facility] #[id number] [place] [address] & [address] [address]  Phone: [phone] Fax: [phone]    Local or Mail Order:  Local    Ordering Provider:  Dr Montalvo    Would the patient rather a call back or a response via [facility]?  Call back    Best Call Back Number:  [phone]    Additional Information:  Pt is going out town this weekend and is trying to see if he can get these medications filled today instead of 10/25.   Please call back to advise. Thanks!

## 2023-10-20 NOTE — TELEPHONE ENCOUNTER
No care due was identified.  [facility] Embedded Care Due Messages. Reference number: [id number].   10/20/2023 12:46:22 PM CDT

## 2023-11-03 ENCOUNTER — TELEPHONE (OUTPATIENT)
Dept: FAMILY MEDICINE | Facility: CLINIC | Age: 78
End: 2023-11-03
Payer: MEDICARE

## 2023-11-03 NOTE — TELEPHONE ENCOUNTER
..I called the patient to confirm his appointment that he has with Dr. Montalvo on 11/06/2023 at 2:20pm, no answer left voicemail to return our call. I will send the patient a portal message as well.

## 2023-11-06 ENCOUNTER — TELEPHONE (OUTPATIENT)
Dept: FAMILY MEDICINE | Facility: CLINIC | Age: 78
End: 2023-11-06

## 2023-11-06 ENCOUNTER — OFFICE VISIT (OUTPATIENT)
Dept: FAMILY MEDICINE | Facility: CLINIC | Age: 78
End: 2023-11-06
Attending: FAMILY MEDICINE
Payer: MEDICARE

## 2023-11-06 VITALS
SYSTOLIC BLOOD PRESSURE: 120 MMHG | DIASTOLIC BLOOD PRESSURE: 78 MMHG | BODY MASS INDEX: 36.25 KG/M2 | RESPIRATION RATE: 18 BRPM | OXYGEN SATURATION: 95 % | TEMPERATURE: 98 F | WEIGHT: 230.94 LBS | HEIGHT: 67 IN | HEART RATE: 82 BPM

## 2023-11-06 DIAGNOSIS — Z95.1 S/P CABG (CORONARY ARTERY BYPASS GRAFT): ICD-10-CM

## 2023-11-06 DIAGNOSIS — F11.20 UNCOMPLICATED OPIOID DEPENDENCE: ICD-10-CM

## 2023-11-06 DIAGNOSIS — I48.0 PAF (PAROXYSMAL ATRIAL FIBRILLATION): ICD-10-CM

## 2023-11-06 DIAGNOSIS — I25.10 CORONARY ARTERY DISEASE INVOLVING NATIVE CORONARY ARTERY OF NATIVE HEART WITHOUT ANGINA PECTORIS: ICD-10-CM

## 2023-11-06 DIAGNOSIS — I10 ESSENTIAL (PRIMARY) HYPERTENSION: ICD-10-CM

## 2023-11-06 DIAGNOSIS — M96.1 FAILED BACK SURGICAL SYNDROME: ICD-10-CM

## 2023-11-06 DIAGNOSIS — G89.4 CHRONIC PAIN SYNDROME: Primary | ICD-10-CM

## 2023-11-06 PROCEDURE — 3074F PR MOST RECENT SYSTOLIC BLOOD PRESSURE < 130 MM HG: ICD-10-PCS | Mod: HCNC,CPTII,S$GLB, | Performed by: FAMILY MEDICINE

## 2023-11-06 PROCEDURE — 3078F PR MOST RECENT DIASTOLIC BLOOD PRESSURE < 80 MM HG: ICD-10-PCS | Mod: HCNC,CPTII,S$GLB, | Performed by: FAMILY MEDICINE

## 2023-11-06 PROCEDURE — 1125F PR PAIN SEVERITY QUANTIFIED, PAIN PRESENT: ICD-10-PCS | Mod: HCNC,CPTII,S$GLB, | Performed by: FAMILY MEDICINE

## 2023-11-06 PROCEDURE — 1159F PR MEDICATION LIST DOCUMENTED IN MEDICAL RECORD: ICD-10-PCS | Mod: HCNC,CPTII,S$GLB, | Performed by: FAMILY MEDICINE

## 2023-11-06 PROCEDURE — 99999 PR PBB SHADOW E&M-EST. PATIENT-LVL IV: CPT | Mod: PBBFAC,HCNC,, | Performed by: FAMILY MEDICINE

## 2023-11-06 PROCEDURE — 99999 PR PBB SHADOW E&M-EST. PATIENT-LVL IV: ICD-10-PCS | Mod: PBBFAC,HCNC,, | Performed by: FAMILY MEDICINE

## 2023-11-06 PROCEDURE — 3288F PR FALLS RISK ASSESSMENT DOCUMENTED: ICD-10-PCS | Mod: HCNC,CPTII,S$GLB, | Performed by: FAMILY MEDICINE

## 2023-11-06 PROCEDURE — 3078F DIAST BP <80 MM HG: CPT | Mod: HCNC,CPTII,S$GLB, | Performed by: FAMILY MEDICINE

## 2023-11-06 PROCEDURE — 1125F AMNT PAIN NOTED PAIN PRSNT: CPT | Mod: HCNC,CPTII,S$GLB, | Performed by: FAMILY MEDICINE

## 2023-11-06 PROCEDURE — 1101F PT FALLS ASSESS-DOCD LE1/YR: CPT | Mod: HCNC,CPTII,S$GLB, | Performed by: FAMILY MEDICINE

## 2023-11-06 PROCEDURE — 3074F SYST BP LT 130 MM HG: CPT | Mod: HCNC,CPTII,S$GLB, | Performed by: FAMILY MEDICINE

## 2023-11-06 PROCEDURE — 99213 OFFICE O/P EST LOW 20 MIN: CPT | Mod: HCNC,S$GLB,, | Performed by: FAMILY MEDICINE

## 2023-11-06 PROCEDURE — 1160F PR REVIEW ALL MEDS BY PRESCRIBER/CLIN PHARMACIST DOCUMENTED: ICD-10-PCS | Mod: HCNC,CPTII,S$GLB, | Performed by: FAMILY MEDICINE

## 2023-11-06 PROCEDURE — 1159F MED LIST DOCD IN RCRD: CPT | Mod: HCNC,CPTII,S$GLB, | Performed by: FAMILY MEDICINE

## 2023-11-06 PROCEDURE — 1160F RVW MEDS BY RX/DR IN RCRD: CPT | Mod: HCNC,CPTII,S$GLB, | Performed by: FAMILY MEDICINE

## 2023-11-06 PROCEDURE — 3288F FALL RISK ASSESSMENT DOCD: CPT | Mod: HCNC,CPTII,S$GLB, | Performed by: FAMILY MEDICINE

## 2023-11-06 PROCEDURE — 1101F PR PT FALLS ASSESS DOC 0-1 FALLS W/OUT INJ PAST YR: ICD-10-PCS | Mod: HCNC,CPTII,S$GLB, | Performed by: FAMILY MEDICINE

## 2023-11-06 PROCEDURE — 99213 PR OFFICE/OUTPT VISIT, EST, LEVL III, 20-29 MIN: ICD-10-PCS | Mod: HCNC,S$GLB,, | Performed by: FAMILY MEDICINE

## 2023-11-06 RX ORDER — MORPHINE SULFATE 30 MG/1
30 TABLET, FILM COATED, EXTENDED RELEASE ORAL 2 TIMES DAILY
Qty: 60 TABLET | Refills: 0 | Status: SHIPPED | OUTPATIENT
Start: 2023-11-20 | End: 2023-11-06 | Stop reason: SDUPTHER

## 2023-11-06 RX ORDER — LOSARTAN POTASSIUM 50 MG/1
50 TABLET ORAL DAILY
Qty: 90 TABLET | Refills: 3 | Status: SHIPPED | OUTPATIENT
Start: 2023-11-06

## 2023-11-06 RX ORDER — MORPHINE SULFATE 30 MG/1
30 TABLET, FILM COATED, EXTENDED RELEASE ORAL 2 TIMES DAILY
Qty: 60 TABLET | Refills: 0 | Status: SHIPPED | OUTPATIENT
Start: 2023-12-18 | End: 2023-12-13 | Stop reason: SDUPTHER

## 2023-11-06 RX ORDER — MORPHINE SULFATE 30 MG/1
30 TABLET, FILM COATED, EXTENDED RELEASE ORAL 2 TIMES DAILY
Qty: 60 TABLET | Refills: 0 | Status: SHIPPED | OUTPATIENT
Start: 2023-12-18 | End: 2023-11-06 | Stop reason: SDUPTHER

## 2023-11-06 RX ORDER — OXYCODONE AND ACETAMINOPHEN 10; 325 MG/1; MG/1
1 TABLET ORAL EVERY 4 HOURS PRN
Qty: 120 TABLET | Refills: 0 | Status: SHIPPED | OUTPATIENT
Start: 2023-11-20 | End: 2023-12-20

## 2023-11-06 RX ORDER — MORPHINE SULFATE 30 MG/1
30 TABLET, FILM COATED, EXTENDED RELEASE ORAL 2 TIMES DAILY
Qty: 60 TABLET | Refills: 0 | Status: SHIPPED | OUTPATIENT
Start: 2024-01-18 | End: 2024-02-05

## 2023-11-06 RX ORDER — OXYCODONE AND ACETAMINOPHEN 10; 325 MG/1; MG/1
1 TABLET ORAL EVERY 4 HOURS PRN
Qty: 120 TABLET | Refills: 0 | Status: SHIPPED | OUTPATIENT
Start: 2024-01-18 | End: 2024-02-05

## 2023-11-06 RX ORDER — MORPHINE SULFATE 30 MG/1
30 TABLET, FILM COATED, EXTENDED RELEASE ORAL 2 TIMES DAILY
Qty: 60 TABLET | Refills: 0 | Status: SHIPPED | OUTPATIENT
Start: 2024-01-18 | End: 2023-11-06 | Stop reason: SDUPTHER

## 2023-11-06 RX ORDER — MORPHINE SULFATE 30 MG/1
30 TABLET, FILM COATED, EXTENDED RELEASE ORAL 2 TIMES DAILY
Qty: 60 TABLET | Refills: 0 | Status: SHIPPED | OUTPATIENT
Start: 2023-11-20 | End: 2023-12-20

## 2023-11-06 RX ORDER — OXYCODONE AND ACETAMINOPHEN 10; 325 MG/1; MG/1
1 TABLET ORAL EVERY 4 HOURS PRN
Qty: 120 TABLET | Refills: 0 | Status: SHIPPED | OUTPATIENT
Start: 2023-12-18 | End: 2023-12-13 | Stop reason: SDUPTHER

## 2023-11-06 NOTE — PROGRESS NOTES
Subjective:       Patient ID: Sam Pete is a 78 y.o. male.    Chief Complaint: Chronic Pain (Pt states that he is here for his 3 mo fu )    8-year-old male coming in for renewal of pain medications for chronic pain/opioid dependency secondary to spinal stenosis of cervical and lumbar spine, failed lumbar back surgery and recently had four vessel bypass surgery in July for coronary artery disease.  He is having some left pectoral area pain tender to touch but not painful with a deep breath cough or sneeze.  It seems to be isolated to a single rib area and not diffuse over the entire pectoral muscle.  The tender area is well away from his surgical site, perhaps it is due to a nerve injury during surgery?  He has additional history of paroxysmal atrial fibrillation hypertension and other problems.  He is status post total thyroidectomy on Synthroid and currently is taking the MS Contin 30 mg three daily with the Percocet 10s on an as-needed basis.  He is ready to try to reduce the MS Contin further.    Past Medical History:  07/2023: Abnormal nuclear stress test  No date: Anticoagulant long-term use      Comment:  ASA 81 mg  No date: Arthritis  No date: Cataract      Comment:  OU  No date: Chronic low back pain  02/08/2011: Complete rupture of rotator cuff  07/08/2015: DDD (degenerative disc disease), lumbar  09/09/2015: Degeneration of lumbar or lumbosacral intervertebral disc  No date: ED (erectile dysfunction)  No date: GERD (gastroesophageal reflux disease)  No date: Hypertension  No date: Hypothyroidism, unspecified  06/26/2017: Lumbar pseudoarthrosis  06/26/2017: Lumbar stenosis  No date: Obesity  07/2023: PSVT (paroxysmal supraventricular tachycardia)  07/2023: SOB (shortness of breath)  07/08/2015: Spondylosis of lumbar region without myelopathy or   radiculopathy  1997: Stroke      Comment:  basal ganglia, left sided weakness, resolved  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  07/08/2015: Thoracic or  lumbosacral neuritis or radiculitis    Past Surgical History:  07/13/2023: ANGIOGRAM, CORONARY, WITH LEFT HEART CATHETERIZATION      Comment:  Procedure: Left Heart Cath;  Surgeon: Salvador Eugene MD;               Location: [facility] [place] CATH;  Service: Cardiology;;  No date: APPENDECTOMY  03/22/2022: ARTHROPLASTY OF SHOULDER; Right      Comment:  Procedure: ARTHROPLASTY, SHOULDER BRENNAN RIGHT SHOULDER                HUMERAL HEAD RESURFACING;  Surgeon: Frankie Joya MD;               Location: [facility] OR;  Service: Orthopedics;  Laterality:                Right;  No date: BACK SURGERY      Comment:  4- back surgery  12/16/2021: CAUDAL EPIDURAL STEROID INJECTION; N/A      Comment:  Procedure: Injection-steroid-epidural-caudal;  Surgeon:                Aram Terrell MD;  Location: [facility] OR;  Service: Pain                Management;  Laterality: N/A;  02/02/2022: CAUDAL EPIDURAL STEROID INJECTION; N/A      Comment:  Procedure: INJECTION, STEROID, SPINE, EPIDURAL, CAUDAL;                Surgeon: Aram Terrell MD;  Location: [facility] OR;  Service:                Pain Management;  Laterality: N/A;  07/22/2022: CAUDAL EPIDURAL STEROID INJECTION; N/A      Comment:  Procedure: Injection-steroid-epidural-caudal;  Surgeon:                Aram Terrell MD;  Location: [facility] OR;  Service: Pain                Management;  Laterality: N/A;  Caudal CARLOS   01/20/2023: CAUDAL EPIDURAL STEROID INJECTION; N/A      Comment:  Procedure: Injection-steroid-epidural-caudal;  Surgeon:                Aram Terrell MD;  Location: [facility] OR;  Service: Pain                Management;  Laterality: N/A;  caudal ( melinda)  04/21/2023: CAUDAL EPIDURAL STEROID INJECTION; N/A      Comment:  Procedure: Injection-steroid-epidural-caudal;  Surgeon:                Aram Terrell MD;  Location: [facility] OR;  Service: Pain                Management;  Laterality: N/A;  caudal  07/11/2023: CAUDAL EPIDURAL STEROID INJECTION; N/A      Comment:  Procedure: Injection-steroid-epidural-caudal;   Surgeon:                Aram Terrell MD;  Location: [facility] OR;  Service: Pain                Management;  Laterality: N/A;  caudal  07/12/2004: COLONOSCOPY      Comment:  CHILO.   Redundant tortuous colon, otherwise normal.  02/25/2019: COLONOSCOPY; N/A      Comment:  Procedure: COLONOSCOPY;  Surgeon: Gilbert Rodriguez Jr., MD;  Location: [facility] ENDO;  Service: Endoscopy;                 Laterality: N/A;  07/13/2023: CORONARY ANGIOGRAPHY; N/A      Comment:  Procedure: ANGIOGRAM, CORONARY ARTERY;  Surgeon: Salvador Eugene MD;  Location: [facility] CATH;  Service: Cardiology;                 Laterality: N/A;  7/19/2023: CORONARY ARTERY BYPASS GRAFT (CABG); N/A      Comment:  Procedure: CORONARY ARTERY BYPASS GRAFT (CABG)- x 4;                 Surgeon: Sandrine Wagner MD;  Location: [facility] OR;  Service:                Cardiothoracic;  Laterality: N/A;  7/19/2023: ENDOSCOPIC HARVEST OF VEIN; Left      Comment:  Procedure: HARVEST-VEIN-ENDOVASCULAR;  Surgeon: Sandrine Wagner MD;  Location: [facility] OR;  Service: Cardiothoracic;                Laterality: Left;  No date: Epidural steroid injection      Comment:  Pain management  07/12/2004: ESOPHAGOGASTRODUODENOSCOPY      Comment:  CHILO.  7/19/2023: EXCLUSION, LEFT ATRIAL APPENDAGE, OPEN, AS PART OF OPEN   CHEST SURGERY; N/A      Comment:  Procedure: EXCLUSION, LEFT ATRIAL APPENDAGE, OPEN, AS                PART OF OPEN CHEST SURGERY;  Surgeon: Sandrine Wagner MD;                 Location: [facility] OR;  Service: Cardiothoracic;  Laterality:               N/A;  No date: FRACTURE SURGERY; Left      Comment:  wrist / forearm, total of 5  12/12/2019: INSERTION OF DORSAL COLUMN NERVE STIMULATOR FOR TRIAL; N/A      Comment:  Procedure: INSERTION, NEUROSTIMULATOR, SPINAL CORD,                DORSAL COLUMN, FOR TRIAL;  Surgeon: Evan Lyles MD;                 Location: [facility] OR;  Service: Pain Management;                 Laterality: N/A;  02/06/2013: JOINT  REPLACEMENT      Comment:  ( rt hip 2007), left hip   No date: JOINT REPLACEMENT; Left      Comment:  total knee  No date: KNEE ARTHROSCOPY W/ DEBRIDEMENT      Comment:  bilateral knees , total of six  No date: KNEE SURGERY  02/12/2020: LAMINECTOMY USING MINIMALLY INVASIVE TECHNIQUE; N/A      Comment:  Procedure: LAMINECTOMY, SPINE, MINIMALLY INVASIVE /                 PLACEMENT OF SPINAL CORD STIMULATOR;  Surgeon: Darlene Max MD;  Location: [facility];  Service: Neurosurgery;                 Laterality: N/A;  No date: Nervbe Block injection      Comment:  Pain management  No date: SPINAL CORD STIMULATOR IMPLANT  03/28/2022: TOTAL SHOULDER ARTHROPLASTY; Right      Comment:  Dr Joya  03/07/2022: TOTAL THYROIDECTOMY; Bilateral      Comment:  Dr Mendoza    Current Outpatient Medications on File Prior to Visit:  aspirin 81 MG Chew, Take 81 mg by mouth once daily., Disp: , Rfl:   atorvastatin (LIPITOR) 40 MG tablet, Take 1 tablet (40 mg total) by mouth once daily., Disp: 90 tablet, Rfl: 3  buPROPion (WELLBUTRIN XL) 150 MG TB24 tablet, Take 1 tablet (150 mg total) by mouth once daily., Disp: 90 tablet, Rfl: 3  calcitRIOL (ROCALTROL) 0.5 MCG Cap, TAKE 1 CAPSULE ONE TIME DAILY, Disp: 90 capsule, Rfl: 3  furosemide (LASIX) 40 MG tablet, Take 1 tablet (40 mg total) by mouth once daily., Disp: 90 tablet, Rfl: 1  levothyroxine (SYNTHROID) 112 MCG tablet, TAKE 2 TABLETS(224 MCG) BY MOUTH EVERY DAY, Disp: 180 tablet, Rfl: 2  metoprolol succinate (TOPROL-XL) 25 MG 24 hr tablet, Take 0.5 tablets (12.5 mg total) by mouth once daily., Disp: 45 tablet, Rfl: 0  naloxone (NARCAN) 0.4 mg/mL injection, Inject 1 mL (0.4 mg total) into the vein as needed (overdose)., Disp: 2 mL, Rfl: 5  omeprazole (PRILOSEC) 40 MG capsule, Take one capsule by mouth daily, Disp: 90 capsule, Rfl: 3  UNABLE TO FIND, Take by mouth once daily. Vitafusion multivites gummies, Disp: , Rfl:   [DISCONTINUED] losartan (COZAAR) 50 MG tablet, Take 50 mg  by mouth once daily. Take 25 mg daily if bp is 140 or above, Disp: , Rfl:   [DISCONTINUED] morphine (MS CONTIN) 30 MG 12 hr tablet, Take 1 tablet (30 mg total) by mouth 3 (three) times daily. Take two tablets (60mg) in the morning and one tablet (30mg) in the evening for 7 days, Disp: 21 tablet, Rfl: 0  [DISCONTINUED] oxyCODONE-acetaminophen (PERCOCET)  mg per tablet, Take 1 tablet by mouth every 4 (four) hours as needed for Pain., Disp: 42 tablet, Rfl: 0    Current Facility-Administered Medications on File Prior to Visit:  diphenhydrAMINE injection 12.5 mg, 12.5 mg, Intravenous, Once PRN, Enrique Rosales MD  electrolyte-S (ISOLYTE), , Intravenous, Continuous, Enrique Rosales MD  HYDROmorphone (PF) injection 0.2 mg, 0.2 mg, Intravenous, Q5 Min PRN, Enrique Rosales MD  HYDROmorphone (PF) injection 0.2 mg, 0.2 mg, Intravenous, Q5 Min PRN, Enrique Rosales MD  lactated ringers infusion, , Intravenous, Once PRN, Aram Terrell MD  lactated ringers infusion, 10 mL/hr, Intravenous, Continuous, Enrique Rosales MD, Stopped at 07/19/23 0959  lactated ringers infusion, , Intravenous, Continuous, Aram Terrell MD, Last Rate: 25 mL/hr at 07/11/23 1012, New Bag at 07/11/23 1012  lorazepam injection 0.25 mg, 0.25 mg, Intravenous, Once PRN, Enrique Rosales MD  prochlorperazine injection Soln 5 mg, 5 mg, Intravenous, Q30 Min PRN, Enrique Rosales MD  sodium chloride 0.9% flush 3 mL, 3 mL, Intravenous, Q8H, Enrique Rosales MD            Review of Systems   Gastrointestinal:  Negative for constipation, diarrhea, nausea and vomiting.   Psychiatric/Behavioral:  Negative for confusion, decreased concentration and dysphoric mood.        Objective:      Physical Exam  Vitals and nursing note reviewed.   Constitutional:       Comments: Good blood pressure control  Normal pulse with regular rhythm   Severe obesity with a BMI of 36.2 he is up 3.4 lb from his August 10, 2023 visit    Cardiovascular:      Rate and Rhythm: Normal rate and regular rhythm.      Heart sounds: Normal heart sounds. No murmur heard.     No friction rub. No gallop.   Pulmonary:      Effort: Pulmonary effort is normal. No respiratory distress.      Breath sounds: Normal breath sounds. No stridor. No wheezing, rhonchi or rales.   Chest:      Chest wall: Tenderness (Tender over the upper left chest wall approximately 3 in from the midline incision) present.         Assessment:       1. Chronic pain syndrome    2. Uncomplicated opioid dependence    3. Coronary artery disease involving native coronary artery of native heart without angina pectoris    4. S/P CABG (coronary artery bypass graft)    5. PAF (paroxysmal atrial fibrillation)    6. Essential (primary) hypertension    7. Failed back surgical syndrome        Plan:       1. Uncomplicated opioid dependence  Starting before his next refill he will alternate two and three morphine per day going from b.i.d. to t.i.d. for a few days until he gets to next refill and then will remain on b.i.d. dosing.   The  (Prescription Monitoring Program) website was checked with no inappropriate activity found.    - morphine (MS CONTIN) 30 MG 12 hr tablet; Take 1 tablet (30 mg total) by mouth 2 (two) times daily. Take two tablets (60mg) in the morning and one tablet (30mg) in the evening  Dispense: 60 tablet; Refill: 0  - morphine (MS CONTIN) 30 MG 12 hr tablet; Take 1 tablet (30 mg total) by mouth 2 (two) times daily. Take two tablets (60mg) in the morning and one tablet (30mg) in the evening  Dispense: 60 tablet; Refill: 0  - morphine (MS CONTIN) 30 MG 12 hr tablet; Take 1 tablet (30 mg total) by mouth 2 (two) times daily. Take two tablets (60mg) in the morning and one tablet (30mg) in the evening  Dispense: 60 tablet; Refill: 0  - oxyCODONE-acetaminophen (PERCOCET)  mg per tablet; Take 1 tablet by mouth every 4 (four) hours as needed for Pain.  Dispense: 120 tablet; Refill:  0  - oxyCODONE-acetaminophen (PERCOCET)  mg per tablet; Take 1 tablet by mouth every 4 (four) hours as needed for Pain.  Dispense: 120 tablet; Refill: 0  - oxyCODONE-acetaminophen (PERCOCET)  mg per tablet; Take 1 tablet by mouth every 4 (four) hours as needed for Pain.  Dispense: 120 tablet; Refill: 0    2. Chronic pain syndrome    3. Coronary artery disease involving native coronary artery of native heart without angina pectoris  Stable and improving    4. S/P CABG (coronary artery bypass graft)  Some residual chest wall pain that may have been due to some injury from a retractor, compression?    5. PAF (paroxysmal atrial fibrillation)  Currently normal sinus rhythm status post closure of the atrial appendage    6. Essential (primary) hypertension  Good control no changes needed    7. Failed back surgical syndrome

## 2023-11-06 NOTE — TELEPHONE ENCOUNTER
----- Message from Constantin Foss sent at 11/6/2023  4:17 PM CST -----  Contact: Pharm  Type:  Pharmacy Calling to Clarify an RX    Name of Caller:  Cinthia  Pharmacy Name:    [id number] [place] [address] [address]  [facility] [address]  Phone: [phone] Fax: [phone]        Prescription Name:  morphine (MS CONTIN) 30 MG 12 hr tablet      What do they need to clarify?:  directions says its two sets  Best Call Back Number:  [phone]  Additional Information:  Please call to advise

## 2023-11-14 DIAGNOSIS — F11.20 UNCOMPLICATED OPIOID DEPENDENCE: ICD-10-CM

## 2023-11-14 RX ORDER — NALOXONE HYDROCHLORIDE 0.4 MG/ML
0.4 INJECTION, SOLUTION INTRAMUSCULAR; INTRAVENOUS; SUBCUTANEOUS
Qty: 2 ML | Refills: 5 | Status: SHIPPED | OUTPATIENT
Start: 2023-11-14

## 2023-11-15 ENCOUNTER — LAB VISIT (OUTPATIENT)
Dept: LAB | Facility: HOSPITAL | Age: 78
End: 2023-11-15
Attending: INTERNAL MEDICINE
Payer: MEDICARE

## 2023-11-15 DIAGNOSIS — I25.10 CORONARY ARTERY DISEASE INVOLVING NATIVE CORONARY ARTERY OF NATIVE HEART WITHOUT ANGINA PECTORIS: Chronic | ICD-10-CM

## 2023-11-15 DIAGNOSIS — Z79.899 LONG TERM CURRENT USE OF AMIODARONE: Chronic | ICD-10-CM

## 2023-11-15 LAB
ALBUMIN SERPL BCP-MCNC: 3.8 G/DL (ref 3.5–5.2)
ALP SERPL-CCNC: 67 U/L (ref 55–135)
ALT SERPL W/O P-5'-P-CCNC: 16 U/L (ref 10–44)
ANION GAP SERPL CALC-SCNC: 12 MMOL/L (ref 8–16)
AST SERPL-CCNC: 20 U/L (ref 10–40)
BILIRUB SERPL-MCNC: 0.3 MG/DL (ref 0.1–1)
BUN SERPL-MCNC: 19 MG/DL (ref 8–23)
CALCIUM SERPL-MCNC: 9.1 MG/DL (ref 8.7–10.5)
CHLORIDE SERPL-SCNC: 101 MMOL/L (ref 95–110)
CO2 SERPL-SCNC: 26 MMOL/L (ref 23–29)
CREAT SERPL-MCNC: 1 MG/DL (ref 0.5–1.4)
EST. GFR  (NO RACE VARIABLE): >60 ML/MIN/1.73 M^2
GLUCOSE SERPL-MCNC: 130 MG/DL (ref 70–110)
POTASSIUM SERPL-SCNC: 4.7 MMOL/L (ref 3.5–5.1)
PROT SERPL-MCNC: 7.7 G/DL (ref 6–8.4)
SODIUM SERPL-SCNC: 139 MMOL/L (ref 136–145)
TSH SERPL DL<=0.005 MIU/L-ACNC: 1.38 UIU/ML (ref 0.4–4)

## 2023-11-15 PROCEDURE — 84443 ASSAY THYROID STIM HORMONE: CPT | Mod: HCNC | Performed by: INTERNAL MEDICINE

## 2023-11-15 PROCEDURE — 80053 COMPREHEN METABOLIC PANEL: CPT | Mod: HCNC | Performed by: INTERNAL MEDICINE

## 2023-11-15 PROCEDURE — 36415 COLL VENOUS BLD VENIPUNCTURE: CPT | Mod: HCNC,PO | Performed by: INTERNAL MEDICINE

## 2023-11-21 ENCOUNTER — OFFICE VISIT (OUTPATIENT)
Dept: URGENT CARE | Facility: CLINIC | Age: 78
End: 2023-11-21
Payer: MEDICARE

## 2023-11-21 VITALS
HEIGHT: 67 IN | RESPIRATION RATE: 15 BRPM | OXYGEN SATURATION: 96 % | WEIGHT: 231.06 LBS | HEART RATE: 82 BPM | BODY MASS INDEX: 36.27 KG/M2 | SYSTOLIC BLOOD PRESSURE: 165 MMHG | TEMPERATURE: 98 F | DIASTOLIC BLOOD PRESSURE: 81 MMHG

## 2023-11-21 DIAGNOSIS — R09.A2 FOREIGN BODY SENSATION IN THROAT: ICD-10-CM

## 2023-11-21 DIAGNOSIS — R05.9 COUGH, UNSPECIFIED TYPE: Primary | ICD-10-CM

## 2023-11-21 PROCEDURE — 99213 OFFICE O/P EST LOW 20 MIN: CPT | Mod: S$GLB,,, | Performed by: PHYSICIAN ASSISTANT

## 2023-11-21 PROCEDURE — 99213 PR OFFICE/OUTPT VISIT, EST, LEVL III, 20-29 MIN: ICD-10-PCS | Mod: S$GLB,,, | Performed by: PHYSICIAN ASSISTANT

## 2023-11-21 NOTE — PROGRESS NOTES
"Subjective:      Patient ID: Sam Pete is a 78 y.o. male.    Vitals:  height is 5' 7" (1.702 m) and weight is 104.8 kg (231 lb 0.7 oz). His temperature is 97.5 °F (36.4 °C). His blood pressure is 165/81 (abnormal) and his pulse is 82. His respiration is 15 and oxygen saturation is 96%.     Chief Complaint: Sinus Problem    Patient presents to clinic with sinus congestion and headache. Symptoms started this morning. Patietn states that he was taking his medication this morning and a pill got stuck. Had a coughing fit and then his nose started draining. His nose has not stopped running and is having brown mucus drainage.    Sinus Problem  This is a new problem. The current episode started today. The problem is unchanged. There has been no fever. His pain is at a severity of 6/10. Associated symptoms include congestion, coughing and headaches. Pertinent negatives include no chills, diaphoresis, ear pain, hoarse voice, neck pain, shortness of breath, sinus pressure, sneezing, sore throat or swollen glands. Past treatments include nothing.       Constitution: Negative for chills, sweating, fatigue and fever.   HENT:  Positive for congestion and postnasal drip. Negative for ear pain, drooling, nosebleeds, foreign body in nose, sinus pain, sinus pressure, sore throat, trouble swallowing and voice change.    Neck: Negative for neck pain, neck stiffness, painful lymph nodes and neck swelling.   Cardiovascular:  Negative for chest pain, leg swelling, palpitations, sob on exertion and passing out.   Eyes:  Negative for eye discharge, eye itching, eye pain, eye redness and eyelid swelling.   Respiratory:  Positive for cough and sputum production. Negative for chest tightness, bloody sputum, shortness of breath, stridor and wheezing.    Gastrointestinal:  Negative for abdominal pain, abdominal bloating, nausea, vomiting, constipation, diarrhea and heartburn.   Genitourinary:  Negative for urine decreased. "   Musculoskeletal:  Negative for joint pain, joint swelling, abnormal ROM of joint, pain with walking, muscle cramps and muscle ache.   Skin:  Negative for rash and hives.   Allergic/Immunologic: Negative for hives, itching and sneezing.   Neurological:  Positive for headaches. Negative for dizziness, light-headedness, passing out, facial drooping, speech difficulty, loss of balance, altered mental status, loss of consciousness, numbness and seizures.   Hematologic/Lymphatic: Negative for swollen lymph nodes.   Psychiatric/Behavioral:  Negative for altered mental status and nervous/anxious. The patient is not nervous/anxious.       Objective:     Physical Exam   Constitutional: He is oriented to person, place, and time. He appears well-developed. He is cooperative.  Non-toxic appearance. He does not appear ill. No distress.   HENT:   Head: Normocephalic and atraumatic.   Ears:   Right Ear: Hearing, tympanic membrane, external ear and ear canal normal.   Left Ear: Hearing, tympanic membrane, external ear and ear canal normal.   Nose: Mucosal edema and rhinorrhea present. No nasal deformity. No epistaxis. Right sinus exhibits no maxillary sinus tenderness and no frontal sinus tenderness. Left sinus exhibits no maxillary sinus tenderness and no frontal sinus tenderness.   Mouth/Throat: Uvula is midline and mucous membranes are normal. No trismus in the jaw. Normal dentition. No uvula swelling. Posterior oropharyngeal erythema and cobblestoning present. No oropharyngeal exudate, posterior oropharyngeal edema or tonsillar abscesses. No tonsillar exudate.   Eyes: Conjunctivae and lids are normal. No scleral icterus.   Neck: Trachea normal and phonation normal. Neck supple. No edema present. No erythema present. No neck rigidity present.   Cardiovascular: Normal rate, regular rhythm, normal heart sounds and normal pulses.   Pulmonary/Chest: Effort normal and breath sounds normal. No accessory muscle usage or stridor. No  respiratory distress. He has no decreased breath sounds. He has no wheezes. He has no rhonchi. He has no rales.   Abdominal: Normal appearance.   Musculoskeletal: Normal range of motion.         General: No deformity. Normal range of motion.   Lymphadenopathy:     He has no cervical adenopathy.   Neurological: He is alert and oriented to person, place, and time. He has normal sensation. He exhibits normal muscle tone. Gait normal. Coordination normal.   Skin: Skin is warm, dry, intact, not diaphoretic, not pale and no rash. Capillary refill takes less than 2 seconds.   Psychiatric: His speech is normal and behavior is normal. Judgment and thought content normal.   Nursing note and vitals reviewed.      X-Ray Chest PA And Lateral    Result Date: 11/21/2023  EXAMINATION: XR CHEST PA AND LATERAL CLINICAL HISTORY: Cough, unspecified TECHNIQUE: PA and lateral views of the chest were performed. 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  The cardiomediastinal silhouette and central pulmonary vasculature are within normal limits.  Aortic vascular calcifications.  No consolidation, pneumothorax or pleural effusion.  Electrodes overlie the mid to lower thoracic spine.  Postsurgical changes of right shoulder arthroplasty.  No acute osseous abnormality identified.     No acute cardiopulmonary process. Electronically signed by: Сергей Foster Date:    11/21/2023 Time:    13:18     Assessment:     1. Cough, unspecified type    2. Foreign body sensation in throat        Plan:   Discussed the limitations in the urgent care setting with patient. Discussed CXR with patient. Advised close follow-up with PCP and/or Specialist for further evaluation as needed. Strict ER precautions given to patient as well. Patient aware, verbalized understanding and agreed with plan of care.    Cough, unspecified type  -     X-Ray Chest PA And Lateral;  Future; Expected date: 11/21/2023    Foreign body sensation in throat  -     X-Ray Chest PA And Lateral; Future; Expected date: 11/21/2023

## 2023-11-22 ENCOUNTER — PATIENT MESSAGE (OUTPATIENT)
Dept: FAMILY MEDICINE | Facility: CLINIC | Age: 78
End: 2023-11-22
Payer: MEDICARE

## 2023-11-22 ENCOUNTER — TELEPHONE (OUTPATIENT)
Dept: FAMILY MEDICINE | Facility: CLINIC | Age: 78
End: 2023-11-22
Payer: MEDICARE

## 2023-11-22 DIAGNOSIS — F11.20 UNCOMPLICATED OPIOID DEPENDENCE: ICD-10-CM

## 2023-11-22 RX ORDER — OXYCODONE AND ACETAMINOPHEN 7.5; 325 MG/1; MG/1
1 TABLET ORAL EVERY 6 HOURS PRN
Qty: 120 TABLET | Refills: 0 | Status: SHIPPED | OUTPATIENT
Start: 2023-11-22 | End: 2024-02-05

## 2023-11-22 NOTE — TELEPHONE ENCOUNTER
----- Message from Israel Pa sent at 11/22/2023  1:35 PM CST -----  Type: Needs Medical Advice  Who Called:  Chiquis from [facility]  Symptoms (please be specific):  said pt's oxyCODONE-acetaminophen (PERCOCET)  mg per tablet is on back order and wanted to know if he can have something else--please call and advise  Said she do have--5 mg or 7.5 /3-25   Pharmacy name and phone #:    [id number] [place] [address] & [address] [address]  Phone: [phone] Fax: [phone]    Additional Information: thank you

## 2023-11-22 NOTE — TELEPHONE ENCOUNTER
Spoke to patient on phone.  [facility] is out of Percocet 10/325, they do have the 7.5/325.  Sent in substitute prescription for Percocet 7.5/325.   checked

## 2023-12-01 DIAGNOSIS — E78.49 OTHER HYPERLIPIDEMIA: ICD-10-CM

## 2023-12-01 DIAGNOSIS — I10 ESSENTIAL (PRIMARY) HYPERTENSION: Primary | ICD-10-CM

## 2023-12-01 RX ORDER — METOPROLOL SUCCINATE 25 MG/1
12.5 TABLET, EXTENDED RELEASE ORAL
Qty: 45 TABLET | Refills: 0 | Status: SHIPPED | OUTPATIENT
Start: 2023-12-01 | End: 2023-12-05 | Stop reason: DRUGHIGH

## 2023-12-05 ENCOUNTER — OFFICE VISIT (OUTPATIENT)
Dept: CARDIOLOGY | Facility: CLINIC | Age: 78
End: 2023-12-05
Payer: MEDICARE

## 2023-12-05 VITALS
SYSTOLIC BLOOD PRESSURE: 138 MMHG | HEIGHT: 67 IN | RESPIRATION RATE: 20 BRPM | WEIGHT: 234.38 LBS | HEART RATE: 78 BPM | DIASTOLIC BLOOD PRESSURE: 80 MMHG | BODY MASS INDEX: 36.79 KG/M2

## 2023-12-05 DIAGNOSIS — I25.118 CORONARY ARTERY DISEASE OF NATIVE ARTERY OF NATIVE HEART WITH STABLE ANGINA PECTORIS: Primary | Chronic | ICD-10-CM

## 2023-12-05 DIAGNOSIS — I10 ESSENTIAL (PRIMARY) HYPERTENSION: Chronic | ICD-10-CM

## 2023-12-05 DIAGNOSIS — I70.0 ATHEROSCLEROSIS OF AORTA: Chronic | ICD-10-CM

## 2023-12-05 DIAGNOSIS — E78.49 OTHER HYPERLIPIDEMIA: Chronic | ICD-10-CM

## 2023-12-05 DIAGNOSIS — I27.20 MILD PULMONARY HYPERTENSION: Chronic | ICD-10-CM

## 2023-12-05 DIAGNOSIS — E03.9 ACQUIRED HYPOTHYROIDISM: Chronic | ICD-10-CM

## 2023-12-05 DIAGNOSIS — E66.01 SEVERE OBESITY (BMI 35.0-39.9) WITH COMORBIDITY: Chronic | ICD-10-CM

## 2023-12-05 DIAGNOSIS — I48.0 PAF (PAROXYSMAL ATRIAL FIBRILLATION): Chronic | ICD-10-CM

## 2023-12-05 PROBLEM — Z79.899 LONG TERM CURRENT USE OF AMIODARONE: Status: RESOLVED | Noted: 2023-08-24 | Resolved: 2023-12-05

## 2023-12-05 PROCEDURE — 3075F SYST BP GE 130 - 139MM HG: CPT | Mod: HCNC,CPTII,S$GLB, | Performed by: INTERNAL MEDICINE

## 2023-12-05 PROCEDURE — 3079F PR MOST RECENT DIASTOLIC BLOOD PRESSURE 80-89 MM HG: ICD-10-PCS | Mod: HCNC,CPTII,S$GLB, | Performed by: INTERNAL MEDICINE

## 2023-12-05 PROCEDURE — 99214 OFFICE O/P EST MOD 30 MIN: CPT | Mod: HCNC,S$GLB,, | Performed by: INTERNAL MEDICINE

## 2023-12-05 PROCEDURE — 3075F PR MOST RECENT SYSTOLIC BLOOD PRESS GE 130-139MM HG: ICD-10-PCS | Mod: HCNC,CPTII,S$GLB, | Performed by: INTERNAL MEDICINE

## 2023-12-05 PROCEDURE — 3079F DIAST BP 80-89 MM HG: CPT | Mod: HCNC,CPTII,S$GLB, | Performed by: INTERNAL MEDICINE

## 2023-12-05 PROCEDURE — 1125F PR PAIN SEVERITY QUANTIFIED, PAIN PRESENT: ICD-10-PCS | Mod: HCNC,CPTII,S$GLB, | Performed by: INTERNAL MEDICINE

## 2023-12-05 PROCEDURE — 99214 PR OFFICE/OUTPT VISIT, EST, LEVL IV, 30-39 MIN: ICD-10-PCS | Mod: HCNC,S$GLB,, | Performed by: INTERNAL MEDICINE

## 2023-12-05 PROCEDURE — 1125F AMNT PAIN NOTED PAIN PRSNT: CPT | Mod: HCNC,CPTII,S$GLB, | Performed by: INTERNAL MEDICINE

## 2023-12-05 PROCEDURE — 99999 PR PBB SHADOW E&M-EST. PATIENT-LVL II: ICD-10-PCS | Mod: PBBFAC,HCNC,, | Performed by: INTERNAL MEDICINE

## 2023-12-05 PROCEDURE — 99999 PR PBB SHADOW E&M-EST. PATIENT-LVL II: CPT | Mod: PBBFAC,HCNC,, | Performed by: INTERNAL MEDICINE

## 2023-12-05 RX ORDER — METOPROLOL SUCCINATE 25 MG/1
25 TABLET, EXTENDED RELEASE ORAL DAILY
Qty: 90 TABLET | Refills: 1 | Status: SHIPPED | OUTPATIENT
Start: 2023-12-05 | End: 2024-03-01

## 2023-12-05 NOTE — PROGRESS NOTES
Subjective:    Patient ID:  Sam Pete is a 78 y.o. male who presents for No chief complaint on file.        HPI  DISCUSSED LABS AND GOALS CMP OK GFR IMPROVED MORE THAN 60, TSH 1.379 DOWN FROM MORE THAN 5, DOING OK, OCC L SIDED CHEST SORENESS, IN REHAB, NO SOB, SEE ROS    Past Medical History:   Diagnosis Date    Abnormal nuclear stress test 07/2023    Anticoagulant long-term use     ASA 81 mg    Arthritis     Cataract     OU    Chronic low back pain     Complete rupture of rotator cuff 02/08/2011    DDD (degenerative disc disease), lumbar 07/08/2015    Degeneration of lumbar or lumbosacral intervertebral disc 09/09/2015    ED (erectile dysfunction)     GERD (gastroesophageal reflux disease)     Hypertension     Hypothyroidism, unspecified     Lumbar pseudoarthrosis 06/26/2017    Lumbar stenosis 06/26/2017    Obesity     PSVT (paroxysmal supraventricular tachycardia) 07/2023    SOB (shortness of breath) 07/2023    Spondylosis of lumbar region without myelopathy or radiculopathy 07/08/2015    Stroke 1997    basal ganglia, left sided weakness, resolved    Testicular hypofunction     Thoracic or lumbosacral neuritis or radiculitis 07/08/2015     Past Surgical History:   Procedure Laterality Date    ANGIOGRAM, CORONARY, WITH LEFT HEART CATHETERIZATION  07/13/2023    Procedure: Left Heart Cath;  Surgeon: Salvador Eugene MD;  Location: [facility] CATH;  Service: Cardiology;;    APPENDECTOMY      ARTHROPLASTY OF SHOULDER Right 03/22/2022    Procedure: ARTHROPLASTY, SHOULDER BRENNAN RIGHT SHOULDER HUMERAL HEAD RESURFACING;  Surgeon: Frankie Joya MD;  Location: [facility] OR;  Service: Orthopedics;  Laterality: Right;    BACK SURGERY      4- back surgery    CAUDAL EPIDURAL STEROID INJECTION N/A 12/16/2021    Procedure: Injection-steroid-epidural-caudal;  Surgeon: Aram Terrell MD;  Location: [facility] OR;  Service: Pain Management;  Laterality: N/A;    CAUDAL EPIDURAL STEROID INJECTION N/A 02/02/2022    Procedure: INJECTION, STEROID,  SPINE, EPIDURAL, CAUDAL;  Surgeon: Aram Terrell MD;  Location: [facility] OR;  Service: Pain Management;  Laterality: N/A;    CAUDAL EPIDURAL STEROID INJECTION N/A 07/22/2022    Procedure: Injection-steroid-epidural-caudal;  Surgeon: Aram Terrell MD;  Location: [facility] OR;  Service: Pain Management;  Laterality: N/A;  Caudal CARLOS     CAUDAL EPIDURAL STEROID INJECTION N/A 01/20/2023    Procedure: Injection-steroid-epidural-caudal;  Surgeon: Aram Terrell MD;  Location: [facility] OR;  Service: Pain Management;  Laterality: N/A;  caudal ( melinda)    CAUDAL EPIDURAL STEROID INJECTION N/A 04/21/2023    Procedure: Injection-steroid-epidural-caudal;  Surgeon: Aram Terrell MD;  Location: [facility] OR;  Service: Pain Management;  Laterality: N/A;  caudal    CAUDAL EPIDURAL STEROID INJECTION N/A 07/11/2023    Procedure: Injection-steroid-epidural-caudal;  Surgeon: Aram Terrell MD;  Location: [facility] OR;  Service: Pain Management;  Laterality: N/A;  caudal    COLONOSCOPY  07/12/2004    CHILO.   Redundant tortuous colon, otherwise normal.    COLONOSCOPY N/A 02/25/2019    Procedure: COLONOSCOPY;  Surgeon: Gilbert Rodriguez Jr., MD;  Location: [facility] ENDO;  Service: Endoscopy;  Laterality: N/A;    CORONARY ANGIOGRAPHY N/A 07/13/2023    Procedure: ANGIOGRAM, CORONARY ARTERY;  Surgeon: Salvador Eugene MD;  Location: [facility] CATH;  Service: Cardiology;  Laterality: N/A;    CORONARY ARTERY BYPASS GRAFT (CABG) N/A 7/19/2023    Procedure: CORONARY ARTERY BYPASS GRAFT (CABG)- x 4;  Surgeon: Sandrine Wagner MD;  Location: [facility] OR;  Service: Cardiothoracic;  Laterality: N/A;    ENDOSCOPIC HARVEST OF VEIN Left 7/19/2023    Procedure: HARVEST-VEIN-ENDOVASCULAR;  Surgeon: Sandrine Wagner MD;  Location: [facility] OR;  Service: Cardiothoracic;  Laterality: Left;    Epidural steroid injection      Pain management    ESOPHAGOGASTRODUODENOSCOPY  07/12/2004    CHIOL.    EXCLUSION, LEFT ATRIAL APPENDAGE, OPEN, AS PART OF OPEN CHEST SURGERY N/A 7/19/2023    Procedure: EXCLUSION, LEFT ATRIAL  APPENDAGE, OPEN, AS PART OF OPEN CHEST SURGERY;  Surgeon: Sandrine Wagner MD;  Location: [facility] OR;  Service: Cardiothoracic;  Laterality: N/A;    FRACTURE SURGERY Left     wrist / forearm, total of 5    INSERTION OF DORSAL COLUMN NERVE STIMULATOR FOR TRIAL N/A 2019    Procedure: INSERTION, NEUROSTIMULATOR, SPINAL CORD, DORSAL COLUMN, FOR TRIAL;  Surgeon: Evan Lyles MD;  Location: [facility] OR;  Service: Pain Management;  Laterality: N/A;    JOINT REPLACEMENT  2013    ( rt hip ), left hip     JOINT REPLACEMENT Left     total knee    KNEE ARTHROSCOPY W/ DEBRIDEMENT      bilateral knees , total of six    KNEE SURGERY      LAMINECTOMY USING MINIMALLY INVASIVE TECHNIQUE N/A 2020    Procedure: LAMINECTOMY, SPINE, MINIMALLY INVASIVE /  PLACEMENT OF SPINAL CORD STIMULATOR;  Surgeon: Darlene Max MD;  Location: [facility] OR;  Service: Neurosurgery;  Laterality: N/A;    Nervbe Block injection      Pain management    SPINAL CORD STIMULATOR IMPLANT      TOTAL SHOULDER ARTHROPLASTY Right 2022    Dr Joya    TOTAL THYROIDECTOMY Bilateral 2022    Dr Mendoza     Family History   Problem Relation Age of Onset    Hypertension Father     Heart disease Father     Drug abuse Daughter     Hypertension Son     Lung disease Sister     COPD Sister     Hypertension Sister     Diverticulitis Sister     Gout Sister     Kidney disease Sister     No Known Problems Mother     Eczema Neg Hx     Lupus Neg Hx     Psoriasis Neg Hx     Melanoma Neg Hx      Social History     Socioeconomic History    Marital status:    Tobacco Use    Smoking status: Former     Current packs/day: 0.00     Average packs/day: 1 pack/day for 30.0 years (30.0 ttl pk-yrs)     Types: Cigarettes     Start date: 8/3/1963     Quit date: 1992     Years since quittin.9    Smokeless tobacco: Never   Substance and Sexual Activity    Alcohol use: Yes     Comment: On occasion    Drug use: Yes     Types: Oxycodone, Morphine     Social  Determinants of Health     Financial Resource Strain: Low Risk  (10/17/2023)    Overall Financial Resource Strain (CARDIA)     Difficulty of Paying Living Expenses: Not hard at all   Recent Concern: Financial Resource Strain - Medium Risk (8/23/2023)    Overall Financial Resource Strain (CARDIA)     Difficulty of Paying Living Expenses: Somewhat hard   Food Insecurity: No Food Insecurity (10/17/2023)    Hunger Vital Sign     Worried About Running Out of Food in the Last Year: Never true     Ran Out of Food in the Last Year: Never true   Transportation Needs: No Transportation Needs (10/17/2023)    PRAPARE - Transportation     Lack of Transportation (Medical): No     Lack of Transportation (Non-Medical): No   Physical Activity: Sufficiently Active (10/17/2023)    Exercise Vital Sign     Days of Exercise per Week: 7 days     Minutes of Exercise per Session: 30 min   Stress: No Stress Concern Present (10/17/2023)    [employer] [place] [employer] - Occupational Stress Questionnaire     Feeling of Stress : Not at all   Social Connections: Socially Integrated (10/17/2023)    Social Connection and Isolation Panel [NHANES]     Frequency of Communication with Friends and Family: Three times a week     Frequency of Social Gatherings with Friends and Family: Once a week     Attends Rastafarian Services: More than 4 times per year     Active Member of Clubs or Organizations: Yes     Attends Club or Organization Meetings: More than 4 times per year     Marital Status:    Housing Stability: Low Risk  (10/17/2023)    Housing Stability Vital Sign     Unable to Pay for Housing in the Last Year: No     Number of Places Lived in the Last Year: 1     Unstable Housing in the Last Year: No       Review of patient's allergies indicates:   Allergen Reactions    Xyzal [levocetirizine] Other (See Comments)     Knocked him out        Current Outpatient Medications:     aspirin 81 MG Chew, Take 81 mg by mouth once daily., Disp:  , Rfl:     atorvastatin (LIPITOR) 40 MG tablet, Take 1 tablet (40 mg total) by mouth once daily., Disp: 90 tablet, Rfl: 3    buPROPion (WELLBUTRIN XL) 150 MG TB24 tablet, Take 1 tablet (150 mg total) by mouth once daily., Disp: 90 tablet, Rfl: 3    calcitRIOL (ROCALTROL) 0.5 MCG Cap, TAKE 1 CAPSULE ONE TIME DAILY, Disp: 90 capsule, Rfl: 3    furosemide (LASIX) 40 MG tablet, Take 1 tablet (40 mg total) by mouth once daily., Disp: 90 tablet, Rfl: 1    levothyroxine (SYNTHROID) 112 MCG tablet, TAKE 2 TABLETS(224 MCG) BY MOUTH EVERY DAY, Disp: 180 tablet, Rfl: 2    losartan (COZAAR) 50 MG tablet, Take 1 tablet (50 mg total) by mouth once daily. Take 25 mg daily if bp is 140 or above, Disp: 90 tablet, Rfl: 3    morphine (MS CONTIN) 30 MG 12 hr tablet, Take 1 tablet (30 mg total) by mouth 2 (two) times daily., Disp: 60 tablet, Rfl: 0    [START ON 12/18/2023] morphine (MS CONTIN) 30 MG 12 hr tablet, Take 1 tablet (30 mg total) by mouth 2 (two) times daily., Disp: 60 tablet, Rfl: 0    [START ON 1/18/2024] morphine (MS CONTIN) 30 MG 12 hr tablet, Take 1 tablet (30 mg total) by mouth 2 (two) times daily., Disp: 60 tablet, Rfl: 0    naloxone (NARCAN) 0.4 mg/mL injection, Inject 1 mL (0.4 mg total) into the vein as needed (overdose)., Disp: 2 mL, Rfl: 5    omeprazole (PRILOSEC) 40 MG capsule, Take one capsule by mouth daily, Disp: 90 capsule, Rfl: 3    oxyCODONE-acetaminophen (PERCOCET)  mg per tablet, Take 1 tablet by mouth every 4 (four) hours as needed for Pain., Disp: 120 tablet, Rfl: 0    [START ON 12/18/2023] oxyCODONE-acetaminophen (PERCOCET)  mg per tablet, Take 1 tablet by mouth every 4 (four) hours as needed for Pain., Disp: 120 tablet, Rfl: 0    [START ON 1/18/2024] oxyCODONE-acetaminophen (PERCOCET)  mg per tablet, Take 1 tablet by mouth every 4 (four) hours as needed for Pain., Disp: 120 tablet, Rfl: 0    oxyCODONE-acetaminophen (PERCOCET) 7.5-325 mg per tablet, Take 1 tablet by mouth every 6  (six) hours as needed for Pain., Disp: 120 tablet, Rfl: 0    UNABLE TO FIND, Take by mouth once daily. Vitafusion multivites gummies, Disp: , Rfl:     metoprolol succinate (TOPROL-XL) 25 MG 24 hr tablet, Take 1 tablet (25 mg total) by mouth once daily., Disp: 90 tablet, Rfl: 1  No current facility-administered medications for this visit.    Facility-Administered Medications Ordered in Other Visits:     diphenhydrAMINE injection 12.5 mg, 12.5 mg, Intravenous, Once PRN, Enrique Rosales MD    electrolyte-S (ISOLYTE), , Intravenous, Continuous, Enrique Rosales MD    HYDROmorphone (PF) injection 0.2 mg, 0.2 mg, Intravenous, Q5 Min PRN, Enrique Rosales MD    HYDROmorphone (PF) injection 0.2 mg, 0.2 mg, Intravenous, Q5 Min PRN, Enrique Rosales MD    lactated ringers infusion, , Intravenous, Once PRN, Aram Terrell MD    lactated ringers infusion, 10 mL/hr, Intravenous, Continuous, Enrique Rosales MD, Stopped at 07/19/23 0959    lactated ringers infusion, , Intravenous, Continuous, Aram Terrell MD, Last Rate: 25 mL/hr at 07/11/23 1012, New Bag at 07/11/23 1012    lorazepam injection 0.25 mg, 0.25 mg, Intravenous, Once PRN, Enrique Rosales MD    prochlorperazine injection Soln 5 mg, 5 mg, Intravenous, Q30 Min PRN, Enrique Rosales MD    sodium chloride 0.9% flush 3 mL, 3 mL, Intravenous, Q8H, Enrique Rosales MD    Review of Systems   Constitutional: Negative for chills, decreased appetite, diaphoresis, fever, malaise/fatigue and night sweats.   HENT:  Negative for congestion and nosebleeds.    Eyes:  Negative for blurred vision and visual disturbance.   Cardiovascular:  Negative for chest pain (OCC), claudication, cyanosis, dyspnea on exertion, irregular heartbeat, leg swelling, near-syncope, orthopnea, palpitations, paroxysmal nocturnal dyspnea and syncope.   Respiratory:  Negative for cough, hemoptysis and shortness of breath.    Endocrine: Negative for polyphagia and polyuria.  "  Skin:  Negative for color change and rash.   Musculoskeletal:  Positive for arthritis. Negative for back pain and falls.   Gastrointestinal:  Negative for abdominal pain, dysphagia, jaundice, melena and nausea.   Genitourinary:  Negative for dysuria and flank pain.   Neurological:  Negative for brief paralysis, dizziness, focal weakness and light-headedness.   Psychiatric/Behavioral:  Negative for altered mental status and depression.    Allergic/Immunologic: Negative for hives and persistent infections.        Objective:      Vitals:    12/05/23 1357 12/05/23 1413   BP: (!) 152/80 138/80   Pulse: 78    Resp: 20    Weight: 106.3 kg (234 lb 5.6 oz)    Height: 5' 7" (1.702 m)    PainSc:   6    PainLoc: Back      Body mass index is 36.7 kg/m².    Physical Exam  Constitutional:       Appearance: Normal appearance. He is obese.   HENT:      Head: Normocephalic and atraumatic.   Eyes:      Extraocular Movements: Extraocular movements intact.      Pupils: Pupils are equal, round, and reactive to light.   Neck:      Vascular: No carotid bruit.   Cardiovascular:      Rate and Rhythm: Normal rate and regular rhythm.      Pulses:           Carotid pulses are 2+ on the right side and 2+ on the left side.       Radial pulses are 2+ on the left side.        Posterior tibial pulses are 2+ on the right side and 2+ on the left side.      Heart sounds: Murmur heard.      Systolic murmur is present with a grade of 1/6 at the lower left sternal border.      No friction rub. No gallop.   Pulmonary:      Effort: Pulmonary effort is normal. No respiratory distress.      Breath sounds: Normal breath sounds and air entry.   Chest:       Abdominal:      Palpations: Abdomen is soft.      Tenderness: There is no abdominal tenderness.      Comments: OBESE   Musculoskeletal:      Cervical back: Neck supple.      Right lower leg: No edema.      Left lower leg: No edema.   Skin:     Capillary Refill: Capillary refill takes less than 2 seconds. "   Neurological:      General: No focal deficit present.      Mental Status: He is alert and oriented to person, place, and time.   Psychiatric:         Mood and Affect: Mood normal.         Behavior: Behavior normal.                 ..    Chemistry        Component Value Date/Time     11/15/2023 1420    K 4.7 11/15/2023 1420     11/15/2023 1420    CO2 26 11/15/2023 1420    BUN 19 11/15/2023 1420    CREATININE 1.0 11/15/2023 1420    CREATININE 0.8 01/31/2013 1625     (H) 11/15/2023 1420        Component Value Date/Time    CALCIUM 9.1 11/15/2023 1420    CALCIUM 9.2 01/31/2013 1625    ALKPHOS 67 11/15/2023 1420    AST 20 11/15/2023 1420    ALT 16 11/15/2023 1420    BILITOT 0.3 11/15/2023 1420    ESTGFRAFRICA >60.0 07/12/2022 1119    EGFRNONAA 53.4 (A) 07/12/2022 1119            ..  Lab Results   Component Value Date    CHOL 143 08/09/2023    CHOL 182 07/12/2022    CHOL 127 01/14/2021     Lab Results   Component Value Date    HDL 48 08/09/2023    HDL 65 07/12/2022    HDL 47 01/14/2021     Lab Results   Component Value Date    LDLCALC 57.8 (L) 08/09/2023    LDLCALC 83.0 07/12/2022    LDLCALC 55.0 (L) 01/14/2021     Lab Results   Component Value Date    TRIG 186 (H) 08/09/2023    TRIG 170 (H) 07/12/2022    TRIG 125 01/14/2021     Lab Results   Component Value Date    CHOLHDL 33.6 08/09/2023    CHOLHDL 35.7 07/12/2022    CHOLHDL 37.0 01/14/2021     ..  Lab Results   Component Value Date    WBC 7.66 08/09/2023    HGB 12.2 (L) 08/09/2023    HCT 40.4 08/09/2023    MCV 99 (H) 08/09/2023     (H) 08/09/2023       Test(s) Reviewed  I have reviewed the following in detail:  [] Stress test   [] Angiography   [] Echocardiogram   [x] Labs   [] Other:       Assessment:         ICD-10-CM ICD-9-CM   1. Coronary artery disease of native artery of native heart with stable angina pectoris  I25.118 414.01     413.9   2. Other hyperlipidemia  E78.49 272.4   3. Mild pulmonary hypertension  I27.20 416.8   4. PAF  (paroxysmal atrial fibrillation)  I48.0 427.31   5. Severe obesity (BMI 35.0-39.9) with comorbidity  E66.01 278.01   6. Atherosclerosis of aorta  I70.0 440.0   7. Essential (primary) hypertension  I10 401.9   8. Acquired hypothyroidism  E03.9 244.9     Problem List Items Addressed This Visit          Cardiac/Vascular    Essential (primary) hypertension    Relevant Orders    Comprehensive Metabolic Panel    Atherosclerosis of aorta    PAF (paroxysmal atrial fibrillation)    Other hyperlipidemia    Relevant Orders    Comprehensive Metabolic Panel    Lipid Panel    Mild pulmonary hypertension    Coronary artery disease involving native coronary artery of native heart - Primary    Relevant Orders    Comprehensive Metabolic Panel    Lipid Panel    Hemoglobin       Endocrine    Severe obesity (BMI 35.0-39.9) with comorbidity    Acquired hypothyroidism        Plan:     WATCH BP, INCREASE TOPROL TO 25 MG,ALL CV CLINICALLY STABLE, CLASS 1 ANGINA, NO HF, NO TIA, NO CLINICAL ARRHYTHMIA,CONTINUE CURRENT MEDS, EDUCATION, DIET, EXERCISE  CONTINUE WITH REHAB, WEIGHT LOSS, RETURN TO CLINIC IN 6 MO WITH LABS      Coronary artery disease of native artery of native heart with stable angina pectoris  -     Comprehensive Metabolic Panel; Future; Expected date: 06/05/2024  -     Lipid Panel; Future; Expected date: 06/05/2024  -     Hemoglobin; Future; Expected date: 06/05/2024    Other hyperlipidemia  -     Comprehensive Metabolic Panel; Future; Expected date: 06/05/2024  -     Lipid Panel; Future; Expected date: 06/05/2024    Mild pulmonary hypertension    PAF (paroxysmal atrial fibrillation)  Comments:  POSTOP    Severe obesity (BMI 35.0-39.9) with comorbidity    Atherosclerosis of aorta  Comments:  NO EMBOLIZATION    Essential (primary) hypertension  Comments:  CONTROLLED  Orders:  -     Comprehensive Metabolic Panel; Future; Expected date: 06/05/2024    Acquired hypothyroidism    Other orders  -     metoprolol succinate (TOPROL-XL)  25 MG 24 hr tablet; Take 1 tablet (25 mg total) by mouth once daily.  Dispense: 90 tablet; Refill: 1    RTC Low level/low impact aerobic exercise 5x's/wk. Heart healthy diet and risk factor modification.    See labs and med orders.    Aerobic exercise 5x's/wk. Heart healthy diet and risk factor modification.    See labs and med orders.

## 2023-12-12 ENCOUNTER — PATIENT MESSAGE (OUTPATIENT)
Dept: SPINE | Facility: CLINIC | Age: 78
End: 2023-12-12
Payer: MEDICARE

## 2023-12-13 ENCOUNTER — PATIENT MESSAGE (OUTPATIENT)
Dept: FAMILY MEDICINE | Facility: CLINIC | Age: 78
End: 2023-12-13
Payer: MEDICARE

## 2023-12-13 ENCOUNTER — TELEPHONE (OUTPATIENT)
Dept: PAIN MEDICINE | Facility: CLINIC | Age: 78
End: 2023-12-13
Payer: MEDICARE

## 2023-12-13 DIAGNOSIS — M54.16 LUMBAR RADICULOPATHY: Primary | ICD-10-CM

## 2023-12-13 DIAGNOSIS — F11.20 UNCOMPLICATED OPIOID DEPENDENCE: ICD-10-CM

## 2023-12-13 RX ORDER — MORPHINE SULFATE 30 MG/1
30 TABLET, FILM COATED, EXTENDED RELEASE ORAL 2 TIMES DAILY
Qty: 60 TABLET | Refills: 0 | Status: SHIPPED | OUTPATIENT
Start: 2023-12-18 | End: 2024-01-17 | Stop reason: SDUPTHER

## 2023-12-13 RX ORDER — OXYCODONE AND ACETAMINOPHEN 10; 325 MG/1; MG/1
1 TABLET ORAL EVERY 4 HOURS PRN
Qty: 120 TABLET | Refills: 0 | Status: SHIPPED | OUTPATIENT
Start: 2023-12-18 | End: 2024-02-05

## 2023-12-13 NOTE — TELEPHONE ENCOUNTER
site checked, no inappropriate activity found    Last refilled MS Contin November 20, 2023, number [id number], Lis Singh, authorized by me.    Last refilled Percocet 7.5 mg,  One hundred twenty, Lis Singh authorized by me        MS Contin originally scheduled for [facility] December 18 and Percocet 10 mg originally scheduled for [facility] December 18 were rerouted to [facility] pharmacy.    Patient notified by e-mail

## 2023-12-13 NOTE — TELEPHONE ENCOUNTER
----- Message from Nikita Landrum MD sent at 12/13/2023  9:54 AM CST -----  Please schedule for caudal epidural steroid injection

## 2024-01-09 ENCOUNTER — HOSPITAL ENCOUNTER (OUTPATIENT)
Facility: HOSPITAL | Age: 79
Discharge: HOME | End: 2024-01-09
Attending: ANESTHESIOLOGY | Admitting: ANESTHESIOLOGY
Payer: MEDICARE

## 2024-01-09 DIAGNOSIS — M54.16 LUMBAR RADICULITIS: ICD-10-CM

## 2024-01-09 PROCEDURE — 63600175 PHARM REV CODE 636 W HCPCS: Performed by: ANESTHESIOLOGY

## 2024-01-09 PROCEDURE — A4216 STERILE WATER/SALINE, 10 ML: HCPCS | Performed by: ANESTHESIOLOGY

## 2024-01-09 PROCEDURE — 62323 NJX INTERLAMINAR LMBR/SAC: CPT | Performed by: ANESTHESIOLOGY

## 2024-01-09 PROCEDURE — 62323 NJX INTERLAMINAR LMBR/SAC: CPT | Mod: ,,, | Performed by: ANESTHESIOLOGY

## 2024-01-09 PROCEDURE — 25000003 PHARM REV CODE 250: Performed by: ANESTHESIOLOGY

## 2024-01-09 PROCEDURE — 25500020 PHARM REV CODE 255: Performed by: ANESTHESIOLOGY

## 2024-01-09 RX ORDER — FENTANYL CITRATE 50 UG/ML
INJECTION, SOLUTION INTRAMUSCULAR; INTRAVENOUS
Status: DISCONTINUED | OUTPATIENT
Start: 2024-01-09 | End: 2024-01-09 | Stop reason: HOSPADM

## 2024-01-09 RX ORDER — LIDOCAINE HYDROCHLORIDE 10 MG/ML
INJECTION, SOLUTION EPIDURAL; INFILTRATION; INTRACAUDAL; PERINEURAL
Status: DISCONTINUED | OUTPATIENT
Start: 2024-01-09 | End: 2024-01-09 | Stop reason: HOSPADM

## 2024-01-09 RX ORDER — LIDOCAINE HYDROCHLORIDE 10 MG/ML
1 INJECTION, SOLUTION EPIDURAL; INFILTRATION; INTRACAUDAL; PERINEURAL ONCE
Status: COMPLETED | OUTPATIENT
Start: 2024-01-09 | End: 2024-01-09

## 2024-01-09 RX ORDER — DEXAMETHASONE SODIUM PHOSPHATE 10 MG/ML
INJECTION INTRAMUSCULAR; INTRAVENOUS
Status: DISCONTINUED | OUTPATIENT
Start: 2024-01-09 | End: 2024-01-09 | Stop reason: HOSPADM

## 2024-01-09 RX ORDER — SODIUM CHLORIDE 9 MG/ML
INJECTION, SOLUTION INTRAMUSCULAR; INTRAVENOUS; SUBCUTANEOUS
Status: DISCONTINUED | OUTPATIENT
Start: 2024-01-09 | End: 2024-01-09 | Stop reason: HOSPADM

## 2024-01-09 RX ORDER — SODIUM CHLORIDE, SODIUM LACTATE, POTASSIUM CHLORIDE, CALCIUM CHLORIDE 600; 310; 30; 20 MG/100ML; MG/100ML; MG/100ML; MG/100ML
INJECTION, SOLUTION INTRAVENOUS CONTINUOUS
Status: ACTIVE | OUTPATIENT
Start: 2024-01-09

## 2024-01-09 RX ORDER — MIDAZOLAM HYDROCHLORIDE 1 MG/ML
INJECTION INTRAMUSCULAR; INTRAVENOUS
Status: DISCONTINUED | OUTPATIENT
Start: 2024-01-09 | End: 2024-01-09 | Stop reason: HOSPADM

## 2024-01-09 RX ADMIN — SODIUM CHLORIDE, POTASSIUM CHLORIDE, SODIUM LACTATE AND CALCIUM CHLORIDE: 600; 310; 30; 20 INJECTION, SOLUTION INTRAVENOUS at 10:01

## 2024-01-09 RX ADMIN — LIDOCAINE HYDROCHLORIDE 10 MG: 10 INJECTION, SOLUTION EPIDURAL; INFILTRATION; INTRACAUDAL; PERINEURAL at 10:01

## 2024-01-09 NOTE — OP NOTE
PROCEDURE DATE: 1/9/2024    PROCEDURE:  Caudal epidural steroid injection under fluoroscopy.    Diagnosis: Lumbar radiculitis    Post Op diagnosis: Same    PHYSICIAN: Aram Terrell M.D.    MEDICATIONS INJECTED:  10 mg of dexamethasone and 4 ml of sterile, preservative-free NaCl.    LOCAL ANESTHETIC GIVEN:  Lidocaine 1%, 2 ml total    SEDATION MEDICATIONS: RN IV sedation    ESTIMATED BLOOD LOSS:  None    COMPLICATIONS:  None    TECHNIQUE:   After the patient was placed in prone position, the patient was prepped and draped in the usual sterile fashion using ChloraPrep and sterile towels.  Appropriate anatomic landmarks were determined by identifying the sacral hiatus in the lateral fluoroscopic view.  Local anesthetic was given via a 25g 1.5 inch needle by raising a wheal and infiltrating down to the periosteum.  A 3.5 inch 20 gauge touhy needle was introduced thru the sacral hiatus.  1ml of contrast was injected to confirm placement in the appropriate area and that there was no vascular uptake.  The medication was then injected slowly.  The patient tolerated the procedure well.    The patient was monitored after the procedure.  Patient was given post procedure and discharge instructions to follow at home.  The patient was discharged in a stable condition

## 2024-01-09 NOTE — DISCHARGE SUMMARY
[facility] - Periop Services  Discharge Note  Short Stay    Procedure(s) (LRB):  Injection-steroid-epidural-caudal (N/A)      OUTCOME: Patient tolerated treatment/procedure well without complication and is now ready for discharge.    DISPOSITION: Home or Self Care    FINAL DIAGNOSIS:  <principal problem not specified>    FOLLOWUP: In clinic    DISCHARGE INSTRUCTIONS:    Discharge Procedure Orders   Notify your health care provider if you experience any of the following:  temperature >100.4     Notify your health care provider if you experience any of the following:  severe uncontrolled pain     Notify your health care provider if you experience any of the following:  redness, tenderness, or signs of infection (pain, swelling, redness, odor or green/yellow discharge around incision site)     Activity as tolerated        TIME SPENT ON DISCHARGE: 30 minutes

## 2024-01-09 NOTE — PLAN OF CARE
Discharge instructions given to pt/wife, verbalized understanding.  Tolerating PO fluids.  IV removed.  Denies pain.  Ambulating out per cane with RN to wife  in no distress.

## 2024-01-09 NOTE — H&P
CC: low back pain    HPI: The patient is a 78 y.o. male with a history of low back pain here for CARLOS. There are no major changes in history and physical from 11/6/23 by Katia.    Past Medical History:   Diagnosis Date    Abnormal nuclear stress test 07/2023    Anticoagulant long-term use     ASA 81 mg    Arthritis     Cataract     OU    Chronic low back pain     Complete rupture of rotator cuff 02/08/2011    DDD (degenerative disc disease), lumbar 07/08/2015    Degeneration of lumbar or lumbosacral intervertebral disc 09/09/2015    ED (erectile dysfunction)     GERD (gastroesophageal reflux disease)     Hypertension     Hypothyroidism, unspecified     Lumbar pseudoarthrosis 06/26/2017    Lumbar stenosis 06/26/2017    Obesity     PSVT (paroxysmal supraventricular tachycardia) 07/2023    SOB (shortness of breath) 07/2023    Spondylosis of lumbar region without myelopathy or radiculopathy 07/08/2015    Stroke 1997    basal ganglia, left sided weakness, resolved    Testicular hypofunction     Thoracic or lumbosacral neuritis or radiculitis 07/08/2015       Past Surgical History:   Procedure Laterality Date    ANGIOGRAM, CORONARY, WITH LEFT HEART CATHETERIZATION  07/13/2023    Procedure: Left Heart Cath;  Surgeon: Salvador Eugene MD;  Location: [facility] CATH;  Service: Cardiology;;    APPENDECTOMY      ARTHROPLASTY OF SHOULDER Right 03/22/2022    Procedure: ARTHROPLASTY, SHOULDER BRENNAN RIGHT SHOULDER HUMERAL HEAD RESURFACING;  Surgeon: Frankie Joya MD;  Location: [facility] OR;  Service: Orthopedics;  Laterality: Right;    BACK SURGERY      4- back surgery    CAUDAL EPIDURAL STEROID INJECTION N/A 12/16/2021    Procedure: Injection-steroid-epidural-caudal;  Surgeon: Aram Terrell MD;  Location: [facility] OR;  Service: Pain Management;  Laterality: N/A;    CAUDAL EPIDURAL STEROID INJECTION N/A 02/02/2022    Procedure: INJECTION, STEROID, SPINE, EPIDURAL, CAUDAL;  Surgeon: Aram Terrell MD;  Location: [facility] OR;  Service: Pain Management;   Laterality: N/A;    CAUDAL EPIDURAL STEROID INJECTION N/A 07/22/2022    Procedure: Injection-steroid-epidural-caudal;  Surgeon: Aram Terrell MD;  Location: [facility] OR;  Service: Pain Management;  Laterality: N/A;  Caudal CARLOS     CAUDAL EPIDURAL STEROID INJECTION N/A 01/20/2023    Procedure: Injection-steroid-epidural-caudal;  Surgeon: Aram Terrell MD;  Location: [facility] OR;  Service: Pain Management;  Laterality: N/A;  caudal ( melinda)    CAUDAL EPIDURAL STEROID INJECTION N/A 04/21/2023    Procedure: Injection-steroid-epidural-caudal;  Surgeon: Aram Terrell MD;  Location: [facility] OR;  Service: Pain Management;  Laterality: N/A;  caudal    CAUDAL EPIDURAL STEROID INJECTION N/A 07/11/2023    Procedure: Injection-steroid-epidural-caudal;  Surgeon: Aram Terrell MD;  Location: [facility] OR;  Service: Pain Management;  Laterality: N/A;  caudal    COLONOSCOPY  07/12/2004    CHILO.   Redundant tortuous colon, otherwise normal.    COLONOSCOPY N/A 02/25/2019    Procedure: COLONOSCOPY;  Surgeon: Gilbert Rodriguez Jr., MD;  Location: [facility] ENDO;  Service: Endoscopy;  Laterality: N/A;    CORONARY ANGIOGRAPHY N/A 07/13/2023    Procedure: ANGIOGRAM, CORONARY ARTERY;  Surgeon: Salvador Eugene MD;  Location: [facility] CATH;  Service: Cardiology;  Laterality: N/A;    CORONARY ARTERY BYPASS GRAFT (CABG) N/A 7/19/2023    Procedure: CORONARY ARTERY BYPASS GRAFT (CABG)- x 4;  Surgeon: Sandrine Wagner MD;  Location: [facility] OR;  Service: Cardiothoracic;  Laterality: N/A;    ENDOSCOPIC HARVEST OF VEIN Left 7/19/2023    Procedure: HARVEST-VEIN-ENDOVASCULAR;  Surgeon: Sandrine Wagner MD;  Location: [facility] OR;  Service: Cardiothoracic;  Laterality: Left;    Epidural steroid injection      Pain management    ESOPHAGOGASTRODUODENOSCOPY  07/12/2004    CHILO.    EXCLUSION, LEFT ATRIAL APPENDAGE, OPEN, AS PART OF OPEN CHEST SURGERY N/A 7/19/2023    Procedure: EXCLUSION, LEFT ATRIAL APPENDAGE, OPEN, AS PART OF OPEN CHEST SURGERY;  Surgeon: Sandrine Wagner MD;  Location: [facility] OR;   Service: Cardiothoracic;  Laterality: N/A;    FRACTURE SURGERY Left     wrist / forearm, total of 5    INSERTION OF DORSAL COLUMN NERVE STIMULATOR FOR TRIAL N/A 2019    Procedure: INSERTION, NEUROSTIMULATOR, SPINAL CORD, DORSAL COLUMN, FOR TRIAL;  Surgeon: Evan Lyles MD;  Location: [facility] OR;  Service: Pain Management;  Laterality: N/A;    JOINT REPLACEMENT  2013    ( rt hip 2007), left hip     JOINT REPLACEMENT Left     total knee    KNEE ARTHROSCOPY W/ DEBRIDEMENT      bilateral knees , total of six    KNEE SURGERY      LAMINECTOMY USING MINIMALLY INVASIVE TECHNIQUE N/A 2020    Procedure: LAMINECTOMY, SPINE, MINIMALLY INVASIVE /  PLACEMENT OF SPINAL CORD STIMULATOR;  Surgeon: Darlene Max MD;  Location: [facility] OR;  Service: Neurosurgery;  Laterality: N/A;    Nervbe Block injection      Pain management    SPINAL CORD STIMULATOR IMPLANT      TOTAL SHOULDER ARTHROPLASTY Right 2022    Dr Joya    TOTAL THYROIDECTOMY Bilateral 2022    Dr Mendoza       Family History   Problem Relation Age of Onset    Hypertension Father     Heart disease Father     Drug abuse Daughter     Hypertension Son     Lung disease Sister     COPD Sister     Hypertension Sister     Diverticulitis Sister     Gout Sister     Kidney disease Sister     No Known Problems Mother     Eczema Neg Hx     Lupus Neg Hx     Psoriasis Neg Hx     Melanoma Neg Hx        Social History     Socioeconomic History    Marital status:    Tobacco Use    Smoking status: Former     Current packs/day: 0.00     Average packs/day: 1 pack/day for 30.0 years (30.0 ttl pk-yrs)     Types: Cigarettes     Start date: 8/3/1963     Quit date: 1992     Years since quittin.0    Smokeless tobacco: Never   Substance and Sexual Activity    Alcohol use: Yes     Comment: On occasion    Drug use: Yes     Types: Oxycodone, Morphine     Social Determinants of Health     Financial Resource Strain: Low Risk  (10/17/2023)    Overall Financial  Resource Strain (CARDIA)     Difficulty of Paying Living Expenses: Not hard at all   Recent Concern: Financial Resource Strain - Medium Risk (8/23/2023)    Overall Financial Resource Strain (CARDIA)     Difficulty of Paying Living Expenses: Somewhat hard   Food Insecurity: No Food Insecurity (10/17/2023)    Hunger Vital Sign     Worried About Running Out of Food in the Last Year: Never true     Ran Out of Food in the Last Year: Never true   Transportation Needs: No Transportation Needs (10/17/2023)    PRAPARE - Transportation     Lack of Transportation (Medical): No     Lack of Transportation (Non-Medical): No   Physical Activity: Sufficiently Active (10/17/2023)    Exercise Vital Sign     Days of Exercise per Week: 7 days     Minutes of Exercise per Session: 30 min   Stress: No Stress Concern Present (10/17/2023)    [place] [employer] - Occupational Stress Questionnaire     Feeling of Stress : Not at all   Social Connections: Socially Integrated (10/17/2023)    Social Connection and Isolation Panel [NHANES]     Frequency of Communication with Friends and Family: Three times a week     Frequency of Social Gatherings with Friends and Family: Once a week     Attends Worship Services: More than 4 times per year     Active Member of Clubs or Organizations: Yes     Attends Club or Organization Meetings: More than 4 times per year     Marital Status:    Housing Stability: Low Risk  (10/17/2023)    Housing Stability Vital Sign     Unable to Pay for Housing in the Last Year: No     Number of Places Lived in the Last Year: 1     Unstable Housing in the Last Year: No       Current Facility-Administered Medications   Medication Dose Route Frequency Provider Last Rate Last Admin    lactated ringers infusion   Intravenous Continuous Aram Terrell MD        LIDOcaine (PF) 10 mg/ml (1%) injection 10 mg  1 mL Intradermal Once Aram Terrell MD         Facility-Administered Medications Ordered in Other  "Encounters   Medication Dose Route Frequency Provider Last Rate Last Admin    diphenhydrAMINE injection 12.5 mg  12.5 mg Intravenous Once PRN Enrique Rosales MD        electrolyte-S (ISOLYTE)   Intravenous Continuous Enrique Rosales MD        HYDROmorphone (PF) injection 0.2 mg  0.2 mg Intravenous Q5 Min PRN Enrique Rosales MD        HYDROmorphone (PF) injection 0.2 mg  0.2 mg Intravenous Q5 Min PRN Enrique Rosales MD        lactated ringers infusion   Intravenous Once PRN Aram Terrell MD        lactated ringers infusion  10 mL/hr Intravenous Continuous Enrique Rosales MD   Stopped at 07/19/23 0959    lactated ringers infusion   Intravenous Continuous Aram Terrell MD 25 mL/hr at 07/11/23 1012 New Bag at 07/11/23 1012    lorazepam injection 0.25 mg  0.25 mg Intravenous Once PRN Enrique Rosales MD        prochlorperazine injection Soln 5 mg  5 mg Intravenous Q30 Min PRN Enrique Rosales MD        sodium chloride 0.9% flush 3 mL  3 mL Intravenous Q8H Enrique Rosales MD           Review of patient's allergies indicates:   Allergen Reactions    Xyzal [levocetirizine] Other (See Comments)     Knocked him out        Vitals:    01/04/24 0956   Weight: 106.3 kg (234 lb 5.6 oz)   Height: 5' 7" (1.702 m)       REVIEW OF SYSTEMS:     GENERAL: No weight loss, malaise or fevers.  HEENT:  No recent changes in vision or hearing  NECK: Negative for lumps, no difficulty with swallowing.  RESPIRATORY: Negative for cough, wheezing or shortness of breath, patient denies any recent URI.  CARDIOVASCULAR: Negative for chest pain, leg swelling or palpitations.  GI: Negative for abdominal discomfort, blood in stools or black stools or change in bowel habits.  MUSCULOSKELETAL: See HPI.  SKIN: Negative for lesions, rash, and itching.  PSYCH: No suicidal or homicidal ideations, no current mood disturbances.  HEMATOLOGY/LYMPHOLOGY: Negative for prolonged bleeding, bruising easily or swollen nodes. " Patient is not currently taking any anti-coagulants  ENDO: No history of diabetes or thyroid dysfunction  NEURO: No history of syncope, paralysis, seizures or tremors.All other reviewed and negative other than HPI.    Physical exam:  Gen: A and O x3, pleasant, well-groomed  Skin: No rashes or obvious lesions  HEENT: PERRLA, no obvious deformities on ears or in canals. No thyroid masses, trachea midline, no palpable lymph nodes in neck, axilla.  CVS: Regular rate and rhythm, normal S1 and S2, no murmurs.  Resp: Clear to auscultation bilaterally.  Abdomen: Soft, NT/ND, normal bowel sounds present.  Musculoskeletal/Neuro: Moving all extremities    Assessment:  Lumbar radiculitis          PLAN: CARLOS      This patient has been cleared for surgery in an ambulatory surgical facility    ASA 3,  Mallampatti Score 3  No history of anesthetic complications  Plan for RN IV sedation

## 2024-01-10 VITALS
WEIGHT: 234.38 LBS | OXYGEN SATURATION: 95 % | BODY MASS INDEX: 36.79 KG/M2 | TEMPERATURE: 98 F | SYSTOLIC BLOOD PRESSURE: 140 MMHG | HEIGHT: 67 IN | DIASTOLIC BLOOD PRESSURE: 67 MMHG | RESPIRATION RATE: 19 BRPM | HEART RATE: 69 BPM

## 2024-01-17 DIAGNOSIS — R60.9 DEPENDENT EDEMA: ICD-10-CM

## 2024-01-17 DIAGNOSIS — F11.20 UNCOMPLICATED OPIOID DEPENDENCE: ICD-10-CM

## 2024-01-17 RX ORDER — OMEPRAZOLE 40 MG/1
CAPSULE, DELAYED RELEASE ORAL
Qty: 90 CAPSULE | Refills: 3 | Status: SHIPPED | OUTPATIENT
Start: 2024-01-17

## 2024-01-17 RX ORDER — MORPHINE SULFATE 30 MG/1
30 TABLET, FILM COATED, EXTENDED RELEASE ORAL 2 TIMES DAILY
Qty: 60 TABLET | Refills: 0 | Status: SHIPPED | OUTPATIENT
Start: 2024-01-17 | End: 2024-02-05 | Stop reason: SDUPTHER

## 2024-01-17 RX ORDER — OXYCODONE AND ACETAMINOPHEN 10; 325 MG/1; MG/1
1 TABLET ORAL EVERY 4 HOURS PRN
Qty: 120 TABLET | Refills: 0 | Status: SHIPPED | OUTPATIENT
Start: 2024-01-17 | End: 2024-02-05 | Stop reason: SDUPTHER

## 2024-01-17 RX ORDER — FUROSEMIDE 40 MG/1
40 TABLET ORAL DAILY
Qty: 90 TABLET | Refills: 1 | Status: SHIPPED | OUTPATIENT
Start: 2024-01-17 | End: 2025-01-16

## 2024-01-17 NOTE — TELEPHONE ENCOUNTER
No care due was identified.  [facility] Embedded Care Due Messages. Reference number: [id number].   1/17/2024 1:40:51 PM CST

## 2024-02-02 NOTE — TELEPHONE ENCOUNTER
No new care gaps identified.  [facility] Embedded Care Gaps. Reference number: [id number]. 3/28/2023   12:02:02 PM CDT   Rx request different from med list    On med list it says daily and on the request it says TID    Per note from last visit it says reduce to daily    Is this correct?

## 2024-02-05 ENCOUNTER — OFFICE VISIT (OUTPATIENT)
Dept: FAMILY MEDICINE | Facility: CLINIC | Age: 79
End: 2024-02-05
Attending: FAMILY MEDICINE
Payer: MEDICARE

## 2024-02-05 VITALS
BODY MASS INDEX: 37.86 KG/M2 | DIASTOLIC BLOOD PRESSURE: 70 MMHG | OXYGEN SATURATION: 95 % | SYSTOLIC BLOOD PRESSURE: 128 MMHG | WEIGHT: 241.75 LBS | HEART RATE: 82 BPM

## 2024-02-05 DIAGNOSIS — I48.0 PAF (PAROXYSMAL ATRIAL FIBRILLATION): ICD-10-CM

## 2024-02-05 DIAGNOSIS — F11.20 UNCOMPLICATED OPIOID DEPENDENCE: Primary | ICD-10-CM

## 2024-02-05 DIAGNOSIS — I10 ESSENTIAL (PRIMARY) HYPERTENSION: ICD-10-CM

## 2024-02-05 DIAGNOSIS — I27.20 MILD PULMONARY HYPERTENSION: ICD-10-CM

## 2024-02-05 DIAGNOSIS — I25.10 CORONARY ARTERY DISEASE INVOLVING NATIVE CORONARY ARTERY OF NATIVE HEART WITHOUT ANGINA PECTORIS: ICD-10-CM

## 2024-02-05 DIAGNOSIS — G60.9 HEREDITARY AND IDIOPATHIC PERIPHERAL NEUROPATHY: ICD-10-CM

## 2024-02-05 DIAGNOSIS — M48.061 SPINAL STENOSIS, LUMBAR REGION, WITHOUT NEUROGENIC CLAUDICATION: ICD-10-CM

## 2024-02-05 DIAGNOSIS — M48.02 SPINAL STENOSIS IN CERVICAL REGION: ICD-10-CM

## 2024-02-05 DIAGNOSIS — Z95.1 S/P CABG (CORONARY ARTERY BYPASS GRAFT): ICD-10-CM

## 2024-02-05 DIAGNOSIS — I70.0 ATHEROSCLEROSIS OF AORTA: ICD-10-CM

## 2024-02-05 DIAGNOSIS — I25.118 CORONARY ARTERY DISEASE OF NATIVE ARTERY OF NATIVE HEART WITH STABLE ANGINA PECTORIS: ICD-10-CM

## 2024-02-05 DIAGNOSIS — E66.01 SEVERE OBESITY (BMI 35.0-39.9) WITH COMORBIDITY: ICD-10-CM

## 2024-02-05 PROCEDURE — 99213 OFFICE O/P EST LOW 20 MIN: CPT | Mod: HCNC,S$GLB,, | Performed by: FAMILY MEDICINE

## 2024-02-05 PROCEDURE — 1159F MED LIST DOCD IN RCRD: CPT | Mod: HCNC,CPTII,S$GLB, | Performed by: FAMILY MEDICINE

## 2024-02-05 PROCEDURE — 99999 PR PBB SHADOW E&M-EST. PATIENT-LVL III: CPT | Mod: PBBFAC,HCNC,, | Performed by: FAMILY MEDICINE

## 2024-02-05 PROCEDURE — 3074F SYST BP LT 130 MM HG: CPT | Mod: HCNC,CPTII,S$GLB, | Performed by: FAMILY MEDICINE

## 2024-02-05 PROCEDURE — 1160F RVW MEDS BY RX/DR IN RCRD: CPT | Mod: HCNC,CPTII,S$GLB, | Performed by: FAMILY MEDICINE

## 2024-02-05 PROCEDURE — 3078F DIAST BP <80 MM HG: CPT | Mod: HCNC,CPTII,S$GLB, | Performed by: FAMILY MEDICINE

## 2024-02-05 PROCEDURE — 1125F AMNT PAIN NOTED PAIN PRSNT: CPT | Mod: HCNC,CPTII,S$GLB, | Performed by: FAMILY MEDICINE

## 2024-02-05 RX ORDER — OXYCODONE AND ACETAMINOPHEN 10; 325 MG/1; MG/1
1 TABLET ORAL EVERY 4 HOURS PRN
Qty: 120 TABLET | Refills: 0 | Status: SHIPPED | OUTPATIENT
Start: 2024-04-18 | End: 2024-05-01

## 2024-02-05 RX ORDER — MORPHINE SULFATE 30 MG/1
30 TABLET, FILM COATED, EXTENDED RELEASE ORAL 2 TIMES DAILY
Qty: 60 TABLET | Refills: 0 | Status: SHIPPED | OUTPATIENT
Start: 2024-02-16 | End: 2024-03-17

## 2024-02-05 RX ORDER — MORPHINE SULFATE 30 MG/1
30 TABLET, FILM COATED, EXTENDED RELEASE ORAL 2 TIMES DAILY
Qty: 60 TABLET | Refills: 0 | Status: SHIPPED | OUTPATIENT
Start: 2024-03-18 | End: 2024-04-17

## 2024-02-05 RX ORDER — OXYCODONE AND ACETAMINOPHEN 10; 325 MG/1; MG/1
1 TABLET ORAL EVERY 4 HOURS PRN
Qty: 120 TABLET | Refills: 0 | Status: SHIPPED | OUTPATIENT
Start: 2024-03-18 | End: 2024-05-01

## 2024-02-05 RX ORDER — OXYCODONE AND ACETAMINOPHEN 10; 325 MG/1; MG/1
1 TABLET ORAL EVERY 4 HOURS PRN
Qty: 120 TABLET | Refills: 0 | Status: SHIPPED | OUTPATIENT
Start: 2024-02-16 | End: 2024-05-01

## 2024-02-05 RX ORDER — MORPHINE SULFATE 30 MG/1
30 TABLET, FILM COATED, EXTENDED RELEASE ORAL 2 TIMES DAILY
Qty: 60 TABLET | Refills: 0 | Status: SHIPPED | OUTPATIENT
Start: 2024-04-18 | End: 2024-05-01

## 2024-02-05 NOTE — PROGRESS NOTES
Subjective:       Patient ID: Sam Pete is a 78 y.o. male.    Chief Complaint: Follow-up (3 mth follow up ), Thyroid Problem, and Hypertension    78-year-old male coming in for renewal of MS Contin and Percocet for chronic pain with history of low back and neck pain due to spinal stenosis with some lumbar radiculopathy but not cervical radiculopathy.  He also has a history of hereditary idiopathic neuropathy.  We have been endeavoring to wean him off of the MS Contin and he is down to 30 mg twice a day.  He is not quite ready to go further until he completes his cardiac rehab program noting that it is increasing his pain levels and making it somewhat difficult to avoid excessive PRN Percocet use.  The target is to get him to the Percocet and then try to wean that down if possible.  He has additional history of pulmonary artery hypertension followed by Cardiology, coronary artery disease with bypass surgery recently hence the cardiac rehab.  He has primary hypertension and hyperlipidemia taking Lipitor 40 mg daily.  In addition he has BPH with lower urinary symptoms and hypothyroidism.    Past Medical History:  07/2023: Abnormal nuclear stress test  No date: Anticoagulant long-term use      Comment:  ASA 81 mg  No date: Arthritis  No date: Cataract      Comment:  OU  No date: Chronic low back pain  02/08/2011: Complete rupture of rotator cuff  07/08/2015: DDD (degenerative disc disease), lumbar  09/09/2015: Degeneration of lumbar or lumbosacral intervertebral disc  No date: ED (erectile dysfunction)  No date: GERD (gastroesophageal reflux disease)  No date: Hypertension  No date: Hypothyroidism, unspecified  06/26/2017: Lumbar pseudoarthrosis  06/26/2017: Lumbar stenosis  No date: Obesity  07/2023: PSVT (paroxysmal supraventricular tachycardia)  07/2023: SOB (shortness of breath)  07/08/2015: Spondylosis of lumbar region without myelopathy or   radiculopathy  1997: Stroke      Comment:  basal ganglia, left  sided weakness, resolved  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  07/08/2015: Thoracic or lumbosacral neuritis or radiculitis    Past Surgical History:  07/13/2023: ANGIOGRAM, CORONARY, WITH LEFT HEART CATHETERIZATION      Comment:  Procedure: Left Heart Cath;  Surgeon: Salvador Eugene MD;               Location: [facility] CATH;  Service: Cardiology;;  No date: APPENDECTOMY  03/22/2022: ARTHROPLASTY OF SHOULDER; Right      Comment:  Procedure: ARTHROPLASTY, SHOULDER BRENNAN RIGHT SHOULDER                HUMERAL HEAD RESURFACING;  Surgeon: Frankie Joya MD;               Location: [facility] OR;  Service: Orthopedics;  Laterality:                Right;  No date: BACK SURGERY      Comment:  4- back surgery  12/16/2021: CAUDAL EPIDURAL STEROID INJECTION; N/A      Comment:  Procedure: Injection-steroid-epidural-caudal;  Surgeon:                Aram Terrell MD;  Location: [facility] OR;  Service: Pain                Management;  Laterality: N/A;  02/02/2022: CAUDAL EPIDURAL STEROID INJECTION; N/A      Comment:  Procedure: INJECTION, STEROID, SPINE, EPIDURAL, CAUDAL;                Surgeon: Aram Terrell MD;  Location: [facility] OR;  Service:                Pain Management;  Laterality: N/A;  07/22/2022: CAUDAL EPIDURAL STEROID INJECTION; N/A      Comment:  Procedure: Injection-steroid-epidural-caudal;  Surgeon:                Aram Terrell MD;  Location: [facility] OR;  Service: Pain                Management;  Laterality: N/A;  Caudal CARLOS   01/20/2023: CAUDAL EPIDURAL STEROID INJECTION; N/A      Comment:  Procedure: Injection-steroid-epidural-caudal;  Surgeon:                Aram Terrell MD;  Location: [facility] OR;  Service: Pain                Management;  Laterality: N/A;  caudal ( melinda)  04/21/2023: CAUDAL EPIDURAL STEROID INJECTION; N/A      Comment:  Procedure: Injection-steroid-epidural-caudal;  Surgeon:                Aram Terrell MD;  Location: [facility] OR;  Service: Pain                Management;  Laterality: N/A;  caudal  07/11/2023: CAUDAL EPIDURAL  STEROID INJECTION; N/A      Comment:  Procedure: Injection-steroid-epidural-caudal;  Surgeon:                Aram Terrell MD;  Location: [facility] OR;  Service: Pain                Management;  Laterality: N/A;  caudal  1/9/2024: CAUDAL EPIDURAL STEROID INJECTION; N/A      Comment:  Procedure: Injection-steroid-epidural-caudal;  Surgeon:                Aram Terrell MD;  Location: [facility] OR;  Service:                Anesthesiology;  Laterality: N/A;  caudal  07/12/2004: COLONOSCOPY      Comment:  CHILO.   Redundant tortuous colon, otherwise normal.  02/25/2019: COLONOSCOPY; N/A      Comment:  Procedure: COLONOSCOPY;  Surgeon: Gilbert Rodriguez Jr., MD;  Location: [facility] ENDO;  Service: Endoscopy;                 Laterality: N/A;  07/13/2023: CORONARY ANGIOGRAPHY; N/A      Comment:  Procedure: ANGIOGRAM, CORONARY ARTERY;  Surgeon: Salvador Eugene MD;  Location: [email or web address] CATH;  Service: Cardiology;                 Laterality: N/A;  7/19/2023: CORONARY ARTERY BYPASS GRAFT (CABG); N/A      Comment:  Procedure: CORONARY ARTERY BYPASS GRAFT (CABG)- x 4;                 Surgeon: Sandrine Wagner MD;  Location: [email or web address] OR;  Service:                Cardiothoracic;  Laterality: N/A;  7/19/2023: ENDOSCOPIC HARVEST OF VEIN; Left      Comment:  Procedure: HARVEST-VEIN-ENDOVASCULAR;  Surgeon: Sandrine Wagner MD;  Location: [email or web address] OR;  Service: Cardiothoracic;                Laterality: Left;  No date: Epidural steroid injection      Comment:  Pain management  07/12/2004: ESOPHAGOGASTRODUODENOSCOPY      Comment:  CHILO.  7/19/2023: EXCLUSION, LEFT ATRIAL APPENDAGE, OPEN, AS PART OF OPEN   CHEST SURGERY; N/A      Comment:  Procedure: EXCLUSION, LEFT ATRIAL APPENDAGE, OPEN, AS                PART OF OPEN CHEST SURGERY;  Surgeon: Sandrine Wagner MD;                 Location: [email or web address] OR;  Service: Cardiothoracic;  Laterality:               N/A;  No date: FRACTURE SURGERY; Left      Comment:  wrist / forearm, total of  5  12/12/2019: INSERTION OF DORSAL COLUMN NERVE STIMULATOR FOR TRIAL; N/A      Comment:  Procedure: INSERTION, NEUROSTIMULATOR, SPINAL CORD,                DORSAL COLUMN, FOR TRIAL;  Surgeon: Evan Lyles MD;                 Location: [facility] OR;  Service: Pain Management;                 Laterality: N/A;  02/06/2013: JOINT REPLACEMENT      Comment:  ( rt hip 2007), left hip   No date: JOINT REPLACEMENT; Left      Comment:  total knee  No date: KNEE ARTHROSCOPY W/ DEBRIDEMENT      Comment:  bilateral knees , total of six  No date: KNEE SURGERY  02/12/2020: LAMINECTOMY USING MINIMALLY INVASIVE TECHNIQUE; N/A      Comment:  Procedure: LAMINECTOMY, SPINE, MINIMALLY INVASIVE /                 PLACEMENT OF SPINAL CORD STIMULATOR;  Surgeon: Darlene Max MD;  Location: [facility] OR;  Service: Neurosurgery;                 Laterality: N/A;  No date: Nervbe Block injection      Comment:  Pain management  No date: SPINAL CORD STIMULATOR IMPLANT  03/28/2022: TOTAL SHOULDER ARTHROPLASTY; Right      Comment:  Dr Joya  03/07/2022: TOTAL THYROIDECTOMY; Bilateral      Comment:  Dr Mendoza    Current Outpatient Medications on File Prior to Visit:  aspirin 81 MG Chew, Take 81 mg by mouth once daily., Disp: , Rfl:   atorvastatin (LIPITOR) 40 MG tablet, Take 1 tablet (40 mg total) by mouth once daily., Disp: 90 tablet, Rfl: 3  buPROPion (WELLBUTRIN XL) 150 MG TB24 tablet, Take 1 tablet (150 mg total) by mouth once daily., Disp: 90 tablet, Rfl: 3  calcitRIOL (ROCALTROL) 0.5 MCG Cap, TAKE 1 CAPSULE ONE TIME DAILY, Disp: 90 capsule, Rfl: 3  furosemide (LASIX) 40 MG tablet, Take 1 tablet (40 mg total) by mouth once daily., Disp: 90 tablet, Rfl: 1  levothyroxine (SYNTHROID) 112 MCG tablet, TAKE 2 TABLETS(224 MCG) BY MOUTH EVERY DAY, Disp: 180 tablet, Rfl: 2  losartan (COZAAR) 50 MG tablet, Take 1 tablet (50 mg total) by mouth once daily. Take 25 mg daily if bp is 140 or above, Disp: 90 tablet, Rfl: 3  metoprolol succinate  (TOPROL-XL) 25 MG 24 hr tablet, Take 1 tablet (25 mg total) by mouth once daily., Disp: 90 tablet, Rfl: 1  naloxone (NARCAN) 0.4 mg/mL injection, Inject 1 mL (0.4 mg total) into the vein as needed (overdose)., Disp: 2 mL, Rfl: 5  omeprazole (PRILOSEC) 40 MG capsule, Take one capsule by mouth daily, Disp: 90 capsule, Rfl: 3  UNABLE TO FIND, Take by mouth once daily. Vitafusion multivites gummies, Disp: , Rfl:   [DISCONTINUED] morphine (MS CONTIN) 30 MG 12 hr tablet, Take 1 tablet (30 mg total) by mouth 2 (two) times daily., Disp: 60 tablet, Rfl: 0  [DISCONTINUED] morphine (MS CONTIN) 30 MG 12 hr tablet, Take 1 tablet (30 mg total) by mouth 2 (two) times daily., Disp: 60 tablet, Rfl: 0  [DISCONTINUED] oxyCODONE-acetaminophen (PERCOCET)  mg per tablet, Take 1 tablet by mouth every 4 (four) hours as needed for Pain., Disp: 120 tablet, Rfl: 0  [DISCONTINUED] oxyCODONE-acetaminophen (PERCOCET)  mg per tablet, Take 1 tablet by mouth every 4 (four) hours as needed for Pain., Disp: 120 tablet, Rfl: 0  [DISCONTINUED] oxyCODONE-acetaminophen (PERCOCET) 7.5-325 mg per tablet, Take 1 tablet by mouth every 6 (six) hours as needed for Pain., Disp: 120 tablet, Rfl: 0  [DISCONTINUED] oxyCODONE-acetaminophen (PERCOCET)  mg per tablet, Take 1 tablet by mouth every 4 (four) hours as needed for Pain., Disp: 120 tablet, Rfl: 0    Current Facility-Administered Medications on File Prior to Visit:  diphenhydrAMINE injection 12.5 mg, 12.5 mg, Intravenous, Once PRN, Enrique Rosales MD  electrolyte-S (ISOLYTE), , Intravenous, Continuous, Enrique Rosales MD  HYDROmorphone (PF) injection 0.2 mg, 0.2 mg, Intravenous, Q5 Min PRN, Enrique Rosales MD  HYDROmorphone (PF) injection 0.2 mg, 0.2 mg, Intravenous, Q5 Min PRN, Enrique Rosales MD  lactated ringers infusion, , Intravenous, Once PRN, Aram Terrell MD  lactated ringers infusion, 10 mL/hr, Intravenous, Continuous, Enrique Rosales MD, Stopped at  07/19/23 0959  lactated ringers infusion, , Intravenous, Continuous, Aram Terrell MD, Last Rate: 25 mL/hr at 07/11/23 1012, New Bag at 07/11/23 1012  lactated ringers infusion, , Intravenous, Continuous, Aram Terrell MD, Last Rate: 25 mL/hr at 01/09/24 1014, New Bag at 01/09/24 1014  lorazepam injection 0.25 mg, 0.25 mg, Intravenous, Once PRN, Enrique Rosales MD  prochlorperazine injection Soln 5 mg, 5 mg, Intravenous, Q30 Min PRN, Enrique Rosales MD  sodium chloride 0.9% flush 3 mL, 3 mL, Intravenous, Q8H, Enrique Rosales MD            Review of Systems   Constitutional:  Negative for chills, diaphoresis, fatigue and fever.   Respiratory:  Negative for choking, chest tightness, shortness of breath and wheezing.    Cardiovascular:  Negative for chest pain and palpitations.   Gastrointestinal:  Negative for constipation, diarrhea, nausea and vomiting.   Musculoskeletal:  Positive for arthralgias, back pain, neck pain and neck stiffness.   Psychiatric/Behavioral:  Negative for confusion, decreased concentration and dysphoric mood.        Objective:      Physical Exam  Vitals and nursing note reviewed.   Constitutional:       General: He is not in acute distress.     Appearance: Normal appearance. He is obese. He is not ill-appearing, toxic-appearing or diaphoretic.      Comments: Good blood pressure control  Normal pulse with regular rhythm   Severe obesity with a BMI of 37.9 he is actually up 10.8 lb from his November 6, 2023 visit   Cardiovascular:      Rate and Rhythm: Normal rate and regular rhythm.      Heart sounds: Normal heart sounds.   Pulmonary:      Effort: Pulmonary effort is normal.      Breath sounds: Normal breath sounds.   Neurological:      General: No focal deficit present.      Mental Status: He is alert and oriented to person, place, and time. Mental status is at baseline.   Psychiatric:         Mood and Affect: Mood normal.         Behavior: Behavior normal.         Thought  Content: Thought content normal.         Judgment: Judgment normal.         Assessment:       1. Uncomplicated opioid dependence    2. Spinal stenosis in cervical region    3. Spinal stenosis, lumbar region, without neurogenic claudication    4. Hereditary and idiopathic peripheral neuropathy    5. Essential (primary) hypertension    6. Coronary artery disease involving native coronary artery of native heart without angina pectoris    7. S/P CABG (coronary artery bypass graft)    8. Atherosclerosis of aorta    9. Mild pulmonary hypertension    10. PAF (paroxysmal atrial fibrillation)    11. Coronary artery disease of native artery of native heart with stable angina pectoris    12. Severe obesity (BMI 35.0-39.9) with comorbidity        Plan:       1. Uncomplicated opioid dependence   checked with no inappropriate activity found.  - morphine (MS CONTIN) 30 MG 12 hr tablet; Take 1 tablet (30 mg total) by mouth 2 (two) times daily.  Dispense: 60 tablet; Refill: 0  - oxyCODONE-acetaminophen (PERCOCET)  mg per tablet; Take 1 tablet by mouth every 4 (four) hours as needed for Pain.  Dispense: 120 tablet; Refill: 0  - oxyCODONE-acetaminophen (PERCOCET)  mg per tablet; Take 1 tablet by mouth every 4 (four) hours as needed for Pain.  Dispense: 120 tablet; Refill: 0  - morphine (MS CONTIN) 30 MG 12 hr tablet; Take 1 tablet (30 mg total) by mouth 2 (two) times daily.  Dispense: 60 tablet; Refill: 0  - oxyCODONE-acetaminophen (PERCOCET)  mg per tablet; Take 1 tablet by mouth every 4 (four) hours as needed for Pain.  Dispense: 120 tablet; Refill: 0  - morphine (MS CONTIN) 30 MG 12 hr tablet; Take 1 tablet (30 mg total) by mouth 2 (two) times daily.  Dispense: 60 tablet; Refill: 0    2. Spinal stenosis in cervical region  Stable    3. Spinal stenosis, lumbar region, without neurogenic claudication  Stable    4. Hereditary and idiopathic peripheral neuropathy  Stable    5. Essential (primary) hypertension  Well  control no changes needed    6. Coronary artery disease involving native coronary artery of native heart without angina pectoris  Followed by Cardiology    7. S/P CABG (coronary artery bypass graft)  Followed by Cardiology    8. Atherosclerosis of aorta  Maintain good blood pressure control, cholesterol control, and monitor blood glucose    9. Mild pulmonary hypertension  Followed by Cardiology    10. PAF (paroxysmal atrial fibrillation)  Followed by Cardiology    11. Coronary artery disease of native artery of native heart with stable angina pectoris  Followed by Cardiology    12. Severe obesity (BMI 35.0-39.9) with comorbidity  Actually seems to have worsened since he started cardiac rehab

## 2024-02-07 ENCOUNTER — OFFICE VISIT (OUTPATIENT)
Dept: SPINE | Facility: CLINIC | Age: 79
End: 2024-02-07
Payer: MEDICARE

## 2024-02-07 VITALS — BODY MASS INDEX: 37.92 KG/M2 | HEIGHT: 67 IN | WEIGHT: 241.63 LBS

## 2024-02-07 DIAGNOSIS — G89.29 CHRONIC BILATERAL LOW BACK PAIN WITH BILATERAL SCIATICA: Primary | ICD-10-CM

## 2024-02-07 DIAGNOSIS — M54.42 CHRONIC BILATERAL LOW BACK PAIN WITH BILATERAL SCIATICA: Primary | ICD-10-CM

## 2024-02-07 DIAGNOSIS — M54.41 CHRONIC BILATERAL LOW BACK PAIN WITH BILATERAL SCIATICA: Primary | ICD-10-CM

## 2024-02-07 PROCEDURE — 3288F FALL RISK ASSESSMENT DOCD: CPT | Mod: HCNC,CPTII,S$GLB, | Performed by: PHYSICAL MEDICINE & REHABILITATION

## 2024-02-07 PROCEDURE — 1125F AMNT PAIN NOTED PAIN PRSNT: CPT | Mod: HCNC,CPTII,S$GLB, | Performed by: PHYSICAL MEDICINE & REHABILITATION

## 2024-02-07 PROCEDURE — 1101F PT FALLS ASSESS-DOCD LE1/YR: CPT | Mod: HCNC,CPTII,S$GLB, | Performed by: PHYSICAL MEDICINE & REHABILITATION

## 2024-02-07 PROCEDURE — 1160F RVW MEDS BY RX/DR IN RCRD: CPT | Mod: HCNC,CPTII,S$GLB, | Performed by: PHYSICAL MEDICINE & REHABILITATION

## 2024-02-07 PROCEDURE — 1159F MED LIST DOCD IN RCRD: CPT | Mod: HCNC,CPTII,S$GLB, | Performed by: PHYSICAL MEDICINE & REHABILITATION

## 2024-02-07 PROCEDURE — 99213 OFFICE O/P EST LOW 20 MIN: CPT | Mod: HCNC,S$GLB,, | Performed by: PHYSICAL MEDICINE & REHABILITATION

## 2024-02-07 NOTE — PROGRESS NOTES
SUBJECTIVE:    Patient ID: Sam Pete is a 78 y.o. male.    Chief Complaint: Follow-up    He is here for follow-up status post caudal epidural steroid injection on 2024 with Dr. Terrell.  Good response to that procedure.  He describes 50% improvement in his back and leg symptoms.  He is satisfied with quality of life.  Still doing cardiac rehab.  Has no new or progressive problems.  Current pain level is 5/10          Past Medical History:   Diagnosis Date    Abnormal nuclear stress test 2023    Anticoagulant long-term use     ASA 81 mg    Arthritis     Cataract     OU    Chronic low back pain     Complete rupture of rotator cuff 2011    DDD (degenerative disc disease), lumbar 2015    Degeneration of lumbar or lumbosacral intervertebral disc 2015    ED (erectile dysfunction)     GERD (gastroesophageal reflux disease)     Hypertension     Hypothyroidism, unspecified     Lumbar pseudoarthrosis 2017    Lumbar stenosis 2017    Obesity     PSVT (paroxysmal supraventricular tachycardia) 2023    SOB (shortness of breath) 2023    Spondylosis of lumbar region without myelopathy or radiculopathy 2015    Stroke 1997    basal ganglia, left sided weakness, resolved    Testicular hypofunction     Thoracic or lumbosacral neuritis or radiculitis 2015     Social History     Socioeconomic History    Marital status:    Tobacco Use    Smoking status: Former     Current packs/day: 0.00     Average packs/day: 1 pack/day for 30.0 years (30.0 ttl pk-yrs)     Types: Cigarettes     Start date: 8/3/1963     Quit date: 1992     Years since quittin.1    Smokeless tobacco: Never   Substance and Sexual Activity    Alcohol use: Yes     Comment: On occasion    Drug use: Yes     Types: Oxycodone, Morphine     Social Determinants of Health     Financial Resource Strain: Low Risk  (10/17/2023)    Overall Financial Resource Strain (CARDIA)     Difficulty of Paying Living  Expenses: Not hard at all   Recent Concern: Financial Resource Strain - Medium Risk (8/23/2023)    Overall Financial Resource Strain (CARDIA)     Difficulty of Paying Living Expenses: Somewhat hard   Food Insecurity: No Food Insecurity (10/17/2023)    Hunger Vital Sign     Worried About Running Out of Food in the Last Year: Never true     Ran Out of Food in the Last Year: Never true   Transportation Needs: No Transportation Needs (10/17/2023)    PRAPARE - Transportation     Lack of Transportation (Medical): No     Lack of Transportation (Non-Medical): No   Physical Activity: Sufficiently Active (10/17/2023)    Exercise Vital Sign     Days of Exercise per Week: 7 days     Minutes of Exercise per Session: 30 min   Stress: No Stress Concern Present (10/17/2023)    [place] [employer] - Occupational Stress Questionnaire     Feeling of Stress : Not at all   Social Connections: Socially Integrated (10/17/2023)    Social Connection and Isolation Panel [NHANES]     Frequency of Communication with Friends and Family: Three times a week     Frequency of Social Gatherings with Friends and Family: Once a week     Attends L[employer]-day Services: More than 4 times per year     Active Member of Clubs or Organizations: Yes     Attends Club or Organization Meetings: More than 4 times per year     Marital Status:    Housing Stability: Low Risk  (10/17/2023)    Housing Stability Vital Sign     Unable to Pay for Housing in the Last Year: No     Number of Places Lived in the Last Year: 1     Unstable Housing in the Last Year: No     Past Surgical History:   Procedure Laterality Date    ANGIOGRAM, CORONARY, WITH LEFT HEART CATHETERIZATION  07/13/2023    Procedure: Left Heart Cath;  Surgeon: Salvador Eugene MD;  Location: [facility] CATH;  Service: Cardiology;;    APPENDECTOMY      ARTHROPLASTY OF SHOULDER Right 03/22/2022    Procedure: ARTHROPLASTY, SHOULDER BRENNAN RIGHT SHOULDER HUMERAL HEAD RESURFACING;  Surgeon: Frankie  DESTINY Joya MD;  Location: [facility] OR;  Service: Orthopedics;  Laterality: Right;    BACK SURGERY      4- back surgery    CAUDAL EPIDURAL STEROID INJECTION N/A 12/16/2021    Procedure: Injection-steroid-epidural-caudal;  Surgeon: Aram Terrell MD;  Location: [facility] OR;  Service: Pain Management;  Laterality: N/A;    CAUDAL EPIDURAL STEROID INJECTION N/A 02/02/2022    Procedure: INJECTION, STEROID, SPINE, EPIDURAL, CAUDAL;  Surgeon: Aram Terrell MD;  Location: [facility] OR;  Service: Pain Management;  Laterality: N/A;    CAUDAL EPIDURAL STEROID INJECTION N/A 07/22/2022    Procedure: Injection-steroid-epidural-caudal;  Surgeon: Aram Terrell MD;  Location: [facility] OR;  Service: Pain Management;  Laterality: N/A;  Caudal CARLOS     CAUDAL EPIDURAL STEROID INJECTION N/A 01/20/2023    Procedure: Injection-steroid-epidural-caudal;  Surgeon: Aram Terrell MD;  Location: [facility] OR;  Service: Pain Management;  Laterality: N/A;  caudal ( melinda)    CAUDAL EPIDURAL STEROID INJECTION N/A 04/21/2023    Procedure: Injection-steroid-epidural-caudal;  Surgeon: Aram Terrell MD;  Location: [facility] OR;  Service: Pain Management;  Laterality: N/A;  caudal    CAUDAL EPIDURAL STEROID INJECTION N/A 07/11/2023    Procedure: Injection-steroid-epidural-caudal;  Surgeon: Aram Terrell MD;  Location: [facility] OR;  Service: Pain Management;  Laterality: N/A;  caudal    CAUDAL EPIDURAL STEROID INJECTION N/A 1/9/2024    Procedure: Injection-steroid-epidural-caudal;  Surgeon: Aram Terrell MD;  Location: [facility] OR;  Service: Anesthesiology;  Laterality: N/A;  caudal    COLONOSCOPY  07/12/2004    CHILO.   Redundant tortuous colon, otherwise normal.    COLONOSCOPY N/A 02/25/2019    Procedure: COLONOSCOPY;  Surgeon: Gilbert Rodriguez Jr., MD;  Location: [facility] ENDO;  Service: Endoscopy;  Laterality: N/A;    CORONARY ANGIOGRAPHY N/A 07/13/2023    Procedure: ANGIOGRAM, CORONARY ARTERY;  Surgeon: Salvador Eugene MD;  Location: [facility] CATH;  Service: Cardiology;  Laterality: N/A;     CORONARY ARTERY BYPASS GRAFT (CABG) N/A 7/19/2023    Procedure: CORONARY ARTERY BYPASS GRAFT (CABG)- x 4;  Surgeon: Sandrine Wagner MD;  Location: [facility] OR;  Service: Cardiothoracic;  Laterality: N/A;    ENDOSCOPIC HARVEST OF VEIN Left 7/19/2023    Procedure: HARVEST-VEIN-ENDOVASCULAR;  Surgeon: Sandrine Wagner MD;  Location: [facility] OR;  Service: Cardiothoracic;  Laterality: Left;    Epidural steroid injection      Pain management    ESOPHAGOGASTRODUODENOSCOPY  07/12/2004    CHILO.    EXCLUSION, LEFT ATRIAL APPENDAGE, OPEN, AS PART OF OPEN CHEST SURGERY N/A 7/19/2023    Procedure: EXCLUSION, LEFT ATRIAL APPENDAGE, OPEN, AS PART OF OPEN CHEST SURGERY;  Surgeon: Sandrine Wagner MD;  Location: [facility] OR;  Service: Cardiothoracic;  Laterality: N/A;    FRACTURE SURGERY Left     wrist / forearm, total of 5    INSERTION OF DORSAL COLUMN NERVE STIMULATOR FOR TRIAL N/A 12/12/2019    Procedure: INSERTION, NEUROSTIMULATOR, SPINAL CORD, DORSAL COLUMN, FOR TRIAL;  Surgeon: Evan Lyles MD;  Location: [facility] OR;  Service: Pain Management;  Laterality: N/A;    JOINT REPLACEMENT  02/06/2013    ( rt hip 2007), left hip     JOINT REPLACEMENT Left     total knee    KNEE ARTHROSCOPY W/ DEBRIDEMENT      bilateral knees , total of six    KNEE SURGERY      LAMINECTOMY USING MINIMALLY INVASIVE TECHNIQUE N/A 02/12/2020    Procedure: LAMINECTOMY, SPINE, MINIMALLY INVASIVE /  PLACEMENT OF SPINAL CORD STIMULATOR;  Surgeon: Darlene Max MD;  Location: [facility] OR;  Service: Neurosurgery;  Laterality: N/A;    Nervbe Block injection      Pain management    SPINAL CORD STIMULATOR IMPLANT      TOTAL SHOULDER ARTHROPLASTY Right 03/28/2022    Dr Joya    TOTAL THYROIDECTOMY Bilateral 03/07/2022    Dr Mendoza     Family History   Problem Relation Age of Onset    Hypertension Father     Heart disease Father     Drug abuse Daughter     Hypertension Son     Lung disease Sister     COPD Sister     Hypertension Sister     Diverticulitis Sister     Gout Sister     Kidney  "disease Sister     No Known Problems Mother     Eczema Neg Hx     Lupus Neg Hx     Psoriasis Neg Hx     Melanoma Neg Hx      Vitals:    02/07/24 1408   Weight: 109.6 kg (241 lb 10 oz)   Height: 5' 7" (1.702 m)       Review of Systems   Constitutional:  Negative for chills, diaphoresis, fatigue, fever and unexpected weight change.   HENT:  Negative for trouble swallowing.    Eyes:  Negative for visual disturbance.   Respiratory:  Negative for shortness of breath.    Cardiovascular:  Negative for chest pain.   Gastrointestinal:  Negative for abdominal pain, constipation, diarrhea, nausea and vomiting.   Genitourinary:  Negative for difficulty urinating.   Musculoskeletal:  Negative for arthralgias, back pain, gait problem, joint swelling, myalgias, neck pain and neck stiffness.   Neurological:  Negative for dizziness, speech difficulty, weakness, light-headedness, numbness and headaches.          Objective:      Physical Exam  Neurological:      Mental Status: He is alert and oriented to person, place, and time.             Assessment:       1. Chronic bilateral low back pain with bilateral sciatica           Plan:     Improved to his satisfaction status post caudal epidural steroid injection.  We can repeat that procedure as needed.  Follow-up here as needed      Chronic bilateral low back pain with bilateral sciatica          "

## 2024-02-15 ENCOUNTER — PATIENT MESSAGE (OUTPATIENT)
Dept: FAMILY MEDICINE | Facility: CLINIC | Age: 79
End: 2024-02-15
Payer: MEDICARE

## 2024-02-29 DIAGNOSIS — E78.49 OTHER HYPERLIPIDEMIA: ICD-10-CM

## 2024-02-29 DIAGNOSIS — I10 ESSENTIAL (PRIMARY) HYPERTENSION: Primary | ICD-10-CM

## 2024-02-29 DIAGNOSIS — R07.9 RIGHT-SIDED CHEST PAIN: ICD-10-CM

## 2024-03-01 RX ORDER — METOPROLOL SUCCINATE 25 MG/1
12.5 TABLET, EXTENDED RELEASE ORAL
Qty: 45 TABLET | Refills: 0 | Status: SHIPPED | OUTPATIENT
Start: 2024-03-01 | End: 2024-05-27

## 2024-03-06 ENCOUNTER — PATIENT MESSAGE (OUTPATIENT)
Dept: ORTHOPEDICS | Facility: CLINIC | Age: 79
End: 2024-03-06
Payer: MEDICARE

## 2024-03-06 DIAGNOSIS — M25.511 RIGHT SHOULDER PAIN, UNSPECIFIED CHRONICITY: Primary | ICD-10-CM

## 2024-03-11 ENCOUNTER — HOSPITAL ENCOUNTER (OUTPATIENT)
Dept: RADIOLOGY | Facility: HOSPITAL | Age: 79
Discharge: HOME | End: 2024-03-11
Attending: ORTHOPAEDIC SURGERY
Payer: MEDICARE

## 2024-03-11 ENCOUNTER — OFFICE VISIT (OUTPATIENT)
Dept: ORTHOPEDICS | Facility: CLINIC | Age: 79
End: 2024-03-11
Payer: MEDICARE

## 2024-03-11 VITALS — BODY MASS INDEX: 37.83 KG/M2 | WEIGHT: 241 LBS | HEIGHT: 67 IN

## 2024-03-11 DIAGNOSIS — M25.511 RIGHT SHOULDER PAIN, UNSPECIFIED CHRONICITY: ICD-10-CM

## 2024-03-11 DIAGNOSIS — Z98.890 S/P SHOULDER SURGERY: Primary | ICD-10-CM

## 2024-03-11 PROCEDURE — 3288F FALL RISK ASSESSMENT DOCD: CPT | Mod: CPTII,S$GLB,, | Performed by: ORTHOPAEDIC SURGERY

## 2024-03-11 PROCEDURE — 1159F MED LIST DOCD IN RCRD: CPT | Mod: CPTII,S$GLB,, | Performed by: ORTHOPAEDIC SURGERY

## 2024-03-11 PROCEDURE — 99213 OFFICE O/P EST LOW 20 MIN: CPT | Mod: S$GLB,,, | Performed by: ORTHOPAEDIC SURGERY

## 2024-03-11 PROCEDURE — 99999 PR PBB SHADOW E&M-EST. PATIENT-LVL III: CPT | Mod: PBBFAC,,, | Performed by: ORTHOPAEDIC SURGERY

## 2024-03-11 PROCEDURE — 73030 X-RAY EXAM OF SHOULDER: CPT | Mod: 26,RT,, | Performed by: RADIOLOGY

## 2024-03-11 PROCEDURE — 1101F PT FALLS ASSESS-DOCD LE1/YR: CPT | Mod: CPTII,S$GLB,, | Performed by: ORTHOPAEDIC SURGERY

## 2024-03-11 PROCEDURE — 73030 X-RAY EXAM OF SHOULDER: CPT | Mod: TC,PO,RT

## 2024-03-11 PROCEDURE — 1125F AMNT PAIN NOTED PAIN PRSNT: CPT | Mod: CPTII,S$GLB,, | Performed by: ORTHOPAEDIC SURGERY

## 2024-03-11 NOTE — PROGRESS NOTES
78 years old 2 years out from hemiarthroplasty of the shoulder done by myself done well until recently went to cardiac rehab noticed pain in the shoulder up to 8 on the pain scale    Exam shows well-healed scar no signs infection cuff strength intact biceps appears to be intact    X-rays show well-positioned components     Assessment: Right shoulder pain status post shoulder hemiarthroplasty probable biceps tendinosis     Plan:  Continue symptomatic care activity modifications relative rest, follow-up few weeks time as needed

## 2024-04-18 DIAGNOSIS — E89.0 POSTOPERATIVE HYPOTHYROIDISM: ICD-10-CM

## 2024-04-18 NOTE — TELEPHONE ENCOUNTER
No care due was identified.  [facility] Embedded Care Due Messages. Reference number: [id number].   4/18/2024 1:02:37 PM CDT

## 2024-04-19 RX ORDER — LEVOTHYROXINE SODIUM 112 UG/1
TABLET ORAL
Qty: 180 TABLET | Refills: 1 | Status: SHIPPED | OUTPATIENT
Start: 2024-04-19

## 2024-04-19 NOTE — TELEPHONE ENCOUNTER
Refill Decision Note   Sam Chandlerell  is requesting a refill authorization.  Brief Assessment and Rationale for Refill:  Approve     Medication Therapy Plan:        Comments:     Note composed:1:08 PM 04/19/2024

## 2024-05-01 ENCOUNTER — OFFICE VISIT (OUTPATIENT)
Dept: FAMILY MEDICINE | Facility: CLINIC | Age: 79
End: 2024-05-01
Attending: FAMILY MEDICINE
Payer: MEDICARE

## 2024-05-01 VITALS
SYSTOLIC BLOOD PRESSURE: 138 MMHG | HEART RATE: 80 BPM | DIASTOLIC BLOOD PRESSURE: 80 MMHG | TEMPERATURE: 98 F | WEIGHT: 240 LBS | OXYGEN SATURATION: 97 % | BODY MASS INDEX: 37.67 KG/M2 | HEIGHT: 67 IN

## 2024-05-01 DIAGNOSIS — M48.061 SPINAL STENOSIS, LUMBAR REGION, WITHOUT NEUROGENIC CLAUDICATION: ICD-10-CM

## 2024-05-01 DIAGNOSIS — F11.20 UNCOMPLICATED OPIOID DEPENDENCE: ICD-10-CM

## 2024-05-01 DIAGNOSIS — M48.02 SPINAL STENOSIS IN CERVICAL REGION: ICD-10-CM

## 2024-05-01 DIAGNOSIS — G60.9 HEREDITARY AND IDIOPATHIC PERIPHERAL NEUROPATHY: ICD-10-CM

## 2024-05-01 DIAGNOSIS — G89.4 CHRONIC PAIN SYNDROME: Primary | ICD-10-CM

## 2024-05-01 PROCEDURE — 3288F FALL RISK ASSESSMENT DOCD: CPT | Mod: HCNC,CPTII,S$GLB, | Performed by: FAMILY MEDICINE

## 2024-05-01 PROCEDURE — 1125F AMNT PAIN NOTED PAIN PRSNT: CPT | Mod: HCNC,CPTII,S$GLB, | Performed by: FAMILY MEDICINE

## 2024-05-01 PROCEDURE — 1101F PT FALLS ASSESS-DOCD LE1/YR: CPT | Mod: HCNC,CPTII,S$GLB, | Performed by: FAMILY MEDICINE

## 2024-05-01 PROCEDURE — 99999 PR PBB SHADOW E&M-EST. PATIENT-LVL III: CPT | Mod: PBBFAC,HCNC,, | Performed by: FAMILY MEDICINE

## 2024-05-01 PROCEDURE — 99213 OFFICE O/P EST LOW 20 MIN: CPT | Mod: HCNC,S$GLB,, | Performed by: FAMILY MEDICINE

## 2024-05-01 PROCEDURE — 3079F DIAST BP 80-89 MM HG: CPT | Mod: HCNC,CPTII,S$GLB, | Performed by: FAMILY MEDICINE

## 2024-05-01 PROCEDURE — 3075F SYST BP GE 130 - 139MM HG: CPT | Mod: HCNC,CPTII,S$GLB, | Performed by: FAMILY MEDICINE

## 2024-05-01 PROCEDURE — 1159F MED LIST DOCD IN RCRD: CPT | Mod: HCNC,CPTII,S$GLB, | Performed by: FAMILY MEDICINE

## 2024-05-01 PROCEDURE — 1160F RVW MEDS BY RX/DR IN RCRD: CPT | Mod: HCNC,CPTII,S$GLB, | Performed by: FAMILY MEDICINE

## 2024-05-01 RX ORDER — OXYCODONE AND ACETAMINOPHEN 10; 325 MG/1; MG/1
1 TABLET ORAL EVERY 4 HOURS PRN
Qty: 120 TABLET | Refills: 0 | Status: SHIPPED | OUTPATIENT
Start: 2024-07-17

## 2024-05-01 RX ORDER — MORPHINE SULFATE 30 MG/1
30 TABLET, FILM COATED, EXTENDED RELEASE ORAL 2 TIMES DAILY
Qty: 60 TABLET | Refills: 0 | Status: SHIPPED | OUTPATIENT
Start: 2024-05-17 | End: 2024-06-18 | Stop reason: DRUGHIGH

## 2024-05-01 RX ORDER — OXYCODONE AND ACETAMINOPHEN 10; 325 MG/1; MG/1
1 TABLET ORAL EVERY 4 HOURS PRN
Qty: 120 TABLET | Refills: 0 | Status: SHIPPED | OUTPATIENT
Start: 2024-06-17

## 2024-05-01 RX ORDER — OXYCODONE AND ACETAMINOPHEN 10; 325 MG/1; MG/1
1 TABLET ORAL EVERY 4 HOURS PRN
Qty: 120 TABLET | Refills: 0 | Status: SHIPPED | OUTPATIENT
Start: 2024-05-17

## 2024-05-01 NOTE — PROGRESS NOTES
Subjective:       Patient ID: Sam Pete is a 79 y.o. male.    Chief Complaint: Follow-up (3 month)    79-year-old male with a history of chronic pain secondary to back and neck spinal stenosis and degenerative joint disease of the left arm.  He has been opioid dependent for many years currently we are trying to wean him down from MS Contin with Percocet PRN with a goal of having him only on the Percocet hopefully by the end of this year.  He had been doing well with the MS Contin 30 mg b.i.d. but he has had a flare up of his back pain for the last several days and does not feel capable of reducing it at this time.  He agreed to a one month refill of the 30 mg and then we will revisit it by phone.  He has not having any problems with the medications, no significant constipation and no cognitive impairment.  He does get good relief but he definitely is able to feel the decrease in dose.    Past Medical History:  07/2023: Abnormal nuclear stress test  No date: Anticoagulant long-term use      Comment:  ASA 81 mg  No date: Arthritis  No date: Cataract      Comment:  OU  No date: Chronic low back pain  02/08/2011: Complete rupture of rotator cuff  07/08/2015: DDD (degenerative disc disease), lumbar  09/09/2015: Degeneration of lumbar or lumbosacral intervertebral disc  No date: ED (erectile dysfunction)  No date: GERD (gastroesophageal reflux disease)  No date: Hypertension  No date: Hypothyroidism, unspecified  06/26/2017: Lumbar pseudoarthrosis  06/26/2017: Lumbar stenosis  No date: Obesity  07/2023: PSVT (paroxysmal supraventricular tachycardia)  07/2023: SOB (shortness of breath)  07/08/2015: Spondylosis of lumbar region without myelopathy or   radiculopathy  1997: Stroke      Comment:  basal ganglia, left sided weakness, resolved  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  07/08/2015: Thoracic or lumbosacral neuritis or radiculitis    .Past Surgical History:  07/13/2023: ANGIOGRAM, CORONARY, WITH LEFT HEART  CATHETERIZATION      Comment:  Procedure: Left Heart Cath;  Surgeon: Salvador Eugene MD;               Location: [facility] CATH;  Service: Cardiology;;  No date: APPENDECTOMY  03/22/2022: ARTHROPLASTY OF SHOULDER; Right      Comment:  Procedure: ARTHROPLASTY, SHOULDER BRENNAN RIGHT SHOULDER                HUMERAL HEAD RESURFACING;  Surgeon: Frankie Joya MD;               Location: [facility] OR;  Service: Orthopedics;  Laterality:                Right;  No date: BACK SURGERY      Comment:  4- back surgery  12/16/2021: CAUDAL EPIDURAL STEROID INJECTION; N/A      Comment:  Procedure: Injection-steroid-epidural-caudal;  Surgeon:                Aram Terrell MD;  Location: [facility] OR;  Service: Pain                Management;  Laterality: N/A;  02/02/2022: CAUDAL EPIDURAL STEROID INJECTION; N/A      Comment:  Procedure: INJECTION, STEROID, SPINE, EPIDURAL, CAUDAL;                Surgeon: Aram Terrell MD;  Location: [facility] OR;  Service:                Pain Management;  Laterality: N/A;  07/22/2022: CAUDAL EPIDURAL STEROID INJECTION; N/A      Comment:  Procedure: Injection-steroid-epidural-caudal;  Surgeon:                Aram Terrell MD;  Location: [facility] OR;  Service: Pain                Management;  Laterality: N/A;  Caudal CARLOS   01/20/2023: CAUDAL EPIDURAL STEROID INJECTION; N/A      Comment:  Procedure: Injection-steroid-epidural-caudal;  Surgeon:                Aram Terrell MD;  Location: [facility] OR;  Service: Pain                Management;  Laterality: N/A;  caudal ( melinda)  04/21/2023: CAUDAL EPIDURAL STEROID INJECTION; N/A      Comment:  Procedure: Injection-steroid-epidural-caudal;  Surgeon:                Aram Terrell MD;  Location: [facility] OR;  Service: Pain                Management;  Laterality: N/A;  caudal  07/11/2023: CAUDAL EPIDURAL STEROID INJECTION; N/A      Comment:  Procedure: Injection-steroid-epidural-caudal;  Surgeon:                Aram Terrell MD;  Location: [facility] OR;  Service: Pain                Management;   Laterality: N/A;  caudal  1/9/2024: CAUDAL EPIDURAL STEROID INJECTION; N/A      Comment:  Procedure: Injection-steroid-epidural-caudal;  Surgeon:                Aram Terrell MD;  Location: [facility] OR;  Service:                Anesthesiology;  Laterality: N/A;  caudal  07/12/2004: COLONOSCOPY      Comment:  CHILO.   Redundant tortuous colon, otherwise normal.  02/25/2019: COLONOSCOPY; N/A      Comment:  Procedure: COLONOSCOPY;  Surgeon: Gilbert Rodriguez Jr., MD;  Location: [facility] ENDO;  Service: Endoscopy;                 Laterality: N/A;  07/13/2023: CORONARY ANGIOGRAPHY; N/A      Comment:  Procedure: ANGIOGRAM, CORONARY ARTERY;  Surgeon: Salvador Eugene MD;  Location: [facility] CATH;  Service: Cardiology;                 Laterality: N/A;  7/19/2023: CORONARY ARTERY BYPASS GRAFT (CABG); N/A      Comment:  Procedure: CORONARY ARTERY BYPASS GRAFT (CABG)- x 4;                 Surgeon: Sandrine Wagner MD;  Location: [facility] OR;  Service:                Cardiothoracic;  Laterality: N/A;  7/19/2023: ENDOSCOPIC HARVEST OF VEIN; Left      Comment:  Procedure: HARVEST-VEIN-ENDOVASCULAR;  Surgeon: Sandrine Wagner MD;  Location: [facility] OR;  Service: Cardiothoracic;                Laterality: Left;  No date: Epidural steroid injection      Comment:  Pain management  07/12/2004: ESOPHAGOGASTRODUODENOSCOPY      Comment:  CHILO.  7/19/2023: EXCLUSION, LEFT ATRIAL APPENDAGE, OPEN, AS PART OF OPEN   CHEST SURGERY; N/A      Comment:  Procedure: EXCLUSION, LEFT ATRIAL APPENDAGE, OPEN, AS                PART OF OPEN CHEST SURGERY;  Surgeon: Sandrine Wagner MD;                 Location: [facility] OR;  Service: Cardiothoracic;  Laterality:               N/A;  No date: FRACTURE SURGERY; Left      Comment:  wrist / forearm, total of 5  12/12/2019: INSERTION OF DORSAL COLUMN NERVE STIMULATOR FOR TRIAL; N/A      Comment:  Procedure: INSERTION, NEUROSTIMULATOR, SPINAL CORD,                DORSAL COLUMN, FOR TRIAL;  Surgeon:  Evan Lyles MD;                 Location: [facility] OR;  Service: Pain Management;                 Laterality: N/A;  02/06/2013: JOINT REPLACEMENT      Comment:  ( rt hip 2007), left hip   No date: JOINT REPLACEMENT; Left      Comment:  total knee  No date: KNEE ARTHROSCOPY W/ DEBRIDEMENT      Comment:  bilateral knees , total of six  No date: KNEE SURGERY  02/12/2020: LAMINECTOMY USING MINIMALLY INVASIVE TECHNIQUE; N/A      Comment:  Procedure: LAMINECTOMY, SPINE, MINIMALLY INVASIVE /                 PLACEMENT OF SPINAL CORD STIMULATOR;  Surgeon: Darlene Max MD;  Location: [facility] OR;  Service: Neurosurgery;                 Laterality: N/A;  No date: Nervbe Block injection      Comment:  Pain management  No date: SPINAL CORD STIMULATOR IMPLANT  03/28/2022: TOTAL SHOULDER ARTHROPLASTY; Right      Comment:  Dr Joya  03/07/2022: TOTAL THYROIDECTOMY; Bilateral      Comment:  Dr Mendoza    Current Outpatient Medications on File Prior to Visit:  aspirin 81 MG Chew, Take 81 mg by mouth once daily., Disp: , Rfl:   atorvastatin (LIPITOR) 40 MG tablet, Take 1 tablet (40 mg total) by mouth once daily., Disp: 90 tablet, Rfl: 3  buPROPion (WELLBUTRIN XL) 150 MG TB24 tablet, Take 1 tablet (150 mg total) by mouth once daily., Disp: 90 tablet, Rfl: 3  calcitRIOL (ROCALTROL) 0.5 MCG Cap, TAKE 1 CAPSULE ONE TIME DAILY, Disp: 90 capsule, Rfl: 3  furosemide (LASIX) 40 MG tablet, Take 1 tablet (40 mg total) by mouth once daily., Disp: 90 tablet, Rfl: 1  levothyroxine (SYNTHROID) 112 MCG tablet, TAKE 2 TABLETS(224 MCG) BY MOUTH EVERY DAY, Disp: 180 tablet, Rfl: 1  losartan (COZAAR) 50 MG tablet, Take 1 tablet (50 mg total) by mouth once daily. Take 25 mg daily if bp is 140 or above, Disp: 90 tablet, Rfl: 3  metoprolol succinate (TOPROL-XL) 25 MG 24 hr tablet, TAKE 1/2 TABLET(12.5 MG) BY MOUTH EVERY DAY, Disp: 45 tablet, Rfl: 0  naloxone (NARCAN) 0.4 mg/mL injection, Inject 1 mL (0.4 mg total) into the vein as needed  (overdose)., Disp: 2 mL, Rfl: 5  omeprazole (PRILOSEC) 40 MG capsule, Take one capsule by mouth daily, Disp: 90 capsule, Rfl: 3  UNABLE TO FIND, Take by mouth once daily. Vitafusion multivites gummies, Disp: , Rfl:   [DISCONTINUED] morphine (MS CONTIN) 30 MG 12 hr tablet, Take 1 tablet (30 mg total) by mouth 2 (two) times daily., Disp: 60 tablet, Rfl: 0  [DISCONTINUED] oxyCODONE-acetaminophen (PERCOCET)  mg per tablet, Take 1 tablet by mouth every 4 (four) hours as needed for Pain., Disp: 120 tablet, Rfl: 0  [DISCONTINUED] oxyCODONE-acetaminophen (PERCOCET)  mg per tablet, Take 1 tablet by mouth every 4 (four) hours as needed for Pain., Disp: 120 tablet, Rfl: 0  [DISCONTINUED] oxyCODONE-acetaminophen (PERCOCET)  mg per tablet, Take 1 tablet by mouth every 4 (four) hours as needed for Pain., Disp: 120 tablet, Rfl: 0    Current Facility-Administered Medications on File Prior to Visit:  diphenhydrAMINE injection 12.5 mg, 12.5 mg, Intravenous, Once PRN, Enrique Rosales MD  electrolyte-S (ISOLYTE), , Intravenous, Continuous, Enrique Rosales MD  HYDROmorphone (PF) injection 0.2 mg, 0.2 mg, Intravenous, Q5 Min PRN, Enrique Rosales MD  HYDROmorphone (PF) injection 0.2 mg, 0.2 mg, Intravenous, Q5 Min PRN, Enrique Rosales MD  lactated ringers infusion, , Intravenous, Once PRN, Aram Terrell MD  lactated ringers infusion, 10 mL/hr, Intravenous, Continuous, Enrique Rosales MD, Stopped at 07/19/23 0959  lactated ringers infusion, , Intravenous, Continuous, Aram Terrell MD, Last Rate: 25 mL/hr at 07/11/23 1012, New Bag at 07/11/23 1012  lactated ringers infusion, , Intravenous, Continuous, Aram Terrell MD, Last Rate: 25 mL/hr at 01/09/24 1014, New Bag at 01/09/24 1014  lorazepam injection 0.25 mg, 0.25 mg, Intravenous, Once PRN, Enrique Rosales MD  prochlorperazine injection Soln 5 mg, 5 mg, Intravenous, Q30 Min PRN, Enrique Rosales MD  sodium chloride 0.9% flush 3 mL, 3  mL, Intravenous, Q8H, Enrique Rosales MD            Review of Systems   Gastrointestinal:  Negative for constipation, diarrhea, nausea and vomiting.   Musculoskeletal:  Positive for arthralgias, back pain and neck pain.   Psychiatric/Behavioral:  Negative for confusion, decreased concentration and dysphoric mood.        Objective:      Physical Exam  Vitals and nursing note reviewed.   Constitutional:       General: He is not in acute distress.     Appearance: Normal appearance. He is obese. He is not ill-appearing, toxic-appearing or diaphoretic.      Comments: Borderline systolic blood pressure  Normal pulse with regular rhythm   Severe obesity with a BMI of 37.6 he is down 1.8 lb from his February 5 visit   Neurological:      Mental Status: He is alert and oriented to person, place, and time. Mental status is at baseline.   Psychiatric:         Mood and Affect: Mood normal.         Behavior: Behavior normal.         Thought Content: Thought content normal.         Judgment: Judgment normal.         Assessment:       1. Chronic pain syndrome    2. Uncomplicated opioid dependence    3. Spinal stenosis, lumbar region, without neurogenic claudication    4. Spinal stenosis in cervical region    5. Hereditary and idiopathic peripheral neuropathy        Plan:       1. Uncomplicated opioid dependence   checked with no inappropriate activity  - morphine (MS CONTIN) 30 MG 12 hr tablet; Take 1 tablet (30 mg total) by mouth 2 (two) times daily.  Dispense: 60 tablet; Refill: 0  - oxyCODONE-acetaminophen (PERCOCET)  mg per tablet; Take 1 tablet by mouth every 4 (four) hours as needed for Pain.  Dispense: 120 tablet; Refill: 0  - oxyCODONE-acetaminophen (PERCOCET)  mg per tablet; Take 1 tablet by mouth every 4 (four) hours as needed for Pain.  Dispense: 120 tablet; Refill: 0  - oxyCODONE-acetaminophen (PERCOCET)  mg per tablet; Take 1 tablet by mouth every 4 (four) hours as needed for Pain.  Dispense:  120 tablet; Refill: 0    2. Chronic pain syndrome  Stable    3. Spinal stenosis, lumbar region, without neurogenic claudication  Stable    4. Spinal stenosis in cervical region  Stable    5. Hereditary and idiopathic peripheral neuropathy  Stable

## 2024-05-04 ENCOUNTER — PATIENT MESSAGE (OUTPATIENT)
Dept: SPINE | Facility: CLINIC | Age: 79
End: 2024-05-04
Payer: MEDICARE

## 2024-05-07 ENCOUNTER — TELEPHONE (OUTPATIENT)
Dept: PAIN MEDICINE | Facility: CLINIC | Age: 79
End: 2024-05-07
Payer: MEDICARE

## 2024-05-07 DIAGNOSIS — M54.16 LUMBAR RADICULOPATHY: Primary | ICD-10-CM

## 2024-05-07 NOTE — TELEPHONE ENCOUNTER
----- Message from Nikita Landrum MD sent at 5/7/2024  8:50 AM CDT -----  Please schedule for caudal epidural steroid injection

## 2024-05-23 ENCOUNTER — TELEPHONE (OUTPATIENT)
Dept: SPINE | Facility: CLINIC | Age: 79
End: 2024-05-23
Payer: MEDICARE

## 2024-05-23 ENCOUNTER — TELEPHONE (OUTPATIENT)
Dept: PAIN MEDICINE | Facility: CLINIC | Age: 79
End: 2024-05-23
Payer: MEDICARE

## 2024-05-23 NOTE — TELEPHONE ENCOUNTER
Juana Stephens, Vidhi Ornelas LPN  Good Afternoon    Below if the information received from [facility]    ATTN: Aram Terrell MD ACTION REQUESTED: To avoid delays in patient care, please contact [facility] to schedule a [id number] at [phone]  [facility] Tracking ID [id number]  Patient Name: Sam Pete Date(s) of Service: 06/04/2024 -   Contact [facility] at [phone].   Tracking ID #[id number]    Recommended Denial-  Missing participation in active rehabilitation. Notification to primary physician of Continuation over 12 Months  If let me know if there is anything else to help    Thank You  Juana       Would you like to do a p2p? Please inform pt  and us if we are canceling.

## 2024-05-25 DIAGNOSIS — I10 ESSENTIAL (PRIMARY) HYPERTENSION: ICD-10-CM

## 2024-05-25 DIAGNOSIS — R07.9 RIGHT-SIDED CHEST PAIN: ICD-10-CM

## 2024-05-25 DIAGNOSIS — E78.49 OTHER HYPERLIPIDEMIA: ICD-10-CM

## 2024-05-27 RX ORDER — METOPROLOL SUCCINATE 25 MG/1
12.5 TABLET, EXTENDED RELEASE ORAL
Qty: 45 TABLET | Refills: 0 | Status: SHIPPED | OUTPATIENT
Start: 2024-05-27

## 2024-05-30 ENCOUNTER — TELEPHONE (OUTPATIENT)
Dept: SPINE | Facility: CLINIC | Age: 79
End: 2024-05-30
Payer: MEDICARE

## 2024-06-04 ENCOUNTER — HOSPITAL ENCOUNTER (OUTPATIENT)
Facility: HOSPITAL | Age: 79
Discharge: HOME | End: 2024-06-04
Attending: ANESTHESIOLOGY | Admitting: ANESTHESIOLOGY
Payer: MEDICARE

## 2024-06-04 DIAGNOSIS — M54.16 LUMBAR RADICULITIS: ICD-10-CM

## 2024-06-04 PROCEDURE — 62323 NJX INTERLAMINAR LMBR/SAC: CPT | Performed by: ANESTHESIOLOGY

## 2024-06-04 PROCEDURE — 62323 NJX INTERLAMINAR LMBR/SAC: CPT | Mod: ,,, | Performed by: ANESTHESIOLOGY

## 2024-06-04 PROCEDURE — 25000003 PHARM REV CODE 250: Performed by: ANESTHESIOLOGY

## 2024-06-04 PROCEDURE — 63600175 PHARM REV CODE 636 W HCPCS: Performed by: ANESTHESIOLOGY

## 2024-06-04 PROCEDURE — A4216 STERILE WATER/SALINE, 10 ML: HCPCS | Performed by: ANESTHESIOLOGY

## 2024-06-04 PROCEDURE — 25500020 PHARM REV CODE 255: Performed by: ANESTHESIOLOGY

## 2024-06-04 RX ORDER — DEXAMETHASONE SODIUM PHOSPHATE 10 MG/ML
INJECTION INTRAMUSCULAR; INTRAVENOUS
Status: DISCONTINUED | OUTPATIENT
Start: 2024-06-04 | End: 2024-06-04 | Stop reason: HOSPADM

## 2024-06-04 RX ORDER — FENTANYL CITRATE 50 UG/ML
INJECTION, SOLUTION INTRAMUSCULAR; INTRAVENOUS
Status: DISCONTINUED | OUTPATIENT
Start: 2024-06-04 | End: 2024-06-04 | Stop reason: HOSPADM

## 2024-06-04 RX ORDER — SODIUM CHLORIDE, SODIUM LACTATE, POTASSIUM CHLORIDE, CALCIUM CHLORIDE 600; 310; 30; 20 MG/100ML; MG/100ML; MG/100ML; MG/100ML
INJECTION, SOLUTION INTRAVENOUS CONTINUOUS
Status: DISCONTINUED | OUTPATIENT
Start: 2024-06-04 | End: 2024-06-04 | Stop reason: HOSPADM

## 2024-06-04 RX ORDER — MIDAZOLAM HYDROCHLORIDE 1 MG/ML
INJECTION INTRAMUSCULAR; INTRAVENOUS
Status: DISCONTINUED | OUTPATIENT
Start: 2024-06-04 | End: 2024-06-04 | Stop reason: HOSPADM

## 2024-06-04 RX ORDER — LIDOCAINE HYDROCHLORIDE 10 MG/ML
1 INJECTION, SOLUTION EPIDURAL; INFILTRATION; INTRACAUDAL; PERINEURAL ONCE
Status: COMPLETED | OUTPATIENT
Start: 2024-06-04 | End: 2024-06-04

## 2024-06-04 RX ORDER — SODIUM CHLORIDE 9 MG/ML
INJECTION, SOLUTION INTRAMUSCULAR; INTRAVENOUS; SUBCUTANEOUS
Status: DISCONTINUED | OUTPATIENT
Start: 2024-06-04 | End: 2024-06-04 | Stop reason: HOSPADM

## 2024-06-04 RX ORDER — LIDOCAINE HYDROCHLORIDE 10 MG/ML
INJECTION, SOLUTION EPIDURAL; INFILTRATION; INTRACAUDAL; PERINEURAL
Status: DISCONTINUED | OUTPATIENT
Start: 2024-06-04 | End: 2024-06-04 | Stop reason: HOSPADM

## 2024-06-04 RX ADMIN — SODIUM CHLORIDE, POTASSIUM CHLORIDE, SODIUM LACTATE AND CALCIUM CHLORIDE: 600; 310; 30; 20 INJECTION, SOLUTION INTRAVENOUS at 12:06

## 2024-06-04 RX ADMIN — LIDOCAINE HYDROCHLORIDE 5 MG: 10 INJECTION, SOLUTION EPIDURAL; INFILTRATION; INTRACAUDAL; PERINEURAL at 12:06

## 2024-06-04 NOTE — OP NOTE
PROCEDURE DATE: 6/4/2024    PROCEDURE:  Caudal epidural steroid injection under fluoroscopy.    Diagnosis: Lumbar radiculitis    Post Op diagnosis: Same    PHYSICIAN: Aram Terrell M.D.    MEDICATIONS INJECTED:  10 mg of dexamethasone and 4 ml of sterile, preservative-free NaCl.    LOCAL ANESTHETIC GIVEN:  Lidocaine 1%, 2 ml total    SEDATION MEDICATIONS: RN IV sedation    ESTIMATED BLOOD LOSS:  None    COMPLICATIONS:  None    TECHNIQUE:   After the patient was placed in prone position, the patient was prepped and draped in the usual sterile fashion using ChloraPrep and sterile towels.  Appropriate anatomic landmarks were determined by identifying the sacral hiatus in the lateral fluoroscopic view.  Local anesthetic was given via a 25g 1.5 inch needle by raising a wheal and infiltrating down to the periosteum.  A 3.5 inch 20 gauge touhy needle was introduced thru the sacral hiatus.  1ml of contrast was injected to confirm placement in the appropriate area and that there was no vascular uptake.  The medication was then injected slowly.  The patient tolerated the procedure well.    The patient was monitored after the procedure.  Patient was given post procedure and discharge instructions to follow at home.  The patient was discharged in a stable condition

## 2024-06-04 NOTE — DISCHARGE SUMMARY
[facility] - Periop Services  Discharge Note  Short Stay    Procedure(s) (LRB):  Injection-steroid-epidural-caudal (N/A)      OUTCOME: Patient tolerated treatment/procedure well without complication and is now ready for discharge.    DISPOSITION: Home or Self Care    FINAL DIAGNOSIS:  <principal problem not specified>    FOLLOWUP: In clinic    DISCHARGE INSTRUCTIONS:    Discharge Procedure Orders   Notify your health care provider if you experience any of the following:  temperature >100.4     Notify your health care provider if you experience any of the following:  severe uncontrolled pain     Notify your health care provider if you experience any of the following:  redness, tenderness, or signs of infection (pain, swelling, redness, odor or green/yellow discharge around incision site)     Activity as tolerated        TIME SPENT ON DISCHARGE: 30 minutes

## 2024-06-04 NOTE — H&P
CC: low back pain    HPI: The patient is a 79 y.o. male with a history of low back pain here for CARLOS. There are no major changes in history and physical from 5/1/24 by [facility].    Past Medical History:   Diagnosis Date    Abnormal nuclear stress test 07/2023    Anticoagulant long-term use     ASA 81 mg    Arthritis     Cataract     OU    Chronic low back pain     Complete rupture of rotator cuff 02/08/2011    DDD (degenerative disc disease), lumbar 07/08/2015    Degeneration of lumbar or lumbosacral intervertebral disc 09/09/2015    ED (erectile dysfunction)     GERD (gastroesophageal reflux disease)     Hypertension     Hypothyroidism, unspecified     Lumbar pseudoarthrosis 06/26/2017    Lumbar stenosis 06/26/2017    Obesity     PSVT (paroxysmal supraventricular tachycardia) 07/2023    SOB (shortness of breath) 07/2023    Spondylosis of lumbar region without myelopathy or radiculopathy 07/08/2015    Stroke 1997    basal ganglia, left sided weakness, resolved    Testicular hypofunction     Thoracic or lumbosacral neuritis or radiculitis 07/08/2015       Past Surgical History:   Procedure Laterality Date    ANGIOGRAM, CORONARY, WITH LEFT HEART CATHETERIZATION  07/13/2023    Procedure: Left Heart Cath;  Surgeon: Salvador Eugene MD;  Location: [facility] CATH;  Service: Cardiology;;    APPENDECTOMY      ARTHROPLASTY OF SHOULDER Right 03/22/2022    Procedure: ARTHROPLASTY, SHOULDER BRENNAN RIGHT SHOULDER HUMERAL HEAD RESURFACING;  Surgeon: Frankie Joya MD;  Location: [facility] OR;  Service: Orthopedics;  Laterality: Right;    BACK SURGERY      4- back surgery    CAUDAL EPIDURAL STEROID INJECTION N/A 12/16/2021    Procedure: Injection-steroid-epidural-caudal;  Surgeon: Aram Terrell MD;  Location: [facility] OR;  Service: Pain Management;  Laterality: N/A;    CAUDAL EPIDURAL STEROID INJECTION N/A 02/02/2022    Procedure: INJECTION, STEROID, SPINE, EPIDURAL, CAUDAL;  Surgeon: Aram Terrell MD;  Location: [facility] OR;  Service: Pain  Management;  Laterality: N/A;    CAUDAL EPIDURAL STEROID INJECTION N/A 07/22/2022    Procedure: Injection-steroid-epidural-caudal;  Surgeon: Aram Terrell MD;  Location: [facility] OR;  Service: Pain Management;  Laterality: N/A;  Caudal CARLOS     CAUDAL EPIDURAL STEROID INJECTION N/A 01/20/2023    Procedure: Injection-steroid-epidural-caudal;  Surgeon: Aram Terrell MD;  Location: [facility] OR;  Service: Pain Management;  Laterality: N/A;  caudal ( melinda)    CAUDAL EPIDURAL STEROID INJECTION N/A 04/21/2023    Procedure: Injection-steroid-epidural-caudal;  Surgeon: Aram Terrell MD;  Location: [facility] OR;  Service: Pain Management;  Laterality: N/A;  caudal    CAUDAL EPIDURAL STEROID INJECTION N/A 07/11/2023    Procedure: Injection-steroid-epidural-caudal;  Surgeon: Aram Terrell MD;  Location: [facility] OR;  Service: Pain Management;  Laterality: N/A;  caudal    CAUDAL EPIDURAL STEROID INJECTION N/A 1/9/2024    Procedure: Injection-steroid-epidural-caudal;  Surgeon: Aram Terrell MD;  Location: [facility] OR;  Service: Anesthesiology;  Laterality: N/A;  caudal    COLONOSCOPY  07/12/2004    CHILO.   Redundant tortuous colon, otherwise normal.    COLONOSCOPY N/A 02/25/2019    Procedure: COLONOSCOPY;  Surgeon: Gilbert Rodriguez Jr., MD;  Location: [facility];  Service: Endoscopy;  Laterality: N/A;    CORONARY ANGIOGRAPHY N/A 07/13/2023    Procedure: ANGIOGRAM, CORONARY ARTERY;  Surgeon: Salvador Eugene MD;  Location: [facility] CATH;  Service: Cardiology;  Laterality: N/A;    CORONARY ARTERY BYPASS GRAFT (CABG) N/A 7/19/2023    Procedure: CORONARY ARTERY BYPASS GRAFT (CABG)- x 4;  Surgeon: Sandrine Wagner MD;  Location: [facility] OR;  Service: Cardiothoracic;  Laterality: N/A;    ENDOSCOPIC HARVEST OF VEIN Left 7/19/2023    Procedure: HARVEST-VEIN-ENDOVASCULAR;  Surgeon: Sandrine Wagner MD;  Location: [facility] OR;  Service: Cardiothoracic;  Laterality: Left;    Epidural steroid injection      Pain management    ESOPHAGOGASTRODUODENOSCOPY  07/12/2004    CHILO.     EXCLUSION, LEFT ATRIAL APPENDAGE, OPEN, AS PART OF OPEN CHEST SURGERY N/A 2023    Procedure: EXCLUSION, LEFT ATRIAL APPENDAGE, OPEN, AS PART OF OPEN CHEST SURGERY;  Surgeon: Sandrine Wagner MD;  Location: [facility] OR;  Service: Cardiothoracic;  Laterality: N/A;    FRACTURE SURGERY Left     wrist / forearm, total of 5    INSERTION OF DORSAL COLUMN NERVE STIMULATOR FOR TRIAL N/A 2019    Procedure: INSERTION, NEUROSTIMULATOR, SPINAL CORD, DORSAL COLUMN, FOR TRIAL;  Surgeon: Evan Lyles MD;  Location: [facility] OR;  Service: Pain Management;  Laterality: N/A;    JOINT REPLACEMENT  2013    ( rt hip ), left hip     JOINT REPLACEMENT Left     total knee    KNEE ARTHROSCOPY W/ DEBRIDEMENT      bilateral knees , total of six    KNEE SURGERY      LAMINECTOMY USING MINIMALLY INVASIVE TECHNIQUE N/A 2020    Procedure: LAMINECTOMY, SPINE, MINIMALLY INVASIVE /  PLACEMENT OF SPINAL CORD STIMULATOR;  Surgeon: Darlene Max MD;  Location: [facility] OR;  Service: Neurosurgery;  Laterality: N/A;    Nervbe Block injection      Pain management    SPINAL CORD STIMULATOR IMPLANT      TOTAL SHOULDER ARTHROPLASTY Right 2022    Dr Joya    TOTAL THYROIDECTOMY Bilateral 2022    Dr Mendoza       Family History   Problem Relation Name Age of Onset    Hypertension Father      Heart disease Father      Drug abuse Daughter      Hypertension Son      Lung disease Sister      COPD Sister      Hypertension Sister      Diverticulitis Sister      Gout Sister      Kidney disease Sister      No Known Problems Mother      Eczema Neg Hx      Lupus Neg Hx      Psoriasis Neg Hx      Melanoma Neg Hx         Social History     Socioeconomic History    Marital status:    Tobacco Use    Smoking status: Former     Current packs/day: 0.00     Average packs/day: 1 pack/day for 30.0 years (30.0 ttl pk-yrs)     Types: Cigarettes     Start date: 8/3/1963     Quit date: 1992     Years since quittin.4    Smokeless tobacco:  Never   Substance and Sexual Activity    Alcohol use: Yes     Comment: On occasion    Drug use: Yes     Types: Oxycodone, Morphine     Social Determinants of Health     Financial Resource Strain: Low Risk  (10/17/2023)    Overall Financial Resource Strain (CARDIA)     Difficulty of Paying Living Expenses: Not hard at all   Recent Concern: Financial Resource Strain - Medium Risk (8/23/2023)    Overall Financial Resource Strain (CARDIA)     Difficulty of Paying Living Expenses: Somewhat hard   Food Insecurity: No Food Insecurity (10/17/2023)    Hunger Vital Sign     Worried About Running Out of Food in the Last Year: Never true     Ran Out of Food in the Last Year: Never true   Transportation Needs: No Transportation Needs (10/17/2023)    PRAPARE - Transportation     Lack of Transportation (Medical): No     Lack of Transportation (Non-Medical): No   Physical Activity: Sufficiently Active (10/17/2023)    Exercise Vital Sign     Days of Exercise per Week: 7 days     Minutes of Exercise per Session: 30 min   Stress: No Stress Concern Present (10/17/2023)    [employer] [place] [employer] - Occupational Stress Questionnaire     Feeling of Stress : Not at all   Housing Stability: Low Risk  (10/17/2023)    Housing Stability Vital Sign     Unable to Pay for Housing in the Last Year: No     Number of Places Lived in the Last Year: 1     Unstable Housing in the Last Year: No       Current Facility-Administered Medications   Medication Dose Route Frequency Provider Last Rate Last Admin    lactated ringers infusion   Intravenous Continuous Aram Terrell MD 25 mL/hr at 06/04/24 1220 New Bag at 06/04/24 1220     Facility-Administered Medications Ordered in Other Encounters   Medication Dose Route Frequency Provider Last Rate Last Admin    diphenhydrAMINE injection 12.5 mg  12.5 mg Intravenous Once PRN Enrique Rosales MD        electrolyte-S (ISOLYTE)   Intravenous Continuous Enrique Rosales MD         "HYDROmorphone (PF) injection 0.2 mg  0.2 mg Intravenous Q5 Min PRN Enrique Rosales MD        HYDROmorphone (PF) injection 0.2 mg  0.2 mg Intravenous Q5 Min PRN Enrique Rosales MD        lactated ringers infusion   Intravenous Once PRN Aram Terrell MD        lactated ringers infusion  10 mL/hr Intravenous Continuous Enrique Rosales MD   Stopped at 07/19/23 0959    lactated ringers infusion   Intravenous Continuous Aram Terrell MD 25 mL/hr at 07/11/23 1012 New Bag at 07/11/23 1012    lactated ringers infusion   Intravenous Continuous Aram Terrell MD 25 mL/hr at 01/09/24 1014 New Bag at 01/09/24 1014    lorazepam injection 0.25 mg  0.25 mg Intravenous Once PRN Enrique Rosales MD        prochlorperazine injection Soln 5 mg  5 mg Intravenous Q30 Min PRN Enrique Rosales MD        sodium chloride 0.9% flush 3 mL  3 mL Intravenous Q8H Enrique Rosales MD           Review of patient's allergies indicates:   Allergen Reactions    Xyzal [levocetirizine] Other (See Comments)     Knocked him out        Vitals:    05/28/24 1400 06/04/24 1207   BP:  137/77   Pulse:  72   Resp:  20   Temp:  97.9 °F (36.6 °C)   TempSrc:  Skin   SpO2:  96%   Weight: 108.8 kg (239 lb 13.8 oz)    Height: 5' 7" (1.702 m)        REVIEW OF SYSTEMS:     GENERAL: No weight loss, malaise or fevers.  HEENT:  No recent changes in vision or hearing  NECK: Negative for lumps, no difficulty with swallowing.  RESPIRATORY: Negative for cough, wheezing or shortness of breath, patient denies any recent URI.  CARDIOVASCULAR: Negative for chest pain, leg swelling or palpitations.  GI: Negative for abdominal discomfort, blood in stools or black stools or change in bowel habits.  MUSCULOSKELETAL: See HPI.  SKIN: Negative for lesions, rash, and itching.  PSYCH: No suicidal or homicidal ideations, no current mood disturbances.  HEMATOLOGY/LYMPHOLOGY: Negative for prolonged bleeding, bruising easily or swollen nodes. Patient is not " currently taking any anti-coagulants  ENDO: No history of diabetes or thyroid dysfunction  NEURO: No history of syncope, paralysis, seizures or tremors.All other reviewed and negative other than HPI.    Physical exam:  Gen: A and O x3, pleasant, well-groomed  Skin: No rashes or obvious lesions  HEENT: PERRLA, no obvious deformities on ears or in canals. No thyroid masses, trachea midline, no palpable lymph nodes in neck, axilla.  CVS: Regular rate and rhythm, normal S1 and S2, no murmurs.  Resp: Clear to auscultation bilaterally.  Abdomen: Soft, NT/ND, normal bowel sounds present.  Musculoskeletal/Neuro: Moving all extremities    Assessment:  Lumbar radiculitis    Other orders  -     Place in Outpatient; Standing  -     Vital signs; Standing  -     LIDOcaine (PF) 10 mg/ml (1%) injection 10 mg  -     Verify informed consent; Standing  -     Notify physician ; Standing  -     Notify physician ; Standing  -     Notify physician (specify); Standing  -     Diet NPO; Standing  -     lactated ringers infusion  -     IP VTE HIGH RISK PATIENT; Standing          PLAN: CARLOS      This patient has been cleared for surgery in an ambulatory surgical facility    ASA 3,  Mallampatti Score 3  No history of anesthetic complications  Plan for RN IV sedation

## 2024-06-06 VITALS
OXYGEN SATURATION: 96 % | HEART RATE: 72 BPM | BODY MASS INDEX: 37.65 KG/M2 | HEIGHT: 67 IN | RESPIRATION RATE: 20 BRPM | DIASTOLIC BLOOD PRESSURE: 88 MMHG | TEMPERATURE: 98 F | SYSTOLIC BLOOD PRESSURE: 153 MMHG | WEIGHT: 239.88 LBS

## 2024-06-17 DIAGNOSIS — F11.20 UNCOMPLICATED OPIOID DEPENDENCE: ICD-10-CM

## 2024-06-17 RX ORDER — MORPHINE SULFATE 30 MG/1
30 TABLET, FILM COATED, EXTENDED RELEASE ORAL 2 TIMES DAILY
Qty: 60 TABLET | Refills: 0 | Status: CANCELLED | OUTPATIENT
Start: 2024-06-17 | End: 2024-07-17

## 2024-06-17 NOTE — TELEPHONE ENCOUNTER
Care Due:                  Date            Visit Type   Department     Provider  --------------------------------------------------------------------------------                                EP -                              PRIMARY      [facility]  Last Visit: 05-      CARE ([facility])   PRACTICE       Ty Montalvo  Next Visit: None Scheduled  None         None Found                                                            Last  Test          Frequency    Reason                     Performed    Due Date  --------------------------------------------------------------------------------    Lipid Panel.  12 months..  atorvastatin.............  08- 08-    [facility] Embedded Care Due Messages. Reference number: [id number].   6/17/2024 4:38:50 PM CDT

## 2024-06-17 NOTE — TELEPHONE ENCOUNTER
Is he ready to reduce it to the 15 mg yet?  We can start at three a day so it will only be a 25% reduction.

## 2024-06-18 ENCOUNTER — PATIENT MESSAGE (OUTPATIENT)
Dept: FAMILY MEDICINE | Facility: CLINIC | Age: 79
End: 2024-06-18
Payer: MEDICARE

## 2024-06-18 RX ORDER — MORPHINE SULFATE 15 MG/1
15 TABLET, FILM COATED, EXTENDED RELEASE ORAL EVERY 8 HOURS
Qty: 90 TABLET | Refills: 0 | Status: SHIPPED | OUTPATIENT
Start: 2024-06-18

## 2024-06-20 ENCOUNTER — LAB VISIT (OUTPATIENT)
Dept: LAB | Facility: HOSPITAL | Age: 79
End: 2024-06-20
Attending: INTERNAL MEDICINE
Payer: MEDICARE

## 2024-06-20 ENCOUNTER — OFFICE VISIT (OUTPATIENT)
Dept: CARDIOLOGY | Facility: CLINIC | Age: 79
End: 2024-06-20
Payer: MEDICARE

## 2024-06-20 VITALS
DIASTOLIC BLOOD PRESSURE: 76 MMHG | SYSTOLIC BLOOD PRESSURE: 138 MMHG | WEIGHT: 240.94 LBS | HEART RATE: 87 BPM | BODY MASS INDEX: 37.74 KG/M2

## 2024-06-20 DIAGNOSIS — Z79.01 LONG TERM (CURRENT) USE OF ANTICOAGULANTS: ICD-10-CM

## 2024-06-20 DIAGNOSIS — I65.23 CAROTID ARTERY PLAQUE, BILATERAL: Chronic | ICD-10-CM

## 2024-06-20 DIAGNOSIS — I25.118 CORONARY ARTERY DISEASE OF NATIVE ARTERY OF NATIVE HEART WITH STABLE ANGINA PECTORIS: Primary | Chronic | ICD-10-CM

## 2024-06-20 DIAGNOSIS — I25.118 CORONARY ARTERY DISEASE OF NATIVE ARTERY OF NATIVE HEART WITH STABLE ANGINA PECTORIS: ICD-10-CM

## 2024-06-20 DIAGNOSIS — I10 ESSENTIAL (PRIMARY) HYPERTENSION: ICD-10-CM

## 2024-06-20 DIAGNOSIS — E78.49 OTHER HYPERLIPIDEMIA: Chronic | ICD-10-CM

## 2024-06-20 DIAGNOSIS — I27.20 MILD PULMONARY HYPERTENSION: Chronic | ICD-10-CM

## 2024-06-20 DIAGNOSIS — E66.01 SEVERE OBESITY (BMI 35.0-39.9) WITH COMORBIDITY: Chronic | ICD-10-CM

## 2024-06-20 DIAGNOSIS — Z79.01 LONG TERM (CURRENT) USE OF ANTICOAGULANTS: Chronic | ICD-10-CM

## 2024-06-20 DIAGNOSIS — I48.0 PAF (PAROXYSMAL ATRIAL FIBRILLATION): Chronic | ICD-10-CM

## 2024-06-20 DIAGNOSIS — I10 ESSENTIAL (PRIMARY) HYPERTENSION: Chronic | ICD-10-CM

## 2024-06-20 DIAGNOSIS — E78.49 OTHER HYPERLIPIDEMIA: ICD-10-CM

## 2024-06-20 DIAGNOSIS — I70.0 ATHEROSCLEROSIS OF AORTA: Chronic | ICD-10-CM

## 2024-06-20 LAB
ALBUMIN SERPL BCP-MCNC: 3.7 G/DL (ref 3.5–5.2)
ALP SERPL-CCNC: 67 U/L (ref 55–135)
ALT SERPL W/O P-5'-P-CCNC: 31 U/L (ref 10–44)
ANION GAP SERPL CALC-SCNC: 9 MMOL/L (ref 8–16)
AST SERPL-CCNC: 22 U/L (ref 10–40)
BILIRUB SERPL-MCNC: 0.4 MG/DL (ref 0.1–1)
BUN SERPL-MCNC: 22 MG/DL (ref 8–23)
CALCIUM SERPL-MCNC: 9.2 MG/DL (ref 8.7–10.5)
CHLORIDE SERPL-SCNC: 99 MMOL/L (ref 95–110)
CHOLEST SERPL-MCNC: 141 MG/DL (ref 120–199)
CHOLEST/HDLC SERPL: 2.9 {RATIO} (ref 2–5)
CO2 SERPL-SCNC: 30 MMOL/L (ref 23–29)
CREAT SERPL-MCNC: 1.2 MG/DL (ref 0.5–1.4)
EST. GFR  (NO RACE VARIABLE): >60 ML/MIN/1.73 M^2
GLUCOSE SERPL-MCNC: 117 MG/DL (ref 70–110)
HDLC SERPL-MCNC: 48 MG/DL (ref 40–75)
HDLC SERPL: 34 % (ref 20–50)
HGB BLD-MCNC: 14.1 G/DL (ref 14–18)
LDLC SERPL CALC-MCNC: 33 MG/DL (ref 63–159)
NONHDLC SERPL-MCNC: 93 MG/DL
POTASSIUM SERPL-SCNC: 4.5 MMOL/L (ref 3.5–5.1)
PROT SERPL-MCNC: 7.4 G/DL (ref 6–8.4)
SODIUM SERPL-SCNC: 138 MMOL/L (ref 136–145)
TRIGL SERPL-MCNC: 300 MG/DL (ref 30–150)

## 2024-06-20 PROCEDURE — 3075F SYST BP GE 130 - 139MM HG: CPT | Mod: HCNC,CPTII,S$GLB, | Performed by: INTERNAL MEDICINE

## 2024-06-20 PROCEDURE — 1125F AMNT PAIN NOTED PAIN PRSNT: CPT | Mod: HCNC,CPTII,S$GLB, | Performed by: INTERNAL MEDICINE

## 2024-06-20 PROCEDURE — 99214 OFFICE O/P EST MOD 30 MIN: CPT | Mod: HCNC,S$GLB,, | Performed by: INTERNAL MEDICINE

## 2024-06-20 PROCEDURE — 36415 COLL VENOUS BLD VENIPUNCTURE: CPT | Mod: HCNC,PO | Performed by: INTERNAL MEDICINE

## 2024-06-20 PROCEDURE — 85018 HEMOGLOBIN: CPT | Mod: HCNC | Performed by: INTERNAL MEDICINE

## 2024-06-20 PROCEDURE — 1101F PT FALLS ASSESS-DOCD LE1/YR: CPT | Mod: HCNC,CPTII,S$GLB, | Performed by: INTERNAL MEDICINE

## 2024-06-20 PROCEDURE — 80061 LIPID PANEL: CPT | Mod: HCNC | Performed by: INTERNAL MEDICINE

## 2024-06-20 PROCEDURE — 1159F MED LIST DOCD IN RCRD: CPT | Mod: HCNC,CPTII,S$GLB, | Performed by: INTERNAL MEDICINE

## 2024-06-20 PROCEDURE — 3288F FALL RISK ASSESSMENT DOCD: CPT | Mod: HCNC,CPTII,S$GLB, | Performed by: INTERNAL MEDICINE

## 2024-06-20 PROCEDURE — 99999 PR PBB SHADOW E&M-EST. PATIENT-LVL III: CPT | Mod: PBBFAC,HCNC,, | Performed by: INTERNAL MEDICINE

## 2024-06-20 PROCEDURE — 3078F DIAST BP <80 MM HG: CPT | Mod: HCNC,CPTII,S$GLB, | Performed by: INTERNAL MEDICINE

## 2024-06-20 PROCEDURE — 80053 COMPREHEN METABOLIC PANEL: CPT | Mod: HCNC | Performed by: INTERNAL MEDICINE

## 2024-06-20 RX ORDER — LOSARTAN POTASSIUM 50 MG/1
50 TABLET ORAL DAILY
Qty: 90 TABLET | Refills: 3 | Status: SHIPPED | OUTPATIENT
Start: 2024-06-20 | End: 2024-06-21 | Stop reason: SDUPTHER

## 2024-06-20 NOTE — PROGRESS NOTES
Subjective:    Patient ID:  Sam Pete is a 79 y.o. male who presents for Coronary Artery Disease and Valvular Heart Disease        HPI    MISSED LABS, DOING WELL, NO CHEST PAIN NO SHORTNESS OF BREATH NO TIA TYPE SYMPTOMS NO NEAR-SYNCOPE, SEE ROS  Past Medical History:   Diagnosis Date    Abnormal nuclear stress test 07/2023    Anticoagulant long-term use     ASA 81 mg    Arthritis     Cataract     OU    Chronic low back pain     Complete rupture of rotator cuff 02/08/2011    DDD (degenerative disc disease), lumbar 07/08/2015    Degeneration of lumbar or lumbosacral intervertebral disc 09/09/2015    ED (erectile dysfunction)     GERD (gastroesophageal reflux disease)     Hypertension     Hypothyroidism, unspecified     Lumbar pseudoarthrosis 06/26/2017    Lumbar stenosis 06/26/2017    Obesity     PSVT (paroxysmal supraventricular tachycardia) 07/2023    SOB (shortness of breath) 07/2023    Spondylosis of lumbar region without myelopathy or radiculopathy 07/08/2015    Stroke 1997    basal ganglia, left sided weakness, resolved    Testicular hypofunction     Thoracic or lumbosacral neuritis or radiculitis 07/08/2015     Past Surgical History:   Procedure Laterality Date    ANGIOGRAM, CORONARY, WITH LEFT HEART CATHETERIZATION  07/13/2023    Procedure: Left Heart Cath;  Surgeon: Salvador Eugene MD;  Location: [facility] CATH;  Service: Cardiology;;    APPENDECTOMY      ARTHROPLASTY OF SHOULDER Right 03/22/2022    Procedure: ARTHROPLASTY, SHOULDER BRENNAN RIGHT SHOULDER HUMERAL HEAD RESURFACING;  Surgeon: Frankie Joya MD;  Location: [facility] OR;  Service: Orthopedics;  Laterality: Right;    BACK SURGERY      4- back surgery    CAUDAL EPIDURAL STEROID INJECTION N/A 12/16/2021    Procedure: Injection-steroid-epidural-caudal;  Surgeon: Aram Terrell MD;  Location: [facility] OR;  Service: Pain Management;  Laterality: N/A;    CAUDAL EPIDURAL STEROID INJECTION N/A 02/02/2022    Procedure: INJECTION, STEROID,  SPINE, EPIDURAL, CAUDAL;  Surgeon: Aram Terrell MD;  Location: [facility] OR;  Service: Pain Management;  Laterality: N/A;    CAUDAL EPIDURAL STEROID INJECTION N/A 07/22/2022    Procedure: Injection-steroid-epidural-caudal;  Surgeon: Aram Terrell MD;  Location: [facility] OR;  Service: Pain Management;  Laterality: N/A;  Caudal CARLOS     CAUDAL EPIDURAL STEROID INJECTION N/A 01/20/2023    Procedure: Injection-steroid-epidural-caudal;  Surgeon: Aram Terrell MD;  Location: [facility] OR;  Service: Pain Management;  Laterality: N/A;  caudal ( melinda)    CAUDAL EPIDURAL STEROID INJECTION N/A 04/21/2023    Procedure: Injection-steroid-epidural-caudal;  Surgeon: Aram Terrell MD;  Location: [facility] OR;  Service: Pain Management;  Laterality: N/A;  caudal    CAUDAL EPIDURAL STEROID INJECTION N/A 07/11/2023    Procedure: Injection-steroid-epidural-caudal;  Surgeon: Aram Terrell MD;  Location: [facility] OR;  Service: Pain Management;  Laterality: N/A;  caudal    CAUDAL EPIDURAL STEROID INJECTION N/A 1/9/2024    Procedure: Injection-steroid-epidural-caudal;  Surgeon: Aram Terrell MD;  [facility];  Service: Anesthesiology;  Laterality: N/A;  caudal    CAUDAL EPIDURAL STEROID INJECTION N/A 6/4/2024    Procedure: Injection-steroid-epidural-caudal;  Surgeon: Aram Terrell MD;  Location: [facility] OR;  Service: Anesthesiology;  Laterality: N/A;    COLONOSCOPY  07/12/2004    CHILO.   Redundant tortuous colon, otherwise normal.    COLONOSCOPY N/A 02/25/2019    Procedure: COLONOSCOPY;  Surgeon: Gilbert Rodriguez Jr., MD;  Location: [facility] ENDO;  Service: Endoscopy;  Laterality: N/A;    CORONARY ANGIOGRAPHY N/A 07/13/2023    Procedure: ANGIOGRAM, CORONARY ARTERY;  Surgeon: Salvador Eugene MD;  Location: [facility] CATH;  Service: Cardiology;  Laterality: N/A;    CORONARY ARTERY BYPASS GRAFT (CABG) N/A 7/19/2023    Procedure: CORONARY ARTERY BYPASS GRAFT (CABG)- x 4;  Surgeon: Sandrine Wagner MD;  Location: [facility] OR;  Service: Cardiothoracic;  Laterality:  N/A;    ENDOSCOPIC HARVEST OF VEIN Left 7/19/2023    Procedure: HARVEST-VEIN-ENDOVASCULAR;  Surgeon: Sandrine Wagner MD;  Location: [facility] OR;  Service: Cardiothoracic;  Laterality: Left;    Epidural steroid injection      Pain management    ESOPHAGOGASTRODUODENOSCOPY  07/12/2004    CHILO.    EXCLUSION, LEFT ATRIAL APPENDAGE, OPEN, AS PART OF OPEN CHEST SURGERY N/A 7/19/2023    Procedure: EXCLUSION, LEFT ATRIAL APPENDAGE, OPEN, AS PART OF OPEN CHEST SURGERY;  Surgeon: Sandrine Wagner MD;  Location: [facility] OR;  Service: Cardiothoracic;  Laterality: N/A;    FRACTURE SURGERY Left     wrist / forearm, total of 5    INSERTION OF DORSAL COLUMN NERVE STIMULATOR FOR TRIAL N/A 12/12/2019    Procedure: INSERTION, NEUROSTIMULATOR, SPINAL CORD, DORSAL COLUMN, FOR TRIAL;  Surgeon: Evan Lyles MD;  Location: [facility] OR;  Service: Pain Management;  Laterality: N/A;    JOINT REPLACEMENT  02/06/2013    ( rt hip 2007), left hip     JOINT REPLACEMENT Left     total knee    KNEE ARTHROSCOPY W/ DEBRIDEMENT      bilateral knees , total of six    KNEE SURGERY      LAMINECTOMY USING MINIMALLY INVASIVE TECHNIQUE N/A 02/12/2020    Procedure: LAMINECTOMY, SPINE, MINIMALLY INVASIVE /  PLACEMENT OF SPINAL CORD STIMULATOR;  Surgeon: Darlene Max MD;  Location: [facility] [place] OR;  Service: Neurosurgery;  Laterality: N/A;    Nervbe Block injection      Pain management    SPINAL CORD STIMULATOR IMPLANT      TOTAL SHOULDER ARTHROPLASTY Right 03/28/2022    Dr Joya    TOTAL THYROIDECTOMY Bilateral 03/07/2022    Dr Mendoza     Family History   Problem Relation Name Age of Onset    Hypertension Father      Heart disease Father      Drug abuse Daughter      Hypertension Son      Lung disease Sister      COPD Sister      Hypertension Sister      Diverticulitis Sister      Gout Sister      Kidney disease Sister      No Known Problems Mother      Eczema Neg Hx      Lupus Neg Hx      Psoriasis Neg Hx      Melanoma Neg Hx       Social History      Socioeconomic History    Marital status:    Tobacco Use    Smoking status: Former     Current packs/day: 0.00     Average packs/day: 1 pack/day for 30.0 years (30.0 ttl pk-yrs)     Types: Cigarettes     Start date: 8/3/1963     Quit date: 1992     Years since quittin.4    Smokeless tobacco: Never   Substance and Sexual Activity    Alcohol use: Yes     Comment: On occasion    Drug use: Yes     Types: Oxycodone, Morphine     Social Determinants of Health     Financial Resource Strain: Low Risk  (10/17/2023)    Overall Financial Resource Strain (CARDIA)     Difficulty of Paying Living Expenses: Not hard at all   Recent Concern: Financial Resource Strain - Medium Risk (2023)    Overall Financial Resource Strain (CARDIA)     Difficulty of Paying Living Expenses: Somewhat hard   Food Insecurity: No Food Insecurity (10/17/2023)    Hunger Vital Sign     Worried About Running Out of Food in the Last Year: Never true     Ran Out of Food in the Last Year: Never true   Transportation Needs: No Transportation Needs (10/17/2023)    PRAPARE - Transportation     Lack of Transportation (Medical): No     Lack of Transportation (Non-Medical): No   Physical Activity: Sufficiently Active (10/17/2023)    Exercise Vital Sign     Days of Exercise per Week: 7 days     Minutes of Exercise per Session: 30 min   Stress: No Stress Concern Present (10/17/2023)    [employer] - Occupational Stress Questionnaire     Feeling of Stress : Not at all   Housing Stability: Low Risk  (10/17/2023)    Housing Stability Vital Sign     Unable to Pay for Housing in the Last Year: No     Number of Places Lived in the Last Year: 1     Unstable Housing in the Last Year: No       Review of patient's allergies indicates:   Allergen Reactions    Xyzal [levocetirizine] Other (See Comments)     Knocked him out        Current Outpatient Medications:     aspirin 81 MG Chew, Take 81 mg by mouth  once daily., Disp: , Rfl:     atorvastatin (LIPITOR) 40 MG tablet, Take 1 tablet (40 mg total) by mouth once daily., Disp: 90 tablet, Rfl: 3    buPROPion (WELLBUTRIN XL) 150 MG TB24 tablet, Take 1 tablet (150 mg total) by mouth once daily., Disp: 90 tablet, Rfl: 3    calcitRIOL (ROCALTROL) 0.5 MCG Cap, TAKE 1 CAPSULE ONE TIME DAILY, Disp: 90 capsule, Rfl: 3    furosemide (LASIX) 40 MG tablet, Take 1 tablet (40 mg total) by mouth once daily., Disp: 90 tablet, Rfl: 1    levothyroxine (SYNTHROID) 112 MCG tablet, TAKE 2 TABLETS(224 MCG) BY MOUTH EVERY DAY, Disp: 180 tablet, Rfl: 1    metoprolol succinate (TOPROL-XL) 25 MG 24 hr tablet, TAKE 1/2 TABLET(12.5 MG) BY MOUTH EVERY DAY, Disp: 45 tablet, Rfl: 0    morphine (MS CONTIN) 15 MG 12 hr tablet, Take 1 tablet (15 mg total) by mouth every 8 (eight) hours., Disp: 90 tablet, Rfl: 0    naloxone (NARCAN) 0.4 mg/mL injection, Inject 1 mL (0.4 mg total) into the vein as needed (overdose)., Disp: 2 mL, Rfl: 5    omeprazole (PRILOSEC) 40 MG capsule, Take one capsule by mouth daily, Disp: 90 capsule, Rfl: 3    oxyCODONE-acetaminophen (PERCOCET)  mg per tablet, Take 1 tablet by mouth every 4 (four) hours as needed for Pain., Disp: 120 tablet, Rfl: 0    oxyCODONE-acetaminophen (PERCOCET)  mg per tablet, Take 1 tablet by mouth every 4 (four) hours as needed for Pain., Disp: 120 tablet, Rfl: 0    [START ON 7/17/2024] oxyCODONE-acetaminophen (PERCOCET)  mg per tablet, Take 1 tablet by mouth every 4 (four) hours as needed for Pain., Disp: 120 tablet, Rfl: 0    UNABLE TO FIND, Take by mouth once daily. Vitafusion multivites gummies, Disp: , Rfl:     losartan (COZAAR) 50 MG tablet, Take 1 tablet (50 mg total) by mouth once daily. Take 25 mg daily if bp is 140 or above, Disp: 90 tablet, Rfl: 3  No current facility-administered medications for this visit.    Facility-Administered Medications Ordered in Other Visits:     diphenhydrAMINE injection 12.5 mg,  12.5 mg, Intravenous, Once PRN, Enrique Rosales MD    electrolyte-S (ISOLYTE), , Intravenous, Continuous, Enrique Rosales MD    HYDROmorphone (PF) injection 0.2 mg, 0.2 mg, Intravenous, Q5 Min PRN, Enrique Rosales MD    HYDROmorphone (PF) injection 0.2 mg, 0.2 mg, Intravenous, Q5 Min PRN, Enrique Rosales MD    lactated ringers infusion, , Intravenous, Once PRN, Aram Terrell MD    lactated ringers infusion, 10 mL/hr, Intravenous, Continuous, Enrique Rosales MD, Stopped at 07/19/23 0959    lactated ringers infusion, , Intravenous, Continuous, Aram Terrell MD, Last Rate: 25 mL/hr at 07/11/23 1012, New Bag at 07/11/23 1012    lactated ringers infusion, , Intravenous, Continuous, Aram Terrell MD, Last Rate: 25 mL/hr at 01/09/24 1014, New Bag at 01/09/24 1014    lorazepam injection 0.25 mg, 0.25 mg, Intravenous, Once PRN, Enrique Rosales MD    prochlorperazine injection Soln 5 mg, 5 mg, Intravenous, Q30 Min PRN, Enrique Rosales MD    sodium chloride 0.9% flush 3 mL, 3 mL, Intravenous, Q8H, Enrique Rosales MD    Review of Systems   Constitutional: Negative for chills, decreased appetite, diaphoresis, fever, malaise/fatigue and night sweats.   HENT:  Negative for congestion and nosebleeds.    Eyes:  Negative for blurred vision and visual disturbance.   Cardiovascular:  Negative for chest pain, claudication, cyanosis, dyspnea on exertion, irregular heartbeat, leg swelling, near-syncope, orthopnea, palpitations, paroxysmal nocturnal dyspnea and syncope.   Respiratory:  Negative for cough, hemoptysis and shortness of breath.    Endocrine: Negative for polyphagia and polyuria.   Skin:  Negative for color change and rash.   Musculoskeletal:  Positive for arthritis. Negative for back pain and falls.   Gastrointestinal:  Negative for abdominal pain, dysphagia, jaundice, melena and nausea.   Genitourinary:  Negative for dysuria and flank pain.   Neurological:  Negative for  brief paralysis, dizziness, focal weakness and light-headedness.   Psychiatric/Behavioral:  Negative for altered mental status and depression.    Allergic/Immunologic: Negative for hives and persistent infections.        Objective:      Vitals:    06/20/24 1425 06/20/24 1427   BP: (!) 146/79 138/76   Pulse: 87    Weight: 109.3 kg (240 lb 15.4 oz)    PainSc:   5    PainLoc: Back      Body mass index is 37.74 kg/m².    Physical Exam  Constitutional:       Appearance: Normal appearance. He is obese.   HENT:      Head: Normocephalic and atraumatic.   Eyes:      General: No scleral icterus.     Extraocular Movements: Extraocular movements intact.   Neck:      Vascular: No carotid bruit.   Cardiovascular:      Rate and Rhythm: Normal rate and regular rhythm.      Pulses:           Carotid pulses are 2+ on the right side and 2+ on the left side.       Radial pulses are 2+ on the right side and 2+ on the left side.        Posterior tibial pulses are 2+ on the right side and 2+ on the left side.      Heart sounds: Murmur heard.      Systolic murmur is present with a grade of 1/6.      No friction rub. No gallop.   Pulmonary:      Effort: Pulmonary effort is normal. No respiratory distress.      Breath sounds: Normal breath sounds.   Abdominal:      Palpations: Abdomen is soft.      Tenderness: There is no abdominal tenderness.      Comments: OBESE   Musculoskeletal:      Cervical back: Neck supple.      Right lower leg: No edema.      Left lower leg: No edema.   Skin:     Capillary Refill: Capillary refill takes less than 2 seconds.   Neurological:      General: No focal deficit present.      Mental Status: He is alert and oriented to person, place, and time.   Psychiatric:         Mood and Affect: Mood normal.         Speech: Speech normal.         Behavior: Behavior normal.             ..    Chemistry        Component Value Date/Time     06/20/2024 1502    K 4.5 06/20/2024 1502    CL 99 06/20/2024 1502    CO2 30 (H)  06/20/2024 1502    BUN 22 06/20/2024 1502    CREATININE 1.2 06/20/2024 1502    CREATININE 0.8 01/31/2013 1625     (H) 06/20/2024 1502        Component Value Date/Time    CALCIUM 9.2 06/20/2024 1502    CALCIUM 9.2 01/31/2013 1625    ALKPHOS 67 06/20/2024 1502    AST 22 06/20/2024 1502    ALT 31 06/20/2024 1502    BILITOT 0.4 06/20/2024 1502    ESTGFRAFRICA >60.0 07/12/2022 1119    EGFRNONAA 53.4 (A) 07/12/2022 1119            ..  Lab Results   Component Value Date    CHOL 141 06/20/2024    CHOL 143 08/09/2023    CHOL 182 07/12/2022     Lab Results   Component Value Date    HDL 48 06/20/2024    HDL 48 08/09/2023    HDL 65 07/12/2022     Lab Results   Component Value Date    LDLCALC 33.0 (L) 06/20/2024    LDLCALC 57.8 (L) 08/09/2023    LDLCALC 83.0 07/12/2022     Lab Results   Component Value Date    TRIG 300 (H) 06/20/2024    TRIG 186 (H) 08/09/2023    TRIG 170 (H) 07/12/2022     Lab Results   Component Value Date    CHOLHDL 34.0 06/20/2024    CHOLHDL 33.6 08/09/2023    CHOLHDL 35.7 07/12/2022     ..  Lab Results   Component Value Date    WBC 7.66 08/09/2023    HGB 14.1 06/20/2024    HCT 40.4 08/09/2023    MCV 99 (H) 08/09/2023     (H) 08/09/2023       Test(s) Reviewed  I have reviewed the following in detail:  [] Stress test   [] Angiography   [] Echocardiogram   [] Labs   [] Other:       Assessment:         ICD-10-CM ICD-9-CM   1. Coronary artery disease of native artery of native heart with stable angina pectoris  I25.118 414.01     413.9   2. Atherosclerosis of aorta  I70.0 440.0   3. PAF (paroxysmal atrial fibrillation)  I48.0 427.31   4. Essential (primary) hypertension  I10 401.9   5. Mild pulmonary hypertension  I27.20 416.8   6. Long term (current) use of anticoagulants  Z79.01 V58.61   7. Severe obesity (BMI 35.0-39.9) with comorbidity  E66.01 278.01   8. Carotid artery plaque, bilateral  I65.23 433.10     433.30   9. Other hyperlipidemia  E78.49 272.4     Problem List Items Addressed This  Visit          Cardiac/Vascular    Essential (primary) hypertension    Relevant Orders    Hypertension Digital Medicine (HDMP) Enrollment Order (Completed)    Comprehensive Metabolic Panel (Completed)    Atherosclerosis of aorta    PAF (paroxysmal atrial fibrillation)    Other hyperlipidemia    Relevant Orders    Comprehensive Metabolic Panel (Completed)    Lipid Panel (Completed)    Mild pulmonary hypertension    Coronary artery disease of native artery of native heart with stable angina pectoris - Primary    Relevant Orders    Comprehensive Metabolic Panel (Completed)    Lipid Panel (Completed)    Carotid artery plaque, bilateral       Hematology    Long term (current) use of anticoagulants    Relevant Orders    Hemoglobin (Completed)       Endocrine    Severe obesity (BMI 35.0-39.9) with comorbidity        Plan:         CHECK LABS ,WEIGHT LOSS, DIGITAL HTN,ALL CV CLINICALLY STABLE, NO ANGINA, NO HF, NO TIA, NO CLINICAL ARRHYTHMIA,CONTINUE CURRENT MEDS, EDUCATION, DIET, EXERCISE , RETURN TO CLINIC IN 6 MO  Coronary artery disease of native artery of native heart with stable angina pectoris  -     Comprehensive Metabolic Panel; Future; Expected date: 06/20/2024  -     Lipid Panel; Future; Expected date: 06/20/2024    Atherosclerosis of aorta  Comments:  NO EMBOLIZATION    PAF (paroxysmal atrial fibrillation)    Essential (primary) hypertension  -     Hypertension Digital Medicine (HDMP) Enrollment Order  -     Comprehensive Metabolic Panel; Future; Expected date: 06/20/2024    Mild pulmonary hypertension    Long term (current) use of anticoagulants  -     Hemoglobin; Future; Expected date: 06/20/2024    Severe obesity (BMI 35.0-39.9) with comorbidity    Carotid artery plaque, bilateral    Other hyperlipidemia  -     Comprehensive Metabolic Panel; Future; Expected date: 06/20/2024  -     Lipid Panel; Future; Expected date: 06/20/2024    Other orders  -     losartan (COZAAR) 50 MG tablet; Take 1 tablet (50 mg total)  by mouth once daily. Take 25 mg daily if bp is 140 or above  Dispense: 90 tablet; Refill: 3    RTC Low level/low impact aerobic exercise 5x's/wk. Heart healthy diet and risk factor modification.    See labs and med orders.    Aerobic exercise 5x's/wk. Heart healthy diet and risk factor modification.    See labs and med orders.

## 2024-06-21 DIAGNOSIS — I10 ESSENTIAL (PRIMARY) HYPERTENSION: Primary | ICD-10-CM

## 2024-06-21 RX ORDER — LOSARTAN POTASSIUM 50 MG/1
50 TABLET ORAL DAILY
Qty: 90 TABLET | Refills: 3 | Status: SHIPPED | OUTPATIENT
Start: 2024-06-21

## 2024-06-23 ENCOUNTER — PATIENT MESSAGE (OUTPATIENT)
Dept: FAMILY MEDICINE | Facility: CLINIC | Age: 79
End: 2024-06-23
Payer: MEDICARE

## 2024-06-24 RX ORDER — TIZANIDINE 4 MG/1
4 TABLET ORAL NIGHTLY
Qty: 90 TABLET | Refills: 0 | Status: SHIPPED | OUTPATIENT
Start: 2024-06-24 | End: 2024-09-22

## 2024-06-24 NOTE — TELEPHONE ENCOUNTER
No care due was identified.  [facility] Embedded Care Due Messages. Reference number: [id number].   6/24/2024 8:29:24 AM CDT

## 2024-06-26 ENCOUNTER — TELEPHONE (OUTPATIENT)
Dept: CARDIOLOGY | Facility: CLINIC | Age: 79
End: 2024-06-26
Payer: MEDICARE

## 2024-06-27 ENCOUNTER — OFFICE VISIT (OUTPATIENT)
Dept: DERMATOLOGY | Facility: CLINIC | Age: 79
End: 2024-06-27
Payer: MEDICARE

## 2024-06-27 DIAGNOSIS — Q82.5 CONGENITAL NEVUS: ICD-10-CM

## 2024-06-27 DIAGNOSIS — L57.8 ACTINIC SKIN DAMAGE: ICD-10-CM

## 2024-06-27 DIAGNOSIS — L57.0 AK (ACTINIC KERATOSIS): Primary | ICD-10-CM

## 2024-06-27 DIAGNOSIS — L82.0 SEBORRHEIC KERATOSES, INFLAMED: ICD-10-CM

## 2024-06-27 DIAGNOSIS — L82.1 SEBORRHEIC KERATOSIS: ICD-10-CM

## 2024-06-27 DIAGNOSIS — D18.01 CHERRY ANGIOMA: ICD-10-CM

## 2024-06-27 DIAGNOSIS — D22.9 MULTIPLE BENIGN NEVI: ICD-10-CM

## 2024-06-27 DIAGNOSIS — D36.10 NEUROFIBROMA: ICD-10-CM

## 2024-06-27 DIAGNOSIS — D48.5 NEOPLASM OF UNCERTAIN BEHAVIOR OF SKIN: ICD-10-CM

## 2024-06-27 PROCEDURE — 11102 TANGNTL BX SKIN SINGLE LES: CPT | Mod: S$GLB,,, | Performed by: STUDENT IN AN ORGANIZED HEALTH CARE EDUCATION/TRAINING PROGRAM

## 2024-06-27 PROCEDURE — 17003 DESTRUCT PREMALG LES 2-14: CPT | Mod: S$GLB,,, | Performed by: STUDENT IN AN ORGANIZED HEALTH CARE EDUCATION/TRAINING PROGRAM

## 2024-06-27 PROCEDURE — 1159F MED LIST DOCD IN RCRD: CPT | Mod: CPTII,S$GLB,, | Performed by: STUDENT IN AN ORGANIZED HEALTH CARE EDUCATION/TRAINING PROGRAM

## 2024-06-27 PROCEDURE — 99213 OFFICE O/P EST LOW 20 MIN: CPT | Mod: 25,S$GLB,, | Performed by: STUDENT IN AN ORGANIZED HEALTH CARE EDUCATION/TRAINING PROGRAM

## 2024-06-27 PROCEDURE — 1126F AMNT PAIN NOTED NONE PRSNT: CPT | Mod: CPTII,S$GLB,, | Performed by: STUDENT IN AN ORGANIZED HEALTH CARE EDUCATION/TRAINING PROGRAM

## 2024-06-27 PROCEDURE — 3288F FALL RISK ASSESSMENT DOCD: CPT | Mod: CPTII,S$GLB,, | Performed by: STUDENT IN AN ORGANIZED HEALTH CARE EDUCATION/TRAINING PROGRAM

## 2024-06-27 PROCEDURE — 1160F RVW MEDS BY RX/DR IN RCRD: CPT | Mod: CPTII,S$GLB,, | Performed by: STUDENT IN AN ORGANIZED HEALTH CARE EDUCATION/TRAINING PROGRAM

## 2024-06-27 PROCEDURE — 1101F PT FALLS ASSESS-DOCD LE1/YR: CPT | Mod: CPTII,S$GLB,, | Performed by: STUDENT IN AN ORGANIZED HEALTH CARE EDUCATION/TRAINING PROGRAM

## 2024-06-27 PROCEDURE — 17000 DESTRUCT PREMALG LESION: CPT | Mod: XS,S$GLB,, | Performed by: STUDENT IN AN ORGANIZED HEALTH CARE EDUCATION/TRAINING PROGRAM

## 2024-06-27 NOTE — PROGRESS NOTES
Subjective:      Patient ID:  Sam Pete is a 79 y.o. male who presents for   Chief Complaint   Patient presents with    Skin Check     UBSC     LOV 12/13/2022    Patient is coming in today for an UBSC. States that he has a spot on his left upper arm and left side that he is concerned about.     Derm Hx  Hx of AK's  Denies Phx NMSC  Denies Fhx MM      Review of Systems   Constitutional:  Negative for fever, chills and fatigue.   Respiratory:  Negative for cough and shortness of breath.    Gastrointestinal:  Negative for nausea, vomiting and diarrhea.   Skin:  Positive for activity-related sunscreen use and wears hat. Negative for daily sunscreen use.   Hematologic/Lymphatic: Bruises/bleeds easily (asa).        Bruises easily       Objective:   Physical Exam   Constitutional: He appears well-developed and well-nourished. No distress.   Neurological: He is alert and oriented to person, place, and time. He is not disoriented.   Psychiatric: He has a normal mood and affect.   Skin:   Areas Examined (abnormalities noted in diagram):   Scalp / Hair Palpated and Inspected  Head / Face Inspection Performed  Neck Inspection Performed  Chest / Axilla Inspection Performed  Abdomen Inspection Performed  Back Inspection Performed  RUE Inspected  LUE Inspection Performed  Nails and Digits Inspection Performed                 Diagram Legend     Erythematous scaling macule/papule c/w actinic keratosis       Vascular papule c/w angioma      Pigmented verrucoid papule/plaque c/w seborrheic keratosis      Yellow umbilicated papule c/w sebaceous hyperplasia      Irregularly shaped tan macule c/w lentigo     1-2 mm smooth white papules consistent with Milia      Movable subcutaneous cyst with punctum c/w epidermal inclusion cyst      Subcutaneous movable cyst c/w pilar cyst      Firm pink to brown papule c/w dermatofibroma      Pedunculated fleshy papule(s) c/w skin tag(s)      Evenly pigmented macule c/w junctional nevus      Mildly variegated pigmented, slightly irregular-bordered macule c/w mildly atypical nevus      Flesh colored to evenly pigmented papule c/w intradermal nevus       Pink pearly papule/plaque c/w basal cell carcinoma      Erythematous hyperkeratotic cursted plaque c/w SCC      Surgical scar with no sign of skin cancer recurrence      Open and closed comedones      Inflammatory papules and pustules      Verrucoid papule consistent consistent with wart     Erythematous eczematous patches and plaques     Dystrophic onycholytic nail with subungual debris c/w onychomycosis     Umbilicated papule    Erythematous-base heme-crusted tan verrucoid plaque consistent with inflamed seborrheic keratosis     Erythematous Silvery Scaling Plaque c/w Psoriasis     See annotation      Assessment / Plan:      Pathology Orders:       Normal Orders This Visit    Specimen to Pathology, Dermatology     Comments:    Number of Specimens:->1  ------------------------->-------------------------  Spec 1 Procedure:->Biopsy  Spec 1 Clinical Impression:->BLK vs. BCC  Spec 1 Source:->right chest    Questions:    Procedure Type: Dermatology and skin neoplasms    Number of Specimens: 1    ------------------------: -------------------------    Spec 1 Procedure: Biopsy    Spec 1 Clinical Impression: BLK vs. BCC    Spec 1 Source: right chest    Release to patient:           AK (actinic keratosis)  Cryosurgery Procedure Note    Verbal consent from the patient is obtained and the patient is aware of the precancerous quality and need for treatment of these lesions. Liquid nitrogen cryosurgery is applied to the 8 actinic keratoses, as detailed in the physical exam, to produce a freeze injury. The patient is aware that blisters may form and is instructed on wound care with gentle cleansing and use of vaseline ointment to keep moist until healed. The patient is supplied a handout on cryosurgery and is instructed to call if lesions do not completely  resolve.    Multiple benign nevi  Congenital nevus  Careful dermoscopy evaluation of nevi performed with none identified as needing biopsy today  Monitor for new mole or moles that are becoming bigger, darker, irritated, or developing irregular borders.     Neoplasm of uncertain behavior of skin x1  -     Specimen to Pathology, Dermatology  Shave biopsy procedure note:    Shave biopsy performed after verbal consent including risk of infection, scar, recurrence, need for additional treatment of site. Area prepped with alcohol, anesthetized with approximately 1.0cc of 1% lidocaine with epinephrine. Lesional tissue shaved with razor blade. Hemostasis achieved with application of aluminum chloride followed by hyfrecation. No complications. Dressing applied. Wound care explained.    Seborrheic keratosis  Seborrheic keratoses, inflamed  These are benign inherited growths without a malignant potential. Reassurance given to patient. No treatment is necessary.     Cherry angioma  This is a benign vascular lesion. Reassurance given. No treatment required.     Actinic skin damage  Upper body skin examination performed today including at least 9 points as noted in physical examination. No lesions suspicious for malignancy noted.  Patient instructed in importance in daily broad spectrum sun protection of at least spf 30. Mineral sunscreen ingredients preferred (Zinc +/- Titanium) and can be found OTC.   Patient encouraged to wear hat for all outdoor exposure.   Also discussed sun avoidance and use of protective clothing.    Neurofibroma  Reassurance  Monitoring        6 months    No follow-ups on file.

## 2024-07-01 NOTE — PATIENT INSTRUCTIONS
CRYOSURGERY      Your doctor has used a method called cryosurgery to treat your skin condition. Cryosurgery refers to the use of very cold substances to treat a variety of skin conditions such as warts, pre-skin cancers, molluscum contagiosum, sun spots, and several benign growths. The substance we use in cryosurgery is liquid nitrogen and is so cold (-195 degrees Celsius) that is burns when administered.     Following treatment in the office, the skin may immediately burn and become red. You may find the area around the lesion is affected as well. It is sometimes necessary to treat not only the lesion, but a small area of the surrounding normal skin to achieve a good response.     A blister, and even a blood filled blister, may form after treatment.   This is a normal response. If the blister is painful, it is acceptable to sterilize a needle and with rubbing alcohol and gently pop the blister. It is important that you gently wash the area with soap and warm water as the blister fluid may contain wart virus if a wart was treated. Do no remove the roof of the blister.     The area treated can take anywhere from 1-3 weeks to heal. Healing time depends on the kind skin lesion treated, the location, and how aggressively the lesion was treated. It is recommended that the areas treated are covered with Vaseline or bacitracin ointment and a band-aid. If a band-aid is not practical, just ointment applied several times per day will do. Keeping these areas moist will speed the healing time.    Treatment with liquid nitrogen can leave a scar. In dark skin, it may be a light or dark scar, in light skin it may be a white or pink scar. These will generally fade with time, but may never go away completely.     If you have any concerns after your treatment, please feel free to call the office.       [address] [facility] [place] [address]/ [phone] FAX/ [email or web address]  Shave Biopsy Wound Care    Your  doctor has performed a shave biopsy today.  A band aid and vaseline ointment has been placed over the site.  This should remain in place for 24 hours.  It is recommended that you keep the area dry for the first 24 hours.  After 24 hours, you may remove the band aid and wash the area with warm soap and water and apply Vaseline jelly.  Many patients prefer to use Neosporin or Bacitracin ointment.  This is acceptable; however, know that you can develop an allergy to this medication even if you have used it safely for years.  It is important to keep the area moist.  Letting it dry out and get air slows healing time, and will worsen the scar.  Band aid is optional after first 24 hours.      If you notice increasing redness, tenderness, pain, or yellow drainage at the biopsy site, please notify your doctor.  These are signs of an infection.    If your biopsy site is bleeding, apply firm pressure for 15 minutes straight.  Repeat for another 15 minutes, if it is still bleeding.   If the surgical site continues to bleed, then please contact your doctor.      [address] [place] [address]/ [phone] FAX/ [email or web address]

## 2024-07-10 ENCOUNTER — TELEPHONE (OUTPATIENT)
Dept: DERMATOLOGY | Facility: CLINIC | Age: 79
End: 2024-07-10
Payer: MEDICARE

## 2024-07-10 NOTE — TELEPHONE ENCOUNTER
----- Message from Vivian Nagel sent at 7/10/2024  2:22 PM CDT -----  Contact: Patient  Type:  Patient Returning Call    Who Called:  Patient  Who Left Message for Patient:  Didn't leave name  Does the patient know what this is regarding?:  No idea    Would the patient rather a call back or a response via [facility]?   Call back  Best Call Back Number:  [phone]    Additional Information:   States he is returning a missed call - please call back - thank you

## 2024-07-11 ENCOUNTER — OFFICE VISIT (OUTPATIENT)
Dept: URGENT CARE | Facility: CLINIC | Age: 79
End: 2024-07-11
Payer: MEDICARE

## 2024-07-11 VITALS
HEIGHT: 67 IN | DIASTOLIC BLOOD PRESSURE: 87 MMHG | OXYGEN SATURATION: 96 % | TEMPERATURE: 99 F | WEIGHT: 240 LBS | BODY MASS INDEX: 37.67 KG/M2 | SYSTOLIC BLOOD PRESSURE: 146 MMHG | RESPIRATION RATE: 16 BRPM | HEART RATE: 81 BPM

## 2024-07-11 DIAGNOSIS — R05.9 COUGH, UNSPECIFIED TYPE: Primary | ICD-10-CM

## 2024-07-11 PROCEDURE — 71046 X-RAY EXAM CHEST 2 VIEWS: CPT | Mod: S$GLB,,, | Performed by: RADIOLOGY

## 2024-07-11 PROCEDURE — 99213 OFFICE O/P EST LOW 20 MIN: CPT | Mod: S$GLB,,, | Performed by: PHYSICIAN ASSISTANT

## 2024-07-11 RX ORDER — ALBUTEROL SULFATE 90 UG/1
2 AEROSOL, METERED RESPIRATORY (INHALATION) EVERY 6 HOURS PRN
Qty: 18 G | Refills: 0 | Status: SHIPPED | OUTPATIENT
Start: 2024-07-11

## 2024-07-11 RX ORDER — DOXYCYCLINE HYCLATE 100 MG
100 TABLET ORAL 2 TIMES DAILY
Qty: 20 TABLET | Refills: 0 | Status: SHIPPED | OUTPATIENT
Start: 2024-07-11 | End: 2024-07-21

## 2024-07-11 RX ORDER — BENZONATATE 100 MG/1
200 CAPSULE ORAL 3 TIMES DAILY PRN
Qty: 30 CAPSULE | Refills: 1 | Status: SHIPPED | OUTPATIENT
Start: 2024-07-11 | End: 2024-07-21

## 2024-07-11 NOTE — PROGRESS NOTES
"Subjective:      Patient ID: Sam Pete is a 79 y.o. male.    Vitals:  height is 5' 7" (1.702 m) and weight is 108.9 kg (240 lb). His oral temperature is 98.6 °F (37 °C). His blood pressure is 146/87 (abnormal) and his pulse is 81. His respiration is 16 and oxygen saturation is 96%.     Chief Complaint: Cough    Other  This is a new problem. The current episode started 1 to 4 weeks ago. The problem occurs constantly. The problem has been gradually worsening. Associated symptoms include congestion and coughing. Pertinent negatives include no abdominal pain, anorexia, arthralgias, change in bowel habit, chest pain, chills, diaphoresis, fatigue, fever, headaches, joint swelling, myalgias, nausea, neck pain, numbness, rash, sore throat, swollen glands, urinary symptoms, vertigo, visual change, vomiting or weakness. Nothing aggravates the symptoms. Treatments tried: OTC meds. The treatment provided mild relief.       Constitution: Negative for chills, sweating, fatigue and fever.   HENT:  Positive for congestion, postnasal drip, sinus pain and sinus pressure. Negative for ear pain, drooling, nosebleeds, foreign body in nose, sore throat, trouble swallowing and voice change.    Neck: Negative for neck pain, neck stiffness, painful lymph nodes and neck swelling.   Cardiovascular:  Negative for chest pain, leg swelling, palpitations, sob on exertion and passing out.   Eyes:  Negative for eye discharge, eye itching, eye pain, eye redness and eyelid swelling.   Respiratory:  Positive for cough and sputum production. Negative for chest tightness, bloody sputum, shortness of breath, stridor and wheezing.    Gastrointestinal:  Negative for abdominal pain, abdominal bloating, nausea, vomiting, constipation, diarrhea and heartburn.   Genitourinary:  Negative for urine decreased.   Musculoskeletal:  Negative for joint pain, joint swelling, abnormal ROM of joint, pain with walking, muscle cramps and muscle ache.   Skin:  " Negative for rash and hives.   Allergic/Immunologic: Negative for hives, itching and sneezing.   Neurological:  Negative for dizziness, history of vertigo, light-headedness, passing out, facial drooping, speech difficulty, loss of balance, headaches, altered mental status, loss of consciousness, numbness and seizures.   Hematologic/Lymphatic: Negative for swollen lymph nodes.   Psychiatric/Behavioral:  Negative for altered mental status and nervous/anxious. The patient is not nervous/anxious.       Objective:     Physical Exam   Constitutional: He is oriented to person, place, and time. He appears well-developed. He is cooperative.  Non-toxic appearance. He does not appear ill. No distress.   HENT:   Head: Normocephalic and atraumatic.   Ears:   Right Ear: Hearing, tympanic membrane, external ear and ear canal normal.   Left Ear: Hearing, tympanic membrane, external ear and ear canal normal.   Nose: Mucosal edema and rhinorrhea present. No nasal deformity. No epistaxis. Right sinus exhibits no maxillary sinus tenderness and no frontal sinus tenderness. Left sinus exhibits no maxillary sinus tenderness and no frontal sinus tenderness.   Mouth/Throat: Uvula is midline and mucous membranes are normal. No trismus in the jaw. Normal dentition. No uvula swelling. Posterior oropharyngeal erythema and cobblestoning present. No oropharyngeal exudate, posterior oropharyngeal edema or tonsillar abscesses. No tonsillar exudate.   Eyes: Conjunctivae and lids are normal. No scleral icterus.   Neck: Trachea normal and phonation normal. Neck supple. No edema present. No erythema present. No neck rigidity present.   Cardiovascular: Normal rate, regular rhythm, normal heart sounds and normal pulses.   Pulmonary/Chest: Effort normal and breath sounds normal. No accessory muscle usage or stridor. No respiratory distress. He has no decreased breath sounds. He has no wheezes. He has no rhonchi. He has no rales.   Abdominal: Normal  appearance.   Musculoskeletal: Normal range of motion.         General: No deformity. Normal range of motion.   Lymphadenopathy:     He has no cervical adenopathy.   Neurological: He is alert and oriented to person, place, and time. He has normal sensation. He exhibits normal muscle tone. Gait normal. Coordination normal.   Skin: Skin is warm, dry, intact, not diaphoretic, not pale and no rash. Capillary refill takes less than 2 seconds.   Psychiatric: His speech is normal and behavior is normal. Judgment and thought content normal.   Nursing note and vitals reviewed.    X-Ray Chest PA And Lateral    Result Date: 7/11/2024  EXAMINATION: XR CHEST PA AND LATERAL CLINICAL HISTORY: Cough, unspecified TECHNIQUE: PA and lateral views of the chest were performed. COMPARISON: Chest x-ray of November 21, 2023 FINDINGS: Calcification is noted in the aorta.  TENS electrodes are noted in the lower thoracic spinal canal.  A left atrial appendage exclusion device is noted in position.  Calcification is noted in the aorta.  The cardiac size and contours within normal limits.  No intrapulmonary mass or infiltrate is seen.  Patient has had a right shoulder arthroplasty.     A left atrial appendage exclusion device is noted in position.  Patient has had prior right shoulder arthroplasty and placement of TENS electrodes in the lower thoracic spine.  Calcification is noted in the aorta.  Otherwise negative chest x-ray Electronically signed by: Soto Fam MD Date:    07/11/2024 Time:    12:57     Assessment:     1. Cough, unspecified type        Plan:   Discussed viral versus bacterial versus allergy with patient. Patient reports symptoms for > 10 days, will go ahead with antibiotics RX at this time. Advised close follow-up with PCP and/or Specialist for further evaluation as needed. ER precautions given to patient as well. Patient aware, verbalized understanding and agreed with plan of care.    Cough, unspecified type  -      X-Ray Chest PA And Lateral; Future; Expected date: 07/11/2024    Other orders  -     doxycycline (VIBRA-TABS) 100 MG tablet; Take 1 tablet (100 mg total) by mouth 2 (two) times daily. DO NOT TAKE ON EMPTY STOMACH. PLEASE TAKE WITH FOOD.  Dispense: 20 tablet; Refill: 0  -     benzonatate (TESSALON PERLES) 100 MG capsule; Take 2 capsules (200 mg total) by mouth 3 (three) times daily as needed for Cough.  Dispense: 30 capsule; Refill: 1  -     albuterol (VENTOLIN HFA) 90 mcg/actuation inhaler; Inhale 2 puffs into the lungs every 6 (six) hours as needed for Wheezing or Shortness of Breath (cough). Rescue  Dispense: 18 g; Refill: 0    There are no Patient Instructions on file for this visit.

## 2024-07-15 ENCOUNTER — PATIENT MESSAGE (OUTPATIENT)
Dept: FAMILY MEDICINE | Facility: CLINIC | Age: 79
End: 2024-07-15
Payer: MEDICARE

## 2024-07-15 DIAGNOSIS — F11.20 UNCOMPLICATED OPIOID DEPENDENCE: ICD-10-CM

## 2024-07-16 RX ORDER — MORPHINE SULFATE 15 MG/1
15 TABLET, FILM COATED, EXTENDED RELEASE ORAL EVERY 8 HOURS
Qty: 90 TABLET | Refills: 0 | Status: SHIPPED | OUTPATIENT
Start: 2024-07-16

## 2024-07-16 NOTE — TELEPHONE ENCOUNTER
No care due was identified.  [facility] Embedded Care Due Messages. Reference number: [id number].   7/16/2024 9:55:34 AM CDT

## 2024-07-22 DIAGNOSIS — Z90.89 S/P PARATHYROIDECTOMY: ICD-10-CM

## 2024-07-22 DIAGNOSIS — Z98.890 S/P PARATHYROIDECTOMY: ICD-10-CM

## 2024-07-22 DIAGNOSIS — R60.9 DEPENDENT EDEMA: ICD-10-CM

## 2024-07-22 RX ORDER — FUROSEMIDE 40 MG/1
40 TABLET ORAL DAILY
Qty: 90 TABLET | Refills: 1 | Status: SHIPPED | OUTPATIENT
Start: 2024-07-22 | End: 2025-07-22

## 2024-07-22 RX ORDER — CALCITRIOL 0.5 UG/1
CAPSULE ORAL
Qty: 90 CAPSULE | Refills: 3 | Status: SHIPPED | OUTPATIENT
Start: 2024-07-22

## 2024-07-22 RX ORDER — BUPROPION HYDROCHLORIDE 150 MG/1
TABLET ORAL
Qty: 90 TABLET | Refills: 3 | Status: SHIPPED | OUTPATIENT
Start: 2024-07-22

## 2024-07-22 NOTE — TELEPHONE ENCOUNTER
Refill Routing Note   Medication(s) are not appropriate for processing by [facility] for the following reason(s):        Required vitals abnormal    [facility] action(s):  Defer               Appointments  past 12m or future 3m with PCP    Date Provider   Last Visit   5/1/2024 Ty Montalvo MD   Next Visit   8/5/2024 Ty Montalvo MD   ED visits in past 90 days: 0        Note composed:4:45 AM 07/22/2024

## 2024-07-22 NOTE — TELEPHONE ENCOUNTER
[facility] ambulatory encounter    URGENT CARE OFFICE VISIT    CHIEF COMPLAINT:    Chief Complaint   Patient presents with   • Fever     2 day hx       SUBJECTIVE:  Lacey Bahena is a 81 year old female, who presents with 2 days of cough fever body ache and headache. Said she had fever to 101.1 Fahrenheit yesterday.  Her cough is been dry. Muscles in her legs ache quite a bit. Been taking Tylenol for comfort. She denies chest tightness or discomfort.    REVIEW OF SYSTEMS:  A review of systems was performed and findings relevant to this complaint are included in the HPI.    HISTORIES:  ALLERGIES:  No Known Allergies       OBJECTIVE:  PHYSICAL EXAM:  Visit Vitals  /64   Pulse 94   Temp 100.2 °F (37.9 °C) (Temporal Artery)   Resp 20   SpO2 94%       CONSTITUTIONAL: Well-hydrated, well-nourished female who appears to be in no acute distress.   Eyes: Conjunctiva clear.  No icterus present  Ears: Otoscopy demonstrated bilaterally clear tympanic membranes with normal anatomy.   Nose: Normal nasal turbinates without discharge.  Oral: mucosa without lesion.  Posterior pharynx without tissue edema, erythema or exudate  NECK:  full range of motion without tenderness  LUNGS: Easy work of breathing on room air.  No wheezing, crackles or rhonchi noted on auscultation  CARDIOVASCULAR: Regular rhythm.  No murmurs, regurgitation, or gallop.  No edema of extremities.  LYMPHATIC: No lymph nodes palpated in neck, halle-auricular, or supra-clavicular regions  SKIN: Warm and dry. No noted rash.      ASSESSMENT/ PLAN:  1. Influenza-like illness      Orders Placed This Encounter   • oseltamivir (TAMIFLU) 75 MG capsule     -- Symptoms consistent with influenza. As this is documented to be prevalent in the community currently and because this patient is meets criteria for treatment will empirically treat with Tamiflu for 5 days. Discussed possible side effects with patient. Discussed symptom management including rest and hydration.   No care due was identified.  [facility] Embedded Care Due Messages. Reference number: [id number].   7/22/2024 1:28:36 AM CDT   Discussed warning symptoms that should prompt medical re-evaluation.    -- The patient was advised to follow up with their primary physician and to seek immediate medical attention should symptoms get worse or new symptoms appear.    -- The patient indicated understanding of the diagnosis and agreed with the plan of care.

## 2024-07-22 NOTE — TELEPHONE ENCOUNTER
No care due was identified.  [facility] Embedded Care Due Messages. Reference number: [id number].   7/22/2024 9:26:23 AM CDT

## 2024-07-23 DIAGNOSIS — Z86.73 HISTORY OF CVA IN ADULTHOOD: ICD-10-CM

## 2024-07-23 RX ORDER — ATORVASTATIN CALCIUM 40 MG/1
40 TABLET, FILM COATED ORAL
Qty: 90 TABLET | Refills: 3 | Status: SHIPPED | OUTPATIENT
Start: 2024-07-23

## 2024-07-23 NOTE — TELEPHONE ENCOUNTER
No care due was identified.  [facility] Embedded Care Due Messages. Reference number: [id number].   7/23/2024 8:02:31 AM CDT

## 2024-07-23 NOTE — TELEPHONE ENCOUNTER
Refill Decision Note   Sam Pete  is requesting a refill authorization.    Brief Assessment and Rationale for Refill:   Approve       Medication Therapy Plan:         Comments:     Note composed:1:14 PM 07/23/2024

## 2024-07-24 ENCOUNTER — PATIENT MESSAGE (OUTPATIENT)
Dept: FAMILY MEDICINE | Facility: CLINIC | Age: 79
End: 2024-07-24
Payer: MEDICARE

## 2024-08-05 ENCOUNTER — PATIENT MESSAGE (OUTPATIENT)
Dept: FAMILY MEDICINE | Facility: CLINIC | Age: 79
End: 2024-08-05
Payer: MEDICARE

## 2024-08-05 ENCOUNTER — PATIENT MESSAGE (OUTPATIENT)
Dept: CARDIOLOGY | Facility: CLINIC | Age: 79
End: 2024-08-05
Payer: MEDICARE

## 2024-08-05 ENCOUNTER — PATIENT MESSAGE (OUTPATIENT)
Dept: DERMATOLOGY | Facility: CLINIC | Age: 79
End: 2024-08-05
Payer: MEDICARE

## 2024-08-06 ENCOUNTER — TELEPHONE (OUTPATIENT)
Dept: FAMILY MEDICINE | Facility: CLINIC | Age: 79
End: 2024-08-06
Payer: MEDICARE

## 2024-08-08 ENCOUNTER — PATIENT MESSAGE (OUTPATIENT)
Dept: FAMILY MEDICINE | Facility: CLINIC | Age: 79
End: 2024-08-08
Payer: MEDICARE

## 2024-08-14 DIAGNOSIS — R07.9 RIGHT-SIDED CHEST PAIN: ICD-10-CM

## 2024-08-14 DIAGNOSIS — E78.49 OTHER HYPERLIPIDEMIA: ICD-10-CM

## 2024-08-14 DIAGNOSIS — I10 ESSENTIAL (PRIMARY) HYPERTENSION: ICD-10-CM

## 2024-08-14 RX ORDER — METOPROLOL SUCCINATE 25 MG/1
12.5 TABLET, EXTENDED RELEASE ORAL
Qty: 45 TABLET | Refills: 0 | Status: CANCELLED | OUTPATIENT
Start: 2024-08-14

## 2024-08-14 RX ORDER — METOPROLOL SUCCINATE 25 MG/1
12.5 TABLET, EXTENDED RELEASE ORAL
Qty: 45 TABLET | Refills: 0 | Status: SHIPPED | OUTPATIENT
Start: 2024-08-14

## 2024-08-15 ENCOUNTER — OFFICE VISIT (OUTPATIENT)
Dept: CARDIOLOGY | Facility: CLINIC | Age: 79
End: 2024-08-15
Payer: MEDICARE

## 2024-08-15 VITALS
WEIGHT: 238.13 LBS | SYSTOLIC BLOOD PRESSURE: 131 MMHG | BODY MASS INDEX: 37.37 KG/M2 | DIASTOLIC BLOOD PRESSURE: 82 MMHG | HEIGHT: 67 IN | HEART RATE: 75 BPM

## 2024-08-15 DIAGNOSIS — E66.01 SEVERE OBESITY (BMI 35.0-39.9) WITH COMORBIDITY: Chronic | ICD-10-CM

## 2024-08-15 DIAGNOSIS — I10 ESSENTIAL (PRIMARY) HYPERTENSION: Chronic | ICD-10-CM

## 2024-08-15 DIAGNOSIS — I70.0 ATHEROSCLEROSIS OF AORTA: Chronic | ICD-10-CM

## 2024-08-15 DIAGNOSIS — I25.10 CORONARY ARTERY DISEASE INVOLVING NATIVE CORONARY ARTERY OF NATIVE HEART WITHOUT ANGINA PECTORIS: Chronic | ICD-10-CM

## 2024-08-15 DIAGNOSIS — R55 SYNCOPE AND COLLAPSE: Primary | ICD-10-CM

## 2024-08-15 PROCEDURE — 1125F AMNT PAIN NOTED PAIN PRSNT: CPT | Mod: HCNC,CPTII,S$GLB, | Performed by: INTERNAL MEDICINE

## 2024-08-15 PROCEDURE — 1100F PTFALLS ASSESS-DOCD GE2>/YR: CPT | Mod: HCNC,CPTII,S$GLB, | Performed by: INTERNAL MEDICINE

## 2024-08-15 PROCEDURE — 3075F SYST BP GE 130 - 139MM HG: CPT | Mod: HCNC,CPTII,S$GLB, | Performed by: INTERNAL MEDICINE

## 2024-08-15 PROCEDURE — 99214 OFFICE O/P EST MOD 30 MIN: CPT | Mod: HCNC,S$GLB,, | Performed by: INTERNAL MEDICINE

## 2024-08-15 PROCEDURE — 99999 PR PBB SHADOW E&M-EST. PATIENT-LVL III: CPT | Mod: PBBFAC,HCNC,, | Performed by: INTERNAL MEDICINE

## 2024-08-15 PROCEDURE — 3079F DIAST BP 80-89 MM HG: CPT | Mod: HCNC,CPTII,S$GLB, | Performed by: INTERNAL MEDICINE

## 2024-08-15 PROCEDURE — 3288F FALL RISK ASSESSMENT DOCD: CPT | Mod: HCNC,CPTII,S$GLB, | Performed by: INTERNAL MEDICINE

## 2024-08-15 RX ORDER — MULTIVITAMIN
1 TABLET ORAL DAILY
COMMUNITY

## 2024-08-15 NOTE — PROGRESS NOTES
Subjective:    Patient ID:  Sam Pete is a 79 y.o. male who presents for Hospital Follow Up        HPI  S/P [facility], WITH SYNCOPE, LACERATION, JUST GOT BACK FROM [place],THAT NIGHT, FELL ASLEEP IN RECLINER, , ECHO NORMAL , MILD TR, LAE, CT OK, WAS TOLD DEHYDRATED, BROKEN RIBS 2 WEEKS BEFORE, SLID IN CAR, NO SYNCOPE, SEE ROS  . Normal left ventricular systolic function. LVEF of 60 to 65%.    2. LV diastolic function is moderately abnormal (Grade II).    3. Left atrium is mildly dilated.    4. Normal right ventricular systolic function.    5. Mild tricuspid regurgitation.   Past Medical History:   Diagnosis Date    Abnormal nuclear stress test 07/2023    Anticoagulant long-term use     ASA 81 mg    Arthritis     Cataract     OU    Chronic low back pain     Complete rupture of rotator cuff 02/08/2011    DDD (degenerative disc disease), lumbar 07/08/2015    Degeneration of lumbar or lumbosacral intervertebral disc 09/09/2015    ED (erectile dysfunction)     GERD (gastroesophageal reflux disease)     Hypertension     Hypothyroidism, unspecified     Lumbar pseudoarthrosis 06/26/2017    Lumbar stenosis 06/26/2017    Obesity     PSVT (paroxysmal supraventricular tachycardia) 07/2023    SOB (shortness of breath) 07/2023    Spondylosis of lumbar region without myelopathy or radiculopathy 07/08/2015    Stroke 1997    basal ganglia, left sided weakness, resolved    Testicular hypofunction     Thoracic or lumbosacral neuritis or radiculitis 07/08/2015     Past Surgical History:   Procedure Laterality Date    ANGIOGRAM, CORONARY, WITH LEFT HEART CATHETERIZATION  07/13/2023    Procedure: Left Heart Cath;  Surgeon: Salvador Eugene MD;  Location: [facility] CATH;  Service: Cardiology;;    APPENDECTOMY      ARTHROPLASTY OF SHOULDER Right 03/22/2022    Procedure: ARTHROPLASTY, SHOULDER BRENNAN RIGHT SHOULDER HUMERAL HEAD RESURFACING;  Surgeon: Frankie Joya MD;  Location: [facility] OR;  Service: Orthopedics;  Laterality: Right;    BACK  SURGERY      4- back surgery    CAUDAL EPIDURAL STEROID INJECTION N/A 12/16/2021    Procedure: Injection-steroid-epidural-caudal;  Surgeon: Aram Terrell MD;  Location: [facility] OR;  Service: Pain Management;  Laterality: N/A;    CAUDAL EPIDURAL STEROID INJECTION N/A 02/02/2022    Procedure: INJECTION, STEROID, SPINE, EPIDURAL, CAUDAL;  Surgeon: Aram Terrell MD;  Location: [facility] OR;  Service: Pain Management;  Laterality: N/A;    CAUDAL EPIDURAL STEROID INJECTION N/A 07/22/2022    Procedure: Injection-steroid-epidural-caudal;  Surgeon: Aram Terrell MD;  Location: [facility] OR;  Service: Pain Management;  Laterality: N/A;  Caudal CARLOS     CAUDAL EPIDURAL STEROID INJECTION N/A 01/20/2023    Procedure: Injection-steroid-epidural-caudal;  Surgeon: Aram Terrell MD;  Location: [facility] OR;  Service: Pain Management;  Laterality: N/A;  caudal ( melinda)    CAUDAL EPIDURAL STEROID INJECTION N/A 04/21/2023    Procedure: Injection-steroid-epidural-caudal;  Surgeon: Aram Terrell MD;  Location: [facility] OR;  Service: Pain Management;  Laterality: N/A;  caudal    CAUDAL EPIDURAL STEROID INJECTION N/A 07/11/2023    Procedure: Injection-steroid-epidural-caudal;  Surgeon: Aram Terrell MD;  Location: [facility] OR;  Service: Pain Management;  Laterality: N/A;  caudal    CAUDAL EPIDURAL STEROID INJECTION N/A 1/9/2024    Procedure: Injection-steroid-epidural-caudal;  Surgeon: Aram Terrell MD;  Location: [facility] OR;  Service: Anesthesiology;  Laterality: N/A;  caudal    CAUDAL EPIDURAL STEROID INJECTION N/A 6/4/2024    Procedure: Injection-steroid-epidural-caudal;  Surgeon: Aram Terrell MD;  Location: [facility] OR;  Service: Anesthesiology;  Laterality: N/A;    COLONOSCOPY  07/12/2004    [facility].   Redundant tortuous colon, otherwise normal.    COLONOSCOPY N/A 02/25/2019    Procedure: COLONOSCOPY;  Surgeon: Gilbert Rodriguez Jr., MD;  Location: [facility];  Service: Endoscopy;  Laterality: N/A;    CORONARY ANGIOGRAPHY N/A 07/13/2023    Procedure: ANGIOGRAM, CORONARY  ARTERY;  Surgeon: Salvador Eugene MD;  Location: [facility] CATH;  Service: Cardiology;  Laterality: N/A;    CORONARY ARTERY BYPASS GRAFT (CABG) N/A 7/19/2023    Procedure: CORONARY ARTERY BYPASS GRAFT (CABG)- x 4;  Surgeon: Sandrine Wagner MD;  Location: [facility] OR;  Service: Cardiothoracic;  Laterality: N/A;    ENDOSCOPIC HARVEST OF VEIN Left 7/19/2023    Procedure: HARVEST-VEIN-ENDOVASCULAR;  Surgeon: Sandrine Wagner MD;  Location: [facility] OR;  Service: Cardiothoracic;  Laterality: Left;    Epidural steroid injection      Pain management    ESOPHAGOGASTRODUODENOSCOPY  07/12/2004    [facility].    EXCLUSION, LEFT ATRIAL APPENDAGE, OPEN, AS PART OF OPEN CHEST SURGERY N/A 7/19/2023    Procedure: EXCLUSION, LEFT ATRIAL APPENDAGE, OPEN, AS PART OF OPEN CHEST SURGERY;  Surgeon: Sandrine Wagner MD;  Location: [facility] OR;  Service: Cardiothoracic;  Laterality: N/A;    FRACTURE SURGERY Left     wrist / forearm, total of 5    INSERTION OF DORSAL COLUMN NERVE STIMULATOR FOR TRIAL N/A 12/12/2019    Procedure: INSERTION, NEUROSTIMULATOR, SPINAL CORD, DORSAL COLUMN, FOR TRIAL;  Surgeon: Evan Lyles MD;  Location: [facility] OR;  Service: Pain Management;  Laterality: N/A;    JOINT REPLACEMENT  02/06/2013    ( rt hip 2007), left hip     JOINT REPLACEMENT Left     total knee    KNEE ARTHROSCOPY W/ DEBRIDEMENT      bilateral knees , total of six    KNEE SURGERY      LAMINECTOMY USING MINIMALLY INVASIVE TECHNIQUE N/A 02/12/2020    Procedure: LAMINECTOMY, SPINE, MINIMALLY INVASIVE /  PLACEMENT OF SPINAL CORD STIMULATOR;  Surgeon: Darlene Max MD;  Location: [facility] OR;  Service: Neurosurgery;  Laterality: N/A;    Nervbe Block injection      Pain management    SPINAL CORD STIMULATOR IMPLANT      TOTAL SHOULDER ARTHROPLASTY Right 03/28/2022    Dr Joya    TOTAL THYROIDECTOMY Bilateral 03/07/2022    Dr Mendoza     Family History   Problem Relation Name Age of Onset    Hypertension Father      Heart disease Father      Drug abuse Daughter      Hypertension Son       Lung disease Sister      COPD Sister      Hypertension Sister      Diverticulitis Sister      Gout Sister      Kidney disease Sister      No Known Problems Mother      Eczema Neg Hx      Lupus Neg Hx      Psoriasis Neg Hx      Melanoma Neg Hx       Social History     Socioeconomic History    Marital status:    Tobacco Use    Smoking status: Former     Current packs/day: 0.00     Average packs/day: 1 pack/day for 30.0 years (30.0 ttl pk-yrs)     Types: Cigarettes     Start date: 8/3/1963     Quit date: 1992     Years since quittin.6    Smokeless tobacco: Never   Substance and Sexual Activity    Alcohol use: Yes     Comment: On occasion    Drug use: Yes     Types: Oxycodone, Morphine     Social Determinants of Health     Financial Resource Strain: Low Risk  (10/17/2023)    Overall Financial Resource Strain (CARDIA)     Difficulty of Paying Living Expenses: Not hard at all   Recent Concern: Financial Resource Strain - Medium Risk (2023)    Overall Financial Resource Strain (CARDIA)     Difficulty of Paying Living Expenses: Somewhat hard   Food Insecurity: No Food Insecurity (2024)    Received from [facility]    Hunger Vital Sign     Worried About Running Out of Food in the Last Year: Never true     Ran Out of Food in the Last Year: Never true   Transportation Needs: No Transportation Needs (2024)    Received from [facility]    PRAPARE - Transportation     Lack of Transportation (Medical): No     Lack of Transportation (Non-Medical): No   Physical Activity: Sufficiently Active (2024)    Received from [facility]    Exercise Vital Sign     Days of Exercise per Week: 5 days     Minutes of Exercise per Session: 40 min   Stress: No Stress Concern Present (2024)    Received from [facility]    [employer] [place] [employer] - Occupational Stress Questionnaire     Feeling of Stress : Not at all   Housing Stability: Low Risk  (10/17/2023)    Housing Stability Vital Sign      Unable to Pay for Housing in the Last Year: No     Number of Places Lived in the Last Year: 1     Unstable Housing in the Last Year: No       Review of patient's allergies indicates:   Allergen Reactions    Xyzal [levocetirizine] Other (See Comments)     Knocked him out        Current Outpatient Medications:     albuterol (VENTOLIN HFA) 90 mcg/actuation inhaler, Inhale 2 puffs into the lungs every 6 (six) hours as needed for Wheezing or Shortness of Breath (cough). Rescue, Disp: 18 g, Rfl: 0    aspirin 81 MG Chew, Take 81 mg by mouth once daily., Disp: , Rfl:     atorvastatin (LIPITOR) 40 MG tablet, TAKE 1 TABLET ONE TIME DAILY, Disp: 90 tablet, Rfl: 3    buPROPion (WELLBUTRIN XL) 150 MG TB24 tablet, TAKE 1 TABLET EVERY DAY, Disp: 90 tablet, Rfl: 3    calcitRIOL (ROCALTROL) 0.5 MCG Cap, TAKE 1 CAPSULE ONE TIME DAILY, Disp: 90 capsule, Rfl: 3    furosemide (LASIX) 40 MG tablet, Take 1 tablet (40 mg total) by mouth once daily., Disp: 90 tablet, Rfl: 1    levothyroxine (SYNTHROID) 112 MCG tablet, TAKE 2 TABLETS(224 MCG) BY MOUTH EVERY DAY, Disp: 180 tablet, Rfl: 1    losartan (COZAAR) 50 MG tablet, Take 1 tablet (50 mg total) by mouth once daily. Take additional 25 mg daily if bp is 140 or above, Disp: 90 tablet, Rfl: 3    metoprolol succinate (TOPROL-XL) 25 MG 24 hr tablet, TAKE ONE-HALF TABLET (12.5 MG) BY MOUTH EVERY DAY, Disp: 45 tablet, Rfl: 0    multivitamin (THERAGRAN) per tablet, Take 1 tablet by mouth once daily., Disp: , Rfl:     naloxone (NARCAN) 0.4 mg/mL injection, Inject 1 mL (0.4 mg total) into the vein as needed (overdose)., Disp: 2 mL, Rfl: 5    omeprazole (PRILOSEC) 40 MG capsule, Take one capsule by mouth daily, Disp: 90 capsule, Rfl: 3    oxyCODONE-acetaminophen (PERCOCET)  mg per tablet, Take 1 tablet by mouth every 4 (four) hours as needed for Pain., Disp: 120 tablet, Rfl: 0    tiZANidine (ZANAFLEX) 4 MG tablet, Take 1 tablet (4 mg total) by mouth every evening., Disp: 90 tablet, Rfl: 0     UNABLE TO FIND, Take by mouth once daily. Vitafusion multivites gummies, Disp: , Rfl:     morphine (MS CONTIN) 15 MG 12 hr tablet, Take 1 tablet (15 mg total) by mouth every 8 (eight) hours., Disp: 90 tablet, Rfl: 0    [START ON 9/16/2024] morphine (MS CONTIN) 15 MG 12 hr tablet, Take 1 tablet (15 mg total) by mouth every 8 (eight) hours., Disp: 90 tablet, Rfl: 0    oxyCODONE-acetaminophen (PERCOCET)  mg per tablet, Take 1 tablet by mouth every 4 (four) hours as needed for Pain., Disp: 120 tablet, Rfl: 0    [START ON 9/16/2024] oxyCODONE-acetaminophen (PERCOCET)  mg per tablet, Take 1 tablet by mouth every 4 (four) hours as needed for Pain., Disp: 120 tablet, Rfl: 0  No current facility-administered medications for this visit.    Facility-Administered Medications Ordered in Other Visits:     diphenhydrAMINE injection 12.5 mg, 12.5 mg, Intravenous, Once PRN, Enrique Rosales MD    electrolyte-S (ISOLYTE), , Intravenous, Continuous, Enrique Rosales MD    HYDROmorphone (PF) injection 0.2 mg, 0.2 mg, Intravenous, Q5 Min PRN, Enrique Rosales MD    HYDROmorphone (PF) injection 0.2 mg, 0.2 mg, Intravenous, Q5 Min PRN, Enrique Rosales MD    lactated ringers infusion, , Intravenous, Once PRN, Aram Terrell MD    lactated ringers infusion, 10 mL/hr, Intravenous, Continuous, Enrique Rosales MD, Stopped at 07/19/23 0959    lactated ringers infusion, , Intravenous, Continuous, Aram Terrell MD, Last Rate: 25 mL/hr at 07/11/23 1012, New Bag at 07/11/23 1012    lactated ringers infusion, , Intravenous, Continuous, Aram Terrell MD, Last Rate: 25 mL/hr at 01/09/24 1014, New Bag at 01/09/24 1014    lorazepam injection 0.25 mg, 0.25 mg, Intravenous, Once PRN, Enrique Rosales MD    prochlorperazine injection Soln 5 mg, 5 mg, Intravenous, Q30 Min PRN, Enrique Rosales MD    sodium chloride 0.9% flush 3 mL, 3 mL, Intravenous, Q8H, Enrique Rosales MD    Review of Systems  "  Constitutional: Negative for chills, decreased appetite, diaphoresis, fever, malaise/fatigue and night sweats.   HENT:  Negative for congestion and nosebleeds.    Eyes:  Negative for blurred vision and visual disturbance.   Cardiovascular:  Positive for syncope. Negative for chest pain, claudication, cyanosis, dyspnea on exertion, irregular heartbeat, leg swelling, near-syncope, orthopnea, palpitations and paroxysmal nocturnal dyspnea.   Respiratory:  Negative for cough, hemoptysis and shortness of breath.    Endocrine: Negative for polyphagia and polyuria.   Skin:  Negative for color change and rash.   Musculoskeletal:  Positive for arthritis. Negative for back pain and falls.   Gastrointestinal:  Negative for abdominal pain, dysphagia, jaundice, melena and nausea.   Genitourinary:  Negative for dysuria and flank pain.   Neurological:  Negative for brief paralysis, dizziness, focal weakness and light-headedness. Headaches: MINOR.  Psychiatric/Behavioral:  Negative for altered mental status and depression.    Allergic/Immunologic: Negative for hives and persistent infections.        Objective:      Vitals:    08/15/24 1540   BP: 131/82   Pulse: 75   Weight: 108 kg (238 lb 1.6 oz)   Height: 5' 7" (1.702 m)   PainSc:   7   PainLoc: Generalized     Body mass index is 37.29 kg/m².    Physical Exam  Constitutional:       Appearance: Normal appearance. He is obese.   HENT:      Head: Normocephalic and atraumatic.   Eyes:      General: No scleral icterus.     Extraocular Movements: Extraocular movements intact.      Conjunctiva/sclera: Conjunctivae normal.   Neck:      Vascular: No carotid bruit.   Cardiovascular:      Rate and Rhythm: Normal rate and regular rhythm.      Pulses:           Carotid pulses are 2+ on the right side and 2+ on the left side.       Radial pulses are 2+ on the right side and 2+ on the left side.        Posterior tibial pulses are 2+ on the right side and 2+ on the left side.      Heart sounds: " Murmur heard.      Systolic murmur is present.      No friction rub. No gallop.   Pulmonary:      Effort: Pulmonary effort is normal. No respiratory distress.      Breath sounds: Normal breath sounds and air entry.   Abdominal:      Palpations: Abdomen is soft.      Tenderness: There is no abdominal tenderness.      Comments: OBESE   Musculoskeletal:      Cervical back: Neck supple.      Right lower leg: No edema.      Left lower leg: No edema.   Skin:     Capillary Refill: Capillary refill takes less than 2 seconds.   Neurological:      General: No focal deficit present.      Mental Status: He is alert and oriented to person, place, and time.   Psychiatric:         Mood and Affect: Mood normal.         Speech: Speech normal.         Behavior: Behavior normal.                 ..    Chemistry        Component Value Date/Time     08/16/2024 1204    K 4.6 08/16/2024 1204     08/16/2024 1204    CO2 28 08/16/2024 1204    BUN 20 08/16/2024 1204    CREATININE 1.0 08/16/2024 1204    CREATININE 0.8 01/31/2013 1625     08/16/2024 1204        Component Value Date/Time    CALCIUM 9.2 08/16/2024 1204    CALCIUM 9.2 01/31/2013 1625    ALKPHOS 67 06/20/2024 1502    AST 22 06/20/2024 1502    ALT 31 06/20/2024 1502    BILITOT 0.4 06/20/2024 1502    ESTGFRAFRICA >60.0 07/12/2022 1119    EGFRNONAA 53.4 (A) 07/12/2022 1119            ..  Lab Results   Component Value Date    CHOL 141 06/20/2024    CHOL 143 08/09/2023    CHOL 182 07/12/2022     Lab Results   Component Value Date    HDL 48 06/20/2024    HDL 48 08/09/2023    HDL 65 07/12/2022     Lab Results   Component Value Date    LDLCALC 33.0 (L) 06/20/2024    LDLCALC 57.8 (L) 08/09/2023    LDLCALC 83.0 07/12/2022     Lab Results   Component Value Date    TRIG 300 (H) 06/20/2024    TRIG 186 (H) 08/09/2023    TRIG 170 (H) 07/12/2022     Lab Results   Component Value Date    CHOLHDL 34.0 06/20/2024    CHOLHDL 33.6 08/09/2023    CHOLHDL 35.7 07/12/2022     ..  Lab  Results   Component Value Date    WBC 7.66 08/09/2023    HGB 14.1 06/20/2024    HCT 40.4 08/09/2023    MCV 99 (H) 08/09/2023     (H) 08/09/2023       Test(s) Reviewed  I have reviewed the following in detail:  [] Stress test   [] Angiography   [x] Echocardiogram   [x] Labs   [x] Other:       Assessment:         ICD-10-CM ICD-9-CM   1. Syncope and collapse  R55 780.2   2. Coronary artery disease involving native coronary artery of native heart without angina pectoris  I25.10 414.01   3. Atherosclerosis of aorta  I70.0 440.0   4. Essential (primary) hypertension  I10 401.9   5. Severe obesity (BMI 35.0-39.9) with comorbidity  E66.01 278.01     Problem List Items Addressed This Visit          Cardiac/Vascular    Essential (primary) hypertension    Relevant Orders    Hypertension Digital Medicine (HDMP) Enrollment Order (Completed)    Atherosclerosis of aorta    Coronary artery disease involving native coronary artery of native heart without angina pectoris       Endocrine    Severe obesity (BMI 35.0-39.9) with comorbidity       Other    Syncope and collapse - Primary    Relevant Orders    Holter monitor - 72 hour        Plan:     72 HOLTER, AVOID DEHYDRATION,ALL CV CLINICALLY STABLE, NO ANGINA, NO HF, NO TIA, NO CLINICAL ARRHYTHMIA,CONTINUE CURRENT MEDS, EDUCATION, DIET, EXERCISE , STAY ACTIVE WEIGHT LOSS RETURN TO CLINIC IN FEW MONTHS AS SCHEDULED      Syncope and collapse  -     Holter monitor - 72 hour; Future    Coronary artery disease involving native coronary artery of native heart without angina pectoris    Atherosclerosis of aorta  Comments:  NO EMBOLIZATION    Essential (primary) hypertension  -     Hypertension Digital Medicine (HDMP) Enrollment Order    Severe obesity (BMI 35.0-39.9) with comorbidity    RTC Low level/low impact aerobic exercise 5x's/wk. Heart healthy diet and risk factor modification.    See labs and med orders.    Aerobic exercise 5x's/wk. Heart healthy diet and risk factor  modification.    See labs and med orders.

## 2024-08-16 ENCOUNTER — OFFICE VISIT (OUTPATIENT)
Dept: FAMILY MEDICINE | Facility: CLINIC | Age: 79
End: 2024-08-16
Attending: FAMILY MEDICINE
Payer: MEDICARE

## 2024-08-16 ENCOUNTER — LAB VISIT (OUTPATIENT)
Dept: LAB | Facility: HOSPITAL | Age: 79
End: 2024-08-16
Attending: FAMILY MEDICINE
Payer: MEDICARE

## 2024-08-16 VITALS
SYSTOLIC BLOOD PRESSURE: 132 MMHG | HEART RATE: 79 BPM | OXYGEN SATURATION: 98 % | BODY MASS INDEX: 37.83 KG/M2 | DIASTOLIC BLOOD PRESSURE: 72 MMHG | WEIGHT: 241.06 LBS | TEMPERATURE: 98 F | HEIGHT: 67 IN

## 2024-08-16 DIAGNOSIS — R55 SYNCOPE AND COLLAPSE: ICD-10-CM

## 2024-08-16 DIAGNOSIS — S01.01XA LACERATION OF OCCIPITAL SCALP, INITIAL ENCOUNTER: ICD-10-CM

## 2024-08-16 DIAGNOSIS — N17.9 AKI (ACUTE KIDNEY INJURY): ICD-10-CM

## 2024-08-16 DIAGNOSIS — F11.20 UNCOMPLICATED OPIOID DEPENDENCE: ICD-10-CM

## 2024-08-16 DIAGNOSIS — G89.4 CHRONIC PAIN SYNDROME: Primary | ICD-10-CM

## 2024-08-16 DIAGNOSIS — M48.061 SPINAL STENOSIS, LUMBAR REGION, WITHOUT NEUROGENIC CLAUDICATION: ICD-10-CM

## 2024-08-16 LAB
ANION GAP SERPL CALC-SCNC: 9 MMOL/L (ref 8–16)
BUN SERPL-MCNC: 20 MG/DL (ref 8–23)
CALCIUM SERPL-MCNC: 9.2 MG/DL (ref 8.7–10.5)
CHLORIDE SERPL-SCNC: 100 MMOL/L (ref 95–110)
CO2 SERPL-SCNC: 28 MMOL/L (ref 23–29)
CREAT SERPL-MCNC: 1 MG/DL (ref 0.5–1.4)
EST. GFR  (NO RACE VARIABLE): >60 ML/MIN/1.73 M^2
GLUCOSE SERPL-MCNC: 108 MG/DL (ref 70–110)
POTASSIUM SERPL-SCNC: 4.6 MMOL/L (ref 3.5–5.1)
SODIUM SERPL-SCNC: 137 MMOL/L (ref 136–145)

## 2024-08-16 PROCEDURE — 80048 BASIC METABOLIC PNL TOTAL CA: CPT | Performed by: FAMILY MEDICINE

## 2024-08-16 PROCEDURE — 36415 COLL VENOUS BLD VENIPUNCTURE: CPT | Performed by: FAMILY MEDICINE

## 2024-08-16 PROCEDURE — 99999 PR PBB SHADOW E&M-EST. PATIENT-LVL IV: CPT | Mod: PBBFAC,HCNC,, | Performed by: FAMILY MEDICINE

## 2024-08-16 RX ORDER — OXYCODONE AND ACETAMINOPHEN 10; 325 MG/1; MG/1
1 TABLET ORAL EVERY 4 HOURS PRN
Qty: 120 TABLET | Refills: 0 | Status: SHIPPED | OUTPATIENT
Start: 2024-09-16

## 2024-08-16 RX ORDER — MORPHINE SULFATE 15 MG/1
15 TABLET, FILM COATED, EXTENDED RELEASE ORAL EVERY 8 HOURS
Qty: 90 TABLET | Refills: 0 | Status: SHIPPED | OUTPATIENT
Start: 2024-08-16

## 2024-08-16 RX ORDER — OXYCODONE AND ACETAMINOPHEN 10; 325 MG/1; MG/1
1 TABLET ORAL EVERY 4 HOURS PRN
Qty: 120 TABLET | Refills: 0 | Status: SHIPPED | OUTPATIENT
Start: 2024-08-16

## 2024-08-16 RX ORDER — MORPHINE SULFATE 15 MG/1
15 TABLET, FILM COATED, EXTENDED RELEASE ORAL EVERY 8 HOURS
Qty: 90 TABLET | Refills: 0 | Status: SHIPPED | OUTPATIENT
Start: 2024-09-16
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
## 2024-08-21 ENCOUNTER — HOSPITAL ENCOUNTER (OUTPATIENT)
Dept: CARDIOLOGY | Facility: HOSPITAL | Age: 79
Discharge: HOME | End: 2024-08-21
Attending: INTERNAL MEDICINE
Payer: MEDICARE

## 2024-08-21 DIAGNOSIS — R55 SYNCOPE AND COLLAPSE: ICD-10-CM

## 2024-08-21 PROCEDURE — 93242 EXT ECG>48HR<7D RECORDING: CPT | Mod: HCNC,PO

## 2024-08-21 PROCEDURE — 93243 EXT ECG>48HR<7D SCAN A/R: CPT | Mod: HCNC,PO

## 2024-09-09 ENCOUNTER — TELEPHONE (OUTPATIENT)
Dept: FAMILY MEDICINE | Facility: CLINIC | Age: 79
End: 2024-09-09
Payer: MEDICARE

## 2024-09-09 DIAGNOSIS — F11.20 UNCOMPLICATED OPIOID DEPENDENCE: ICD-10-CM

## 2024-09-09 RX ORDER — OXYCODONE AND ACETAMINOPHEN 10; 325 MG/1; MG/1
1 TABLET ORAL EVERY 4 HOURS PRN
Qty: 120 TABLET | Refills: 0 | Status: CANCELLED | OUTPATIENT
Start: 2024-09-09

## 2024-09-10 NOTE — TELEPHONE ENCOUNTER
No care due was identified.  [facility] Embedded Care Due Messages. Reference number: [id number].   9/09/2024 9:23:58 PM CDT

## 2024-09-12 ENCOUNTER — PATIENT MESSAGE (OUTPATIENT)
Dept: SPINE | Facility: CLINIC | Age: 79
End: 2024-09-12
Payer: MEDICARE

## 2024-09-13 RX ORDER — MORPHINE SULFATE 15 MG/1
15 TABLET, FILM COATED, EXTENDED RELEASE ORAL EVERY 8 HOURS
Qty: 90 TABLET | Refills: 0 | Status: SHIPPED | OUTPATIENT
Start: 2024-10-11

## 2024-09-13 RX ORDER — OXYCODONE AND ACETAMINOPHEN 10; 325 MG/1; MG/1
1 TABLET ORAL EVERY 4 HOURS PRN
Qty: 120 TABLET | Refills: 0 | Status: SHIPPED | OUTPATIENT
Start: 2024-10-11

## 2024-09-13 RX ORDER — MORPHINE SULFATE 15 MG/1
15 TABLET, FILM COATED, EXTENDED RELEASE ORAL EVERY 8 HOURS
Qty: 90 TABLET | Refills: 0 | Status: SHIPPED | OUTPATIENT
Start: 2024-09-13

## 2024-09-13 RX ORDER — OXYCODONE AND ACETAMINOPHEN 10; 325 MG/1; MG/1
1 TABLET ORAL EVERY 4 HOURS PRN
Qty: 120 TABLET | Refills: 0 | Status: SHIPPED | OUTPATIENT
Start: 2024-09-13

## 2024-09-13 NOTE — TELEPHONE ENCOUNTER
I moved the MS contin up to today as well and put in next months refills on October 11 for both.       site checked, no inappropriate activity found

## 2024-09-16 ENCOUNTER — OFFICE VISIT (OUTPATIENT)
Dept: DERMATOLOGY | Facility: CLINIC | Age: 79
End: 2024-09-16
Payer: MEDICARE

## 2024-09-16 DIAGNOSIS — D09.9 SQUAMOUS CELL CARCINOMA IN SITU: Primary | ICD-10-CM

## 2024-09-16 PROCEDURE — 17261 DSTRJ MAL LES T/A/L .6-1.0CM: CPT | Mod: S$GLB,,, | Performed by: STUDENT IN AN ORGANIZED HEALTH CARE EDUCATION/TRAINING PROGRAM

## 2024-09-16 PROCEDURE — 99499 UNLISTED E&M SERVICE: CPT | Mod: S$GLB,,, | Performed by: STUDENT IN AN ORGANIZED HEALTH CARE EDUCATION/TRAINING PROGRAM

## 2024-09-16 NOTE — PROGRESS NOTES
Subjective:      Patient ID:  Sam Pete is a 79 y.o. male who presents for   Chief Complaint   Patient presents with    Skin Cancer     ED&C     LOV 6/27/24    Patient here today for ED&C of SCCIS on right chest.    Final Pathologic Diagnosis 1. 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  - THE TUMOR EXTENDS TO A LATERAL BIOPSY MARGIN.                Review of Systems   Constitutional:  Negative for fever, chills and fatigue.   Respiratory:  Negative for cough and shortness of breath.    Gastrointestinal:  Negative for nausea, vomiting and diarrhea.   Skin:  Positive for activity-related sunscreen use and wears hat. Negative for daily sunscreen use.   Hematologic/Lymphatic: Bruises/bleeds easily (asa).        Bruises easily       Objective:   Physical Exam   Constitutional: He appears well-developed and well-nourished.   Neurological: He is alert and oriented to person, place, and time.   Psychiatric: He has a normal mood and affect.   Skin:   Areas Examined (abnormalities noted in diagram):   Chest / Axilla Inspection Performed            Diagram Legend     Erythematous scaling macule/papule c/w actinic keratosis       Vascular papule c/w angioma      Pigmented verrucoid papule/plaque c/w seborrheic keratosis      Yellow umbilicated papule c/w sebaceous hyperplasia      Irregularly shaped tan macule c/w lentigo     1-2 mm smooth white papules consistent with Milia      Movable subcutaneous cyst with punctum c/w epidermal inclusion cyst      Subcutaneous movable cyst c/w pilar cyst      Firm pink to brown papule c/w dermatofibroma      Pedunculated fleshy papule(s) c/w skin tag(s)      Evenly pigmented macule c/w junctional nevus     Mildly variegated pigmented, slightly irregular-bordered macule c/w mildly atypical nevus      Flesh colored to evenly pigmented papule c/w intradermal nevus       Pink pearly papule/plaque c/w basal cell carcinoma       Erythematous hyperkeratotic cursted plaque c/w SCC      Surgical scar with no sign of skin cancer recurrence      Open and closed comedones      Inflammatory papules and pustules      Verrucoid papule consistent consistent with wart     Erythematous eczematous patches and plaques     Dystrophic onycholytic nail with subungual debris c/w onychomycosis     Umbilicated papule    Erythematous-base heme-crusted tan verrucoid plaque consistent with inflamed seborrheic keratosis     Erythematous Silvery Scaling Plaque c/w Psoriasis     See annotation      Assessment / Plan:        Squamous cell carcinoma in situ  Here for electrodesiccation and curettage of SCCiS on the right chest. :      Electrodessication and Curettage Procedure note:    Verbal consent obtained. Lesional tissue marked and prepped with alcohol. Lesion anesthetized with 1% lidocaine with epinephrine. Curettage and Desiccation x 3 cycles to base. Aluminum chloride for hemostasis. Lesion size after primary curettage: 1.0 cm    Area bandaged and wound care explained.             No follow-ups on file.

## 2024-09-16 NOTE — PATIENT INSTRUCTIONS

## 2024-09-18 ENCOUNTER — OFFICE VISIT (OUTPATIENT)
Dept: SPINE | Facility: CLINIC | Age: 79
End: 2024-09-18
Payer: MEDICARE

## 2024-09-18 VITALS — BODY MASS INDEX: 37.85 KG/M2 | HEIGHT: 67 IN | WEIGHT: 241.19 LBS

## 2024-09-18 DIAGNOSIS — M54.9 DORSALGIA, UNSPECIFIED: ICD-10-CM

## 2024-09-18 DIAGNOSIS — M54.42 ACUTE BILATERAL LOW BACK PAIN WITH LEFT-SIDED SCIATICA: Primary | ICD-10-CM

## 2024-09-18 PROCEDURE — 1100F PTFALLS ASSESS-DOCD GE2>/YR: CPT | Mod: HCNC,CPTII,S$GLB, | Performed by: PHYSICAL MEDICINE & REHABILITATION

## 2024-09-18 PROCEDURE — 1125F AMNT PAIN NOTED PAIN PRSNT: CPT | Mod: HCNC,CPTII,S$GLB, | Performed by: PHYSICAL MEDICINE & REHABILITATION

## 2024-09-18 PROCEDURE — 3288F FALL RISK ASSESSMENT DOCD: CPT | Mod: HCNC,CPTII,S$GLB, | Performed by: PHYSICAL MEDICINE & REHABILITATION

## 2024-09-18 PROCEDURE — 1159F MED LIST DOCD IN RCRD: CPT | Mod: HCNC,CPTII,S$GLB, | Performed by: PHYSICAL MEDICINE & REHABILITATION

## 2024-09-18 PROCEDURE — 1160F RVW MEDS BY RX/DR IN RCRD: CPT | Mod: HCNC,CPTII,S$GLB, | Performed by: PHYSICAL MEDICINE & REHABILITATION

## 2024-09-18 PROCEDURE — 99213 OFFICE O/P EST LOW 20 MIN: CPT | Mod: HCNC,S$GLB,, | Performed by: PHYSICAL MEDICINE & REHABILITATION

## 2024-09-18 PROCEDURE — 99999 PR PBB SHADOW E&M-EST. PATIENT-LVL III: CPT | Mod: PBBFAC,HCNC,, | Performed by: PHYSICAL MEDICINE & REHABILITATION
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
## 2024-09-28 DIAGNOSIS — M54.50 DORSALGIA OF LUMBAR REGION: Primary | ICD-10-CM

## 2024-09-30 ENCOUNTER — OFFICE VISIT (OUTPATIENT)
Dept: FAMILY MEDICINE | Facility: CLINIC | Age: 79
End: 2024-09-30
Payer: MEDICARE

## 2024-09-30 VITALS
SYSTOLIC BLOOD PRESSURE: 128 MMHG | BODY MASS INDEX: 37.75 KG/M2 | WEIGHT: 241 LBS | HEART RATE: 73 BPM | OXYGEN SATURATION: 96 % | DIASTOLIC BLOOD PRESSURE: 74 MMHG
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
## 2024-09-30 PROCEDURE — 99213 OFFICE O/P EST LOW 20 MIN: CPT | Mod: HCNC,S$GLB,,

## 2024-09-30 PROCEDURE — 3288F FALL RISK ASSESSMENT DOCD: CPT | Mod: HCNC,CPTII,S$GLB,

## 2024-09-30 PROCEDURE — 1101F PT FALLS ASSESS-DOCD LE1/YR: CPT | Mod: HCNC,CPTII,S$GLB,

## 2024-09-30 PROCEDURE — 1125F AMNT PAIN NOTED PAIN PRSNT: CPT | Mod: HCNC,CPTII,S$GLB,

## 2024-09-30 PROCEDURE — 3074F SYST BP LT 130 MM HG: CPT | Mod: HCNC,CPTII,S$GLB,

## 2024-09-30 PROCEDURE — 1159F MED LIST DOCD IN RCRD: CPT | Mod: HCNC,CPTII,S$GLB,

## 2024-09-30 PROCEDURE — 3078F DIAST BP <80 MM HG: CPT | Mod: HCNC,CPTII,S$GLB,

## 2024-09-30 PROCEDURE — 99999 PR PBB SHADOW E&M-EST. PATIENT-LVL IV: CPT | Mod: PBBFAC,HCNC,,

## 2024-09-30 RX ORDER — TIZANIDINE HYDROCHLORIDE 4 MG/1
CAPSULE, GELATIN COATED ORAL
COMMUNITY

## 2024-09-30 RX ORDER — SULFAMETHOXAZOLE AND TRIMETHOPRIM 800; 160 MG/1; MG/1
1 TABLET ORAL 2 TIMES DAILY
Qty: 20 TABLET | Refills: 0 | Status: SHIPPED | OUTPATIENT
Start: 2024-09-30 | End: 2024-10-10

## 2024-09-30 NOTE — PROGRESS NOTES
Name: Sam Pete  MRN: [id number]  : 1945  PCP: Ty Montalvo MD    HPI    Patient follows with Dr. Montalvo, new to me. He presents for arm swelling and redness following a dog scratch one week ago. He states his son's dog jumped on him and scratched his left arm. He reports he started with left arm swelling this morning. He denies any fevers. He complains of some blood leaking from the scratch.    Review of Systems   Constitutional:  Negative for fever.   Respiratory:  Negative for shortness of breath.    Cardiovascular:  Negative for chest pain.   Gastrointestinal:  Negative for nausea and vomiting.   Skin:  Positive for color change and wound.       Patient Active Problem List   Diagnosis    Hypertrophy of prostate with urinary obstruction and other lower urinary tract symptoms (LUTS)    Impotence of organic origin    Spinal stenosis in cervical region    Spinal stenosis, lumbar region, without neurogenic claudication    Hereditary and idiopathic peripheral neuropathy    Chronic pain low back and neck with spinal stenosis, djd left arm.  pain contract renewed 9/10/14    Essential (primary) hypertension    Facet arthropathy, lumbar    Atherosclerosis of aorta    Opioid dependence    PAF (paroxysmal atrial fibrillation)    Colon cancer screening    Failed back surgical syndrome    Lumbar radiculopathy    Chronic pain syndrome    Chronic back pain    Other hyperlipidemia    Gastroesophageal reflux disease without esophagitis    At risk for falls    Primary osteoarthritis of right shoulder    Long term (current) use of anticoagulants    Severe obesity (BMI 35.0-39.9) with comorbidity    REAL (iron deficiency anemia)    Right-sided chest pain    COLEMAN (dyspnea on exertion)    Syncope and collapse    Family history of early CAD    Nonrheumatic tricuspid valve regurgitation    Abnormal nuclear stress test    Mild pulmonary hypertension    Coronary artery disease involving native coronary artery of native  heart without angina pectoris    H/O: CVA (cerebrovascular accident)    S/P CABG (coronary artery bypass graft)    Acquired hypothyroidism    Carotid artery plaque, bilateral       Vitals:    09/30/24 1113   BP: 128/74   Pulse: 73       Physical Exam  Constitutional:       General: He is not in acute distress.     Appearance: He is well-developed.   HENT:      Head: Normocephalic and atraumatic.      Right Ear: External ear normal.      Left Ear: External ear normal.   Eyes:      Conjunctiva/sclera: Conjunctivae normal.      Pupils: Pupils are equal, round, and reactive to light.   Neck:      Thyroid: No thyromegaly.   Cardiovascular:      Heart sounds: S1 normal and S2 normal.   Musculoskeletal:         General: No swelling or tenderness. Normal range of motion.      Cervical back: Normal range of motion and neck supple.   Skin:     General: Skin is warm and dry.      Coloration: Skin is not jaundiced or pale.      Findings: Rash present.      Comments: Mild erythema and swelling noted to left arm. Bleeding abrasion noted to left forearm    Neurological:      General: No focal deficit present.      Mental Status: He is alert and oriented to person, place, and time.      Cranial Nerves: No cranial nerve deficit.   Psychiatric:         Mood and Affect: Mood normal.         Behavior: Behavior normal.         1. Dog scratch  -     sulfamethoxazole-trimethoprim 800-160mg (BACTRIM DS) 800-160 mg Tab; Take 1 tablet by mouth 2 (two) times daily. for 10 days  Dispense: 20 tablet; Refill: 0  -     Ambulatory referral/consult to Wound Clinic; Future; Expected date: 10/07/2024        Follow up in 10 days      EMILY Kauffman  09/30/2024

## 2024-10-02 ENCOUNTER — PATIENT MESSAGE (OUTPATIENT)
Dept: RADIOLOGY | Facility: HOSPITAL | Age: 79
End: 2024-10-02
Payer: MEDICARE

## 2024-10-03 ENCOUNTER — HOSPITAL ENCOUNTER (OUTPATIENT)
Dept: RADIOLOGY | Facility: HOSPITAL | Age: 79
Discharge: HOME | End: 2024-10-03
Attending: PHYSICAL MEDICINE & REHABILITATION
Payer: MEDICARE

## 2024-10-03 ENCOUNTER — PATIENT MESSAGE (OUTPATIENT)
Dept: SPINE | Facility: CLINIC | Age: 79
End: 2024-10-03
Payer: MEDICARE

## 2024-10-03 DIAGNOSIS — M54.9 DORSALGIA, UNSPECIFIED: ICD-10-CM

## 2024-10-03 PROCEDURE — 25500020 PHARM REV CODE 255: Mod: HCNC,PO | Performed by: PHYSICAL MEDICINE & REHABILITATION

## 2024-10-03 PROCEDURE — A9585 GADOBUTROL INJECTION: HCPCS | Mod: HCNC,PO | Performed by: PHYSICAL MEDICINE & REHABILITATION

## 2024-10-03 PROCEDURE — 72158 MRI LUMBAR SPINE W/O & W/DYE: CPT | Mod: TC,HCNC,PO

## 2024-10-03 PROCEDURE — 72158 MRI LUMBAR SPINE W/O & W/DYE: CPT | Mod: 26,HCNC,, | Performed by: RADIOLOGY

## 2024-10-03 RX ORDER — GADOBUTROL 604.72 MG/ML
10 INJECTION INTRAVENOUS
Status: COMPLETED | OUTPATIENT
Start: 2024-10-03 | End: 2024-10-03

## 2024-10-03 RX ADMIN — GADOBUTROL 10 ML: 604.72 INJECTION INTRAVENOUS at 01:10

## 2024-10-07 ENCOUNTER — TELEPHONE (OUTPATIENT)
Dept: PAIN MEDICINE | Facility: CLINIC | Age: 79
End: 2024-10-07
Payer: MEDICARE

## 2024-10-07 DIAGNOSIS — M54.16 LUMBAR RADICULOPATHY: Primary | ICD-10-CM

## 2024-10-07 NOTE — TELEPHONE ENCOUNTER
----- Message from Nikita Landrum MD sent at 10/7/2024  8:54 AM CDT -----  Please schedule for caudal epidural steroid injection

## 2024-10-10 ENCOUNTER — OFFICE VISIT (OUTPATIENT)
Dept: FAMILY MEDICINE | Facility: CLINIC | Age: 79
End: 2024-10-10
Payer: MEDICARE

## 2024-10-10 VITALS
HEART RATE: 81 BPM | OXYGEN SATURATION: 97 % | WEIGHT: 241 LBS | DIASTOLIC BLOOD PRESSURE: 68 MMHG | BODY MASS INDEX: 37.75 KG/M2 | SYSTOLIC BLOOD PRESSURE: 128 MMHG
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
## 2024-10-10 PROCEDURE — 99999 PR PBB SHADOW E&M-EST. PATIENT-LVL III: CPT | Mod: PBBFAC,HCNC,,

## 2024-10-10 RX ORDER — MUPIROCIN 20 MG/G
OINTMENT TOPICAL 3 TIMES DAILY
Qty: 22 G | Refills: 0 | Status: SHIPPED | OUTPATIENT
Start: 2024-10-10

## 2024-10-10 NOTE — PROGRESS NOTES
Name: Sam Pete  MRN: [id number]  : 1945  PCP: Ty Montalvo MD    HPI    Patient follows with Dr. Montalvo, known to me. He presents for follow up on dog scratch to his left arm. He was started on course of oral bactrim 10 days ago. He reports swelling has improved and redness improved. He has one day left on abx course. He denies any fevers at this time.     Review of Systems   Cardiovascular:  Negative for chest pain and palpitations.   Musculoskeletal:  Negative for myalgias.       Patient Active Problem List   Diagnosis    Hypertrophy of prostate with urinary obstruction and other lower urinary tract symptoms (LUTS)    Impotence of organic origin    Spinal stenosis in cervical region    Spinal stenosis, lumbar region, without neurogenic claudication    Hereditary and idiopathic peripheral neuropathy    Chronic pain low back and neck with spinal stenosis, djd left arm.  pain contract renewed 9/10/14    Essential (primary) hypertension    Facet arthropathy, lumbar    Atherosclerosis of aorta    Opioid dependence    PAF (paroxysmal atrial fibrillation)    Colon cancer screening    Failed back surgical syndrome    Lumbar radiculopathy    Chronic pain syndrome    Chronic back pain    Other hyperlipidemia    Gastroesophageal reflux disease without esophagitis    At risk for falls    Primary osteoarthritis of right shoulder    Long term (current) use of anticoagulants    Severe obesity (BMI 35.0-39.9) with comorbidity    REAL (iron deficiency anemia)    Right-sided chest pain    COLEMAN (dyspnea on exertion)    Syncope and collapse    Family history of early CAD    Nonrheumatic tricuspid valve regurgitation    Abnormal nuclear stress test    Mild pulmonary hypertension    Coronary artery disease involving native coronary artery of native heart without angina pectoris    H/O: CVA (cerebrovascular accident)    S/P CABG (coronary artery bypass graft)    Acquired hypothyroidism    Carotid artery plaque,  bilateral       Vitals:    10/10/24 1119   BP: 128/68   Pulse: 81       Physical Exam  Constitutional:       General: He is not in acute distress.     Appearance: He is well-developed.   HENT:      Head: Normocephalic and atraumatic.      Right Ear: External ear normal.      Left Ear: External ear normal.   Eyes:      Conjunctiva/sclera: Conjunctivae normal.      Pupils: Pupils are equal, round, and reactive to light.   Neck:      Thyroid: No thyromegaly.   Cardiovascular:      Heart sounds: S1 normal and S2 normal.   Musculoskeletal:         General: No swelling or tenderness. Normal range of motion.      Cervical back: Normal range of motion and neck supple.   Skin:     General: Skin is warm and dry.      Coloration: Skin is not jaundiced or pale.   Neurological:      General: No focal deficit present.      Mental Status: He is alert and oriented to person, place, and time.      Cranial Nerves: No cranial nerve deficit.   Psychiatric:         Mood and Affect: Mood normal.         Behavior: Behavior normal.         1. Dog scratch  -     mupirocin (BACTROBAN) 2 % ointment; Apply topically 3 (three) times daily.  Dispense: 22 g; Refill: 0        Follow up as needed or if symptoms fail to improve       EMILY Kauffman  10/10/2024

## 2024-10-18 DIAGNOSIS — E89.0 POSTOPERATIVE HYPOTHYROIDISM: ICD-10-CM

## 2024-10-18 NOTE — TELEPHONE ENCOUNTER
No care due was identified.  [facility] Embedded Care Due Messages. Reference number: [id number].   10/18/2024 10:11:31 AM CDT

## 2024-10-19 RX ORDER — LEVOTHYROXINE SODIUM 112 UG/1
TABLET ORAL
Qty: 180 TABLET | Refills: 0 | Status: SHIPPED | OUTPATIENT
Start: 2024-10-19

## 2024-10-19 NOTE — TELEPHONE ENCOUNTER
Refill Decision Note   Sam Pete  is requesting a refill authorization.  Brief Assessment and Rationale for Refill:  Approve     Medication Therapy Plan:        Comments:     Note composed:12:33 PM 10/19/2024

## 2024-10-24 ENCOUNTER — HOSPITAL ENCOUNTER (OUTPATIENT)
Facility: HOSPITAL | Age: 79
Discharge: HOME | End: 2024-10-24
Attending: ANESTHESIOLOGY | Admitting: ANESTHESIOLOGY
Payer: MEDICARE

## 2024-10-24 VITALS
HEART RATE: 76 BPM | BODY MASS INDEX: 37.82 KG/M2 | HEIGHT: 67 IN | SYSTOLIC BLOOD PRESSURE: 111 MMHG | DIASTOLIC BLOOD PRESSURE: 64 MMHG | WEIGHT: 240.94 LBS | OXYGEN SATURATION: 94 % | RESPIRATION RATE: 18 BRPM | TEMPERATURE: 98 F

## 2024-10-24 DIAGNOSIS — M54.16 LUMBAR RADICULITIS: ICD-10-CM

## 2024-10-24 PROCEDURE — 25500020 PHARM REV CODE 255: Performed by: ANESTHESIOLOGY

## 2024-10-24 PROCEDURE — 25000003 PHARM REV CODE 250: Performed by: ANESTHESIOLOGY

## 2024-10-24 PROCEDURE — 62323 NJX INTERLAMINAR LMBR/SAC: CPT | Mod: ,,, | Performed by: ANESTHESIOLOGY

## 2024-10-24 PROCEDURE — 63600175 PHARM REV CODE 636 W HCPCS: Performed by: ANESTHESIOLOGY

## 2024-10-24 PROCEDURE — 36000704 HC OR TIME LEV I 1ST 15 MIN: Performed by: ANESTHESIOLOGY

## 2024-10-24 PROCEDURE — 71000015 HC POSTOP RECOV 1ST HR: Performed by: ANESTHESIOLOGY

## 2024-10-24 PROCEDURE — 62323 NJX INTERLAMINAR LMBR/SAC: CPT

## 2024-10-24 PROCEDURE — A4216 STERILE WATER/SALINE, 10 ML: HCPCS | Performed by: ANESTHESIOLOGY

## 2024-10-24 RX ORDER — LIDOCAINE HYDROCHLORIDE 10 MG/ML
1 INJECTION, SOLUTION EPIDURAL; INFILTRATION; INTRACAUDAL; PERINEURAL ONCE
Status: DISCONTINUED | OUTPATIENT
Start: 2024-10-24 | End: 2024-10-24 | Stop reason: HOSPADM

## 2024-10-24 RX ORDER — DEXAMETHASONE SODIUM PHOSPHATE 10 MG/ML
INJECTION INTRAMUSCULAR; INTRAVENOUS
Status: DISCONTINUED | OUTPATIENT
Start: 2024-10-24 | End: 2024-10-24 | Stop reason: HOSPADM

## 2024-10-24 RX ORDER — SODIUM CHLORIDE 9 MG/ML
INJECTION, SOLUTION INTRAMUSCULAR; INTRAVENOUS; SUBCUTANEOUS
Status: DISCONTINUED | OUTPATIENT
Start: 2024-10-24 | End: 2024-10-24 | Stop reason: HOSPADM

## 2024-10-24 RX ORDER — SODIUM CHLORIDE, SODIUM LACTATE, POTASSIUM CHLORIDE, CALCIUM CHLORIDE 600; 310; 30; 20 MG/100ML; MG/100ML; MG/100ML; MG/100ML
INJECTION, SOLUTION INTRAVENOUS CONTINUOUS
Status: DISCONTINUED | OUTPATIENT
Start: 2024-10-24 | End: 2024-10-24 | Stop reason: HOSPADM

## 2024-10-24 RX ORDER — MIDAZOLAM HYDROCHLORIDE 1 MG/ML
INJECTION INTRAMUSCULAR; INTRAVENOUS
Status: DISCONTINUED | OUTPATIENT
Start: 2024-10-24 | End: 2024-10-24 | Stop reason: HOSPADM

## 2024-10-24 RX ORDER — FENTANYL CITRATE 50 UG/ML
INJECTION, SOLUTION INTRAMUSCULAR; INTRAVENOUS
Status: DISCONTINUED | OUTPATIENT
Start: 2024-10-24 | End: 2024-10-24 | Stop reason: HOSPADM

## 2024-10-24 RX ORDER — LIDOCAINE HYDROCHLORIDE 10 MG/ML
INJECTION, SOLUTION EPIDURAL; INFILTRATION; INTRACAUDAL; PERINEURAL
Status: DISCONTINUED | OUTPATIENT
Start: 2024-10-24 | End: 2024-10-24 | Stop reason: HOSPADM

## 2024-11-01 DIAGNOSIS — I10 ESSENTIAL (PRIMARY) HYPERTENSION: ICD-10-CM

## 2024-11-01 DIAGNOSIS — E78.49 OTHER HYPERLIPIDEMIA: ICD-10-CM

## 2024-11-01 DIAGNOSIS — R07.9 RIGHT-SIDED CHEST PAIN: ICD-10-CM

## 2024-11-01 RX ORDER — METOPROLOL SUCCINATE 25 MG/1
12.5 TABLET, EXTENDED RELEASE ORAL
Qty: 45 TABLET | Refills: 0 | Status: CANCELLED | OUTPATIENT
Start: 2024-11-01

## 2024-11-02 RX ORDER — METOPROLOL SUCCINATE 25 MG/1
12.5 TABLET, EXTENDED RELEASE ORAL
Qty: 45 TABLET | Refills: 0 | Status: SHIPPED | OUTPATIENT
Start: 2024-11-02

## 2024-11-04 ENCOUNTER — OFFICE VISIT (OUTPATIENT)
Dept: FAMILY MEDICINE | Facility: CLINIC | Age: 79
End: 2024-11-04
Attending: FAMILY MEDICINE
Payer: MEDICARE

## 2024-11-04 VITALS
TEMPERATURE: 98 F | WEIGHT: 228.94 LBS | SYSTOLIC BLOOD PRESSURE: 132 MMHG | OXYGEN SATURATION: 94 % | DIASTOLIC BLOOD PRESSURE: 78 MMHG | HEIGHT: 67 IN | HEART RATE: 64 BPM | BODY MASS INDEX: 35.93 KG/M2

## 2024-11-04 DIAGNOSIS — M48.02 SPINAL STENOSIS IN CERVICAL REGION: ICD-10-CM

## 2024-11-04 DIAGNOSIS — F11.20 UNCOMPLICATED OPIOID DEPENDENCE: ICD-10-CM

## 2024-11-04 DIAGNOSIS — G89.4 CHRONIC PAIN SYNDROME: ICD-10-CM

## 2024-11-04 DIAGNOSIS — M48.061 SPINAL STENOSIS, LUMBAR REGION, WITHOUT NEUROGENIC CLAUDICATION: Primary | ICD-10-CM

## 2024-11-04 PROCEDURE — 1101F PT FALLS ASSESS-DOCD LE1/YR: CPT | Mod: HCNC,CPTII,S$GLB, | Performed by: FAMILY MEDICINE

## 2024-11-04 PROCEDURE — 1159F MED LIST DOCD IN RCRD: CPT | Mod: HCNC,CPTII,S$GLB, | Performed by: FAMILY MEDICINE

## 2024-11-04 PROCEDURE — 3288F FALL RISK ASSESSMENT DOCD: CPT | Mod: HCNC,CPTII,S$GLB, | Performed by: FAMILY MEDICINE

## 2024-11-04 PROCEDURE — 3078F DIAST BP <80 MM HG: CPT | Mod: HCNC,CPTII,S$GLB, | Performed by: FAMILY MEDICINE

## 2024-11-04 PROCEDURE — 99213 OFFICE O/P EST LOW 20 MIN: CPT | Mod: HCNC,S$GLB,, | Performed by: FAMILY MEDICINE

## 2024-11-04 PROCEDURE — 3075F SYST BP GE 130 - 139MM HG: CPT | Mod: HCNC,CPTII,S$GLB, | Performed by: FAMILY MEDICINE

## 2024-11-04 PROCEDURE — 80307 DRUG TEST PRSMV CHEM ANLYZR: CPT | Mod: HCNC | Performed by: FAMILY MEDICINE

## 2024-11-04 PROCEDURE — 99999 PR PBB SHADOW E&M-EST. PATIENT-LVL IV: CPT | Mod: PBBFAC,HCNC,, | Performed by: FAMILY MEDICINE

## 2024-11-04 PROCEDURE — 1160F RVW MEDS BY RX/DR IN RCRD: CPT | Mod: HCNC,CPTII,S$GLB, | Performed by: FAMILY MEDICINE

## 2024-11-04 PROCEDURE — 1125F AMNT PAIN NOTED PAIN PRSNT: CPT | Mod: HCNC,CPTII,S$GLB, | Performed by: FAMILY MEDICINE

## 2024-11-04 RX ORDER — OXYCODONE AND ACETAMINOPHEN 10; 325 MG/1; MG/1
1 TABLET ORAL EVERY 4 HOURS PRN
Qty: 120 TABLET | Refills: 0 | Status: SHIPPED | OUTPATIENT
Start: 2025-01-10

## 2024-11-04 RX ORDER — GABAPENTIN 300 MG/1
300 CAPSULE ORAL 3 TIMES DAILY
Qty: 90 CAPSULE | Refills: 5 | Status: SHIPPED | OUTPATIENT
Start: 2024-12-02 | End: 2025-12-02

## 2024-11-04 RX ORDER — GABAPENTIN 300 MG/1
CAPSULE ORAL
Qty: 69 CAPSULE | Refills: 0 | Status: SHIPPED | OUTPATIENT
Start: 2024-11-04 | End: 2024-12-04

## 2024-11-04 RX ORDER — OXYCODONE AND ACETAMINOPHEN 10; 325 MG/1; MG/1
1 TABLET ORAL EVERY 4 HOURS PRN
Qty: 120 TABLET | Refills: 0 | Status: SHIPPED | OUTPATIENT
Start: 2024-12-11

## 2024-11-04 RX ORDER — MORPHINE SULFATE 15 MG/1
15 TABLET, FILM COATED, EXTENDED RELEASE ORAL EVERY MORNING
Qty: 30 TABLET | Refills: 0 | Status: SHIPPED | OUTPATIENT
Start: 2024-12-11

## 2024-11-04 RX ORDER — OXYCODONE AND ACETAMINOPHEN 10; 325 MG/1; MG/1
1 TABLET ORAL EVERY 4 HOURS PRN
Qty: 120 TABLET | Refills: 0 | Status: SHIPPED | OUTPATIENT
Start: 2024-11-11

## 2024-11-04 NOTE — PROGRESS NOTES
Subjective:       Patient ID: Sam Pete is a 79 y.o. male.    Chief Complaint: Follow-up (3 month)    79-year-old male with a history of chronic pain disorder of multiple etiologies including chronic low back pain and neck pain with spinal stenosis and degenerative joint disease, facet arthropathy of the lumbar spine with radiculopathy and failed back surgery.  He is opioid dependent but we have been gradually weaning morphine down and trying to switch him over to Percocet.  He is making progress and lately he has been taking one MS Contin 15 mg every morning with a 2nd in the evening only if he absolutely needs it.  At least half of the time he is getting by on one a day.  The Percocet as being used 4 times a day on a regular basis and would be a better alternative.  He is willing to try going to one a day on the MS Contin prior to discontinuing it and he would like to see Dr. Lyles in pain management in hopes of taking over the weaning process after I retire at the end of December.  He also would like to go back on gabapentin which he discontinued in 2022 in an effort to maintain control of the pain with less opioid.  He does not recall having any problems or side effect issues with the gabapentin and was taking 300 mg two a day when he discontinued it.     was checked with no inappropriate activity.  His last Percocet was filled October 11, 2024 as was the MS Contin.    The opioids are allowing him to carry out ADLs, house maintenance, and social activities with no significant cognitive impairment and minimal GI problems.    Past Medical History:  07/2023: Abnormal nuclear stress test  No date: Anticoagulant long-term use      Comment:  ASA 81 mg  No date: Arthritis  No date: Cataract      Comment:  OU  No date: Chronic low back pain  02/08/2011: Complete rupture of rotator cuff  07/08/2015: DDD (degenerative disc disease), lumbar  09/09/2015: Degeneration of lumbar or lumbosacral intervertebral disc  No  date: ED (erectile dysfunction)  No date: GERD (gastroesophageal reflux disease)  No date: Hypertension  No date: Hypothyroidism, unspecified  06/26/2017: Lumbar pseudoarthrosis  06/26/2017: Lumbar stenosis  No date: Obesity  07/2023: PSVT (paroxysmal supraventricular tachycardia)  07/2023: SOB (shortness of breath)  07/08/2015: Spondylosis of lumbar region without myelopathy or   radiculopathy  1997: Stroke      Comment:  basal ganglia, left sided weakness, resolved  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  07/08/2015: Thoracic or lumbosacral neuritis or radiculitis    Past Surgical History:  07/13/2023: ANGIOGRAM, CORONARY, WITH LEFT HEART CATHETERIZATION      Comment:  Procedure: Left Heart Cath;  Surgeon: Salvador Eugene MD;               Location: [facility] CATH;  Service: Cardiology;;  No date: APPENDECTOMY  03/22/2022: ARTHROPLASTY OF SHOULDER; Right      Comment:  Procedure: ARTHROPLASTY, SHOULDER BRENNAN RIGHT SHOULDER                HUMERAL HEAD RESURFACING;  Surgeon: Frankie Joya MD;               Location: [facility] OR;  Service: Orthopedics;  Laterality:                Right;  No date: BACK SURGERY      Comment:  4- back surgery  12/16/2021: CAUDAL EPIDURAL STEROID INJECTION; N/A      Comment:  Procedure: Injection-steroid-epidural-caudal;  Surgeon:                Aram Terrell MD;  Location: [facility] OR;  Service: Pain                Management;  Laterality: N/A;  02/02/2022: CAUDAL EPIDURAL STEROID INJECTION; N/A      Comment:  Procedure: INJECTION, STEROID, SPINE, EPIDURAL, CAUDAL;                Surgeon: Aram Terrell MD;  Location: [facility] OR;  Service:                Pain Management;  Laterality: N/A;  07/22/2022: CAUDAL EPIDURAL STEROID INJECTION; N/A      Comment:  Procedure: Injection-steroid-epidural-caudal;  Surgeon:                Aram Terrell MD;  Location: [facility] OR;  Service: Pain                Management;  Laterality: N/A;  Caudal CARLOS   01/20/2023: CAUDAL EPIDURAL STEROID INJECTION; N/A      Comment:  Procedure:  Injection-steroid-epidural-caudal;  Surgeon:                Aram Terrell MD;  Location: [facility] OR;  Service: Pain                Management;  Laterality: N/A;  caudal ( melinda)  04/21/2023: CAUDAL EPIDURAL STEROID INJECTION; N/A      Comment:  Procedure: Injection-steroid-epidural-caudal;  Surgeon:                Aram Terrell MD;  Location: [facility] OR;  Service: Pain                Management;  Laterality: N/A;  caudal  07/11/2023: CAUDAL EPIDURAL STEROID INJECTION; N/A      Comment:  Procedure: Injection-steroid-epidural-caudal;  Surgeon:                Aram Terrell MD;  Location: [facility] OR;  Service: Pain                Management;  Laterality: N/A;  caudal  1/9/2024: CAUDAL EPIDURAL STEROID INJECTION; N/A      Comment:  Procedure: Injection-steroid-epidural-caudal;  Surgeon:                Aram Terrell MD;  Location: [facility] OR;  Service:                Anesthesiology;  Laterality: N/A;  caudal  6/4/2024: CAUDAL EPIDURAL STEROID INJECTION; N/A      Comment:  Procedure: Injection-steroid-epidural-caudal;  Surgeon:                Aram Terrell MD;  Location: [facility] OR;  Service:                Anesthesiology;  Laterality: N/A;  07/12/2004: COLONOSCOPY      Comment:  CHILO.   Redundant tortuous colon, otherwise normal.  02/25/2019: COLONOSCOPY; N/A      Comment:  Procedure: COLONOSCOPY;  Surgeon: Gilbert Rodriguez Jr., MD;  Location: [facility] ENDO;  Service: Endoscopy;                 Laterality: N/A;  07/13/2023: CORONARY ANGIOGRAPHY; N/A      Comment:  Procedure: ANGIOGRAM, CORONARY ARTERY;  Surgeon: Salvador Eugene MD;  Location: [facility] CATH;  Service: Cardiology;                 Laterality: N/A;  7/19/2023: CORONARY ARTERY BYPASS GRAFT (CABG); N/A      Comment:  Procedure: CORONARY ARTERY BYPASS GRAFT (CABG)- x 4;                 Surgeon: Sandrine Wagner MD;  Location: [facility] OR;  Service:                Cardiothoracic;  Laterality: N/A;  7/19/2023: ENDOSCOPIC HARVEST OF VEIN; Left      Comment:   Procedure: HARVEST-VEIN-ENDOVASCULAR;  Surgeon: Sandrine Wagner MD;  Location: [facility] OR;  Service: Cardiothoracic;                Laterality: Left;  No date: Epidural steroid injection      Comment:  Pain management  10/24/2024: EPIDURAL STEROID INJECTION; N/A      Comment:  Procedure: Injection, Steroid, Epidural;  Surgeon: Aram Terrell MD;  Location: [facility] OR;  Service: Pain                Management;  Laterality: N/A;  07/12/2004: ESOPHAGOGASTRODUODENOSCOPY      Comment:  CHILO.  7/19/2023: EXCLUSION, LEFT ATRIAL APPENDAGE, OPEN, AS PART OF OPEN   CHEST SURGERY; N/A      Comment:  Procedure: EXCLUSION, LEFT ATRIAL APPENDAGE, OPEN, AS                PART OF OPEN CHEST SURGERY;  Surgeon: Sandrine Wagner MD;                 Location: [facility] OR;  Service: Cardiothoracic;  Laterality:               N/A;  No date: FRACTURE SURGERY; Left      Comment:  wrist / forearm, total of 5  12/12/2019: INSERTION OF DORSAL COLUMN NERVE STIMULATOR FOR TRIAL; N/A      Comment:  Procedure: INSERTION, NEUROSTIMULATOR, SPINAL CORD,                DORSAL COLUMN, FOR TRIAL;  Surgeon: Evan Lyles MD;                 Location: [facility] OR;  Service: Pain Management;                 Laterality: N/A;  02/06/2013: JOINT REPLACEMENT      Comment:  ( rt hip 2007), left hip   No date: JOINT REPLACEMENT; Left      Comment:  total knee  No date: KNEE ARTHROSCOPY W/ DEBRIDEMENT      Comment:  bilateral knees , total of six  No date: KNEE SURGERY  02/12/2020: LAMINECTOMY USING MINIMALLY INVASIVE TECHNIQUE; N/A      Comment:  Procedure: LAMINECTOMY, SPINE, MINIMALLY INVASIVE /                 PLACEMENT OF SPINAL CORD STIMULATOR;  Surgeon: Dalrene Max MD;  Location: [facility] OR;  Service: Neurosurgery;                 Laterality: N/A;  No date: Nervbe Block injection      Comment:  Pain management  No date: SPINAL CORD STIMULATOR IMPLANT  03/28/2022: TOTAL SHOULDER ARTHROPLASTY; Right      Comment:    Mahnaz  03/07/2022: TOTAL THYROIDECTOMY; Bilateral      Comment:  Dr Mendoza    Current Outpatient Medications on File Prior to Visit:  albuterol (VENTOLIN HFA) 90 mcg/actuation inhaler, Inhale 2 puffs into the lungs every 6 (six) hours as needed for Wheezing or Shortness of Breath (cough). Rescue, Disp: 18 g, Rfl: 0  aspirin 81 MG Chew, Take 81 mg by mouth once daily., Disp: , Rfl:   atorvastatin (LIPITOR) 40 MG tablet, TAKE 1 TABLET ONE TIME DAILY, Disp: 90 tablet, Rfl: 3  buPROPion (WELLBUTRIN XL) 150 MG TB24 tablet, TAKE 1 TABLET EVERY DAY, Disp: 90 tablet, Rfl: 3  calcitRIOL (ROCALTROL) 0.5 MCG Cap, TAKE 1 CAPSULE ONE TIME DAILY, Disp: 90 capsule, Rfl: 3  furosemide (LASIX) 40 MG tablet, Take 1 tablet (40 mg total) by mouth once daily., Disp: 90 tablet, Rfl: 1  levothyroxine (SYNTHROID) 112 MCG tablet, TAKE 2 TABLETS(224 MCG) BY MOUTH EVERY DAY, Disp: 180 tablet, Rfl: 0  losartan (COZAAR) 50 MG tablet, Take 1 tablet (50 mg total) by mouth once daily. Take additional 25 mg daily if bp is 140 or above, Disp: 90 tablet, Rfl: 3  metoprolol succinate (TOPROL-XL) 25 MG 24 hr tablet, TAKE ONE-HALF TABLET (12.5 MG) BY MOUTH EVERY DAY, Disp: 45 tablet, Rfl: 0  multivitamin (THERAGRAN) per tablet, Take 1 tablet by mouth once daily., Disp: , Rfl:   mupirocin (BACTROBAN) 2 % ointment, Apply topically 3 (three) times daily., Disp: 22 g, Rfl: 0  naloxone (NARCAN) 0.4 mg/mL injection, Inject 1 mL (0.4 mg total) into the vein as needed (overdose)., Disp: 2 mL, Rfl: 5  omeprazole (PRILOSEC) 40 MG capsule, Take one capsule by mouth daily, Disp: 90 capsule, Rfl: 3  tiZANidine 4 mg Cap, , Disp: , Rfl:   UNABLE TO FIND, Take by mouth once daily. Vitafusion multivites gummies, Disp: , Rfl:   [DISCONTINUED] morphine (MS CONTIN) 15 MG 12 hr tablet, Take 1 tablet (15 mg total) by mouth every 8 (eight) hours., Disp: 90 tablet, Rfl: 0  [DISCONTINUED] morphine (MS CONTIN) 15 MG 12 hr tablet, Take 1 tablet (15 mg total) by mouth every 8  (eight) hours., Disp: 90 tablet, Rfl: 0  [DISCONTINUED] oxyCODONE-acetaminophen (PERCOCET)  mg per tablet, Take 1 tablet by mouth every 4 (four) hours as needed for Pain., Disp: 120 tablet, Rfl: 0  [DISCONTINUED] oxyCODONE-acetaminophen (PERCOCET)  mg per tablet, Take 1 tablet by mouth every 4 (four) hours as needed for Pain., Disp: 120 tablet, Rfl: 0    Current Facility-Administered Medications on File Prior to Visit:  diphenhydrAMINE injection 12.5 mg, 12.5 mg, Intravenous, Once PRN, Enrique Rosales MD  electrolyte-S (ISOLYTE), , Intravenous, Continuous, Enrique Rosales MD  HYDROmorphone (PF) injection 0.2 mg, 0.2 mg, Intravenous, Q5 Min PRN, Enrique Rosales MD  HYDROmorphone (PF) injection 0.2 mg, 0.2 mg, Intravenous, Q5 Min PRN, Enrique Rosales MD  lactated ringers infusion, , Intravenous, Once PRN, Aram Terrell MD  lactated ringers infusion, 10 mL/hr, Intravenous, Continuous, Enrique Rosales MD, Stopped at 07/19/23 0959  lactated ringers infusion, , Intravenous, Continuous, Aram Terrell MD, Last Rate: 25 mL/hr at 07/11/23 1012, New Bag at 07/11/23 1012  lactated ringers infusion, , Intravenous, Continuous, Aram Terrell MD, Last Rate: 25 mL/hr at 01/09/24 1014, New Bag at 01/09/24 1014  lorazepam injection 0.25 mg, 0.25 mg, Intravenous, Once PRN, Enrique Rosales MD  prochlorperazine injection Soln 5 mg, 5 mg, Intravenous, Q30 Min PRN, Enrique Rosales MD  sodium chloride 0.9% flush 3 mL, 3 mL, Intravenous, Q8H, Enrique Rosales MD            Review of Systems   Gastrointestinal:  Negative for constipation, diarrhea and nausea.   Musculoskeletal:  Positive for arthralgias, back pain and neck pain. Negative for joint swelling.   Psychiatric/Behavioral:  Negative for confusion, decreased concentration and dysphoric mood.        Objective:      Physical Exam  Vitals and nursing note reviewed.   Constitutional:       Appearance: Normal appearance. He is obese.       Comments: Borderline blood pressure control   Obese with a BMI of 35.9 he is down 12.2 lb from his August 16, 2024 visit   Cardiovascular:      Rate and Rhythm: Normal rate and regular rhythm.   Pulmonary:      Effort: Pulmonary effort is normal.      Breath sounds: Normal breath sounds.   Neurological:      Mental Status: He is alert and oriented to person, place, and time. Mental status is at baseline.   Psychiatric:         Mood and Affect: Mood normal.         Behavior: Behavior normal.         Thought Content: Thought content normal.         Judgment: Judgment normal.         Assessment:       1. Spinal stenosis, lumbar region, without neurogenic claudication    2. Uncomplicated opioid dependence    3. Spinal stenosis in cervical region    4. Chronic pain syndrome        Plan:       1. Uncomplicated opioid dependence   checked without any inappropriate activity  - Ambulatory referral/consult to Pain Clinic; Future  - oxyCODONE-acetaminophen (PERCOCET)  mg per tablet; Take 1 tablet by mouth every 4 (four) hours as needed for Pain.  Dispense: 120 tablet; Refill: 0  - morphine (MS CONTIN) 15 MG 12 hr tablet; Take 1 tablet (15 mg total) by mouth every morning.  Dispense: 30 tablet; Refill: 0  - oxyCODONE-acetaminophen (PERCOCET)  mg per tablet; Take 1 tablet by mouth every 4 (four) hours as needed for Pain.  Dispense: 120 tablet; Refill: 0  - oxyCODONE-acetaminophen (PERCOCET)  mg per tablet; Take 1 tablet by mouth every 4 (four) hours as needed for Pain.  Dispense: 120 tablet; Refill: 0  - Toxicology screen, urine    2. Spinal stenosis, lumbar region, without neurogenic claudication (Primary)  - gabapentin (NEURONTIN) 300 MG capsule; Take 1 capsule (300 mg total) by mouth every evening for 7 days, THEN 1 capsule (300 mg total) 2 (two) times daily for 7 days, THEN 1 capsule (300 mg total) 3 (three) times daily for 16 days.  Dispense: 69 capsule; Refill: 0    3. Spinal stenosis in cervical  region  - gabapentin (NEURONTIN) 300 MG capsule; Take 1 capsule (300 mg total) by mouth every evening for 7 days, THEN 1 capsule (300 mg total) 2 (two) times daily for 7 days, THEN 1 capsule (300 mg total) 3 (three) times daily for 16 days.  Dispense: 69 capsule; Refill: 0    4. Chronic pain syndrome  - gabapentin (NEURONTIN) 300 MG capsule; Take 1 capsule (300 mg total) by mouth every evening for 7 days, THEN 1 capsule (300 mg total) 2 (two) times daily for 7 days, THEN 1 capsule (300 mg total) 3 (three) times daily for 16 days.  Dispense: 69 capsule; Refill: 0

## 2024-11-07 ENCOUNTER — OFFICE VISIT (OUTPATIENT)
Dept: PODIATRY | Facility: CLINIC | Age: 79
End: 2024-11-07
Payer: MEDICARE

## 2024-11-07 DIAGNOSIS — B07.0 PLANTAR WART, RIGHT FOOT: Primary | ICD-10-CM

## 2024-11-07 PROCEDURE — 99999 PR PBB SHADOW E&M-EST. PATIENT-LVL I: CPT | Mod: PBBFAC,HCNC,, | Performed by: STUDENT IN AN ORGANIZED HEALTH CARE EDUCATION/TRAINING PROGRAM

## 2024-11-07 PROCEDURE — 99214 OFFICE O/P EST MOD 30 MIN: CPT | Mod: HCNC,S$GLB,, | Performed by: STUDENT IN AN ORGANIZED HEALTH CARE EDUCATION/TRAINING PROGRAM

## 2024-11-07 PROCEDURE — 1159F MED LIST DOCD IN RCRD: CPT | Mod: HCNC,CPTII,S$GLB, | Performed by: STUDENT IN AN ORGANIZED HEALTH CARE EDUCATION/TRAINING PROGRAM

## 2024-11-07 PROCEDURE — 1160F RVW MEDS BY RX/DR IN RCRD: CPT | Mod: HCNC,CPTII,S$GLB, | Performed by: STUDENT IN AN ORGANIZED HEALTH CARE EDUCATION/TRAINING PROGRAM

## 2024-11-07 RX ORDER — IMIQUIMOD 12.5 MG/.25G
CREAM TOPICAL
Qty: 12 PACKET | Refills: 3 | Status: SHIPPED | OUTPATIENT
Start: 2024-11-08 | End: 2025-02-06

## 2024-11-07 NOTE — PROGRESS NOTES
No chief complaint on file.      HPI:   Sam Pete is a 79 y.o. male with concerns of  right foot pain, which appears to be present for months but worse over the last several days to week  Patient reports no acute injury to the area.    Patient states the pain occurs with direct pressure.      Treatment tried at home: nothing    10/6/22: Patient returns with some worsening pain over the last few days.    11/09/2022: Patient returns today for worsening foot pain again.    12/1/22:Return for wart treatment.     12/22/22: Return for f/u wart. Pain is better but not completely resolved.    11/07/2024  Wart has returned and he noticed the pain about 2 weeks ago.     Patient Active Problem List   Diagnosis    Hypertrophy of prostate with urinary obstruction and other lower urinary tract symptoms (LUTS)    Impotence of organic origin    Spinal stenosis in cervical region    Spinal stenosis, lumbar region, without neurogenic claudication    Hereditary and idiopathic peripheral neuropathy    Chronic pain low back and neck with spinal stenosis, djd left arm.  pain contract renewed 9/10/14    Essential (primary) hypertension    Facet arthropathy, lumbar    Atherosclerosis of aorta    Opioid dependence    PAF (paroxysmal atrial fibrillation)    Colon cancer screening    Failed back surgical syndrome    Lumbar radiculopathy    Chronic pain syndrome    Chronic back pain    Other hyperlipidemia    Gastroesophageal reflux disease without esophagitis    At risk for falls    Primary osteoarthritis of right shoulder    Long term (current) use of anticoagulants    Severe obesity (BMI 35.0-39.9) with comorbidity    REAL (iron deficiency anemia)    Right-sided chest pain    COLEMAN (dyspnea on exertion)    Syncope and collapse    Family history of early CAD    Nonrheumatic tricuspid valve regurgitation    Abnormal nuclear stress test    Mild pulmonary hypertension    Coronary artery disease involving native coronary artery of native  heart without angina pectoris    H/O: CVA (cerebrovascular accident)    S/P CABG (coronary artery bypass graft)    Acquired hypothyroidism    Carotid artery plaque, bilateral         Current Outpatient Medications on File Prior to Visit   Medication Sig Dispense Refill    albuterol (VENTOLIN HFA) 90 mcg/actuation inhaler Inhale 2 puffs into the lungs every 6 (six) hours as needed for Wheezing or Shortness of Breath (cough). Rescue 18 g 0    aspirin 81 MG Chew Take 81 mg by mouth once daily.      atorvastatin (LIPITOR) 40 MG tablet TAKE 1 TABLET ONE TIME DAILY 90 tablet 3    buPROPion (WELLBUTRIN XL) 150 MG TB24 tablet TAKE 1 TABLET EVERY DAY 90 tablet 3    calcitRIOL (ROCALTROL) 0.5 MCG Cap TAKE 1 CAPSULE ONE TIME DAILY 90 capsule 3    furosemide (LASIX) 40 MG tablet Take 1 tablet (40 mg total) by mouth once daily. 90 tablet 1    gabapentin (NEURONTIN) 300 MG capsule Take 1 capsule (300 mg total) by mouth every evening for 7 days, THEN 1 capsule (300 mg total) 2 (two) times daily for 7 days, THEN 1 capsule (300 mg total) 3 (three) times daily for 16 days. 69 capsule 0    [START ON 12/2/2024] gabapentin (NEURONTIN) 300 MG capsule Take 1 capsule (300 mg total) by mouth 3 (three) times daily. 90 capsule 5    levothyroxine (SYNTHROID) 112 MCG tablet TAKE 2 TABLETS(224 MCG) BY MOUTH EVERY  tablet 0    losartan (COZAAR) 50 MG tablet Take 1 tablet (50 mg total) by mouth once daily. Take additional 25 mg daily if bp is 140 or above 90 tablet 3    metoprolol succinate (TOPROL-XL) 25 MG 24 hr tablet TAKE ONE-HALF TABLET (12.5 MG) BY MOUTH EVERY DAY 45 tablet 0    [START ON 12/11/2024] morphine (MS CONTIN) 15 MG 12 hr tablet Take 1 tablet (15 mg total) by mouth every morning. 30 tablet 0    multivitamin (THERAGRAN) per tablet Take 1 tablet by mouth once daily.      mupirocin (BACTROBAN) 2 % ointment Apply topically 3 (three) times daily. 22 g 0    naloxone (NARCAN) 0.4 mg/mL injection Inject 1 mL (0.4 mg total) into the  vein as needed (overdose). 2 mL 5    omeprazole (PRILOSEC) 40 MG capsule Take one capsule by mouth daily 90 capsule 3    [START ON 11/11/2024] oxyCODONE-acetaminophen (PERCOCET)  mg per tablet Take 1 tablet by mouth every 4 (four) hours as needed for Pain. 120 tablet 0    [START ON 12/11/2024] oxyCODONE-acetaminophen (PERCOCET)  mg per tablet Take 1 tablet by mouth every 4 (four) hours as needed for Pain. 120 tablet 0    [START ON 1/10/2025] oxyCODONE-acetaminophen (PERCOCET)  mg per tablet Take 1 tablet by mouth every 4 (four) hours as needed for Pain. 120 tablet 0    tiZANidine 4 mg Cap       UNABLE TO FIND Take by mouth once daily. Vitafusion multivites gummies       Current Facility-Administered Medications on File Prior to Visit   Medication Dose Route Frequency Provider Last Rate Last Admin    diphenhydrAMINE injection 12.5 mg  12.5 mg Intravenous Once PRN Enrique Rosales MD        electrolyte-S (ISOLYTE)   Intravenous Continuous Enrique Rosales MD        HYDROmorphone (PF) injection 0.2 mg  0.2 mg Intravenous Q5 Min PRN Enrique Rosales MD        HYDROmorphone (PF) injection 0.2 mg  0.2 mg Intravenous Q5 Min PRN Enrique Rosales MD        lactated ringers infusion   Intravenous Once PRN Aram Terrell MD        lactated ringers infusion  10 mL/hr Intravenous Continuous Enrique Rosales MD   Stopped at 07/19/23 0959    lactated ringers infusion   Intravenous Continuous Aram Terrell MD 25 mL/hr at 07/11/23 1012 New Bag at 07/11/23 1012    lactated ringers infusion   Intravenous Continuous Aram Terrell MD 25 mL/hr at 01/09/24 1014 New Bag at 01/09/24 1014    lorazepam injection 0.25 mg  0.25 mg Intravenous Once PRN Enrique Rosales MD        prochlorperazine injection Soln 5 mg  5 mg Intravenous Q30 Min PRN Enrique Rosales MD        sodium chloride 0.9% flush 3 mL  3 mL Intravenous Q8H Enrique Rosales MD             Review of patient's allergies  indicates:   Allergen Reactions    Xyzal [levocetirizine] Other (See Comments)     Knocked him out              ROS:   General ROS: negative  Respiratory ROS: no cough, shortness of breath, or wheezing  Cardiovascular ROS: no chest pain or dyspnea on exertion  Musculoskeletal ROS: negative  Neurological ROS: no TIA or stroke symptoms  Dermatological ROS: negative        EXAM:     There were no vitals filed for this visit.       General: Patient is WD/WN, alert and oriented x 3 and in no apparent distress.     Right  Lower extremity exam:      Vascular:   Dorsalis pedis and posterior tibial pulses are 2/4 .    3 seconds capillary refill time   Toes are warm to touch.     There is no edema.       Neurological:    Light touch, proprioception, and sharp/dull sensation are all intact.           Dermatological:    R submet 5 focal hyperkeratosis with central core and tenderness w/ palpation and loss of skin lines.       Musculoskeletal:    Muscle strength is 5/5 in all groups .    Metatarsophalangeal, subtalar, and ankle range of motion are within normal limits without crepitus.     N/a  Prominent submet 5 on the right w/ some fatpad atrophy. Digital contractures of the lesser toes.    ASSESSMENT/PLAN:    Problem List Items Addressed This Visit    None  Visit Diagnoses       Plantar wart, right foot    -  Primary                    I counseled the patient on the patient's conditions, their implications and medical management.   Wart has returned. We will treat with imiquimod to use every other day for 3 months.  F/u 2 months with Dr. Price or Jus. Patient advised to d/c use if there is any local reaction.    Uziel Valadez DPM

## 2024-11-10 ENCOUNTER — PATIENT MESSAGE (OUTPATIENT)
Dept: FAMILY MEDICINE | Facility: CLINIC | Age: 79
End: 2024-11-10
Payer: MEDICARE

## 2024-11-10 DIAGNOSIS — G89.4 CHRONIC PAIN SYNDROME: Primary | ICD-10-CM

## 2024-11-10 DIAGNOSIS — M48.02 SPINAL STENOSIS IN CERVICAL REGION: ICD-10-CM

## 2024-11-10 DIAGNOSIS — F11.20 UNCOMPLICATED OPIOID DEPENDENCE: ICD-10-CM

## 2024-11-10 DIAGNOSIS — M48.061 SPINAL STENOSIS, LUMBAR REGION, WITHOUT NEUROGENIC CLAUDICATION: ICD-10-CM

## 2024-11-11 RX ORDER — MORPHINE SULFATE 15 MG/1
15 TABLET, FILM COATED, EXTENDED RELEASE ORAL 2 TIMES DAILY
Qty: 60 TABLET | Refills: 0 | Status: SHIPPED | OUTPATIENT
Start: 2024-11-18 | End: 2024-11-11 | Stop reason: SDUPTHER

## 2024-11-11 RX ORDER — MORPHINE SULFATE 15 MG/1
15 TABLET, FILM COATED, EXTENDED RELEASE ORAL 2 TIMES DAILY
Qty: 60 TABLET | Refills: 0 | Status: SHIPPED | OUTPATIENT
Start: 2024-11-18

## 2024-11-12 NOTE — TELEPHONE ENCOUNTER
[facility] site checked, no inappropriate activity found    Last MS Contin refill 15 mg October 11, 2024

## 2024-11-22 ENCOUNTER — OFFICE VISIT (OUTPATIENT)
Dept: SPINE | Facility: CLINIC | Age: 79
End: 2024-11-22
Payer: MEDICARE

## 2024-11-22 ENCOUNTER — PATIENT MESSAGE (OUTPATIENT)
Dept: PODIATRY | Facility: CLINIC | Age: 79
End: 2024-11-22
Payer: MEDICARE

## 2024-11-22 VITALS — BODY MASS INDEX: 35.91 KG/M2 | WEIGHT: 228.81 LBS | HEIGHT: 67 IN

## 2024-11-22 DIAGNOSIS — M54.41 CHRONIC BILATERAL LOW BACK PAIN WITH BILATERAL SCIATICA: Primary | ICD-10-CM

## 2024-11-22 DIAGNOSIS — G89.29 CHRONIC BILATERAL LOW BACK PAIN WITH BILATERAL SCIATICA: Primary | ICD-10-CM

## 2024-11-22 DIAGNOSIS — M54.42 CHRONIC BILATERAL LOW BACK PAIN WITH BILATERAL SCIATICA: Primary | ICD-10-CM

## 2024-11-22 PROCEDURE — 99999 PR PBB SHADOW E&M-EST. PATIENT-LVL III: CPT | Mod: PBBFAC,HCNC,, | Performed by: PHYSICAL MEDICINE & REHABILITATION

## 2024-11-22 NOTE — PROGRESS NOTES
SUBJECTIVE:    Patient ID: Sam Pete is a 79 y.o. male.    Chief Complaint: Follow-up    He is here for follow-up status post caudal epidural steroid injection on 10/24/2024 with Dr. Terrell.  Fair response to that procedure.  He describes about 50% improvement in his back and bilateral leg pain with improved functional mobility.  He says if he sits in his car for about 45 minutes he will develop discomfort in his left foot that radiates up to his back but overall he is fairly satisfied with his quality of life.  His current pain level is 6/10.  I note he is scheduled to see Dr. Lyles, interventional pain management early next month.          Past Medical History:   Diagnosis Date    Abnormal nuclear stress test 2023    Anticoagulant long-term use     ASA 81 mg    Arthritis     Cataract     OU    Chronic low back pain     Complete rupture of rotator cuff 2011    DDD (degenerative disc disease), lumbar 2015    Degeneration of lumbar or lumbosacral intervertebral disc 2015    ED (erectile dysfunction)     GERD (gastroesophageal reflux disease)     Hypertension     Hypothyroidism, unspecified     Lumbar pseudoarthrosis 2017    Lumbar stenosis 2017    Obesity     PSVT (paroxysmal supraventricular tachycardia) 2023    SOB (shortness of breath) 2023    Spondylosis of lumbar region without myelopathy or radiculopathy 2015    Stroke     basal ganglia, left sided weakness, resolved    Testicular hypofunction     Thoracic or lumbosacral neuritis or radiculitis 2015     Social History     Socioeconomic History    Marital status:    Tobacco Use    Smoking status: Former     Current packs/day: 0.00     Average packs/day: 1 pack/day for 30.0 years (30.0 ttl pk-yrs)     Types: Cigarettes     Start date: 8/3/1963     Quit date: 1992     Years since quittin.9    Smokeless tobacco: Never   Substance and Sexual Activity    Alcohol use: Yes     Comment: On  occasion    Drug use: Yes     Types: Oxycodone, Morphine     Social Drivers of Health     Financial Resource Strain: Low Risk  (10/17/2023)    Overall Financial Resource Strain (CARDIA)     Difficulty of Paying Living Expenses: Not hard at all   Recent Concern: Financial Resource Strain - Medium Risk (8/23/2023)    Overall Financial Resource Strain (CARDIA)     Difficulty of Paying Living Expenses: Somewhat hard   Food Insecurity: No Food Insecurity (8/4/2024)    Received from [facility]    Hunger Vital Sign     Worried About Running Out of Food in the Last Year: Never true     Ran Out of Food in the Last Year: Never true   Transportation Needs: No Transportation Needs (8/4/2024)    Received from [facility]    PRAPARE - Transportation     Lack of Transportation (Medical): No     Lack of Transportation (Non-Medical): No   Physical Activity: Sufficiently Active (8/4/2024)    Received from [facility]    Exercise Vital Sign     Days of Exercise per Week: 5 days     Minutes of Exercise per Session: 40 min   Stress: No Stress Concern Present (8/4/2024)    Received from [facility]    [employer] [place] [employer] - Occupational Stress Questionnaire     Feeling of Stress : Not at all   Housing Stability: Low Risk  (8/4/2024)    Received from [facility]    Housing Stability Vital Sign     Unable to Pay for Housing in the Last Year: No     Number of Places Lived in the Last Year: 1     Unstable Housing in the Last Year: No     Past Surgical History:   Procedure Laterality Date    ANGIOGRAM, CORONARY, WITH LEFT HEART CATHETERIZATION  07/13/2023    Procedure: Left Heart Cath;  Surgeon: Salvador Eugene MD;  Location: [facility] CATH;  Service: Cardiology;;    APPENDECTOMY      ARTHROPLASTY OF SHOULDER Right 03/22/2022    Procedure: ARTHROPLASTY, SHOULDER BRENNAN RIGHT SHOULDER HUMERAL HEAD RESURFACING;  Surgeon: Frankie Joya MD;  Location: [facility] OR;  Service: Orthopedics;  Laterality: Right;    BACK SURGERY      4-  back surgery    CAUDAL EPIDURAL STEROID INJECTION N/A 12/16/2021    Procedure: Injection-steroid-epidural-caudal;  Surgeon: Aram Terrell MD;  Location: [facility] OR;  Service: Pain Management;  Laterality: N/A;    CAUDAL EPIDURAL STEROID INJECTION N/A 02/02/2022    Procedure: INJECTION, STEROID, SPINE, EPIDURAL, CAUDAL;  Surgeon: Aram Terrell MD;  Location: [facility] OR;  Service: Pain Management;  Laterality: N/A;    CAUDAL EPIDURAL STEROID INJECTION N/A 07/22/2022    Procedure: Injection-steroid-epidural-caudal;  Surgeon: Aram Terrell MD;  Location: [facility] OR;  Service: Pain Management;  Laterality: N/A;  Caudal CARLOS     CAUDAL EPIDURAL STEROID INJECTION N/A 01/20/2023    Procedure: Injection-steroid-epidural-caudal;  Surgeon: Aram Terrell MD;  Location: [facility] OR;  Service: Pain Management;  Laterality: N/A;  caudal ( melinda)    CAUDAL EPIDURAL STEROID INJECTION N/A 04/21/2023    Procedure: Injection-steroid-epidural-caudal;  Surgeon: Aram Terrell MD;  Location: [facility] OR;  Service: Pain Management;  Laterality: N/A;  caudal    CAUDAL EPIDURAL STEROID INJECTION N/A 07/11/2023    Procedure: Injection-steroid-epidural-caudal;  Surgeon: Aram Terrell MD;  Location: [facility] OR;  Service: Pain Management;  Laterality: N/A;  caudal    CAUDAL EPIDURAL STEROID INJECTION N/A 1/9/2024    Procedure: Injection-steroid-epidural-caudal;  Surgeon: Aram Terrell MD;  Location: [facility] OR;  Service: Anesthesiology;  Laterality: N/A;  caudal    CAUDAL EPIDURAL STEROID INJECTION N/A 6/4/2024    Procedure: Injection-steroid-epidural-caudal;  Surgeon: Aram Terrell MD;  Location: [facility] OR;  Service: Anesthesiology;  Laterality: N/A;    COLONOSCOPY  07/12/2004    [facility].   Redundant tortuous colon, otherwise normal.    COLONOSCOPY N/A 02/25/2019    Procedure: COLONOSCOPY;  Surgeon: Gilbert Rodriguez Jr., MD;  Location: [facility];  Service: Endoscopy;  Laterality: N/A;    CORONARY ANGIOGRAPHY N/A 07/13/2023    Procedure: ANGIOGRAM, CORONARY ARTERY;   Surgeon: Salvador Eugene MD;  Location: [facility] CATH;  Service: Cardiology;  Laterality: N/A;    CORONARY ARTERY BYPASS GRAFT (CABG) N/A 7/19/2023    Procedure: CORONARY ARTERY BYPASS GRAFT (CABG)- x 4;  Surgeon: Sandrine Wagner MD;  Location: [facility] OR;  Service: Cardiothoracic;  Laterality: N/A;    ENDOSCOPIC HARVEST OF VEIN Left 7/19/2023    Procedure: HARVEST-VEIN-ENDOVASCULAR;  Surgeon: Sandrine Wagner MD;  Location: [facility] OR;  Service: Cardiothoracic;  Laterality: Left;    Epidural steroid injection      Pain management    EPIDURAL STEROID INJECTION N/A 10/24/2024    Procedure: Injection, Steroid, Epidural;  Surgeon: Aram Terrell MD;  Location: [facility] OR;  Service: Pain Management;  Laterality: N/A;    ESOPHAGOGASTRODUODENOSCOPY  07/12/2004    [facility].    EXCLUSION, LEFT ATRIAL APPENDAGE, OPEN, AS PART OF OPEN CHEST SURGERY N/A 7/19/2023    Procedure: EXCLUSION, LEFT ATRIAL APPENDAGE, OPEN, AS PART OF OPEN CHEST SURGERY;  Surgeon: Sandrine Wagner MD;  Location: [facility] OR;  Service: Cardiothoracic;  Laterality: N/A;    FRACTURE SURGERY Left     wrist / forearm, total of 5    INSERTION OF DORSAL COLUMN NERVE STIMULATOR FOR TRIAL N/A 12/12/2019    Procedure: INSERTION, NEUROSTIMULATOR, SPINAL CORD, DORSAL COLUMN, FOR TRIAL;  Surgeon: Evan Lyles MD;  Location: [facility] [place];  Service: Pain Management;  Laterality: N/A;    JOINT REPLACEMENT  02/06/2013    ( rt hip 2007), left hip     JOINT REPLACEMENT Left     total knee    KNEE ARTHROSCOPY W/ DEBRIDEMENT      bilateral knees , total of six    KNEE SURGERY      LAMINECTOMY USING MINIMALLY INVASIVE TECHNIQUE N/A 02/12/2020    Procedure: LAMINECTOMY, SPINE, MINIMALLY INVASIVE /  PLACEMENT OF SPINAL CORD STIMULATOR;  Surgeon: Darlene Max MD;  Location: [facility] OR;  Service: Neurosurgery;  Laterality: N/A;    Nervbe Block injection      Pain management    SPINAL CORD STIMULATOR IMPLANT      TOTAL SHOULDER ARTHROPLASTY Right 03/28/2022    Dr Joya    TOTAL THYROIDECTOMY Bilateral 03/07/2022  "   Dr Mendoza     Family History   Problem Relation Name Age of Onset    Hypertension Father      Heart disease Father      Drug abuse Daughter      Hypertension Son      Lung disease Sister      COPD Sister      Hypertension Sister      Diverticulitis Sister      Gout Sister      Kidney disease Sister      No Known Problems Mother      Eczema Neg Hx      Lupus Neg Hx      Psoriasis Neg Hx      Melanoma Neg Hx       Vitals:    11/22/24 1105   Weight: 103.8 kg (228 lb 13.4 oz)   Height: 5' 7" (1.702 m)       Review of Systems   Constitutional:  Negative for chills, diaphoresis, fatigue, fever and unexpected weight change.   HENT:  Negative for trouble swallowing.    Eyes:  Negative for visual disturbance.   Respiratory:  Negative for shortness of breath.    Cardiovascular:  Negative for chest pain.   Gastrointestinal:  Negative for abdominal pain, constipation, diarrhea, nausea and vomiting.   Genitourinary:  Negative for difficulty urinating.   Musculoskeletal:  Negative for arthralgias, back pain, gait problem, joint swelling, myalgias, neck pain and neck stiffness.   Neurological:  Negative for dizziness, speech difficulty, weakness, light-headedness, numbness and headaches.          Objective:      Physical Exam  Neurological:      Mental Status: He is alert and oriented to person, place, and time.             Assessment:       1. Chronic bilateral low back pain with bilateral sciatica           Plan:     Improved to his satisfaction status post caudal epidural injection.  I have no additional recommendations for him and I agree with consultation with pain management.  He can follow up here on an as needed basis.      Chronic bilateral low back pain with bilateral sciatica          "

## 2024-12-05 ENCOUNTER — OFFICE VISIT (OUTPATIENT)
Dept: PAIN MEDICINE | Facility: CLINIC | Age: 79
End: 2024-12-05
Payer: MEDICARE

## 2024-12-05 VITALS
SYSTOLIC BLOOD PRESSURE: 156 MMHG | HEART RATE: 80 BPM | DIASTOLIC BLOOD PRESSURE: 70 MMHG | BODY MASS INDEX: 36.93 KG/M2 | WEIGHT: 235.81 LBS

## 2024-12-05 DIAGNOSIS — M96.1 FAILED BACK SURGICAL SYNDROME: Primary | ICD-10-CM

## 2024-12-05 DIAGNOSIS — G89.4 CHRONIC PAIN SYNDROME: ICD-10-CM

## 2024-12-05 DIAGNOSIS — M48.061 SPINAL STENOSIS, LUMBAR REGION, WITHOUT NEUROGENIC CLAUDICATION: ICD-10-CM

## 2024-12-05 DIAGNOSIS — M54.16 LUMBAR RADICULOPATHY: ICD-10-CM

## 2024-12-05 PROCEDURE — 99999 PR PBB SHADOW E&M-EST. PATIENT-LVL V: CPT | Mod: PBBFAC,HCNC,, | Performed by: ANESTHESIOLOGY

## 2024-12-05 PROCEDURE — 80364 OPIOID &OPIATE ANALOG 5/MORE: CPT | Mod: HCNC | Performed by: ANESTHESIOLOGY

## 2024-12-05 NOTE — PROGRESS NOTES
[facility] New Patient Evaluation      Referred by: Dr. Ty Montalvo    PCP:     CC:   Chief Complaint   Patient presents with    Back Pain    Hip Pain    Knee Pain          12/5/2024     1:41 PM 11/22/2024    11:06 AM 9/18/2024     2:40 PM   Last 3 PDI Scores   Pain Disability Index (PDI) 36 21 56         HPI:   Sam Pete is a 79 y.o. male patient who has a past medical history of Abnormal nuclear stress test, Anticoagulant long-term use, Arthritis, Cataract, Chronic low back pain, Complete rupture of rotator cuff, DDD (degenerative disc disease), lumbar, Degeneration of lumbar or lumbosacral intervertebral disc, ED (erectile dysfunction), GERD (gastroesophageal reflux disease), Hypertension, Hypothyroidism, unspecified, Lumbar pseudoarthrosis, Lumbar stenosis, Obesity, PSVT (paroxysmal supraventricular tachycardia), SOB (shortness of breath), Spondylosis of lumbar region without myelopathy or radiculopathy, Stroke, Testicular hypofunction, and Thoracic or lumbosacral neuritis or radiculitis. He presents with back pain.  He has a history of chronic back pain for over the past 30 years.  He has a history of 6 prior lumbar surgeries.  He has a history of spinal cord stimulator paddle lead implant.  Today he reports his pain is 7/10, constant, throbbing in his lower back with pain radiating primarily down the left leg to the left foot.  His pain is worse with sitting, standing, bending, walking, morning, flexing and relieved with medications, injections, physical therapy.  He has done physical therapy and kyree goes in the past.  Recently has been receiving injections from pain management in [place].      Pain Intervention History:      Past Spine Surgical History:      Past and current medications:  Antineuropathics: gabapentin   NSAIDs:  Physical therapy: yes, completed   Antidepressants:  Muscle relaxers: tizanidine   Opioids: oxycodone, ms contin   Antiplatelets/Anticoagulants: aspirin      History:    Current Outpatient Medications:     albuterol (VENTOLIN HFA) 90 mcg/actuation inhaler, Inhale 2 puffs into the lungs every 6 (six) hours as needed for Wheezing or Shortness of Breath (cough). Rescue, Disp: 18 g, Rfl: 0    aspirin 81 MG Chew, Take 81 mg by mouth once daily., Disp: , Rfl:     atorvastatin (LIPITOR) 40 MG tablet, TAKE 1 TABLET ONE TIME DAILY, Disp: 90 tablet, Rfl: 3    buPROPion (WELLBUTRIN XL) 150 MG TB24 tablet, TAKE 1 TABLET EVERY DAY, Disp: 90 tablet, Rfl: 3    calcitRIOL (ROCALTROL) 0.5 MCG Cap, TAKE 1 CAPSULE ONE TIME DAILY, Disp: 90 capsule, Rfl: 3    furosemide (LASIX) 40 MG tablet, Take 1 tablet (40 mg total) by mouth once daily., Disp: 90 tablet, Rfl: 1    gabapentin (NEURONTIN) 300 MG capsule, Take 1 capsule (300 mg total) by mouth 3 (three) times daily., Disp: 90 capsule, Rfl: 5    imiquimod (ALDARA) 5 % cream, Apply topically 3 (three) times a week., Disp: 12 packet, Rfl: 3    levothyroxine (SYNTHROID) 112 MCG tablet, TAKE 2 TABLETS(224 MCG) BY MOUTH EVERY DAY, Disp: 180 tablet, Rfl: 0    losartan (COZAAR) 50 MG tablet, Take 1 tablet (50 mg total) by mouth once daily. Take additional 25 mg daily if bp is 140 or above, Disp: 90 tablet, Rfl: 3    metoprolol succinate (TOPROL-XL) 25 MG 24 hr tablet, TAKE ONE-HALF TABLET (12.5 MG) BY MOUTH EVERY DAY, Disp: 45 tablet, Rfl: 0    morphine (MS CONTIN) 15 MG 12 hr tablet, Take 1 tablet (15 mg total) by mouth 2 (two) times daily., Disp: 60 tablet, Rfl: 0    multivitamin (THERAGRAN) per tablet, Take 1 tablet by mouth once daily., Disp: , Rfl:     mupirocin (BACTROBAN) 2 % ointment, Apply topically 3 (three) times daily., Disp: 22 g, Rfl: 0    naloxone (NARCAN) 0.4 mg/mL injection, Inject 1 mL (0.4 mg total) into the vein as needed (overdose)., Disp: 2 mL, Rfl: 5    omeprazole (PRILOSEC) 40 MG capsule, Take one capsule by mouth daily, Disp: 90 capsule, Rfl: 3    oxyCODONE-acetaminophen (PERCOCET)  mg per tablet, Take 1 tablet by  mouth every 4 (four) hours as needed for Pain., Disp: 120 tablet, Rfl: 0    [START ON 12/11/2024] oxyCODONE-acetaminophen (PERCOCET)  mg per tablet, Take 1 tablet by mouth every 4 (four) hours as needed for Pain., Disp: 120 tablet, Rfl: 0    [START ON 1/10/2025] oxyCODONE-acetaminophen (PERCOCET)  mg per tablet, Take 1 tablet by mouth every 4 (four) hours as needed for Pain., Disp: 120 tablet, Rfl: 0    tiZANidine 4 mg Cap, , Disp: , Rfl:     UNABLE TO FIND, Take by mouth once daily. Vitafusion multivites gummies, Disp: , Rfl:   No current facility-administered medications for this visit.    Facility-Administered Medications Ordered in Other Visits:     diphenhydrAMINE injection 12.5 mg, 12.5 mg, Intravenous, Once PRN, Enrique Rosales MD    electrolyte-S (ISOLYTE), , Intravenous, Continuous, Enrique Rosales MD    HYDROmorphone (PF) injection 0.2 mg, 0.2 mg, Intravenous, Q5 Min PRN, Enrique Rosales MD    HYDROmorphone (PF) injection 0.2 mg, 0.2 mg, Intravenous, Q5 Min PRN, Enrique Rosales MD    lactated ringers infusion, , Intravenous, Once PRN, Aram Terrell MD    lactated ringers infusion, 10 mL/hr, Intravenous, Continuous, Enrique Rosales MD, Stopped at 07/19/23 0959    lactated ringers infusion, , Intravenous, Continuous, Aram Terrell MD, Last Rate: 25 mL/hr at 07/11/23 1012, New Bag at 07/11/23 1012    lactated ringers infusion, , Intravenous, Continuous, Aram Terrell MD, Last Rate: 25 mL/hr at 01/09/24 1014, New Bag at 01/09/24 1014    lorazepam injection 0.25 mg, 0.25 mg, Intravenous, Once PRN, Enrique Rosales MD    prochlorperazine injection Soln 5 mg, 5 mg, Intravenous, Q30 Min PRN, Enrique Rosales MD    sodium chloride 0.9% flush 3 mL, 3 mL, Intravenous, Q8H, Enrique Rosales MD    Past Medical History:   Diagnosis Date    Abnormal nuclear stress test 07/2023    Anticoagulant long-term use     ASA 81 mg    Arthritis     Cataract     OU    Chronic low  back pain     Complete rupture of rotator cuff 02/08/2011    DDD (degenerative disc disease), lumbar 07/08/2015    Degeneration of lumbar or lumbosacral intervertebral disc 09/09/2015    ED (erectile dysfunction)     GERD (gastroesophageal reflux disease)     Hypertension     Hypothyroidism, unspecified     Lumbar pseudoarthrosis 06/26/2017    Lumbar stenosis 06/26/2017    Obesity     PSVT (paroxysmal supraventricular tachycardia) 07/2023    SOB (shortness of breath) 07/2023    Spondylosis of lumbar region without myelopathy or radiculopathy 07/08/2015    Stroke 1997    basal ganglia, left sided weakness, resolved    Testicular hypofunction     Thoracic or lumbosacral neuritis or radiculitis 07/08/2015       Past Surgical History:   Procedure Laterality Date    ANGIOGRAM, CORONARY, WITH LEFT HEART CATHETERIZATION  07/13/2023    Procedure: Left Heart Cath;  Surgeon: Salvador Eugene MD;  [facility] CATH;  Service: Cardiology;;    APPENDECTOMY      ARTHROPLASTY OF SHOULDER Right 03/22/2022    Procedure: ARTHROPLASTY, SHOULDER BRENNAN RIGHT SHOULDER HUMERAL HEAD RESURFACING;  Surgeon: Frankie Joya MD;  Location: [facility] OR;  Service: Orthopedics;  Laterality: Right;    BACK SURGERY      4- back surgery    CAUDAL EPIDURAL STEROID INJECTION N/A 12/16/2021    Procedure: Injection-steroid-epidural-caudal;  Surgeon: Aram Terrell MD;  Location: [facility] OR;  Service: Pain Management;  Laterality: N/A;    CAUDAL EPIDURAL STEROID INJECTION N/A 02/02/2022    Procedure: INJECTION, STEROID, SPINE, EPIDURAL, CAUDAL;  Surgeon: Aram Terrell MD;  Location: [facility] OR;  Service: Pain Management;  Laterality: N/A;    CAUDAL EPIDURAL STEROID INJECTION N/A 07/22/2022    Procedure: Injection-steroid-epidural-caudal;  Surgeon: Aram Terrell MD;  Location: [facility] OR;  Service: Pain Management;  Laterality: N/A;  Caudal CARLOS     CAUDAL EPIDURAL STEROID INJECTION N/A 01/20/2023    Procedure: Injection-steroid-epidural-caudal;  Surgeon: Aram Terrell MD;   Location: [facility] OR;  Service: Pain Management;  Laterality: N/A;  caudal ( melinda)    CAUDAL EPIDURAL STEROID INJECTION N/A 04/21/2023    Procedure: Injection-steroid-epidural-caudal;  Surgeon: Aram Terrell MD;  Location: [facility] OR;  Service: Pain Management;  Laterality: N/A;  caudal    CAUDAL EPIDURAL STEROID INJECTION N/A 07/11/2023    Procedure: Injection-steroid-epidural-caudal;  Surgeon: Aram Terrell MD;  [facility];  Service: Pain Management;  Laterality: N/A;  caudal    CAUDAL EPIDURAL STEROID INJECTION N/A 1/9/2024    Procedure: Injection-steroid-epidural-caudal;  Surgeon: Aram Terrell MD;  Location: [facility] OR;  Service: Anesthesiology;  Laterality: N/A;  caudal    CAUDAL EPIDURAL STEROID INJECTION N/A 6/4/2024    Procedure: Injection-steroid-epidural-caudal;  Surgeon: Aram Terrell MD;  Location: [facility] OR;  Service: Anesthesiology;  Laterality: N/A;    COLONOSCOPY  07/12/2004    CHILO.   Redundant tortuous colon, otherwise normal.    COLONOSCOPY N/A 02/25/2019    Procedure: COLONOSCOPY;  Surgeon: Gilbert Rodriguez Jr., MD;  Location: [facility] ENDO;  Service: Endoscopy;  Laterality: N/A;    CORONARY ANGIOGRAPHY N/A 07/13/2023    Procedure: ANGIOGRAM, CORONARY ARTERY;  Surgeon: Salvador Eugene MD;  Location: [facility] CATH;  Service: Cardiology;  Laterality: N/A;    CORONARY ARTERY BYPASS GRAFT (CABG) N/A 7/19/2023    Procedure: CORONARY ARTERY BYPASS GRAFT (CABG)- x 4;  Surgeon: Sandrine Wagner MD;  Location: [facility] OR;  Service: Cardiothoracic;  Laterality: N/A;    ENDOSCOPIC HARVEST OF VEIN Left 7/19/2023    Procedure: HARVEST-VEIN-ENDOVASCULAR;  Surgeon: Sandrine Wagner MD;  Location: [facility] OR;  Service: Cardiothoracic;  Laterality: Left;    Epidural steroid injection      Pain management    EPIDURAL STEROID INJECTION N/A 10/24/2024    Procedure: Injection, Steroid, Epidural;  Surgeon: Aram Terrell MD;  Location: [facility] OR;  Service: Pain Management;  Laterality: N/A;    ESOPHAGOGASTRODUODENOSCOPY  07/12/2004     CHILO.    EXCLUSION, LEFT ATRIAL APPENDAGE, OPEN, AS PART OF OPEN CHEST SURGERY N/A 2023    Procedure: EXCLUSION, LEFT ATRIAL APPENDAGE, OPEN, AS PART OF OPEN CHEST SURGERY;  Surgeon: Sandrine Wagner MD;  Location: [facility] OR;  Service: Cardiothoracic;  Laterality: N/A;    FRACTURE SURGERY Left     wrist / forearm, total of 5    INSERTION OF DORSAL COLUMN NERVE STIMULATOR FOR TRIAL N/A 2019    Procedure: INSERTION, NEUROSTIMULATOR, SPINAL CORD, DORSAL COLUMN, FOR TRIAL;  Surgeon: Evan Lyles MD;  Location: [facility] OR;  Service: Pain Management;  Laterality: N/A;    JOINT REPLACEMENT  2013    ( rt hip ), left hip     JOINT REPLACEMENT Left     total knee    KNEE ARTHROSCOPY W/ DEBRIDEMENT      bilateral knees , total of six    KNEE SURGERY      LAMINECTOMY USING MINIMALLY INVASIVE TECHNIQUE N/A 2020    Procedure: LAMINECTOMY, SPINE, MINIMALLY INVASIVE /  PLACEMENT OF SPINAL CORD STIMULATOR;  Surgeon: Darlene Max MD;  Location: [facility] OR;  Service: Neurosurgery;  Laterality: N/A;    Nervbe Block injection      Pain management    SPINAL CORD STIMULATOR IMPLANT      TOTAL SHOULDER ARTHROPLASTY Right 2022    Dr Joya    TOTAL THYROIDECTOMY Bilateral 2022    Dr Mendoza       Family History   Problem Relation Name Age of Onset    Hypertension Father      Heart disease Father      Drug abuse Daughter      Hypertension Son      Lung disease Sister      COPD Sister      Hypertension Sister      Diverticulitis Sister      Gout Sister      Kidney disease Sister      No Known Problems Mother      Eczema Neg Hx      Lupus Neg Hx      Psoriasis Neg Hx      Melanoma Neg Hx         Social History     Socioeconomic History    Marital status:    Tobacco Use    Smoking status: Former     Current packs/day: 0.00     Average packs/day: 1 pack/day for 30.0 years (30.0 ttl pk-yrs)     Types: Cigarettes     Start date: 8/3/1963     Quit date: 1992     Years since quittin.9    Smokeless  tobacco: Never   Substance and Sexual Activity    Alcohol use: Yes     Comment: On occasion    Drug use: Yes     Types: Oxycodone, Morphine     Social Drivers of Health     Financial Resource Strain: Low Risk  (10/17/2023)    Overall Financial Resource Strain (CARDIA)     Difficulty of Paying Living Expenses: Not hard at all   Recent Concern: Financial Resource Strain - Medium Risk (8/23/2023)    Overall Financial Resource Strain (CARDIA)     Difficulty of Paying Living Expenses: Somewhat hard   Food Insecurity: No Food Insecurity (8/4/2024)    Received from [facility]    Hunger Vital Sign     Worried About Running Out of Food in the Last Year: Never true     Ran Out of Food in the Last Year: Never true   Transportation Needs: No Transportation Needs (8/4/2024)    Received from [facility]    PRAPARE - Transportation     Lack of Transportation (Medical): No     Lack of Transportation (Non-Medical): No   Physical Activity: Sufficiently Active (8/4/2024)    Received from [facility]    Exercise Vital Sign     Days of Exercise per Week: 5 days     Minutes of Exercise per Session: 40 min   Stress: No Stress Concern Present (8/4/2024)    Received from [facility]    [employer] [place] [employer] - Occupational Stress Questionnaire     Feeling of Stress : Not at all   Housing Stability: Low Risk  (8/4/2024)    Received from [facility]    Housing Stability Vital Sign     Unable to Pay for Housing in the Last Year: No     Number of Places Lived in the Last Year: 1     Unstable Housing in the Last Year: No       Review of patient's allergies indicates:   Allergen Reactions    Xyzal [levocetirizine] Other (See Comments)     Knocked him out        Review of Systems:  12 point review of systems is negative.    Physical Exam:  Vitals:    12/05/24 1343   BP: (!) 156/70   Pulse: 80   Weight: 106.9 kg (235 lb 12.5 oz)   PainSc:   7   PainLoc: Back     Body mass index is 36.93 kg/m².    Gen: NAD  Psych: mood appropriate  for given condition  HEENT: eyes anicteric   CV: RRR  HEENT: anicteric   Respiratory: non-labored, no signs of respiratory distress  Abd: non-distended  Skin: warm, dry and intact.  Gait: antalgic gait.     Sensory:  Decreased sensation in a nondermatomal distribution in his lower extremities bilaterally    Motor:     Right Left   L2/3 Iliacus Hip flexion  5  5   L3/4 Qudratus Femoris Knee Extension  5  5   L4/5 Tib Anterior Ankle Dorsiflexion   5  5   L5/S1 Extensor Hallicus Longus Great toe extension  5  5   S1/S2 Gastroc/Soleus Plantar Flexion  5  5       Labs:  Lab Results   Component Value Date    HGBA1C 6.3 (H) 07/17/2023       Lab Results   Component Value Date    WBC 7.66 08/09/2023    HGB 14.1 06/20/2024    HCT 40.4 08/09/2023    MCV 99 (H) 08/09/2023     (H) 08/09/2023         Imaging:  MRI lumbar spine 10/3/24  FINDINGS:  Stable postoperative changes of posterior instrumented fusion and decompression at L2-S1.  Osseous fusion across the L4-5 disc space.  Interbody cage at L5-S1.     Alignment: Stable fused anterolisthesis of L2 on L3 and L4 on L5.  Sagittal alignment is otherwise maintained.     Vertebral column: Vertebral body heights appear maintained.  No acute fracture is identified; however, if trauma is suspected, a CT scan would be a more sensitive examination for fractures. No evidence of an aggressive marrow replacement process. Modic type 1 degenerative endplate change along the anterior superior endplate of L1.  Multilevel disc degeneration with disc desiccation, disc space narrowing and disc bulges throughout the lumbar spine.     Cord: Susceptibility artifact from a neurostimulator lead entering the dorsal spinal canal at the T11-12 inter spinous space and extending craniad beyond the field of view, limiting evaluation of the cord.  Visualized portions of the lower cord appears normal.  Conus terminates at L1.  Stable mild clumping of the cauda equina nerve roots at the L2-3 level  which may reflect sequelae of chronic inflammation.     Degenerative findings:  T11-12: Minimal circumferential disc bulge.  Moderate bilateral facet arthropathy.  No significant neural foraminal or spinal canal stenosis.  T12-L1: Disc is normal configuration.  Mild-to-moderate bilateral facet arthropathy.  Ligamentum flavum thickening.  No significant neural foraminal or spinal canal stenosis.  L1-L2: Mild circumferential disc bulge.  Moderate bilateral facet arthropathy.  Prominent ligamentum flavum thickening.  Interval increase in size of a small right-sided presumed facet joint synovial cyst, measuring 8 x 5 x 5 mm, slightly effacing right dorsal thecal sac.  Mild right neural foraminal stenosis.  Mild spinal canal stenosis.  L2-L3: Postoperative change, as above.  Disc space narrowing.  Circumferential disc bulge.  Moderate bilateral facet arthropathy.  Mild-to-moderate bilateral neural foraminal stenosis.  Mild spinal canal stenosis.  L3-L4: Postoperative change, as above.  Enhancing scar tissue in the laminectomy bed/posterior epidural space.  Disc space narrowing.  Circumferential disc bulge.  Moderate bilateral facet arthropathy.  Mild-to-moderate right and mild left neural foraminal stenosis.  Spinal canal is decompressed posteriorly.  L4-L5: Postoperative change, as above.  Posterior osteophytic ridging.  Moderate facet arthropathy.  Mild bilateral neural foraminal stenosis.  Spinal canal is decompressed posteriorly.  L5-S1: Postoperative change, as above.  Severe disc space narrowing.  Circumferential disc bulge with osteophytic ridging.  Severe bilateral facet arthropathy.  Neural foraminal are suboptimally evaluated due to susceptibility artifact, with questionable mild bilateral neural foraminal narrowing.  No spinal canal stenosis.     Paraspinal muscles & soft tissues: Postoperative changes in the posterior paraspinal soft tissues with stable small nonspecific chronic postoperative collection in  the laminectomy bed at the L3 vertebral body level.  Stable exophytic right renal cyst.  Small exophytic left renal cyst also noted.     No other discrete abnormal intraspinal enhancement.    Assessment:   Problem List Items Addressed This Visit          Neuro    Spinal stenosis, lumbar region, without neurogenic claudication    Lumbar radiculopathy    Relevant Orders    Ambulatory referral/consult to Physical/Occupational Therapy    Chronic pain syndrome    Relevant Orders    Pain Clinic Drug Screen       Orthopedic    Failed back surgical syndrome - Primary    Relevant Orders    Ambulatory referral/consult to Physical/Occupational Therapy         Sam Pete is a 79 y.o. male patient who has a past medical history of Abnormal nuclear stress test, Anticoagulant long-term use, Arthritis, Cataract, Chronic low back pain, Complete rupture of rotator cuff, DDD (degenerative disc disease), lumbar, Degeneration of lumbar or lumbosacral intervertebral disc, ED (erectile dysfunction), GERD (gastroesophageal reflux disease), Hypertension, Hypothyroidism, unspecified, Lumbar pseudoarthrosis, Lumbar stenosis, Obesity, PSVT (paroxysmal supraventricular tachycardia), SOB (shortness of breath), Spondylosis of lumbar region without myelopathy or radiculopathy, Stroke, Testicular hypofunction, and Thoracic or lumbosacral neuritis or radiculitis. He presents with back pain.  He has a history of chronic back pain for over the past 30 years.  He has a history of 6 prior lumbar surgeries.  He has a history of spinal cord stimulator paddle lead implant.  Today he reports his pain is 7/10, constant, throbbing in his lower back with pain radiating primarily down the left leg to the left foot.  His pain is worse with sitting, standing, bending, walking, morning, flexing and relieved with medications, injections, physical therapy.  He has done physical therapy and kyree goes in the past.  Recently has been receiving injections from  pain management in [place].    - I independently reviewed his lumbar MRI and he has evidence of posterior decompression from L2-S1 and interbody cage at L5-S1.  Adequate decompression of the lumbar spine.  - most recently is status post caudal CARLOS on 10/24/2024 with a about 50% relief of his pain  - he has been on chronic long-term opioids prescribed by his primary care physician.  He has been weaned down from morphine extended release 30 mg 3 times a day down to morphine extended release 15 mg twice a day.  He continues to use oxycodone 10 mg 3 to 4 times a day on an as needed basis for severe breakthrough pain.  He continues to use gabapentin 300 mg 3 times a day for the neuropathic component of his pain.  - his primary care physician is retiring in an he is looking for me to take over his medication management.  I discussed that be willing to take over his medication management but I am going to continue to try to wean him down to a lower dose of his chronic opioids.  He is in agreement with this plan.  No signs of abuse, no adverse events noted, patient experiences significant benefit of analgesia, and patient demonstrates increased activity while on these medications.  The patient is meeting the goals of opioid therapy and is dependent on them for functionality and can not perform ADLS without them. Regarding opioids, the risks were discussed including tolerance, addiction, overdose, over-sedation, drug interactions, respiratory depression, opioid-induced hyperalgesia, and even death.    - he has a Nevro spinal cord stimulator paddle lead.  He reports he is using in in his providing him with partial relief.  - he has done formal physical therapy in the past and I have recommended he restart formal PT at [facility].  I have placed a referral for him to do that  - he is not in need of a refill at this time.  He has already been prescribed naloxone nasal actuation spray and he reports both him in his wife were  educated on its use  - we will send a pain contract with me today and we will do a urine drug screen.  - follow up in 1 month      : Reviewed and consistent with medication use as prescribed.    Evan Lyles M.D.  Interventional Pain Medicine / Anesthesiology    This note was completed with dictation software and grammatical errors may exist.

## 2024-12-06 ENCOUNTER — PATIENT MESSAGE (OUTPATIENT)
Dept: FAMILY MEDICINE | Facility: CLINIC | Age: 79
End: 2024-12-06
Payer: MEDICARE

## 2024-12-09 NOTE — TELEPHONE ENCOUNTER
Duplicate request.   You can access the FollowMyHealth Patient Portal offered by [facility] by registering at the following website: [email or web address]. By joining [employer]’s FollowMyHealth portal, you will also be able to view your health information using other applications (apps) compatible with our system.

## 2024-12-10 ENCOUNTER — OFFICE VISIT (OUTPATIENT)
Dept: DERMATOLOGY | Facility: CLINIC | Age: 79
End: 2024-12-10
Payer: MEDICARE

## 2024-12-10 DIAGNOSIS — D36.10 NEUROFIBROMA: ICD-10-CM

## 2024-12-10 DIAGNOSIS — D48.5 NEOPLASM OF UNCERTAIN BEHAVIOR OF SKIN: Primary | ICD-10-CM

## 2024-12-10 DIAGNOSIS — D22.9 MULTIPLE BENIGN NEVI: ICD-10-CM

## 2024-12-10 DIAGNOSIS — Z85.828 HISTORY OF NONMELANOMA SKIN CANCER: ICD-10-CM

## 2024-12-10 DIAGNOSIS — L90.5 SCAR: ICD-10-CM

## 2024-12-10 DIAGNOSIS — D18.01 CHERRY ANGIOMA: ICD-10-CM

## 2024-12-10 DIAGNOSIS — L82.1 SEBORRHEIC KERATOSIS: ICD-10-CM

## 2024-12-10 DIAGNOSIS — L57.0 AK (ACTINIC KERATOSIS): ICD-10-CM

## 2024-12-10 PROCEDURE — 1126F AMNT PAIN NOTED NONE PRSNT: CPT | Mod: CPTII,S$GLB,, | Performed by: STUDENT IN AN ORGANIZED HEALTH CARE EDUCATION/TRAINING PROGRAM

## 2024-12-10 PROCEDURE — 1100F PTFALLS ASSESS-DOCD GE2>/YR: CPT | Mod: CPTII,S$GLB,, | Performed by: STUDENT IN AN ORGANIZED HEALTH CARE EDUCATION/TRAINING PROGRAM

## 2024-12-10 PROCEDURE — 1160F RVW MEDS BY RX/DR IN RCRD: CPT | Mod: CPTII,S$GLB,, | Performed by: STUDENT IN AN ORGANIZED HEALTH CARE EDUCATION/TRAINING PROGRAM

## 2024-12-10 PROCEDURE — 17004 DESTROY PREMAL LESIONS 15/>: CPT | Mod: S$GLB,,, | Performed by: STUDENT IN AN ORGANIZED HEALTH CARE EDUCATION/TRAINING PROGRAM

## 2024-12-10 PROCEDURE — 3288F FALL RISK ASSESSMENT DOCD: CPT | Mod: CPTII,S$GLB,, | Performed by: STUDENT IN AN ORGANIZED HEALTH CARE EDUCATION/TRAINING PROGRAM

## 2024-12-10 PROCEDURE — 1159F MED LIST DOCD IN RCRD: CPT | Mod: CPTII,S$GLB,, | Performed by: STUDENT IN AN ORGANIZED HEALTH CARE EDUCATION/TRAINING PROGRAM

## 2024-12-10 PROCEDURE — 99213 OFFICE O/P EST LOW 20 MIN: CPT | Mod: 25,S$GLB,, | Performed by: STUDENT IN AN ORGANIZED HEALTH CARE EDUCATION/TRAINING PROGRAM

## 2024-12-10 PROCEDURE — 11102 TANGNTL BX SKIN SINGLE LES: CPT | Mod: XS,S$GLB,, | Performed by: STUDENT IN AN ORGANIZED HEALTH CARE EDUCATION/TRAINING PROGRAM

## 2024-12-10 NOTE — PROGRESS NOTES
Subjective:      Patient ID:  Sam Pete is a 79 y.o. male who presents for   Chief Complaint   Patient presents with    Skin Check     UBSC     LOV 09/16/2024    Patient is coming in today for UBSC. States that he has a spot on his left dorsal hand x's 2-3 weeks that he is concerned about.     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   - THE TUMOR EXTENDS TO A LATERAL BIOPSY MARGIN.     Derm Hx  Hx of AK's  Phx  06/27/2024 SCCIS right chest ED&C 09/16/2024  Denies Fhx MM          Review of Systems   Constitutional:  Negative for fever, chills and fatigue.   Respiratory:  Negative for cough and shortness of breath.    Gastrointestinal:  Negative for nausea, vomiting and diarrhea.   Skin:  Positive for activity-related sunscreen use and wears hat. Negative for daily sunscreen use.   Hematologic/Lymphatic: Bruises/bleeds easily (asa).        Bruises easily       Objective:   Physical Exam   Constitutional: He appears well-developed and well-nourished. No distress.   Neurological: He is alert and oriented to person, place, and time. He is not disoriented.   Psychiatric: He has a normal mood and affect.   Skin:   Areas Examined (abnormalities noted in diagram):   Scalp / Hair Palpated and Inspected  Head / Face Inspection Performed  Neck Inspection Performed  Chest / Axilla Inspection Performed  Abdomen Inspection Performed  Back Inspection Performed  RUE Inspected  LUE Inspection Performed  Nails and Digits Inspection Performed                     Diagram Legend     Erythematous scaling macule/papule c/w actinic keratosis       Vascular papule c/w angioma      Pigmented verrucoid papule/plaque c/w seborrheic keratosis      Yellow umbilicated papule c/w sebaceous hyperplasia      Irregularly shaped tan macule c/w lentigo     1-2 mm smooth white papules consistent with Milia      Movable subcutaneous cyst with punctum c/w epidermal  inclusion cyst      Subcutaneous movable cyst c/w pilar cyst      Firm pink to brown papule c/w dermatofibroma      Pedunculated fleshy papule(s) c/w skin tag(s)      Evenly pigmented macule c/w junctional nevus     Mildly variegated pigmented, slightly irregular-bordered macule c/w mildly atypical nevus      Flesh colored to evenly pigmented papule c/w intradermal nevus       Pink pearly papule/plaque c/w basal cell carcinoma      Erythematous hyperkeratotic cursted plaque c/w SCC      Surgical scar with no sign of skin cancer recurrence      Open and closed comedones      Inflammatory papules and pustules      Verrucoid papule consistent consistent with wart     Erythematous eczematous patches and plaques     Dystrophic onycholytic nail with subungual debris c/w onychomycosis     Umbilicated papule    Erythematous-base heme-crusted tan verrucoid plaque consistent with inflamed seborrheic keratosis     Erythematous Silvery Scaling Plaque c/w Psoriasis     See annotation      Assessment / Plan:      Pathology Orders:       Normal Orders This Visit    Specimen to Pathology, Dermatology     Questions:    Procedure Type: Dermatology and skin neoplasms    Number of Specimens: 1    ------------------------: -------------------------    Spec 1 Procedure: Biopsy    Spec 1 Clinical Impression: HAK vs. SCC    Spec 1 Source: left dorsal hand    Release to patient:           Neoplasm of uncertain behavior of skin  -     Specimen to Pathology, Dermatology  Shave biopsy procedure note:    Shave biopsy performed after verbal consent including risk of infection, scar, recurrence, need for additional treatment of site. Area prepped with alcohol, anesthetized with approximately 1.0cc of 1% lidocaine with epinephrine. Lesional tissue shaved with razor blade. Hemostasis achieved with application of aluminum chloride followed by hyfrecation. No complications. Dressing applied. Wound care explained.    AK (actinic  keratosis)  Cryosurgery Procedure Note    Verbal consent from the patient is obtained and the patient is aware of the precancerous quality and need for treatment of these lesions. Liquid nitrogen cryosurgery is applied to the 16 actinic keratoses, as detailed in the physical exam, to produce a freeze injury. The patient is aware that blisters may form and is instructed on wound care with gentle cleansing and use of vaseline ointment to keep moist until healed. The patient is supplied a handout on cryosurgery and is instructed to call if lesions do not completely resolve.    History of nonmelanoma skin cancer  Scar  Area(s) of previous NMSC evaluated with no signs of recurrence.  Upper body skin examination performed today including at least 9 points as noted in physical examination. Suspicious lesions noted.  Patient instructed in importance in daily broad spectrum sun protection of at least spf 30. Mineral sunscreen ingredients preferred (Zinc +/- Titanium) and can be found OTC.   Patient encouraged to wear hat for all outdoor exposure.   Also discussed sun avoidance and use of protective clothing.    Capone angioma  This is a benign vascular lesion. Reassurance given. No treatment required.     Multiple benign nevi  Careful dermoscopy evaluation of nevi performed with biopsy as above  Monitor for new mole or moles that are becoming bigger, darker, irritated, or developing irregular borders.     Neurofibroma  Reassurance, benign     Seborrheic keratosis  These are benign inherited growths without a malignant potential. Reassurance given to patient. No treatment is necessary.          3 months to recheck face and scalp    No follow-ups on file.

## 2024-12-10 NOTE — PATIENT INSTRUCTIONS
CRYOSURGERY      Your doctor has used a method called cryosurgery to treat your skin condition. Cryosurgery refers to the use of very cold substances to treat a variety of skin conditions such as warts, pre-skin cancers, molluscum contagiosum, sun spots, and several benign growths. The substance we use in cryosurgery is liquid nitrogen and is so cold (-195 degrees Celsius) that is burns when administered.     Following treatment in the office, the skin may immediately burn and become red. You may find the area around the lesion is affected as well. It is sometimes necessary to treat not only the lesion, but a small area of the surrounding normal skin to achieve a good response.     A blister, and even a blood filled blister, may form after treatment.   This is a normal response. If the blister is painful, it is acceptable to sterilize a needle and with rubbing alcohol and gently pop the blister. It is important that you gently wash the area with soap and warm water as the blister fluid may contain wart virus if a wart was treated. Do no remove the roof of the blister.     The area treated can take anywhere from 1-3 weeks to heal. Healing time depends on the kind skin lesion treated, the location, and how aggressively the lesion was treated. It is recommended that the areas treated are covered with Vaseline or bacitracin ointment and a band-aid. If a band-aid is not practical, just ointment applied several times per day will do. Keeping these areas moist will speed the healing time.    Treatment with liquid nitrogen can leave a scar. In dark skin, it may be a light or dark scar, in light skin it may be a white or pink scar. These will generally fade with time, but may never go away completely.     If you have any concerns after your treatment, please feel free to call the office.       [address] [facility] [address]/ [phone] FAX/ [email or web address]  Shave Biopsy Wound Care    Your  doctor has performed a shave biopsy today.  A band aid and vaseline ointment has been placed over the site.  This should remain in place for 24 hours.  It is recommended that you keep the area dry for the first 24 hours.  After 24 hours, you may remove the band aid and wash the area with warm soap and water and apply Vaseline jelly.  Many patients prefer to use Neosporin or Bacitracin ointment.  This is acceptable; however, know that you can develop an allergy to this medication even if you have used it safely for years.  It is important to keep the area moist.  Letting it dry out and get air slows healing time, and will worsen the scar.  Band aid is optional after first 24 hours.      If you notice increasing redness, tenderness, pain, or yellow drainage at the biopsy site, please notify your doctor.  These are signs of an infection.    If your biopsy site is bleeding, apply firm pressure for 15 minutes straight.  Repeat for another 15 minutes, if it is still bleeding.   If the surgical site continues to bleed, then please contact your doctor.      [address] [place] [address]/ [phone] FAX/ [email or web address]

## 2024-12-18 ENCOUNTER — PATIENT MESSAGE (OUTPATIENT)
Dept: PAIN MEDICINE | Facility: CLINIC | Age: 79
End: 2024-12-18
Payer: MEDICARE

## 2024-12-18 ENCOUNTER — TELEPHONE (OUTPATIENT)
Dept: DERMATOLOGY | Facility: CLINIC | Age: 79
End: 2024-12-18
Payer: MEDICARE

## 2024-12-18 DIAGNOSIS — F11.20 UNCOMPLICATED OPIOID DEPENDENCE: ICD-10-CM

## 2024-12-18 DIAGNOSIS — D04.62 SQUAMOUS CELL CARCINOMA IN SITU (SCCIS) OF DORSUM OF LEFT HAND: Primary | ICD-10-CM

## 2024-12-18 DIAGNOSIS — G89.4 CHRONIC PAIN SYNDROME: Primary | ICD-10-CM

## 2024-12-18 RX ORDER — MORPHINE SULFATE 15 MG/1
15 TABLET, FILM COATED, EXTENDED RELEASE ORAL 2 TIMES DAILY
Qty: 60 TABLET | Refills: 0 | Status: CANCELLED | OUTPATIENT
Start: 2024-12-18

## 2024-12-18 RX ORDER — OMEPRAZOLE 40 MG/1
CAPSULE, DELAYED RELEASE ORAL
Qty: 90 CAPSULE | Refills: 3 | Status: SHIPPED | OUTPATIENT
Start: 2024-12-18

## 2024-12-18 RX ORDER — OMEPRAZOLE 40 MG/1
CAPSULE, DELAYED RELEASE ORAL
Qty: 90 CAPSULE | Refills: 3 | Status: CANCELLED | OUTPATIENT
Start: 2024-12-18

## 2024-12-18 NOTE — TELEPHONE ENCOUNTER
No care due was identified.  Health [facility] Embedded Care Due Messages. Reference number: [id number].   12/18/2024 7:39:36 AM CST

## 2024-12-18 NOTE — TELEPHONE ENCOUNTER
----- Message from Isaak sent at 12/18/2024  1:25 PM CST -----  Contact: [phone]  Type:  Patient Returning Call    Who Called:may   Who Left Message for Patient:nurse   Does the patient know what this is regarding?:yes   Would the patient rather a call back or a response via [employer]? Call Back   Best Call Back Number:[phone]   Additional Information: n/a      Thanks KB

## 2024-12-18 NOTE — TELEPHONE ENCOUNTER
No care due was identified.  Health [facility] Embedded Care Due Messages. Reference number: [id number].   12/18/2024 1:05:12 PM CST

## 2024-12-18 NOTE — TELEPHONE ENCOUNTER
No care due was identified.  [facility] Embedded Care Due Messages. Reference number: [id number].   12/18/2024 3:56:25 PM CST

## 2024-12-18 NOTE — TELEPHONE ENCOUNTER
I thought Dr. Lyles was taking over this and had signed a new pain contract with him.  If I refill this that violates the pain contract.

## 2024-12-18 NOTE — TELEPHONE ENCOUNTER
No care due was identified.  Health [facility] Embedded Care Due Messages. Reference number: [id number].   12/18/2024 3:52:48 PM CST

## 2024-12-19 RX ORDER — MORPHINE SULFATE 15 MG/1
15 TABLET, FILM COATED, EXTENDED RELEASE ORAL 2 TIMES DAILY
Qty: 60 TABLET | Refills: 0 | Status: SHIPPED | OUTPATIENT
Start: 2024-12-19

## 2024-12-19 RX ORDER — MORPHINE SULFATE 15 MG/1
15 TABLET, FILM COATED, EXTENDED RELEASE ORAL 2 TIMES DAILY
Qty: 60 TABLET | Refills: 0 | OUTPATIENT
Start: 2024-12-19

## 2024-12-23 ENCOUNTER — TELEPHONE (OUTPATIENT)
Dept: DERMATOLOGY | Facility: CLINIC | Age: 79
End: 2024-12-23
Payer: MEDICARE

## 2025-01-02 ENCOUNTER — TELEPHONE (OUTPATIENT)
Dept: PODIATRY | Facility: CLINIC | Age: 80
End: 2025-01-02
Payer: MEDICARE

## 2025-01-06 ENCOUNTER — OFFICE VISIT (OUTPATIENT)
Dept: PAIN MEDICINE | Facility: CLINIC | Age: 80
End: 2025-01-06
Payer: MEDICARE

## 2025-01-06 VITALS
HEIGHT: 67 IN | WEIGHT: 233.44 LBS | SYSTOLIC BLOOD PRESSURE: 120 MMHG | HEART RATE: 83 BPM | DIASTOLIC BLOOD PRESSURE: 82 MMHG | BODY MASS INDEX: 36.64 KG/M2

## 2025-01-06 DIAGNOSIS — G89.4 CHRONIC PAIN SYNDROME: Primary | ICD-10-CM

## 2025-01-06 DIAGNOSIS — M96.1 FAILED BACK SURGICAL SYNDROME: ICD-10-CM

## 2025-01-06 DIAGNOSIS — M48.061 SPINAL STENOSIS, LUMBAR REGION, WITHOUT NEUROGENIC CLAUDICATION: ICD-10-CM

## 2025-01-06 DIAGNOSIS — M54.16 LUMBAR RADICULOPATHY: ICD-10-CM

## 2025-01-06 PROCEDURE — 3288F FALL RISK ASSESSMENT DOCD: CPT | Mod: HCNC,CPTII,S$GLB, | Performed by: ANESTHESIOLOGY

## 2025-01-06 PROCEDURE — 1125F AMNT PAIN NOTED PAIN PRSNT: CPT | Mod: HCNC,CPTII,S$GLB, | Performed by: ANESTHESIOLOGY

## 2025-01-06 PROCEDURE — 3079F DIAST BP 80-89 MM HG: CPT | Mod: HCNC,CPTII,S$GLB, | Performed by: ANESTHESIOLOGY

## 2025-01-06 PROCEDURE — 1101F PT FALLS ASSESS-DOCD LE1/YR: CPT | Mod: HCNC,CPTII,S$GLB, | Performed by: ANESTHESIOLOGY

## 2025-01-06 PROCEDURE — 99999 PR PBB SHADOW E&M-EST. PATIENT-LVL III: CPT | Mod: PBBFAC,HCNC,, | Performed by: ANESTHESIOLOGY

## 2025-01-06 PROCEDURE — 99214 OFFICE O/P EST MOD 30 MIN: CPT | Mod: HCNC,S$GLB,, | Performed by: ANESTHESIOLOGY

## 2025-01-06 PROCEDURE — 3074F SYST BP LT 130 MM HG: CPT | Mod: HCNC,CPTII,S$GLB, | Performed by: ANESTHESIOLOGY

## 2025-01-06 PROCEDURE — 1159F MED LIST DOCD IN RCRD: CPT | Mod: HCNC,CPTII,S$GLB, | Performed by: ANESTHESIOLOGY

## 2025-01-06 RX ORDER — MORPHINE SULFATE 15 MG/1
15 TABLET, FILM COATED, EXTENDED RELEASE ORAL 2 TIMES DAILY
Qty: 60 TABLET | Refills: 0 | Status: SHIPPED | OUTPATIENT
Start: 2025-01-17 | End: 2025-02-16

## 2025-01-06 NOTE — PROGRESS NOTES
[facility] Follow Up Evaluation      Referred by: No ref. provider found    PCP:     CC:   Chief Complaint   Patient presents with    Low-back Pain          1/6/2025     1:13 PM 12/5/2024     1:41 PM 11/22/2024    11:06 AM   Last 3 PDI Scores   Pain Disability Index (PDI) 36 36 21       Interval HPI 1/6/25: Mr. Pete returns to the office for follow up.  Today he reports his pain is 8/10.  He has a typical low back pain but he also reports some worsening pain in the bottom of the right foot.  He reports he has a history of plantar wart on the right foot that can cause pain.  He has seen Podiatry in the past.  He is scheduled see to Podiatry next week.  He continues to use morphine extended release 15 mg twice a day and oxycodone 10 mg on an as needed basis for severe breakthrough pain without any side effects or adverse events.  He says he has been taking oxycodone a little more frequently than 4 times a day due to the increased pain in the bottom of the right foot.    HPI:   Sam Pete is a 79 y.o. male patient who has a past medical history of Abnormal nuclear stress test, Anticoagulant long-term use, Arthritis, Cataract, Chronic low back pain, Complete rupture of rotator cuff, DDD (degenerative disc disease), lumbar, Degeneration of lumbar or lumbosacral intervertebral disc, ED (erectile dysfunction), GERD (gastroesophageal reflux disease), Hypertension, Hypothyroidism, unspecified, Lumbar pseudoarthrosis, Lumbar stenosis, Obesity, PSVT (paroxysmal supraventricular tachycardia), SOB (shortness of breath), Spondylosis of lumbar region without myelopathy or radiculopathy, Squamous cell carcinoma of skin, Stroke, Testicular hypofunction, and Thoracic or lumbosacral neuritis or radiculitis. He presents with back pain.  He has a history of chronic back pain for over the past 30 years.  He has a history of 6 prior lumbar surgeries.  He has a history of spinal cord stimulator paddle lead implant.   Today he reports his pain is 7/10, constant, throbbing in his lower back with pain radiating primarily down the left leg to the left foot.  His pain is worse with sitting, standing, bending, walking, morning, flexing and relieved with medications, injections, physical therapy.  He has done physical therapy and kyree goes in the past.  Recently has been receiving injections from pain management in [place].      Pain Intervention History:  - history of spinal cord stimulator paddle lead implant  - s/p caudal CARLOS on 1/20/2023with a about 50% relief of his pain  - s/p caudal CARLOS on 4/21/2023 with a about 50% relief of his pain  - s/p caudal CARLOS on 7/11/2023 with a about 50% relief of his pain  - s/p caudal CARLOS on 1/9/2024 with a about 50% relief of his pain  - s/p caudal CARLOS on 6/4/2024 with a about 50% relief of his pain  - s/p caudal CARLOS on 10/24/2024 with a about 50% relief of his pain    Past Spine Surgical History:  - posterior decompression from L2-S1 and interbody cage at L5-S1    Past and current medications:  Antineuropathics: gabapentin   NSAIDs:  Physical therapy: yes, completed   Antidepressants:  Muscle relaxers: tizanidine   Opioids: oxycodone, ms contin   Antiplatelets/Anticoagulants: aspirin     History:    Current Outpatient Medications:     albuterol (VENTOLIN HFA) 90 mcg/actuation inhaler, Inhale 2 puffs into the lungs every 6 (six) hours as needed for Wheezing or Shortness of Breath (cough). Rescue, Disp: 18 g, Rfl: 0    aspirin 81 MG Chew, Take 81 mg by mouth once daily., Disp: , Rfl:     atorvastatin (LIPITOR) 40 MG tablet, TAKE 1 TABLET ONE TIME DAILY, Disp: 90 tablet, Rfl: 3    buPROPion (WELLBUTRIN XL) 150 MG TB24 tablet, TAKE 1 TABLET EVERY DAY, Disp: 90 tablet, Rfl: 3    calcitRIOL (ROCALTROL) 0.5 MCG Cap, TAKE 1 CAPSULE ONE TIME DAILY, Disp: 90 capsule, Rfl: 3    furosemide (LASIX) 40 MG tablet, Take 1 tablet (40 mg total) by mouth once daily., Disp: 90 tablet, Rfl: 1    gabapentin  (NEURONTIN) 300 MG capsule, Take 1 capsule (300 mg total) by mouth 3 (three) times daily., Disp: 90 capsule, Rfl: 5    imiquimod (ALDARA) 5 % cream, Apply topically 3 (three) times a week., Disp: 12 packet, Rfl: 3    levothyroxine (SYNTHROID) 112 MCG tablet, TAKE 2 TABLETS(224 MCG) BY MOUTH EVERY DAY, Disp: 180 tablet, Rfl: 0    losartan (COZAAR) 50 MG tablet, Take 1 tablet (50 mg total) by mouth once daily. Take additional 25 mg daily if bp is 140 or above, Disp: 90 tablet, Rfl: 3    metoprolol succinate (TOPROL-XL) 25 MG 24 hr tablet, TAKE ONE-HALF TABLET (12.5 MG) BY MOUTH EVERY DAY, Disp: 45 tablet, Rfl: 0    morphine (MS CONTIN) 15 MG 12 hr tablet, Take 1 tablet (15 mg total) by mouth 2 (two) times daily., Disp: 60 tablet, Rfl: 0    multivitamin (THERAGRAN) per tablet, Take 1 tablet by mouth once daily., Disp: , Rfl:     mupirocin (BACTROBAN) 2 % ointment, Apply topically 3 (three) times daily., Disp: 22 g, Rfl: 0    naloxone (NARCAN) 0.4 mg/mL injection, Inject 1 mL (0.4 mg total) into the vein as needed (overdose)., Disp: 2 mL, Rfl: 5    omeprazole (PRILOSEC) 40 MG capsule, Take one capsule by mouth daily, Disp: 90 capsule, Rfl: 3    oxyCODONE-acetaminophen (PERCOCET)  mg per tablet, Take 1 tablet by mouth every 4 (four) hours as needed for Pain., Disp: 120 tablet, Rfl: 0    oxyCODONE-acetaminophen (PERCOCET)  mg per tablet, Take 1 tablet by mouth every 4 (four) hours as needed for Pain., Disp: 120 tablet, Rfl: 0    [START ON 1/10/2025] oxyCODONE-acetaminophen (PERCOCET)  mg per tablet, Take 1 tablet by mouth every 4 (four) hours as needed for Pain., Disp: 120 tablet, Rfl: 0    tiZANidine 4 mg Cap, , Disp: , Rfl:     UNABLE TO FIND, Take by mouth once daily. Vitafusion multivites gummies, Disp: , Rfl:   No current facility-administered medications for this visit.    Facility-Administered Medications Ordered in Other Visits:     diphenhydrAMINE injection 12.5 mg, 12.5 mg, Intravenous, Once  PRN, Enrique Rosales MD    electrolyte-S (ISOLYTE), , Intravenous, Continuous, Enrique Rosales MD    HYDROmorphone (PF) injection 0.2 mg, 0.2 mg, Intravenous, Q5 Min PRN, Enrique Rosales MD    HYDROmorphone (PF) injection 0.2 mg, 0.2 mg, Intravenous, Q5 Min PRN, Enrique Rosales MD    lactated ringers infusion, , Intravenous, Once PRN, Aram Terrell MD    lactated ringers infusion, 10 mL/hr, Intravenous, Continuous, Enrique Rosales MD, Stopped at 07/19/23 0959    lactated ringers infusion, , Intravenous, Continuous, Aram Terrell MD, Last Rate: 25 mL/hr at 07/11/23 1012, New Bag at 07/11/23 1012    lactated ringers infusion, , Intravenous, Continuous, Aram Terrell MD, Last Rate: 25 mL/hr at 01/09/24 1014, New Bag at 01/09/24 1014    lorazepam injection 0.25 mg, 0.25 mg, Intravenous, Once PRN, Enrique Rosales MD    prochlorperazine injection Soln 5 mg, 5 mg, Intravenous, Q30 Min PRN, Enrique Rosales MD    sodium chloride 0.9% flush 3 mL, 3 mL, Intravenous, Q8H, Enrique Rosales MD    Past Medical History:   Diagnosis Date    Abnormal nuclear stress test 07/2023    Anticoagulant long-term use     ASA 81 mg    Arthritis     Cataract     OU    Chronic low back pain     Complete rupture of rotator cuff 02/08/2011    DDD (degenerative disc disease), lumbar 07/08/2015    Degeneration of lumbar or lumbosacral intervertebral disc 09/09/2015    ED (erectile dysfunction)     GERD (gastroesophageal reflux disease)     Hypertension     Hypothyroidism, unspecified     Lumbar pseudoarthrosis 06/26/2017    Lumbar stenosis 06/26/2017    Obesity     PSVT (paroxysmal supraventricular tachycardia) 07/2023    SOB (shortness of breath) 07/2023    Spondylosis of lumbar region without myelopathy or radiculopathy 07/08/2015    Squamous cell carcinoma of skin     Stroke 1997    basal ganglia, left sided weakness, resolved    Testicular hypofunction     Thoracic or lumbosacral neuritis or  radiculitis 07/08/2015       Past Surgical History:   Procedure Laterality Date    ANGIOGRAM, CORONARY, WITH LEFT HEART CATHETERIZATION  07/13/2023    Procedure: Left Heart Cath;  Surgeon: Salvador Eugene MD;  Location: [facility] CATH;  Service: Cardiology;;    APPENDECTOMY      ARTHROPLASTY OF SHOULDER Right 03/22/2022    Procedure: ARTHROPLASTY, SHOULDER BRENNAN RIGHT SHOULDER HUMERAL HEAD RESURFACING;  Surgeon: Frankie Joya MD;  Location: [facility] OR;  Service: Orthopedics;  Laterality: Right;    BACK SURGERY      4- back surgery    CAUDAL EPIDURAL STEROID INJECTION N/A 12/16/2021    Procedure: Injection-steroid-epidural-caudal;  Surgeon: Aram Terrell MD;  Location: [facility] OR;  Service: Pain Management;  Laterality: N/A;    CAUDAL EPIDURAL STEROID INJECTION N/A 02/02/2022    Procedure: INJECTION, STEROID, SPINE, EPIDURAL, CAUDAL;  Surgeon: Aram Terrell MD;  Location: [facility] OR;  Service: Pain Management;  Laterality: N/A;    CAUDAL EPIDURAL STEROID INJECTION N/A 07/22/2022    Procedure: Injection-steroid-epidural-caudal;  Surgeon: Aram Terrell MD;  Location: [facility] OR;  Service: Pain Management;  Laterality: N/A;  Caudal CARLOS     CAUDAL EPIDURAL STEROID INJECTION N/A 01/20/2023    Procedure: Injection-steroid-epidural-caudal;  Surgeon: Aram Terrell MD;  Location: [facility] OR;  Service: Pain Management;  Laterality: N/A;  caudal ( melinda)    CAUDAL EPIDURAL STEROID INJECTION N/A 04/21/2023    Procedure: Injection-steroid-epidural-caudal;  Surgeon: Aram Terrell MD;  Location: [facility] OR;  Service: Pain Management;  Laterality: N/A;  caudal    CAUDAL EPIDURAL STEROID INJECTION N/A 07/11/2023    Procedure: Injection-steroid-epidural-caudal;  Surgeon: Aram Terrell MD;  Location: [facility] OR;  Service: Pain Management;  Laterality: N/A;  caudal    CAUDAL EPIDURAL STEROID INJECTION N/A 1/9/2024    Procedure: Injection-steroid-epidural-caudal;  Surgeon: Aram Terrell MD;  Location: [facility] OR;  Service: Anesthesiology;  Laterality: N/A;  caudal     CAUDAL EPIDURAL STEROID INJECTION N/A 6/4/2024    Procedure: Injection-steroid-epidural-caudal;  Surgeon: Aram Terrell MD;  Location: [facility] OR;  Service: Anesthesiology;  Laterality: N/A;    COLONOSCOPY  07/12/2004    [facility].   Redundant tortuous colon, otherwise normal.    COLONOSCOPY N/A 02/25/2019    Procedure: COLONOSCOPY;  Surgeon: Gilbert Rodriguez Jr., MD;  Location: [facility] ENDO;  Service: Endoscopy;  Laterality: N/A;    CORONARY ANGIOGRAPHY N/A 07/13/2023    Procedure: ANGIOGRAM, CORONARY ARTERY;  Surgeon: Salvador Eugene MD;  Location: [facility] CATH;  Service: Cardiology;  Laterality: N/A;    CORONARY ARTERY BYPASS GRAFT (CABG) N/A 7/19/2023    Procedure: CORONARY ARTERY BYPASS GRAFT (CABG)- x 4;  Surgeon: Sandrine Wagner MD;  Location: [facility] OR;  Service: Cardiothoracic;  Laterality: N/A;    ENDOSCOPIC HARVEST OF VEIN Left 7/19/2023    Procedure: HARVEST-VEIN-ENDOVASCULAR;  Surgeon: Sandrine Wagner MD;  Location: [facility] OR;  Service: Cardiothoracic;  Laterality: Left;    Epidural steroid injection      Pain management    EPIDURAL STEROID INJECTION N/A 10/24/2024    Procedure: Injection, Steroid, Epidural;  Surgeon: Aram Terrell MD;  Location: [facility] OR;  Service: Pain Management;  Laterality: N/A;    ESOPHAGOGASTRODUODENOSCOPY  07/12/2004    [facility].    EXCLUSION, LEFT ATRIAL APPENDAGE, OPEN, AS PART OF OPEN CHEST SURGERY N/A 7/19/2023    Procedure: EXCLUSION, LEFT ATRIAL APPENDAGE, OPEN, AS PART OF OPEN CHEST SURGERY;  Surgeon: Sandrine Wagner MD;  Location: [facility] OR;  Service: Cardiothoracic;  Laterality: N/A;    FRACTURE SURGERY Left     wrist / forearm, total of 5    INSERTION OF DORSAL COLUMN NERVE STIMULATOR FOR TRIAL N/A 12/12/2019    Procedure: INSERTION, NEUROSTIMULATOR, SPINAL CORD, DORSAL COLUMN, FOR TRIAL;  Surgeon: Evan Lyles MD;  Location: [facility] OR;  Service: Pain Management;  Laterality: N/A;    JOINT REPLACEMENT  02/06/2013    ( rt hip 2007), left hip     JOINT REPLACEMENT Left     total knee    KNEE  ARTHROSCOPY W/ DEBRIDEMENT      bilateral knees , total of six    KNEE SURGERY      LAMINECTOMY USING MINIMALLY INVASIVE TECHNIQUE N/A 2020    Procedure: LAMINECTOMY, SPINE, MINIMALLY INVASIVE /  PLACEMENT OF SPINAL CORD STIMULATOR;  Surgeon: Darlene Max MD;  Location: [facility];  Service: Neurosurgery;  Laterality: N/A;    Nervbe Block injection      Pain management    SPINAL CORD STIMULATOR IMPLANT      TOTAL SHOULDER ARTHROPLASTY Right 2022    Dr Joya    TOTAL THYROIDECTOMY Bilateral 2022    Dr Mendoza       Family History   Problem Relation Name Age of Onset    Hypertension Father      Heart disease Father      Drug abuse Daughter      Hypertension Son      Lung disease Sister      COPD Sister      Hypertension Sister      Diverticulitis Sister      Gout Sister      Kidney disease Sister      No Known Problems Mother      Eczema Neg Hx      Lupus Neg Hx      Psoriasis Neg Hx      Melanoma Neg Hx         Social History     Socioeconomic History    Marital status:    Tobacco Use    Smoking status: Former     Current packs/day: 0.00     Average packs/day: 1 pack/day for 30.0 years (30.0 ttl pk-yrs)     Types: Cigarettes     Start date: 8/3/1963     Quit date: 1992     Years since quittin.0    Smokeless tobacco: Never   Substance and Sexual Activity    Alcohol use: Yes     Comment: On occasion    Drug use: Yes     Types: Oxycodone, Morphine     Social Drivers of Health     Financial Resource Strain: Low Risk  (10/17/2023)    Overall Financial Resource Strain (CARDIA)     Difficulty of Paying Living Expenses: Not hard at all   Recent Concern: Financial Resource Strain - Medium Risk (2023)    Overall Financial Resource Strain (CARDIA)     Difficulty of Paying Living Expenses: Somewhat hard   Food Insecurity: No Food Insecurity (2024)    Received from [facility]    Hunger Vital Sign     Worried About Running Out of Food in the Last Year: Never true     Ran Out of Food in  "the Last Year: Never true   Transportation Needs: No Transportation Needs (8/4/2024)    Received from [facility]    PRAPARE - Transportation     Lack of Transportation (Medical): No     Lack of Transportation (Non-Medical): No   Physical Activity: Sufficiently Active (8/4/2024)    Received from [facility]    Exercise Vital Sign     Days of Exercise per Week: 5 days     Minutes of Exercise per Session: 40 min   Stress: No Stress Concern Present (8/4/2024)    Received from [facility]    [employer] - Occupational Stress Questionnaire     Feeling of Stress : Not at all   Housing Stability: Low Risk  (8/4/2024)    Received from [facility]    Housing Stability Vital Sign     Unable to Pay for Housing in the Last Year: No     Number of Places Lived in the Last Year: 1     Unstable Housing in the Last Year: No       Review of patient's allergies indicates:   Allergen Reactions    Xyzal [levocetirizine] Other (See Comments)     Knocked him out        Review of Systems:  12 point review of systems is negative.    Physical Exam:  Vitals:    01/06/25 1316   BP: 120/82   Pulse: 83   Weight: 105.9 kg (233 lb 7.5 oz)   Height: 5' 7" (1.702 m)   PainSc:   8   PainLoc: Back       Body mass index is 36.57 kg/m².    Gen: NAD  Psych: mood appropriate for given condition  HEENT: eyes anicteric   CV: RRR  HEENT: anicteric   Respiratory: non-labored, no signs of respiratory distress  Abd: non-distended  Skin: warm, dry and intact.  Gait: antalgic gait.     Sensory:  Decreased sensation in a nondermatomal distribution in his lower extremities bilaterally    Motor:     Right Left   L2/3 Iliacus Hip flexion  5  5   L3/4 Qudratus Femoris Knee Extension  5  5   L4/5 Tib Anterior Ankle Dorsiflexion   5  5   L5/S1 Extensor Hallicus Longus Great toe extension  5  5   S1/S2 Gastroc/Soleus Plantar Flexion  5  5       Labs:  Lab Results   Component Value Date    HGBA1C 6.3 (H) 07/17/2023       Lab Results "   Component Value Date    WBC 7.66 08/09/2023    HGB 14.1 06/20/2024    HCT 40.4 08/09/2023    MCV 99 (H) 08/09/2023     (H) 08/09/2023         Imaging:  MRI lumbar spine 10/3/24  FINDINGS:  Stable postoperative changes of posterior instrumented fusion and decompression at L2-S1.  Osseous fusion across the L4-5 disc space.  Interbody cage at L5-S1.     Alignment: Stable fused anterolisthesis of L2 on L3 and L4 on L5.  Sagittal alignment is otherwise maintained.     Vertebral column: Vertebral body heights appear maintained.  No acute fracture is identified; however, if trauma is suspected, a CT scan would be a more sensitive examination for fractures. No evidence of an aggressive marrow replacement process. Modic type 1 degenerative endplate change along the anterior superior endplate of L1.  Multilevel disc degeneration with disc desiccation, disc space narrowing and disc bulges throughout the lumbar spine.     Cord: Susceptibility artifact from a neurostimulator lead entering the dorsal spinal canal at the T11-12 inter spinous space and extending craniad beyond the field of view, limiting evaluation of the cord.  Visualized portions of the lower cord appears normal.  Conus terminates at L1.  Stable mild clumping of the cauda equina nerve roots at the L2-3 level which may reflect sequelae of chronic inflammation.     Degenerative findings:  T11-12: Minimal circumferential disc bulge.  Moderate bilateral facet arthropathy.  No significant neural foraminal or spinal canal stenosis.  T12-L1: Disc is normal configuration.  Mild-to-moderate bilateral facet arthropathy.  Ligamentum flavum thickening.  No significant neural foraminal or spinal canal stenosis.  L1-L2: Mild circumferential disc bulge.  Moderate bilateral facet arthropathy.  Prominent ligamentum flavum thickening.  Interval increase in size of a small right-sided presumed facet joint synovial cyst, measuring 8 x 5 x 5 mm, slightly effacing right  dorsal thecal sac.  Mild right neural foraminal stenosis.  Mild spinal canal stenosis.  L2-L3: Postoperative change, as above.  Disc space narrowing.  Circumferential disc bulge.  Moderate bilateral facet arthropathy.  Mild-to-moderate bilateral neural foraminal stenosis.  Mild spinal canal stenosis.  L3-L4: Postoperative change, as above.  Enhancing scar tissue in the laminectomy bed/posterior epidural space.  Disc space narrowing.  Circumferential disc bulge.  Moderate bilateral facet arthropathy.  Mild-to-moderate right and mild left neural foraminal stenosis.  Spinal canal is decompressed posteriorly.  L4-L5: Postoperative change, as above.  Posterior osteophytic ridging.  Moderate facet arthropathy.  Mild bilateral neural foraminal stenosis.  Spinal canal is decompressed posteriorly.  L5-S1: Postoperative change, as above.  Severe disc space narrowing.  Circumferential disc bulge with osteophytic ridging.  Severe bilateral facet arthropathy.  Neural foraminal are suboptimally evaluated due to susceptibility artifact, with questionable mild bilateral neural foraminal narrowing.  No spinal canal stenosis.     Paraspinal muscles & soft tissues: Postoperative changes in the posterior paraspinal soft tissues with stable small nonspecific chronic postoperative collection in the laminectomy bed at the L3 vertebral body level.  Stable exophytic right renal cyst.  Small exophytic left renal cyst also noted.     No other discrete abnormal intraspinal enhancement.      Assessment:   Problem List Items Addressed This Visit          Neuro    Spinal stenosis, lumbar region, without neurogenic claudication    Lumbar radiculopathy    Chronic pain syndrome - Primary       Orthopedic    Failed back surgical syndrome         Sam Pete is a 79 y.o. male patient who has a past medical history of Abnormal nuclear stress test, Anticoagulant long-term use, Arthritis, Cataract, Chronic low back pain, Complete rupture of rotator  cuff, DDD (degenerative disc disease), lumbar, Degeneration of lumbar or lumbosacral intervertebral disc, ED (erectile dysfunction), GERD (gastroesophageal reflux disease), Hypertension, Hypothyroidism, unspecified, Lumbar pseudoarthrosis, Lumbar stenosis, Obesity, PSVT (paroxysmal supraventricular tachycardia), SOB (shortness of breath), Spondylosis of lumbar region without myelopathy or radiculopathy, Squamous cell carcinoma of skin, Stroke, Testicular hypofunction, and Thoracic or lumbosacral neuritis or radiculitis. He presents with back pain.  He has a history of chronic back pain for over the past 30 years.  He has a history of 6 prior lumbar surgeries.  He has a history of spinal cord stimulator paddle lead implant.  Today he reports his pain is 7/10, constant, throbbing in his lower back with pain radiating primarily down the left leg to the left foot.  His pain is worse with sitting, standing, bending, walking, morning, flexing and relieved with medications, injections, physical therapy.  He has done physical therapy and kyree goes in the past.  Recently has been receiving injections from pain management in [place].      1/6/25 - Mr. Pete returns to the office for follow up.  Today he reports his pain is 8/10.  He has a typical low back pain but he also reports some worsening pain in the bottom of the right foot.  He reports he has a history of plantar wart on the right foot that can cause pain.  He has seen Podiatry in the past.  He is scheduled see to Podiatry next week.  He continues to use morphine extended release 15 mg twice a day and oxycodone 10 mg on an as needed basis for severe breakthrough pain without any side effects or adverse events.  He says he has been taking oxycodone a little more frequently than 4 times a day due to the increased pain in the bottom of the right foot.    - I independently reviewed his lumbar MRI and he has evidence of posterior decompression from L2-S1 and interbody  cage at L5-S1.  Adequate decompression of the lumbar spine.  - he has been on chronic long-term opioids prescribed by his primary care physician.  He has been weaned down from morphine extended release 30 mg 3 times a day down to morphine extended release 15 mg twice a day.  He continues to use oxycodone 10 mg 3 to 4 times a day on an as needed basis for severe breakthrough pain.  He continues to use gabapentin 300 mg 3 times a day for the neuropathic component of his pain.  - we discussed taking medications as prescribed.  I have recommended against increasing the frequency of his oxycodone.  He understands and we will take it only on an as needed basis 3 to 4 times a day.  No signs of abuse, no adverse events noted, patient experiences significant benefit of analgesia, and patient demonstrates increased activity while on these medications.  The patient is meeting the goals of opioid therapy and is dependent on them for functionality and can not perform ADLS without them. Regarding opioids, the risks were discussed including tolerance, addiction, overdose, over-sedation, drug interactions, respiratory depression, opioid-induced hyperalgesia, and even death.  He has been prescribed naloxone nasal actuation spray and he reports both him in his wife were educated on its use  - he does not need a refill of oxycodone and has 1 already ordered for him for 01/10/2025.  He is due for a refill on morphine on 01/17/2025 and I have prescribed this.  - he has a Nevro spinal cord stimulator paddle lead.  He reports he is using in in his providing him with partial relief.  - Referral previously given for physical therapy to improve function and strength, and to receive training towards establishing a safe and effective home exercise program (HEP).   - we will send a pain contract signed.  UDS 12/5/24 consistent   - follow up in 2 month      : Reviewed and consistent with medication use as prescribed.    Evan Lyles  M.D.  Interventional Pain Medicine / Anesthesiology    This note was completed with dictation software and grammatical errors may exist.

## 2025-01-08 ENCOUNTER — PATIENT MESSAGE (OUTPATIENT)
Dept: DERMATOLOGY | Facility: CLINIC | Age: 80
End: 2025-01-08

## 2025-01-08 ENCOUNTER — PROCEDURE VISIT (OUTPATIENT)
Dept: DERMATOLOGY | Facility: CLINIC | Age: 80
End: 2025-01-08
Payer: MEDICARE

## 2025-01-08 VITALS
WEIGHT: 230 LBS | SYSTOLIC BLOOD PRESSURE: 137 MMHG | DIASTOLIC BLOOD PRESSURE: 77 MMHG | BODY MASS INDEX: 36.1 KG/M2 | HEIGHT: 67 IN | HEART RATE: 86 BPM

## 2025-01-08 DIAGNOSIS — D04.62 SQUAMOUS CELL CARCINOMA IN SITU (SCCIS) OF DORSUM OF LEFT HAND: ICD-10-CM

## 2025-01-08 PROCEDURE — 13132 CMPLX RPR F/C/C/M/N/AX/G/H/F: CPT | Mod: HCNC,S$GLB,, | Performed by: DERMATOLOGY

## 2025-01-08 PROCEDURE — 17313 MOHS 1 STAGE T/A/L: CPT | Mod: HCNC,51,S$GLB, | Performed by: DERMATOLOGY

## 2025-01-09 NOTE — PROGRESS NOTES
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  Surgical Preparation: povidone-iodine  Pre-op anbitioics: no     Description of Operation:  The nature and purpose of the procedure, associated risks and alternative treatments were explained to the patient in detail. All patient questions were answered completely. An informed operative consent and photography permit were obtained. The tumor location was then identified and marked with agreement by the patient of the correct location. The patient was positioned, prepped, and draped in the usual sterile manner. Local anesthesia was obtained with 1 cc(s) of 1% lidocaine with 1:100,000 epinephrine. The lesion pre-operatively measures 1.0 by 0.9 cm.     1ST STAGE:  A 2+ mm rim of normal appearing skin was marked circumferentially around the lesion after scraping with a curette to define the margin. The area thus outlined was excised at a 45 degree angle. Hemostasis was obtained with electrodesiccation. The specimen was oriented, mapped, and subdivided into at least two sections. Sections were then chromacoded and submitted for horizontal frozen sections. The patient tolerated the procedure well and there were no complications. Upon microscopic examination of the processed horizontal frozen sections of this stage:  Tumor was present at the margin of the specimen; these areas of residual tumor were marked on the reference map with red pencil, pinpointing the location in which further tissue excision was necessary.    Tumor type noted on stage  1: Squamous cell carcinoma in situ: Epidermis with full-thickness atypia and variable epidermal maturation.     SUBSEQUENT STAGES: The patient returned to the operating room for additional stages of tumor removal, and prepped in the manner described above. Surgery was directed to the areas having residual tumor, with thin layers of tissue being excised from these regions. A new reference map was prepared during the surgery to maintain precise orientation as described above. Hemostasis was achieved and the excised tissue was processed for microscopic analysis. These sections were then examined by the Mohs surgeon, and areas of persistent tumor were indicated on the reference map. This process was repeated until the frozen horizontal sections of STAGE 2 revealed no further tumor cells and tumor eradication was considered to be complete.  Tumor type noted on subsequent stages: No tumor seen.       SUMMARY:  The tumor was extirpated in 2 Mohs Stages resulting in a final defect measuring 1.4 by 1.3 cm².   The final defect extended deep to subcutaneous fat  The patient tolerated the procedure well and no complications were noted.     DEFECT PHOTO:      Nafisa Solomon MD    Complex Linear Closure Operative Note  Name: Sam Pete  YOB: 1945  Date: 1/8/2025  Attending Surgeon: Nafisa Solomon MD FAAD  Assistants:  Esperanza Sheppard - Surgical Technician  Clinical Diagnosis: A 1.4 by 1.3 cm defect secondary to Mohs Micrographic Surgery  Location: left dorsal hand  Operation: Complex linear closure  Surgical Preparation: broad scrub with povidone-iodine    Description of Operation:  The nature and purpose of the procedure, associated risks and alternative treatments were explained to the patient in detail. All patient questions were answered completely. In order to minimize tension on the closure and avoid compromising the anatomic contour of the cosmetic region and maximize functional capacity, a  complex linear closure was performed. An informed operative consent and photography permit were obtained. The patient was positioned, prepped, and draped in the usual sterile manner. Local anesthesia was obtained with 12 cc(s) of 1% lidocaine with 1:100,000 epinephrine.    The defect edges were debeveled with a scalpel blade. The circular defect was widely undermined in the deep subcutaneous plane at least 100% of the defect diameter to allow for maximum tissue movement. Hemostasis was achieved with spot electrodessication. The defect was closed first utilizing multiple 5-0 Vicryl interrupted buried subcutaneous sutures. This resulted in excellent apposition of the dermis and epidermis, however two redundant areas of tissue were created on opposite sides of the defect. Utilizing a #15 scalpel blade, two Burows triangles were excised to ensure a flat scar. Hemostasis was obtained with spot electrodessication. The skin edges throughout further fastened superficially with 6-0 Nylon. The final length of the repair was 4.7 cm. The patient tolerated the procedure well and there were no complications.    Polysporin, non-adherent gauze and a pressure dressing were applied to wound after gentle cleansing with saline.    Patient instructed in and provided with instructions for post-op care, will RTC in 10 days for suture removal    Post op meds: None    Photos:      MD VIRIDIANA Shahid

## 2025-01-14 ENCOUNTER — TELEPHONE (OUTPATIENT)
Dept: PODIATRY | Facility: CLINIC | Age: 80
End: 2025-01-14
Payer: MEDICARE

## 2025-01-14 NOTE — TELEPHONE ENCOUNTER
Spoke with the patient to reschedule appointment per provider's request because of surgery. First available was scheduled for 02/10/25 @ 2:00 pm. Patient expressed understanding of the message.

## 2025-01-17 ENCOUNTER — CLINICAL SUPPORT (OUTPATIENT)
Dept: DERMATOLOGY | Facility: CLINIC | Age: 80
End: 2025-01-17
Payer: MEDICARE

## 2025-01-17 DIAGNOSIS — Z48.02 VISIT FOR SUTURE REMOVAL: Primary | ICD-10-CM

## 2025-01-17 NOTE — PROGRESS NOTES
Mohs Surgery Suture Removal Note   Patient Name: Sam Pete  Patient : 1945  Date of Service: 2025    CC: Pt. Presents for removal of sutures on the left hand today  Subjective: patient reports wound healing as expected     Objective: Wound well healed, sutures clean/dry/intact. No evidence of any complications noted.     Visit For Suture Removal:  - Suture removal today  - Steri strips/mastisol were applied  - Patient counseled on silicone gel/silicone sheeting and their use in the management of post-operative scars  - Handout on silicone gel/silicone gel sheeting was provided  - Patient to follow up with their referring provider in 3 months for further skin evaluation    Mireya Ragsdale

## 2025-01-20 DIAGNOSIS — I10 ESSENTIAL (PRIMARY) HYPERTENSION: ICD-10-CM

## 2025-01-20 DIAGNOSIS — R07.9 RIGHT-SIDED CHEST PAIN: ICD-10-CM

## 2025-01-20 DIAGNOSIS — E78.49 OTHER HYPERLIPIDEMIA: ICD-10-CM

## 2025-01-20 RX ORDER — METOPROLOL SUCCINATE 25 MG/1
12.5 TABLET, EXTENDED RELEASE ORAL
Qty: 45 TABLET | Refills: 0 | Status: CANCELLED | OUTPATIENT
Start: 2025-01-20

## 2025-01-20 RX ORDER — METOPROLOL SUCCINATE 25 MG/1
12.5 TABLET, EXTENDED RELEASE ORAL
Qty: 45 TABLET | Refills: 0 | Status: SHIPPED | OUTPATIENT
Start: 2025-01-20

## 2025-01-25 ENCOUNTER — TELEPHONE (OUTPATIENT)
Dept: FAMILY MEDICINE | Facility: CLINIC | Age: 80
End: 2025-01-25
Payer: MEDICARE

## 2025-01-25 NOTE — TELEPHONE ENCOUNTER
----- Message from Erendira sent at 1/25/2025  9:30 AM CST -----  Type:  Needs Medical Advice    Who Called: Aysha Pete    Symptoms (please be specific): na     How long has patient had these symptoms:  na    Pharmacy name and phone #:    [id number] [place] [address] [facility] [address] [address]  Phone: [phone] Fax: [phone]    Would the patient rather a call back or a response via [facility]? Call back     Best Call Back Number: [phone]      Additional Information: Mrs Olmstead is calling today bc Dr Montalvo retired and they are wanting to use Dr Monsalve but she said her  needs to be seen first. I was unable to schedule for Dr Monsalve so she asked if I could send a message over to the office to see if Dr Monsalve could take them on as new PT      Please call Back to advise. Thanks!    Aysha Pete MRN [id number]  Sam Chandlerell MRN [id number]

## 2025-01-25 NOTE — TELEPHONE ENCOUNTER
Returned patient phone. Patient is scheduled to see est care with Dr. Monsalve on 1/31/2025. Patient verbalized understanding.

## 2025-01-27 ENCOUNTER — PATIENT MESSAGE (OUTPATIENT)
Dept: DERMATOLOGY | Facility: CLINIC | Age: 80
End: 2025-01-27
Payer: MEDICARE

## 2025-01-31 ENCOUNTER — TELEPHONE (OUTPATIENT)
Dept: DERMATOLOGY | Facility: CLINIC | Age: 80
End: 2025-01-31
Payer: MEDICARE

## 2025-01-31 ENCOUNTER — OFFICE VISIT (OUTPATIENT)
Dept: FAMILY MEDICINE | Facility: CLINIC | Age: 80
End: 2025-01-31
Payer: MEDICARE

## 2025-01-31 ENCOUNTER — LAB VISIT (OUTPATIENT)
Dept: LAB | Facility: HOSPITAL | Age: 80
End: 2025-01-31
Attending: FAMILY MEDICINE
Payer: MEDICARE

## 2025-01-31 VITALS
HEART RATE: 75 BPM | OXYGEN SATURATION: 96 % | WEIGHT: 239.44 LBS | SYSTOLIC BLOOD PRESSURE: 136 MMHG | DIASTOLIC BLOOD PRESSURE: 80 MMHG | BODY MASS INDEX: 37.58 KG/M2 | HEIGHT: 67 IN

## 2025-01-31 DIAGNOSIS — G89.29 CHRONIC BILATERAL LOW BACK PAIN WITHOUT SCIATICA: ICD-10-CM

## 2025-01-31 DIAGNOSIS — Z12.5 ENCOUNTER FOR SCREENING FOR MALIGNANT NEOPLASM OF PROSTATE: ICD-10-CM

## 2025-01-31 DIAGNOSIS — N52.9 IMPOTENCE OF ORGANIC ORIGIN: ICD-10-CM

## 2025-01-31 DIAGNOSIS — G89.4 CHRONIC PAIN SYNDROME: ICD-10-CM

## 2025-01-31 DIAGNOSIS — I48.0 PAF (PAROXYSMAL ATRIAL FIBRILLATION): ICD-10-CM

## 2025-01-31 DIAGNOSIS — Z79.01 LONG TERM (CURRENT) USE OF ANTICOAGULANTS: ICD-10-CM

## 2025-01-31 DIAGNOSIS — E66.01 SEVERE OBESITY (BMI 35.0-39.9) WITH COMORBIDITY: ICD-10-CM

## 2025-01-31 DIAGNOSIS — I36.1 NONRHEUMATIC TRICUSPID VALVE REGURGITATION: ICD-10-CM

## 2025-01-31 DIAGNOSIS — M47.816 FACET ARTHROPATHY, LUMBAR: ICD-10-CM

## 2025-01-31 DIAGNOSIS — I69.331 MONOPLEGIA OF UPPER LIMB FOLLOWING CEREBRAL INFARCTION AFFECTING RIGHT DOMINANT SIDE: ICD-10-CM

## 2025-01-31 DIAGNOSIS — K21.9 GASTROESOPHAGEAL REFLUX DISEASE WITHOUT ESOPHAGITIS: ICD-10-CM

## 2025-01-31 DIAGNOSIS — Z79.899 DRUG THERAPY: ICD-10-CM

## 2025-01-31 DIAGNOSIS — M54.50 CHRONIC BILATERAL LOW BACK PAIN WITHOUT SCIATICA: ICD-10-CM

## 2025-01-31 DIAGNOSIS — I27.20 MILD PULMONARY HYPERTENSION: ICD-10-CM

## 2025-01-31 DIAGNOSIS — M19.011 PRIMARY OSTEOARTHRITIS OF RIGHT SHOULDER: ICD-10-CM

## 2025-01-31 DIAGNOSIS — G60.9 HEREDITARY AND IDIOPATHIC PERIPHERAL NEUROPATHY: ICD-10-CM

## 2025-01-31 DIAGNOSIS — D50.9 IRON DEFICIENCY ANEMIA, UNSPECIFIED IRON DEFICIENCY ANEMIA TYPE: ICD-10-CM

## 2025-01-31 DIAGNOSIS — Z91.81 AT RISK FOR FALLS: ICD-10-CM

## 2025-01-31 DIAGNOSIS — M48.061 SPINAL STENOSIS, LUMBAR REGION, WITHOUT NEUROGENIC CLAUDICATION: ICD-10-CM

## 2025-01-31 DIAGNOSIS — I70.0 ATHEROSCLEROSIS OF AORTA: ICD-10-CM

## 2025-01-31 DIAGNOSIS — E78.49 OTHER HYPERLIPIDEMIA: ICD-10-CM

## 2025-01-31 DIAGNOSIS — M54.16 LUMBAR RADICULOPATHY: ICD-10-CM

## 2025-01-31 DIAGNOSIS — R06.2 WHEEZE: ICD-10-CM

## 2025-01-31 DIAGNOSIS — R60.9 CHRONIC EDEMA: ICD-10-CM

## 2025-01-31 DIAGNOSIS — I25.10 CORONARY ARTERY DISEASE INVOLVING NATIVE CORONARY ARTERY OF NATIVE HEART WITHOUT ANGINA PECTORIS: Primary | ICD-10-CM

## 2025-01-31 DIAGNOSIS — Z86.73 H/O: CVA (CEREBROVASCULAR ACCIDENT): ICD-10-CM

## 2025-01-31 DIAGNOSIS — E03.9 ACQUIRED HYPOTHYROIDISM: ICD-10-CM

## 2025-01-31 DIAGNOSIS — N40.1 BENIGN PROSTATIC HYPERPLASIA WITH LOWER URINARY TRACT SYMPTOMS, SYMPTOM DETAILS UNSPECIFIED: ICD-10-CM

## 2025-01-31 DIAGNOSIS — Z95.1 S/P CABG (CORONARY ARTERY BYPASS GRAFT): ICD-10-CM

## 2025-01-31 DIAGNOSIS — E89.0 POSTOPERATIVE HYPOTHYROIDISM: ICD-10-CM

## 2025-01-31 DIAGNOSIS — I10 ESSENTIAL (PRIMARY) HYPERTENSION: ICD-10-CM

## 2025-01-31 DIAGNOSIS — M48.02 SPINAL STENOSIS IN CERVICAL REGION: ICD-10-CM

## 2025-01-31 DIAGNOSIS — I65.23 CAROTID ARTERY PLAQUE, BILATERAL: ICD-10-CM

## 2025-01-31 PROBLEM — R07.9 RIGHT-SIDED CHEST PAIN: Status: RESOLVED | Noted: 2022-09-14 | Resolved: 2025-01-31

## 2025-01-31 PROBLEM — Z12.11 COLON CANCER SCREENING: Status: RESOLVED | Noted: 2019-02-25 | Resolved: 2025-01-31

## 2025-01-31 PROBLEM — Z82.49 FAMILY HISTORY OF EARLY CAD: Status: RESOLVED | Noted: 2023-04-20 | Resolved: 2025-01-31

## 2025-01-31 PROBLEM — F11.20 OPIOID DEPENDENCE: Status: RESOLVED | Noted: 2017-05-10 | Resolved: 2025-01-31

## 2025-01-31 PROBLEM — R94.39 ABNORMAL NUCLEAR STRESS TEST: Status: RESOLVED | Noted: 2023-06-20 | Resolved: 2025-01-31

## 2025-01-31 PROBLEM — M96.1 FAILED BACK SURGICAL SYNDROME: Status: RESOLVED | Noted: 2019-11-01 | Resolved: 2025-01-31

## 2025-01-31 PROBLEM — R06.09 DOE (DYSPNEA ON EXERTION): Status: RESOLVED | Noted: 2023-04-20 | Resolved: 2025-01-31

## 2025-01-31 PROBLEM — R55 SYNCOPE AND COLLAPSE: Status: RESOLVED | Noted: 2023-04-20 | Resolved: 2025-01-31

## 2025-01-31 LAB
ALBUMIN SERPL BCP-MCNC: 3.8 G/DL (ref 3.5–5.2)
ALP SERPL-CCNC: 68 U/L (ref 40–150)
ALT SERPL W/O P-5'-P-CCNC: 23 U/L (ref 10–44)
ANION GAP SERPL CALC-SCNC: 12 MMOL/L (ref 8–16)
AST SERPL-CCNC: 22 U/L (ref 10–40)
BASOPHILS # BLD AUTO: 0.06 K/UL (ref 0–0.2)
BASOPHILS NFR BLD: 0.7 % (ref 0–1.9)
BILIRUB SERPL-MCNC: 0.6 MG/DL (ref 0.1–1)
BUN SERPL-MCNC: 15 MG/DL (ref 8–23)
CALCIUM SERPL-MCNC: 8.8 MG/DL (ref 8.7–10.5)
CHLORIDE SERPL-SCNC: 100 MMOL/L (ref 95–110)
CHOLEST SERPL-MCNC: 125 MG/DL (ref 120–199)
CHOLEST/HDLC SERPL: 3.2 {RATIO} (ref 2–5)
CO2 SERPL-SCNC: 25 MMOL/L (ref 23–29)
COMPLEXED PSA SERPL-MCNC: 0.22 NG/ML (ref 0–4)
CREAT SERPL-MCNC: 1.1 MG/DL (ref 0.5–1.4)
DIFFERENTIAL METHOD BLD: ABNORMAL
EOSINOPHIL # BLD AUTO: 0.3 K/UL (ref 0–0.5)
EOSINOPHIL NFR BLD: 3.7 % (ref 0–8)
ERYTHROCYTE [DISTWIDTH] IN BLOOD BY AUTOMATED COUNT: 14.3 % (ref 11.5–14.5)
EST. GFR  (NO RACE VARIABLE): >60 ML/MIN/1.73 M^2
ESTIMATED AVG GLUCOSE: 131 MG/DL (ref 68–131)
GLUCOSE SERPL-MCNC: 105 MG/DL (ref 70–110)
HBA1C MFR BLD: 6.2 % (ref 4–5.6)
HCT VFR BLD AUTO: 42.3 % (ref 40–54)
HDLC SERPL-MCNC: 39 MG/DL (ref 40–75)
HDLC SERPL: 31.2 % (ref 20–50)
HGB BLD-MCNC: 13.2 G/DL (ref 14–18)
IMM GRANULOCYTES # BLD AUTO: 0.03 K/UL (ref 0–0.04)
IMM GRANULOCYTES NFR BLD AUTO: 0.3 % (ref 0–0.5)
LDLC SERPL CALC-MCNC: 43 MG/DL (ref 63–159)
LYMPHOCYTES # BLD AUTO: 3.5 K/UL (ref 1–4.8)
LYMPHOCYTES NFR BLD: 39.6 % (ref 18–48)
MCH RBC QN AUTO: 28.3 PG (ref 27–31)
MCHC RBC AUTO-ENTMCNC: 31.2 G/DL (ref 32–36)
MCV RBC AUTO: 91 FL (ref 82–98)
MONOCYTES # BLD AUTO: 0.9 K/UL (ref 0.3–1)
MONOCYTES NFR BLD: 9.6 % (ref 4–15)
NEUTROPHILS # BLD AUTO: 4.1 K/UL (ref 1.8–7.7)
NEUTROPHILS NFR BLD: 46.1 % (ref 38–73)
NONHDLC SERPL-MCNC: 86 MG/DL
NRBC BLD-RTO: 0 /100 WBC
PLATELET # BLD AUTO: 296 K/UL (ref 150–450)
PMV BLD AUTO: 9.3 FL (ref 9.2–12.9)
POTASSIUM SERPL-SCNC: 4.4 MMOL/L (ref 3.5–5.1)
PROT SERPL-MCNC: 7.6 G/DL (ref 6–8.4)
RBC # BLD AUTO: 4.66 M/UL (ref 4.6–6.2)
SODIUM SERPL-SCNC: 137 MMOL/L (ref 136–145)
T4 FREE SERPL-MCNC: 0.9 NG/DL (ref 0.71–1.51)
TRIGL SERPL-MCNC: 215 MG/DL (ref 30–150)
TSH SERPL DL<=0.005 MIU/L-ACNC: 6.69 UIU/ML (ref 0.4–4)
WBC # BLD AUTO: 8.9 K/UL (ref 3.9–12.7)

## 2025-01-31 PROCEDURE — 36415 COLL VENOUS BLD VENIPUNCTURE: CPT | Mod: HCNC,PN | Performed by: FAMILY MEDICINE

## 2025-01-31 PROCEDURE — 99999 PR PBB SHADOW E&M-EST. PATIENT-LVL IV: CPT | Mod: PBBFAC,HCNC,, | Performed by: FAMILY MEDICINE

## 2025-01-31 PROCEDURE — 3075F SYST BP GE 130 - 139MM HG: CPT | Mod: HCNC,CPTII,S$GLB, | Performed by: FAMILY MEDICINE

## 2025-01-31 PROCEDURE — 3288F FALL RISK ASSESSMENT DOCD: CPT | Mod: HCNC,CPTII,S$GLB, | Performed by: FAMILY MEDICINE

## 2025-01-31 PROCEDURE — G2211 COMPLEX E/M VISIT ADD ON: HCPCS | Mod: HCNC,S$GLB,, | Performed by: FAMILY MEDICINE

## 2025-01-31 PROCEDURE — 83036 HEMOGLOBIN GLYCOSYLATED A1C: CPT | Mod: HCNC | Performed by: FAMILY MEDICINE

## 2025-01-31 PROCEDURE — 84443 ASSAY THYROID STIM HORMONE: CPT | Mod: HCNC | Performed by: FAMILY MEDICINE

## 2025-01-31 PROCEDURE — 80061 LIPID PANEL: CPT | Mod: HCNC | Performed by: FAMILY MEDICINE

## 2025-01-31 PROCEDURE — 1125F AMNT PAIN NOTED PAIN PRSNT: CPT | Mod: HCNC,CPTII,S$GLB, | Performed by: FAMILY MEDICINE

## 2025-01-31 PROCEDURE — 1159F MED LIST DOCD IN RCRD: CPT | Mod: HCNC,CPTII,S$GLB, | Performed by: FAMILY MEDICINE

## 2025-01-31 PROCEDURE — 3079F DIAST BP 80-89 MM HG: CPT | Mod: HCNC,CPTII,S$GLB, | Performed by: FAMILY MEDICINE

## 2025-01-31 PROCEDURE — 1101F PT FALLS ASSESS-DOCD LE1/YR: CPT | Mod: HCNC,CPTII,S$GLB, | Performed by: FAMILY MEDICINE

## 2025-01-31 PROCEDURE — 84153 ASSAY OF PSA TOTAL: CPT | Mod: HCNC | Performed by: FAMILY MEDICINE

## 2025-01-31 PROCEDURE — 80053 COMPREHEN METABOLIC PANEL: CPT | Mod: HCNC | Performed by: FAMILY MEDICINE

## 2025-01-31 PROCEDURE — 85025 COMPLETE CBC W/AUTO DIFF WBC: CPT | Mod: HCNC | Performed by: FAMILY MEDICINE

## 2025-01-31 PROCEDURE — 99214 OFFICE O/P EST MOD 30 MIN: CPT | Mod: HCNC,S$GLB,, | Performed by: FAMILY MEDICINE

## 2025-01-31 PROCEDURE — 84439 ASSAY OF FREE THYROXINE: CPT | Mod: HCNC | Performed by: FAMILY MEDICINE

## 2025-01-31 RX ORDER — TIZANIDINE HYDROCHLORIDE 4 MG/1
4 CAPSULE, GELATIN COATED ORAL 3 TIMES DAILY PRN
Qty: 90 CAPSULE | Refills: 3 | Status: SHIPPED | OUTPATIENT
Start: 2025-01-31

## 2025-01-31 RX ORDER — ALBUTEROL SULFATE 90 UG/1
2 INHALANT RESPIRATORY (INHALATION) EVERY 6 HOURS PRN
Qty: 18 G | Refills: 11 | Status: SHIPPED | OUTPATIENT
Start: 2025-01-31

## 2025-01-31 RX ORDER — LEVOTHYROXINE SODIUM 112 UG/1
112 TABLET ORAL
Qty: 180 TABLET | Refills: 3 | Status: SHIPPED | OUTPATIENT
Start: 2025-01-31

## 2025-01-31 RX ORDER — TAMSULOSIN HYDROCHLORIDE 0.4 MG/1
0.4 CAPSULE ORAL NIGHTLY
Qty: 90 CAPSULE | Refills: 3 | Status: SHIPPED | OUTPATIENT
Start: 2025-01-31 | End: 2026-01-31

## 2025-01-31 NOTE — PROGRESS NOTES
THIS DOCUMENT WAS MADE IN PART WITH VOICE RECOGNITION SOFTWARE.  OCCASIONALLY THIS SOFTWARE WILL MISINTERPRET WORDS OR PHRASES.    Assessment and Plan:    1. Coronary artery disease involving native coronary artery of native heart without angina pectoris        2. H/O: CVA (cerebrovascular accident)        3. S/P CABG (coronary artery bypass graft)        4. Carotid artery plaque, bilateral        5. Acquired hypothyroidism        6. Nonrheumatic tricuspid valve regurgitation        7. Mild pulmonary hypertension        8. Gastroesophageal reflux disease without esophagitis        9. Chronic pain syndrome        10. Atherosclerosis of aorta        11. Spinal stenosis in cervical region        12. Impotence of organic origin        13. PAF (paroxysmal atrial fibrillation)        14. Other hyperlipidemia        15. Iron deficiency anemia, unspecified iron deficiency anemia type        16. Chronic bilateral low back pain without sciatica        17. Essential (primary) hypertension        18. Benign prostatic hyperplasia with lower urinary tract symptoms, symptom details unspecified  tamsulosin (FLOMAX) 0.4 mg Cap      19. Facet arthropathy, lumbar        20. Lumbar radiculopathy        21. Long term (current) use of anticoagulants        22. Primary osteoarthritis of right shoulder        23. Severe obesity (BMI 35.0-39.9) with comorbidity        24. At risk for falls        25. 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 Hereditary and idiopathic peripheral neuropathy        27. Chronic edema        28. Spinal stenosis, lumbar region, without neurogenic claudication  tiZANidine 4 mg Cap      29. Postoperative hypothyroidism  levothyroxine (SYNTHROID) 112 MCG tablet      30. Drug therapy  CBC Auto Differential    Comprehensive Metabolic Panel    Lipid Panel    Hemoglobin A1C    TSH    T4, Free    PSA, Screening    Urinalysis, Reflex to Urine Culture Urine, Clean  Catch    Urinalysis Microscopic      31. Wheeze  albuterol (VENTOLIN HFA) 90 mcg/actuation inhaler      32. Encounter for screening for malignant neoplasm of prostate  PSA, Screening            Assessment & Plan    PLAN SUMMARY:   Routine wellness labs ordered   Continue furosemide (Lasix) 1 tablet daily   Start Flomax (tamsulosin) for enlarged prostate   Continue metoprolol 12.5 mg (half tablet) daily   Refill tizanidine (muscle relaxer)   Refill levothyroxine   Refill albuterol inhaler   Continue losartan 1 tablet daily   Continue wearing compression stockings daily   Follow up in clinic    ATRIAL FIBRILLATION:   Explained atrial fibrillation, its potential complications, and current management approach.   Continued metoprolol 12.5 mg (half tablet) daily.    BENIGN PROSTATIC HYPERPLASIA (BPH):   Discussed potential side effects of Flomax, particularly dizziness.   Educated on the relationship between enlarged prostate and urinary symptoms.   Started Flomax (tamsulosin) for enlarged prostate; discontinue if experiencing dizziness.   Contact the office if experiencing increased dizziness on new medication.    LEG SWELLING:   Sam to continue wearing compression stockings daily for leg swelling.    WHEEZING:   Sam to use albuterol as needed for wheezing symptoms.   Refilled albuterol inhaler.    HYPERTENSION:   Continued losartan 1 tablet daily.    EDEMA:   Continued furosemide (Lasix) 1 tablet daily.    MUSCLE SPASMS:   Refilled tizanidine (muscle relaxer).    THYROID DISORDER:   Refilled levothyroxine.    LABS:   Routine wellness labs ordered.    FOLLOW UP:   Follow up in clinic.             Visit today included increased complexity associated with the care of the episodic problem above addressed and managing the longitudinal care of the patient due to the serious and/or complex managed problem(s) .        ______________________________________________________________________  Subjective:    Chief  Complaint:  Chief Complaint   Patient presents with    [facility]        HPI:  Sam is a 79 y.o. year old       History of Present Illness    CHIEF COMPLAINT:  Sam presents today for follow-up    RECENT FALL:  He experienced syncope with subsequent fall on August 5th, resulting in a head injury requiring 6 staples and significant bleeding. He was hospitalized for 2-3 days following the incident.    CARDIOVASCULAR:  He has a history of heart disease with quadruple bypass surgery on July 19, 2023. Additional cardiac history includes tricuspid regurgitation, superventricular tachycardia, and atrial fibrillation documented on EKG in July 2023. He experienced a TIA in 1997 with residual intermittent right hand tremor. He reports left leg swelling managed with daily furosemide and compression stockings. He attempted to reduce the furosemide dosage to half but found it ineffective.    BACK PAIN AND NEUROPATHY:  He reports back pain as the primary concern, managed by Dr. Caban with medications and epidural steroid injections from Dr. Terrell. He has a spinal cord stimulator with leads implanted along the spine. He experiences burning and tingling sensations in his feet due to neuropathy, which has improved significantly with gabapentin.    RESPIRATORY:  He experiences intermittent wheezing, particularly when lying in bed, which resolves with as-needed albuterol use. He denies daily inhaler use.    GENITOURINARY:  He has an enlarged prostate with nocturia every 2 hours, which he attributes to Lasix use and high tea consumption.    SURGICAL HISTORY:  He has undergone multiple joint replacements including left total knee arthroplasty, bilateral total hip arthroplasties, and right total shoulder arthroplasty. He has had partial bone replacement in the left arm from elbow to mid-humerus. His thyroid was removed due to two non-cancerous nodules.    MENTAL HEALTH:  He takes Wellbutrin for depression and anxiety with reported  benefit.    DERMATOLOGIC:  He has a history of excised squamous cell carcinoma and follows up routinely with dermatologist Dr. Joselyn Foss.    SOCIAL HISTORY:  He quit smoking on January 1st, 1992, after smoking for several decades since college. He denies current tobacco use.    MEDICATIONS:  Current medications include furosemide, gabapentin, Wellbutrin, levothyroxine, and albuterol as needed.      ROS:  ROS as indicated in HPI.             Coronary artery disease, dyslipidemia, aortic atherosclerosis   Status post CABG (7/2023) x 4    Cardiology- MD Valorie   Rx-atorvastatin 40 mg, beta-blocker, Aspirin    H/o smoking   Quit : 1992    Carotid artery disease, history of CVA (1997)   Residual right upper extremity tremor    History of paroxysmal supraventricular tachycardia, Paroxysmal Atrial Fib   No symptoms  Rx : beta blocker    Tricuspid regurgitation  Per Cards     Chronic edema   Rx-Lasix 40 mg QD (left > right)  + daily compression stocking     Essential hypertension   Rx-losartan 50 mg, metoprolol 12.5 mg  Home : sys <140     Chronic low back pain  Rx-gabapentin 300 mg t.i.d., MS Contin 15 mg b.i.d., Narcan, Percocet 10 mg q.4 hours p.r.n., tizanidine  Pain MD  -  MD Trupti (meds)    +    Aram Terrell MD (CARLOS)    PMR : MD Diallo (no f/u planned)   Neurosurgery- Darlene Max MD (pain stimulator)   Prev tx : Surgeries, PT (Francos), CARLOS, Spinal Cord Stimulator in place     Idiopathic neuropathy  Rx-gabapentin 300 mg t.i.d.    Depression / Anxiety   Rx-Wellbutrin 150 mg    History squamous cell carcinoma  Dermatology-Joselyn Foss MD    Iatrogenic Hypothyroidism  Rx-levothyroxine 224 mcg  S/p total thyroidectomy : non cancerous nodules     BPH, ED, Hypogonadism     Chronic intermit Wheeze   Rx : albuterol    S/p Rt shoulder / bilateral hip / left knee arthroplasty                Past Medical History:  Past Medical History:   Diagnosis Date    Abnormal nuclear stress test 07/2023    Anticoagulant long-term use      ASA 81 mg    Arthritis     Cataract     OU    Chronic low back pain     Complete rupture of rotator cuff 02/08/2011    DDD (degenerative disc disease), lumbar 07/08/2015    Degeneration of lumbar or lumbosacral intervertebral disc 09/09/2015    ED (erectile dysfunction)     GERD (gastroesophageal reflux disease)     Hypertension     Hypothyroidism, unspecified     Lumbar pseudoarthrosis 06/26/2017    Lumbar stenosis 06/26/2017    Obesity     PSVT (paroxysmal supraventricular tachycardia) 07/2023    SOB (shortness of breath) 07/2023    Spondylosis of lumbar region without myelopathy or radiculopathy 07/08/2015    Squamous cell carcinoma of skin     Stroke 1997    basal ganglia, left sided weakness, resolved    Testicular hypofunction     Thoracic or lumbosacral neuritis or radiculitis 07/08/2015       Past Surgical History:  Past Surgical History:   Procedure Laterality Date    ANGIOGRAM, CORONARY, WITH LEFT HEART CATHETERIZATION  07/13/2023    Procedure: Left Heart Cath;  Surgeon: Salvador Eugene MD;  Location: [facility] [place] CATH;  Service: Cardiology;;    APPENDECTOMY      ARTHROPLASTY OF SHOULDER Right 03/22/2022    Procedure: ARTHROPLASTY, SHOULDER BRENNAN RIGHT SHOULDER HUMERAL HEAD RESURFACING;  Surgeon: Frankie Joya MD;  Location: [facility] OR;  Service: Orthopedics;  Laterality: Right;    BACK SURGERY      4- back surgery    CAUDAL EPIDURAL STEROID INJECTION N/A 12/16/2021    Procedure: Injection-steroid-epidural-caudal;  Surgeon: Aram Terrell MD;  Location: [facility] OR;  Service: Pain Management;  Laterality: N/A;    CAUDAL EPIDURAL STEROID INJECTION N/A 02/02/2022    Procedure: INJECTION, STEROID, SPINE, EPIDURAL, CAUDAL;  Surgeon: Aram Terrell MD;  Location: [facility] OR;  Service: Pain Management;  Laterality: N/A;    CAUDAL EPIDURAL STEROID INJECTION N/A 07/22/2022    Procedure: Injection-steroid-epidural-caudal;  Surgeon: Aram Terrell MD;  Location: [facility] OR;  Service: Pain Management;  Laterality: N/A;  Caudal CARLOS     CAUDAL  EPIDURAL STEROID INJECTION N/A 01/20/2023    Procedure: Injection-steroid-epidural-caudal;  Surgeon: Aram Terrell MD;  Location: [facility] OR;  Service: Pain Management;  Laterality: N/A;  caudal ( melinda)    CAUDAL EPIDURAL STEROID INJECTION N/A 04/21/2023    Procedure: Injection-steroid-epidural-caudal;  Surgeon: Aram Terrell MD;  Location: [facility] OR;  Service: Pain Management;  Laterality: N/A;  caudal    CAUDAL EPIDURAL STEROID INJECTION N/A 07/11/2023    Procedure: Injection-steroid-epidural-caudal;  Surgeon: Aram Terrell MD;  Location: [facility] OR;  Service: Pain Management;  Laterality: N/A;  caudal    CAUDAL EPIDURAL STEROID INJECTION N/A 1/9/2024    Procedure: Injection-steroid-epidural-caudal;  Surgeon: Aram Terrell MD;  Location: [facility] OR;  Service: Anesthesiology;  Laterality: N/A;  caudal    CAUDAL EPIDURAL STEROID INJECTION N/A 6/4/2024    Procedure: Injection-steroid-epidural-caudal;  Surgeon: Aram Terrell MD;  Location: [facility] OR;  Service: Anesthesiology;  Laterality: N/A;    COLONOSCOPY  07/12/2004    CHILO.   Redundant tortuous colon, otherwise normal.    COLONOSCOPY N/A 02/25/2019    Procedure: COLONOSCOPY;  Surgeon: Gilbert Rodriguez Jr., MD;  Location: [facility];  Service: Endoscopy;  Laterality: N/A;    CORONARY ANGIOGRAPHY N/A 07/13/2023    Procedure: ANGIOGRAM, CORONARY ARTERY;  Surgeon: Salvador Eugene MD;  Location: [facility] CATH;  Service: Cardiology;  Laterality: N/A;    CORONARY ARTERY BYPASS GRAFT (CABG) N/A 7/19/2023    Procedure: CORONARY ARTERY BYPASS GRAFT (CABG)- x 4;  Surgeon: Sandrine Wagner MD;  Location: [facility] OR;  Service: Cardiothoracic;  Laterality: N/A;    ENDOSCOPIC HARVEST OF VEIN Left 7/19/2023    Procedure: HARVEST-VEIN-ENDOVASCULAR;  Surgeon: Sandrine Wagner MD;  Location: [facility] OR;  Service: Cardiothoracic;  Laterality: Left;    Epidural steroid injection      Pain management    EPIDURAL STEROID INJECTION N/A 10/24/2024    Procedure: Injection, Steroid, Epidural;  Surgeon: Aram Terrell  MD TARAN;  Location: [facility] OR;  Service: Pain Management;  Laterality: N/A;    ESOPHAGOGASTRODUODENOSCOPY  07/12/2004    CHILO.    EXCLUSION, LEFT ATRIAL APPENDAGE, OPEN, AS PART OF OPEN CHEST SURGERY N/A 7/19/2023    Procedure: EXCLUSION, LEFT ATRIAL APPENDAGE, OPEN, AS PART OF OPEN CHEST SURGERY;  Surgeon: Sandrine Wagner MD;  Location: [facility] OR;  Service: Cardiothoracic;  Laterality: N/A;    FRACTURE SURGERY Left     wrist / forearm, total of 5    INSERTION OF DORSAL COLUMN NERVE STIMULATOR FOR TRIAL N/A 12/12/2019    Procedure: INSERTION, NEUROSTIMULATOR, SPINAL CORD, DORSAL COLUMN, FOR TRIAL;  Surgeon: Evan Lyles MD;  Location: [facility] OR;  Service: Pain Management;  Laterality: N/A;    JOINT REPLACEMENT  02/06/2013    ( rt hip 2007), left hip     JOINT REPLACEMENT Left     total knee    KNEE ARTHROSCOPY W/ DEBRIDEMENT      bilateral knees , total of six    KNEE SURGERY      LAMINECTOMY USING MINIMALLY INVASIVE TECHNIQUE N/A 02/12/2020    Procedure: LAMINECTOMY, SPINE, MINIMALLY INVASIVE /  PLACEMENT OF SPINAL CORD STIMULATOR;  Surgeon: Darlene Max MD;  Location: [facility] OR;  Service: Neurosurgery;  Laterality: N/A;    Nervbe Block injection      Pain management    SPINAL CORD STIMULATOR IMPLANT      TOTAL SHOULDER ARTHROPLASTY Right 03/28/2022    Dr Joya    TOTAL THYROIDECTOMY Bilateral 03/07/2022    Dr Mendoza       Family History:  Family History   Problem Relation Name Age of Onset    Hypertension Father      Heart disease Father      Drug abuse Daughter      Hypertension Son      Lung disease Sister      COPD Sister      Hypertension Sister      Diverticulitis Sister      Gout Sister      Kidney disease Sister      No Known Problems Mother      Eczema Neg Hx      Lupus Neg Hx      Psoriasis Neg Hx      Melanoma Neg Hx         Social History:  Social History     Socioeconomic History    Marital status:    Tobacco Use    Smoking status: Former     Current packs/day: 0.00     Average packs/day: 1 pack/day  for 30.0 years (30.0 ttl pk-yrs)     Types: Cigarettes     Start date: 8/3/1963     Quit date: 1992     Years since quittin.1     Passive exposure: Past    Smokeless tobacco: Never   Substance and Sexual Activity    Alcohol use: Yes     Comment: On occasion    Drug use: Yes     Types: Oxycodone, Morphine     Social Drivers of Health     Financial Resource Strain: Low Risk  (10/17/2023)    Overall Financial Resource Strain (CARDIA)     Difficulty of Paying Living Expenses: Not hard at all   Recent Concern: Financial Resource Strain - Medium Risk (2023)    Overall Financial Resource Strain (CARDIA)     Difficulty of Paying Living Expenses: Somewhat hard   Food Insecurity: No Food Insecurity (2024)    Received from [facility]    Hunger Vital Sign     Worried About Running Out of Food in the Last Year: Never true     Ran Out of Food in the Last Year: Never true   Transportation Needs: No Transportation Needs (2024)    Received from [facility]    PRAPARE - Transportation     Lack of Transportation (Medical): No     Lack of Transportation (Non-Medical): No   Physical Activity: Sufficiently Active (2024)    Received from [facility]    Exercise Vital Sign     Days of Exercise per Week: 5 days     Minutes of Exercise per Session: 40 min   Stress: No Stress Concern Present (2024)    Received from [facility]    [employer] [place] [employer] - Occupational Stress Questionnaire     Feeling of Stress : Not at all   Housing Stability: Low Risk  (2024)    Received from [facility]    Housing Stability Vital Sign     Unable to Pay for Housing in the Last Year: No     Number of Places Lived in the Last Year: 1     Unstable Housing in the Last Year: No       Medications:  Current Outpatient Medications on File Prior to Visit   Medication Sig Dispense Refill    aspirin 81 MG Chew Take 81 mg by mouth once daily.      atorvastatin (LIPITOR) 40 MG tablet TAKE 1 TABLET ONE TIME DAILY 90  tablet 3    buPROPion (WELLBUTRIN XL) 150 MG TB24 tablet TAKE 1 TABLET EVERY DAY 90 tablet 3    calcitRIOL (ROCALTROL) 0.5 MCG Cap TAKE 1 CAPSULE ONE TIME DAILY 90 capsule 3    furosemide (LASIX) 40 MG tablet Take 1 tablet (40 mg total) by mouth once daily. 90 tablet 1    gabapentin (NEURONTIN) 300 MG capsule Take 1 capsule (300 mg total) by mouth 3 (three) times daily. 90 capsule 5    imiquimod (ALDARA) 5 % cream Apply topically 3 (three) times a week. 12 packet 3    losartan (COZAAR) 50 MG tablet Take 1 tablet (50 mg total) by mouth once daily. Take additional 25 mg daily if bp is 140 or above 90 tablet 3    metoprolol succinate (TOPROL-XL) 25 MG 24 hr tablet TAKE ONE-HALF TABLET (12.5 MG) BY MOUTH EVERY DAY 45 tablet 0    morphine (MS CONTIN) 15 MG 12 hr tablet Take 1 tablet (15 mg total) by mouth 2 (two) times daily. 60 tablet 0    multivitamin (THERAGRAN) per tablet Take 1 tablet by mouth once daily.      naloxone (NARCAN) 0.4 mg/mL injection Inject 1 mL (0.4 mg total) into the vein as needed (overdose). 2 mL 5    omeprazole (PRILOSEC) 40 MG capsule Take one capsule by mouth daily 90 capsule 3    oxyCODONE-acetaminophen (PERCOCET)  mg per tablet Take 1 tablet by mouth every 4 (four) hours as needed for Pain. 120 tablet 0    oxyCODONE-acetaminophen (PERCOCET)  mg per tablet Take 1 tablet by mouth every 4 (four) hours as needed for Pain. 120 tablet 0    oxyCODONE-acetaminophen (PERCOCET)  mg per tablet Take 1 tablet by mouth every 4 (four) hours as needed for Pain. 120 tablet 0    UNABLE TO FIND Take by mouth once daily. Vitafusion multivites gummies      [DISCONTINUED] albuterol (VENTOLIN HFA) 90 mcg/actuation inhaler Inhale 2 puffs into the lungs every 6 (six) hours as needed for Wheezing or Shortness of Breath (cough). Rescue 18 g 0    [DISCONTINUED] levothyroxine (SYNTHROID) 112 MCG tablet TAKE 2 TABLETS(224 MCG) BY MOUTH EVERY  tablet 0    [DISCONTINUED] tiZANidine 4 mg Cap        mupirocin (BACTROBAN) 2 % ointment Apply topically 3 (three) times daily. (Patient not taking: Reported on 1/31/2025) 22 g 0     Current Facility-Administered Medications on File Prior to Visit   Medication Dose Route Frequency Provider Last Rate Last Admin    diphenhydrAMINE injection 12.5 mg  12.5 mg Intravenous Once PRN Enrique Rosales MD        electrolyte-S (ISOLYTE)   Intravenous Continuous Enrique Rosales MD        HYDROmorphone (PF) injection 0.2 mg  0.2 mg Intravenous Q5 Min PRN Enrique Rosales MD        HYDROmorphone (PF) injection 0.2 mg  0.2 mg Intravenous Q5 Min PRN Enrique Rosales MD        lactated ringers infusion   Intravenous Once PRN Aram Terrell MD        lactated ringers infusion  10 mL/hr Intravenous Continuous Enrique Rosales MD   Stopped at 07/19/23 0959    lactated ringers infusion   Intravenous Continuous Aram Terrell MD 25 mL/hr at 07/11/23 1012 New Bag at 07/11/23 1012    lactated ringers infusion   Intravenous Continuous Aram Terrell MD 25 mL/hr at 01/09/24 1014 New Bag at 01/09/24 1014    lorazepam injection 0.25 mg  0.25 mg Intravenous Once PRN Enrique Rosales MD        prochlorperazine injection Soln 5 mg  5 mg Intravenous Q30 Min PRN Enrique Rosales MD        sodium chloride 0.9% flush 3 mL  3 mL Intravenous Q8H Enrique Rosales MD           Allergies:  Xyzal [levocetirizine]    Immunizations:  Immunization History   Administered Date(s) Administered    COVID-19, MRNA, LN-S, PF (Pfizer) (Gray Cap) 07/25/2022    COVID-19, MRNA, LN-S, PF (Pfizer) (Purple Cap) 02/09/2021, 03/02/2021, 10/15/2021    COVID-19, mRNA, LNP-S, PF (Moderna) Ages 12+ 12/18/2023    Influenza 11/05/2010, 09/22/2015    Influenza (FLUAD) - Quadrivalent - Adjuvanted - PF *Preferred* (65+) 09/15/2020, 09/15/2020, 09/15/2020, 10/05/2021, 09/14/2022, 09/23/2023    Influenza - Trivalent - Afluria, Fluzone MDV 11/05/2010, 10/20/2019    Influenza - Trivalent - Fluad -  "Adjuvanted - PF (65 years and older 10/20/2019, 10/20/2019, 10/21/2024    Influenza - Trivalent - Fluzone High Dose - PF (65 years and older) 01/14/2014, 01/14/2014, 12/05/2014, 10/17/2016, 08/31/2017, 08/31/2017, 10/16/2018    Pneumococcal Conjugate - 13 Valent 11/01/2015, 10/20/2019    Pneumococcal Conjugate - 20 Valent 10/21/2024    Pneumococcal Polysaccharide - 23 Valent 01/14/2014, 01/14/2014, 10/17/2016    RSVpreF (Arexvy) 12/18/2023    Rsv, Bivalent, Rsvpref (Abrysvo) 10/21/2024    Tdap 10/14/2015    Zoster Recombinant 07/06/2022, 09/14/2022       Review of Systems:  Review of Systems   All other systems reviewed and are negative.      Objective:    Vitals:  Vitals:    01/31/25 0815   BP: 136/80   Pulse: 75   SpO2: 96%   Weight: 108.6 kg (239 lb 6.7 oz)   Height: 5' 7" (1.702 m)   PainSc:   5       Physical Exam  Vitals reviewed.   Constitutional:       General: He is not in acute distress.  HENT:      Head: Normocephalic and atraumatic.   Eyes:      Pupils: Pupils are equal, round, and reactive to light.   Cardiovascular:      Rate and Rhythm: Normal rate and regular rhythm.      Heart sounds: No murmur heard.     No friction rub.   Pulmonary:      Effort: Pulmonary effort is normal.      Breath sounds: Normal breath sounds.   Abdominal:      General: Bowel sounds are normal. There is no distension.      Palpations: Abdomen is soft.      Tenderness: There is no abdominal tenderness.   Musculoskeletal:      Cervical back: Neck supple.   Skin:     General: Skin is warm and dry.      Findings: No rash.   Psychiatric:         Behavior: Behavior normal.             Dagoberto Monsalve MD  Family Medicine      "

## 2025-01-31 NOTE — TELEPHONE ENCOUNTER
----- Message from Toremeka sent at 1/31/2025 12:35 PM CST -----  Contact: Sam Vargas is needing a call back in regards to r/s his appt due to a conflict. Please give him a call back at [phone]

## 2025-02-05 ENCOUNTER — TELEPHONE (OUTPATIENT)
Dept: DERMATOLOGY | Facility: CLINIC | Age: 80
End: 2025-02-05
Payer: MEDICARE

## 2025-02-05 NOTE — TELEPHONE ENCOUNTER
Attempted to contact patient, no answer, lvm for a return call.     ----- Message from Khadra sent at 2/5/2025  9:19 AM CST -----  Regarding: Appt  Contact: Pt  [phone]  Pt is calling asking to speak to provider states he has concerns stating his skin cancer is back please call

## 2025-02-06 ENCOUNTER — OFFICE VISIT (OUTPATIENT)
Dept: CARDIOLOGY | Facility: CLINIC | Age: 80
End: 2025-02-06
Payer: MEDICARE

## 2025-02-06 VITALS
BODY MASS INDEX: 37.37 KG/M2 | HEART RATE: 84 BPM | SYSTOLIC BLOOD PRESSURE: 136 MMHG | DIASTOLIC BLOOD PRESSURE: 82 MMHG | WEIGHT: 238.13 LBS | HEIGHT: 67 IN

## 2025-02-06 DIAGNOSIS — I25.10 CORONARY ARTERY DISEASE INVOLVING NATIVE CORONARY ARTERY OF NATIVE HEART WITHOUT ANGINA PECTORIS: Primary | Chronic | ICD-10-CM

## 2025-02-06 DIAGNOSIS — I27.20 MILD PULMONARY HYPERTENSION: Chronic | ICD-10-CM

## 2025-02-06 DIAGNOSIS — I10 ESSENTIAL (PRIMARY) HYPERTENSION: Chronic | ICD-10-CM

## 2025-02-06 DIAGNOSIS — E66.01 SEVERE OBESITY (BMI 35.0-39.9) WITH COMORBIDITY: Chronic | ICD-10-CM

## 2025-02-06 DIAGNOSIS — F11.20 UNCOMPLICATED OPIOID DEPENDENCE: ICD-10-CM

## 2025-02-06 DIAGNOSIS — I70.0 ATHEROSCLEROSIS OF AORTA: Chronic | ICD-10-CM

## 2025-02-06 DIAGNOSIS — Z98.890 STATUS POST LIGATION OF LEFT ATRIAL APPENDAGE: Chronic | ICD-10-CM

## 2025-02-06 DIAGNOSIS — Z86.73 HISTORY OF CVA IN ADULTHOOD: ICD-10-CM

## 2025-02-06 DIAGNOSIS — I65.23 CAROTID ARTERY PLAQUE, BILATERAL: Chronic | ICD-10-CM

## 2025-02-06 PROCEDURE — 3079F DIAST BP 80-89 MM HG: CPT | Mod: HCNC,CPTII,S$GLB, | Performed by: INTERNAL MEDICINE

## 2025-02-06 PROCEDURE — 99214 OFFICE O/P EST MOD 30 MIN: CPT | Mod: HCNC,S$GLB,, | Performed by: INTERNAL MEDICINE

## 2025-02-06 PROCEDURE — 1125F AMNT PAIN NOTED PAIN PRSNT: CPT | Mod: HCNC,CPTII,S$GLB, | Performed by: INTERNAL MEDICINE

## 2025-02-06 PROCEDURE — 1101F PT FALLS ASSESS-DOCD LE1/YR: CPT | Mod: HCNC,CPTII,S$GLB, | Performed by: INTERNAL MEDICINE

## 2025-02-06 PROCEDURE — 3288F FALL RISK ASSESSMENT DOCD: CPT | Mod: HCNC,CPTII,S$GLB, | Performed by: INTERNAL MEDICINE

## 2025-02-06 PROCEDURE — 99999 PR PBB SHADOW E&M-EST. PATIENT-LVL III: CPT | Mod: PBBFAC,HCNC,, | Performed by: INTERNAL MEDICINE

## 2025-02-06 PROCEDURE — 3075F SYST BP GE 130 - 139MM HG: CPT | Mod: HCNC,CPTII,S$GLB, | Performed by: INTERNAL MEDICINE

## 2025-02-06 PROCEDURE — 1159F MED LIST DOCD IN RCRD: CPT | Mod: HCNC,CPTII,S$GLB, | Performed by: INTERNAL MEDICINE

## 2025-02-06 RX ORDER — ATORVASTATIN CALCIUM 40 MG/1
40 TABLET, FILM COATED ORAL NIGHTLY
Qty: 90 TABLET | Refills: 1 | Status: SHIPPED | OUTPATIENT
Start: 2025-02-06

## 2025-02-06 RX ORDER — OXYCODONE AND ACETAMINOPHEN 10; 325 MG/1; MG/1
1 TABLET ORAL EVERY 6 HOURS PRN
Qty: 120 TABLET | Refills: 0 | Status: SHIPPED | OUTPATIENT
Start: 2025-02-06 | End: 2025-03-09

## 2025-02-06 NOTE — PROGRESS NOTES
Subjective:    Patient ID:  Sam Pete is a 79 y.o. male who presents for Coronary Artery Disease        HPI  RECENT LABS CMP OK, HBA1C 6.2%, LDL 43, HDL 39, DOING OK, BP OK, NO CHEST PAIN NO SHORTNESS OF BREATH NO TIA TYPE SYMPTOMS NO NEAR-SYNCOPE, SEE ROS    Past Medical History:   Diagnosis Date    Abnormal nuclear stress test 07/2023    Anticoagulant long-term use     ASA 81 mg    Arthritis     Cataract     OU    Chronic low back pain     Complete rupture of rotator cuff 02/08/2011    DDD (degenerative disc disease), lumbar 07/08/2015    Degeneration of lumbar or lumbosacral intervertebral disc 09/09/2015    ED (erectile dysfunction)     GERD (gastroesophageal reflux disease)     Hypertension     Hypothyroidism, unspecified     Lumbar pseudoarthrosis 06/26/2017    Lumbar stenosis 06/26/2017    Obesity     PSVT (paroxysmal supraventricular tachycardia) 07/2023    SOB (shortness of breath) 07/2023    Spondylosis of lumbar region without myelopathy or radiculopathy 07/08/2015    Squamous cell carcinoma of skin     Stroke 1997    basal ganglia, left sided weakness, resolved    Testicular hypofunction     Thoracic or lumbosacral neuritis or radiculitis 07/08/2015     Past Surgical History:   Procedure Laterality Date    ANGIOGRAM, CORONARY, WITH LEFT HEART CATHETERIZATION  07/13/2023    Procedure: Left Heart Cath;  Surgeon: Salvador Eugene MD;  Location: [facility] CATH;  Service: Cardiology;;    APPENDECTOMY      ARTHROPLASTY OF SHOULDER Right 03/22/2022    Procedure: ARTHROPLASTY, SHOULDER BRENNAN RIGHT SHOULDER HUMERAL HEAD RESURFACING;  Surgeon: Frankie Joya MD;  Location: [facility] OR;  Service: Orthopedics;  Laterality: Right;    BACK SURGERY      4- back surgery    CAUDAL EPIDURAL STEROID INJECTION N/A 12/16/2021    Procedure: Injection-steroid-epidural-caudal;  Surgeon: Aram Terrell MD;  Location: [facility] OR;  Service: Pain Management;  Laterality: N/A;    CAUDAL EPIDURAL STEROID INJECTION N/A 02/02/2022     Procedure: INJECTION, STEROID, SPINE, EPIDURAL, CAUDAL;  Surgeon: Aram Terrell MD;  Location: [facility] OR;  Service: Pain Management;  Laterality: N/A;    CAUDAL EPIDURAL STEROID INJECTION N/A 07/22/2022    Procedure: Injection-steroid-epidural-caudal;  Surgeon: Aram Terrell MD;  Location: [facility] OR;  Service: Pain Management;  Laterality: N/A;  Caudal CARLOS     CAUDAL EPIDURAL STEROID INJECTION N/A 01/20/2023    Procedure: Injection-steroid-epidural-caudal;  Surgeon: Aram Terrell MD;  Location: [facility] OR;  Service: Pain Management;  Laterality: N/A;  caudal ( melinda)    CAUDAL EPIDURAL STEROID INJECTION N/A 04/21/2023    Procedure: Injection-steroid-epidural-caudal;  Surgeon: Aram Terrell MD;  Location: [facility] OR;  Service: Pain Management;  Laterality: N/A;  caudal    CAUDAL EPIDURAL STEROID INJECTION N/A 07/11/2023    Procedure: Injection-steroid-epidural-caudal;  Surgeon: Aram Terrell MD;  Location: [facility] OR;  Service: Pain Management;  Laterality: N/A;  caudal    CAUDAL EPIDURAL STEROID INJECTION N/A 1/9/2024    Procedure: Injection-steroid-epidural-caudal;  Surgeon: Aram Terrell MD;  Location: [facility] OR;  Service: Anesthesiology;  Laterality: N/A;  caudal    CAUDAL EPIDURAL STEROID INJECTION N/A 6/4/2024    Procedure: Injection-steroid-epidural-caudal;  Surgeon: Aram Terrell MD;  Location: [facility] OR;  Service: Anesthesiology;  Laterality: N/A;    COLONOSCOPY  07/12/2004    CHILO.   Redundant tortuous colon, otherwise normal.    COLONOSCOPY N/A 02/25/2019    Procedure: COLONOSCOPY;  Surgeon: Gilbert Rodriguez Jr., MD;  Location: [facility] ENDO;  Service: Endoscopy;  Laterality: N/A;    CORONARY ANGIOGRAPHY N/A 07/13/2023    Procedure: ANGIOGRAM, CORONARY ARTERY;  Surgeon: Salvador Eugene MD;  Location: [facility] CATH;  Service: Cardiology;  Laterality: N/A;    CORONARY ARTERY BYPASS GRAFT (CABG) N/A 7/19/2023    Procedure: CORONARY ARTERY BYPASS GRAFT (CABG)- x 4;  Surgeon: Sandrine Wagner MD;  Location: [facility] OR;  Service:  Cardiothoracic;  Laterality: N/A;    ENDOSCOPIC HARVEST OF VEIN Left 7/19/2023    Procedure: HARVEST-VEIN-ENDOVASCULAR;  Surgeon: Sandrine Wagner MD;  Location: [facility] OR;  Service: Cardiothoracic;  Laterality: Left;    Epidural steroid injection      Pain management    EPIDURAL STEROID INJECTION N/A 10/24/2024    Procedure: Injection, Steroid, Epidural;  Surgeon: Aram Terrell MD;  Location: [facility] OR;  Service: Pain Management;  Laterality: N/A;    ESOPHAGOGASTRODUODENOSCOPY  07/12/2004    [facility]O.    EXCLUSION, LEFT ATRIAL APPENDAGE, OPEN, AS PART OF OPEN CHEST SURGERY N/A 7/19/2023    Procedure: EXCLUSION, LEFT ATRIAL APPENDAGE, OPEN, AS PART OF OPEN CHEST SURGERY;  Surgeon: Sandrine Wagner MD;  Location: [facility] OR;  Service: Cardiothoracic;  Laterality: N/A;    FRACTURE SURGERY Left     wrist / forearm, total of 5    INSERTION OF DORSAL COLUMN NERVE STIMULATOR FOR TRIAL N/A 12/12/2019    Procedure: INSERTION, NEUROSTIMULATOR, SPINAL CORD, DORSAL COLUMN, FOR TRIAL;  Surgeon: Evan Lyles MD;  Location: [facility] OR;  Service: Pain Management;  Laterality: N/A;    JOINT REPLACEMENT  02/06/2013    ( rt hip 2007), left hip     JOINT REPLACEMENT Left     total knee    KNEE ARTHROSCOPY W/ DEBRIDEMENT      bilateral knees , total of six    KNEE SURGERY      LAMINECTOMY USING MINIMALLY INVASIVE TECHNIQUE N/A 02/12/2020    Procedure: LAMINECTOMY, SPINE, MINIMALLY INVASIVE /  PLACEMENT OF SPINAL CORD STIMULATOR;  Surgeon: Darlene Max MD;  Location: [facility] OR;  Service: Neurosurgery;  Laterality: N/A;    Nervbe Block injection      Pain management    SPINAL CORD STIMULATOR IMPLANT      TOTAL SHOULDER ARTHROPLASTY Right 03/28/2022    Dr Joya    TOTAL THYROIDECTOMY Bilateral 03/07/2022    Dr Mendoza     Family History   Problem Relation Name Age of Onset    Hypertension Father      Heart disease Father      Drug abuse Daughter      Hypertension Son      Lung disease Sister      COPD Sister      Hypertension Sister      Diverticulitis  Sister      Gout Sister      Kidney disease Sister      No Known Problems Mother      Eczema Neg Hx      Lupus Neg Hx      Psoriasis Neg Hx      Melanoma Neg Hx       Social History     Socioeconomic History    Marital status:    Tobacco Use    Smoking status: Former     Current packs/day: 0.00     Average packs/day: 1 pack/day for 30.0 years (30.0 ttl pk-yrs)     Types: Cigarettes     Start date: 8/3/1963     Quit date: 1992     Years since quittin.1     Passive exposure: Past    Smokeless tobacco: Never   Substance and Sexual Activity    Alcohol use: Yes     Comment: On occasion    Drug use: Yes     Types: Oxycodone, Morphine     Social Drivers of Health     Financial Resource Strain: Low Risk  (10/17/2023)    Overall Financial Resource Strain (CARDIA)     Difficulty of Paying Living Expenses: Not hard at all   Recent Concern: Financial Resource Strain - Medium Risk (2023)    Overall Financial Resource Strain (CARDIA)     Difficulty of Paying Living Expenses: Somewhat hard   Food Insecurity: No Food Insecurity (2024)    Received from [facility]    Hunger Vital Sign     Worried About Running Out of Food in the Last Year: Never true     Ran Out of Food in the Last Year: Never true   Transportation Needs: No Transportation Needs (2024)    Received from [facility]    PRAPARE - Transportation     Lack of Transportation (Medical): No     Lack of Transportation (Non-Medical): No   Physical Activity: Sufficiently Active (2024)    Received from [facility]    Exercise Vital Sign     Days of Exercise per Week: 5 days     Minutes of Exercise per Session: 40 min   Stress: No Stress Concern Present (2024)    Received from [facility]    [employer] [place] [employer] - Occupational Stress Questionnaire     Feeling of Stress : Not at all   Housing Stability: Low Risk  (2024)    Received from [facility]    Housing Stability Vital Sign     Unable to Pay for Housing in the Last  Year: No     Number of Places Lived in the Last Year: 1     Unstable Housing in the Last Year: No       Review of patient's allergies indicates:   Allergen Reactions    Xyzal [levocetirizine] Other (See Comments)     Knocked him out        Current Outpatient Medications:     albuterol (VENTOLIN HFA) 90 mcg/actuation inhaler, Inhale 2 puffs into the lungs every 6 (six) hours as needed for Wheezing or Shortness of Breath (cough). Rescue, Disp: 18 g, Rfl: 11    aspirin 81 MG Chew, Take 81 mg by mouth once daily., Disp: , Rfl:     buPROPion (WELLBUTRIN XL) 150 MG TB24 tablet, TAKE 1 TABLET EVERY DAY, Disp: 90 tablet, Rfl: 3    calcitRIOL (ROCALTROL) 0.5 MCG Cap, TAKE 1 CAPSULE ONE TIME DAILY, Disp: 90 capsule, Rfl: 3    furosemide (LASIX) 40 MG tablet, Take 1 tablet (40 mg total) by mouth once daily., Disp: 90 tablet, Rfl: 1    gabapentin (NEURONTIN) 300 MG capsule, Take 1 capsule (300 mg total) by mouth 3 (three) times daily., Disp: 90 capsule, Rfl: 5    levothyroxine (SYNTHROID) 112 MCG tablet, Take 1 tablet (112 mcg total) by mouth before breakfast., Disp: 180 tablet, Rfl: 3    losartan (COZAAR) 50 MG tablet, Take 1 tablet (50 mg total) by mouth once daily. Take additional 25 mg daily if bp is 140 or above, Disp: 90 tablet, Rfl: 3    metoprolol succinate (TOPROL-XL) 25 MG 24 hr tablet, TAKE ONE-HALF TABLET (12.5 MG) BY MOUTH EVERY DAY, Disp: 45 tablet, Rfl: 0    morphine (MS CONTIN) 15 MG 12 hr tablet, Take 1 tablet (15 mg total) by mouth 2 (two) times daily., Disp: 60 tablet, Rfl: 0    multivitamin (THERAGRAN) per tablet, Take 1 tablet by mouth once daily., Disp: , Rfl:     mupirocin (BACTROBAN) 2 % ointment, Apply topically 3 (three) times daily., Disp: 22 g, Rfl: 0    naloxone (NARCAN) 0.4 mg/mL injection, Inject 1 mL (0.4 mg total) into the vein as needed (overdose)., Disp: 2 mL, Rfl: 5    omeprazole (PRILOSEC) 40 MG capsule, Take one capsule by mouth daily, Disp: 90 capsule, Rfl: 3    tamsulosin (FLOMAX) 0.4 mg  Cap, Take 1 capsule (0.4 mg total) by mouth every evening., Disp: 90 capsule, Rfl: 3    tiZANidine 4 mg Cap, Take 4 mg by mouth 3 (three) times daily as needed (pain)., Disp: 90 capsule, Rfl: 3    UNABLE TO FIND, Take by mouth once daily. Vitafusion multivites gummies, Disp: , Rfl:     atorvastatin (LIPITOR) 40 MG tablet, Take 1 tablet (40 mg total) by mouth every evening., Disp: 90 tablet, Rfl: 1    oxyCODONE-acetaminophen (PERCOCET)  mg per tablet, Take 1 tablet by mouth every 6 (six) hours as needed for Pain., Disp: 120 tablet, Rfl: 0  No current facility-administered medications for this visit.    Facility-Administered Medications Ordered in Other Visits:     diphenhydrAMINE injection 12.5 mg, 12.5 mg, Intravenous, Once PRN, Enrique Rosales MD    electrolyte-S (ISOLYTE), , Intravenous, Continuous, Enrique Rosales MD    HYDROmorphone (PF) injection 0.2 mg, 0.2 mg, Intravenous, Q5 Min PRN, Enrique Rosales MD    HYDROmorphone (PF) injection 0.2 mg, 0.2 mg, Intravenous, Q5 Min PRN, Enrique Rosales MD    lactated ringers infusion, , Intravenous, Once PRN, Aram Terrell MD    lactated ringers infusion, 10 mL/hr, Intravenous, Continuous, Enrique Rosales MD, Stopped at 07/19/23 0959    lactated ringers infusion, , Intravenous, Continuous, Aram Terrell MD, Last Rate: 25 mL/hr at 07/11/23 1012, New Bag at 07/11/23 1012    lactated ringers infusion, , Intravenous, Continuous, Aram Terrell MD, Last Rate: 25 mL/hr at 01/09/24 1014, New Bag at 01/09/24 1014    lorazepam injection 0.25 mg, 0.25 mg, Intravenous, Once PRN, Enrique Rosales MD    prochlorperazine injection Soln 5 mg, 5 mg, Intravenous, Q30 Min PRN, Enrique Rosales MD    sodium chloride 0.9% flush 3 mL, 3 mL, Intravenous, Q8H, Enrique Rosales MD    Review of Systems   Constitutional: Negative for chills, decreased appetite, diaphoresis, fever, malaise/fatigue and night sweats.   HENT:  Negative for congestion and  "nosebleeds.    Eyes:  Negative for blurred vision and visual disturbance.   Cardiovascular:  Negative for chest pain, claudication, cyanosis, dyspnea on exertion, irregular heartbeat, leg swelling, near-syncope, orthopnea, palpitations, paroxysmal nocturnal dyspnea and syncope.   Respiratory:  Negative for cough, hemoptysis and shortness of breath.    Endocrine: Negative for polyphagia and polyuria.   Skin:  Negative for color change and rash.   Musculoskeletal:  Positive for arthritis. Negative for back pain and falls.   Gastrointestinal:  Negative for abdominal pain, dysphagia, jaundice, melena and nausea.   Genitourinary:  Negative for dysuria and flank pain.   Neurological:  Negative for brief paralysis, dizziness, focal weakness and light-headedness. Headaches: OCC.  Psychiatric/Behavioral:  Negative for altered mental status and depression.    Allergic/Immunologic: Negative for hives and persistent infections.        Objective:      Vitals:    02/06/25 1058 02/06/25 1148   BP: (!) 146/86 136/82   Pulse: 84    Weight: 108 kg (238 lb 1.6 oz)    Height: 5' 7" (1.702 m)    PainSc:   6    PainLoc: Back      Body mass index is 37.29 kg/m².    Physical Exam  Constitutional:       Appearance: Normal appearance. He is obese.   HENT:      Head: Normocephalic and atraumatic.   Eyes:      General: No scleral icterus.     Extraocular Movements: Extraocular movements intact.      Conjunctiva/sclera: Conjunctivae normal.      Pupils: Pupils are equal, round, and reactive to light.   Neck:      Vascular: No carotid bruit.   Cardiovascular:      Rate and Rhythm: Normal rate and regular rhythm. No extrasystoles are present.     Pulses:           Carotid pulses are 2+ on the right side and 2+ on the left side.       Radial pulses are 2+ on the right side and 2+ on the left side.        Posterior tibial pulses are 2+ on the right side and 2+ on the left side.      Heart sounds: Murmur heard.      Systolic murmur is present with a " grade of 1/6 at the upper right sternal border.      No friction rub. No gallop.   Pulmonary:      Effort: Pulmonary effort is normal. No respiratory distress.      Breath sounds: Normal breath sounds. No rales.   Abdominal:      Palpations: Abdomen is soft.      Tenderness: There is no abdominal tenderness.      Comments: OBESE   Musculoskeletal:      Cervical back: Neck supple.      Right lower leg: No edema (TRACE).      Left lower leg: No edema (TRACE).      Comments: USES A CANE   Skin:     Capillary Refill: Capillary refill takes less than 2 seconds.   Neurological:      General: No focal deficit present.      Mental Status: He is alert and oriented to person, place, and time.   Psychiatric:         Mood and Affect: Mood normal.         Speech: Speech normal.         Behavior: Behavior normal.                 ..    Chemistry        Component Value Date/Time     01/31/2025 0912    K 4.4 01/31/2025 0912     01/31/2025 0912    CO2 25 01/31/2025 0912    BUN 15 01/31/2025 0912    CREATININE 1.1 01/31/2025 0912    CREATININE 0.8 01/31/2013 1625     01/31/2025 0912        Component Value Date/Time    CALCIUM 8.8 01/31/2025 0912    CALCIUM 9.2 01/31/2013 1625    ALKPHOS 68 01/31/2025 0912    AST 22 01/31/2025 0912    ALT 23 01/31/2025 0912    BILITOT 0.6 01/31/2025 0912    ESTGFRAFRICA >60.0 07/12/2022 1119    EGFRNONAA 53.4 (A) 07/12/2022 1119            ..  Lab Results   Component Value Date    CHOL 125 01/31/2025    CHOL 141 06/20/2024    CHOL 143 08/09/2023     Lab Results   Component Value Date    HDL 39 (L) 01/31/2025    HDL 48 06/20/2024    HDL 48 08/09/2023     Lab Results   Component Value Date    LDLCALC 43.0 (L) 01/31/2025    LDLCALC 33.0 (L) 06/20/2024    LDLCALC 57.8 (L) 08/09/2023     Lab Results   Component Value Date    TRIG 215 (H) 01/31/2025    TRIG 300 (H) 06/20/2024    TRIG 186 (H) 08/09/2023     Lab Results   Component Value Date    CHOLHDL 31.2 01/31/2025    CHOLHDL 34.0  06/20/2024    CHOLHDL 33.6 08/09/2023     ..  Lab Results   Component Value Date    WBC 8.90 01/31/2025    HGB 13.2 (L) 01/31/2025    HCT 42.3 01/31/2025    MCV 91 01/31/2025     01/31/2025       Test(s) Reviewed  I have reviewed the following in detail:  [] Stress test   [] Angiography   [] Echocardiogram   [x] Labs   [] Other:       Assessment:         ICD-10-CM ICD-9-CM   1. Coronary artery disease involving native coronary artery of native heart without angina pectoris  I25.10 414.01   2. Essential (primary) hypertension  I10 401.9   3. Mild pulmonary hypertension  I27.20 416.8   4. Atherosclerosis of aorta  I70.0 440.0   5. Severe obesity (BMI 35.0-39.9) with comorbidity  E66.01 278.01   6. Carotid artery plaque, bilateral  I65.23 433.10     433.30   7. History of CVA in adulthood  Z86.73 V12.54   8. Status post ligation of left atrial appendage  Z98.890 V45.89     Problem List Items Addressed This Visit          Cardiac/Vascular    Essential (primary) hypertension    Relevant Orders    Hypertension Digital Medicine (HDMP) Enrollment Order (Completed)    Atherosclerosis of aorta    Mild pulmonary hypertension    Relevant Orders    Echo    Coronary artery disease involving native coronary artery of native heart without angina pectoris - Primary    Relevant Orders    Echo    Carotid artery plaque, bilateral       Endocrine    Severe obesity (BMI 35.0-39.9) with comorbidity     Other Visit Diagnoses       History of CVA in adulthood        Relevant Medications    atorvastatin (LIPITOR) 40 MG tablet    Status post ligation of left atrial appendage  (Chronic)                Plan:     DIGITAL HTN, REFERRAL RETURN TO CLINIC IN 6 MO WITH ECHO, REASSESS VALVULAR DISEASE ETCETERA    ALL CV CLINICALLY STABLE, NO ANGINA, NO HF, NO TIA, NO CLINICAL ARRHYTHMIA,CONTINUE CURRENT MEDS, EDUCATION, DIET, EXERCISE , WEIGHT LOSS        Coronary artery disease involving native coronary artery of native heart without angina  pectoris  -     Echo; Future; Expected date: 08/06/2025    Essential (primary) hypertension  -     Hypertension Digital Medicine (HDMP) Enrollment Order    Mild pulmonary hypertension  -     Echo; Future; Expected date: 08/06/2025    Atherosclerosis of aorta  Comments:  NO EMBOLIZATION    Severe obesity (BMI 35.0-39.9) with comorbidity    Carotid artery plaque, bilateral    History of CVA in adulthood  -     atorvastatin (LIPITOR) 40 MG tablet; Take 1 tablet (40 mg total) by mouth every evening.  Dispense: 90 tablet; Refill: 1    Status post ligation of left atrial appendage    RTC Low level/low impact aerobic exercise 5x's/wk. Heart healthy diet and risk factor modification.    See labs and med orders.    Aerobic exercise 5x's/wk. Heart healthy diet and risk factor modification.    See labs and med orders.

## 2025-02-10 ENCOUNTER — OFFICE VISIT (OUTPATIENT)
Dept: PODIATRY | Facility: CLINIC | Age: 80
End: 2025-02-10
Payer: MEDICARE

## 2025-02-10 VITALS — BODY MASS INDEX: 37.37 KG/M2 | HEIGHT: 67 IN | WEIGHT: 238.13 LBS

## 2025-02-10 DIAGNOSIS — L90.9 FAT PAD ATROPHY OF FOOT: ICD-10-CM

## 2025-02-10 DIAGNOSIS — Q82.8 POROKERATOSIS: ICD-10-CM

## 2025-02-10 DIAGNOSIS — M79.671 BILATERAL FOOT PAIN: Primary | ICD-10-CM

## 2025-02-10 DIAGNOSIS — M79.672 BILATERAL FOOT PAIN: Primary | ICD-10-CM

## 2025-02-10 PROCEDURE — 1125F AMNT PAIN NOTED PAIN PRSNT: CPT | Mod: HCNC,CPTII,S$GLB, | Performed by: PODIATRIST

## 2025-02-10 PROCEDURE — 1159F MED LIST DOCD IN RCRD: CPT | Mod: HCNC,CPTII,S$GLB, | Performed by: PODIATRIST

## 2025-02-10 PROCEDURE — 99214 OFFICE O/P EST MOD 30 MIN: CPT | Mod: HCNC,S$GLB,, | Performed by: PODIATRIST

## 2025-02-10 PROCEDURE — 3288F FALL RISK ASSESSMENT DOCD: CPT | Mod: HCNC,CPTII,S$GLB, | Performed by: PODIATRIST

## 2025-02-10 PROCEDURE — 99999 PR PBB SHADOW E&M-EST. PATIENT-LVL III: CPT | Mod: PBBFAC,HCNC,, | Performed by: PODIATRIST

## 2025-02-10 PROCEDURE — 1101F PT FALLS ASSESS-DOCD LE1/YR: CPT | Mod: HCNC,CPTII,S$GLB, | Performed by: PODIATRIST

## 2025-02-11 NOTE — PROGRESS NOTES
Subjective:     Patient ID: Sam Pete is a 79 y.o. male.    Chief Complaint: Plantar Warts (Bilateral plantar warts on bottom of feet, pain when walking, feels like a rock is in foot.)    Sam is a 79 y.o. male with a past medical history of Abnormal nuclear stress test (07/2023), Anticoagulant long-term use, Arthritis, Cataract, Chronic low back pain, Complete rupture of rotator cuff (02/08/2011), DDD (degenerative disc disease), lumbar (07/08/2015), Degeneration of lumbar or lumbosacral intervertebral disc (09/09/2015), ED (erectile dysfunction), GERD (gastroesophageal reflux disease), Hypertension, Hypothyroidism, unspecified, Lumbar pseudoarthrosis (06/26/2017), Lumbar stenosis (06/26/2017), Obesity, PSVT (paroxysmal supraventricular tachycardia) (07/2023), SOB (shortness of breath) (07/2023), Spondylosis of lumbar region without myelopathy or radiculopathy (07/08/2015), Squamous cell carcinoma of skin, Stroke (1997), Testicular hypofunction, and Thoracic or lumbosacral neuritis or radiculitis (07/08/2015). The patient's chief complaint consists of bilateral forefoot pain.  Notes being previously treated by Dr. Valadez for plantar warts about bilateral sub 5th met head.  Describes sharp pain from these areas.  Rates pain as an 8/10.  Symptoms are aggravated with all weight bearing and alleviated with rest.  Denies noticing drainage or signs of infection from the area.  Has been treating with Compound W with no visible improvement in build up.  Notes the Lt. Side is newer in presentation and less painful.  Denies any additional pedal complaints.        Hemoglobin A1C   Date Value Ref Range Status   01/31/2025 6.2 (H) 4.0 - 5.6 % Final     Comment:     ADA Screening Guidelines:  5.7-6.4%  Consistent with prediabetes  >or=6.5%  Consistent with diabetes    High levels of fetal hemoglobin interfere with the HbA1C  assay. Heterozygous hemoglobin variants (HbS, HgC, etc)do  not significantly interfere with this  assay.   However, presence of multiple variants may affect accuracy.     07/17/2023 6.3 (H) 0.0 - 5.6 % Final     Comment:     Reference Interval:  5.0 - 5.6 Normal   5.7 - 6.4 High Risk   > 6.5 Diabetic      Hgb A1c results are standardized based on the (NGSP) National   Glycohemoglobin Standardization Program.      Hemoglobin A1C levels are related to mean serum/plasma glucose   during the preceding 2-3 months.        06/12/2017 6.1 (H) 0.0 - 5.6 % Final     Comment:     Reference Interval:  5.0 - 5.6 Normal   5.7 - 6.4 High Risk   > 6.5 Diabetic    Hgb A1c results are standardized based on the (NGSP) National   Glycohemoglobin Standardization Program.    Hemoglobin A1C levels are related to mean serum/plasma glucose   during the preceding 2-3 months.            Review of Systems   Constitutional: Negative for chills and fever.   Skin:  Positive for nail changes and suspicious lesions.   Musculoskeletal:  Negative for joint pain, joint swelling, muscle cramps and muscle weakness.   Gastrointestinal:  Negative for nausea and vomiting.   Neurological:  Negative for numbness and paresthesias.   Psychiatric/Behavioral:  Negative for altered mental status.         Objective:     Physical Exam  Constitutional:       Appearance: Normal appearance. He is not ill-appearing.   Cardiovascular:      Pulses:           Dorsalis pedis pulses are 2+ on the right side and 2+ on the left side.        Posterior tibial pulses are 2+ on the right side and 2+ on the left side.      Comments: CFT is < 3 seconds bilateral.  Pedal hair growth is present bilateral.  No lower extremity edema noted bilateral.  Toes are warm to touch bilateral.    Musculoskeletal:         General: Tenderness present. No signs of injury.      Right lower leg: No edema.      Left lower leg: No edema.      Comments: Muscle strength 5/5 in all muscle groups bilateral.  No tenderness nor crepitation with ROM of foot/ankle joints bilateral.  Bilateral forefoot fat  "pad atrophy.  Pain with palpation to bilateral sub 5th met head lesions.     Skin:     Capillary Refill: Capillary refill takes 2 to 3 seconds.      Findings: Lesion present. No bruising, ecchymosis, erythema, signs of injury, laceration, petechiae, rash or wound.      Comments: Pedal skin has normal turgor, temperature, and texture bilateral.  Toenails x 10 appear normotrophic.  Porokeratosis noted to bilateral sub 5th met head.       Neurological:      General: No focal deficit present.      Mental Status: He is alert.      Sensory: No sensory deficit.      Motor: No weakness or atrophy.      Comments: Light touch is intact bilateral.             Assessment:      Encounter Diagnoses   Name Primary?    Bilateral foot pain Yes    Porokeratosis     Fat pad atrophy of foot      Plan:     Sam Fernández" was seen today for plantar warts.    Diagnoses and all orders for this visit:    Bilateral foot pain  -     ORTHOTIC DEVICE (DME)    Porokeratosis  -     ORTHOTIC DEVICE (DME)    Fat pad atrophy of foot      I counseled the patient on his conditions, their implications and medical management.    Based on today's exam, patient has bilateral forefoot fat pad atrophy with a porokeratosis at the sub 5th met head.    Advised to apply ebanel lotion and an occlusive dressing to the site QD x 3 weeks.    Rx written for custom molded orthotics with a donut hole about the sub 5th met head.    Given information regarding the vendor who provides this service.    Patient to call once orthotics have been obtained, and will reassess their offloading potential.    RTC as mentioned.    Chance Luu DPM  "

## 2025-02-12 DIAGNOSIS — R60.9 DEPENDENT EDEMA: ICD-10-CM

## 2025-02-12 RX ORDER — FUROSEMIDE 40 MG/1
40 TABLET ORAL DAILY
Qty: 90 TABLET | Refills: 1 | Status: SHIPPED | OUTPATIENT
Start: 2025-02-12 | End: 2026-02-12

## 2025-02-12 RX ORDER — FUROSEMIDE 40 MG/1
40 TABLET ORAL DAILY
Qty: 90 TABLET | Refills: 1 | Status: CANCELLED | OUTPATIENT
Start: 2025-02-12 | End: 2026-02-12

## 2025-02-12 NOTE — TELEPHONE ENCOUNTER
No care due was identified.  Health [facility] Embedded Care Due Messages. Reference number: [id number].   2/12/2025 10:59:03 AM CST

## 2025-02-12 NOTE — TELEPHONE ENCOUNTER
No care due was identified.  Health [facility] Embedded Care Due Messages. Reference number: [id number].   2/12/2025 9:56:02 AM CST

## 2025-02-13 DIAGNOSIS — G89.4 CHRONIC PAIN SYNDROME: ICD-10-CM

## 2025-02-13 RX ORDER — MORPHINE SULFATE 15 MG/1
15 TABLET, FILM COATED, EXTENDED RELEASE ORAL 2 TIMES DAILY
Qty: 60 TABLET | Refills: 0 | Status: SHIPPED | OUTPATIENT
Start: 2025-02-13 | End: 2025-03-16

## 2025-02-24 ENCOUNTER — PATIENT MESSAGE (OUTPATIENT)
Dept: PAIN MEDICINE | Facility: CLINIC | Age: 80
End: 2025-02-24
Payer: MEDICARE

## 2025-02-24 ENCOUNTER — TELEPHONE (OUTPATIENT)
Dept: PAIN MEDICINE | Facility: CLINIC | Age: 80
End: 2025-02-24
Payer: MEDICARE

## 2025-02-24 NOTE — TELEPHONE ENCOUNTER
Called to reschedule appt as provider out / surgery OR   No answer left vm for a call back to reschedule

## 2025-03-06 DIAGNOSIS — G89.4 CHRONIC PAIN SYNDROME: ICD-10-CM

## 2025-03-06 DIAGNOSIS — F11.20 UNCOMPLICATED OPIOID DEPENDENCE: ICD-10-CM

## 2025-03-06 RX ORDER — MORPHINE SULFATE 15 MG/1
15 TABLET, FILM COATED, EXTENDED RELEASE ORAL 2 TIMES DAILY
Qty: 60 TABLET | Refills: 0 | OUTPATIENT
Start: 2025-03-06 | End: 2025-04-05

## 2025-03-06 RX ORDER — OXYCODONE AND ACETAMINOPHEN 10; 325 MG/1; MG/1
1 TABLET ORAL EVERY 6 HOURS PRN
Qty: 120 TABLET | Refills: 0 | Status: SHIPPED | OUTPATIENT
Start: 2025-03-08 | End: 2025-03-06 | Stop reason: SDUPTHER

## 2025-03-06 RX ORDER — OXYCODONE AND ACETAMINOPHEN 10; 325 MG/1; MG/1
1 TABLET ORAL EVERY 6 HOURS PRN
Qty: 120 TABLET | Refills: 0 | Status: SHIPPED | OUTPATIENT
Start: 2025-03-06 | End: 2025-04-05

## 2025-03-06 NOTE — TELEPHONE ENCOUNTER
Pt came in clinic wanting to have refills sent in on medications   original appt was scheduled for today but had to reschedule due to mor surgery/OR days  pt states he did not receive the voicemails or even the [email or web address] message that was sent    informed him of the new appt he has scheduled with provider on 3/19/25 and   He is requesting refills be sent to pharmacy until then

## 2025-03-07 NOTE — TELEPHONE ENCOUNTER
called and spoke to spouse and informed medication refilled on oxy but not morphine as it is too early to be filled

## 2025-03-11 DIAGNOSIS — F11.20 UNCOMPLICATED OPIOID DEPENDENCE: ICD-10-CM

## 2025-03-11 RX ORDER — OXYCODONE AND ACETAMINOPHEN 10; 325 MG/1; MG/1
1 TABLET ORAL EVERY 6 HOURS PRN
Qty: 120 TABLET | Refills: 0 | Status: CANCELLED | OUTPATIENT
Start: 2025-03-11 | End: 2025-04-10

## 2025-03-13 DIAGNOSIS — G89.4 CHRONIC PAIN SYNDROME: ICD-10-CM

## 2025-03-14 RX ORDER — MORPHINE SULFATE 15 MG/1
15 TABLET, FILM COATED, EXTENDED RELEASE ORAL 2 TIMES DAILY
Qty: 60 TABLET | Refills: 0 | Status: SHIPPED | OUTPATIENT
Start: 2025-03-14 | End: 2025-04-13

## 2025-03-17 ENCOUNTER — TELEPHONE (OUTPATIENT)
Dept: PAIN MEDICINE | Facility: CLINIC | Age: 80
End: 2025-03-17
Payer: MEDICARE

## 2025-03-17 ENCOUNTER — OFFICE VISIT (OUTPATIENT)
Dept: DERMATOLOGY | Facility: CLINIC | Age: 80
End: 2025-03-17
Payer: MEDICARE

## 2025-03-17 DIAGNOSIS — Z85.828 HISTORY OF NONMELANOMA SKIN CANCER: ICD-10-CM

## 2025-03-17 DIAGNOSIS — T81.31XA SPITTING SUTURE, INITIAL ENCOUNTER: ICD-10-CM

## 2025-03-17 DIAGNOSIS — L90.5 SCAR: ICD-10-CM

## 2025-03-17 DIAGNOSIS — G89.4 CHRONIC PAIN SYNDROME: ICD-10-CM

## 2025-03-17 DIAGNOSIS — D48.5 NEOPLASM OF UNCERTAIN BEHAVIOR OF SKIN: Primary | ICD-10-CM

## 2025-03-17 DIAGNOSIS — L57.0 AK (ACTINIC KERATOSIS): ICD-10-CM

## 2025-03-17 PROCEDURE — 1160F RVW MEDS BY RX/DR IN RCRD: CPT | Mod: CPTII,S$GLB,, | Performed by: STUDENT IN AN ORGANIZED HEALTH CARE EDUCATION/TRAINING PROGRAM

## 2025-03-17 PROCEDURE — 3288F FALL RISK ASSESSMENT DOCD: CPT | Mod: CPTII,S$GLB,, | Performed by: STUDENT IN AN ORGANIZED HEALTH CARE EDUCATION/TRAINING PROGRAM

## 2025-03-17 PROCEDURE — 88342 IMHCHEM/IMCYTCHM 1ST ANTB: CPT | Performed by: PATHOLOGY

## 2025-03-17 PROCEDURE — 1159F MED LIST DOCD IN RCRD: CPT | Mod: CPTII,S$GLB,, | Performed by: STUDENT IN AN ORGANIZED HEALTH CARE EDUCATION/TRAINING PROGRAM

## 2025-03-17 PROCEDURE — 1101F PT FALLS ASSESS-DOCD LE1/YR: CPT | Mod: CPTII,S$GLB,, | Performed by: STUDENT IN AN ORGANIZED HEALTH CARE EDUCATION/TRAINING PROGRAM

## 2025-03-17 PROCEDURE — 17000 DESTRUCT PREMALG LESION: CPT | Mod: XS,S$GLB,, | Performed by: STUDENT IN AN ORGANIZED HEALTH CARE EDUCATION/TRAINING PROGRAM

## 2025-03-17 PROCEDURE — 11102 TANGNTL BX SKIN SINGLE LES: CPT | Mod: S$GLB,,, | Performed by: STUDENT IN AN ORGANIZED HEALTH CARE EDUCATION/TRAINING PROGRAM

## 2025-03-17 PROCEDURE — 88305 TISSUE EXAM BY PATHOLOGIST: CPT | Mod: 26,,, | Performed by: PATHOLOGY

## 2025-03-17 PROCEDURE — 17003 DESTRUCT PREMALG LES 2-14: CPT | Mod: S$GLB,,, | Performed by: STUDENT IN AN ORGANIZED HEALTH CARE EDUCATION/TRAINING PROGRAM

## 2025-03-17 PROCEDURE — 88305 TISSUE EXAM BY PATHOLOGIST: CPT | Performed by: PATHOLOGY

## 2025-03-17 PROCEDURE — 88342 IMHCHEM/IMCYTCHM 1ST ANTB: CPT | Mod: 26,,, | Performed by: PATHOLOGY

## 2025-03-17 PROCEDURE — 99213 OFFICE O/P EST LOW 20 MIN: CPT | Mod: 25,S$GLB,, | Performed by: STUDENT IN AN ORGANIZED HEALTH CARE EDUCATION/TRAINING PROGRAM

## 2025-03-17 PROCEDURE — 1126F AMNT PAIN NOTED NONE PRSNT: CPT | Mod: CPTII,S$GLB,, | Performed by: STUDENT IN AN ORGANIZED HEALTH CARE EDUCATION/TRAINING PROGRAM

## 2025-03-17 RX ORDER — MORPHINE SULFATE 15 MG/1
15 TABLET, FILM COATED, EXTENDED RELEASE ORAL 2 TIMES DAILY
Qty: 60 TABLET | Refills: 0 | Status: SHIPPED | OUTPATIENT
Start: 2025-03-17 | End: 2025-03-19 | Stop reason: SDUPTHER

## 2025-03-17 NOTE — TELEPHONE ENCOUNTER
Patient requesting MS contin be sent to [facility] in [place] since it's on back order in the [facility] LOV 1/6/25

## 2025-03-17 NOTE — TELEPHONE ENCOUNTER
----- Message from Pharmacist Soni sent at 3/14/2025  5:01 PM CDT -----  Regarding: MS Contin 15mg  Good afternoon Dr. Lyles, Mr. Sam Pete has been prescribed MS Contin 15mg. The RX was sent to [facility] in [place], but we cannot get the medication in due to a backorder. He called us and stated the RX for his wife was sent to [facility] on [facility] in [place] ([address] [place] [address]), but they havent received the RX for him there. He asked me to reach out to see if a RX for MS Contin 15mg can be sent to [facility]? Thank you for your time!

## 2025-03-17 NOTE — PATIENT INSTRUCTIONS
CRYOSURGERY      Your doctor has used a method called cryosurgery to treat your skin condition. Cryosurgery refers to the use of very cold substances to treat a variety of skin conditions such as warts, pre-skin cancers, molluscum contagiosum, sun spots, and several benign growths. The substance we use in cryosurgery is liquid nitrogen and is so cold (-195 degrees Celsius) that is burns when administered.     Following treatment in the office, the skin may immediately burn and become red. You may find the area around the lesion is affected as well. It is sometimes necessary to treat not only the lesion, but a small area of the surrounding normal skin to achieve a good response.     A blister, and even a blood filled blister, may form after treatment.   This is a normal response. If the blister is painful, it is acceptable to sterilize a needle and with rubbing alcohol and gently pop the blister. It is important that you gently wash the area with soap and warm water as the blister fluid may contain wart virus if a wart was treated. Do no remove the roof of the blister.     The area treated can take anywhere from 1-3 weeks to heal. Healing time depends on the kind skin lesion treated, the location, and how aggressively the lesion was treated. It is recommended that the areas treated are covered with Vaseline or bacitracin ointment and a band-aid. If a band-aid is not practical, just ointment applied several times per day will do. Keeping these areas moist will speed the healing time.    Treatment with liquid nitrogen can leave a scar. In dark skin, it may be a light or dark scar, in light skin it may be a white or pink scar. These will generally fade with time, but may never go away completely.     If you have any concerns after your treatment, please feel free to call the office.       [address] [facility] [place] [address]/ [phone] FAX/ [email or web address]  Shave Biopsy Wound Care    Your  doctor has performed a shave biopsy today.  A band aid and vaseline ointment has been placed over the site.  This should remain in place for 24 hours.  It is recommended that you keep the area dry for the first 24 hours.  After 24 hours, you may remove the band aid and wash the area with warm soap and water and apply Vaseline jelly.  Many patients prefer to use Neosporin or Bacitracin ointment.  This is acceptable; however, know that you can develop an allergy to this medication even if you have used it safely for years.  It is important to keep the area moist.  Letting it dry out and get air slows healing time, and will worsen the scar.  Band aid is optional after first 24 hours.      If you notice increasing redness, tenderness, pain, or yellow drainage at the biopsy site, please notify your doctor.  These are signs of an infection.    If your biopsy site is bleeding, apply firm pressure for 15 minutes straight.  Repeat for another 15 minutes, if it is still bleeding.   If the surgical site continues to bleed, then please contact your doctor.      [address] [place] [address]/ [phone] FAX/ [email or web address]

## 2025-03-17 NOTE — PROGRESS NOTES
Subjective:      Patient ID:  Sam Pete is a 79 y.o. male who presents for   Chief Complaint   Patient presents with    Spot     Hands      LOV 12/10/24    Patient here today for face/scalp check  Complains of tender area where mohs surgery was done one left hand  Also complains of spot on right dorsal hand     Last Path:  Final Pathologic Diagnosis   1. Skin, left dorsal hand, shave biopsy:--> mohs Dr. K 1/8/25  - SQUAMOUS CELL CARCINOMA IN SITU.  - THE TUMOR EXTENDS TO THE DEEP AND LATERAL BIOPSY MARGINS.      Derm Hx  Hx of AK's  SCCIS, right chest, ED&C 9/16/2024 06/27/2024 SCCIS right chest ED&C 09/16/2024  Denies Fhx MM          Review of Systems   Constitutional:  Negative for fever, chills and fatigue.   Respiratory:  Negative for cough and shortness of breath.    Gastrointestinal:  Negative for nausea, vomiting and diarrhea.   Skin:  Positive for activity-related sunscreen use and wears hat. Negative for daily sunscreen use.   Hematologic/Lymphatic: Bruises/bleeds easily (asa).        Bruises easily       Objective:   Physical Exam   Constitutional: He appears well-developed and well-nourished. No distress.   Neurological: He is alert and oriented to person, place, and time. He is not disoriented.   Psychiatric: He has a normal mood and affect.   Skin:   Areas Examined (abnormalities noted in diagram):   Scalp / Hair Palpated and Inspected  Head / Face Inspection Performed  Neck Inspection Performed  RUE Inspected  LUE Inspection Performed  Nails and Digits Inspection Performed                         Diagram Legend     Erythematous scaling macule/papule c/w actinic keratosis       Vascular papule c/w angioma      Pigmented verrucoid papule/plaque c/w seborrheic keratosis      Yellow umbilicated papule c/w sebaceous hyperplasia      Irregularly shaped tan macule c/w lentigo     1-2 mm smooth white papules consistent with Milia      Movable subcutaneous cyst with punctum c/w epidermal inclusion  cyst      Subcutaneous movable cyst c/w pilar cyst      Firm pink to brown papule c/w dermatofibroma      Pedunculated fleshy papule(s) c/w skin tag(s)      Evenly pigmented macule c/w junctional nevus     Mildly variegated pigmented, slightly irregular-bordered macule c/w mildly atypical nevus      Flesh colored to evenly pigmented papule c/w intradermal nevus       Pink pearly papule/plaque c/w basal cell carcinoma      Erythematous hyperkeratotic cursted plaque c/w SCC      Surgical scar with no sign of skin cancer recurrence      Open and closed comedones      Inflammatory papules and pustules      Verrucoid papule consistent consistent with wart     Erythematous eczematous patches and plaques     Dystrophic onycholytic nail with subungual debris c/w onychomycosis     Umbilicated papule    Erythematous-base heme-crusted tan verrucoid plaque consistent with inflamed seborrheic keratosis     Erythematous Silvery Scaling Plaque c/w Psoriasis     See annotation      Assessment / Plan:      Pathology Orders:       Normal Orders This Visit    Specimen to Pathology, Dermatology     Comments:    Number of Specimens:->1  ------------------------->-------------------------  Spec 1 Procedure:->Biopsy  Spec 1 Clinical Impression:->excoriation vs. HAK vs. SCC  Spec 1 Source:->right parietal scalp    Questions:    Procedure Type: Dermatology and skin neoplasms    Number of Specimens: 1    ------------------------: -------------------------    Spec 1 Procedure: Biopsy    Spec 1 Clinical Impression: excoriation vs. HAK vs. SCC    Spec 1 Source: right parietal scalp    Release to patient:           Neoplasm of uncertain behavior of skin  -     Specimen to Pathology, Dermatology  Shave biopsy procedure note:    Shave biopsy performed after verbal consent including risk of infection, scar, recurrence, need for additional treatment of site. Area prepped with alcohol, anesthetized with approximately 1.0cc of 1% lidocaine with  epinephrine. Lesional tissue shaved with razor blade. Hemostasis achieved with application of aluminum chloride followed by hyfrecation. No complications. Dressing applied. Wound care explained.    AK (actinic keratosis)  Cryosurgery Procedure Note    Verbal consent from the patient is obtained and the patient is aware of the precancerous quality and need for treatment of these lesions. Liquid nitrogen cryosurgery is applied to the 11 actinic keratoses, as detailed in the physical exam, to produce a freeze injury. The patient is aware that blisters may form and is instructed on wound care with gentle cleansing and use of vaseline ointment to keep moist until healed. The patient is supplied a handout on cryosurgery and is instructed to call if lesions do not completely resolve.    History of nonmelanoma skin cancer  Scar  Spitting suture, initial encounter  Left dorsal hand  Removed w/ 11 blade  Examined face and scalp, had mohs for SCCIS on left dorsal hand ~ 8 weeks ago, still with some mild swelling and tenderness, spitting suture removed, multiple AKs treated, no nodularity, no signs of recurrence  Recheck in July 2025  If he continues to have pain will discuss w/ Dr. GRIMM    Patient instructed in importance in daily broad spectrum sun protection of at least spf 30. Mineral sunscreen ingredients preferred (Zinc +/- Titanium) and can be found OTC.   Patient encouraged to wear hat for all outdoor exposure.   Also discussed sun avoidance and use of protective clothing.           No follow-ups on file.

## 2025-03-19 ENCOUNTER — OFFICE VISIT (OUTPATIENT)
Dept: PAIN MEDICINE | Facility: CLINIC | Age: 80
End: 2025-03-19
Payer: MEDICARE

## 2025-03-19 VITALS
SYSTOLIC BLOOD PRESSURE: 158 MMHG | HEIGHT: 67 IN | DIASTOLIC BLOOD PRESSURE: 77 MMHG | WEIGHT: 233.38 LBS | HEART RATE: 77 BPM | BODY MASS INDEX: 36.63 KG/M2

## 2025-03-19 DIAGNOSIS — M54.16 LUMBAR RADICULOPATHY: ICD-10-CM

## 2025-03-19 DIAGNOSIS — G89.4 CHRONIC PAIN SYNDROME: Primary | ICD-10-CM

## 2025-03-19 DIAGNOSIS — F11.20 UNCOMPLICATED OPIOID DEPENDENCE: ICD-10-CM

## 2025-03-19 DIAGNOSIS — M96.1 FAILED BACK SURGICAL SYNDROME: ICD-10-CM

## 2025-03-19 DIAGNOSIS — M47.816 FACET ARTHROPATHY, LUMBAR: ICD-10-CM

## 2025-03-19 DIAGNOSIS — M48.061 SPINAL STENOSIS, LUMBAR REGION, WITHOUT NEUROGENIC CLAUDICATION: ICD-10-CM

## 2025-03-19 DIAGNOSIS — G89.4 CHRONIC PAIN SYNDROME: ICD-10-CM

## 2025-03-19 PROCEDURE — 3288F FALL RISK ASSESSMENT DOCD: CPT | Mod: CPTII,S$GLB,,

## 2025-03-19 PROCEDURE — 3077F SYST BP >= 140 MM HG: CPT | Mod: CPTII,S$GLB,,

## 2025-03-19 PROCEDURE — 1159F MED LIST DOCD IN RCRD: CPT | Mod: CPTII,S$GLB,,

## 2025-03-19 PROCEDURE — 80355 GABAPENTIN NON-BLOOD: CPT

## 2025-03-19 PROCEDURE — 3078F DIAST BP <80 MM HG: CPT | Mod: CPTII,S$GLB,,

## 2025-03-19 PROCEDURE — 1125F AMNT PAIN NOTED PAIN PRSNT: CPT | Mod: CPTII,S$GLB,,

## 2025-03-19 PROCEDURE — 99214 OFFICE O/P EST MOD 30 MIN: CPT | Mod: S$GLB,,,

## 2025-03-19 PROCEDURE — 99999 PR PBB SHADOW E&M-EST. PATIENT-LVL IV: CPT | Mod: PBBFAC,,,

## 2025-03-19 PROCEDURE — 1101F PT FALLS ASSESS-DOCD LE1/YR: CPT | Mod: CPTII,S$GLB,,

## 2025-03-19 RX ORDER — MORPHINE SULFATE 15 MG/1
15 TABLET, FILM COATED, EXTENDED RELEASE ORAL 2 TIMES DAILY
Qty: 60 TABLET | Refills: 0 | Status: SHIPPED | OUTPATIENT
Start: 2025-04-16 | End: 2025-05-16

## 2025-03-19 RX ORDER — OXYCODONE AND ACETAMINOPHEN 10; 325 MG/1; MG/1
1 TABLET ORAL EVERY 6 HOURS PRN
Qty: 120 TABLET | Refills: 0 | Status: SHIPPED | OUTPATIENT
Start: 2025-04-07 | End: 2025-05-07

## 2025-03-19 NOTE — PROGRESS NOTES
[facility] Follow Up Evaluation      Referred by: No ref. provider found    PCP:     CC:   Chief Complaint   Patient presents with    Follow-up          3/19/2025     4:07 PM 1/6/2025     1:13 PM 12/5/2024     1:41 PM   Last 3 PDI Scores   Pain Disability Index (PDI) 32 36 36     Interval HPI 3/19/2025: Sam Pete returns to the office for follow up.  Returns for follow-up with continued chronic generalized pain throughout his body, his worst pain is his right shoulder pain and low back pain.  Ultimately he feels like he is at his baseline with no new or significantly worsening pain.  Did have a cramp in his left leg a few days ago that was short lasting but intense.  No cramps since.  He continues to take morphine extended release 15 mg twice a day and oxycodone 10 mg 3-4 times daily.  He denies any ill side effects or adverse events with his medication.  No new numbness, weakness or any new changes to his bowel or bladder function.      HPI:   Sam Pete is a 79 y.o. male patient who has a past medical history of Abnormal nuclear stress test, Anticoagulant long-term use, Arthritis, Cataract, Chronic low back pain, Complete rupture of rotator cuff, DDD (degenerative disc disease), lumbar, Degeneration of lumbar or lumbosacral intervertebral disc, ED (erectile dysfunction), GERD (gastroesophageal reflux disease), Hypertension, Hypothyroidism, unspecified, Lumbar pseudoarthrosis, Lumbar stenosis, Obesity, PSVT (paroxysmal supraventricular tachycardia), SOB (shortness of breath), Spondylosis of lumbar region without myelopathy or radiculopathy, Squamous cell carcinoma of skin, Stroke, Testicular hypofunction, and Thoracic or lumbosacral neuritis or radiculitis. He presents with back pain.  He has a history of chronic back pain for over the past 30 years.  He has a history of 6 prior lumbar surgeries.  He has a history of spinal cord stimulator paddle lead implant.  Today he reports his pain is  7/10, constant, throbbing in his lower back with pain radiating primarily down the left leg to the left foot.  His pain is worse with sitting, standing, bending, walking, morning, flexing and relieved with medications, injections, physical therapy.  He has done physical therapy and kyree goes in the past.  Recently has been receiving injections from pain management in [place].      Pain Intervention History:  - history of spinal cord stimulator paddle lead implant  - s/p caudal CARLOS on 1/20/2023with a about 50% relief of his pain  - s/p caudal CARLOS on 4/21/2023 with a about 50% relief of his pain  - s/p caudal CARLOS on 7/11/2023 with a about 50% relief of his pain  - s/p caudal CARLOS on 1/9/2024 with a about 50% relief of his pain  - s/p caudal CARLOS on 6/4/2024 with a about 50% relief of his pain  - s/p caudal CARLOS on 10/24/2024 with a about 50% relief of his pain    Past Spine Surgical History:  - posterior decompression from L2-S1 and interbody cage at L5-S1    Past and current medications:  Antineuropathics: gabapentin   NSAIDs:  Physical therapy: yes, completed   Antidepressants:  Muscle relaxers: tizanidine   Opioids: oxycodone, ms contin   Antiplatelets/Anticoagulants: aspirin     History:    Current Outpatient Medications:     albuterol (VENTOLIN HFA) 90 mcg/actuation inhaler, Inhale 2 puffs into the lungs every 6 (six) hours as needed for Wheezing or Shortness of Breath (cough). Rescue, Disp: 18 g, Rfl: 11    aspirin 81 MG Chew, Take 81 mg by mouth once daily., Disp: , Rfl:     atorvastatin (LIPITOR) 40 MG tablet, Take 1 tablet (40 mg total) by mouth every evening., Disp: 90 tablet, Rfl: 1    buPROPion (WELLBUTRIN XL) 150 MG TB24 tablet, TAKE 1 TABLET EVERY DAY, Disp: 90 tablet, Rfl: 3    calcitRIOL (ROCALTROL) 0.5 MCG Cap, TAKE 1 CAPSULE ONE TIME DAILY, Disp: 90 capsule, Rfl: 3    furosemide (LASIX) 40 MG tablet, Take 1 tablet (40 mg total) by mouth once daily., Disp: 90 tablet, Rfl: 1    gabapentin (NEURONTIN) 300  MG capsule, Take 1 capsule (300 mg total) by mouth 3 (three) times daily., Disp: 90 capsule, Rfl: 5    levothyroxine (SYNTHROID) 112 MCG tablet, Take 1 tablet (112 mcg total) by mouth before breakfast., Disp: 180 tablet, Rfl: 3    losartan (COZAAR) 50 MG tablet, Take 1 tablet (50 mg total) by mouth once daily. Take additional 25 mg daily if bp is 140 or above, Disp: 90 tablet, Rfl: 3    metoprolol succinate (TOPROL-XL) 25 MG 24 hr tablet, TAKE ONE-HALF TABLET (12.5 MG) BY MOUTH EVERY DAY, Disp: 45 tablet, Rfl: 0    morphine (MS CONTIN) 15 MG 12 hr tablet, Take 1 tablet (15 mg total) by mouth 2 (two) times daily., Disp: 60 tablet, Rfl: 0    multivitamin (THERAGRAN) per tablet, Take 1 tablet by mouth once daily., Disp: , Rfl:     mupirocin (BACTROBAN) 2 % ointment, Apply topically 3 (three) times daily., Disp: 22 g, Rfl: 0    naloxone (NARCAN) 0.4 mg/mL injection, Inject 1 mL (0.4 mg total) into the vein as needed (overdose)., Disp: 2 mL, Rfl: 5    omeprazole (PRILOSEC) 40 MG capsule, Take one capsule by mouth daily, Disp: 90 capsule, Rfl: 3    oxyCODONE-acetaminophen (PERCOCET)  mg per tablet, Take 1 tablet by mouth every 6 (six) hours as needed for Pain., Disp: 120 tablet, Rfl: 0    tamsulosin (FLOMAX) 0.4 mg Cap, Take 1 capsule (0.4 mg total) by mouth every evening., Disp: 90 capsule, Rfl: 3    tiZANidine 4 mg Cap, Take 4 mg by mouth 3 (three) times daily as needed (pain)., Disp: 90 capsule, Rfl: 3    UNABLE TO FIND, Take by mouth once daily. Vitafusion multivites gummies, Disp: , Rfl:   No current facility-administered medications for this visit.    Facility-Administered Medications Ordered in Other Visits:     diphenhydrAMINE injection 12.5 mg, 12.5 mg, Intravenous, Once PRN, Enrique Rosales MD    electrolyte-S (ISOLYTE), , Intravenous, Continuous, Enrique Rosales MD    HYDROmorphone (PF) injection 0.2 mg, 0.2 mg, Intravenous, Q5 Min PRN, Enrique Rosales MD    HYDROmorphone (PF) injection  0.2 mg, 0.2 mg, Intravenous, Q5 Min PRN, Enrique Rosales MD    lactated ringers infusion, , Intravenous, Once PRN, Aram Terrell MD    lactated ringers infusion, 10 mL/hr, Intravenous, Continuous, Enrique Rosales MD, Stopped at 07/19/23 0959    lactated ringers infusion, , Intravenous, Continuous, Aram Terrell MD, Last Rate: 25 mL/hr at 07/11/23 1012, New Bag at 07/11/23 1012    lactated ringers infusion, , Intravenous, Continuous, Aram Terrell MD, Last Rate: 25 mL/hr at 01/09/24 1014, New Bag at 01/09/24 1014    lorazepam injection 0.25 mg, 0.25 mg, Intravenous, Once PRN, Enrique Rosales MD    prochlorperazine injection Soln 5 mg, 5 mg, Intravenous, Q30 Min PRN, Enrique Rosales MD    sodium chloride 0.9% flush 3 mL, 3 mL, Intravenous, Q8H, Enrique Rosales MD    Past Medical History:   Diagnosis Date    Abnormal nuclear stress test 07/2023    Anticoagulant long-term use     ASA 81 mg    Arthritis     Cataract     OU    Chronic low back pain     Complete rupture of rotator cuff 02/08/2011    DDD (degenerative disc disease), lumbar 07/08/2015    Degeneration of lumbar or lumbosacral intervertebral disc 09/09/2015    ED (erectile dysfunction)     GERD (gastroesophageal reflux disease)     Hypertension     Hypothyroidism, unspecified     Lumbar pseudoarthrosis 06/26/2017    Lumbar stenosis 06/26/2017    Obesity     PSVT (paroxysmal supraventricular tachycardia) 07/2023    SOB (shortness of breath) 07/2023    Spondylosis of lumbar region without myelopathy or radiculopathy 07/08/2015    Squamous cell carcinoma of skin     Stroke 1997    basal ganglia, left sided weakness, resolved    Testicular hypofunction     Thoracic or lumbosacral neuritis or radiculitis 07/08/2015       Past Surgical History:   Procedure Laterality Date    ANGIOGRAM, CORONARY, WITH LEFT HEART CATHETERIZATION  07/13/2023    Procedure: Left Heart Cath;  Surgeon: Salvador Eugene MD;  Location: [facility];  Service:  Cardiology;;    APPENDECTOMY      ARTHROPLASTY OF SHOULDER Right 03/22/2022    Procedure: ARTHROPLASTY, SHOULDER BRENNAN RIGHT SHOULDER HUMERAL HEAD RESURFACING;  Surgeon: Frankie Joya MD;  Location: [facility] OR;  Service: Orthopedics;  Laterality: Right;    BACK SURGERY      4- back surgery    CAUDAL EPIDURAL STEROID INJECTION N/A 12/16/2021    Procedure: Injection-steroid-epidural-caudal;  Surgeon: Aram Terrell MD;  [facility];  Service: Pain Management;  Laterality: N/A;    CAUDAL EPIDURAL STEROID INJECTION N/A 02/02/2022    Procedure: INJECTION, STEROID, SPINE, EPIDURAL, CAUDAL;  Surgeon: Aram Terrell MD;  [facility];  Service: Pain Management;  Laterality: N/A;    CAUDAL EPIDURAL STEROID INJECTION N/A 07/22/2022    Procedure: Injection-steroid-epidural-caudal;  Surgeon: Aram Terrell MD;  [facility];  Service: Pain Management;  Laterality: N/A;  Caudal CARLOS     CAUDAL EPIDURAL STEROID INJECTION N/A 01/20/2023    Procedure: Injection-steroid-epidural-caudal;  Surgeon: Aarm Terrell MD;  [facility];  Service: Pain Management;  Laterality: N/A;  caudal ( melinda)    CAUDAL EPIDURAL STEROID INJECTION N/A 04/21/2023    Procedure: Injection-steroid-epidural-caudal;  Surgeon: Aram Terrell MD;  [facility];  Service: Pain Management;  Laterality: N/A;  caudal    CAUDAL EPIDURAL STEROID INJECTION N/A 07/11/2023    Procedure: Injection-steroid-epidural-caudal;  Surgeon: Aram Terrell MD;  Location: [facility] OR;  Service: Pain Management;  Laterality: N/A;  caudal    CAUDAL EPIDURAL STEROID INJECTION N/A 1/9/2024    Procedure: Injection-steroid-epidural-caudal;  Surgeon: Aram Terrell MD;  Location: [facility] OR;  Service: Anesthesiology;  Laterality: N/A;  caudal    CAUDAL EPIDURAL STEROID INJECTION N/A 6/4/2024    Procedure: Injection-steroid-epidural-caudal;  Surgeon: Aram Terrell MD;  Location: [facility] OR;  Service: Anesthesiology;  Laterality: N/A;    COLONOSCOPY  07/12/2004    [facility].   Redundant  tortuous colon, otherwise normal.    COLONOSCOPY N/A 02/25/2019    Procedure: COLONOSCOPY;  Surgeon: Gilbert Rodriguez Jr., MD;  Location: [facility] ENDO;  Service: Endoscopy;  Laterality: N/A;    CORONARY ANGIOGRAPHY N/A 07/13/2023    Procedure: ANGIOGRAM, CORONARY ARTERY;  Surgeon: Salvador Eugene MD;  Location: [facility] CATH;  Service: Cardiology;  Laterality: N/A;    CORONARY ARTERY BYPASS GRAFT (CABG) N/A 7/19/2023    Procedure: CORONARY ARTERY BYPASS GRAFT (CABG)- x 4;  Surgeon: Sandrine Wagner MD;  Location: [facility] OR;  Service: Cardiothoracic;  Laterality: N/A;    ENDOSCOPIC HARVEST OF VEIN Left 7/19/2023    Procedure: HARVEST-VEIN-ENDOVASCULAR;  Surgeon: Sandrine Wagner MD;  Location: [facility] OR;  Service: Cardiothoracic;  Laterality: Left;    Epidural steroid injection      Pain management    EPIDURAL STEROID INJECTION N/A 10/24/2024    Procedure: Injection, Steroid, Epidural;  Surgeon: Aram Terrell MD;  Location: [facility] OR;  Service: Pain Management;  Laterality: N/A;    ESOPHAGOGASTRODUODENOSCOPY  07/12/2004    [facility].    EXCLUSION, LEFT ATRIAL APPENDAGE, OPEN, AS PART OF OPEN CHEST SURGERY N/A 7/19/2023    Procedure: EXCLUSION, LEFT ATRIAL APPENDAGE, OPEN, AS PART OF OPEN CHEST SURGERY;  Surgeon: Sandrine Wagner MD;  Location: [facility] OR;  Service: Cardiothoracic;  Laterality: N/A;    FRACTURE SURGERY Left     wrist / forearm, total of 5    INSERTION OF DORSAL COLUMN NERVE STIMULATOR FOR TRIAL N/A 12/12/2019    Procedure: INSERTION, NEUROSTIMULATOR, SPINAL CORD, DORSAL COLUMN, FOR TRIAL;  Surgeon: Evan Lyles MD;  Location: [facility] OR;  Service: Pain Management;  Laterality: N/A;    JOINT REPLACEMENT  02/06/2013    ( rt hip 2007), left hip     JOINT REPLACEMENT Left     total knee    KNEE ARTHROSCOPY W/ DEBRIDEMENT      bilateral knees , total of six    KNEE SURGERY      LAMINECTOMY USING MINIMALLY INVASIVE TECHNIQUE N/A 02/12/2020    Procedure: LAMINECTOMY, SPINE, MINIMALLY INVASIVE /  PLACEMENT OF SPINAL CORD STIMULATOR;  Surgeon:  Darlene Max MD;  Location: [facility] OR;  Service: Neurosurgery;  Laterality: N/A;    Nervbe Block injection      Pain management    SPINAL CORD STIMULATOR IMPLANT      TOTAL SHOULDER ARTHROPLASTY Right 2022    Dr Joya    TOTAL THYROIDECTOMY Bilateral 2022    Dr Mendoza       Family History   Problem Relation Name Age of Onset    Hypertension Father      Heart disease Father      Drug abuse Daughter      Hypertension Son      Lung disease Sister      COPD Sister      Hypertension Sister      Diverticulitis Sister      Gout Sister      Kidney disease Sister      No Known Problems Mother      Eczema Neg Hx      Lupus Neg Hx      Psoriasis Neg Hx      Melanoma Neg Hx         Social History     Socioeconomic History    Marital status:    Tobacco Use    Smoking status: Former     Current packs/day: 0.00     Average packs/day: 1 pack/day for 30.0 years (30.0 ttl pk-yrs)     Types: Cigarettes     Start date: 8/3/1963     Quit date: 1992     Years since quittin.2     Passive exposure: Past    Smokeless tobacco: Never   Substance and Sexual Activity    Alcohol use: Yes     Comment: On occasion    Drug use: Yes     Types: Oxycodone, Morphine     Social Drivers of Health     Financial Resource Strain: Low Risk  (10/17/2023)    Overall Financial Resource Strain (CARDIA)     Difficulty of Paying Living Expenses: Not hard at all   Recent Concern: Financial Resource Strain - Medium Risk (2023)    Overall Financial Resource Strain (CARDIA)     Difficulty of Paying Living Expenses: Somewhat hard   Food Insecurity: No Food Insecurity (2024)    Received from [facility]    Hunger Vital Sign     Worried About Running Out of Food in the Last Year: Never true     Ran Out of Food in the Last Year: Never true   Transportation Needs: No Transportation Needs (2024)    Received from [facility]    PRAPARE - Transportation     Lack of Transportation (Medical): No     Lack of Transportation  "(Non-Medical): No   Physical Activity: Sufficiently Active (8/4/2024)    Received from [facility]    Exercise Vital Sign     Days of Exercise per Week: 5 days     Minutes of Exercise per Session: 40 min   Stress: No Stress Concern Present (8/4/2024)    Received from [facility] [employer]    [employer] - Occupational Stress Questionnaire     Feeling of Stress : Not at all   Housing Stability: Low Risk  (8/4/2024)    Received from [facility]    Housing Stability Vital Sign     Unable to Pay for Housing in the Last Year: No     Number of Places Lived in the Last Year: 1     Unstable Housing in the Last Year: No       Review of patient's allergies indicates:   Allergen Reactions    Xyzal [levocetirizine] Other (See Comments)     Knocked him out        Review of Systems:  12 point review of systems is negative.    Physical Exam:  Vitals:    03/19/25 1608   BP: (!) 158/77   Pulse: 77   Weight: 105.8 kg (233 lb 5.7 oz)   Height: 5' 7" (1.702 m)   PainSc:   6   PainLoc: Back       Body mass index is 36.55 kg/m².    Gen: NAD  Psych: mood appropriate for given condition  HEENT: eyes anicteric   CV: RRR  HEENT: anicteric   Respiratory: non-labored, no signs of respiratory distress  Abd: non-distended  Skin: warm, dry and intact.  Gait: antalgic gait.     Sensory:  Decreased sensation in a nondermatomal distribution in his lower extremities bilaterally    Motor:     Right Left   L2/3 Iliacus Hip flexion  5  5   L3/4 Qudratus Femoris Knee Extension  5  5   L4/5 Tib Anterior Ankle Dorsiflexion   5  5   L5/S1 Extensor Hallicus Longus Great toe extension  5  5   S1/S2 Gastroc/Soleus Plantar Flexion  5  5       Labs:  Lab Results   Component Value Date    HGBA1C 6.2 (H) 01/31/2025       Lab Results   Component Value Date    WBC 8.90 01/31/2025    HGB 13.2 (L) 01/31/2025    HCT 42.3 01/31/2025    MCV 91 01/31/2025     01/31/2025         Imaging:  MRI lumbar spine 10/3/24  FINDINGS:  Stable postoperative " changes of posterior instrumented fusion and decompression at L2-S1.  Osseous fusion across the L4-5 disc space.  Interbody cage at L5-S1.     Alignment: Stable fused anterolisthesis of L2 on L3 and L4 on L5.  Sagittal alignment is otherwise maintained.     Vertebral column: Vertebral body heights appear maintained.  No acute fracture is identified; however, if trauma is suspected, a CT scan would be a more sensitive examination for fractures. No evidence of an aggressive marrow replacement process. Modic type 1 degenerative endplate change along the anterior superior endplate of L1.  Multilevel disc degeneration with disc desiccation, disc space narrowing and disc bulges throughout the lumbar spine.     Cord: Susceptibility artifact from a neurostimulator lead entering the dorsal spinal canal at the T11-12 inter spinous space and extending craniad beyond the field of view, limiting evaluation of the cord.  Visualized portions of the lower cord appears normal.  Conus terminates at L1.  Stable mild clumping of the cauda equina nerve roots at the L2-3 level which may reflect sequelae of chronic inflammation.     Degenerative findings:  T11-12: Minimal circumferential disc bulge.  Moderate bilateral facet arthropathy.  No significant neural foraminal or spinal canal stenosis.  T12-L1: Disc is normal configuration.  Mild-to-moderate bilateral facet arthropathy.  Ligamentum flavum thickening.  No significant neural foraminal or spinal canal stenosis.  L1-L2: Mild circumferential disc bulge.  Moderate bilateral facet arthropathy.  Prominent ligamentum flavum thickening.  Interval increase in size of a small right-sided presumed facet joint synovial cyst, measuring 8 x 5 x 5 mm, slightly effacing right dorsal thecal sac.  Mild right neural foraminal stenosis.  Mild spinal canal stenosis.  L2-L3: Postoperative change, as above.  Disc space narrowing.  Circumferential disc bulge.  Moderate bilateral facet arthropathy.   Mild-to-moderate bilateral neural foraminal stenosis.  Mild spinal canal stenosis.  L3-L4: Postoperative change, as above.  Enhancing scar tissue in the laminectomy bed/posterior epidural space.  Disc space narrowing.  Circumferential disc bulge.  Moderate bilateral facet arthropathy.  Mild-to-moderate right and mild left neural foraminal stenosis.  Spinal canal is decompressed posteriorly.  L4-L5: Postoperative change, as above.  Posterior osteophytic ridging.  Moderate facet arthropathy.  Mild bilateral neural foraminal stenosis.  Spinal canal is decompressed posteriorly.  L5-S1: Postoperative change, as above.  Severe disc space narrowing.  Circumferential disc bulge with osteophytic ridging.  Severe bilateral facet arthropathy.  Neural foraminal are suboptimally evaluated due to susceptibility artifact, with questionable mild bilateral neural foraminal narrowing.  No spinal canal stenosis.     Paraspinal muscles & soft tissues: Postoperative changes in the posterior paraspinal soft tissues with stable small nonspecific chronic postoperative collection in the laminectomy bed at the L3 vertebral body level.  Stable exophytic right renal cyst.  Small exophytic left renal cyst also noted.     No other discrete abnormal intraspinal enhancement.      Assessment:   Problem List Items Addressed This Visit       Spinal stenosis, lumbar region, without neurogenic claudication    Facet arthropathy, lumbar    Relevant Orders    Pain Clinic Drug Screen    Lumbar radiculopathy    Chronic pain syndrome - Primary     Other Visit Diagnoses         Uncomplicated opioid dependence          Failed back surgical syndrome                    Sam Pete is a 79 y.o. male patient who has a past medical history of Abnormal nuclear stress test, Anticoagulant long-term use, Arthritis, Cataract, Chronic low back pain, Complete rupture of rotator cuff, DDD (degenerative disc disease), lumbar, Degeneration of lumbar or lumbosacral  intervertebral disc, ED (erectile dysfunction), GERD (gastroesophageal reflux disease), Hypertension, Hypothyroidism, unspecified, Lumbar pseudoarthrosis, Lumbar stenosis, Obesity, PSVT (paroxysmal supraventricular tachycardia), SOB (shortness of breath), Spondylosis of lumbar region without myelopathy or radiculopathy, Squamous cell carcinoma of skin, Stroke, Testicular hypofunction, and Thoracic or lumbosacral neuritis or radiculitis. He presents with back pain.  He has a history of chronic back pain for over the past 30 years.  He has a history of 6 prior lumbar surgeries.  He has a history of spinal cord stimulator paddle lead implant.  Today he reports his pain is 7/10, constant, throbbing in his lower back with pain radiating primarily down the left leg to the left foot.  His pain is worse with sitting, standing, bending, walking, morning, flexing and relieved with medications, injections, physical therapy.  He has done physical therapy and kyree goes in the past.  Recently has been receiving injections from pain management in [place].      3/19/2025: Sam Pete returns to the office for follow up.  Returns for follow-up with continued chronic generalized pain throughout his body, his worst pain is his right shoulder pain and low back pain.  Ultimately he feels like he is at his baseline with no new or significantly worsening pain.  Did have a cramp in his left leg a few days ago that was short lasting but intense.  No cramps since.  He continues to take morphine extended release 15 mg twice a day and oxycodone 10 mg 3-4 times daily.  He denies any ill side effects or adverse events with his medication.  No new numbness, weakness or any new changes to his bowel or bladder function.    - I independently reviewed his lumbar MRI and he has evidence of posterior decompression from L2-S1 and interbody cage at L5-S1.  Adequate decompression of the lumbar spine.  - overall, at baseline for pain with no new or  significantly worsening symptoms.  - we will continue with current regimen of morphine extended release 15 mg twice a day and oxycodone 10 mg 3-4 times daily as needed for severe pain.  - No signs of abuse, no adverse events noted, patient experiences significant benefit of analgesia, and patient demonstrates increased activity while on these medications.  The patient is meeting the goals of opioid therapy and is dependent on them for functionality and can not perform ADLS without them. Regarding opioids, the risks were discussed including tolerance, addiction, overdose, over-sedation, drug interactions, respiratory depression, opioid-induced hyperalgesia, and even death.  He has been prescribed naloxone nasal actuation spray and he reports both him in his wife were educated on its use  - refill for morphine and oxycodone sent today for appropriate refill in April.  - he has a Nevro spinal cord stimulator paddle lead.  He reports he is using in in his providing him with partial relief.  - pain contract signed, UDS 12/05/2024 consistent.  Repeat UDS collected today  - follow up in 2 months or sooner if needed.        : Reviewed and consistent with medication use as prescribed.      This note was completed with dictation software and grammatical errors may exist.

## 2025-03-20 ENCOUNTER — RESULTS FOLLOW-UP (OUTPATIENT)
Dept: DERMATOLOGY | Facility: CLINIC | Age: 80
End: 2025-03-20

## 2025-03-20 ENCOUNTER — TELEPHONE (OUTPATIENT)
Dept: DERMATOLOGY | Facility: CLINIC | Age: 80
End: 2025-03-20
Payer: MEDICARE

## 2025-03-20 DIAGNOSIS — D04.4 SQUAMOUS CELL CARCINOMA IN SITU (SCCIS) OF SCALP: Primary | ICD-10-CM

## 2025-03-20 LAB
FINAL PATHOLOGIC DIAGNOSIS: NORMAL
GROSS: NORMAL
Lab: NORMAL
MICROSCOPIC EXAM: NORMAL

## 2025-03-22 ENCOUNTER — PATIENT MESSAGE (OUTPATIENT)
Dept: ORTHOPEDICS | Facility: CLINIC | Age: 80
End: 2025-03-22
Payer: MEDICARE

## 2025-03-24 DIAGNOSIS — M25.562 LEFT KNEE PAIN: Primary | ICD-10-CM

## 2025-03-31 ENCOUNTER — OFFICE VISIT (OUTPATIENT)
Dept: FAMILY MEDICINE | Facility: CLINIC | Age: 80
End: 2025-03-31
Payer: MEDICARE

## 2025-03-31 ENCOUNTER — HOSPITAL ENCOUNTER (OUTPATIENT)
Dept: RADIOLOGY | Facility: HOSPITAL | Age: 80
Discharge: HOME | End: 2025-03-31
Attending: FAMILY MEDICINE
Payer: MEDICARE

## 2025-03-31 VITALS
HEIGHT: 67 IN | SYSTOLIC BLOOD PRESSURE: 138 MMHG | BODY MASS INDEX: 36.09 KG/M2 | HEART RATE: 74 BPM | DIASTOLIC BLOOD PRESSURE: 88 MMHG | OXYGEN SATURATION: 96 % | WEIGHT: 229.94 LBS

## 2025-03-31 DIAGNOSIS — M79.605 PAIN IN LEFT LEG: ICD-10-CM

## 2025-03-31 DIAGNOSIS — M79.89 LEFT LEG SWELLING: ICD-10-CM

## 2025-03-31 DIAGNOSIS — M79.89 LEFT LEG SWELLING: Primary | ICD-10-CM

## 2025-03-31 PROCEDURE — 3288F FALL RISK ASSESSMENT DOCD: CPT | Mod: CPTII,S$GLB,, | Performed by: FAMILY MEDICINE

## 2025-03-31 PROCEDURE — 93971 EXTREMITY STUDY: CPT | Mod: 26,LT,, | Performed by: RADIOLOGY

## 2025-03-31 PROCEDURE — 1101F PT FALLS ASSESS-DOCD LE1/YR: CPT | Mod: CPTII,S$GLB,, | Performed by: FAMILY MEDICINE

## 2025-03-31 PROCEDURE — 93971 EXTREMITY STUDY: CPT | Mod: TC,PO,LT

## 2025-03-31 PROCEDURE — 3075F SYST BP GE 130 - 139MM HG: CPT | Mod: CPTII,S$GLB,, | Performed by: FAMILY MEDICINE

## 2025-03-31 PROCEDURE — 99999 PR PBB SHADOW E&M-EST. PATIENT-LVL V: CPT | Mod: PBBFAC,,, | Performed by: FAMILY MEDICINE

## 2025-03-31 PROCEDURE — 3079F DIAST BP 80-89 MM HG: CPT | Mod: CPTII,S$GLB,, | Performed by: FAMILY MEDICINE

## 2025-03-31 PROCEDURE — 1159F MED LIST DOCD IN RCRD: CPT | Mod: CPTII,S$GLB,, | Performed by: FAMILY MEDICINE

## 2025-03-31 PROCEDURE — 99214 OFFICE O/P EST MOD 30 MIN: CPT | Mod: S$GLB,,, | Performed by: FAMILY MEDICINE

## 2025-03-31 PROCEDURE — 1125F AMNT PAIN NOTED PAIN PRSNT: CPT | Mod: CPTII,S$GLB,, | Performed by: FAMILY MEDICINE

## 2025-03-31 RX ORDER — DOXYCYCLINE 100 MG/1
100 CAPSULE ORAL 2 TIMES DAILY
Qty: 10 CAPSULE | Refills: 0 | Status: SHIPPED | OUTPATIENT
Start: 2025-03-31 | End: 2025-04-04

## 2025-03-31 NOTE — PROGRESS NOTES
THIS DOCUMENT WAS MADE IN PART WITH VOICE RECOGNITION SOFTWARE.  OCCASIONALLY THIS SOFTWARE WILL MISINTERPRET WORDS OR PHRASES.    Assessment and Plan:    1. Left leg swelling  US Lower Extremity Veins Left      2. Pain in left leg  US Lower Extremity Veins Left    doxycycline (MONODOX) 100 MG capsule              Assessment & Plan    PLAN SUMMARY:   Ordered STAT ultrasound to evaluate for DVT   Prescribed doxycycline for potential cellulitis   Initiate anticoagulants if ultrasound confirms blood clot   Refer to hematology if blood clot is confirmed   Scheduled follow-up later in the week to assess improvement    DEEP VEIN THROMBOSIS (DVT) OF LEFT LOWER EXTREMITY:   Evaluated the patient's left leg, noting swelling and pain that developed after a recent trip to [place].   Observed redness and swelling in the left leg, with a circumference of 15.5 inches compared to 14.5 inches for the right leg.   Suspected DVT based on symptoms and recent prolonged car trip.   Ordered a STAT ultrasound to evaluate for DVT.   Explained potential causes of leg swelling, redness, and pain, focusing on DVT.   Discussed risk factors for DVT, including prolonged car rides.   Explained typical duration of anticoagulation therapy for first-time DVT (3-6 months).   Plan to initiate anticoagulants and refer to hematology if ultrasound confirms blood clot.    CELLULITIS OF LOWER LIMB:   Considered cellulitis as a possible diagnosis.   Prescribed doxycycline as antibiotic treatment for potential cellulitis.    SWELLING OF LOWER LIMB:   Evaluated the patient's leg, noting swelling.   Measured the affected leg circumference at 15.5 inches, compared to 14.5 inches for the unaffected leg.   Explained potential causes of leg swelling, including cellulitis and DVT.    REDNESS OF SKIN:   Evaluated the patient's leg, confirming redness more pronounced than normal.   Explained potential causes of redness, including cellulitis and DVT.    PAIN IN LOWER  LIMB:   Evaluated the patient's leg, confirming pain on palpation.   Noted patient's report of severe pain the previous night.   Explained potential causes of pain, including cellulitis and DVT.   Observed that the leg is painful to touch.    HISTORY OF CORONARY ARTERY BYPASS GRAFT:   Noted patient's history of quadruple bypass surgery.    PRESENCE OF ARTIFICIAL RIGHT KNEE JOINT:   Confirmed patient's history of right knee replacement.    LONG-TERM USE OF ASPIRIN:   Confirmed patient's current use of baby aspirin.    FOLLOW-UP:   Scheduled follow-up later in the week to assess improvement.             Visit today included increased complexity associated with the care of the episodic problem above addressed and managing the longitudinal care of the patient due to the serious and/or complex managed problem(s) .        ______________________________________________________________________  Subjective:    Chief Complaint:  Chief Complaint   Patient presents with    Follow-up     Left knee swollen all weekend and pain        HPI:  Sam is a 79 y.o. year old       History of Present Illness    CHIEF COMPLAINT:  Sam presents today for leg pain and swelling.    HISTORY OF PRESENT ILLNESS:  He reports pain and swelling in his leg since Saturday night. He is uncertain whether the pain is localized to the knee or the lower part of the leg. He denies fever, nausea, vomiting, or feeling ill.    SURGICAL HISTORY:  He has undergone quadruple bypass surgery with vein harvesting from the leg. He has had multiple knee surgeries including knee replacement by Dr. Joya. He also has history of back surgery during which there was concern for blood clot, requiring ultrasound evaluation prior to discharge.    MEDICATIONS:  He currently takes baby aspirin. He was previously on Plavix for an extended period approximately 30 years ago, which was discontinued by Dr. Montalvo.    FAMILY HISTORY:  He denies family history of blood  clots.      ROS:  ROS as indicated in HPI.             Coronary artery disease, dyslipidemia, aortic atherosclerosis   Status post CABG (7/2023) x 4    Cardiology- MD Valorie   Rx-atorvastatin 40 mg, beta-blocker, Aspirin    H/o smoking   Quit : 1992    Carotid artery disease, history of CVA (1997)   Residual right upper extremity tremor    History of paroxysmal supraventricular tachycardia, Paroxysmal Atrial Fib   No symptoms  Rx : beta blocker    Tricuspid regurgitation  Per Cards     Chronic edema   Rx-Lasix 40 mg QD (left > right)  + daily compression stocking     Essential hypertension   Rx-losartan 50 mg, metoprolol 12.5 mg  Home : sys <140     Chronic low back pain  Rx-gabapentin 300 mg t.i.d., MS Contin 15 mg b.i.d., Narcan, Percocet 10 mg q.4 hours p.r.n., tizanidine  Pain MD  -  MD Trupti (meds)    +    Aram Terrell MD (CARLOS)    PMR : MD Diallo (no f/u planned)   Neurosurgery- Darlene Max MD (pain stimulator)   Prev tx : Surgeries, PT (Francos), CARLOS, Spinal Cord Stimulator in place     Idiopathic neuropathy  Rx-gabapentin 300 mg t.i.d.    Depression / Anxiety   Rx-Wellbutrin 150 mg    History squamous cell carcinoma  Dermatology-Joselyn Foss MD    Iatrogenic Hypothyroidism  Rx-levothyroxine 224 mcg  S/p total thyroidectomy : non cancerous nodules     BPH, ED, Hypogonadism     Chronic intermit Wheeze   Rx : albuterol    S/p Rt shoulder / bilateral hip / left knee arthroplasty                Past Medical History:  Past Medical History:   Diagnosis Date    Abnormal nuclear stress test 07/2023    Anticoagulant long-term use     ASA 81 mg    Arthritis     Cataract     OU    Chronic low back pain     Complete rupture of rotator cuff 02/08/2011    DDD (degenerative disc disease), lumbar 07/08/2015    Degeneration of lumbar or lumbosacral intervertebral disc 09/09/2015    ED (erectile dysfunction)     GERD (gastroesophageal reflux disease)     Hypertension     Hypothyroidism, unspecified     Lumbar pseudoarthrosis  06/26/2017    Lumbar stenosis 06/26/2017    Obesity     PSVT (paroxysmal supraventricular tachycardia) 07/2023    SOB (shortness of breath) 07/2023    Spondylosis of lumbar region without myelopathy or radiculopathy 07/08/2015    Squamous cell carcinoma of skin     Stroke 1997    basal ganglia, left sided weakness, resolved    Testicular hypofunction     Thoracic or lumbosacral neuritis or radiculitis 07/08/2015       Past Surgical History:  Past Surgical History:   Procedure Laterality Date    ANGIOGRAM, CORONARY, WITH LEFT HEART CATHETERIZATION  07/13/2023    Procedure: Left Heart Cath;  Surgeon: Salvador Eugene MD;  Location: [facility] CATH;  Service: Cardiology;;    APPENDECTOMY      ARTHROPLASTY OF SHOULDER Right 03/22/2022    Procedure: ARTHROPLASTY, SHOULDER BRENNAN RIGHT SHOULDER HUMERAL HEAD RESURFACING;  Surgeon: Frankie Joya MD;  Location: [facility] OR;  Service: Orthopedics;  Laterality: Right;    BACK SURGERY      4- back surgery    CAUDAL EPIDURAL STEROID INJECTION N/A 12/16/2021    Procedure: Injection-steroid-epidural-caudal;  Surgeon: Aram Terrell MD;  Location: [facility] OR;  Service: Pain Management;  Laterality: N/A;    CAUDAL EPIDURAL STEROID INJECTION N/A 02/02/2022    Procedure: INJECTION, STEROID, SPINE, EPIDURAL, CAUDAL;  Surgeon: Aram Terrell MD;  Location: [facility] OR;  Service: Pain Management;  Laterality: N/A;    CAUDAL EPIDURAL STEROID INJECTION N/A 07/22/2022    Procedure: Injection-steroid-epidural-caudal;  Surgeon: Aram Terrell MD;  Location: [facility] OR;  Service: Pain Management;  Laterality: N/A;  Caudal CARLOS     CAUDAL EPIDURAL STEROID INJECTION N/A 01/20/2023    Procedure: Injection-steroid-epidural-caudal;  Surgeon: Aram Terrell MD;  Location: [facility] OR;  Service: Pain Management;  Laterality: N/A;  caudal ( melinda)    CAUDAL EPIDURAL STEROID INJECTION N/A 04/21/2023    Procedure: Injection-steroid-epidural-caudal;  Surgeon: Aram Terrell MD;  Location: [facility] OR;  Service: Pain Management;  Laterality: N/A;   caudal    CAUDAL EPIDURAL STEROID INJECTION N/A 07/11/2023    Procedure: Injection-steroid-epidural-caudal;  Surgeon: Aram Terrell MD;  [facility];  Service: Pain Management;  Laterality: N/A;  caudal    CAUDAL EPIDURAL STEROID INJECTION N/A 1/9/2024    Procedure: Injection-steroid-epidural-caudal;  Surgeon: Aram Terrell MD;  [facility];  Service: Anesthesiology;  Laterality: N/A;  caudal    CAUDAL EPIDURAL STEROID INJECTION N/A 6/4/2024    Procedure: Injection-steroid-epidural-caudal;  Surgeon: Aram Terrell MD;  [facility];  Service: Anesthesiology;  Laterality: N/A;    COLONOSCOPY  07/12/2004    CHILO.   Redundant tortuous colon, otherwise normal.    COLONOSCOPY N/A 02/25/2019    Procedure: COLONOSCOPY;  Surgeon: Gilbert Rodriguez Jr., MD;  Location: [facility] ENDO;  Service: Endoscopy;  Laterality: N/A;    CORONARY ANGIOGRAPHY N/A 07/13/2023    Procedure: ANGIOGRAM, CORONARY ARTERY;  Surgeon: Salvador Eugene MD;  Location: [facility] CATH;  Service: Cardiology;  Laterality: N/A;    CORONARY ARTERY BYPASS GRAFT (CABG) N/A 7/19/2023    Procedure: CORONARY ARTERY BYPASS GRAFT (CABG)- x 4;  Surgeon: Sandrine Wagner MD;  Location: [facility] OR;  Service: Cardiothoracic;  Laterality: N/A;    ENDOSCOPIC HARVEST OF VEIN Left 7/19/2023    Procedure: HARVEST-VEIN-ENDOVASCULAR;  Surgeon: Sandrine Wagner MD;  Location: [facility] OR;  Service: Cardiothoracic;  Laterality: Left;    Epidural steroid injection      Pain management    EPIDURAL STEROID INJECTION N/A 10/24/2024    Procedure: Injection, Steroid, Epidural;  Surgeon: Aram Terrell MD;  Location: [facility] OR;  Service: Pain Management;  Laterality: N/A;    ESOPHAGOGASTRODUODENOSCOPY  07/12/2004    CHILO.    EXCLUSION, LEFT ATRIAL APPENDAGE, OPEN, AS PART OF OPEN CHEST SURGERY N/A 7/19/2023    Procedure: EXCLUSION, LEFT ATRIAL APPENDAGE, OPEN, AS PART OF OPEN CHEST SURGERY;  Surgeon: Sandrine Wagner MD;  Location: [facility] OR;  Service: Cardiothoracic;  Laterality: N/A;     FRACTURE SURGERY Left     wrist / forearm, total of 5    INSERTION OF DORSAL COLUMN NERVE STIMULATOR FOR TRIAL N/A 2019    Procedure: INSERTION, NEUROSTIMULATOR, SPINAL CORD, DORSAL COLUMN, FOR TRIAL;  Surgeon: Evan Lyles MD;  Location: [facility] OR;  Service: Pain Management;  Laterality: N/A;    JOINT REPLACEMENT  2013    ( rt hip ), left hip     JOINT REPLACEMENT Left     total knee    KNEE ARTHROSCOPY W/ DEBRIDEMENT      bilateral knees , total of six    KNEE SURGERY      LAMINECTOMY USING MINIMALLY INVASIVE TECHNIQUE N/A 2020    Procedure: LAMINECTOMY, SPINE, MINIMALLY INVASIVE /  PLACEMENT OF SPINAL CORD STIMULATOR;  Surgeon: Darlene Max MD;  Location: [facility] [place] OR;  Service: Neurosurgery;  Laterality: N/A;    Nervbe Block injection      Pain management    SPINAL CORD STIMULATOR IMPLANT      TOTAL SHOULDER ARTHROPLASTY Right 2022    Dr Joya    TOTAL THYROIDECTOMY Bilateral 2022    Dr Mendoza       Family History:  Family History   Problem Relation Name Age of Onset    Hypertension Father      Heart disease Father      Drug abuse Daughter      Hypertension Son      Lung disease Sister      COPD Sister      Hypertension Sister      Diverticulitis Sister      Gout Sister      Kidney disease Sister      No Known Problems Mother      Eczema Neg Hx      Lupus Neg Hx      Psoriasis Neg Hx      Melanoma Neg Hx         Social History:  Social History     Socioeconomic History    Marital status:    Tobacco Use    Smoking status: Former     Current packs/day: 0.00     Average packs/day: 1 pack/day for 30.0 years (30.0 ttl pk-yrs)     Types: Cigarettes     Start date: 8/3/1963     Quit date: 1992     Years since quittin.2     Passive exposure: Past    Smokeless tobacco: Never   Substance and Sexual Activity    Alcohol use: Yes     Comment: On occasion    Drug use: Yes     Types: Oxycodone, Morphine     Social Drivers of Health     Financial Resource Strain: Low Risk   (10/17/2023)    Overall Financial Resource Strain (CARDIA)     Difficulty of Paying Living Expenses: Not hard at all   Recent Concern: Financial Resource Strain - Medium Risk (8/23/2023)    Overall Financial Resource Strain (CARDIA)     Difficulty of Paying Living Expenses: Somewhat hard   Food Insecurity: No Food Insecurity (8/4/2024)    Received from [facility]    Hunger Vital Sign     Worried About Running Out of Food in the Last Year: Never true     Ran Out of Food in the Last Year: Never true   Transportation Needs: No Transportation Needs (8/4/2024)    Received from [facility]    PRAPARE - Transportation     Lack of Transportation (Medical): No     Lack of Transportation (Non-Medical): No   Physical Activity: Sufficiently Active (8/4/2024)    Received from [facility]    Exercise Vital Sign     Days of Exercise per Week: 5 days     Minutes of Exercise per Session: 40 min   Stress: No Stress Concern Present (8/4/2024)    Received from [facility]    [employer] - Occupational Stress Questionnaire     Feeling of Stress : Not at all   Housing Stability: Low Risk  (8/4/2024)    Received from [facility]    Housing Stability Vital Sign     Unable to Pay for Housing in the Last Year: No     Number of Places Lived in the Last Year: 1     Unstable Housing in the Last Year: No       Medications:  Current Outpatient Medications on File Prior to Visit   Medication Sig Dispense Refill    albuterol (VENTOLIN HFA) 90 mcg/actuation inhaler Inhale 2 puffs into the lungs every 6 (six) hours as needed for Wheezing or Shortness of Breath (cough). Rescue 18 g 11    aspirin 81 MG Chew Take 81 mg by mouth once daily.      atorvastatin (LIPITOR) 40 MG tablet Take 1 tablet (40 mg total) by mouth every evening. 90 tablet 1    buPROPion (WELLBUTRIN XL) 150 MG TB24 tablet TAKE 1 TABLET EVERY DAY 90 tablet 3    calcitRIOL (ROCALTROL) 0.5 MCG Cap TAKE 1 CAPSULE ONE TIME DAILY 90 capsule 3    furosemide  (LASIX) 40 MG tablet Take 1 tablet (40 mg total) by mouth once daily. 90 tablet 1    gabapentin (NEURONTIN) 300 MG capsule Take 1 capsule (300 mg total) by mouth 3 (three) times daily. 90 capsule 5    levothyroxine (SYNTHROID) 112 MCG tablet Take 1 tablet (112 mcg total) by mouth before breakfast. 180 tablet 3    losartan (COZAAR) 50 MG tablet Take 1 tablet (50 mg total) by mouth once daily. Take additional 25 mg daily if bp is 140 or above 90 tablet 3    metoprolol succinate (TOPROL-XL) 25 MG 24 hr tablet TAKE ONE-HALF TABLET (12.5 MG) BY MOUTH EVERY DAY 45 tablet 0    [START ON 4/16/2025] morphine (MS CONTIN) 15 MG 12 hr tablet Take 1 tablet (15 mg total) by mouth 2 (two) times daily. 60 tablet 0    multivitamin (THERAGRAN) per tablet Take 1 tablet by mouth once daily.      mupirocin (BACTROBAN) 2 % ointment Apply topically 3 (three) times daily. 22 g 0    naloxone (NARCAN) 0.4 mg/mL injection Inject 1 mL (0.4 mg total) into the vein as needed (overdose). 2 mL 5    omeprazole (PRILOSEC) 40 MG capsule Take one capsule by mouth daily 90 capsule 3    [START ON 4/7/2025] oxyCODONE-acetaminophen (PERCOCET)  mg per tablet Take 1 tablet by mouth every 6 (six) hours as needed for Pain. 120 tablet 0    tamsulosin (FLOMAX) 0.4 mg Cap Take 1 capsule (0.4 mg total) by mouth every evening. 90 capsule 3    tiZANidine 4 mg Cap Take 4 mg by mouth 3 (three) times daily as needed (pain). 90 capsule 3    UNABLE TO FIND Take by mouth once daily. Vitafusion multivites gummies       Current Facility-Administered Medications on File Prior to Visit   Medication Dose Route Frequency Provider Last Rate Last Admin    diphenhydrAMINE injection 12.5 mg  12.5 mg Intravenous Once PRN Enrique Rosales MD        electrolyte-S (ISOLYTE)   Intravenous Continuous Enrique Rosales MD        HYDROmorphone (PF) injection 0.2 mg  0.2 mg Intravenous Q5 Min PRN Sassone, Nunez E., MD        HYDROmorphone (PF) injection 0.2 mg  0.2 mg  Intravenous Q5 Min PRN Enrique Rosales MD        lactated ringers infusion   Intravenous Once PRN Aram Terrell MD        lactated ringers infusion  10 mL/hr Intravenous Continuous Enrique Rosales MD   Stopped at 07/19/23 0959    lactated ringers infusion   Intravenous Continuous Aram Terrell MD 25 mL/hr at 07/11/23 1012 New Bag at 07/11/23 1012    lactated ringers infusion   Intravenous Continuous Aram Terrell MD 25 mL/hr at 01/09/24 1014 New Bag at 01/09/24 1014    lorazepam injection 0.25 mg  0.25 mg Intravenous Once PRN Enrique Rosales MD        prochlorperazine injection Soln 5 mg  5 mg Intravenous Q30 Min PRN Enrique Rosales MD        sodium chloride 0.9% flush 3 mL  3 mL Intravenous Q8H Enrique Rosales MD           Allergies:  Xyzal [levocetirizine]    Immunizations:  Immunization History   Administered Date(s) Administered    COVID-19, MRNA, LN-S, PF (Pfizer) (Gray Cap) 07/25/2022    COVID-19, MRNA, LN-S, PF (Pfizer) (Purple Cap) 02/09/2021, 03/02/2021, 10/15/2021    COVID-19, mRNA, LNP-S, PF (Moderna) Ages 12+ 12/18/2023    Influenza 11/05/2010, 09/22/2015    Influenza (FLUAD) - Quadrivalent - Adjuvanted - PF *Preferred* (65+) 09/15/2020, 09/15/2020, 09/15/2020, 10/05/2021, 09/14/2022, 09/23/2023    Influenza - Trivalent - Afluria, Fluzone MDV 11/05/2010, 10/20/2019    Influenza - Trivalent - Fluad - Adjuvanted - PF (65 years and older 10/20/2019, 10/20/2019, 10/21/2024    Influenza - Trivalent - Fluzone High Dose - PF (65 years and older) 01/14/2014, 01/14/2014, 12/05/2014, 10/17/2016, 08/31/2017, 08/31/2017, 10/16/2018    Pneumococcal Conjugate - 13 Valent 11/01/2015, 10/20/2019    Pneumococcal Conjugate - 20 Valent 10/21/2024    Pneumococcal Polysaccharide - 23 Valent 01/14/2014, 01/14/2014, 10/17/2016    RSVpreF (Arexvy) 12/18/2023    Rsv, Bivalent, Rsvpref (Abrysvo) 10/21/2024    Tdap 10/14/2015    Zoster Recombinant 07/06/2022, 09/14/2022       Review of Systems:  Review  "of Systems   All other systems reviewed and are negative.      Objective:    Vitals:  Vitals:    03/31/25 0853   BP: 138/88   Pulse: 74   SpO2: 96%   Weight: 104.3 kg (229 lb 15 oz)   Height: 5' 7" (1.702 m)   PainSc:   7       Physical Exam  Vitals reviewed.   Constitutional:       General: He is not in acute distress.  HENT:      Head: Normocephalic and atraumatic.   Eyes:      Pupils: Pupils are equal, round, and reactive to light.   Cardiovascular:      Rate and Rhythm: Normal rate and regular rhythm.      Heart sounds: No murmur heard.     No friction rub.   Pulmonary:      Effort: Pulmonary effort is normal.      Breath sounds: Normal breath sounds.   Abdominal:      General: Bowel sounds are normal. There is no distension.      Palpations: Abdomen is soft.      Tenderness: There is no abdominal tenderness.   Musculoskeletal:      Cervical back: Neck supple.      Comments: Left lower extremity with redness, swelling, tender to palpation along the posterior on medial side of the lower leg.  A proximally 1 in greater in circumference left compared to right   Skin:     General: Skin is warm and dry.      Findings: No rash.   Psychiatric:         Behavior: Behavior normal.             Dagoberto Monsalve MD  Family Medicine        "

## 2025-04-01 ENCOUNTER — RESULTS FOLLOW-UP (OUTPATIENT)
Dept: FAMILY MEDICINE | Facility: CLINIC | Age: 80
End: 2025-04-01

## 2025-04-04 ENCOUNTER — OFFICE VISIT (OUTPATIENT)
Dept: FAMILY MEDICINE | Facility: CLINIC | Age: 80
End: 2025-04-04
Payer: MEDICARE

## 2025-04-04 VITALS
HEIGHT: 67 IN | OXYGEN SATURATION: 95 % | HEART RATE: 79 BPM | BODY MASS INDEX: 36.09 KG/M2 | DIASTOLIC BLOOD PRESSURE: 86 MMHG | WEIGHT: 229.94 LBS | SYSTOLIC BLOOD PRESSURE: 134 MMHG

## 2025-04-04 DIAGNOSIS — L03.116 CELLULITIS OF LEFT LOWER EXTREMITY: Primary | ICD-10-CM

## 2025-04-04 PROCEDURE — 99999 PR PBB SHADOW E&M-EST. PATIENT-LVL IV: CPT | Mod: PBBFAC,,,

## 2025-04-04 RX ORDER — DOXYCYCLINE HYCLATE 100 MG
100 TABLET ORAL 2 TIMES DAILY
Qty: 10 TABLET | Refills: 0 | Status: SHIPPED | OUTPATIENT
Start: 2025-04-04 | End: 2025-04-09

## 2025-04-04 NOTE — PROGRESS NOTES
[facility]  [address]  [place] [address]    Subjective    Chief Complaint:   Chief Complaint   Patient presents with    Follow-up     Patient states he is feeling ok        History of Present Illness:     Sam Pete is a(n) 79 y.o. male with past medical history as noted below who presents to the clinic today for follow up.    Patient was seen in clinic on 03/31/2025 for left leg redness and swelling. Venous US negative for DVT. Patient has been taking doxycycline 100 mg BID x 4 days and reports a mild improvement in pain and redness. No fever, chills, chest pain or shortness of breath.         Coronary artery disease, dyslipidemia, aortic atherosclerosis   Status post CABG (7/2023) x 4    Cardiology- MD Valorie   Rx-atorvastatin 40 mg, beta-blocker, Aspirin     H/o smoking   Quit : 1992     Carotid artery disease, history of CVA (1997)   Residual right upper extremity tremor     History of paroxysmal supraventricular tachycardia, Paroxysmal Atrial Fib   No symptoms  Rx : beta blocker     Tricuspid regurgitation  Per Cards      Chronic edema   Rx-Lasix 40 mg QD (left > right)  + daily compression stocking      Essential hypertension   Rx-losartan 50 mg, metoprolol 12.5 mg  Home : sys <140      Chronic low back pain  Rx-gabapentin 300 mg t.i.d., MS Contin 15 mg b.i.d., Narcan, Percocet 10 mg q.4 hours p.r.n., tizanidine  Pain MD  -  MD Trupti (meds)    +    Aram Terrell MD (CARLOS)    PMR : MD Diallo (no f/u planned)   Neurosurgery- Darlene Max MD (pain stimulator)   Prev tx : Surgeries, PT (Francos), CARLOS, Spinal Cord Stimulator in place      Idiopathic neuropathy  Rx-gabapentin 300 mg t.i.d.     Depression / Anxiety   Rx-Wellbutrin 150 mg     History squamous cell carcinoma  Dermatology-Joselyn Foss MD     Iatrogenic Hypothyroidism  Rx-levothyroxine 224 mcg  S/p total thyroidectomy : non cancerous nodules      BPH, ED, Hypogonadism      Chronic intermit Wheeze    Rx : albuterol     S/p Rt shoulder / bilateral hip / left knee arthroplasty      Problem List: Problem List[1]    Current Outpatient Medications:   Current Outpatient Medications   Medication Instructions    albuterol (VENTOLIN HFA) 90 mcg/actuation inhaler 2 puffs, Inhalation, Every 6 hours PRN, Rescue    aspirin 81 mg, Daily    atorvastatin (LIPITOR) 40 mg, Oral, Nightly    buPROPion (WELLBUTRIN XL) 150 MG TB24 tablet TAKE 1 TABLET EVERY DAY    calcitRIOL (ROCALTROL) 0.5 MCG Cap TAKE 1 CAPSULE ONE TIME DAILY    doxycycline (MONODOX) 100 mg, Oral, 2 times daily    furosemide (LASIX) 40 mg, Oral, Daily    gabapentin (NEURONTIN) 300 mg, Oral, 3 times daily    levothyroxine (SYNTHROID) 112 mcg, Oral, Before breakfast    losartan (COZAAR) 50 mg, Oral, Daily, Take additional 25 mg daily if bp is 140 or above    metoprolol succinate (TOPROL-XL) 25 MG 24 hr tablet TAKE ONE-HALF TABLET (12.5 MG) BY MOUTH EVERY DAY    [START ON 4/16/2025] morphine (MS CONTIN) 15 mg, Oral, 2 times daily    multivitamin (THERAGRAN) per tablet 1 tablet, Daily    mupirocin (BACTROBAN) 2 % ointment Topical (Top), 3 times daily    naloxone (NARCAN) 0.4 mg, Intravenous, As needed (PRN)    omeprazole (PRILOSEC) 40 MG capsule Take one capsule by mouth daily    [START ON 4/7/2025] oxyCODONE-acetaminophen (PERCOCET)  mg per tablet 1 tablet, Oral, Every 6 hours PRN    tamsulosin (FLOMAX) 0.4 mg, Oral, Nightly    tiZANidine 4 mg, Oral, 3 times daily PRN    UNABLE TO FIND Daily       Surgical History:   Past Surgical History:   Procedure Laterality Date    ANGIOGRAM, CORONARY, WITH LEFT HEART CATHETERIZATION  07/13/2023    Procedure: Left Heart Cath;  Surgeon: Salvador Eugene MD;  Location: [facility] CATH;  Service: Cardiology;;    APPENDECTOMY      ARTHROPLASTY OF SHOULDER Right 03/22/2022    Procedure: ARTHROPLASTY, SHOULDER BRENNAN RIGHT SHOULDER HUMERAL HEAD RESURFACING;  Surgeon: Frankie Joya MD;  Location: [facility] OR;  Service: Orthopedics;   Laterality: Right;    BACK SURGERY      4- back surgery    CAUDAL EPIDURAL STEROID INJECTION N/A 12/16/2021    Procedure: Injection-steroid-epidural-caudal;  Surgeon: Aram Terrell MD;  Location: [facility] OR;  Service: Pain Management;  Laterality: N/A;    CAUDAL EPIDURAL STEROID INJECTION N/A 02/02/2022    Procedure: INJECTION, STEROID, SPINE, EPIDURAL, CAUDAL;  Surgeon: Aram Terrell MD;  Location: [facility] OR;  Service: Pain Management;  Laterality: N/A;    CAUDAL EPIDURAL STEROID INJECTION N/A 07/22/2022    Procedure: Injection-steroid-epidural-caudal;  Surgeon: Aram Terrell MD;  Location: [facility] OR;  Service: Pain Management;  Laterality: N/A;  Caudal CARLOS     CAUDAL EPIDURAL STEROID INJECTION N/A 01/20/2023    Procedure: Injection-steroid-epidural-caudal;  Surgeon: Aram Terrell MD;  Location: [facility] OR;  Service: Pain Management;  Laterality: N/A;  caudal ( melinda)    CAUDAL EPIDURAL STEROID INJECTION N/A 04/21/2023    Procedure: Injection-steroid-epidural-caudal;  Surgeon: Aram Terrell MD;  Location: [facility] OR;  Service: Pain Management;  Laterality: N/A;  caudal    CAUDAL EPIDURAL STEROID INJECTION N/A 07/11/2023    Procedure: Injection-steroid-epidural-caudal;  Surgeon: Aram Terrell MD;  Location: [facility] OR;  Service: Pain Management;  Laterality: N/A;  caudal    CAUDAL EPIDURAL STEROID INJECTION N/A 1/9/2024    Procedure: Injection-steroid-epidural-caudal;  Surgeon: Aram Terrell MD;  Location: [facility] OR;  Service: Anesthesiology;  Laterality: N/A;  caudal    CAUDAL EPIDURAL STEROID INJECTION N/A 6/4/2024    Procedure: Injection-steroid-epidural-caudal;  Surgeon: Aram Terrell MD;  Location: [facility] OR;  Service: Anesthesiology;  Laterality: N/A;    COLONOSCOPY  07/12/2004    CHILO.   Redundant tortuous colon, otherwise normal.    COLONOSCOPY N/A 02/25/2019    Procedure: COLONOSCOPY;  Surgeon: Gilbert Rodriguez Jr., MD;  Location: [facility];  Service: Endoscopy;  Laterality: N/A;    CORONARY ANGIOGRAPHY N/A 07/13/2023     Procedure: ANGIOGRAM, CORONARY ARTERY;  Surgeon: Salvador Eugene MD;  Location: [facility] CATH;  Service: Cardiology;  Laterality: N/A;    CORONARY ARTERY BYPASS GRAFT (CABG) N/A 7/19/2023    Procedure: CORONARY ARTERY BYPASS GRAFT (CABG)- x 4;  Surgeon: Sandrine Wagner MD;  Location: [facility] OR;  Service: Cardiothoracic;  Laterality: N/A;    ENDOSCOPIC HARVEST OF VEIN Left 7/19/2023    Procedure: HARVEST-VEIN-ENDOVASCULAR;  Surgeon: Sandrine Wagner MD;  Location: [facility] OR;  Service: Cardiothoracic;  Laterality: Left;    Epidural steroid injection      Pain management    EPIDURAL STEROID INJECTION N/A 10/24/2024    Procedure: Injection, Steroid, Epidural;  Surgeon: Aram Terrell MD;  Location: [facility] OR;  Service: Pain Management;  Laterality: N/A;    ESOPHAGOGASTRODUODENOSCOPY  07/12/2004    CHILO.    EXCLUSION, LEFT ATRIAL APPENDAGE, OPEN, AS PART OF OPEN CHEST SURGERY N/A 7/19/2023    Procedure: EXCLUSION, LEFT ATRIAL APPENDAGE, OPEN, AS PART OF OPEN CHEST SURGERY;  Surgeon: Sandrine Wagner MD;  Location: [facility] OR;  Service: Cardiothoracic;  Laterality: N/A;    FRACTURE SURGERY Left     wrist / forearm, total of 5    INSERTION OF DORSAL COLUMN NERVE STIMULATOR FOR TRIAL N/A 12/12/2019    Procedure: INSERTION, NEUROSTIMULATOR, SPINAL CORD, DORSAL COLUMN, FOR TRIAL;  Surgeon: Evan Lyles MD;  Location: [facility] OR;  Service: Pain Management;  Laterality: N/A;    JOINT REPLACEMENT  02/06/2013    ( rt hip 2007), left hip     JOINT REPLACEMENT Left     total knee    KNEE ARTHROSCOPY W/ DEBRIDEMENT      bilateral knees , total of six    KNEE SURGERY      LAMINECTOMY USING MINIMALLY INVASIVE TECHNIQUE N/A 02/12/2020    Procedure: LAMINECTOMY, SPINE, MINIMALLY INVASIVE /  PLACEMENT OF SPINAL CORD STIMULATOR;  Surgeon: Darlene Max MD;  Location: [facility] OR;  Service: Neurosurgery;  Laterality: N/A;    Nervbe Block injection      Pain management    SPINAL CORD STIMULATOR IMPLANT      TOTAL SHOULDER ARTHROPLASTY Right 03/28/2022    Dr Joya     "TOTAL THYROIDECTOMY Bilateral 03/07/2022    Dr Mendoza       Family History:   Family History   Problem Relation Name Age of Onset    Hypertension Father      Heart disease Father      Drug abuse Daughter      Hypertension Son      Lung disease Sister      COPD Sister      Hypertension Sister      Diverticulitis Sister      Gout Sister      Kidney disease Sister      No Known Problems Mother      Eczema Neg Hx      Lupus Neg Hx      Psoriasis Neg Hx      Melanoma Neg Hx         Allergies:   Review of patient's allergies indicates:   Allergen Reactions    Xyzal [levocetirizine] Other (See Comments)     Knocked him out        Tobacco Status:   Tobacco Use: Medium Risk (4/4/2025)    Patient History     Smoking Tobacco Use: Former     Smokeless Tobacco Use: Never     Passive Exposure: Past       Sexual Activity:   Social History     Substance and Sexual Activity   Sexual Activity Not on file       Alcohol Use:   Social History     Substance and Sexual Activity   Alcohol Use Yes    Comment: On occasion         Objective       Vitals:    04/04/25 0949   Weight: 104.3 kg (229 lb 15 oz)   Height: 5' 7" (1.702 m)       Review of Systems   Constitutional:  Negative for chills and fever.   Respiratory:  Negative for cough and shortness of breath.    Cardiovascular:  Positive for leg swelling. Negative for chest pain.   Skin:  Positive for rash.       Physical Exam  Constitutional:       General: He is not in acute distress.     Appearance: Normal appearance.   HENT:      Head: Normocephalic and atraumatic.   Cardiovascular:      Rate and Rhythm: Normal rate and regular rhythm.      Heart sounds: Normal heart sounds. No murmur heard.  Pulmonary:      Effort: Pulmonary effort is normal. No respiratory distress.      Breath sounds: Normal breath sounds. No wheezing.   Musculoskeletal:      Left lower leg: Edema present.   Skin:     General: Skin is warm.      Findings: Erythema present.   Neurological:      Mental Status: He is " alert and oriented to person, place, and time.   Psychiatric:         Behavior: Behavior normal.           Assessment and Plan:    1. Cellulitis of left lower extremity  -     doxycycline (VIBRA-TABS) 100 MG tablet; Take 1 tablet (100 mg total) by mouth 2 (two) times daily. for 5 days  Dispense: 10 tablet; Refill: 0        Visit summary:    Sam Pete presented today for follow up.    Some improvement in appearance of the left leg noted, continue doxycycline 100 mg x 5 more days. F/u with persistent or worsening symptoms.       Patient was instructed to report to ER if symptoms become severe.    Patient discussed with Dr. Monsalve.    Josefina Huitron PA-C    This note was created partially with voice dictation software and is prone to errors. This note has been reviewed by me but some errors are inevitable.          [1]   Patient Active Problem List  Diagnosis    Benign prostatic hyperplasia with lower urinary tract symptoms    Impotence of organic origin    Spinal stenosis in cervical region    Spinal stenosis, lumbar region, without neurogenic claudication    Hereditary and idiopathic peripheral neuropathy    Chronic pain low back and neck with spinal stenosis, djd left arm.  pain contract renewed 9/10/14    Essential (primary) hypertension    Facet arthropathy, lumbar    Atherosclerosis of aorta    PAF (paroxysmal atrial fibrillation)    Lumbar radiculopathy    Chronic pain syndrome    Chronic back pain    Other hyperlipidemia    Gastroesophageal reflux disease without esophagitis    At risk for falls    Primary osteoarthritis of right shoulder    Long term (current) use of anticoagulants    Severe obesity (BMI 35.0-39.9) with comorbidity    REAL (iron deficiency anemia)    Nonrheumatic tricuspid valve regurgitation    Mild pulmonary hypertension    Coronary artery disease involving native coronary artery of native heart without angina pectoris    H/O: CVA (cerebrovascular accident)    S/P CABG (coronary  artery bypass graft)    Acquired hypothyroidism    Carotid artery plaque, bilateral    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

## 2025-04-13 NOTE — TELEPHONE ENCOUNTER
----- Message from Vivian Crystal PharmD sent at 12/30/2022  6:16 PM CST -----  Regarding: Percocet RX - Partial Fill  Hello,    Today (12/30/22) Mr Sam Pete came to  his Percocet RX. Due to FDA regulations, we have hit our threshold for oxycodone for the month of December, leaving me with 52 tablets of Percocet 10/325. His RX was written for 120 tablets. He elected to take the 52 I had, voiding the remainder of the RX. Just to clarify: the patient picked up 52 tablets of Percocet on 12/30/22 for a 9 day supply, the remainder of the RX is voided.    He may reach out to you for the remainder of the RX which would require a new RX from Dr Montalvo. This would not be an early refill due to the shortened quantity he picked up today.    If you have any questions, please feel free to call myself or my manager, Kitty, and we would be happy to clarify.    Thank you for your help,    CAS Crystal, PharmD  [facility]  [phone]     STABLE  DENIES ANY CP, INDIGESTION, OR COUGH  NOTES NO MELENA OR HEMATOCHEZIA  NO HEMATEMESIS  COMPLIANT WITH MEDICATION  NO CONCERNS    - CONTINUE CURRENT TREATMENT PLAN  - MEDICATION AS PRESCRIBED  - AVOID CAFFEINE, ALCOHOL OR SMOKING

## 2025-04-23 DIAGNOSIS — I10 ESSENTIAL (PRIMARY) HYPERTENSION: ICD-10-CM

## 2025-04-23 DIAGNOSIS — E78.49 OTHER HYPERLIPIDEMIA: ICD-10-CM

## 2025-04-23 DIAGNOSIS — R07.9 RIGHT-SIDED CHEST PAIN: ICD-10-CM

## 2025-04-23 RX ORDER — METOPROLOL SUCCINATE 25 MG/1
12.5 TABLET, EXTENDED RELEASE ORAL
Qty: 45 TABLET | Refills: 1 | Status: SHIPPED | OUTPATIENT
Start: 2025-04-23

## 2025-04-23 RX ORDER — LOSARTAN POTASSIUM 50 MG/1
TABLET ORAL
Qty: 90 TABLET | Refills: 1 | Status: SHIPPED | OUTPATIENT
Start: 2025-04-23

## 2025-04-24 ENCOUNTER — OFFICE VISIT (OUTPATIENT)
Dept: FAMILY MEDICINE | Facility: CLINIC | Age: 80
End: 2025-04-24
Payer: MEDICARE

## 2025-04-24 ENCOUNTER — LAB VISIT (OUTPATIENT)
Dept: LAB | Facility: HOSPITAL | Age: 80
End: 2025-04-24
Payer: MEDICARE

## 2025-04-24 VITALS
DIASTOLIC BLOOD PRESSURE: 82 MMHG | OXYGEN SATURATION: 96 % | HEART RATE: 80 BPM | SYSTOLIC BLOOD PRESSURE: 128 MMHG | WEIGHT: 229.25 LBS | BODY MASS INDEX: 35.98 KG/M2 | HEIGHT: 67 IN

## 2025-04-24 DIAGNOSIS — G60.9 HEREDITARY AND IDIOPATHIC PERIPHERAL NEUROPATHY: ICD-10-CM

## 2025-04-24 DIAGNOSIS — R55 SYNCOPE, UNSPECIFIED SYNCOPE TYPE: ICD-10-CM
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
## 2025-04-24 DIAGNOSIS — G89.4 CHRONIC PAIN SYNDROME: ICD-10-CM

## 2025-04-24 DIAGNOSIS — I65.23 CAROTID ARTERY PLAQUE, BILATERAL: ICD-10-CM

## 2025-04-24 DIAGNOSIS — I25.10 CORONARY ARTERY DISEASE INVOLVING NATIVE CORONARY ARTERY OF NATIVE HEART WITHOUT ANGINA PECTORIS: ICD-10-CM

## 2025-04-24 DIAGNOSIS — I10 ESSENTIAL (PRIMARY) HYPERTENSION: ICD-10-CM

## 2025-04-24 DIAGNOSIS — Z91.81 AT RISK FOR FALLS: ICD-10-CM

## 2025-04-24 DIAGNOSIS — M54.16 LUMBAR RADICULOPATHY: ICD-10-CM

## 2025-04-24 DIAGNOSIS — S40.022A ARM CONTUSION, LEFT, INITIAL ENCOUNTER: ICD-10-CM

## 2025-04-24 DIAGNOSIS — R55 SYNCOPE, UNSPECIFIED SYNCOPE TYPE: Primary | ICD-10-CM

## 2025-04-24 LAB
ABSOLUTE EOSINOPHIL (OHS): 0.25 K/UL
ABSOLUTE MONOCYTE (OHS): 0.67 K/UL (ref 0.3–1)
ABSOLUTE NEUTROPHIL COUNT (OHS): 4.71 K/UL (ref 1.8–7.7)
ALBUMIN SERPL BCP-MCNC: 4.2 G/DL (ref 3.5–5.2)
ALP SERPL-CCNC: 64 UNIT/L (ref 40–150)
ALT SERPL W/O P-5'-P-CCNC: 21 UNIT/L (ref 10–44)
ANION GAP (OHS): 14 MMOL/L (ref 8–16)
AST SERPL-CCNC: 28 UNIT/L (ref 11–45)
BASOPHILS # BLD AUTO: 0.04 K/UL
BASOPHILS NFR BLD AUTO: 0.5 %
BILIRUB SERPL-MCNC: 0.6 MG/DL (ref 0.1–1)
BUN SERPL-MCNC: 18 MG/DL (ref 8–23)
CALCIUM SERPL-MCNC: 9.6 MG/DL (ref 8.7–10.5)
CHLORIDE SERPL-SCNC: 98 MMOL/L (ref 95–110)
CO2 SERPL-SCNC: 30 MMOL/L (ref 23–29)
CREAT SERPL-MCNC: 1.2 MG/DL (ref 0.5–1.4)
ERYTHROCYTE [DISTWIDTH] IN BLOOD BY AUTOMATED COUNT: 14.5 % (ref 11.5–14.5)
GFR SERPLBLD CREATININE-BSD FMLA CKD-EPI: >60 ML/MIN/1.73/M2
GLUCOSE SERPL-MCNC: 99 MG/DL (ref 70–110)
HCT VFR BLD AUTO: 43.5 % (ref 40–54)
HGB BLD-MCNC: 13.5 GM/DL (ref 14–18)
IMM GRANULOCYTES # BLD AUTO: 0.01 K/UL (ref 0–0.04)
IMM GRANULOCYTES NFR BLD AUTO: 0.1 % (ref 0–0.5)
LYMPHOCYTES # BLD AUTO: 3.2 K/UL (ref 1–4.8)
MCH RBC QN AUTO: 28.7 PG (ref 27–31)
MCHC RBC AUTO-ENTMCNC: 31 G/DL (ref 32–36)
MCV RBC AUTO: 93 FL (ref 82–98)
NUCLEATED RBC (/100WBC) (OHS): 0 /100 WBC
PLATELET # BLD AUTO: 288 K/UL (ref 150–450)
PMV BLD AUTO: 9.1 FL (ref 9.2–12.9)
POTASSIUM SERPL-SCNC: 4.7 MMOL/L (ref 3.5–5.1)
PROT SERPL-MCNC: 8.3 GM/DL (ref 6–8.4)
RBC # BLD AUTO: 4.7 M/UL (ref 4.6–6.2)
RELATIVE EOSINOPHIL (OHS): 2.8 %
RELATIVE LYMPHOCYTE (OHS): 36 % (ref 18–48)
RELATIVE MONOCYTE (OHS): 7.5 % (ref 4–15)
RELATIVE NEUTROPHIL (OHS): 53.1 % (ref 38–73)
SODIUM SERPL-SCNC: 142 MMOL/L (ref 136–145)
WBC # BLD AUTO: 8.88 K/UL (ref 3.9–12.7)

## 2025-04-24 PROCEDURE — 85025 COMPLETE CBC W/AUTO DIFF WBC: CPT

## 2025-04-24 PROCEDURE — 93005 ELECTROCARDIOGRAM TRACING: CPT | Mod: S$GLB,,, | Performed by: NURSE PRACTITIONER

## 2025-04-24 PROCEDURE — 93010 ELECTROCARDIOGRAM REPORT: CPT | Mod: S$GLB,,, | Performed by: INTERNAL MEDICINE

## 2025-04-24 PROCEDURE — 3079F DIAST BP 80-89 MM HG: CPT | Mod: CPTII,S$GLB,, | Performed by: NURSE PRACTITIONER

## 2025-04-24 PROCEDURE — 3074F SYST BP LT 130 MM HG: CPT | Mod: CPTII,S$GLB,, | Performed by: NURSE PRACTITIONER

## 2025-04-24 PROCEDURE — 82040 ASSAY OF SERUM ALBUMIN: CPT

## 2025-04-24 PROCEDURE — 1160F RVW MEDS BY RX/DR IN RCRD: CPT | Mod: CPTII,S$GLB,, | Performed by: NURSE PRACTITIONER

## 2025-04-24 PROCEDURE — 3288F FALL RISK ASSESSMENT DOCD: CPT | Mod: CPTII,S$GLB,, | Performed by: NURSE PRACTITIONER

## 2025-04-24 PROCEDURE — 1126F AMNT PAIN NOTED NONE PRSNT: CPT | Mod: CPTII,S$GLB,, | Performed by: NURSE PRACTITIONER

## 2025-04-24 PROCEDURE — 36415 COLL VENOUS BLD VENIPUNCTURE: CPT | Mod: PN

## 2025-04-24 PROCEDURE — 1100F PTFALLS ASSESS-DOCD GE2>/YR: CPT | Mod: CPTII,S$GLB,, | Performed by: NURSE PRACTITIONER

## 2025-04-24 PROCEDURE — 99999 PR PBB SHADOW E&M-EST. PATIENT-LVL V: CPT | Mod: PBBFAC,,, | Performed by: NURSE PRACTITIONER

## 2025-04-24 PROCEDURE — 1159F MED LIST DOCD IN RCRD: CPT | Mod: CPTII,S$GLB,, | Performed by: NURSE PRACTITIONER

## 2025-04-24 PROCEDURE — 99214 OFFICE O/P EST MOD 30 MIN: CPT | Mod: S$GLB,,, | Performed by: NURSE PRACTITIONER

## 2025-04-24 NOTE — PROGRESS NOTES
THIS DOCUMENT WAS MADE IN PART WITH VOICE RECOGNITION SOFTWARE.  OCCASIONALLY THIS SOFTWARE WILL MISINTERPRET WORDS OR PHRASES.     Assessment and Plan:    Syncope, unspecified syncope type  -     Comprehensive Metabolic Panel; Future; Expected date: 04/24/2025  -     CBC Auto Differential; Future; Expected date: 04/24/2025  -     US Carotid Bilateral; Future; Expected date: 04/24/2025  -     IN OFFICE EKG 12-LEAD (to Muse)    Fall, initial encounter    Arm contusion, left, initial encounter    Carotid artery plaque, bilateral  -     US Carotid Bilateral; Future; Expected date: 04/24/2025    Coronary artery disease involving native coronary artery of native heart without angina pectoris    At risk for falls    Hereditary and idiopathic peripheral neuropathy    Essential (primary) hypertension    Chronic pain syndrome    Lumbar radiculopathy             Visit summary:      Assessed recent episodes of dizziness and syncope, considering potential causes such as dehydration, cardiovascular issues, or neurological factors.  Evaluated fall risk, particularly in light of recent incidents.  Considered potential relationship between symptoms and recent travel.  Reviewed previous diagnostic results, including normal head CT and carotid US from August.    PLAN SUMMARY:  - EKG ordered   -  will repeat Carotid ultrasound   - will check CBC and CMP  - advised on fall precautions  -advised patient on changing positions slowly and to stay well hydrated  - Follow-up with Dr. Monsalve in July as scheduled  - advised patient to schedule earlier follow up with cardiologist.   - emergent conditions/ER precautions discussed          Follow up for Follow-up with PCP- Dr. Monsalve as scheduled.   ______________________________________________________________________  Subjective:    History of Present Illness    CHIEF COMPLAINT:  - Patient presents with two recent episodes of dizziness and syncope, including one instance where he fell into a  bathtub.    HPI:  Patient reports two recent episodes of dizziness and syncope. The first episode occurred on Good Friday at 6:00 AM while he was in a camper in [place], [place]. He had gotten up to take his medicine and use the bathroom when he had a near syncopal event. He was able to lower himself into a chair using his cane. During this episode, he  felt off balance, took a second to regain balance. The second episode occurred a couple of days later (either Saturday or Sunday) in a motel room. He was walking from the bed to shave when he suddenly lost consciousness and ended up in the bathtub. He does not recall how he got into the tub and was only unconscious momentarily. He reports feeling fine now, but does have a bruise on his left upper arm from the fall into the tub.    He has noticed new onset dizziness when standing up quickly. He reports staying well-hydrated, drinking at least 4 fink a day, since a previous fainting episode on August 5th where dehydration was identified as a factor. During that earlier episode, he fell at 2:30 AM while walking from his recliner to the kitchen, resulting in 6 staples in the back of his head.    He has a history of a mini-stroke (TIA) many years ago and had quadruple bypass surgery 2 years ago. He was evaluated by a cardiologist about 6 weeks ago, before these recent episodes occurred.     He denies chest pain. He denies always feeling dizzy when getting up, stating it only happens if he gets up too quickly.        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    Cardiology- MD Valorie   Rx-atorvastatin 40 mg, beta-blocker, Aspirin     H/o smoking   Quit : 1992     Carotid artery disease,  history of CVA (1997)   Residual right upper extremity tremor     History of paroxysmal supraventricular tachycardia, Paroxysmal Atrial Fib   No symptoms  Rx : beta blocker     Tricuspid regurgitation  Per Cards      Chronic edema   Rx-Lasix 40 mg QD (left > right)  + daily compression stocking      Essential hypertension   Rx-losartan 50 mg, metoprolol 12.5 mg  Home : sys <140      Chronic low back pain  Rx-gabapentin 300 mg t.i.d., MS Contin 15 mg b.i.d., Narcan, Percocet 10 mg q.4 hours p.r.n., tizanidine  Pain MD  -  MD Trupti (meds)    +    Aram Terrell MD (CARLOS)    PMR : MD Diallo (no f/u planned)   Neurosurgery- Darlene Max MD (pain stimulator)   Prev tx : Surgeries, PT (Franc[facility]), CARLOS, Spinal Cord Stimulator in place      Idiopathic neuropathy  Rx-gabapentin 300 mg t.i.d.     Depression / Anxiety   Rx-Wellbutrin 150 mg     History squamous cell carcinoma  Dermatology-Joselyn Foss MD     Iatrogenic Hypothyroidism  Rx-levothyroxine 224 mcg  S/p total thyroidectomy : non cancerous nodules      BPH, ED, Hypogonadism      Chronic intermit Wheeze   Rx : albuterol     S/p Rt shoulder / bilateral hip / left knee arthroplasty        Medications:  Medications Ordered Prior to Encounter[1]    Review of Systems:  Review of Systems   Constitutional:  Negative for chills, fatigue and fever.   HENT:  Negative for congestion and rhinorrhea.    Respiratory:  Negative for cough and shortness of breath.    Cardiovascular:  Negative for chest pain, palpitations and leg swelling.   Gastrointestinal:  Negative for diarrhea, nausea and vomiting.   Musculoskeletal:  Positive for arthralgias and back pain.   Skin:  Negative for color change and rash.   Neurological:  Positive for dizziness. Negative for weakness, light-headedness and headaches.       Past Medical History:  Past Medical History:   Diagnosis Date    Abnormal nuclear stress test 07/2023    Anticoagulant long-term use     ASA 81 mg    Arthritis     Cataract     OU    Chronic  "low back pain     Complete rupture of rotator cuff 02/08/2011    DDD (degenerative disc disease), lumbar 07/08/2015    Degeneration of lumbar or lumbosacral intervertebral disc 09/09/2015    ED (erectile dysfunction)     GERD (gastroesophageal reflux disease)     Hypertension     Hypothyroidism, unspecified     Lumbar pseudoarthrosis 06/26/2017    Lumbar stenosis 06/26/2017    Obesity     PSVT (paroxysmal supraventricular tachycardia) 07/2023    SOB (shortness of breath) 07/2023    Spondylosis of lumbar region without myelopathy or radiculopathy 07/08/2015    Squamous cell carcinoma of skin     Stroke 1997    basal ganglia, left sided weakness, resolved    Testicular hypofunction     Thoracic or lumbosacral neuritis or radiculitis 07/08/2015       Objective:    Vitals:  Vitals:    04/24/25 1542   BP: 128/82   Pulse: 80   SpO2: 96%   Weight: 104 kg (229 lb 4.5 oz)   Height: 5' 7" (1.702 m)   PainSc: 0-No pain       Physical Exam  Vitals and nursing note reviewed.   Constitutional:       General: He is not in acute distress.     Appearance: He is obese.   HENT:      Head: Normocephalic and atraumatic.      Right Ear: Tympanic membrane normal.      Left Ear: Tympanic membrane normal.      Nose: Nose normal.      Mouth/Throat:      Mouth: Mucous membranes are moist.      Pharynx: Oropharynx is clear.   Eyes:      General: No scleral icterus.     Conjunctiva/sclera: Conjunctivae normal.   Neck:      Vascular: No carotid bruit.   Cardiovascular:      Rate and Rhythm: Normal rate and regular rhythm.      Heart sounds: Normal heart sounds. No murmur heard.  Pulmonary:      Effort: Pulmonary effort is normal. No respiratory distress.      Breath sounds: Normal breath sounds. No wheezing or rhonchi.   Abdominal:      Palpations: Abdomen is soft.   Musculoskeletal:      Cervical back: Neck supple.      Right lower leg: No edema.      Left lower leg: No edema.   Lymphadenopathy:      Cervical: No cervical adenopathy.   Skin:    "  General: Skin is warm and dry.      Findings: Bruising (Slight bruising noted to lateral aspect left arm without edema or tenderness) present.          Neurological:      Mental Status: He is alert and oriented to person, place, and time.   Psychiatric:         Mood and Affect: Mood normal.         Behavior: Behavior normal.         Thought Content: Thought content normal.         Data:  CMP  Sodium   Date Value Ref Range Status   01/31/2025 137 136 - 145 mmol/L Final     Potassium   Date Value Ref Range Status   01/31/2025 4.4 3.5 - 5.1 mmol/L Final     Chloride   Date Value Ref Range Status   01/31/2025 100 95 - 110 mmol/L Final     CO2   Date Value Ref Range Status   01/31/2025 25 23 - 29 mmol/L Final     Glucose   Date Value Ref Range Status   01/31/2025 105 70 - 110 mg/dL Final     BUN   Date Value Ref Range Status   01/31/2025 15 8 - 23 mg/dL Final     Creatinine   Date Value Ref Range Status   01/31/2025 1.1 0.5 - 1.4 mg/dL Final   01/31/2013 0.8 0.5 - 1.4 mg/dL Final     Calcium   Date Value Ref Range Status   01/31/2025 8.8 8.7 - 10.5 mg/dL Final   01/31/2013 9.2 8.7 - 10.5 mg/dL Final     Total Protein   Date Value Ref Range Status   01/31/2025 7.6 6.0 - 8.4 g/dL Final     Albumin   Date Value Ref Range Status   01/31/2025 3.8 3.5 - 5.2 g/dL Final     Total Bilirubin   Date Value Ref Range Status   01/31/2025 0.6 0.1 - 1.0 mg/dL Final     Comment:     For infants and newborns, interpretation of results should be based  on gestational age, weight and in agreement with clinical  observations.    Premature Infant recommended reference ranges:  Up to 24 hours.............<8.0 mg/dL  Up to 48 hours............<12.0 mg/dL  3-5 days..................<15.0 mg/dL  6-29 days.................<15.0 mg/dL       Alkaline Phosphatase   Date Value Ref Range Status   01/31/2025 68 40 - 150 U/L Final     AST   Date Value Ref Range Status   01/31/2025 22 10 - 40 U/L Final     ALT   Date Value Ref Range Status   01/31/2025  23 10 - 44 U/L Final     Anion Gap   Date Value Ref Range Status   01/31/2025 12 8 - 16 mmol/L Final   01/31/2013 9 5 - 15 meq/L Final     eGFR   Date Value Ref Range Status   01/31/2025 >60.0 >60 mL/min/1.73 m^2 Final          Medical history reviewed, Medications reconciled.          Mary Luke, EDGAR-C  Family Medicine           [1]   Current Outpatient Medications on File Prior to Visit   Medication Sig Dispense Refill    albuterol (VENTOLIN HFA) 90 mcg/actuation inhaler Inhale 2 puffs into the lungs every 6 (six) hours as needed for Wheezing or Shortness of Breath (cough). Rescue 18 g 11    aspirin 81 MG Chew Take 81 mg by mouth once daily.      atorvastatin (LIPITOR) 40 MG tablet Take 1 tablet (40 mg total) by mouth every evening. 90 tablet 1    buPROPion (WELLBUTRIN XL) 150 MG TB24 tablet TAKE 1 TABLET EVERY DAY 90 tablet 3    calcitRIOL (ROCALTROL) 0.5 MCG Cap TAKE 1 CAPSULE ONE TIME DAILY 90 capsule 3    furosemide (LASIX) 40 MG tablet Take 1 tablet (40 mg total) by mouth once daily. 90 tablet 1    gabapentin (NEURONTIN) 300 MG capsule Take 1 capsule (300 mg total) by mouth 3 (three) times daily. 90 capsule 5    levothyroxine (SYNTHROID) 112 MCG tablet Take 1 tablet (112 mcg total) by mouth before breakfast. 180 tablet 3    losartan (COZAAR) 50 MG tablet TAKE 1 TABLET EVERY DAY. TAKE ADDITIONAL 25 MG (1/2 TABLET) DAILY IF BLOOD PRESSURE  OR ABOVE 90 tablet 1    metoprolol succinate (TOPROL-XL) 25 MG 24 hr tablet TAKE ONE-HALF TABLET (12.5 MG) BY MOUTH EVERY DAY 45 tablet 1    morphine (MS CONTIN) 15 MG 12 hr tablet Take 1 tablet (15 mg total) by mouth 2 (two) times daily. 60 tablet 0    multivitamin (THERAGRAN) per tablet Take 1 tablet by mouth once daily.      mupirocin (BACTROBAN) 2 % ointment Apply topically 3 (three) times daily. 22 g 0    naloxone (NARCAN) 0.4 mg/mL injection Inject 1 mL (0.4 mg total) into the vein as needed (overdose). 2 mL 5    omeprazole (PRILOSEC) 40 MG capsule Take one  capsule by mouth daily 90 capsule 3    oxyCODONE-acetaminophen (PERCOCET)  mg per tablet Take 1 tablet by mouth every 6 (six) hours as needed for Pain. 120 tablet 0    tamsulosin (FLOMAX) 0.4 mg Cap Take 1 capsule (0.4 mg total) by mouth every evening. 90 capsule 3    tiZANidine 4 mg Cap Take 4 mg by mouth 3 (three) times daily as needed (pain). 90 capsule 3    UNABLE TO FIND Take by mouth once daily. Vitafusion multivites gummies       Current Facility-Administered Medications on File Prior to Visit   Medication Dose Route Frequency Provider Last Rate Last Admin    diphenhydrAMINE injection 12.5 mg  12.5 mg Intravenous Once PRN Enrique Rosales MD        electrolyte-S (ISOLYTE)   Intravenous Continuous Enrique Rosales MD        HYDROmorphone (PF) injection 0.2 mg  0.2 mg Intravenous Q5 Min PRN Enrique Rosales MD        HYDROmorphone (PF) injection 0.2 mg  0.2 mg Intravenous Q5 Min PRN Enrique Rosales MD        lactated ringers infusion   Intravenous Once PRN Aram Terrell MD        lactated ringers infusion  10 mL/hr Intravenous Continuous Enrique Rosales MD   Stopped at 07/19/23 0959    lactated ringers infusion   Intravenous Continuous Aram Terrell MD 25 mL/hr at 07/11/23 1012 New Bag at 07/11/23 1012    lactated ringers infusion   Intravenous Continuous Aram Terrell MD 25 mL/hr at 01/09/24 1014 New Bag at 01/09/24 1014    lorazepam injection 0.25 mg  0.25 mg Intravenous Once PRN Enrique Rosales MD        prochlorperazine injection Soln 5 mg  5 mg Intravenous Q30 Min PRN Enrique Rosales MD        sodium chloride 0.9% flush 3 mL  3 mL Intravenous Q8H Enrique Rosales MD

## 2025-04-25 ENCOUNTER — RESULTS FOLLOW-UP (OUTPATIENT)
Dept: FAMILY MEDICINE | Facility: CLINIC | Age: 80
End: 2025-04-25

## 2025-04-25 LAB
OHS QRS DURATION: 108 MS
OHS QTC CALCULATION: 483 MS

## 2025-04-28 ENCOUNTER — HOSPITAL ENCOUNTER (OUTPATIENT)
Dept: RADIOLOGY | Facility: HOSPITAL | Age: 80
Discharge: HOME | End: 2025-04-28
Attending: NURSE PRACTITIONER
Payer: MEDICARE

## 2025-04-28 DIAGNOSIS — R55 SYNCOPE, UNSPECIFIED SYNCOPE TYPE: ICD-10-CM

## 2025-04-28 DIAGNOSIS — I65.23 CAROTID ARTERY PLAQUE, BILATERAL: ICD-10-CM

## 2025-04-28 PROCEDURE — 93880 EXTRACRANIAL BILAT STUDY: CPT | Mod: 26,,, | Performed by: STUDENT IN AN ORGANIZED HEALTH CARE EDUCATION/TRAINING PROGRAM

## 2025-04-28 PROCEDURE — 93880 EXTRACRANIAL BILAT STUDY: CPT | Mod: TC,PO

## 2025-05-06 ENCOUNTER — OFFICE VISIT (OUTPATIENT)
Dept: CARDIOLOGY | Facility: CLINIC | Age: 80
End: 2025-05-06
Payer: MEDICARE

## 2025-05-06 VITALS
DIASTOLIC BLOOD PRESSURE: 88 MMHG | BODY MASS INDEX: 37.2 KG/M2 | HEIGHT: 67 IN | WEIGHT: 237 LBS | HEART RATE: 76 BPM | SYSTOLIC BLOOD PRESSURE: 139 MMHG

## 2025-05-06 DIAGNOSIS — I36.1 NONRHEUMATIC TRICUSPID VALVE REGURGITATION: ICD-10-CM

## 2025-05-06 DIAGNOSIS — I65.23 CAROTID ARTERY PLAQUE, BILATERAL: ICD-10-CM

## 2025-05-06 DIAGNOSIS — I48.0 PAF (PAROXYSMAL ATRIAL FIBRILLATION): ICD-10-CM

## 2025-05-06 DIAGNOSIS — E66.01 SEVERE OBESITY (BMI 35.0-39.9) WITH COMORBIDITY: ICD-10-CM

## 2025-05-06 DIAGNOSIS — E78.49 OTHER HYPERLIPIDEMIA: ICD-10-CM

## 2025-05-06 DIAGNOSIS — G45.9 TRANSIENT CEREBRAL ISCHEMIA, UNSPECIFIED TYPE: ICD-10-CM

## 2025-05-06 DIAGNOSIS — Z95.1 S/P CABG (CORONARY ARTERY BYPASS GRAFT): ICD-10-CM

## 2025-05-06 DIAGNOSIS — I10 ESSENTIAL (PRIMARY) HYPERTENSION: ICD-10-CM

## 2025-05-06 DIAGNOSIS — I25.10 CORONARY ARTERY DISEASE INVOLVING NATIVE CORONARY ARTERY OF NATIVE HEART WITHOUT ANGINA PECTORIS: Primary | ICD-10-CM

## 2025-05-06 DIAGNOSIS — R42 DIZZINESS: ICD-10-CM

## 2025-05-06 PROCEDURE — 99999 PR PBB SHADOW E&M-EST. PATIENT-LVL III: CPT | Mod: PBBFAC,,,

## 2025-05-06 NOTE — PROGRESS NOTES
[facility]  Date: 5/6/25    Patient: Sam Pete, 1945, [id number]  Primary Care Provider: Dagoberto Monsalve MD     Chief Complaint: Dizziness, slurred speech     Subjective:       Sam Pete is a 80 y.o. male who presents for evaluation of dizziness and slurred speech. They follow with Dr. Eugene.    CBC, CMP, lipid panel stable. At last office visit, patient doing well from cardiac standpoint for which medications were continued as is.     Since then, patient reports episodes of sudden weakness with dizziness 2-3 weeks ago. Associated with aphasia described as difficulty physically forming sentences with his mouth. Episodes lasted 10-15 seconds before resolving spontaneously. Denies loss of consciousness or head trauma. No further episodes. Average -140s at home. Weight up-trending. Denies chest discomfort, dyspnea, palpitations, dizziness, syncope, orthopnea, PND.    Focused Past History includes:  Coronary artery disease s/p CABG 7/2023  Valvular disease  TTE 5/2023: LVEF 60%, PASP 38  TTE 8/2024: LVEF 60-65%, grade II DD, mild LAE, mild TR  Paroxysmal atrial fibrillation  72 hr monitor 8/2024: predominantly sinus rhythm avg HR 76 bpm, 0.58% PACs, 0.01% PVCs  Carotid artery plaque  Carotid US 5/2023: mild plaque bilaterally without significant stenosis   Carotid US 4/2025: no significant stenosis bilaterally   Essential hypertension  Hyperlipidemia  Severe obesity   History of TIA    Current Outpatient Medications   Medication Sig    albuterol (VENTOLIN HFA) 90 mcg/actuation inhaler Inhale 2 puffs into the lungs every 6 (six) hours as needed for Wheezing or Shortness of Breath (cough). Rescue    aspirin 81 MG Chew Take 81 mg by mouth once daily.    atorvastatin (LIPITOR) 40 MG tablet Take 1 tablet (40 mg total) by mouth every evening.    buPROPion (WELLBUTRIN XL) 150 MG TB24 tablet TAKE 1 TABLET EVERY DAY    calcitRIOL (ROCALTROL) 0.5 MCG Cap TAKE 1 CAPSULE ONE TIME  DAILY    furosemide (LASIX) 40 MG tablet Take 1 tablet (40 mg total) by mouth once daily.    gabapentin (NEURONTIN) 300 MG capsule Take 1 capsule (300 mg total) by mouth 3 (three) times daily.    levothyroxine (SYNTHROID) 112 MCG tablet Take 1 tablet (112 mcg total) by mouth before breakfast.    losartan (COZAAR) 50 MG tablet TAKE 1 TABLET EVERY DAY. TAKE ADDITIONAL 25 MG (1/2 TABLET) DAILY IF BLOOD PRESSURE  OR ABOVE    metoprolol succinate (TOPROL-XL) 25 MG 24 hr tablet TAKE ONE-HALF TABLET (12.5 MG) BY MOUTH EVERY DAY    morphine (MS CONTIN) 15 MG 12 hr tablet Take 1 tablet (15 mg total) by mouth 2 (two) times daily.    multivitamin (THERAGRAN) per tablet Take 1 tablet by mouth once daily.    mupirocin (BACTROBAN) 2 % ointment Apply topically 3 (three) times daily.    naloxone (NARCAN) 0.4 mg/mL injection Inject 1 mL (0.4 mg total) into the vein as needed (overdose).    omeprazole (PRILOSEC) 40 MG capsule Take one capsule by mouth daily    oxyCODONE-acetaminophen (PERCOCET)  mg per tablet Take 1 tablet by mouth every 6 (six) hours as needed for Pain.    tamsulosin (FLOMAX) 0.4 mg Cap Take 1 capsule (0.4 mg total) by mouth every evening.    tiZANidine 4 mg Cap Take 4 mg by mouth 3 (three) times daily as needed (pain).    UNABLE TO FIND Take by mouth once daily. Vitafusion multivites gummies     No current facility-administered medications for this visit.     Facility-Administered Medications Ordered in Other Visits   Medication    diphenhydrAMINE injection 12.5 mg    electrolyte-S (ISOLYTE)    HYDROmorphone (PF) injection 0.2 mg    HYDROmorphone (PF) injection 0.2 mg    lactated ringers infusion    lactated ringers infusion    lactated ringers infusion    lactated ringers infusion    lorazepam injection 0.25 mg    prochlorperazine injection Soln 5 mg    sodium chloride 0.9% flush 3 mL            Objective       Review of Systems  Constitutional: negative for fevers, night sweats, and weight loss  Eyes:  negative for visual disturbance, diplopia  Respiratory: negative for cough, hemoptysis, sputum, and wheezing  Cardiovascular: see HPI  Gastrointestinal: negative for abdominal pain, bright red blood per rectum, change in bowel habits, dysphagia, melena, and reflux symptoms  Genitourinary:negative for dysuria, frequency, and hematuria  Hematologic/lymphatic: negative for bleeding, easy bruising, and lymphadenopathy  Musculoskeletal:negative for arthralgias, back pain, and myalgias  Neurological: negative for gait problems, paresthesia, speech problems, vertigo, and weakness  Behavioral/Psych: negative for excessive alcohol consumption, illegal drug usage, and sleep disturbance    -------------------------------------    Abnormal nuclear stress test    Anticoagulant long-term use    ASA 81 mg    Arthritis    Cataract    OU    Chronic low back pain    Complete rupture of rotator cuff    DDD (degenerative disc disease), lumbar    Degeneration of lumbar or lumbosacral intervertebral disc    ED (erectile dysfunction)    GERD (gastroesophageal reflux disease)    Hypertension    Hypothyroidism, unspecified    Lumbar pseudoarthrosis    Lumbar stenosis    Obesity    PSVT (paroxysmal supraventricular tachycardia)    SOB (shortness of breath)    Spondylosis of lumbar region without myelopathy or radiculopathy    Squamous cell carcinoma of skin    Stroke    basal ganglia, left sided weakness, resolved    Testicular hypofunction    Thoracic or lumbosacral neuritis or radiculitis     ----------------------------    Angiogram, coronary, with left heart catheterization    Procedure: Left Heart Cath;  Surgeon: Salvador Eugene MD;  [email or web address];  Service: Cardiology;;    Appendectomy    Arthroplasty of shoulder    Procedure: ARTHROPLASTY, SHOULDER BRENNAN RIGHT SHOULDER HUMERAL HEAD RESURFACING;  Surgeon: Frankie Joya MD;  Location: [facility] OR;  Service: Orthopedics;  Laterality: Right;    Back surgery    4- back surgery     Caudal epidural steroid injection    Procedure: Injection-steroid-epidural-caudal;  Surgeon: Aram Terrell MD;  Location: [facility] OR;  Service: Pain Management;  Laterality: N/A;    Caudal epidural steroid injection    Procedure: INJECTION, STEROID, SPINE, EPIDURAL, CAUDAL;  Surgeon: Aram Terrell MD;  Location: [facility] OR;  Service: Pain Management;  Laterality: N/A;    Caudal epidural steroid injection    Procedure: Injection-steroid-epidural-caudal;  Surgeon: Aram Terrell MD;  Location: [facility] OR;  Service: Pain Management;  Laterality: N/A;  Caudal CARLOS     Caudal epidural steroid injection    Procedure: Injection-steroid-epidural-caudal;  Surgeon: Aram Terrell MD;  Location: [facility] OR;  Service: Pain Management;  Laterality: N/A;  caudal ( melinda)    Caudal epidural steroid injection    Procedure: Injection-steroid-epidural-caudal;  Surgeon: Aram Terrell MD;  Location: [facility] OR;  Service: Pain Management;  Laterality: N/A;  caudal    Caudal epidural steroid injection    Procedure: Injection-steroid-epidural-caudal;  Surgeon: Aram Terrell MD;  Location: [facility] OR;  Service: Pain Management;  Laterality: N/A;  caudal    Caudal epidural steroid injection    Procedure: Injection-steroid-epidural-caudal;  Surgeon: Aram Terrell MD;  Location: [facility] OR;  Service: Anesthesiology;  Laterality: N/A;  caudal    Caudal epidural steroid injection    Procedure: Injection-steroid-epidural-caudal;  Surgeon: Aram Terrell MD;  Location: [facility] OR;  Service: Anesthesiology;  Laterality: N/A;    Colonoscopy    CHILO.   Redundant tortuous colon, otherwise normal.    Colonoscopy    Procedure: COLONOSCOPY;  Surgeon: Gilbert Rodriguez Jr., MD;  Location: [facility] ENDO;  Service: Endoscopy;  Laterality: N/A;    Coronary angiography    Procedure: ANGIOGRAM, CORONARY ARTERY;  Surgeon: Salvador Eugene MD;  Location: [facility] CATH;  Service: Cardiology;  Laterality: N/A;    Coronary artery bypass graft (cabg)    Procedure: CORONARY ARTERY BYPASS GRAFT (CABG)- x  4;  Surgeon: Sandrine Wagner MD;  Location: [facility] OR;  Service: Cardiothoracic;  Laterality: N/A;    Endoscopic harvest of vein    Procedure: HARVEST-VEIN-ENDOVASCULAR;  Surgeon: Sandrine Wagner MD;  Location: [facility] OR;  Service: Cardiothoracic;  Laterality: Left;    Epidural steroid injection    Pain management    Epidural steroid injection    Procedure: Injection, Steroid, Epidural;  Surgeon: Aram Terrell MD;  Location: [facility] OR;  Service: Pain Management;  Laterality: N/A;    Esophagogastroduodenoscopy    CHILO.    Exclusion, left atrial appendage, open, as part of open chest surgery    Procedure: EXCLUSION, LEFT ATRIAL APPENDAGE, OPEN, AS PART OF OPEN CHEST SURGERY;  Surgeon: Sandrine Wagner MD;  Location: [facility] OR;  Service: Cardiothoracic;  Laterality: N/A;    Fracture surgery    wrist / forearm, total of 5    Insertion of dorsal column nerve stimulator for trial    Procedure: INSERTION, NEUROSTIMULATOR, SPINAL CORD, DORSAL COLUMN, FOR TRIAL;  Surgeon: Evan Lyles MD;  Location: [facility] OR;  Service: Pain Management;  Laterality: N/A;    Joint replacement    ( rt hip 2007), left hip     Joint replacement    total knee    Knee arthroscopy w/ debridement    bilateral knees , total of six    Knee surgery    Laminectomy using minimally invasive technique    Procedure: LAMINECTOMY, SPINE, MINIMALLY INVASIVE /  PLACEMENT OF SPINAL CORD STIMULATOR;  Surgeon: Darlene Max MD;  Location: [facility] OR;  Service: Neurosurgery;  Laterality: N/A;    Nervbe block injection    Pain management    Spinal cord stimulator implant    Total shoulder arthroplasty    Dr Joya    Total thyroidectomy    Dr Mendoza        Family History   Problem Relation Name Age of Onset    Hypertension Father      Heart disease Father      Drug abuse Daughter      Hypertension Son      Lung disease Sister      COPD Sister      Hypertension Sister      Diverticulitis Sister      Gout Sister      Kidney disease Sister      No Known Problems Mother      Eczema Neg  "Hx      Lupus Neg Hx      Psoriasis Neg Hx      Melanoma Neg Hx       Social History[1]    Physical Exam  /88 (BP Location: Left arm, Patient Position: Sitting)   Pulse 76   Ht 5' 7" (1.702 m)   Wt 107.5 kg (236 lb 15.9 oz)   BMI 37.12 kg/m²   Body surface area is 2.25 meters squared.  Body mass index is 37.12 kg/m².    General appearance: alert, appears stated age, cooperative, and no distress  Head: Normocephalic, without obvious abnormality, atraumatic  Neck: no carotid bruit, no JVD, and supple, symmetrical, trachea midline  Lungs: clear to auscultation bilaterally  Heart: regular rate and rhythm; S1, S2 normal, no murmur, click, rub or gallop  Abdomen: soft, non-tender, no distended  Extremities: extremities atraumatic, no pitting edema  Skin: warm, no cyanosis, no pathologic ecchymosis in exposed portions  Neurologic: Grossly normal. A&O x3      Lab Review   Lab Results   Component Value Date    WBC 8.88 04/24/2025    HGB 13.5 (L) 04/24/2025    HCT 43.5 04/24/2025    MCV 93 04/24/2025     04/24/2025         BMP  Lab Results   Component Value Date     04/24/2025    K 4.7 04/24/2025    CL 98 04/24/2025    CO2 30 (H) 04/24/2025    BUN 18 04/24/2025    CREATININE 1.2 04/24/2025    CALCIUM 9.6 04/24/2025    ANIONGAP 14 04/24/2025    ESTGFRAFRICA >60.0 07/12/2022    EGFRNONAA 53.4 (A) 07/12/2022       Lab Results   Component Value Date    LABPROT 10.0 02/12/2020    ALBUMIN 4.2 04/24/2025       Lab Results   Component Value Date    ALT 21 04/24/2025    AST 28 04/24/2025    ALKPHOS 64 04/24/2025    BILITOT 0.6 04/24/2025       Lab Results   Component Value Date    TSH 6.686 (H) 01/31/2025       Lab Results   Component Value Date    CHOL 125 01/31/2025    CHOL 141 06/20/2024    CHOL 143 08/09/2023     Lab Results   Component Value Date    HDL 39 (L) 01/31/2025    HDL 48 06/20/2024    HDL 48 08/09/2023     Lab Results   Component Value Date    LDLCALC 43.0 (L) 01/31/2025    LDLCALC 33.0 (L) " 06/20/2024    LDLCALC 57.8 (L) 08/09/2023     Lab Results   Component Value Date    TRIG 215 (H) 01/31/2025    TRIG 300 (H) 06/20/2024    TRIG 186 (H) 08/09/2023     Lab Results   Component Value Date    CHOLHDL 31.2 01/31/2025    CHOLHDL 34.0 06/20/2024    CHOLHDL 33.6 08/09/2023                Assessment & Plan:     This is a 80 y.o. male with PMHx of CABG, valvular disease, PAF, carotid artery disease, HTN, HLD, obesity, history of TIA. Reports episode of sudden weakness with dizziness and aphasia 2-3 weeks ago. No known recurrence or residual deficits.      1. Weakness, dizziness, aphasia  - Possible TIA event   - CT head w/o contrast for further evaluation   - 30 day event monitor     2. Coronary artery disease s/p CABG   - No anginal equivalents   - Continue ASA 81 mg qd   - Continue atorvastatin 40 mg qd     3. Valvular disease  - TTE 2023 with trace MR, trace TR  - Asymptomatic     4. Paroxysmal atrial fibrillation  - Post-op after back surgery in 2017 without known recurrence   - RRR today in clinic  - New onset dizziness   - 30 day event for further evaluation     4. Carotid artery plaque  - Asymptomatic   - Continue ASA 81 mg qd   - Continue atorvastatin 40 mg qd     5. Essential hypertension  - Well controlled  - Continue metoprolol succinate 12.5 mg qd   - Continue losartan 50 mg qd   - Continue furosemide 40 mg qd     6. Hyperlipidemia  - LDL 43, HDL 39  - Continue ASA 81 mg qd   - Continue atorvastatin 40 mg qd     7. Severe obesity   - Weight stable  - Low level/low impact aerobic exercise 5x's/wk recommended.   - Heart healthy diet and risk factor modification discussed with patient.           Emphasized the importance of modifying lifestyle related risk factors including limiting alcohol intake, exercise, diet most resembling a Mediterranean diet.    Please follow up as scheduled.      Valentina Luo PA-C  [facility]  Office: [phone]                   [1]   Social  History  Tobacco Use    Smoking status: Former     Current packs/day: 0.00     Average packs/day: 1 pack/day for 30.0 years (30.0 ttl pk-yrs)     Types: Cigarettes     Start date: 8/3/1963     Quit date: 1992     Years since quittin.3     Passive exposure: Past    Smokeless tobacco: Never   Substance Use Topics    Alcohol use: Yes     Comment: On occasion    Drug use: Yes     Types: Oxycodone, Morphine

## 2025-05-07 ENCOUNTER — TELEPHONE (OUTPATIENT)
Dept: PAIN MEDICINE | Facility: CLINIC | Age: 80
End: 2025-05-07
Payer: MEDICARE

## 2025-05-07 DIAGNOSIS — F11.20 UNCOMPLICATED OPIOID DEPENDENCE: ICD-10-CM

## 2025-05-07 RX ORDER — OXYCODONE AND ACETAMINOPHEN 10; 325 MG/1; MG/1
1 TABLET ORAL EVERY 6 HOURS PRN
Qty: 120 TABLET | Refills: 0 | Status: SHIPPED | OUTPATIENT
Start: 2025-05-08 | End: 2025-06-07

## 2025-05-07 NOTE — TELEPHONE ENCOUNTER
----- Message from Felipa sent at 5/7/2025 11:26 AM CDT -----  Regarding: Refill  Contact: patient  Type:  RX Refill RequestWho Called: patientRefill or New Rx:refillRX Name and Strength:oxyCODONE-acetaminophen (PERCOCET)  mg per tabletHow is the patient currently taking it? (ex. 1XDay):as directed Is this a 30 day or 90 day RX:30Preferred Pharmacy with phone number:[id number] [place] [address] & [address] [address]Hmwfs: [phone] Fax: [phone] or Mail Order:localOrdering Provider:Dr Taylorould the patient rather a call back or a response via [facility]? [facility] Call Back Number:[phone] (home) [phone] (work)Additional Information: Please call to advise, thanks!

## 2025-05-11 ENCOUNTER — PATIENT MESSAGE (OUTPATIENT)
Dept: PAIN MEDICINE | Facility: CLINIC | Age: 80
End: 2025-05-11
Payer: MEDICARE

## 2025-05-12 ENCOUNTER — HOSPITAL ENCOUNTER (OUTPATIENT)
Dept: RADIOLOGY | Facility: HOSPITAL | Age: 80
Discharge: HOME | End: 2025-05-12
Payer: MEDICARE

## 2025-05-12 ENCOUNTER — E-CONSULT (OUTPATIENT)
Dept: CARDIOLOGY | Facility: CLINIC | Age: 80
End: 2025-05-12
Payer: MEDICARE

## 2025-05-12 DIAGNOSIS — Z79.02 ANTIPLATELET OR ANTITHROMBOTIC LONG-TERM USE: Primary | ICD-10-CM

## 2025-05-12 DIAGNOSIS — G45.9 TRANSIENT CEREBRAL ISCHEMIA, UNSPECIFIED TYPE: ICD-10-CM

## 2025-05-12 DIAGNOSIS — M54.16 LUMBAR RADICULOPATHY: Primary | ICD-10-CM

## 2025-05-12 DIAGNOSIS — I25.10 CORONARY ARTERY DISEASE INVOLVING NATIVE CORONARY ARTERY OF NATIVE HEART WITHOUT ANGINA PECTORIS: Primary | ICD-10-CM

## 2025-05-12 DIAGNOSIS — R79.9 ABNORMAL FINDING OF BLOOD CHEMISTRY, UNSPECIFIED: ICD-10-CM

## 2025-05-12 PROCEDURE — 70450 CT HEAD/BRAIN W/O DYE: CPT | Mod: 26,,, | Performed by: RADIOLOGY

## 2025-05-12 PROCEDURE — 70450 CT HEAD/BRAIN W/O DYE: CPT | Mod: TC,PO

## 2025-05-12 RX ORDER — SODIUM CHLORIDE, SODIUM LACTATE, POTASSIUM CHLORIDE, CALCIUM CHLORIDE 600; 310; 30; 20 MG/100ML; MG/100ML; MG/100ML; MG/100ML
INJECTION, SOLUTION INTRAVENOUS CONTINUOUS
OUTPATIENT
Start: 2025-05-12

## 2025-05-12 NOTE — TELEPHONE ENCOUNTER
Spoke with patient and scheduled. Per provider patient to hold ASA x 4 days prior. E- Consult to cardiology for clearance.Pre op information given to patient and follow up appointment scheduled.

## 2025-05-12 NOTE — CONSULTS
[facility]  Response for E-Consult     Patient Name: Sam Pete  MRN: [id number]  Primary Care Provider: Dagoberto Monsalve MD   Requesting Provider: Evan Lyles MD  Consults    Recommendation:  Acceptable CV risk hold aspirin 4-5 days    Additional future steps to consider:     Total time of Consultation: 5 minute    I did not speak to the requesting provider verbally about this.     *This eConsult is based on the clinical data available to me and is furnished without benefit of a physical examination. The eConsult will need to be interpreted in light of any clinical issues or changes in patient status not available to me at the time of filing this eConsults. Significant changes in patient condition or level of acuity should result in immediate formal consultation and reevaluation. Please alert me if you have further questions.    Thank you for this eConsult referral.     Salvador Eugene MD  [facility]

## 2025-05-12 NOTE — TELEPHONE ENCOUNTER
Pt does not see Dr Terrell in the office he sees Dr Lyles. Dr Terrell has done several injections on him but they have all been direct books. Last seen by Dr Lyles 03/2025. Please order injection or perform as appropriate. Thank you

## 2025-05-12 NOTE — TELEPHONE ENCOUNTER
Physician - Dr Lyles    Type of Procedure/Injection - Lumbar Epidural  Caudal           Laterality - NA      Priority - Normal      Anxiolysis- RNIV      Need to hold medication - Yes      Aspirin for 4 days      Clearance needed - Yes      Follow up - 3 week

## 2025-05-13 ENCOUNTER — RESULTS FOLLOW-UP (OUTPATIENT)
Dept: CARDIOLOGY | Facility: CLINIC | Age: 80
End: 2025-05-13

## 2025-05-13 DIAGNOSIS — Z86.73 HISTORY OF CVA IN ADULTHOOD: ICD-10-CM

## 2025-05-13 NOTE — TELEPHONE ENCOUNTER
No care due was identified.  [facility] Embedded Care Due Messages. Reference number: [id number].   5/13/2025 4:06:32 PM CDT

## 2025-05-14 ENCOUNTER — TELEPHONE (OUTPATIENT)
Dept: CARDIOLOGY | Facility: CLINIC | Age: 80
End: 2025-05-14
Payer: MEDICARE

## 2025-05-14 RX ORDER — BUPROPION HYDROCHLORIDE 150 MG/1
TABLET ORAL
Qty: 90 TABLET | Refills: 0 | Status: SHIPPED | OUTPATIENT
Start: 2025-05-14

## 2025-05-14 RX ORDER — ATORVASTATIN CALCIUM 40 MG/1
40 TABLET, FILM COATED ORAL NIGHTLY
Qty: 90 TABLET | Refills: 3 | Status: SHIPPED | OUTPATIENT
Start: 2025-05-14

## 2025-05-14 NOTE — TELEPHONE ENCOUNTER
----- Message from Valentina Luo PA-C sent at 5/13/2025  1:13 PM CDT -----  Please call patient- CT head without findings of acute stroke. Please follow up with primary care for further evaluation  ----- Message -----  From: Interface, Rad Results In  Sent: 5/13/2025   8:08 AM CDT  To: Valentina Luo PA-C

## 2025-05-19 ENCOUNTER — PATIENT MESSAGE (OUTPATIENT)
Dept: PAIN MEDICINE | Facility: CLINIC | Age: 80
End: 2025-05-19
Payer: MEDICARE

## 2025-05-19 DIAGNOSIS — G89.4 CHRONIC PAIN SYNDROME: ICD-10-CM

## 2025-05-19 RX ORDER — MORPHINE SULFATE 15 MG/1
15 TABLET, FILM COATED, EXTENDED RELEASE ORAL 2 TIMES DAILY
Qty: 60 TABLET | Refills: 0 | Status: SHIPPED | OUTPATIENT
Start: 2025-05-19 | End: 2025-06-18

## 2025-05-20 ENCOUNTER — OFFICE VISIT (OUTPATIENT)
Dept: PAIN MEDICINE | Facility: CLINIC | Age: 80
End: 2025-05-20
Payer: MEDICARE

## 2025-05-20 VITALS
WEIGHT: 236.56 LBS | SYSTOLIC BLOOD PRESSURE: 160 MMHG | DIASTOLIC BLOOD PRESSURE: 92 MMHG | HEART RATE: 76 BPM | BODY MASS INDEX: 37.05 KG/M2

## 2025-05-20 DIAGNOSIS — Z79.891 OPIOID CONTRACT EXISTS: Primary | ICD-10-CM

## 2025-05-20 DIAGNOSIS — F11.20 UNCOMPLICATED OPIOID DEPENDENCE: ICD-10-CM

## 2025-05-20 DIAGNOSIS — G89.29 CHRONIC BILATERAL LOW BACK PAIN WITHOUT SCIATICA: ICD-10-CM

## 2025-05-20 DIAGNOSIS — M79.89 SWELLING OF LOWER LEG: ICD-10-CM

## 2025-05-20 DIAGNOSIS — M96.1 FAILED BACK SURGICAL SYNDROME: ICD-10-CM

## 2025-05-20 DIAGNOSIS — M54.50 CHRONIC BILATERAL LOW BACK PAIN WITHOUT SCIATICA: ICD-10-CM

## 2025-05-20 DIAGNOSIS — M48.061 SPINAL STENOSIS, LUMBAR REGION, WITHOUT NEUROGENIC CLAUDICATION: ICD-10-CM

## 2025-05-20 DIAGNOSIS — G89.4 CHRONIC PAIN SYNDROME: ICD-10-CM

## 2025-05-20 DIAGNOSIS — M54.16 LUMBAR RADICULOPATHY: ICD-10-CM

## 2025-05-20 DIAGNOSIS — Z79.02 ANTIPLATELET OR ANTITHROMBOTIC LONG-TERM USE: ICD-10-CM

## 2025-05-20 PROCEDURE — 99999 PR PBB SHADOW E&M-EST. PATIENT-LVL III: CPT | Mod: PBBFAC,HCNC,,

## 2025-05-20 PROCEDURE — 80307 DRUG TEST PRSMV CHEM ANLYZR: CPT

## 2025-05-20 NOTE — TELEPHONE ENCOUNTER
Spoke with patient's wife and rescheduled procedure and follow up due to Dr. Lyles being out of clinic

## 2025-05-20 NOTE — H&P (VIEW-ONLY)
[facility] Follow Up Evaluation      Referred by: No ref. provider found    PCP:     CC:   Chief Complaint   Patient presents with    Back Pain          5/20/2025     4:10 PM 3/19/2025     4:07 PM 1/6/2025     1:13 PM   Last 3 PDI Scores   Pain Disability Index (PDI) 31 32 36     Interval HPI 5/20/2025: Sam Pete returns to the office for follow up.  He returns for follow-up with continued chronic generalized pain, low back pain with radiation down bilateral legs.  He also reports some worsening swelling if left lower extremity.  The swelling started after doing extensive walking over the weekend.  He reports ultrasound in bilateral lower extremities roughly 2 weeks ago for similar swelling with no signs of clots.  Lower back pain has worsened overall in his scheduled for lumbar epidural in the near future. He continues to take morphine extended release 15 mg twice a day and oxycodone 10 mg 3-4 times daily.  He denies any ill side effects or adverse events with his medication.  No new numbness, weakness or any new changes to his bowel or bladder function.      HPI:   Sam Pete is a 80 y.o. male patient who has a past medical history of Abnormal nuclear stress test, Anticoagulant long-term use, Arthritis, Cataract, Chronic low back pain, Complete rupture of rotator cuff, DDD (degenerative disc disease), lumbar, Degeneration of lumbar or lumbosacral intervertebral disc, ED (erectile dysfunction), GERD (gastroesophageal reflux disease), Hypertension, Hypothyroidism, unspecified, Lumbar pseudoarthrosis, Lumbar stenosis, Obesity, PSVT (paroxysmal supraventricular tachycardia), SOB (shortness of breath), Spondylosis of lumbar region without myelopathy or radiculopathy, Squamous cell carcinoma of skin, Stroke, Testicular hypofunction, and Thoracic or lumbosacral neuritis or radiculitis. He presents with back pain.  He has a history of chronic back pain for over the past 30 years.  He has a  history of 6 prior lumbar surgeries.  He has a history of spinal cord stimulator paddle lead implant.  Today he reports his pain is 7/10, constant, throbbing in his lower back with pain radiating primarily down the left leg to the left foot.  His pain is worse with sitting, standing, bending, walking, morning, flexing and relieved with medications, injections, physical therapy.  He has done physical therapy and kyree goes in the past.  Recently has been receiving injections from pain management in [place].      Pain Intervention History:  - history of spinal cord stimulator paddle lead implant  - s/p caudal CARLOS on 1/20/2023with a about 50% relief of his pain  - s/p caudal CARLOS on 4/21/2023 with a about 50% relief of his pain  - s/p caudal CARLOS on 7/11/2023 with a about 50% relief of his pain  - s/p caudal CARLOS on 1/9/2024 with a about 50% relief of his pain  - s/p caudal CARLOS on 6/4/2024 with a about 50% relief of his pain  - s/p caudal CARLOS on 10/24/2024 with a about 50% relief of his pain    Past Spine Surgical History:  - posterior decompression from L2-S1 and interbody cage at L5-S1    Past and current medications:  Antineuropathics: gabapentin   NSAIDs:  Physical therapy: yes, completed   Antidepressants:  Muscle relaxers: tizanidine   Opioids: oxycodone, ms contin   Antiplatelets/Anticoagulants: aspirin     History:    Current Outpatient Medications:     albuterol (VENTOLIN HFA) 90 mcg/actuation inhaler, Inhale 2 puffs into the lungs every 6 (six) hours as needed for Wheezing or Shortness of Breath (cough). Rescue, Disp: 18 g, Rfl: 11    aspirin 81 MG Chew, Take 81 mg by mouth once daily., Disp: , Rfl:     atorvastatin (LIPITOR) 40 MG tablet, Take 1 tablet (40 mg total) by mouth every evening., Disp: 90 tablet, Rfl: 3    buPROPion (WELLBUTRIN XL) 150 MG TB24 tablet, TAKE 1 TABLET EVERY DAY, Disp: 90 tablet, Rfl: 0    calcitRIOL (ROCALTROL) 0.5 MCG Cap, TAKE 1 CAPSULE ONE TIME DAILY, Disp: 90 capsule, Rfl: 3     furosemide (LASIX) 40 MG tablet, Take 1 tablet (40 mg total) by mouth once daily., Disp: 90 tablet, Rfl: 1    gabapentin (NEURONTIN) 300 MG capsule, Take 1 capsule (300 mg total) by mouth 3 (three) times daily., Disp: 90 capsule, Rfl: 5    levothyroxine (SYNTHROID) 112 MCG tablet, Take 1 tablet (112 mcg total) by mouth before breakfast., Disp: 180 tablet, Rfl: 3    losartan (COZAAR) 50 MG tablet, TAKE 1 TABLET EVERY DAY. TAKE ADDITIONAL 25 MG (1/2 TABLET) DAILY IF BLOOD PRESSURE  OR ABOVE, Disp: 90 tablet, Rfl: 1    metoprolol succinate (TOPROL-XL) 25 MG 24 hr tablet, TAKE ONE-HALF TABLET (12.5 MG) BY MOUTH EVERY DAY, Disp: 45 tablet, Rfl: 1    morphine (MS CONTIN) 15 MG 12 hr tablet, Take 1 tablet (15 mg total) by mouth 2 (two) times daily., Disp: 60 tablet, Rfl: 0    multivitamin (THERAGRAN) per tablet, Take 1 tablet by mouth once daily., Disp: , Rfl:     mupirocin (BACTROBAN) 2 % ointment, Apply topically 3 (three) times daily., Disp: 22 g, Rfl: 0    naloxone (NARCAN) 0.4 mg/mL injection, Inject 1 mL (0.4 mg total) into the vein as needed (overdose)., Disp: 2 mL, Rfl: 5    omeprazole (PRILOSEC) 40 MG capsule, Take one capsule by mouth daily, Disp: 90 capsule, Rfl: 3    oxyCODONE-acetaminophen (PERCOCET)  mg per tablet, Take 1 tablet by mouth every 6 (six) hours as needed for Pain., Disp: 120 tablet, Rfl: 0    tamsulosin (FLOMAX) 0.4 mg Cap, Take 1 capsule (0.4 mg total) by mouth every evening., Disp: 90 capsule, Rfl: 3    tiZANidine 4 mg Cap, Take 4 mg by mouth 3 (three) times daily as needed (pain)., Disp: 90 capsule, Rfl: 3    UNABLE TO FIND, Take by mouth once daily. Vitafusion multivites gummies, Disp: , Rfl:   No current facility-administered medications for this visit.    Facility-Administered Medications Ordered in Other Visits:     diphenhydrAMINE injection 12.5 mg, 12.5 mg, Intravenous, Once PRN, Enrique Rosales MD    electrolyte-S (ISOLYTE), , Intravenous, Continuous, Enrique Rosales  MD SYLVIA    HYDROmorphone (PF) injection 0.2 mg, 0.2 mg, Intravenous, Q5 Min PRN, Enrique Rosales MD    HYDROmorphone (PF) injection 0.2 mg, 0.2 mg, Intravenous, Q5 Min PRN, Enrique Rosales MD    lactated ringers infusion, , Intravenous, Once PRN, Aram Terrell MD    lactated ringers infusion, 10 mL/hr, Intravenous, Continuous, Enrique Rosales MD, Stopped at 07/19/23 0959    lactated ringers infusion, , Intravenous, Continuous, Aram Terrell MD, Last Rate: 25 mL/hr at 07/11/23 1012, New Bag at 07/11/23 1012    lactated ringers infusion, , Intravenous, Continuous, Aram Terrell MD, Last Rate: 25 mL/hr at 01/09/24 1014, New Bag at 01/09/24 1014    lorazepam injection 0.25 mg, 0.25 mg, Intravenous, Once PRN, Enrique Rosales MD    prochlorperazine injection Soln 5 mg, 5 mg, Intravenous, Q30 Min PRN, Enrique Rosales MD    sodium chloride 0.9% flush 3 mL, 3 mL, Intravenous, Q8H, Enrique Rosales MD    Past Medical History:   Diagnosis Date    Abnormal nuclear stress test 07/2023    Anticoagulant long-term use     ASA 81 mg    Arthritis     Cataract     OU    Chronic low back pain     Complete rupture of rotator cuff 02/08/2011    DDD (degenerative disc disease), lumbar 07/08/2015    Degeneration of lumbar or lumbosacral intervertebral disc 09/09/2015    ED (erectile dysfunction)     GERD (gastroesophageal reflux disease)     Hypertension     Hypothyroidism, unspecified     Lumbar pseudoarthrosis 06/26/2017    Lumbar stenosis 06/26/2017    Obesity     PSVT (paroxysmal supraventricular tachycardia) 07/2023    SOB (shortness of breath) 07/2023    Spondylosis of lumbar region without myelopathy or radiculopathy 07/08/2015    Squamous cell carcinoma of skin     Stroke 1997    basal ganglia, left sided weakness, resolved    Testicular hypofunction     Thoracic or lumbosacral neuritis or radiculitis 07/08/2015       Past Surgical History:   Procedure Laterality Date    ANGIOGRAM, CORONARY, WITH LEFT  HEART CATHETERIZATION  07/13/2023    Procedure: Left Heart Cath;  Surgeon: Salvador Eugene MD;  Location: [facility] CATH;  Service: Cardiology;;    APPENDECTOMY      ARTHROPLASTY OF SHOULDER Right 03/22/2022    Procedure: ARTHROPLASTY, SHOULDER BRENNAN RIGHT SHOULDER HUMERAL HEAD RESURFACING;  Surgeon: Frankie Joya MD;  Location: [facility] OR;  Service: Orthopedics;  Laterality: Right;    BACK SURGERY      4- back surgery    CAUDAL EPIDURAL STEROID INJECTION N/A 12/16/2021    Procedure: Injection-steroid-epidural-caudal;  Surgeon: Aram Terrell MD;  Location: [facility] OR;  Service: Pain Management;  Laterality: N/A;    CAUDAL EPIDURAL STEROID INJECTION N/A 02/02/2022    Procedure: INJECTION, STEROID, SPINE, EPIDURAL, CAUDAL;  Surgeon: Aram Terrell MD;  Location: [facility] OR;  Service: Pain Management;  Laterality: N/A;    CAUDAL EPIDURAL STEROID INJECTION N/A 07/22/2022    Procedure: Injection-steroid-epidural-caudal;  Surgeon: Aram Terrell MD;  Location: [facility] OR;  Service: Pain Management;  Laterality: N/A;  Caudal CARLOS     CAUDAL EPIDURAL STEROID INJECTION N/A 01/20/2023    Procedure: Injection-steroid-epidural-caudal;  Surgeon: Aram Terrell MD;  Location: [facility] OR;  Service: Pain Management;  Laterality: N/A;  caudal ( melinda)    CAUDAL EPIDURAL STEROID INJECTION N/A 04/21/2023    Procedure: Injection-steroid-epidural-caudal;  Surgeon: Aram Terrell MD;  Location: [facility] OR;  Service: Pain Management;  Laterality: N/A;  caudal    CAUDAL EPIDURAL STEROID INJECTION N/A 07/11/2023    Procedure: Injection-steroid-epidural-caudal;  Surgeon: Aram Terrell MD;  Location: [facility] OR;  Service: Pain Management;  Laterality: N/A;  caudal    CAUDAL EPIDURAL STEROID INJECTION N/A 1/9/2024    Procedure: Injection-steroid-epidural-caudal;  Surgeon: Aram Terrell MD;  Location: [facility] OR;  Service: Anesthesiology;  Laterality: N/A;  caudal    CAUDAL EPIDURAL STEROID INJECTION N/A 6/4/2024    Procedure: Injection-steroid-epidural-caudal;  Surgeon: Xander  Aram CHIRINOS MD;  Location: [facility] OR;  Service: Anesthesiology;  Laterality: N/A;    COLONOSCOPY  07/12/2004    CHILO.   Redundant tortuous colon, otherwise normal.    COLONOSCOPY N/A 02/25/2019    Procedure: COLONOSCOPY;  Surgeon: Gilbert Rodriguez Jr., MD;  Location: [facility] ENDO;  Service: Endoscopy;  Laterality: N/A;    CORONARY ANGIOGRAPHY N/A 07/13/2023    Procedure: ANGIOGRAM, CORONARY ARTERY;  Surgeon: Salvador Eugene MD;  Location: [facility] CATH;  Service: Cardiology;  Laterality: N/A;    CORONARY ARTERY BYPASS GRAFT (CABG) N/A 7/19/2023    Procedure: CORONARY ARTERY BYPASS GRAFT (CABG)- x 4;  Surgeon: Sandrine Wagner MD;  Location: [facility] OR;  Service: Cardiothoracic;  Laterality: N/A;    ENDOSCOPIC HARVEST OF VEIN Left 7/19/2023    Procedure: HARVEST-VEIN-ENDOVASCULAR;  Surgeon: Sandrine Wagner MD;  Location: [facility] OR;  Service: Cardiothoracic;  Laterality: Left;    Epidural steroid injection      Pain management    EPIDURAL STEROID INJECTION N/A 10/24/2024    Procedure: Injection, Steroid, Epidural;  Surgeon: Aram Terrell MD;  Location: [facility] OR;  Service: Pain Management;  Laterality: N/A;    ESOPHAGOGASTRODUODENOSCOPY  07/12/2004    CHILO.    EXCLUSION, LEFT ATRIAL APPENDAGE, OPEN, AS PART OF OPEN CHEST SURGERY N/A 7/19/2023    Procedure: EXCLUSION, LEFT ATRIAL APPENDAGE, OPEN, AS PART OF OPEN CHEST SURGERY;  Surgeon: Sandrine Wagner MD;  Location: [facility] OR;  Service: Cardiothoracic;  Laterality: N/A;    FRACTURE SURGERY Left     wrist / forearm, total of 5    INSERTION OF DORSAL COLUMN NERVE STIMULATOR FOR TRIAL N/A 12/12/2019    Procedure: INSERTION, NEUROSTIMULATOR, SPINAL CORD, DORSAL COLUMN, FOR TRIAL;  Surgeon: Evan Lyles MD;  Location: [facility] OR;  Service: Pain Management;  Laterality: N/A;    JOINT REPLACEMENT  02/06/2013    ( rt hip 2007), left hip     JOINT REPLACEMENT Left     total knee    KNEE ARTHROSCOPY W/ DEBRIDEMENT      bilateral knees , total of six    KNEE SURGERY      LAMINECTOMY USING MINIMALLY  INVASIVE TECHNIQUE N/A 2020    Procedure: LAMINECTOMY, SPINE, MINIMALLY INVASIVE /  PLACEMENT OF SPINAL CORD STIMULATOR;  Surgeon: Darlene Max MD;  Location: [facility];  Service: Neurosurgery;  Laterality: N/A;    Nervbe Block injection      Pain management    SPINAL CORD STIMULATOR IMPLANT      TOTAL SHOULDER ARTHROPLASTY Right 2022    Dr Joya    TOTAL THYROIDECTOMY Bilateral 2022    Dr Mendoza       Family History   Problem Relation Name Age of Onset    Hypertension Father      Heart disease Father      Drug abuse Daughter      Hypertension Son      Lung disease Sister      COPD Sister      Hypertension Sister      Diverticulitis Sister      Gout Sister      Kidney disease Sister      No Known Problems Mother      Eczema Neg Hx      Lupus Neg Hx      Psoriasis Neg Hx      Melanoma Neg Hx         Social History     Socioeconomic History    Marital status:    Tobacco Use    Smoking status: Former     Current packs/day: 0.00     Average packs/day: 1 pack/day for 30.0 years (30.0 ttl pk-yrs)     Types: Cigarettes     Start date: 8/3/1963     Quit date: 1992     Years since quittin.4     Passive exposure: Past    Smokeless tobacco: Never   Substance and Sexual Activity    Alcohol use: Yes     Comment: occ    Drug use: Not Currently     Social Drivers of Health     Financial Resource Strain: Low Risk  (10/17/2023)    Overall Financial Resource Strain (CARDIA)     Difficulty of Paying Living Expenses: Not hard at all   Recent Concern: Financial Resource Strain - Medium Risk (2023)    Overall Financial Resource Strain (CARDIA)     Difficulty of Paying Living Expenses: Somewhat hard   Food Insecurity: No Food Insecurity (2024)    Received from [facility] [employer]    Hunger Vital Sign     Worried About Running Out of Food in the Last Year: Never true     Ran Out of Food in the Last Year: Never true   Transportation Needs: No Transportation Needs (2024)    Received from [facility] [employer]     PRAPARE - Transportation     Lack of Transportation (Medical): No     Lack of Transportation (Non-Medical): No   Physical Activity: Sufficiently Active (8/4/2024)    Received from [facility]    Exercise Vital Sign     Days of Exercise per Week: 5 days     Minutes of Exercise per Session: 40 min   Stress: No Stress Concern Present (8/4/2024)    Received from [facility]    [employer] [place] [employer] - Occupational Stress Questionnaire     Feeling of Stress : Not at all   Housing Stability: Low Risk  (8/4/2024)    Received from [facility]    Housing Stability Vital Sign     Unable to Pay for Housing in the Last Year: No     Number of Places Lived in the Last Year: 1     Unstable Housing in the Last Year: No       Review of patient's allergies indicates:   Allergen Reactions    Xyzal [levocetirizine] Other (See Comments)     Knocked him out        Review of Systems:  12 point review of systems is negative.    Physical Exam:  Vitals:    05/20/25 1611   BP: (!) 160/92   Pulse: 76   Weight: 107.3 kg (236 lb 8.9 oz)   PainSc:   7   PainLoc: Back       Body mass index is 37.05 kg/m².    Gen: NAD  Psych: mood appropriate for given condition  HEENT: eyes anicteric   CV: RRR  HEENT: anicteric   Respiratory: non-labored, no signs of respiratory distress  Abd: non-distended  Skin: warm, dry and intact.  Gait: antalgic gait.     Sensory:  Decreased sensation in a nondermatomal distribution in his lower extremities bilaterally    Motor:     Right Left   L2/3 Iliacus Hip flexion  5  5   L3/4 Qudratus Femoris Knee Extension  5  5   L4/5 Tib Anterior Ankle Dorsiflexion   5  5   L5/S1 Extensor Hallicus Longus Great toe extension  5  5   S1/S2 Gastroc/Soleus Plantar Flexion  5  5       Labs:  Lab Results   Component Value Date    HGBA1C 6.2 (H) 01/31/2025       Lab Results   Component Value Date    WBC 8.88 04/24/2025    HGB 13.5 (L) 04/24/2025    HCT 43.5 04/24/2025    MCV 93 04/24/2025     04/24/2025          Imaging:  MRI lumbar spine 10/3/24  FINDINGS:  Stable postoperative changes of posterior instrumented fusion and decompression at L2-S1.  Osseous fusion across the L4-5 disc space.  Interbody cage at L5-S1.     Alignment: Stable fused anterolisthesis of L2 on L3 and L4 on L5.  Sagittal alignment is otherwise maintained.     Vertebral column: Vertebral body heights appear maintained.  No acute fracture is identified; however, if trauma is suspected, a CT scan would be a more sensitive examination for fractures. No evidence of an aggressive marrow replacement process. Modic type 1 degenerative endplate change along the anterior superior endplate of L1.  Multilevel disc degeneration with disc desiccation, disc space narrowing and disc bulges throughout the lumbar spine.     Cord: Susceptibility artifact from a neurostimulator lead entering the dorsal spinal canal at the T11-12 inter spinous space and extending craniad beyond the field of view, limiting evaluation of the cord.  Visualized portions of the lower cord appears normal.  Conus terminates at L1.  Stable mild clumping of the cauda equina nerve roots at the L2-3 level which may reflect sequelae of chronic inflammation.     Degenerative findings:  T11-12: Minimal circumferential disc bulge.  Moderate bilateral facet arthropathy.  No significant neural foraminal or spinal canal stenosis.  T12-L1: Disc is normal configuration.  Mild-to-moderate bilateral facet arthropathy.  Ligamentum flavum thickening.  No significant neural foraminal or spinal canal stenosis.  L1-L2: Mild circumferential disc bulge.  Moderate bilateral facet arthropathy.  Prominent ligamentum flavum thickening.  Interval increase in size of a small right-sided presumed facet joint synovial cyst, measuring 8 x 5 x 5 mm, slightly effacing right dorsal thecal sac.  Mild right neural foraminal stenosis.  Mild spinal canal stenosis.  L2-L3: Postoperative change, as above.  Disc space  narrowing.  Circumferential disc bulge.  Moderate bilateral facet arthropathy.  Mild-to-moderate bilateral neural foraminal stenosis.  Mild spinal canal stenosis.  L3-L4: Postoperative change, as above.  Enhancing scar tissue in the laminectomy bed/posterior epidural space.  Disc space narrowing.  Circumferential disc bulge.  Moderate bilateral facet arthropathy.  Mild-to-moderate right and mild left neural foraminal stenosis.  Spinal canal is decompressed posteriorly.  L4-L5: Postoperative change, as above.  Posterior osteophytic ridging.  Moderate facet arthropathy.  Mild bilateral neural foraminal stenosis.  Spinal canal is decompressed posteriorly.  L5-S1: Postoperative change, as above.  Severe disc space narrowing.  Circumferential disc bulge with osteophytic ridging.  Severe bilateral facet arthropathy.  Neural foraminal are suboptimally evaluated due to susceptibility artifact, with questionable mild bilateral neural foraminal narrowing.  No spinal canal stenosis.     Paraspinal muscles & soft tissues: Postoperative changes in the posterior paraspinal soft tissues with stable small nonspecific chronic postoperative collection in the laminectomy bed at the L3 vertebral body level.  Stable exophytic right renal cyst.  Small exophytic left renal cyst also noted.     No other discrete abnormal intraspinal enhancement.      Assessment:   Problem List Items Addressed This Visit       Spinal stenosis, lumbar region, without neurogenic claudication    Lumbar radiculopathy    Chronic pain syndrome    Chronic back pain    Relevant Orders    Pain Clinic Drug Screen     Other Visit Diagnoses         Opioid contract exists    -  Primary      Antiplatelet or antithrombotic long-term use          Swelling of lower leg          Failed back surgical syndrome          Uncomplicated opioid dependence                    Sam Pete is a 80 y.o. male patient who has a past medical history of Abnormal nuclear stress test,  Anticoagulant long-term use, Arthritis, Cataract, Chronic low back pain, Complete rupture of rotator cuff, DDD (degenerative disc disease), lumbar, Degeneration of lumbar or lumbosacral intervertebral disc, ED (erectile dysfunction), GERD (gastroesophageal reflux disease), Hypertension, Hypothyroidism, unspecified, Lumbar pseudoarthrosis, Lumbar stenosis, Obesity, PSVT (paroxysmal supraventricular tachycardia), SOB (shortness of breath), Spondylosis of lumbar region without myelopathy or radiculopathy, Squamous cell carcinoma of skin, Stroke, Testicular hypofunction, and Thoracic or lumbosacral neuritis or radiculitis. He presents with back pain.  He has a history of chronic back pain for over the past 30 years.  He has a history of 6 prior lumbar surgeries.  He has a history of spinal cord stimulator paddle lead implant.  Today he reports his pain is 7/10, constant, throbbing in his lower back with pain radiating primarily down the left leg to the left foot.  His pain is worse with sitting, standing, bending, walking, morning, flexing and relieved with medications, injections, physical therapy.  He has done physical therapy and kyree goes in the past.  Recently has been receiving injections from pain management in [place].      5/20/2025: Sam Pete returns to the office for follow up.  He returns for follow-up with continued chronic generalized pain, low back pain with radiation down bilateral legs.  He also reports some worsening swelling if left lower extremity.  The swelling started after doing extensive walking over the weekend.  He reports ultrasound in bilateral lower extremities roughly 2 weeks ago for similar swelling with no signs of clots.  Lower back pain has worsened overall in his scheduled for lumbar epidural in the near future. He continues to take morphine extended release 15 mg twice a day and oxycodone 10 mg 3-4 times daily.  He denies any ill side effects or adverse events with his  medication.  No new numbness, weakness or any new changes to his bowel or bladder function.    - I independently reviewed his lumbar MRI and he has evidence of posterior decompression from L2-S1 and interbody cage at L5-S1.  Adequate decompression of the lumbar spine.  - for his current worsening lower back pain, he is scheduled for epidural in the next few weeks.  - he denies a history of blood clots, recent ultrasound 2 weeks ago did not show any clots for similar swelling.  At this time I discussed with him to continue with compression stockings, ambulating and monitoring.  Discussed if swelling worsens or develops pain To reach out to us.  - we will continue with current regimen of morphine extended release 15 mg twice a day and oxycodone 10 mg 3-4 times daily as needed for severe pain.  - No signs of abuse, no adverse events noted, patient experiences significant benefit of analgesia, and patient demonstrates increased activity while on these medications.  The patient is meeting the goals of opioid therapy and is dependent on them for functionality and can not perform ADLS without them. Regarding opioids, the risks were discussed including tolerance, addiction, overdose, over-sedation, drug interactions, respiratory depression, opioid-induced hyperalgesia, and even death.  He has been prescribed naloxone nasal actuation spray and he reports both him in his wife were educated on its use.  UDS 03/19/2025 consistent with medications as prescribed  - recent refill for morphine and oxycodone provided, no refill requested today.   - UDS collected today  - he continues to use Nevro spinal cord stimulator.  - follow up in 3 months or sooner if needed.        : Reviewed and consistent with medication use as prescribed.      This note was completed with dictation software and grammatical errors may exist.

## 2025-05-20 NOTE — PROGRESS NOTES
[facility] Follow Up Evaluation      Referred by: No ref. provider found    PCP:     CC:   Chief Complaint   Patient presents with    Back Pain          5/20/2025     4:10 PM 3/19/2025     4:07 PM 1/6/2025     1:13 PM   Last 3 PDI Scores   Pain Disability Index (PDI) 31 32 36     Interval HPI 5/20/2025: Sam Pete returns to the office for follow up.  He returns for follow-up with continued chronic generalized pain, low back pain with radiation down bilateral legs.  He also reports some worsening swelling if left lower extremity.  The swelling started after doing extensive walking over the weekend.  He reports ultrasound in bilateral lower extremities roughly 2 weeks ago for similar swelling with no signs of clots.  Lower back pain has worsened overall in his scheduled for lumbar epidural in the near future. He continues to take morphine extended release 15 mg twice a day and oxycodone 10 mg 3-4 times daily.  He denies any ill side effects or adverse events with his medication.  No new numbness, weakness or any new changes to his bowel or bladder function.      HPI:   Sam Pete is a 80 y.o. male patient who has a past medical history of Abnormal nuclear stress test, Anticoagulant long-term use, Arthritis, Cataract, Chronic low back pain, Complete rupture of rotator cuff, DDD (degenerative disc disease), lumbar, Degeneration of lumbar or lumbosacral intervertebral disc, ED (erectile dysfunction), GERD (gastroesophageal reflux disease), Hypertension, Hypothyroidism, unspecified, Lumbar pseudoarthrosis, Lumbar stenosis, Obesity, PSVT (paroxysmal supraventricular tachycardia), SOB (shortness of breath), Spondylosis of lumbar region without myelopathy or radiculopathy, Squamous cell carcinoma of skin, Stroke, Testicular hypofunction, and Thoracic or lumbosacral neuritis or radiculitis. He presents with back pain.  He has a history of chronic back pain for over the past 30 years.  He has a  history of 6 prior lumbar surgeries.  He has a history of spinal cord stimulator paddle lead implant.  Today he reports his pain is 7/10, constant, throbbing in his lower back with pain radiating primarily down the left leg to the left foot.  His pain is worse with sitting, standing, bending, walking, morning, flexing and relieved with medications, injections, physical therapy.  He has done physical therapy and kyree goes in the past.  Recently has been receiving injections from pain management in [place].      Pain Intervention History:  - history of spinal cord stimulator paddle lead implant  - s/p caudal CARLOS on 1/20/2023with a about 50% relief of his pain  - s/p caudal CARLOS on 4/21/2023 with a about 50% relief of his pain  - s/p caudal CARLOS on 7/11/2023 with a about 50% relief of his pain  - s/p caudal CARLOS on 1/9/2024 with a about 50% relief of his pain  - s/p caudal CARLOS on 6/4/2024 with a about 50% relief of his pain  - s/p caudal CARLOS on 10/24/2024 with a about 50% relief of his pain    Past Spine Surgical History:  - posterior decompression from L2-S1 and interbody cage at L5-S1    Past and current medications:  Antineuropathics: gabapentin   NSAIDs:  Physical therapy: yes, completed   Antidepressants:  Muscle relaxers: tizanidine   Opioids: oxycodone, ms contin   Antiplatelets/Anticoagulants: aspirin     History:    Current Outpatient Medications:     albuterol (VENTOLIN HFA) 90 mcg/actuation inhaler, Inhale 2 puffs into the lungs every 6 (six) hours as needed for Wheezing or Shortness of Breath (cough). Rescue, Disp: 18 g, Rfl: 11    aspirin 81 MG Chew, Take 81 mg by mouth once daily., Disp: , Rfl:     atorvastatin (LIPITOR) 40 MG tablet, Take 1 tablet (40 mg total) by mouth every evening., Disp: 90 tablet, Rfl: 3    buPROPion (WELLBUTRIN XL) 150 MG TB24 tablet, TAKE 1 TABLET EVERY DAY, Disp: 90 tablet, Rfl: 0    calcitRIOL (ROCALTROL) 0.5 MCG Cap, TAKE 1 CAPSULE ONE TIME DAILY, Disp: 90 capsule, Rfl: 3     furosemide (LASIX) 40 MG tablet, Take 1 tablet (40 mg total) by mouth once daily., Disp: 90 tablet, Rfl: 1    gabapentin (NEURONTIN) 300 MG capsule, Take 1 capsule (300 mg total) by mouth 3 (three) times daily., Disp: 90 capsule, Rfl: 5    levothyroxine (SYNTHROID) 112 MCG tablet, Take 1 tablet (112 mcg total) by mouth before breakfast., Disp: 180 tablet, Rfl: 3    losartan (COZAAR) 50 MG tablet, TAKE 1 TABLET EVERY DAY. TAKE ADDITIONAL 25 MG (1/2 TABLET) DAILY IF BLOOD PRESSURE  OR ABOVE, Disp: 90 tablet, Rfl: 1    metoprolol succinate (TOPROL-XL) 25 MG 24 hr tablet, TAKE ONE-HALF TABLET (12.5 MG) BY MOUTH EVERY DAY, Disp: 45 tablet, Rfl: 1    morphine (MS CONTIN) 15 MG 12 hr tablet, Take 1 tablet (15 mg total) by mouth 2 (two) times daily., Disp: 60 tablet, Rfl: 0    multivitamin (THERAGRAN) per tablet, Take 1 tablet by mouth once daily., Disp: , Rfl:     mupirocin (BACTROBAN) 2 % ointment, Apply topically 3 (three) times daily., Disp: 22 g, Rfl: 0    naloxone (NARCAN) 0.4 mg/mL injection, Inject 1 mL (0.4 mg total) into the vein as needed (overdose)., Disp: 2 mL, Rfl: 5    omeprazole (PRILOSEC) 40 MG capsule, Take one capsule by mouth daily, Disp: 90 capsule, Rfl: 3    oxyCODONE-acetaminophen (PERCOCET)  mg per tablet, Take 1 tablet by mouth every 6 (six) hours as needed for Pain., Disp: 120 tablet, Rfl: 0    tamsulosin (FLOMAX) 0.4 mg Cap, Take 1 capsule (0.4 mg total) by mouth every evening., Disp: 90 capsule, Rfl: 3    tiZANidine 4 mg Cap, Take 4 mg by mouth 3 (three) times daily as needed (pain)., Disp: 90 capsule, Rfl: 3    UNABLE TO FIND, Take by mouth once daily. Vitafusion multivites gummies, Disp: , Rfl:   No current facility-administered medications for this visit.    Facility-Administered Medications Ordered in Other Visits:     diphenhydrAMINE injection 12.5 mg, 12.5 mg, Intravenous, Once PRN, Enrique Rosales MD    electrolyte-S (ISOLYTE), , Intravenous, Continuous, Enrique Rosales  MD SYLVIA    HYDROmorphone (PF) injection 0.2 mg, 0.2 mg, Intravenous, Q5 Min PRN, Enrique Rosales MD    HYDROmorphone (PF) injection 0.2 mg, 0.2 mg, Intravenous, Q5 Min PRN, Enrique Rosales MD    lactated ringers infusion, , Intravenous, Once PRN, Aram Terrell MD    lactated ringers infusion, 10 mL/hr, Intravenous, Continuous, Enrique Rosales MD, Stopped at 07/19/23 0959    lactated ringers infusion, , Intravenous, Continuous, Aram Terrell MD, Last Rate: 25 mL/hr at 07/11/23 1012, New Bag at 07/11/23 1012    lactated ringers infusion, , Intravenous, Continuous, Aram Terrell MD, Last Rate: 25 mL/hr at 01/09/24 1014, New Bag at 01/09/24 1014    lorazepam injection 0.25 mg, 0.25 mg, Intravenous, Once PRN, Enrique Rosales MD    prochlorperazine injection Soln 5 mg, 5 mg, Intravenous, Q30 Min PRN, Enrique Rosales MD    sodium chloride 0.9% flush 3 mL, 3 mL, Intravenous, Q8H, Enrique Rosales MD    Past Medical History:   Diagnosis Date    Abnormal nuclear stress test 07/2023    Anticoagulant long-term use     ASA 81 mg    Arthritis     Cataract     OU    Chronic low back pain     Complete rupture of rotator cuff 02/08/2011    DDD (degenerative disc disease), lumbar 07/08/2015    Degeneration of lumbar or lumbosacral intervertebral disc 09/09/2015    ED (erectile dysfunction)     GERD (gastroesophageal reflux disease)     Hypertension     Hypothyroidism, unspecified     Lumbar pseudoarthrosis 06/26/2017    Lumbar stenosis 06/26/2017    Obesity     PSVT (paroxysmal supraventricular tachycardia) 07/2023    SOB (shortness of breath) 07/2023    Spondylosis of lumbar region without myelopathy or radiculopathy 07/08/2015    Squamous cell carcinoma of skin     Stroke 1997    basal ganglia, left sided weakness, resolved    Testicular hypofunction     Thoracic or lumbosacral neuritis or radiculitis 07/08/2015       Past Surgical History:   Procedure Laterality Date    ANGIOGRAM, CORONARY, WITH LEFT  HEART CATHETERIZATION  07/13/2023    Procedure: Left Heart Cath;  Surgeon: Salvador Eugene MD;  Location: [facility] CATH;  Service: Cardiology;;    APPENDECTOMY      ARTHROPLASTY OF SHOULDER Right 03/22/2022    Procedure: ARTHROPLASTY, SHOULDER BRENNAN RIGHT SHOULDER HUMERAL HEAD RESURFACING;  Surgeon: Frankie Joya MD;  Location: [facility] OR;  Service: Orthopedics;  Laterality: Right;    BACK SURGERY      4- back surgery    CAUDAL EPIDURAL STEROID INJECTION N/A 12/16/2021    Procedure: Injection-steroid-epidural-caudal;  Surgeon: Aram Terrell MD;  Location: [facility] OR;  Service: Pain Management;  Laterality: N/A;    CAUDAL EPIDURAL STEROID INJECTION N/A 02/02/2022    Procedure: INJECTION, STEROID, SPINE, EPIDURAL, CAUDAL;  Surgeon: Aram Terrell MD;  Location: [facility] OR;  Service: Pain Management;  Laterality: N/A;    CAUDAL EPIDURAL STEROID INJECTION N/A 07/22/2022    Procedure: Injection-steroid-epidural-caudal;  Surgeon: Aram Terrell MD;  Location: [facility] OR;  Service: Pain Management;  Laterality: N/A;  Caudal CARLOS     CAUDAL EPIDURAL STEROID INJECTION N/A 01/20/2023    Procedure: Injection-steroid-epidural-caudal;  Surgeon: Aram Terrell MD;  Location: [facility] OR;  Service: Pain Management;  Laterality: N/A;  caudal ( melinda)    CAUDAL EPIDURAL STEROID INJECTION N/A 04/21/2023    Procedure: Injection-steroid-epidural-caudal;  Surgeon: Aram Terrell MD;  Location: [facility] OR;  Service: Pain Management;  Laterality: N/A;  caudal    CAUDAL EPIDURAL STEROID INJECTION N/A 07/11/2023    Procedure: Injection-steroid-epidural-caudal;  Surgeon: Aram Terrell MD;  Location: [facility] OR;  Service: Pain Management;  Laterality: N/A;  caudal    CAUDAL EPIDURAL STEROID INJECTION N/A 1/9/2024    Procedure: Injection-steroid-epidural-caudal;  Surgeon: Aram Terrell MD;  Location: [facility] OR;  Service: Anesthesiology;  Laterality: N/A;  caudal    CAUDAL EPIDURAL STEROID INJECTION N/A 6/4/2024    Procedure: Injection-steroid-epidural-caudal;  Surgeon: Xander  Aram CHIRINOS MD;  Location: [facility] OR;  Service: Anesthesiology;  Laterality: N/A;    COLONOSCOPY  07/12/2004    CHILO.   Redundant tortuous colon, otherwise normal.    COLONOSCOPY N/A 02/25/2019    Procedure: COLONOSCOPY;  Surgeon: Gilbert Rodriguez Jr., MD;  Location: [facility] ENDO;  Service: Endoscopy;  Laterality: N/A;    CORONARY ANGIOGRAPHY N/A 07/13/2023    Procedure: ANGIOGRAM, CORONARY ARTERY;  Surgeon: Salvador Eugene MD;  Location: [facility] CATH;  Service: Cardiology;  Laterality: N/A;    CORONARY ARTERY BYPASS GRAFT (CABG) N/A 7/19/2023    Procedure: CORONARY ARTERY BYPASS GRAFT (CABG)- x 4;  Surgeon: Sandrine Wagner MD;  Location: [facility] OR;  Service: Cardiothoracic;  Laterality: N/A;    ENDOSCOPIC HARVEST OF VEIN Left 7/19/2023    Procedure: HARVEST-VEIN-ENDOVASCULAR;  Surgeon: Sandrine Wagner MD;  Location: [facility] OR;  Service: Cardiothoracic;  Laterality: Left;    Epidural steroid injection      Pain management    EPIDURAL STEROID INJECTION N/A 10/24/2024    Procedure: Injection, Steroid, Epidural;  Surgeon: Aram Terrell MD;  Location: [facility] OR;  Service: Pain Management;  Laterality: N/A;    ESOPHAGOGASTRODUODENOSCOPY  07/12/2004    CHILO.    EXCLUSION, LEFT ATRIAL APPENDAGE, OPEN, AS PART OF OPEN CHEST SURGERY N/A 7/19/2023    Procedure: EXCLUSION, LEFT ATRIAL APPENDAGE, OPEN, AS PART OF OPEN CHEST SURGERY;  Surgeon: Sandrine Wagner MD;  Location: [facility] OR;  Service: Cardiothoracic;  Laterality: N/A;    FRACTURE SURGERY Left     wrist / forearm, total of 5    INSERTION OF DORSAL COLUMN NERVE STIMULATOR FOR TRIAL N/A 12/12/2019    Procedure: INSERTION, NEUROSTIMULATOR, SPINAL CORD, DORSAL COLUMN, FOR TRIAL;  Surgeon: Evan Lyles MD;  Location: [facility] OR;  Service: Pain Management;  Laterality: N/A;    JOINT REPLACEMENT  02/06/2013    ( rt hip 2007), left hip     JOINT REPLACEMENT Left     total knee    KNEE ARTHROSCOPY W/ DEBRIDEMENT      bilateral knees , total of six    KNEE SURGERY      LAMINECTOMY USING MINIMALLY  INVASIVE TECHNIQUE N/A 2020    Procedure: LAMINECTOMY, SPINE, MINIMALLY INVASIVE /  PLACEMENT OF SPINAL CORD STIMULATOR;  Surgeon: Darlene Max MD;  Location: [facility];  Service: Neurosurgery;  Laterality: N/A;    Nervbe Block injection      Pain management    SPINAL CORD STIMULATOR IMPLANT      TOTAL SHOULDER ARTHROPLASTY Right 2022    Dr Joya    TOTAL THYROIDECTOMY Bilateral 2022    Dr Mendoza       Family History   Problem Relation Name Age of Onset    Hypertension Father      Heart disease Father      Drug abuse Daughter      Hypertension Son      Lung disease Sister      COPD Sister      Hypertension Sister      Diverticulitis Sister      Gout Sister      Kidney disease Sister      No Known Problems Mother      Eczema Neg Hx      Lupus Neg Hx      Psoriasis Neg Hx      Melanoma Neg Hx         Social History     Socioeconomic History    Marital status:    Tobacco Use    Smoking status: Former     Current packs/day: 0.00     Average packs/day: 1 pack/day for 30.0 years (30.0 ttl pk-yrs)     Types: Cigarettes     Start date: 8/3/1963     Quit date: 1992     Years since quittin.4     Passive exposure: Past    Smokeless tobacco: Never   Substance and Sexual Activity    Alcohol use: Yes     Comment: occ    Drug use: Not Currently     Social Drivers of Health     Financial Resource Strain: Low Risk  (10/17/2023)    Overall Financial Resource Strain (CARDIA)     Difficulty of Paying Living Expenses: Not hard at all   Recent Concern: Financial Resource Strain - Medium Risk (2023)    Overall Financial Resource Strain (CARDIA)     Difficulty of Paying Living Expenses: Somewhat hard   Food Insecurity: No Food Insecurity (2024)    Received from [facility] [place] [place] [employer]    Hunger Vital Sign     Worried About Running Out of Food in the Last Year: Never true     Ran Out of Food in the Last Year: Never true   Transportation Needs: No Transportation Needs (2024)    Received from [facility] [place] [place] [employer]     PRAPARE - Transportation     Lack of Transportation (Medical): No     Lack of Transportation (Non-Medical): No   Physical Activity: Sufficiently Active (8/4/2024)    Received from [facility]    Exercise Vital Sign     Days of Exercise per Week: 5 days     Minutes of Exercise per Session: 40 min   Stress: No Stress Concern Present (8/4/2024)    Received from [facility]    [employer] [place] [employer] - Occupational Stress Questionnaire     Feeling of Stress : Not at all   Housing Stability: Low Risk  (8/4/2024)    Received from [facility]    Housing Stability Vital Sign     Unable to Pay for Housing in the Last Year: No     Number of Places Lived in the Last Year: 1     Unstable Housing in the Last Year: No       Review of patient's allergies indicates:   Allergen Reactions    Xyzal [levocetirizine] Other (See Comments)     Knocked him out        Review of Systems:  12 point review of systems is negative.    Physical Exam:  Vitals:    05/20/25 1611   BP: (!) 160/92   Pulse: 76   Weight: 107.3 kg (236 lb 8.9 oz)   PainSc:   7   PainLoc: Back       Body mass index is 37.05 kg/m².    Gen: NAD  Psych: mood appropriate for given condition  HEENT: eyes anicteric   CV: RRR  HEENT: anicteric   Respiratory: non-labored, no signs of respiratory distress  Abd: non-distended  Skin: warm, dry and intact.  Gait: antalgic gait.     Sensory:  Decreased sensation in a nondermatomal distribution in his lower extremities bilaterally    Motor:     Right Left   L2/3 Iliacus Hip flexion  5  5   L3/4 Qudratus Femoris Knee Extension  5  5   L4/5 Tib Anterior Ankle Dorsiflexion   5  5   L5/S1 Extensor Hallicus Longus Great toe extension  5  5   S1/S2 Gastroc/Soleus Plantar Flexion  5  5       Labs:  Lab Results   Component Value Date    HGBA1C 6.2 (H) 01/31/2025       Lab Results   Component Value Date    WBC 8.88 04/24/2025    HGB 13.5 (L) 04/24/2025    HCT 43.5 04/24/2025    MCV 93 04/24/2025     04/24/2025          Imaging:  MRI lumbar spine 10/3/24  FINDINGS:  Stable postoperative changes of posterior instrumented fusion and decompression at L2-S1.  Osseous fusion across the L4-5 disc space.  Interbody cage at L5-S1.     Alignment: Stable fused anterolisthesis of L2 on L3 and L4 on L5.  Sagittal alignment is otherwise maintained.     Vertebral column: Vertebral body heights appear maintained.  No acute fracture is identified; however, if trauma is suspected, a CT scan would be a more sensitive examination for fractures. No evidence of an aggressive marrow replacement process. Modic type 1 degenerative endplate change along the anterior superior endplate of L1.  Multilevel disc degeneration with disc desiccation, disc space narrowing and disc bulges throughout the lumbar spine.     Cord: Susceptibility artifact from a neurostimulator lead entering the dorsal spinal canal at the T11-12 inter spinous space and extending craniad beyond the field of view, limiting evaluation of the cord.  Visualized portions of the lower cord appears normal.  Conus terminates at L1.  Stable mild clumping of the cauda equina nerve roots at the L2-3 level which may reflect sequelae of chronic inflammation.     Degenerative findings:  T11-12: Minimal circumferential disc bulge.  Moderate bilateral facet arthropathy.  No significant neural foraminal or spinal canal stenosis.  T12-L1: Disc is normal configuration.  Mild-to-moderate bilateral facet arthropathy.  Ligamentum flavum thickening.  No significant neural foraminal or spinal canal stenosis.  L1-L2: Mild circumferential disc bulge.  Moderate bilateral facet arthropathy.  Prominent ligamentum flavum thickening.  Interval increase in size of a small right-sided presumed facet joint synovial cyst, measuring 8 x 5 x 5 mm, slightly effacing right dorsal thecal sac.  Mild right neural foraminal stenosis.  Mild spinal canal stenosis.  L2-L3: Postoperative change, as above.  Disc space  narrowing.  Circumferential disc bulge.  Moderate bilateral facet arthropathy.  Mild-to-moderate bilateral neural foraminal stenosis.  Mild spinal canal stenosis.  L3-L4: Postoperative change, as above.  Enhancing scar tissue in the laminectomy bed/posterior epidural space.  Disc space narrowing.  Circumferential disc bulge.  Moderate bilateral facet arthropathy.  Mild-to-moderate right and mild left neural foraminal stenosis.  Spinal canal is decompressed posteriorly.  L4-L5: Postoperative change, as above.  Posterior osteophytic ridging.  Moderate facet arthropathy.  Mild bilateral neural foraminal stenosis.  Spinal canal is decompressed posteriorly.  L5-S1: Postoperative change, as above.  Severe disc space narrowing.  Circumferential disc bulge with osteophytic ridging.  Severe bilateral facet arthropathy.  Neural foraminal are suboptimally evaluated due to susceptibility artifact, with questionable mild bilateral neural foraminal narrowing.  No spinal canal stenosis.     Paraspinal muscles & soft tissues: Postoperative changes in the posterior paraspinal soft tissues with stable small nonspecific chronic postoperative collection in the laminectomy bed at the L3 vertebral body level.  Stable exophytic right renal cyst.  Small exophytic left renal cyst also noted.     No other discrete abnormal intraspinal enhancement.      Assessment:   Problem List Items Addressed This Visit       Spinal stenosis, lumbar region, without neurogenic claudication    Lumbar radiculopathy    Chronic pain syndrome    Chronic back pain    Relevant Orders    Pain Clinic Drug Screen     Other Visit Diagnoses         Opioid contract exists    -  Primary      Antiplatelet or antithrombotic long-term use          Swelling of lower leg          Failed back surgical syndrome          Uncomplicated opioid dependence                    Sam Pete is a 80 y.o. male patient who has a past medical history of Abnormal nuclear stress test,  Anticoagulant long-term use, Arthritis, Cataract, Chronic low back pain, Complete rupture of rotator cuff, DDD (degenerative disc disease), lumbar, Degeneration of lumbar or lumbosacral intervertebral disc, ED (erectile dysfunction), GERD (gastroesophageal reflux disease), Hypertension, Hypothyroidism, unspecified, Lumbar pseudoarthrosis, Lumbar stenosis, Obesity, PSVT (paroxysmal supraventricular tachycardia), SOB (shortness of breath), Spondylosis of lumbar region without myelopathy or radiculopathy, Squamous cell carcinoma of skin, Stroke, Testicular hypofunction, and Thoracic or lumbosacral neuritis or radiculitis. He presents with back pain.  He has a history of chronic back pain for over the past 30 years.  He has a history of 6 prior lumbar surgeries.  He has a history of spinal cord stimulator paddle lead implant.  Today he reports his pain is 7/10, constant, throbbing in his lower back with pain radiating primarily down the left leg to the left foot.  His pain is worse with sitting, standing, bending, walking, morning, flexing and relieved with medications, injections, physical therapy.  He has done physical therapy and kyree goes in the past.  Recently has been receiving injections from pain management in [place].      5/20/2025: Sam Pete returns to the office for follow up.  He returns for follow-up with continued chronic generalized pain, low back pain with radiation down bilateral legs.  He also reports some worsening swelling if left lower extremity.  The swelling started after doing extensive walking over the weekend.  He reports ultrasound in bilateral lower extremities roughly 2 weeks ago for similar swelling with no signs of clots.  Lower back pain has worsened overall in his scheduled for lumbar epidural in the near future. He continues to take morphine extended release 15 mg twice a day and oxycodone 10 mg 3-4 times daily.  He denies any ill side effects or adverse events with his  medication.  No new numbness, weakness or any new changes to his bowel or bladder function.    - I independently reviewed his lumbar MRI and he has evidence of posterior decompression from L2-S1 and interbody cage at L5-S1.  Adequate decompression of the lumbar spine.  - for his current worsening lower back pain, he is scheduled for epidural in the next few weeks.  - he denies a history of blood clots, recent ultrasound 2 weeks ago did not show any clots for similar swelling.  At this time I discussed with him to continue with compression stockings, ambulating and monitoring.  Discussed if swelling worsens or develops pain To reach out to us.  - we will continue with current regimen of morphine extended release 15 mg twice a day and oxycodone 10 mg 3-4 times daily as needed for severe pain.  - No signs of abuse, no adverse events noted, patient experiences significant benefit of analgesia, and patient demonstrates increased activity while on these medications.  The patient is meeting the goals of opioid therapy and is dependent on them for functionality and can not perform ADLS without them. Regarding opioids, the risks were discussed including tolerance, addiction, overdose, over-sedation, drug interactions, respiratory depression, opioid-induced hyperalgesia, and even death.  He has been prescribed naloxone nasal actuation spray and he reports both him in his wife were educated on its use.  UDS 03/19/2025 consistent with medications as prescribed  - recent refill for morphine and oxycodone provided, no refill requested today.   - UDS collected today  - he continues to use Nevro spinal cord stimulator.  - follow up in 3 months or sooner if needed.        : Reviewed and consistent with medication use as prescribed.      This note was completed with dictation software and grammatical errors may exist.

## 2025-05-21 ENCOUNTER — TELEPHONE (OUTPATIENT)
Dept: CARDIOLOGY | Facility: CLINIC | Age: 80
End: 2025-05-21
Payer: MEDICARE

## 2025-05-21 ENCOUNTER — PATIENT MESSAGE (OUTPATIENT)
Dept: CARDIOLOGY | Facility: CLINIC | Age: 80
End: 2025-05-21
Payer: MEDICARE

## 2025-05-21 NOTE — TELEPHONE ENCOUNTER
----- Message from Javid sent at 5/21/2025  9:46 AM CDT -----  Type:  Needs Medical AdviceWho Called: ptWould the patient rather a call back or a response via [facility]? Call Mabel Call Back Number: [phone] Additional Information: pt st he needs to come at a later time around 2 or 230 for his appt. please call to discuss

## 2025-05-22 ENCOUNTER — HOSPITAL ENCOUNTER (OUTPATIENT)
Dept: CARDIOLOGY | Facility: HOSPITAL | Age: 80
Discharge: HOME | End: 2025-05-22
Payer: MEDICARE

## 2025-05-22 DIAGNOSIS — R42 DIZZINESS: ICD-10-CM

## 2025-05-22 DIAGNOSIS — I48.0 PAF (PAROXYSMAL ATRIAL FIBRILLATION): ICD-10-CM

## 2025-05-22 PROCEDURE — 93271 ECG/MONITORING AND ANALYSIS: CPT | Mod: HCNC,PO

## 2025-06-02 ENCOUNTER — OFFICE VISIT (OUTPATIENT)
Dept: FAMILY MEDICINE | Facility: CLINIC | Age: 80
End: 2025-06-02
Payer: MEDICARE

## 2025-06-02 VITALS
SYSTOLIC BLOOD PRESSURE: 138 MMHG | WEIGHT: 237.63 LBS | BODY MASS INDEX: 35.2 KG/M2 | HEIGHT: 69 IN | OXYGEN SATURATION: 95 % | RESPIRATION RATE: 16 BRPM | DIASTOLIC BLOOD PRESSURE: 78 MMHG | HEART RATE: 70 BPM | TEMPERATURE: 98 F

## 2025-06-02 DIAGNOSIS — L03.116 CELLULITIS OF LEFT LOWER EXTREMITY: ICD-10-CM

## 2025-06-02 DIAGNOSIS — R29.818 TRANSIENT NEUROLOGIC DEFICIT: Primary | ICD-10-CM

## 2025-06-02 DIAGNOSIS — M79.89 LEFT LEG SWELLING: ICD-10-CM

## 2025-06-02 PROCEDURE — 99999 PR PBB SHADOW E&M-EST. PATIENT-LVL V: CPT | Mod: PBBFAC,HCNC,, | Performed by: FAMILY MEDICINE

## 2025-06-02 PROCEDURE — 1125F AMNT PAIN NOTED PAIN PRSNT: CPT | Mod: CPTII,HCNC,S$GLB, | Performed by: FAMILY MEDICINE

## 2025-06-02 PROCEDURE — 3075F SYST BP GE 130 - 139MM HG: CPT | Mod: CPTII,HCNC,S$GLB, | Performed by: FAMILY MEDICINE

## 2025-06-02 PROCEDURE — 1100F PTFALLS ASSESS-DOCD GE2>/YR: CPT | Mod: CPTII,HCNC,S$GLB, | Performed by: FAMILY MEDICINE

## 2025-06-02 PROCEDURE — 99214 OFFICE O/P EST MOD 30 MIN: CPT | Mod: HCNC,S$GLB,, | Performed by: FAMILY MEDICINE

## 2025-06-02 PROCEDURE — 1159F MED LIST DOCD IN RCRD: CPT | Mod: CPTII,HCNC,S$GLB, | Performed by: FAMILY MEDICINE

## 2025-06-02 PROCEDURE — 3288F FALL RISK ASSESSMENT DOCD: CPT | Mod: CPTII,HCNC,S$GLB, | Performed by: FAMILY MEDICINE

## 2025-06-02 PROCEDURE — 3078F DIAST BP <80 MM HG: CPT | Mod: CPTII,HCNC,S$GLB, | Performed by: FAMILY MEDICINE

## 2025-06-02 RX ORDER — SULFAMETHOXAZOLE AND TRIMETHOPRIM 800; 160 MG/1; MG/1
1 TABLET ORAL 2 TIMES DAILY
Qty: 10 TABLET | Refills: 0 | Status: SHIPPED | OUTPATIENT
Start: 2025-06-02

## 2025-06-02 RX ORDER — GABAPENTIN 300 MG/1
300 CAPSULE ORAL 3 TIMES DAILY
Qty: 90 CAPSULE | Refills: 5 | Status: SHIPPED | OUTPATIENT
Start: 2025-06-02 | End: 2026-06-02

## 2025-06-03 ENCOUNTER — HOSPITAL ENCOUNTER (OUTPATIENT)
Dept: RADIOLOGY | Facility: HOSPITAL | Age: 80
Discharge: HOME | End: 2025-06-03
Attending: ANESTHESIOLOGY
Payer: MEDICARE

## 2025-06-03 ENCOUNTER — PATIENT MESSAGE (OUTPATIENT)
Dept: PAIN MEDICINE | Facility: CLINIC | Age: 80
End: 2025-06-03
Payer: MEDICARE

## 2025-06-03 ENCOUNTER — TELEPHONE (OUTPATIENT)
Dept: SURGERY | Facility: HOSPITAL | Age: 80
End: 2025-06-03
Payer: MEDICARE

## 2025-06-03 DIAGNOSIS — M54.50 LOWER BACK PAIN: ICD-10-CM

## 2025-06-03 DIAGNOSIS — F11.20 UNCOMPLICATED OPIOID DEPENDENCE: ICD-10-CM

## 2025-06-04 DIAGNOSIS — M54.16 LUMBAR RADICULOPATHY: Primary | ICD-10-CM

## 2025-06-04 RX ORDER — SODIUM CHLORIDE, SODIUM LACTATE, POTASSIUM CHLORIDE, CALCIUM CHLORIDE 600; 310; 30; 20 MG/100ML; MG/100ML; MG/100ML; MG/100ML
INJECTION, SOLUTION INTRAVENOUS CONTINUOUS
OUTPATIENT
Start: 2025-06-04

## 2025-06-05 RX ORDER — OXYCODONE AND ACETAMINOPHEN 10; 325 MG/1; MG/1
1 TABLET ORAL EVERY 6 HOURS PRN
Qty: 120 TABLET | Refills: 0 | Status: SHIPPED | OUTPATIENT
Start: 2025-06-06 | End: 2025-07-06

## 2025-06-16 ENCOUNTER — PATIENT MESSAGE (OUTPATIENT)
Dept: PAIN MEDICINE | Facility: CLINIC | Age: 80
End: 2025-06-16
Payer: MEDICARE

## 2025-06-16 DIAGNOSIS — G89.4 CHRONIC PAIN SYNDROME: ICD-10-CM

## 2025-06-17 ENCOUNTER — HOSPITAL ENCOUNTER (OUTPATIENT)
Dept: RADIOLOGY | Facility: HOSPITAL | Age: 80
Discharge: HOME | End: 2025-06-17
Attending: ANESTHESIOLOGY | Admitting: ANESTHESIOLOGY
Payer: MEDICARE

## 2025-06-17 ENCOUNTER — HOSPITAL ENCOUNTER (OUTPATIENT)
Facility: HOSPITAL | Age: 80
Discharge: HOME | End: 2025-06-17
Attending: ANESTHESIOLOGY | Admitting: ANESTHESIOLOGY
Payer: MEDICARE

## 2025-06-17 VITALS
HEIGHT: 69 IN | BODY MASS INDEX: 33.92 KG/M2 | SYSTOLIC BLOOD PRESSURE: 143 MMHG | DIASTOLIC BLOOD PRESSURE: 71 MMHG | WEIGHT: 229 LBS | HEART RATE: 74 BPM | OXYGEN SATURATION: 95 % | TEMPERATURE: 97 F | RESPIRATION RATE: 14 BRPM

## 2025-06-17 DIAGNOSIS — M54.50 LOWER BACK PAIN: ICD-10-CM

## 2025-06-17 DIAGNOSIS — M54.16 LUMBAR RADICULOPATHY: Primary | ICD-10-CM

## 2025-06-17 PROCEDURE — 62323 NJX INTERLAMINAR LMBR/SAC: CPT | Mod: HCNC,,, | Performed by: ANESTHESIOLOGY

## 2025-06-17 PROCEDURE — 63600175 PHARM REV CODE 636 W HCPCS: Mod: HCNC,PO | Performed by: ANESTHESIOLOGY

## 2025-06-17 PROCEDURE — 25000003 PHARM REV CODE 250: Mod: HCNC,PO | Performed by: ANESTHESIOLOGY

## 2025-06-17 PROCEDURE — 62323 NJX INTERLAMINAR LMBR/SAC: CPT | Mod: HCNC,PO | Performed by: ANESTHESIOLOGY

## 2025-06-17 RX ORDER — MIDAZOLAM HYDROCHLORIDE 1 MG/ML
INJECTION INTRAMUSCULAR; INTRAVENOUS
Status: DISCONTINUED | OUTPATIENT
Start: 2025-06-17 | End: 2025-06-17 | Stop reason: HOSPADM

## 2025-06-17 RX ORDER — SODIUM CHLORIDE, SODIUM LACTATE, POTASSIUM CHLORIDE, CALCIUM CHLORIDE 600; 310; 30; 20 MG/100ML; MG/100ML; MG/100ML; MG/100ML
INJECTION, SOLUTION INTRAVENOUS CONTINUOUS
Status: DISCONTINUED | OUTPATIENT
Start: 2025-06-17 | End: 2025-06-17

## 2025-06-17 RX ORDER — SODIUM CHLORIDE 9 MG/ML
INJECTION, SOLUTION INTRAVENOUS CONTINUOUS
Status: DISCONTINUED | OUTPATIENT
Start: 2025-06-17 | End: 2025-06-17 | Stop reason: HOSPADM

## 2025-06-17 RX ORDER — BUPIVACAINE HYDROCHLORIDE 2.5 MG/ML
INJECTION, SOLUTION EPIDURAL; INFILTRATION; INTRACAUDAL; PERINEURAL
Status: DISCONTINUED | OUTPATIENT
Start: 2025-06-17 | End: 2025-06-17 | Stop reason: HOSPADM

## 2025-06-17 RX ORDER — LIDOCAINE HYDROCHLORIDE 10 MG/ML
INJECTION, SOLUTION EPIDURAL; INFILTRATION; INTRACAUDAL; PERINEURAL
Status: DISCONTINUED | OUTPATIENT
Start: 2025-06-17 | End: 2025-06-17 | Stop reason: HOSPADM

## 2025-06-17 RX ORDER — MORPHINE SULFATE 15 MG/1
15 TABLET, FILM COATED, EXTENDED RELEASE ORAL 2 TIMES DAILY
Qty: 60 TABLET | Refills: 0 | Status: SHIPPED | OUTPATIENT
Start: 2025-06-17 | End: 2025-07-17

## 2025-06-17 RX ADMIN — SODIUM CHLORIDE: 9 INJECTION, SOLUTION INTRAVENOUS at 02:06

## 2025-06-17 NOTE — PLAN OF CARE
Plan of care reviewed with patient. Verbalized understanding, allowed time for questions. Safety measures maintained. Bed low, wheels locked, side rails up, call light within reach. Will continue to monitor.

## 2025-06-17 NOTE — INTERVAL H&P NOTE
The patient has been examined and the H&P has been reviewed:    I concur with the findings and no changes have occurred since H&P was written.  He has completed antibiotics for lower extremity cellulitis so we can go.  No significant edema or erythema or purulent drainage.  He denies any fever, chills.  He has held aspirin appropriately.  We will proceed with repeat caudal CARLOS.  The risks and benefits of this intervention, and alternative therapies were discussed with the patient.  The discussion of risks included infection, bleeding, need for additional procedures or surgery, nerve damage.  Questions regarding the procedure, risks, expected outcome, and possible side effects were solicited and answered to the patient's satisfaction.  Ayo Pete wishes to proceed with the injection or procedure.  Written consent was obtained.  ASA 3, MP II      There are no hospital problems to display for this patient.

## 2025-06-17 NOTE — OP NOTE
Procedure Note    Procedure Date: 6/17/2025    Procedure Performed:  Caudal epidural steroid injection under fluoroscopy    Indications:  Sam Pete presents with lumbar radiculitis/radiculopathy secondary to disc herniation, osteophyte/osteophyte complexes, and/or severe degenerative disc disease producing foraminal or central spinal stenosis.  The pain has been present for at least 4 weeks and the patient has failed to respond to noninvasive conservative care.  Pain rated by NRS at baseline prior to intervention is 6/10.  Their radiculitis/radiculopathy and/or neurogenic claudication is severe enough to greatly impact their quality of life or function.     Pre-op diagnosis: Lumbar radiculitis/radiculopathy    Post-op diagnosis: same    Physician: Evan Lyles MD    IV anxiolysis medications: versed 2mg    Medications injected: Depomedrol 80mg, 2ml Bupivacaine 0.25%, 6 mL sterile, preservative-free normal saline.    Local anesthetic used: 1% Lidocaine, 1 ml    Estimated Blood Loss: none    Complications:  none    Technique:   The patient was interviewed in the holding area and Risks/Benefits were discussed, including, but not limited to, the possibility of new or different pain, bleeding or infection.   All questions were answered.  The patient understood and accepted risks.  Consent was reviewed.  A time-out was taken to identify patient and procedure side prior to starting the procedure.  After the patient was placed in prone position, the patient was prepped and draped in the usual sterile fashion using ChloraPrep x3 and sterile towels.  Appropriate anatomic landmarks were determined by identifying the sacral hiatus in the lateral fluoroscopic view.  Local anesthetic was given via a 25g 1.5 inch needle by raising a wheal and infiltrating down to the periosteum.  A 25 gauge 3.5 inch needle was introduced thru the sacral hiatus.  Omnipaque was injected to confirm placement in the appropriate area and that  there was no vascular uptake.  The medication was then injected slowly.  The patient tolerated the procedure well.  The patient was monitored after the procedure.  Patient was given post procedure and discharge instructions to follow at home.  The patient was discharged in a stable condition

## 2025-06-17 NOTE — PLAN OF CARE
Dr. Lyles made aware that pt completed ABx for recent cellulitis to lower legs and that patient has been wearing a heart monitor x2 weeks (will be on x1 month), due to a near-syncopal episode. MD okay to proceed today.

## 2025-06-17 NOTE — DISCHARGE SUMMARY
[facility]  Discharge Note  Short Stay    Admit Date: 6/17/2025    Discharge Date: 6/17/2025    Attending Physician: Evan Lyles     Discharge Provider: Evan Lyles    Diagnoses:  There are no hospital problems to display for this patient.      Discharged Condition: Good    Final Diagnoses: Lumbar radiculopathy [M54.16]    Disposition: Home or Self Care    Hospital Course: No complications, uneventful    Outcome of Hospitalization, Treatment, Procedure, or Surgery:  Patient was admitted for outpatient interventional pain management procedure. The patient tolerated the procedure well with no complications.    Follow up/Patient Instructions:  Follow up as scheduled in Pain Management office in 2-3 weeks.  Patient has received instructions and follow up date and time.    Medications:  Continue previous medications, except restart aspirin in 24 hours    Discharge Procedure Orders   Notify your health care provider if you experience any of the following:  temperature >100.4     Notify your health care provider if you experience any of the following:  persistent nausea and vomiting or diarrhea     Notify your health care provider if you experience any of the following:  severe uncontrolled pain     Notify your health care provider if you experience any of the following:  redness, tenderness, or signs of infection (pain, swelling, redness, odor or green/yellow discharge around incision site)     Notify your health care provider if you experience any of the following:  difficulty breathing or increased cough     Notify your health care provider if you experience any of the following:  severe persistent headache     Notify your health care provider if you experience any of the following:  worsening rash     Notify your health care provider if you experience any of the following:  persistent dizziness, light-headedness, or visual disturbances     Notify your health care provider if you experience any of the  following:  increased confusion or weakness     Activity as tolerated

## 2025-06-18 ENCOUNTER — PATIENT MESSAGE (OUTPATIENT)
Dept: FAMILY MEDICINE | Facility: CLINIC | Age: 80
End: 2025-06-18
Payer: MEDICARE

## 2025-06-19 ENCOUNTER — TELEPHONE (OUTPATIENT)
Dept: FAMILY MEDICINE | Facility: CLINIC | Age: 80
End: 2025-06-19
Payer: MEDICARE

## 2025-06-19 ENCOUNTER — LAB VISIT (OUTPATIENT)
Dept: LAB | Facility: HOSPITAL | Age: 80
End: 2025-06-19
Attending: FAMILY MEDICINE
Payer: MEDICARE

## 2025-06-19 ENCOUNTER — OFFICE VISIT (OUTPATIENT)
Dept: FAMILY MEDICINE | Facility: CLINIC | Age: 80
End: 2025-06-19
Payer: MEDICARE

## 2025-06-19 VITALS
BODY MASS INDEX: 34.25 KG/M2 | WEIGHT: 231.25 LBS | SYSTOLIC BLOOD PRESSURE: 145 MMHG | DIASTOLIC BLOOD PRESSURE: 74 MMHG | RESPIRATION RATE: 16 BRPM | HEIGHT: 69 IN | OXYGEN SATURATION: 94 % | HEART RATE: 70 BPM | TEMPERATURE: 98 F

## 2025-06-19 DIAGNOSIS — Z79.899 DRUG THERAPY: ICD-10-CM

## 2025-06-19 DIAGNOSIS — L03.116 CELLULITIS OF LEFT LOWER EXTREMITY: ICD-10-CM

## 2025-06-19 DIAGNOSIS — R05.9 COUGH, UNSPECIFIED TYPE: ICD-10-CM

## 2025-06-19 DIAGNOSIS — J00 COMMON COLD: ICD-10-CM

## 2025-06-19 DIAGNOSIS — M79.89 LEFT LEG SWELLING: Primary | ICD-10-CM

## 2025-06-19 DIAGNOSIS — R09.81 NASAL CONGESTION: ICD-10-CM

## 2025-06-19 DIAGNOSIS — I87.2 VENOUS INSUFFICIENCY: ICD-10-CM

## 2025-06-19 LAB
EAG (OHS): 126 MG/DL (ref 68–131)
HBA1C MFR BLD: 6 % (ref 4–5.6)
T4 FREE SERPL-MCNC: 0.78 NG/DL (ref 0.71–1.51)
TSH SERPL-ACNC: 33.34 UIU/ML (ref 0.4–4)

## 2025-06-19 PROCEDURE — 84439 ASSAY OF FREE THYROXINE: CPT | Mod: HCNC

## 2025-06-19 PROCEDURE — 99999 PR PBB SHADOW E&M-EST. PATIENT-LVL V: CPT | Mod: PBBFAC,HCNC,, | Performed by: FAMILY MEDICINE

## 2025-06-19 PROCEDURE — 3078F DIAST BP <80 MM HG: CPT | Mod: CPTII,HCNC,S$GLB, | Performed by: FAMILY MEDICINE

## 2025-06-19 PROCEDURE — 99214 OFFICE O/P EST MOD 30 MIN: CPT | Mod: HCNC,S$GLB,, | Performed by: FAMILY MEDICINE

## 2025-06-19 PROCEDURE — 36415 COLL VENOUS BLD VENIPUNCTURE: CPT | Mod: HCNC,PN

## 2025-06-19 PROCEDURE — 3288F FALL RISK ASSESSMENT DOCD: CPT | Mod: CPTII,HCNC,S$GLB, | Performed by: FAMILY MEDICINE

## 2025-06-19 PROCEDURE — 1100F PTFALLS ASSESS-DOCD GE2>/YR: CPT | Mod: CPTII,HCNC,S$GLB, | Performed by: FAMILY MEDICINE

## 2025-06-19 PROCEDURE — 1125F AMNT PAIN NOTED PAIN PRSNT: CPT | Mod: CPTII,HCNC,S$GLB, | Performed by: FAMILY MEDICINE

## 2025-06-19 PROCEDURE — 1159F MED LIST DOCD IN RCRD: CPT | Mod: CPTII,HCNC,S$GLB, | Performed by: FAMILY MEDICINE

## 2025-06-19 PROCEDURE — 83036 HEMOGLOBIN GLYCOSYLATED A1C: CPT | Mod: HCNC

## 2025-06-19 PROCEDURE — 84443 ASSAY THYROID STIM HORMONE: CPT | Mod: HCNC

## 2025-06-19 PROCEDURE — 3077F SYST BP >= 140 MM HG: CPT | Mod: CPTII,HCNC,S$GLB, | Performed by: FAMILY MEDICINE

## 2025-06-19 RX ORDER — FLUTICASONE PROPIONATE 50 MCG
SPRAY, SUSPENSION (ML) NASAL
COMMUNITY
Start: 2025-06-13

## 2025-06-19 RX ORDER — IPRATROPIUM BROMIDE 21 UG/1
2 SPRAY, METERED NASAL 3 TIMES DAILY
Qty: 30 ML | Refills: 5 | Status: SHIPPED | OUTPATIENT
Start: 2025-06-19

## 2025-06-19 RX ORDER — BENZONATATE 200 MG/1
200 CAPSULE ORAL 3 TIMES DAILY PRN
Qty: 30 CAPSULE | Refills: 5 | Status: SHIPPED | OUTPATIENT
Start: 2025-06-19

## 2025-06-19 RX ORDER — BENZONATATE 200 MG/1
200 CAPSULE ORAL
COMMUNITY
Start: 2025-06-13 | End: 2025-06-19 | Stop reason: SDUPTHER

## 2025-06-19 RX ORDER — SYRINGE-NEEDLE,INSULIN,0.5 ML 28GX1/2"
600 SYRINGE, EMPTY DISPOSABLE MISCELLANEOUS EVERY 12 HOURS
COMMUNITY
Start: 2025-06-13

## 2025-06-19 NOTE — PROGRESS NOTES
THIS DOCUMENT WAS MADE IN PART WITH VOICE RECOGNITION SOFTWARE.  OCCASIONALLY THIS SOFTWARE WILL MISINTERPRET WORDS OR PHRASES.    Assessment and Plan:    1. Left leg swelling        2. Cellulitis of left lower extremity        3. Cough, unspecified type        4. Nasal congestion        5. Venous insufficiency        6. Common cold  ipratropium (ATROVENT) 21 mcg (0.03 %) nasal spray    benzonatate (TESSALON) 200 MG capsule      7. Drug therapy  Hemoglobin A1C    TSH                  Assessment & Plan    PLAN SUMMARY:  > Ordered thyroid function test and A1C test  > Continue compression stockings daily  > Maintain use of CeraVe lotion on legs  > Started Tessalon Perles, 3 times daily as needed for cough  > Continue levothyroxine 112 mcg daily  > Started Atrovent nasal spray for congestion and post-nasal drip  > Recommend reducing ice cream intake  > Continue Delsym as needed for cough  > Follow up in 3 months    PLAN NOTE:  > Started Atrovent nasal spray (prescription) for nasal congestion and post-nasal drip.  > Started Tessalon Perles, to be taken 3 times daily as needed for cough.  > Continued Delsym as needed for cough.  > Recommend reducing ice cream intake to help manage prediabetes risk.  > Sam to continue wearing compression stockings daily for leg health.  > Sam to maintain use of CeraVe lotion on legs.  > Continued levothyroxine 112 mcg daily for thyroid management.  > Ordered thyroid function test and A1C test to monitor and prevent potential complications.  > Follow up in 3 months.  > Contact the office if fever develops or if cough starts affecting the chest.             Visit today included increased complexity associated with the care of the episodic problem above addressed and managing the longitudinal care of the patient due to the serious and/or complex managed problem(s) .        ______________________________________________________________________  Subjective:    Chief Complaint:  Chief  Complaint   Patient presents with    Follow-up    Cough     Cough STARTED YESTERDAY AT 2:00AM, patient took Tessalon pearls he had on hand with no relief. Patient also tried delsym OTC with minor relief.     Nasal Congestion        HPI:  Sam is a 80 y.o. year old       History of Present Illness    CHIEF COMPLAINT:  Sam presents today with cough and nasal congestion    UPPER RESPIRATORY INFECTION:  He reports cough onset yesterday at 2:00 AM, which was almost continuous and significantly disrupted his sleep. The cough is less severe compared to the previous night but still present. He has persistent nasal congestion with frequent nose blowing. He attributes these symptoms to a cold virus circulating in the community. He denies fever, shortness of breath, and wheezing. He has been taking Delsym for symptomatic relief, with the last dose at 4:00 AM today.    CARDIOVASCULAR:  He reports self-monitoring blood pressure at home, which is usually consistently around 130 systolic and typically maintained under 140 systolic.    SKIN AND VASCULAR:  He has a history of phlebitis managed with daily compression stockings which he tolerates well. He uses CeraVe for skin care and reports improvement in leg appearance with reduced redness and flaking. He is under ongoing skin cancer surveillance by Dr. Foss.    MEDICAL HISTORY:  History includes pre-diabetes (based on two diabetic-range labs from 13-15 years ago, currently monitored but not actively treated), chronic back pain managed by pain team (Dr. Witt and Dr. Foss), and slightly abnormal thyroid labs from January/February requiring follow-up. He quit smoking in 1992.      ROS:  ROS as indicated in HPI.             Coronary artery disease, dyslipidemia, aortic atherosclerosis   Status post CABG (7/2023) x 4    Cardiology- MD Valorie   Rx-atorvastatin 40 mg, beta-blocker, Aspirin    H/o smoking   Quit : 1992    Carotid artery disease, history of CVA (1997)   Residual right  upper extremity tremor    History of paroxysmal supraventricular tachycardia, Paroxysmal Atrial Fib   No symptoms  Rx : beta blocker    Tricuspid regurgitation  Per Cards     Chronic edema   Rx-Lasix 40 mg QD (left > right)  + daily compression stocking     Essential hypertension   Rx-losartan 50 mg, metoprolol 12.5 mg  Home : sys <140     Chronic low back pain  Rx-gabapentin 300 mg t.i.d., MS Contin 15 mg b.i.d., Narcan, Percocet 10 mg q.4 hours p.r.n., tizanidine  Pain MD  -  MD Trupti (meds)    +    Aram Terrell MD (CARLOS)    PMR : MD Diallo (no f/u planned)   Neurosurgery- Darlene Max MD (pain stimulator)   Prev tx : Surgeries, PT (Francos), CARLOS, Spinal Cord Stimulator in place     Idiopathic neuropathy  Rx-gabapentin 300 mg t.i.d.    Depression / Anxiety   Rx-Wellbutrin 150 mg    History squamous cell carcinoma  Dermatology-Joselyn Foss MD    Iatrogenic Hypothyroidism  Rx-levothyroxine 112 mcg  S/p total thyroidectomy : non cancerous nodules     BPH, ED, Hypogonadism     Chronic intermit Wheeze   Rx : albuterol    S/p Rt shoulder / bilateral hip / left knee arthroplasty     Prediabetes  Prev A1c : 6.2                Past Medical History:  Past Medical History:   Diagnosis Date    Abnormal nuclear stress test 07/2023    Anticoagulant long-term use     ASA 81 mg    Arthritis     Cataract     OU    Chronic low back pain     Complete rupture of rotator cuff 02/08/2011    DDD (degenerative disc disease), lumbar 07/08/2015    Degeneration of lumbar or lumbosacral intervertebral disc 09/09/2015    ED (erectile dysfunction)     GERD (gastroesophageal reflux disease)     Hypertension     Hypothyroidism, unspecified     Lumbar pseudoarthrosis 06/26/2017    Lumbar stenosis 06/26/2017    Obesity     PSVT (paroxysmal supraventricular tachycardia) 07/2023    SOB (shortness of breath) 07/2023    Spondylosis of lumbar region without myelopathy or radiculopathy 07/08/2015    Squamous cell carcinoma of skin     Stroke 1997    basal  ganglia, left sided weakness, resolved    Testicular hypofunction     Thoracic or lumbosacral neuritis or radiculitis 07/08/2015       Past Surgical History:  Past Surgical History:   Procedure Laterality Date    ANGIOGRAM, CORONARY, WITH LEFT HEART CATHETERIZATION  07/13/2023    Procedure: Left Heart Cath;  Surgeon: Salvador Eugene MD;  Location: [facility] CATH;  Service: Cardiology;;    APPENDECTOMY      ARTHROPLASTY OF SHOULDER Right 03/22/2022    Procedure: ARTHROPLASTY, SHOULDER BRENNAN RIGHT SHOULDER HUMERAL HEAD RESURFACING;  Surgeon: Frankie Joya MD;  Location: [facility] OR;  Service: Orthopedics;  Laterality: Right;    BACK SURGERY      4- back surgery    CAUDAL EPIDURAL STEROID INJECTION N/A 12/16/2021    Procedure: Injection-steroid-epidural-caudal;  Surgeon: Aram Terrell MD;  Location: [facility] OR;  Service: Pain Management;  Laterality: N/A;    CAUDAL EPIDURAL STEROID INJECTION N/A 02/02/2022    Procedure: INJECTION, STEROID, SPINE, EPIDURAL, CAUDAL;  Surgeon: Aram Terrell MD;  Location: [facility] OR;  Service: Pain Management;  Laterality: N/A;    CAUDAL EPIDURAL STEROID INJECTION N/A 07/22/2022    Procedure: Injection-steroid-epidural-caudal;  Surgeon: Aram Terrell MD;  Location: [facility] OR;  Service: Pain Management;  Laterality: N/A;  Caudal CARLOS     CAUDAL EPIDURAL STEROID INJECTION N/A 01/20/2023    Procedure: Injection-steroid-epidural-caudal;  Surgeon: Aram Terrell MD;  Location: [facility] OR;  Service: Pain Management;  Laterality: N/A;  caudal ( melinda)    CAUDAL EPIDURAL STEROID INJECTION N/A 04/21/2023    Procedure: Injection-steroid-epidural-caudal;  Surgeon: Aram Terrell MD;  Location: [facility] OR;  Service: Pain Management;  Laterality: N/A;  caudal    CAUDAL EPIDURAL STEROID INJECTION N/A 07/11/2023    Procedure: Injection-steroid-epidural-caudal;  Surgeon: Aram Terrell MD;  Location: [facility] OR;  Service: Pain Management;  Laterality: N/A;  caudal    CAUDAL EPIDURAL STEROID INJECTION N/A 1/9/2024    Procedure:  Injection-steroid-epidural-caudal;  Surgeon: Aram Terrell MD;  Location: [facility] OR;  Service: Anesthesiology;  Laterality: N/A;  caudal    CAUDAL EPIDURAL STEROID INJECTION N/A 6/4/2024    Procedure: Injection-steroid-epidural-caudal;  Surgeon: Aram Terrell MD;  Location: [facility] OR;  Service: Anesthesiology;  Laterality: N/A;    CAUDAL EPIDURAL STEROID INJECTION N/A 6/17/2025    Procedure: Injection-steroid-epidural-caudal;  Surgeon: Evan Lyles MD;  Location: [facility] OR;  Service: Pain Management;  Laterality: N/A;    COLONOSCOPY  07/12/2004    CHILO.   Redundant tortuous colon, otherwise normal.    COLONOSCOPY N/A 02/25/2019    Procedure: COLONOSCOPY;  Surgeon: Gilbert Rodriguez Jr., MD;  Location: [facility] ENDO;  Service: Endoscopy;  Laterality: N/A;    CORONARY ANGIOGRAPHY N/A 07/13/2023    Procedure: ANGIOGRAM, CORONARY ARTERY;  Surgeon: Salvador Eugene MD;  Location: [facility] CATH;  Service: Cardiology;  Laterality: N/A;    CORONARY ARTERY BYPASS GRAFT (CABG) N/A 7/19/2023    Procedure: CORONARY ARTERY BYPASS GRAFT (CABG)- x 4;  Surgeon: Sandrine Wagner MD;  Location: [facility] OR;  Service: Cardiothoracic;  Laterality: N/A;    ENDOSCOPIC HARVEST OF VEIN Left 7/19/2023    Procedure: HARVEST-VEIN-ENDOVASCULAR;  Surgeon: Sandrine Wagner MD;  Location: [facility] OR;  Service: Cardiothoracic;  Laterality: Left;    Epidural steroid injection      Pain management    EPIDURAL STEROID INJECTION N/A 10/24/2024    Procedure: Injection, Steroid, Epidural;  Surgeon: Aram Terrell MD;  Location: [facility] OR;  Service: Pain Management;  Laterality: N/A;    ESOPHAGOGASTRODUODENOSCOPY  07/12/2004    [facility]O.    EXCLUSION, LEFT ATRIAL APPENDAGE, OPEN, AS PART OF OPEN CHEST SURGERY N/A 7/19/2023    Procedure: EXCLUSION, LEFT ATRIAL APPENDAGE, OPEN, AS PART OF OPEN CHEST SURGERY;  Surgeon: Sandrine Wagner MD;  Location: [facility] OR;  Service: Cardiothoracic;  Laterality: N/A;    FRACTURE SURGERY Left     wrist / forearm, total of 5    INSERTION OF DORSAL COLUMN  NERVE STIMULATOR FOR TRIAL N/A 2019    Procedure: INSERTION, NEUROSTIMULATOR, SPINAL CORD, DORSAL COLUMN, FOR TRIAL;  Surgeon: Evan Lyles MD;  Location: [facility] OR;  Service: Pain Management;  Laterality: N/A;    JOINT REPLACEMENT  2013    ( rt hip ), left hip     JOINT REPLACEMENT Left     total knee    KNEE ARTHROSCOPY W/ DEBRIDEMENT      bilateral knees , total of six    KNEE SURGERY      LAMINECTOMY USING MINIMALLY INVASIVE TECHNIQUE N/A 2020    Procedure: LAMINECTOMY, SPINE, MINIMALLY INVASIVE /  PLACEMENT OF SPINAL CORD STIMULATOR;  Surgeon: Darlene Max MD;  Location: [facility] OR;  Service: Neurosurgery;  Laterality: N/A;    Nervbe Block injection      Pain management    SPINAL CORD STIMULATOR IMPLANT      TOTAL SHOULDER ARTHROPLASTY Right 2022    Dr Joya    TOTAL THYROIDECTOMY Bilateral 2022    Dr Mendoza       Family History:  Family History   Problem Relation Name Age of Onset    Hypertension Father      Heart disease Father      Drug abuse Daughter      Hypertension Son      Lung disease Sister      COPD Sister      Hypertension Sister      Diverticulitis Sister      Gout Sister      Kidney disease Sister      No Known Problems Mother      Eczema Neg Hx      Lupus Neg Hx      Psoriasis Neg Hx      Melanoma Neg Hx         Social History:  Social History     Socioeconomic History    Marital status:    Tobacco Use    Smoking status: Former     Current packs/day: 0.00     Average packs/day: 1 pack/day for 30.0 years (30.0 ttl pk-yrs)     Types: Cigarettes     Start date: 8/3/1963     Quit date: 1992     Years since quittin.4     Passive exposure: Past    Smokeless tobacco: Never   Substance and Sexual Activity    Alcohol use: Yes     Comment: occ    Drug use: Not Currently     Social Drivers of Health     Financial Resource Strain: Low Risk  (10/17/2023)    Overall Financial Resource Strain (CARDIA)     Difficulty of Paying Living Expenses: Not hard at all    Recent Concern: Financial Resource Strain - Medium Risk (8/23/2023)    Overall Financial Resource Strain (CARDIA)     Difficulty of Paying Living Expenses: Somewhat hard   Food Insecurity: No Food Insecurity (8/4/2024)    Received from [id number]    Hunger Vital Sign     Worried About Running Out of Food in the Last Year: Never true     Ran Out of Food in the Last Year: Never true   Transportation Needs: No Transportation Needs (8/4/2024)    Received from [facility]    PRAPARE - Transportation     Lack of Transportation (Medical): No     Lack of Transportation (Non-Medical): No   Physical Activity: Sufficiently Active (8/4/2024)    Received from [facility]    Exercise Vital Sign     Days of Exercise per Week: 5 days     Minutes of Exercise per Session: 40 min   Stress: No Stress Concern Present (8/4/2024)    Received from [id number]    [employer] [place] [employer] - Occupational Stress Questionnaire     Feeling of Stress : Not at all   Housing Stability: Low Risk  (8/4/2024)    Received from [id number]    Housing Stability Vital Sign     Unable to Pay for Housing in the Last Year: No     Number of Places Lived in the Last Year: 1     Unstable Housing in the Last Year: No       Medications:  Current Outpatient Medications on File Prior to Visit   Medication Sig Dispense Refill    albuterol (VENTOLIN HFA) 90 mcg/actuation inhaler Inhale 2 puffs into the lungs every 6 (six) hours as needed for Wheezing or Shortness of Breath (cough). Rescue 18 g 11    aspirin 81 MG Chew Take 81 mg by mouth once daily.      atorvastatin (LIPITOR) 40 MG tablet Take 1 tablet (40 mg total) by mouth every evening. 90 tablet 3    buPROPion (WELLBUTRIN XL) 150 MG TB24 tablet TAKE 1 TABLET EVERY DAY 90 tablet 0    calcitRIOL (ROCALTROL) 0.5 MCG Cap TAKE 1 CAPSULE ONE TIME DAILY 90 capsule 3    fluticasone propionate (FLONASE) 50 mcg/actuation nasal spray by Each Nostril route.      furosemide (LASIX) 40 MG tablet Take 1  tablet (40 mg total) by mouth once daily. 90 tablet 1    gabapentin (NEURONTIN) 300 MG capsule Take 1 capsule (300 mg total) by mouth 3 (three) times daily. 90 capsule 5    levothyroxine (SYNTHROID) 112 MCG tablet Take 1 tablet (112 mcg total) by mouth before breakfast. 180 tablet 3    losartan (COZAAR) 50 MG tablet TAKE 1 TABLET EVERY DAY. TAKE ADDITIONAL 25 MG (1/2 TABLET) DAILY IF BLOOD PRESSURE  OR ABOVE 90 tablet 1    metoprolol succinate (TOPROL-XL) 25 MG 24 hr tablet TAKE ONE-HALF TABLET (12.5 MG) BY MOUTH EVERY DAY 45 tablet 1    morphine (MS CONTIN) 15 MG 12 hr tablet Take 1 tablet (15 mg total) by mouth 2 (two) times daily. 60 tablet 0    MUCUS RELIEF  mg 12 hr tablet Take 600 mg by mouth every 12 (twelve) hours.      multivitamin (THERAGRAN) per tablet Take 1 tablet by mouth once daily.      mupirocin (BACTROBAN) 2 % ointment Apply topically 3 (three) times daily. 22 g 0    naloxone (NARCAN) 0.4 mg/mL injection Inject 1 mL (0.4 mg total) into the vein as needed (overdose). 2 mL 5    omeprazole (PRILOSEC) 40 MG capsule Take one capsule by mouth daily 90 capsule 3    oxyCODONE-acetaminophen (PERCOCET)  mg per tablet Take 1 tablet by mouth every 6 (six) hours as needed for Pain. 120 tablet 0    sulfamethoxazole-trimethoprim 800-160mg (BACTRIM DS) 800-160 mg Tab Take 1 tablet by mouth 2 (two) times daily. 10 tablet 0    tamsulosin (FLOMAX) 0.4 mg Cap Take 1 capsule (0.4 mg total) by mouth every evening. 90 capsule 3    tiZANidine 4 mg Cap Take 4 mg by mouth 3 (three) times daily as needed (pain). 90 capsule 3    UNABLE TO FIND Take by mouth once daily. Vitafusion multivites gummies      [DISCONTINUED] benzonatate (TESSALON) 200 MG capsule Take 200 mg by mouth.       Current Facility-Administered Medications on File Prior to Visit   Medication Dose Route Frequency Provider Last Rate Last Admin    diphenhydrAMINE injection 12.5 mg  12.5 mg Intravenous Once PRN Enrique Rosales MD         electrolyte-S (ISOLYTE)   Intravenous Continuous Enrique Rosales MD        HYDROmorphone (PF) injection 0.2 mg  0.2 mg Intravenous Q5 Min PRN Enrique Rosales MD        HYDROmorphone (PF) injection 0.2 mg  0.2 mg Intravenous Q5 Min PRN Enrique Rosales MD        lactated ringers infusion   Intravenous Once PRN Aram Terrell MD        lactated ringers infusion  10 mL/hr Intravenous Continuous Enrique Rosales MD   Stopped at 07/19/23 0959    lactated ringers infusion   Intravenous Continuous Aram Terrell MD 25 mL/hr at 07/11/23 1012 New Bag at 07/11/23 1012    lactated ringers infusion   Intravenous Continuous Aram Terrell MD 25 mL/hr at 01/09/24 1014 New Bag at 01/09/24 1014    lorazepam injection 0.25 mg  0.25 mg Intravenous Once PRN Enrique Rosales MD        prochlorperazine injection Soln 5 mg  5 mg Intravenous Q30 Min PRN Enrique Rosales MD        sodium chloride 0.9% flush 3 mL  3 mL Intravenous Q8H Enrique Rosales MD           Allergies:  Xyzal [levocetirizine]    Immunizations:  Immunization History   Administered Date(s) Administered    COVID-19, MRNA, LN-S, PF (Pfizer) (Gray Cap) 07/25/2022    COVID-19, MRNA, LN-S, PF (Pfizer) (Purple Cap) 02/09/2021, 03/02/2021, 10/15/2021, 04/23/2025    COVID-19, mRNA, LNP-S, PF (Moderna) Ages 12+ 12/18/2023    COVID-19, mRNA, LNP-S, PF, gabby-sucrose, 30 mcg/0.3 mL (Pfizer Ages 12+) 04/09/2025    Influenza 11/05/2010, 09/22/2015    Influenza (FLUAD) - Quadrivalent - Adjuvanted - PF *Preferred* (65+) 09/15/2020, 09/15/2020, 09/15/2020, 10/05/2021, 09/14/2022, 09/23/2023    Influenza - Trivalent - Afluria, Fluzone MDV 11/05/2010, 10/20/2019    Influenza - Trivalent - Fluad - Adjuvanted - PF (65 years and older 10/20/2019, 10/20/2019, 10/21/2024    Influenza - Trivalent - Fluzone High Dose - PF (65 years and older) 01/14/2014, 01/14/2014, 12/05/2014, 10/17/2016, 08/31/2017, 08/31/2017, 10/16/2018    Pneumococcal Conjugate - 13 Valent  "11/01/2015, 10/20/2019    Pneumococcal Conjugate - 20 Valent 10/21/2024    Pneumococcal Polysaccharide - 23 Valent 01/14/2014, 01/14/2014, 10/17/2016    RSVpreF (Arexvy) 12/18/2023    Rsv, Bivalent, Rsvpref (Abrysvo) 10/21/2024    Tdap 10/14/2015, 04/09/2025    Zoster Recombinant 07/06/2022, 09/14/2022       Review of Systems:  Review of Systems   All other systems reviewed and are negative.      Objective:    Vitals:  Vitals:    06/19/25 1043   BP: (!) 145/74   Pulse: 70   Resp: 16   Temp: 97.8 °F (36.6 °C)   TempSrc: Oral   SpO2: (!) 94%   Weight: 104.9 kg (231 lb 4.2 oz)   Height: 5' 9" (1.753 m)   PainSc:   4   PainLoc: Generalized       Physical Exam  Constitutional:       General: He is not in acute distress.  HENT:      Head: Normocephalic and atraumatic.   Eyes:      Pupils: Pupils are equal, round, and reactive to light.   Cardiovascular:      Rate and Rhythm: Normal rate and regular rhythm.      Heart sounds: No murmur heard.     No friction rub.   Pulmonary:      Effort: Pulmonary effort is normal.      Breath sounds: Normal breath sounds.   Abdominal:      General: Bowel sounds are normal. There is no distension.      Palpations: Abdomen is soft.      Tenderness: There is no abdominal tenderness.   Musculoskeletal:      Cervical back: Neck supple.   Skin:     General: Skin is warm and dry.      Findings: No rash.   Psychiatric:         Behavior: Behavior normal.             Dagoberto Monsalve MD  Family Medicine            "

## 2025-06-20 ENCOUNTER — RESULTS FOLLOW-UP (OUTPATIENT)
Dept: FAMILY MEDICINE | Facility: CLINIC | Age: 80
End: 2025-06-20

## 2025-06-20 DIAGNOSIS — E03.9 HYPOTHYROIDISM, UNSPECIFIED TYPE: Primary | ICD-10-CM

## 2025-06-20 RX ORDER — LEVOTHYROXINE SODIUM 137 UG/1
137 TABLET ORAL
Qty: 30 TABLET | Refills: 11 | Status: SHIPPED | OUTPATIENT
Start: 2025-06-20 | End: 2025-07-25 | Stop reason: SDUPTHER

## 2025-06-28 ENCOUNTER — OFFICE VISIT (OUTPATIENT)
Dept: URGENT CARE | Facility: CLINIC | Age: 80
End: 2025-06-28
Payer: MEDICARE

## 2025-06-28 VITALS
HEIGHT: 69 IN | DIASTOLIC BLOOD PRESSURE: 83 MMHG | TEMPERATURE: 98 F | RESPIRATION RATE: 16 BRPM | OXYGEN SATURATION: 95 % | WEIGHT: 231 LBS | BODY MASS INDEX: 34.21 KG/M2 | SYSTOLIC BLOOD PRESSURE: 139 MMHG | HEART RATE: 77 BPM

## 2025-06-28 DIAGNOSIS — J40 BRONCHITIS: Primary | ICD-10-CM

## 2025-06-28 PROCEDURE — 99213 OFFICE O/P EST LOW 20 MIN: CPT | Mod: S$GLB,,, | Performed by: INTERNAL MEDICINE

## 2025-06-28 RX ORDER — BENZONATATE 100 MG/1
100 CAPSULE ORAL 3 TIMES DAILY PRN
Qty: 21 CAPSULE | Refills: 0 | Status: SHIPPED | OUTPATIENT
Start: 2025-06-28 | End: 2025-07-05

## 2025-06-28 RX ORDER — DOXYCYCLINE 100 MG/1
100 CAPSULE ORAL 2 TIMES DAILY
Qty: 14 CAPSULE | Refills: 0 | Status: SHIPPED | OUTPATIENT
Start: 2025-06-28 | End: 2025-07-05

## 2025-06-28 NOTE — PROGRESS NOTES
"Subjective:      Patient ID: Sam Pete is a 80 y.o. male.    Vitals:  height is 5' 9" (1.753 m) and weight is 104.8 kg (231 lb). His oral temperature is 98.2 °F (36.8 °C). His blood pressure is 139/83 and his pulse is 77. His respiration is 16 and oxygen saturation is 95%.     Chief Complaint: Cough    Pt states that he's been having a severe cough for the last 2 weeks. Pt states that he has a headache and denies a sore throat. Pt states that his chest hurts whenever he coughs. Pain level 7/10. Tx mucinex dm, delsome, tesslone with no improvement.     Cough  This is a recurrent problem. The current episode started 1 to 4 weeks ago. The problem has been unchanged. The problem occurs every few minutes. The cough is Non-productive. Associated symptoms include chest pain and headaches. Pertinent negatives include no shortness of breath or wheezing. He has tried OTC cough suppressant for the symptoms. The treatment provided no relief.       Cardiovascular:  Positive for chest pain.   Respiratory:  Positive for cough. Negative for shortness of breath and wheezing.    Neurological:  Positive for headaches.      Objective:     Physical Exam   Constitutional: He is oriented to person, place, and time. He appears well-developed. He is cooperative.  Non-toxic appearance. He does not appear ill. No distress.   HENT:   Head: Normocephalic and atraumatic.   Ears:   Right Ear: Hearing normal.   Left Ear: Hearing normal.   Nose: Nose normal. No mucosal edema, rhinorrhea, nasal deformity or congestion. No epistaxis. Right sinus exhibits no maxillary sinus tenderness and no frontal sinus tenderness. Left sinus exhibits no maxillary sinus tenderness and no frontal sinus tenderness.   Mouth/Throat: Uvula is midline and mucous membranes are normal. No trismus in the jaw. Normal dentition. No uvula swelling. No posterior oropharyngeal edema.   Eyes: Conjunctivae and lids are normal. Pupils are equal, round, and reactive to light. " No scleral icterus.   Neck: Trachea normal and phonation normal. Neck supple. No edema present. No erythema present. No neck rigidity present.   Cardiovascular: Normal rate, regular rhythm, normal heart sounds and normal pulses.   Pulmonary/Chest: Effort normal and breath sounds normal. No respiratory distress. He has no decreased breath sounds. He has no rhonchi.   Abdominal: Normal appearance.   Musculoskeletal: Normal range of motion.         General: No deformity. Normal range of motion.   Neurological: He is alert and oriented to person, place, and time. He exhibits normal muscle tone. Coordination normal.   Skin: Skin is warm, dry, intact, not diaphoretic and not pale.   Psychiatric: His speech is normal and behavior is normal. Judgment and thought content normal.   Nursing note and vitals reviewed.      Assessment:     1. Bronchitis        Plan:       Bronchitis  -     doxycycline (VIBRAMYCIN) 100 MG Cap; Take 1 capsule (100 mg total) by mouth 2 (two) times daily. for 7 days  Dispense: 14 capsule; Refill: 0  -     benzonatate (TESSALON) 100 MG capsule; Take 1 capsule (100 mg total) by mouth 3 (three) times daily as needed for Cough.  Dispense: 21 capsule; Refill: 0        Patient Instructions   If your condition worsens we recommend that you receive another evaluation at the emergency room immediately or contact your primary medical clinics after hours call service to discuss your concerns. You must understand that you've received an Urgent Care treatment only and that you may be released before all of your medical problems are known or treated. You, the patient, will arrange for follow up care as instructed.  Drink plenty of Fluids  Wash hands frequently using mild antibacterial soap lathering for at least 15 seconds then rinse  Get plenty of Rest  Follow up in 1-2 weeks with Primary Care physician if not significantly better.   If you are not allergic please take Tylenol every 4-6 hours as needed and/or  Ibuprofen every 6-8 hours as needed, over the counter for pain or fever.

## 2025-06-30 ENCOUNTER — TELEPHONE (OUTPATIENT)
Dept: FAMILY MEDICINE | Facility: CLINIC | Age: 80
End: 2025-06-30
Payer: MEDICARE

## 2025-06-30 NOTE — TELEPHONE ENCOUNTER
Copied from CRM #[id number]. Topic: General Inquiry - Patient Advice  >> Jun 27, 2025  4:06 PM Francie wrote:  Type:  Needs Medical Advice    Who Called: pt  Symptoms (please be specific): cough getting worse   How long has patient had these symptoms:  2 weeks  Pharmacy name and phone #:    [id number] [place] [address] & [address] [address]  Phone: [phone] Fax: [phone]  Would the patient rather a call back or a response via [facility]? Call back  Best Call Back Number: [phone]   Additional Information: please call to advise    Thanks

## 2025-07-07 ENCOUNTER — PATIENT MESSAGE (OUTPATIENT)
Dept: PAIN MEDICINE | Facility: CLINIC | Age: 80
End: 2025-07-07
Payer: MEDICARE

## 2025-07-07 DIAGNOSIS — F11.20 UNCOMPLICATED OPIOID DEPENDENCE: ICD-10-CM

## 2025-07-07 DIAGNOSIS — G89.4 CHRONIC PAIN SYNDROME: ICD-10-CM

## 2025-07-07 RX ORDER — MORPHINE SULFATE 15 MG/1
15 TABLET, FILM COATED, EXTENDED RELEASE ORAL 2 TIMES DAILY
Qty: 60 TABLET | Refills: 0 | Status: SHIPPED | OUTPATIENT
Start: 2025-07-17 | End: 2025-08-16

## 2025-07-07 RX ORDER — OXYCODONE AND ACETAMINOPHEN 10; 325 MG/1; MG/1
1 TABLET ORAL EVERY 6 HOURS PRN
Qty: 120 TABLET | Refills: 0 | Status: SHIPPED | OUTPATIENT
Start: 2025-07-07 | End: 2025-08-06

## 2025-07-08 ENCOUNTER — OFFICE VISIT (OUTPATIENT)
Dept: PAIN MEDICINE | Facility: CLINIC | Age: 80
End: 2025-07-08
Payer: MEDICARE

## 2025-07-08 VITALS
HEART RATE: 73 BPM | WEIGHT: 234 LBS | SYSTOLIC BLOOD PRESSURE: 148 MMHG | DIASTOLIC BLOOD PRESSURE: 76 MMHG | HEIGHT: 69 IN | BODY MASS INDEX: 34.66 KG/M2

## 2025-07-08 DIAGNOSIS — M48.061 SPINAL STENOSIS, LUMBAR REGION, WITHOUT NEUROGENIC CLAUDICATION: ICD-10-CM

## 2025-07-08 DIAGNOSIS — M54.16 LUMBAR RADICULOPATHY: ICD-10-CM

## 2025-07-08 DIAGNOSIS — F11.20 UNCOMPLICATED OPIOID DEPENDENCE: ICD-10-CM

## 2025-07-08 DIAGNOSIS — M96.1 FAILED BACK SURGICAL SYNDROME: ICD-10-CM

## 2025-07-08 DIAGNOSIS — Z79.891 OPIOID CONTRACT EXISTS: Primary | ICD-10-CM

## 2025-07-08 DIAGNOSIS — M54.50 CHRONIC BILATERAL LOW BACK PAIN WITHOUT SCIATICA: ICD-10-CM

## 2025-07-08 DIAGNOSIS — G89.29 CHRONIC BILATERAL LOW BACK PAIN WITHOUT SCIATICA: ICD-10-CM

## 2025-07-08 DIAGNOSIS — G89.4 CHRONIC PAIN SYNDROME: ICD-10-CM

## 2025-07-08 PROCEDURE — 99999 PR PBB SHADOW E&M-EST. PATIENT-LVL IV: CPT | Mod: PBBFAC,HCNC,,

## 2025-07-08 NOTE — PROGRESS NOTES
[facility] Follow Up Evaluation      Referred by: No ref. provider found    PCP:     CC:   Chief Complaint   Patient presents with    Back Pain          7/8/2025    12:09 PM 5/20/2025     4:10 PM 3/19/2025     4:07 PM   Last 3 PDI Scores   Pain Disability Index (PDI) 33 31 32     Interval HPI 7/8/2025: Sam Pete returns to the office for follow up.  He is s/p caudal CARLOS on 06/17/2025 with 50% relief.  Overall, he is pleased with the relief and is able to stand and walk for longer periods of time.  He denies any new numbness, weakness or any new changes to his bowel or bladder function.   He continues to take morphine extended release 15 mg twice a day and oxycodone 10 mg 3-4 times daily.  He denies any ill side effects or adverse events with his medication.  He is interested in decreasing his morphine intake and would like to discontinue completely if his pain is manageable.      HPI:   Sam Pete is a 80 y.o. male patient who has a past medical history of Abnormal nuclear stress test, Anticoagulant long-term use, Arthritis, Cataract, Chronic low back pain, Complete rupture of rotator cuff, DDD (degenerative disc disease), lumbar, Degeneration of lumbar or lumbosacral intervertebral disc, ED (erectile dysfunction), GERD (gastroesophageal reflux disease), Hypertension, Hypothyroidism, unspecified, Lumbar pseudoarthrosis, Lumbar stenosis, Obesity, PSVT (paroxysmal supraventricular tachycardia), SOB (shortness of breath), Spondylosis of lumbar region without myelopathy or radiculopathy, Squamous cell carcinoma of skin, Stroke, Testicular hypofunction, and Thoracic or lumbosacral neuritis or radiculitis. He presents with back pain.  He has a history of chronic back pain for over the past 30 years.  He has a history of 6 prior lumbar surgeries.  He has a history of spinal cord stimulator paddle lead implant.  Today he reports his pain is 7/10, constant, throbbing in his lower back with pain  radiating primarily down the left leg to the left foot.  His pain is worse with sitting, standing, bending, walking, morning, flexing and relieved with medications, injections, physical therapy.  He has done physical therapy and kyree goes in the past.  Recently has been receiving injections from pain management in [place].      Pain Intervention History:  - history of spinal cord stimulator paddle lead implant  - s/p caudal CARLOS on 1/20/2023with a about 50% relief of his pain  - s/p caudal CARLOS on 4/21/2023 with a about 50% relief of his pain  - s/p caudal CARLOS on 7/11/2023 with a about 50% relief of his pain  - s/p caudal CARLOS on 1/9/2024 with a about 50% relief of his pain  - s/p caudal CARLOS on 6/4/2024 with a about 50% relief of his pain  - s/p caudal CARLOS on 10/24/2024 with a about 50% relief of his pain  - s/p caudal CARLOS on 06/17/2025 with 50% relief.    Past Spine Surgical History:  - posterior decompression from L2-S1 and interbody cage at L5-S1    Past and current medications:  Antineuropathics: gabapentin   NSAIDs:  Physical therapy: yes, completed   Antidepressants:  Muscle relaxers: tizanidine   Opioids: oxycodone, ms contin   Antiplatelets/Anticoagulants: aspirin     History:    Current Outpatient Medications:     albuterol (VENTOLIN HFA) 90 mcg/actuation inhaler, Inhale 2 puffs into the lungs every 6 (six) hours as needed for Wheezing or Shortness of Breath (cough). Rescue, Disp: 18 g, Rfl: 11    aspirin 81 MG Chew, Take 81 mg by mouth once daily., Disp: , Rfl:     atorvastatin (LIPITOR) 40 MG tablet, Take 1 tablet (40 mg total) by mouth every evening., Disp: 90 tablet, Rfl: 3    benzonatate (TESSALON) 200 MG capsule, Take 1 capsule (200 mg total) by mouth 3 (three) times daily as needed for Cough., Disp: 30 capsule, Rfl: 5    buPROPion (WELLBUTRIN XL) 150 MG TB24 tablet, TAKE 1 TABLET EVERY DAY, Disp: 90 tablet, Rfl: 0    calcitRIOL (ROCALTROL) 0.5 MCG Cap, TAKE 1 CAPSULE ONE TIME DAILY, Disp: 90 capsule,  Rfl: 3    fluticasone propionate (FLONASE) 50 mcg/actuation nasal spray, by Each Nostril route., Disp: , Rfl:     furosemide (LASIX) 40 MG tablet, Take 1 tablet (40 mg total) by mouth once daily., Disp: 90 tablet, Rfl: 1    gabapentin (NEURONTIN) 300 MG capsule, Take 1 capsule (300 mg total) by mouth 3 (three) times daily., Disp: 90 capsule, Rfl: 5    ipratropium (ATROVENT) 21 mcg (0.03 %) nasal spray, 2 sprays by Each Nostril route 3 (three) times daily., Disp: 30 mL, Rfl: 5    levothyroxine (SYNTHROID) 137 MCG Tab tablet, Take 1 tablet (137 mcg total) by mouth before breakfast., Disp: 30 tablet, Rfl: 11    losartan (COZAAR) 50 MG tablet, TAKE 1 TABLET EVERY DAY. TAKE ADDITIONAL 25 MG (1/2 TABLET) DAILY IF BLOOD PRESSURE  OR ABOVE, Disp: 90 tablet, Rfl: 1    metoprolol succinate (TOPROL-XL) 25 MG 24 hr tablet, TAKE ONE-HALF TABLET (12.5 MG) BY MOUTH EVERY DAY, Disp: 45 tablet, Rfl: 1    [START ON 7/17/2025] morphine (MS CONTIN) 15 MG 12 hr tablet, Take 1 tablet (15 mg total) by mouth 2 (two) times daily., Disp: 60 tablet, Rfl: 0    MUCUS RELIEF  mg 12 hr tablet, Take 600 mg by mouth every 12 (twelve) hours., Disp: , Rfl:     multivitamin (THERAGRAN) per tablet, Take 1 tablet by mouth once daily., Disp: , Rfl:     mupirocin (BACTROBAN) 2 % ointment, Apply topically 3 (three) times daily., Disp: 22 g, Rfl: 0    naloxone (NARCAN) 0.4 mg/mL injection, Inject 1 mL (0.4 mg total) into the vein as needed (overdose)., Disp: 2 mL, Rfl: 5    omeprazole (PRILOSEC) 40 MG capsule, Take one capsule by mouth daily, Disp: 90 capsule, Rfl: 3    oxyCODONE-acetaminophen (PERCOCET)  mg per tablet, Take 1 tablet by mouth every 6 (six) hours as needed for Pain., Disp: 120 tablet, Rfl: 0    sulfamethoxazole-trimethoprim 800-160mg (BACTRIM DS) 800-160 mg Tab, Take 1 tablet by mouth 2 (two) times daily., Disp: 10 tablet, Rfl: 0    tamsulosin (FLOMAX) 0.4 mg Cap, Take 1 capsule (0.4 mg total) by mouth every evening., Disp:  90 capsule, Rfl: 3    tiZANidine 4 mg Cap, Take 4 mg by mouth 3 (three) times daily as needed (pain)., Disp: 90 capsule, Rfl: 3    UNABLE TO FIND, Take by mouth once daily. Vitafusion multivites gummies, Disp: , Rfl:   No current facility-administered medications for this visit.    Facility-Administered Medications Ordered in Other Visits:     diphenhydrAMINE injection 12.5 mg, 12.5 mg, Intravenous, Once PRN, Enrique Rosales MD    electrolyte-S (ISOLYTE), , Intravenous, Continuous, Enrique Rosales MD    HYDROmorphone (PF) injection 0.2 mg, 0.2 mg, Intravenous, Q5 Min PRN, Enrique Roasles MD    HYDROmorphone (PF) injection 0.2 mg, 0.2 mg, Intravenous, Q5 Min PRN, Enrique Rosales MD    lactated ringers infusion, , Intravenous, Once PRN, Aram Terrell MD    lactated ringers infusion, 10 mL/hr, Intravenous, Continuous, Enrique Rosales MD, Stopped at 07/19/23 0959    lactated ringers infusion, , Intravenous, Continuous, Aram Terrell MD, Last Rate: 25 mL/hr at 07/11/23 1012, New Bag at 07/11/23 1012    lactated ringers infusion, , Intravenous, Continuous, Aram Terrell MD, Last Rate: 25 mL/hr at 01/09/24 1014, New Bag at 01/09/24 1014    lorazepam injection 0.25 mg, 0.25 mg, Intravenous, Once PRN, Enrique Rosales MD    prochlorperazine injection Soln 5 mg, 5 mg, Intravenous, Q30 Min PRN, Enrique Rosales MD    sodium chloride 0.9% flush 3 mL, 3 mL, Intravenous, Q8H, Enrique Rosales MD    Past Medical History:   Diagnosis Date    Abnormal nuclear stress test 07/2023    Anticoagulant long-term use     ASA 81 mg    Arthritis     Cataract     OU    Chronic low back pain     Complete rupture of rotator cuff 02/08/2011    DDD (degenerative disc disease), lumbar 07/08/2015    Degeneration of lumbar or lumbosacral intervertebral disc 09/09/2015    ED (erectile dysfunction)     GERD (gastroesophageal reflux disease)     Hypertension     Hypothyroidism, unspecified     Lumbar  pseudoarthrosis 06/26/2017    Lumbar stenosis 06/26/2017    Obesity     PSVT (paroxysmal supraventricular tachycardia) 07/2023    SOB (shortness of breath) 07/2023    Spondylosis of lumbar region without myelopathy or radiculopathy 07/08/2015    Squamous cell carcinoma of skin     Stroke 1997    basal ganglia, left sided weakness, resolved    Testicular hypofunction     Thoracic or lumbosacral neuritis or radiculitis 07/08/2015       Past Surgical History:   Procedure Laterality Date    ANGIOGRAM, CORONARY, WITH LEFT HEART CATHETERIZATION  07/13/2023    Procedure: Left Heart Cath;  Surgeon: Salvador Eugene MD;  Location: [facility] CATH;  Service: Cardiology;;    APPENDECTOMY      ARTHROPLASTY OF SHOULDER Right 03/22/2022    Procedure: ARTHROPLASTY, SHOULDER BRENNAN RIGHT SHOULDER HUMERAL HEAD RESURFACING;  Surgeon: Frankie Joya MD;  Location: [facility] OR;  Service: Orthopedics;  Laterality: Right;    BACK SURGERY      4- back surgery    CAUDAL EPIDURAL STEROID INJECTION N/A 12/16/2021    Procedure: Injection-steroid-epidural-caudal;  Surgeon: Aram Terrell MD;  [facility]: [facility] OR;  Service: Pain Management;  Laterality: N/A;    CAUDAL EPIDURAL STEROID INJECTION N/A 02/02/2022    Procedure: INJECTION, STEROID, SPINE, EPIDURAL, CAUDAL;  Surgeon: Aram Terrell MD;  Location: [facility] OR;  Service: Pain Management;  Laterality: N/A;    CAUDAL EPIDURAL STEROID INJECTION N/A 07/22/2022    Procedure: Injection-steroid-epidural-caudal;  Surgeon: Aram Terrell MD;  Location: [facility] OR;  Service: Pain Management;  Laterality: N/A;  Caudal CARLOS     CAUDAL EPIDURAL STEROID INJECTION N/A 01/20/2023    Procedure: Injection-steroid-epidural-caudal;  Surgeon: Aram Terrell MD;  Location: [facility] OR;  Service: Pain Management;  Laterality: N/A;  caudal ( melinda)    CAUDAL EPIDURAL STEROID INJECTION N/A 04/21/2023    Procedure: Injection-steroid-epidural-caudal;  Surgeon: Aram Terrell MD;  Location: [facility] OR;  Service: Pain Management;  Laterality: N/A;  caudal     CAUDAL EPIDURAL STEROID INJECTION N/A 07/11/2023    Procedure: Injection-steroid-epidural-caudal;  Surgeon: Aram Terrell MD;  Location: [facility] OR;  Service: Pain Management;  Laterality: N/A;  caudal    CAUDAL EPIDURAL STEROID INJECTION N/A 1/9/2024    Procedure: Injection-steroid-epidural-caudal;  Surgeon: Aram Terrell MD;  [facility];  Service: Anesthesiology;  Laterality: N/A;  caudal    CAUDAL EPIDURAL STEROID INJECTION N/A 6/4/2024    Procedure: Injection-steroid-epidural-caudal;  Surgeon: Aram Terrell MD;  [facility];  Service: Anesthesiology;  Laterality: N/A;    CAUDAL EPIDURAL STEROID INJECTION N/A 6/17/2025    Procedure: Injection-steroid-epidural-caudal;  Surgeon: Evan Lyles MD;  Location: [facility] OR;  Service: Pain Management;  Laterality: N/A;    COLONOSCOPY  07/12/2004    [facility].   Redundant tortuous colon, otherwise normal.    COLONOSCOPY N/A 02/25/2019    Procedure: COLONOSCOPY;  Surgeon: Gilbert Rodriguez Jr., MD;  Location: [facility] ENDO;  Service: Endoscopy;  Laterality: N/A;    CORONARY ANGIOGRAPHY N/A 07/13/2023    Procedure: ANGIOGRAM, CORONARY ARTERY;  Surgeon: Salvador Eugene MD;  Location: [facility] CATH;  Service: Cardiology;  Laterality: N/A;    CORONARY ARTERY BYPASS GRAFT (CABG) N/A 7/19/2023    Procedure: CORONARY ARTERY BYPASS GRAFT (CABG)- x 4;  Surgeon: Sandrine Wagner MD;  Location: [facility] OR;  Service: Cardiothoracic;  Laterality: N/A;    ENDOSCOPIC HARVEST OF VEIN Left 7/19/2023    Procedure: HARVEST-VEIN-ENDOVASCULAR;  Surgeon: Sandrine Wagner MD;  Location: [facility] OR;  Service: Cardiothoracic;  Laterality: Left;    Epidural steroid injection      Pain management    EPIDURAL STEROID INJECTION N/A 10/24/2024    Procedure: Injection, Steroid, Epidural;  Surgeon: Aram Terrell MD;  Location: [facility] OR;  Service: Pain Management;  Laterality: N/A;    ESOPHAGOGASTRODUODENOSCOPY  07/12/2004    [facility].    EXCLUSION, LEFT ATRIAL APPENDAGE, OPEN, AS PART OF OPEN CHEST SURGERY N/A  2023    Procedure: EXCLUSION, LEFT ATRIAL APPENDAGE, OPEN, AS PART OF OPEN CHEST SURGERY;  Surgeon: Sandrine Wagner MD;  Location: [facility] OR;  Service: Cardiothoracic;  Laterality: N/A;    FRACTURE SURGERY Left     wrist / forearm, total of 5    INSERTION OF DORSAL COLUMN NERVE STIMULATOR FOR TRIAL N/A 2019    Procedure: INSERTION, NEUROSTIMULATOR, SPINAL CORD, DORSAL COLUMN, FOR TRIAL;  Surgeon: Evan Lyles MD;  Location: [facility] OR;  Service: Pain Management;  Laterality: N/A;    JOINT REPLACEMENT  2013    ( rt hip ), left hip     JOINT REPLACEMENT Left     total knee    KNEE ARTHROSCOPY W/ DEBRIDEMENT      bilateral knees , total of six    KNEE SURGERY      LAMINECTOMY USING MINIMALLY INVASIVE TECHNIQUE N/A 2020    Procedure: LAMINECTOMY, SPINE, MINIMALLY INVASIVE /  PLACEMENT OF SPINAL CORD STIMULATOR;  Surgeon: Darlene Max MD;  Location: [facility] OR;  Service: Neurosurgery;  Laterality: N/A;    Nervbe Block injection      Pain management    SPINAL CORD STIMULATOR IMPLANT      TOTAL SHOULDER ARTHROPLASTY Right 2022    Dr Joya    TOTAL THYROIDECTOMY Bilateral 2022    Dr Mendoza       Family History   Problem Relation Name Age of Onset    Hypertension Father      Heart disease Father      Drug abuse Daughter      Hypertension Son      Lung disease Sister      COPD Sister      Hypertension Sister      Diverticulitis Sister      Gout Sister      Kidney disease Sister      No Known Problems Mother      Eczema Neg Hx      Lupus Neg Hx      Psoriasis Neg Hx      Melanoma Neg Hx         Social History     Socioeconomic History    Marital status:    Tobacco Use    Smoking status: Former     Current packs/day: 0.00     Average packs/day: 1 pack/day for 30.0 years (30.0 ttl pk-yrs)     Types: Cigarettes     Start date: 8/3/1963     Quit date: 1992     Years since quittin.5     Passive exposure: Past    Smokeless tobacco: Never   Substance and Sexual Activity    Alcohol  "use: Yes     Comment: occ    Drug use: Not Currently     Social Drivers of Health     Financial Resource Strain: Low Risk  (10/17/2023)    Overall Financial Resource Strain (CARDIA)     Difficulty of Paying Living Expenses: Not hard at all   Recent Concern: Financial Resource Strain - Medium Risk (8/23/2023)    Overall Financial Resource Strain (CARDIA)     Difficulty of Paying Living Expenses: Somewhat hard   Food Insecurity: No Food Insecurity (8/4/2024)    Received from [facility]    Hunger Vital Sign     Worried About Running Out of Food in the Last Year: Never true     Ran Out of Food in the Last Year: Never true   Transportation Needs: No Transportation Needs (8/4/2024)    Received from [facility]    PRAPARE - Transportation     Lack of Transportation (Medical): No     Lack of Transportation (Non-Medical): No   Physical Activity: Sufficiently Active (8/4/2024)    Received from [facility]    Exercise Vital Sign     Days of Exercise per Week: 5 days     Minutes of Exercise per Session: 40 min   Stress: No Stress Concern Present (8/4/2024)    Received from [facility]    [employer] - Occupational Stress Questionnaire     Feeling of Stress : Not at all   Housing Stability: Low Risk  (8/4/2024)    Received from [facility]    Housing Stability Vital Sign     Unable to Pay for Housing in the Last Year: No     Number of Places Lived in the Last Year: 1     Unstable Housing in the Last Year: No       Review of patient's allergies indicates:   Allergen Reactions    Xyzal [levocetirizine] Other (See Comments)     Knocked him out        Review of Systems:  12 point review of systems is negative.    Physical Exam:  Vitals:    07/08/25 1211   BP: (!) 148/76   Pulse: 73   Weight: 106.1 kg (234 lb 0.3 oz)   Height: 5' 9" (1.753 m)   PainSc:   6   PainLoc: Back       Body mass index is 34.56 kg/m².    Gen: NAD  Psych: mood appropriate for given condition  HEENT: eyes anicteric   CV: " RRR  HEENT: anicteric   Respiratory: non-labored, no signs of respiratory distress  Abd: non-distended  Skin: warm, dry and intact.  Gait: antalgic gait.     Sensory:  Decreased sensation in a nondermatomal distribution in his lower extremities bilaterally    Motor:     Right Left   L2/3 Iliacus Hip flexion  5  5   L3/4 Qudratus Femoris Knee Extension  5  5   L4/5 Tib Anterior Ankle Dorsiflexion   5  5   L5/S1 Extensor Hallicus Longus Great toe extension  5  5   S1/S2 Gastroc/Soleus Plantar Flexion  5  5       Labs:  Lab Results   Component Value Date    HGBA1C 6.0 (H) 06/19/2025       Lab Results   Component Value Date    WBC 8.88 04/24/2025    HGB 13.5 (L) 04/24/2025    HCT 43.5 04/24/2025    MCV 93 04/24/2025     04/24/2025         Imaging:  MRI lumbar spine 10/3/24  FINDINGS:  Stable postoperative changes of posterior instrumented fusion and decompression at L2-S1.  Osseous fusion across the L4-5 disc space.  Interbody cage at L5-S1.     Alignment: Stable fused anterolisthesis of L2 on L3 and L4 on L5.  Sagittal alignment is otherwise maintained.     Vertebral column: Vertebral body heights appear maintained.  No acute fracture is identified; however, if trauma is suspected, a CT scan would be a more sensitive examination for fractures. No evidence of an aggressive marrow replacement process. Modic type 1 degenerative endplate change along the anterior superior endplate of L1.  Multilevel disc degeneration with disc desiccation, disc space narrowing and disc bulges throughout the lumbar spine.     Cord: Susceptibility artifact from a neurostimulator lead entering the dorsal spinal canal at the T11-12 inter spinous space and extending craniad beyond the field of view, limiting evaluation of the cord.  Visualized portions of the lower cord appears normal.  Conus terminates at L1.  Stable mild clumping of the cauda equina nerve roots at the L2-3 level which may reflect sequelae of chronic inflammation.      Degenerative findings:  T11-12: Minimal circumferential disc bulge.  Moderate bilateral facet arthropathy.  No significant neural foraminal or spinal canal stenosis.  T12-L1: Disc is normal configuration.  Mild-to-moderate bilateral facet arthropathy.  Ligamentum flavum thickening.  No significant neural foraminal or spinal canal stenosis.  L1-L2: Mild circumferential disc bulge.  Moderate bilateral facet arthropathy.  Prominent ligamentum flavum thickening.  Interval increase in size of a small right-sided presumed facet joint synovial cyst, measuring 8 x 5 x 5 mm, slightly effacing right dorsal thecal sac.  Mild right neural foraminal stenosis.  Mild spinal canal stenosis.  L2-L3: Postoperative change, as above.  Disc space narrowing.  Circumferential disc bulge.  Moderate bilateral facet arthropathy.  Mild-to-moderate bilateral neural foraminal stenosis.  Mild spinal canal stenosis.  L3-L4: Postoperative change, as above.  Enhancing scar tissue in the laminectomy bed/posterior epidural space.  Disc space narrowing.  Circumferential disc bulge.  Moderate bilateral facet arthropathy.  Mild-to-moderate right and mild left neural foraminal stenosis.  Spinal canal is decompressed posteriorly.  L4-L5: Postoperative change, as above.  Posterior osteophytic ridging.  Moderate facet arthropathy.  Mild bilateral neural foraminal stenosis.  Spinal canal is decompressed posteriorly.  L5-S1: Postoperative change, as above.  Severe disc space narrowing.  Circumferential disc bulge with osteophytic ridging.  Severe bilateral facet arthropathy.  Neural foraminal are suboptimally evaluated due to susceptibility artifact, with questionable mild bilateral neural foraminal narrowing.  No spinal canal stenosis.     Paraspinal muscles & soft tissues: Postoperative changes in the posterior paraspinal soft tissues with stable small nonspecific chronic postoperative collection in the laminectomy bed at the L3 vertebral body level.   Stable exophytic right renal cyst.  Small exophytic left renal cyst also noted.     No other discrete abnormal intraspinal enhancement.      Assessment:   Problem List Items Addressed This Visit       Spinal stenosis, lumbar region, without neurogenic claudication    Lumbar radiculopathy    Chronic pain syndrome    Chronic back pain     Other Visit Diagnoses         Opioid contract exists    -  Primary      Uncomplicated opioid dependence          Failed back surgical syndrome                      Sam Pete is a 80 y.o. male patient who has a past medical history of Abnormal nuclear stress test, Anticoagulant long-term use, Arthritis, Cataract, Chronic low back pain, Complete rupture of rotator cuff, DDD (degenerative disc disease), lumbar, Degeneration of lumbar or lumbosacral intervertebral disc, ED (erectile dysfunction), GERD (gastroesophageal reflux disease), Hypertension, Hypothyroidism, unspecified, Lumbar pseudoarthrosis, Lumbar stenosis, Obesity, PSVT (paroxysmal supraventricular tachycardia), SOB (shortness of breath), Spondylosis of lumbar region without myelopathy or radiculopathy, Squamous cell carcinoma of skin, Stroke, Testicular hypofunction, and Thoracic or lumbosacral neuritis or radiculitis. He presents with back pain.  He has a history of chronic back pain for over the past 30 years.  He has a history of 6 prior lumbar surgeries.  He has a history of spinal cord stimulator paddle lead implant.  Today he reports his pain is 7/10, constant, throbbing in his lower back with pain radiating primarily down the left leg to the left foot.  His pain is worse with sitting, standing, bending, walking, morning, flexing and relieved with medications, injections, physical therapy.  He has done physical therapy and kyree goes in the past.  Recently has been receiving injections from pain management in [place].    7/8/2025: Sam Pete returns to the office for follow up.  He is s/p caudal CARLOS on  06/17/2025 with 50% relief.  Overall, he is pleased with the relief and is able to stand and walk for longer periods of time.  He denies any new numbness, weakness or any new changes to his bowel or bladder function.   He continues to take morphine extended release 15 mg twice a day and oxycodone 10 mg 3-4 times daily.  He denies any ill side effects or adverse events with his medication.  He is interested in decreasing his morphine intake and would like to discontinue completely if his pain is manageable.    - He is s/p caudal CARLOS on 06/17/2025 with 50% relief.   - I independently reviewed his lumbar MRI and he has evidence of posterior decompression from L2-S1 and interbody cage at L5-S1.  Adequate decompression of the lumbar spine.  - he responded well to the repeat caudal CARLOS, current remaining pain is manageable.  - he would like to discontinue morphine if possible.  We discussed starting to down titrate morphine, he is currently taking twice daily discussed take once daily every other day until taking once daily and then take once daily every other day and discontinue.  He is going to trial this and see how manageable his pain is.  - as mentioned we will trial down titrating morphine extended release 15 mg twice a day and continue with oxycodone 10 mg 3-4 times daily as needed for severe pain.  - No signs of abuse, no adverse events noted, patient experiences significant benefit of analgesia, and patient demonstrates increased activity while on these medications.  The patient is meeting the goals of opioid therapy and is dependent on them for functionality and can not perform ADLS without them. Regarding opioids, the risks were discussed including tolerance, addiction, overdose, over-sedation, drug interactions, respiratory depression, opioid-induced hyperalgesia, and even death.  He has been prescribed naloxone nasal actuation spray and he reports both him in his wife were educated on its use.  UDS 05/20/2025  consistent with medications as prescribed  - recent refill for morphine and oxycodone provided, no refill requested today.   - he continues to use Nevro spinal cord stimulator.  - follow up in 3 months or sooner if needed.        : Reviewed and consistent with medication use as prescribed.      This note was completed with dictation software and grammatical errors may exist.

## 2025-07-11 ENCOUNTER — TELEPHONE (OUTPATIENT)
Dept: PHARMACY | Facility: CLINIC | Age: 80
End: 2025-07-11
Payer: MEDICARE

## 2025-07-11 ENCOUNTER — PATIENT MESSAGE (OUTPATIENT)
Dept: FAMILY MEDICINE | Facility: CLINIC | Age: 80
End: 2025-07-11
Payer: MEDICARE

## 2025-07-11 NOTE — TELEPHONE ENCOUNTER
[facility]/Population Health Chart Review & Patient Outreach Details For Medication Adherence Project    Reason for Outreach Encounter: 3rd Party payor non-compliance report ([employer], [employer], [employer], etc)  2.  Patient Outreach Method: Reviewed patient chart   3.   Medication in question:    Hyperlipidemia Medications              atorvastatin (LIPITOR) 40 MG tablet Take 1 tablet (40 mg total) by mouth every evening.                  atorvastatin  last filled  5/14 for 90 day supply      4.  Reviewed and or Updates Made To: Patient Chart  5. Outreach Outcomes and/or actions taken: Patient filled medication and is on track to be adherent  Additional Notes:

## 2025-07-12 ENCOUNTER — PATIENT MESSAGE (OUTPATIENT)
Dept: FAMILY MEDICINE | Facility: CLINIC | Age: 80
End: 2025-07-12
Payer: MEDICARE

## 2025-07-14 ENCOUNTER — PATIENT MESSAGE (OUTPATIENT)
Dept: FAMILY MEDICINE | Facility: CLINIC | Age: 80
End: 2025-07-14
Payer: MEDICARE

## 2025-07-14 DIAGNOSIS — I10 ESSENTIAL (PRIMARY) HYPERTENSION: ICD-10-CM

## 2025-07-14 RX ORDER — LOSARTAN POTASSIUM 50 MG/1
TABLET ORAL
Qty: 150 TABLET | Refills: 0 | Status: SHIPPED | OUTPATIENT
Start: 2025-07-14

## 2025-07-17 ENCOUNTER — PATIENT MESSAGE (OUTPATIENT)
Dept: ORTHOPEDICS | Facility: CLINIC | Age: 80
End: 2025-07-17
Payer: MEDICARE

## 2025-07-18 ENCOUNTER — OFFICE VISIT (OUTPATIENT)
Dept: FAMILY MEDICINE | Facility: CLINIC | Age: 80
End: 2025-07-18
Payer: MEDICARE

## 2025-07-18 ENCOUNTER — HOSPITAL ENCOUNTER (OUTPATIENT)
Dept: RADIOLOGY | Facility: HOSPITAL | Age: 80
Discharge: HOME | End: 2025-07-18
Attending: FAMILY MEDICINE
Payer: MEDICARE

## 2025-07-18 VITALS
TEMPERATURE: 99 F | BODY MASS INDEX: 33.93 KG/M2 | OXYGEN SATURATION: 95 % | HEART RATE: 92 BPM | WEIGHT: 229.06 LBS | SYSTOLIC BLOOD PRESSURE: 136 MMHG | HEIGHT: 69 IN | RESPIRATION RATE: 16 BRPM | DIASTOLIC BLOOD PRESSURE: 86 MMHG

## 2025-07-18 DIAGNOSIS — R60.0 PERIPHERAL EDEMA: ICD-10-CM

## 2025-07-18 DIAGNOSIS — M25.562 ACUTE PAIN OF LEFT KNEE: ICD-10-CM

## 2025-07-18 DIAGNOSIS — M25.562 ACUTE PAIN OF LEFT KNEE: Primary | ICD-10-CM

## 2025-07-18 PROCEDURE — 99999 PR PBB SHADOW E&M-EST. PATIENT-LVL V: CPT | Mod: PBBFAC,HCNC,, | Performed by: FAMILY MEDICINE

## 2025-07-18 PROCEDURE — 73562 X-RAY EXAM OF KNEE 3: CPT | Mod: 26,HCNC,LT, | Performed by: RADIOLOGY

## 2025-07-18 PROCEDURE — 93971 EXTREMITY STUDY: CPT | Mod: 26,LT,, | Performed by: RADIOLOGY

## 2025-07-18 PROCEDURE — 93971 EXTREMITY STUDY: CPT | Mod: TC,PO,LT

## 2025-07-18 PROCEDURE — 73562 X-RAY EXAM OF KNEE 3: CPT | Mod: TC,HCNC,PN,LT

## 2025-07-18 RX ORDER — CELECOXIB 200 MG/1
200 CAPSULE ORAL 2 TIMES DAILY PRN
Qty: 60 CAPSULE | Refills: 0 | Status: SHIPPED | OUTPATIENT
Start: 2025-07-18

## 2025-07-18 NOTE — PROGRESS NOTES
THIS DOCUMENT WAS MADE IN PART WITH VOICE RECOGNITION SOFTWARE.  OCCASIONALLY THIS SOFTWARE WILL MISINTERPRET WORDS OR PHRASES.    Assessment and Plan:    1. Acute pain of left knee  X-Ray Knee 3 View Left    celecoxib (CELEBREX) 200 MG capsule    CBC Auto Differential    Sedimentation rate    C-Reactive Protein    Comprehensive Metabolic Panel    US Lower Extremity Veins Left      2. Peripheral edema  US Lower Extremity Veins Left                    Assessment & Plan    M25.562 Acute pain of left knee  R60.0 Peripheral edema    PLAN SUMMARY:  > Ordered lower extremity US to rule out blood clot  > Ordered XR Left Knee for evaluation  > Ordered labs including ESR to check for infection  > Started Celebrex 200 mg twice daily as needed for knee pain  > Sam to elevate the leg and rest  > Avoid activities that cause pain, including weed-eating  > Referred to Dr. Joya (knee surgeon) for follow-up  > Follow up with Maxine MATHEWS) next week, either Monday or Wednesday  > Contact office if any critical lab values are found  > Contact emergency department if developing fever, significant chest pain, shortness of breath, dizziness, or if knee becomes more red and swollen    ACUTE PAIN OF LEFT KNEE:  > Ruled out pes anserine bursitis due to widespread tenderness.  > Primary concerns: potential knee infection or blood clot.  > Avoided steroid injections until cause of pain is determined.  > Explained risks of knee infection with hardware and potential complications.  > Explained possible inflammation from overexertion during yard work.  > Sam to avoid weed-eating or similar activities.  > Recommend rest and avoiding activities that cause pain.  > Started Celebrex 200 mg twice daily as needed for knee pain.  > Ordered XR Left Knee to evaluate.  > Ordered lower extremity US to rule out blood clot.  > Ordered labs including ESR (Erythrocyte Sedimentation Rate) to check for infection.  > Referred to Dr. Joya (knee surgeon)  for follow-up visit.  > Will monitor lab results for critical values.  > Contact the office if any critical values are found in lab results.  > Follow up with Maxine MATHEWS) next week, either Monday or Wednesday.  > Contact the emergency department if developing fever, significant chest pain, shortness of breath, dizziness, or if knee becomes more red and swollen.    PERIPHERAL EDEMA:  > Sam to elevate the leg.  > Ordered lower extremity US to rule out blood clot.             Visit today included increased complexity associated with the care of the episodic problem above addressed and managing the longitudinal care of the patient due to the serious and/or complex managed problem(s) .        ______________________________________________________________________  Subjective:    Chief Complaint:  Chief Complaint   Patient presents with    Knee Pain     Left knee pain since yesterday morning, by noon patient said he could barely walk. Pt bought a knee brace which has helped him be able to walk. Pt has been taking percocet for pain with minor relief.         HPI:  Sam is a 80 y.o. year old       History of Present Illness    CHIEF COMPLAINT:  Sam presents today for knee pain that started yesterday morning.    HISTORY OF PRESENT ILLNESS:  He reports acute onset of right knee pain starting yesterday around 10:00 AM after performing weed-eating activities. The knee is tender throughout and warm to touch with noticeable swelling. He describes significant mobility impairment and can barely walk, causing functional limitation in daily activities. He purchased a knee brace yesterday for support and has kept the knee elevated most of yesterday. He denies knee instability, clicking sensations, or fever. This represents a new pain pattern since his previous knee replacement.    LOWER EXTREMITY SYMPTOMS:  He reports intermittent leg pain and swelling for several weeks involving the entire right leg. He currently wears  compression socks to manage the swelling. He denies pain in the contralateral leg.    SURGICAL HISTORY:  Right knee replacement in 2016 by Dr. Joya.    MEDICATIONS:  He denies taking OTC anti-inflammatories and is not currently on anticoagulants such as Xarelto, Eliquis, or Coumadin.      ROS:  ROS as indicated in HPI.             Coronary artery disease, dyslipidemia, aortic atherosclerosis   Status post CABG (7/2023) x 4    Cardiology- MD Valorie   Rx-atorvastatin 40 mg, beta-blocker, Aspirin    H/o smoking   Quit : 1992    Carotid artery disease, history of CVA (1997)   Residual right upper extremity tremor    History of paroxysmal supraventricular tachycardia, Paroxysmal Atrial Fib   No symptoms  Rx : beta blocker    Tricuspid regurgitation  Per Cards     Chronic edema   Rx-Lasix 40 mg QD (left > right)  + daily compression stocking     Essential hypertension   Rx-losartan 50 mg, metoprolol 12.5 mg  Home : sys <140     Chronic low back pain  Rx-gabapentin 300 mg t.i.d., MS Contin 15 mg b.i.d., Narcan, Percocet 10 mg q.4 hours p.r.n., tizanidine  Pain MD  -  MD Trupti (meds)    +    Aram Terrell MD (CARLOS)    PMR : MD Diallo (no f/u planned)   Neurosurgery- Darlene Max MD (pain stimulator)   Prev tx : Surgeries, PT (Francos), CARLOS, Spinal Cord Stimulator in place     Idiopathic neuropathy  Rx-gabapentin 300 mg t.i.d.    Depression / Anxiety   Rx-Wellbutrin 150 mg    History squamous cell carcinoma  Dermatology-Joselyn Foss MD    Iatrogenic Hypothyroidism  Rx-levothyroxine 112 mcg  S/p total thyroidectomy : non cancerous nodules     BPH, ED, Hypogonadism     Chronic intermit Wheeze   Rx : albuterol    S/p Rt shoulder / bilateral hip / left knee arthroplasty     Prediabetes  Prev A1c : 6                Past Medical History:  Past Medical History:   Diagnosis Date    Abnormal nuclear stress test 07/2023    Anticoagulant long-term use     ASA 81 mg    Arthritis     Cataract     OU    Chronic low back pain     Complete  rupture of rotator cuff 02/08/2011    DDD (degenerative disc disease), lumbar 07/08/2015    Degeneration of lumbar or lumbosacral intervertebral disc 09/09/2015    ED (erectile dysfunction)     GERD (gastroesophageal reflux disease)     Hypertension     Hypothyroidism, unspecified     Lumbar pseudoarthrosis 06/26/2017    Lumbar stenosis 06/26/2017    Obesity     PSVT (paroxysmal supraventricular tachycardia) 07/2023    SOB (shortness of breath) 07/2023    Spondylosis of lumbar region without myelopathy or radiculopathy 07/08/2015    Squamous cell carcinoma of skin     Stroke 1997    basal ganglia, left sided weakness, resolved    Testicular hypofunction     Thoracic or lumbosacral neuritis or radiculitis 07/08/2015       Past Surgical History:  Past Surgical History:   Procedure Laterality Date    ANGIOGRAM, CORONARY, WITH LEFT HEART CATHETERIZATION  07/13/2023    Procedure: Left Heart Cath;  Surgeon: Salvador Eugene MD;  Location: [facility] CATH;  Service: Cardiology;;    APPENDECTOMY      ARTHROPLASTY OF SHOULDER Right 03/22/2022    Procedure: ARTHROPLASTY, SHOULDER BRENNAN RIGHT SHOULDER HUMERAL HEAD RESURFACING;  Surgeon: Frankie Joya MD;  Location: [facility] OR;  Service: Orthopedics;  Laterality: Right;    BACK SURGERY      4- back surgery    CAUDAL EPIDURAL STEROID INJECTION N/A 12/16/2021    Procedure: Injection-steroid-epidural-caudal;  Surgeon: Aram Terrell MD;  [facility];  Service: Pain Management;  Laterality: N/A;    CAUDAL EPIDURAL STEROID INJECTION N/A 02/02/2022    Procedure: INJECTION, STEROID, SPINE, EPIDURAL, CAUDAL;  Surgeon: Aram Terrell MD;  [facility];  Service: Pain Management;  Laterality: N/A;    CAUDAL EPIDURAL STEROID INJECTION N/A 07/22/2022    Procedure: Injection-steroid-epidural-caudal;  Surgeon: Aram Terrell MD;  [facility];  Service: Pain Management;  Laterality: N/A;  Caudal CARLOS     CAUDAL EPIDURAL STEROID INJECTION N/A 01/20/2023    Procedure:  Injection-steroid-epidural-caudal;  Surgeon: Aram Terrell MD;  Location: [facility] OR;  Service: Pain Management;  Laterality: N/A;  caudal ( melinda)    CAUDAL EPIDURAL STEROID INJECTION N/A 04/21/2023    Procedure: Injection-steroid-epidural-caudal;  Surgeon: Aram Terrell MD;  Location: [facility] OR;  Service: Pain Management;  Laterality: N/A;  caudal    CAUDAL EPIDURAL STEROID INJECTION N/A 07/11/2023    Procedure: Injection-steroid-epidural-caudal;  Surgeon: Aram Terrell MD;  Location: [facility] OR;  Service: Pain Management;  Laterality: N/A;  caudal    CAUDAL EPIDURAL STEROID INJECTION N/A 1/9/2024    Procedure: Injection-steroid-epidural-caudal;  Surgeon: Aram Terrell MD;  Location: [facility] OR;  Service: Anesthesiology;  Laterality: N/A;  caudal    CAUDAL EPIDURAL STEROID INJECTION N/A 6/4/2024    Procedure: Injection-steroid-epidural-caudal;  Surgeon: Aram Terrell MD;  Location: [facility] OR;  Service: Anesthesiology;  Laterality: N/A;    CAUDAL EPIDURAL STEROID INJECTION N/A 6/17/2025    Procedure: Injection-steroid-epidural-caudal;  Surgeon: Evan Lyles MD;  Location: [facility] OR;  Service: Pain Management;  Laterality: N/A;    COLONOSCOPY  07/12/2004    CHILO.   Redundant tortuous colon, otherwise normal.    COLONOSCOPY N/A 02/25/2019    Procedure: COLONOSCOPY;  Surgeon: Gilbert Rodriguez Jr., MD;  Location: [facility] ENDO;  Service: Endoscopy;  Laterality: N/A;    CORONARY ANGIOGRAPHY N/A 07/13/2023    Procedure: ANGIOGRAM, CORONARY ARTERY;  Surgeon: Salvador Eugene MD;  Location: [facility] CATH;  Service: Cardiology;  Laterality: N/A;    CORONARY ARTERY BYPASS GRAFT (CABG) N/A 7/19/2023    Procedure: CORONARY ARTERY BYPASS GRAFT (CABG)- x 4;  Surgeon: Sandrine Wagner MD;  Location: [facility] OR;  Service: Cardiothoracic;  Laterality: N/A;    ENDOSCOPIC HARVEST OF VEIN Left 7/19/2023    Procedure: HARVEST-VEIN-ENDOVASCULAR;  Surgeon: Sandrine Wagner MD;  Location: [facility] OR;  Service: Cardiothoracic;  Laterality: Left;    Epidural steroid  injection      Pain management    EPIDURAL STEROID INJECTION N/A 10/24/2024    Procedure: Injection, Steroid, Epidural;  Surgeon: Aram Terrell MD;  Location: [facility] OR;  Service: Pain Management;  Laterality: N/A;    ESOPHAGOGASTRODUODENOSCOPY  07/12/2004    CHILO.    EXCLUSION, LEFT ATRIAL APPENDAGE, OPEN, AS PART OF OPEN CHEST SURGERY N/A 7/19/2023    Procedure: EXCLUSION, LEFT ATRIAL APPENDAGE, OPEN, AS PART OF OPEN CHEST SURGERY;  Surgeon: Sandrine Wagner MD;  Location: [facility] OR;  Service: Cardiothoracic;  Laterality: N/A;    FRACTURE SURGERY Left     wrist / forearm, total of 5    INSERTION OF DORSAL COLUMN NERVE STIMULATOR FOR TRIAL N/A 12/12/2019    Procedure: INSERTION, NEUROSTIMULATOR, SPINAL CORD, DORSAL COLUMN, FOR TRIAL;  Surgeon: Evan Lyles MD;  Location: [facility] OR;  Service: Pain Management;  Laterality: N/A;    JOINT REPLACEMENT  02/06/2013    ( rt hip 2007), left hip     JOINT REPLACEMENT Left     total knee    KNEE ARTHROSCOPY W/ DEBRIDEMENT      bilateral knees , total of six    KNEE SURGERY      LAMINECTOMY USING MINIMALLY INVASIVE TECHNIQUE N/A 02/12/2020    Procedure: LAMINECTOMY, SPINE, MINIMALLY INVASIVE /  PLACEMENT OF SPINAL CORD STIMULATOR;  Surgeon: Darlene Max MD;  Location: [facility] OR;  Service: Neurosurgery;  Laterality: N/A;    Nervbe Block injection      Pain management    SPINAL CORD STIMULATOR IMPLANT      TOTAL SHOULDER ARTHROPLASTY Right 03/28/2022    Dr Joya    TOTAL THYROIDECTOMY Bilateral 03/07/2022    Dr Mendoza       Family History:  Family History   Problem Relation Name Age of Onset    Hypertension Father      Heart disease Father      Drug abuse Daughter      Hypertension Son      Lung disease Sister      COPD Sister      Hypertension Sister      Diverticulitis Sister      Gout Sister      Kidney disease Sister      No Known Problems Mother      Eczema Neg Hx      Lupus Neg Hx      Psoriasis Neg Hx      Melanoma Neg Hx         Social History:  Social History      Socioeconomic History    Marital status:    Tobacco Use    Smoking status: Former     Current packs/day: 0.00     Average packs/day: 1 pack/day for 30.0 years (30.0 ttl pk-yrs)     Types: Cigarettes     Start date: 8/3/1963     Quit date: 1992     Years since quittin.5     Passive exposure: Past    Smokeless tobacco: Never   Substance and Sexual Activity    Alcohol use: Yes     Comment: occ    Drug use: Not Currently     Social Drivers of Health     Financial Resource Strain: Low Risk  (10/17/2023)    Overall Financial Resource Strain (CARDIA)     Difficulty of Paying Living Expenses: Not hard at all   Recent Concern: Financial Resource Strain - Medium Risk (2023)    Overall Financial Resource Strain (CARDIA)     Difficulty of Paying Living Expenses: Somewhat hard   Food Insecurity: No Food Insecurity (2024)    Received from [facility]    Hunger Vital Sign     Worried About Running Out of Food in the Last Year: Never true     Ran Out of Food in the Last Year: Never true   Transportation Needs: No Transportation Needs (2024)    Received from [facility]    PRAPARE - Transportation     Lack of Transportation (Medical): No     Lack of Transportation (Non-Medical): No   Physical Activity: Sufficiently Active (2024)    Received from [facility]    Exercise Vital Sign     Days of Exercise per Week: 5 days     Minutes of Exercise per Session: 40 min   Stress: No Stress Concern Present (2024)    Received from [facility]    [employer] - Occupational Stress Questionnaire     Feeling of Stress : Not at all   Housing Stability: Low Risk  (2024)    Received from [facility]    Housing Stability Vital Sign     Unable to Pay for Housing in the Last Year: No     Number of Places Lived in the Last Year: 1     Unstable Housing in the Last Year: No       Medications:  Current Outpatient Medications on File Prior to Visit   Medication Sig Dispense Refill     albuterol (VENTOLIN HFA) 90 mcg/actuation inhaler Inhale 2 puffs into the lungs every 6 (six) hours as needed for Wheezing or Shortness of Breath (cough). Rescue 18 g 11    aspirin 81 MG Chew Take 81 mg by mouth once daily.      atorvastatin (LIPITOR) 40 MG tablet Take 1 tablet (40 mg total) by mouth every evening. 90 tablet 3    benzonatate (TESSALON) 200 MG capsule Take 1 capsule (200 mg total) by mouth 3 (three) times daily as needed for Cough. 30 capsule 5    buPROPion (WELLBUTRIN XL) 150 MG TB24 tablet TAKE 1 TABLET EVERY DAY 90 tablet 0    calcitRIOL (ROCALTROL) 0.5 MCG Cap TAKE 1 CAPSULE ONE TIME DAILY 90 capsule 3    fluticasone propionate (FLONASE) 50 mcg/actuation nasal spray by Each Nostril route.      furosemide (LASIX) 40 MG tablet Take 1 tablet (40 mg total) by mouth once daily. 90 tablet 1    gabapentin (NEURONTIN) 300 MG capsule Take 1 capsule (300 mg total) by mouth 3 (three) times daily. 90 capsule 5    ipratropium (ATROVENT) 21 mcg (0.03 %) nasal spray 2 sprays by Each Nostril route 3 (three) times daily. 30 mL 5    levothyroxine (SYNTHROID) 137 MCG Tab tablet Take 1 tablet (137 mcg total) by mouth before breakfast. 30 tablet 11    losartan (COZAAR) 50 MG tablet TAKE 1 TABLET EVERY DAY. TAKE ADDITIONAL 25 MG (1/2 TABLET) DAILY IF BLOOD PRESSURE  OR ABOVE 150 tablet 0    metoprolol succinate (TOPROL-XL) 25 MG 24 hr tablet TAKE ONE-HALF TABLET (12.5 MG) BY MOUTH EVERY DAY 45 tablet 1    morphine (MS CONTIN) 15 MG 12 hr tablet Take 1 tablet (15 mg total) by mouth 2 (two) times daily. 60 tablet 0    MUCUS RELIEF  mg 12 hr tablet Take 600 mg by mouth every 12 (twelve) hours.      multivitamin (THERAGRAN) per tablet Take 1 tablet by mouth once daily.      mupirocin (BACTROBAN) 2 % ointment Apply topically 3 (three) times daily. 22 g 0    naloxone (NARCAN) 0.4 mg/mL injection Inject 1 mL (0.4 mg total) into the vein as needed (overdose). 2 mL 5    omeprazole (PRILOSEC) 40 MG capsule Take one  capsule by mouth daily 90 capsule 3    oxyCODONE-acetaminophen (PERCOCET)  mg per tablet Take 1 tablet by mouth every 6 (six) hours as needed for Pain. 120 tablet 0    sulfamethoxazole-trimethoprim 800-160mg (BACTRIM DS) 800-160 mg Tab Take 1 tablet by mouth 2 (two) times daily. 10 tablet 0    tamsulosin (FLOMAX) 0.4 mg Cap Take 1 capsule (0.4 mg total) by mouth every evening. 90 capsule 3    tiZANidine 4 mg Cap Take 4 mg by mouth 3 (three) times daily as needed (pain). 90 capsule 3    UNABLE TO FIND Take by mouth once daily. Vitafusion multivites gummies       Current Facility-Administered Medications on File Prior to Visit   Medication Dose Route Frequency Provider Last Rate Last Admin    diphenhydrAMINE injection 12.5 mg  12.5 mg Intravenous Once PRN Enrique Rosales MD        electrolyte-S (ISOLYTE)   Intravenous Continuous Enrique Rosales MD        HYDROmorphone (PF) injection 0.2 mg  0.2 mg Intravenous Q5 Min PRN Enrique Rosales MD        HYDROmorphone (PF) injection 0.2 mg  0.2 mg Intravenous Q5 Min PRN Enrique Rosales MD        lactated ringers infusion   Intravenous Once PRN Aram Terrell MD        lactated ringers infusion  10 mL/hr Intravenous Continuous Enrique Rosales MD   Stopped at 07/19/23 0959    lactated ringers infusion   Intravenous Continuous Aram Terrell MD 25 mL/hr at 07/11/23 1012 New Bag at 07/11/23 1012    lactated ringers infusion   Intravenous Continuous Aram Terrell MD 25 mL/hr at 01/09/24 1014 New Bag at 01/09/24 1014    lorazepam injection 0.25 mg  0.25 mg Intravenous Once PRN Enrique Rosales MD        prochlorperazine injection Soln 5 mg  5 mg Intravenous Q30 Min PRN Enrique Rosales MD        sodium chloride 0.9% flush 3 mL  3 mL Intravenous Q8H Enrique Rosales MD           Allergies:  Xyzal [levocetirizine]    Immunizations:  Immunization History   Administered Date(s) Administered    COVID-19, MRNA, LN-S, PF (Pfizer) (Gray Cap)  "07/25/2022    COVID-19, MRNA, LN-S, PF (Pfizer) (Purple Cap) 02/09/2021, 03/02/2021, 10/15/2021, 04/23/2025    COVID-19, mRNA, LNP-S, PF (Moderna) Ages 12+ 12/18/2023    COVID-19, mRNA, LNP-S, PF, gabby-sucrose, 30 mcg/0.3 mL (Pfizer Ages 12+) 04/09/2025    Influenza 11/05/2010, 09/22/2015    Influenza (FLUAD) - Quadrivalent - Adjuvanted - PF *Preferred* (65+) 09/15/2020, 09/15/2020, 09/15/2020, 10/05/2021, 09/14/2022, 09/23/2023    Influenza - Trivalent - Afluria, Fluzone MDV 11/05/2010, 10/20/2019    Influenza - Trivalent - Fluad - Adjuvanted - PF (65 years and older 10/20/2019, 10/20/2019, 10/21/2024    Influenza - Trivalent - Fluzone High Dose - PF (65 years and older) 01/14/2014, 01/14/2014, 12/05/2014, 10/17/2016, 08/31/2017, 08/31/2017, 10/16/2018    Pneumococcal Conjugate - 13 Valent 11/01/2015, 10/20/2019    Pneumococcal Conjugate - 20 Valent 10/21/2024    Pneumococcal Polysaccharide - 23 Valent 01/14/2014, 01/14/2014, 10/17/2016    RSVpreF (Arexvy) 12/18/2023    Rsv, Bivalent, Rsvpref (Abrysvo) 10/21/2024    Tdap 10/14/2015, 04/09/2025    Zoster Recombinant 07/06/2022, 09/14/2022       Review of Systems:  Review of Systems   All other systems reviewed and are negative.      Objective:    Vitals:  Vitals:    07/18/25 0839   BP: 136/86   Pulse: 92   Resp: 16   Temp: 98.5 °F (36.9 °C)   TempSrc: Oral   SpO2: 95%   Weight: 103.9 kg (229 lb 0.9 oz)   Height: 5' 9" (1.753 m)   PainSc:   8   PainLoc: Knee         Physical Exam  Constitutional:       General: He is not in acute distress.  HENT:      Head: Normocephalic and atraumatic.   Eyes:      Pupils: Pupils are equal, round, and reactive to light.   Cardiovascular:      Rate and Rhythm: Normal rate and regular rhythm.      Heart sounds: No murmur heard.     No friction rub.   Pulmonary:      Effort: Pulmonary effort is normal.      Breath sounds: Normal breath sounds.   Abdominal:      General: Bowel sounds are normal. There is no distension.      Palpations: " Abdomen is soft.      Tenderness: There is no abdominal tenderness.   Musculoskeletal:      Cervical back: Neck supple.      Comments: Generalized swelling to upper and lower leg and knee joint.  Left knee joint warm to touch, no erythema.  Decent range of motion.  Tender to palpation exquisitely so all around the joint, thigh and lower extremity.  No evidence of any external damage or skin lesion   Skin:     General: Skin is warm and dry.      Findings: No rash.   Psychiatric:         Behavior: Behavior normal.             Dagoberto Monsalve MD  Family Medicine

## 2025-07-21 ENCOUNTER — LAB VISIT (OUTPATIENT)
Dept: LAB | Facility: HOSPITAL | Age: 80
End: 2025-07-21
Payer: MEDICARE

## 2025-07-21 DIAGNOSIS — E03.9 HYPOTHYROIDISM, UNSPECIFIED TYPE: ICD-10-CM

## 2025-07-21 LAB — TSH SERPL-ACNC: 3.44 UIU/ML (ref 0.4–4)

## 2025-07-21 PROCEDURE — 84443 ASSAY THYROID STIM HORMONE: CPT | Mod: HCNC

## 2025-07-21 PROCEDURE — 36415 COLL VENOUS BLD VENIPUNCTURE: CPT | Mod: HCNC,PN

## 2025-07-22 ENCOUNTER — TELEPHONE (OUTPATIENT)
Dept: DERMATOLOGY | Facility: CLINIC | Age: 80
End: 2025-07-22
Payer: MEDICARE

## 2025-07-22 ENCOUNTER — OFFICE VISIT (OUTPATIENT)
Dept: DERMATOLOGY | Facility: CLINIC | Age: 80
End: 2025-07-22
Payer: MEDICARE

## 2025-07-22 VITALS — BODY MASS INDEX: 33.47 KG/M2 | WEIGHT: 226 LBS | HEIGHT: 69 IN

## 2025-07-22 DIAGNOSIS — L90.5 SCAR: ICD-10-CM

## 2025-07-22 DIAGNOSIS — D22.9 MULTIPLE BENIGN NEVI: ICD-10-CM

## 2025-07-22 DIAGNOSIS — D48.5 NEOPLASM OF UNCERTAIN BEHAVIOR OF SKIN: ICD-10-CM

## 2025-07-22 DIAGNOSIS — D36.10 NEUROFIBROMA: ICD-10-CM

## 2025-07-22 DIAGNOSIS — L57.0 AK (ACTINIC KERATOSIS): Primary | ICD-10-CM

## 2025-07-22 DIAGNOSIS — D09.9 SQUAMOUS CELL CARCINOMA IN SITU: ICD-10-CM

## 2025-07-22 DIAGNOSIS — L82.1 SEBORRHEIC KERATOSIS: ICD-10-CM

## 2025-07-22 DIAGNOSIS — Z85.828 HISTORY OF NONMELANOMA SKIN CANCER: ICD-10-CM

## 2025-07-22 PROCEDURE — 1159F MED LIST DOCD IN RCRD: CPT | Mod: CPTII,S$GLB,, | Performed by: STUDENT IN AN ORGANIZED HEALTH CARE EDUCATION/TRAINING PROGRAM

## 2025-07-22 PROCEDURE — 88342 IMHCHEM/IMCYTCHM 1ST ANTB: CPT | Mod: TC,59,HCNC | Performed by: STUDENT IN AN ORGANIZED HEALTH CARE EDUCATION/TRAINING PROGRAM

## 2025-07-22 PROCEDURE — 1160F RVW MEDS BY RX/DR IN RCRD: CPT | Mod: CPTII,S$GLB,, | Performed by: STUDENT IN AN ORGANIZED HEALTH CARE EDUCATION/TRAINING PROGRAM

## 2025-07-22 PROCEDURE — 11102 TANGNTL BX SKIN SINGLE LES: CPT | Mod: S$GLB,,, | Performed by: STUDENT IN AN ORGANIZED HEALTH CARE EDUCATION/TRAINING PROGRAM

## 2025-07-22 PROCEDURE — 17000 DESTRUCT PREMALG LESION: CPT | Mod: XS,S$GLB,, | Performed by: STUDENT IN AN ORGANIZED HEALTH CARE EDUCATION/TRAINING PROGRAM

## 2025-07-22 PROCEDURE — 3288F FALL RISK ASSESSMENT DOCD: CPT | Mod: CPTII,S$GLB,, | Performed by: STUDENT IN AN ORGANIZED HEALTH CARE EDUCATION/TRAINING PROGRAM

## 2025-07-22 PROCEDURE — 17003 DESTRUCT PREMALG LES 2-14: CPT | Mod: S$GLB,,, | Performed by: STUDENT IN AN ORGANIZED HEALTH CARE EDUCATION/TRAINING PROGRAM

## 2025-07-22 PROCEDURE — 99213 OFFICE O/P EST LOW 20 MIN: CPT | Mod: 25,S$GLB,, | Performed by: STUDENT IN AN ORGANIZED HEALTH CARE EDUCATION/TRAINING PROGRAM

## 2025-07-22 PROCEDURE — 1126F AMNT PAIN NOTED NONE PRSNT: CPT | Mod: CPTII,S$GLB,, | Performed by: STUDENT IN AN ORGANIZED HEALTH CARE EDUCATION/TRAINING PROGRAM

## 2025-07-22 PROCEDURE — 88305 TISSUE EXAM BY PATHOLOGIST: CPT | Mod: 26,HCNC,, | Performed by: PATHOLOGY

## 2025-07-22 PROCEDURE — 1101F PT FALLS ASSESS-DOCD LE1/YR: CPT | Mod: CPTII,S$GLB,, | Performed by: STUDENT IN AN ORGANIZED HEALTH CARE EDUCATION/TRAINING PROGRAM

## 2025-07-22 PROCEDURE — 88342 IMHCHEM/IMCYTCHM 1ST ANTB: CPT | Mod: 26,HCNC,, | Performed by: PATHOLOGY

## 2025-07-22 PROCEDURE — 11103 TANGNTL BX SKIN EA SEP/ADDL: CPT | Mod: S$GLB,,, | Performed by: STUDENT IN AN ORGANIZED HEALTH CARE EDUCATION/TRAINING PROGRAM

## 2025-07-22 NOTE — PROGRESS NOTES
Subjective:      Patient ID:  Sam Pete is a 80 y.o. 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 Our [facility] Mohs surgeon Dr Solomon is located in [place].  Please notify patient and place referral request once patient has been given diagnosis and opportunity to discuss treatment plan     Derm Hx  SCCIS-left dorsal hand, shave biopsy:--> mohs Dr. K 1/8/25  - Hx of AK's  SCCIS, right chest, ED&C 9/16/2024 06/27/2024 SCCIS right chest ED&C 09/16/2024  Denies Fhx MM            Review of Systems   Constitutional:  Negative for fever, chills and fatigue.   Respiratory:  Negative for cough and shortness of breath.    Gastrointestinal:  Negative for nausea, vomiting and diarrhea.   Skin:  Positive for activity-related sunscreen use and wears hat. Negative for daily sunscreen use.   Hematologic/Lymphatic: Bruises/bleeds easily (asa).        Bruises easily       Objective:   Physical Exam   Constitutional: He appears well-developed and well-nourished. No distress.   Neurological: He is alert and oriented to person, place, and time. He is not disoriented.   Psychiatric: He has a normal mood and affect.   Skin:   Areas Examined (abnormalities noted in diagram):   Scalp / Hair Palpated and Inspected  Head / Face Inspection Performed  Neck Inspection Performed  Chest / Axilla Inspection Performed  Abdomen Inspection Performed  Back Inspection Performed  RUE Inspected  LUE Inspection Performed  Nails and Digits Inspection Performed                         Diagram Legend     Erythematous scaling macule/papule c/w actinic keratosis       Vascular  papule c/w angioma      Pigmented verrucoid papule/plaque c/w seborrheic keratosis      Yellow umbilicated papule c/w sebaceous hyperplasia      Irregularly shaped tan macule c/w lentigo     1-2 mm smooth white papules consistent with Milia      Movable subcutaneous cyst with punctum c/w epidermal inclusion cyst      Subcutaneous movable cyst c/w pilar cyst      Firm pink to brown papule c/w dermatofibroma      Pedunculated fleshy papule(s) c/w skin tag(s)      Evenly pigmented macule c/w junctional nevus     Mildly variegated pigmented, slightly irregular-bordered macule c/w mildly atypical nevus      Flesh colored to evenly pigmented papule c/w intradermal nevus       Pink pearly papule/plaque c/w basal cell carcinoma      Erythematous hyperkeratotic cursted plaque c/w SCC      Surgical scar with no sign of skin cancer recurrence      Open and closed comedones      Inflammatory papules and pustules      Verrucoid papule consistent consistent with wart     Erythematous eczematous patches and plaques     Dystrophic onycholytic nail with subungual debris c/w onychomycosis     Umbilicated papule    Erythematous-base heme-crusted tan verrucoid plaque consistent with inflamed seborrheic keratosis     Erythematous Silvery Scaling Plaque c/w Psoriasis     See annotation      Assessment / Plan:      Pathology Orders:       Normal Orders This Visit    Specimen to Pathology, Dermatology     Questions:    Procedure Type: Dermatology and skin neoplasms    Number of Specimens: 2    ------------------------: -------------------------    Spec 1 Procedure: Shave Biopsy    Spec 1 Clinical Impression: SCCiS vs. AK vs. ISK    Spec 1 Source: left upper arm    ------------------------: -------------------------    Spec 2 Procedure: Shave Biopsy    Spec 2 Clinical Impression: irregular brown macule r/o MIS    Spec 2 Source: right chest    Clinical Information: see EPIC    Clinical History: see EPIC    Specimen Source: Skin    Release to  patient: Immediate    Send normal result to authorizing provider's In Basket if patient is active on MyChart: Yes          AK (actinic keratosis)  Cryosurgery Procedure Note    Verbal consent from the patient is obtained and the patient is aware of the precancerous quality and need for treatment of these lesions. Liquid nitrogen cryosurgery is applied to the 6 actinic keratoses, as detailed in the physical exam, to produce a freeze injury. The patient is aware that blisters may form and is instructed on wound care with gentle cleansing and use of vaseline ointment to keep moist until healed. The patient is supplied a handout on cryosurgery and is instructed to call if lesions do not completely resolve.    Neoplasm of uncertain behavior of skin x2  -     Specimen to Pathology, Dermatology  Shave biopsy procedure note:    Shave biopsy performed after verbal consent including risk of infection, scar, recurrence, need for additional treatment of site. Area prepped with alcohol, anesthetized with approximately 1.0cc of 1% lidocaine with epinephrine. Lesional tissue shaved with razor blade. Hemostasis achieved with application of aluminum chloride followed by hyfrecation. No complications. Dressing applied. Wound care explained.    History of nonmelanoma skin cancer  Scar  Area(s) of previous NMSC evaluated with no signs of recurrence.  Upper body skin examination performed today including at least 9 points as noted in physical examination. Suspicious lesions noted.  Patient instructed in importance in daily broad spectrum sun protection of at least spf 30. Mineral sunscreen ingredients preferred (Zinc +/- Titanium) and can be found OTC.   Patient encouraged to wear hat for all outdoor exposure.   Also discussed sun avoidance and use of protective clothing.    Seborrheic keratosis  These are benign inherited growths without a malignant potential. Reassurance given to patient. No treatment is necessary.     Multiple benign  nevi  Careful dermoscopy evaluation of nevi performed   Monitor for new mole or moles that are becoming bigger, darker, irritated, or developing irregular borders.     Squamous cell carcinoma in situ  Right parietal scalp- biopsy proven  Referral placed to mohs, patient states he was not called to be scheduled. Will send message to Dr. GRIMM and staff for scheduling    Neurofibroma  reassurance         6 months  No follow-ups on file.

## 2025-07-22 NOTE — Clinical Note
Hi- patient has biopsy proven SCCiS on right parietal scalp from march 2025, states he never got a call for scheduling. Can you all schedule him for mohs? Thanks!

## 2025-07-23 ENCOUNTER — TELEPHONE (OUTPATIENT)
Dept: DERMATOLOGY | Facility: CLINIC | Age: 80
End: 2025-07-23
Payer: MEDICARE

## 2025-07-23 DIAGNOSIS — Z98.890 S/P PARATHYROIDECTOMY: ICD-10-CM

## 2025-07-23 DIAGNOSIS — Z90.89 S/P PARATHYROIDECTOMY: ICD-10-CM

## 2025-07-23 RX ORDER — CALCITRIOL 0.5 UG/1
CAPSULE ORAL
Qty: 90 CAPSULE | Refills: 3 | Status: SHIPPED | OUTPATIENT
Start: 2025-07-23

## 2025-07-23 NOTE — TELEPHONE ENCOUNTER
"----- Message from PATY Meneses sent at 7/22/2025  4:42 PM CDT -----  Regarding: RE: Mohs Referral  HI Mariajose-   On 3/20 under encounters you can see where I made an entry for "Mohs order placed" and then under referrals it is referral # [id number].  ----- Message -----  From: Elmo Amin  Sent: 7/22/2025   3:44 PM CDT  To: Jalyn Leblanc MD; [facility] [place]  Subject: Mohs Referral                                    I have tried to review the chart to see where the miscommunication happened  when this patient was referred. I do not see any original communication from the referral, or where it was sent to Dr. GRIMM. I am so sorry that this patient slipped through the cracks. I l/m for patient so that we can get him scheduled. Please advise if there is anything we can do to eliminate this from happening in the future.     Elmo  "

## 2025-07-25 ENCOUNTER — OFFICE VISIT (OUTPATIENT)
Dept: FAMILY MEDICINE | Facility: CLINIC | Age: 80
End: 2025-07-25
Payer: MEDICARE

## 2025-07-25 ENCOUNTER — LAB VISIT (OUTPATIENT)
Dept: LAB | Facility: HOSPITAL | Age: 80
End: 2025-07-25
Attending: FAMILY MEDICINE
Payer: MEDICARE

## 2025-07-25 VITALS
BODY MASS INDEX: 34.82 KG/M2 | DIASTOLIC BLOOD PRESSURE: 76 MMHG | WEIGHT: 235.13 LBS | HEART RATE: 75 BPM | HEIGHT: 69 IN | SYSTOLIC BLOOD PRESSURE: 129 MMHG | RESPIRATION RATE: 16 BRPM | TEMPERATURE: 98 F | OXYGEN SATURATION: 94 %

## 2025-07-25 DIAGNOSIS — E03.9 HYPOTHYROIDISM, UNSPECIFIED TYPE: ICD-10-CM

## 2025-07-25 DIAGNOSIS — R60.0 LOCALIZED EDEMA: ICD-10-CM

## 2025-07-25 DIAGNOSIS — L03.116 CELLULITIS OF LEFT LOWER EXTREMITY: Primary | ICD-10-CM

## 2025-07-25 DIAGNOSIS — M25.562 ACUTE PAIN OF LEFT KNEE: ICD-10-CM

## 2025-07-25 LAB
ABSOLUTE EOSINOPHIL (OHS): 0.27 K/UL
ABSOLUTE MONOCYTE (OHS): 0.7 K/UL (ref 0.3–1)
ABSOLUTE NEUTROPHIL COUNT (OHS): 2.86 K/UL (ref 1.8–7.7)
ALBUMIN SERPL BCP-MCNC: 3.8 G/DL (ref 3.5–5.2)
ALP SERPL-CCNC: 56 UNIT/L (ref 40–150)
ALT SERPL W/O P-5'-P-CCNC: 17 UNIT/L (ref 0–55)
ANION GAP (OHS): 5 MMOL/L (ref 8–16)
AST SERPL-CCNC: 23 UNIT/L (ref 0–50)
BASOPHILS # BLD AUTO: 0.05 K/UL
BASOPHILS NFR BLD AUTO: 0.8 %
BILIRUB SERPL-MCNC: 0.4 MG/DL (ref 0.1–1)
BUN SERPL-MCNC: 24 MG/DL (ref 8–23)
CALCIUM SERPL-MCNC: 8.8 MG/DL (ref 8.7–10.5)
CHLORIDE SERPL-SCNC: 103 MMOL/L (ref 95–110)
CO2 SERPL-SCNC: 29 MMOL/L (ref 23–29)
CREAT SERPL-MCNC: 1.1 MG/DL (ref 0.5–1.4)
CRP SERPL-MCNC: 2.8 MG/L
ERYTHROCYTE [DISTWIDTH] IN BLOOD BY AUTOMATED COUNT: 14.1 % (ref 11.5–14.5)
ERYTHROCYTE [SEDIMENTATION RATE] IN BLOOD BY PHOTOMETRIC METHOD: 10 MM/HR
ESTROGEN SERPL-MCNC: NORMAL PG/ML
GFR SERPLBLD CREATININE-BSD FMLA CKD-EPI: >60 ML/MIN/1.73/M2
GLUCOSE SERPL-MCNC: 87 MG/DL (ref 70–110)
HCT VFR BLD AUTO: 36.7 % (ref 40–54)
HGB BLD-MCNC: 11.3 GM/DL (ref 14–18)
IMM GRANULOCYTES # BLD AUTO: 0.01 K/UL (ref 0–0.04)
IMM GRANULOCYTES NFR BLD AUTO: 0.2 % (ref 0–0.5)
INSULIN SERPL-ACNC: NORMAL U[IU]/ML
LAB AP CLINICAL INFORMATION: NORMAL
LAB AP GROSS DESCRIPTION: NORMAL
LAB AP PERFORMING LOCATION(S): NORMAL
LAB AP REPORT FOOTNOTES: NORMAL
LYMPHOCYTES # BLD AUTO: 2.69 K/UL (ref 1–4.8)
MCH RBC QN AUTO: 29.1 PG (ref 27–31)
MCHC RBC AUTO-ENTMCNC: 30.8 G/DL (ref 32–36)
MCV RBC AUTO: 95 FL (ref 82–98)
NUCLEATED RBC (/100WBC) (OHS): 0 /100 WBC
PLATELET # BLD AUTO: 283 K/UL (ref 150–450)
PMV BLD AUTO: 9.2 FL (ref 9.2–12.9)
POTASSIUM SERPL-SCNC: 4.7 MMOL/L (ref 3.5–5.1)
PROT SERPL-MCNC: 7 GM/DL (ref 6–8.4)
RBC # BLD AUTO: 3.88 M/UL (ref 4.6–6.2)
RELATIVE EOSINOPHIL (OHS): 4.1 %
RELATIVE LYMPHOCYTE (OHS): 40.9 % (ref 18–48)
RELATIVE MONOCYTE (OHS): 10.6 % (ref 4–15)
RELATIVE NEUTROPHIL (OHS): 43.4 % (ref 38–73)
SODIUM SERPL-SCNC: 137 MMOL/L (ref 136–145)
T3RU NFR SERPL: NORMAL %
WBC # BLD AUTO: 6.58 K/UL (ref 3.9–12.7)

## 2025-07-25 PROCEDURE — 36415 COLL VENOUS BLD VENIPUNCTURE: CPT | Mod: HCNC,PN

## 2025-07-25 PROCEDURE — 80053 COMPREHEN METABOLIC PANEL: CPT | Mod: HCNC

## 2025-07-25 PROCEDURE — 99999 PR PBB SHADOW E&M-EST. PATIENT-LVL V: CPT | Mod: PBBFAC,HCNC,,

## 2025-07-25 PROCEDURE — 86140 C-REACTIVE PROTEIN: CPT | Mod: HCNC

## 2025-07-25 PROCEDURE — 85025 COMPLETE CBC W/AUTO DIFF WBC: CPT | Mod: HCNC

## 2025-07-25 PROCEDURE — 85652 RBC SED RATE AUTOMATED: CPT | Mod: HCNC

## 2025-07-25 RX ORDER — DOXYCYCLINE HYCLATE 100 MG
100 TABLET ORAL 2 TIMES DAILY
Qty: 14 TABLET | Refills: 0 | Status: SHIPPED | OUTPATIENT
Start: 2025-07-25 | End: 2025-08-01

## 2025-07-25 RX ORDER — LEVOTHYROXINE SODIUM 137 UG/1
137 TABLET ORAL
Qty: 30 TABLET | Refills: 11 | Status: SHIPPED | OUTPATIENT
Start: 2025-07-25 | End: 2026-07-25

## 2025-07-28 ENCOUNTER — PROCEDURE VISIT (OUTPATIENT)
Dept: DERMATOLOGY | Facility: CLINIC | Age: 80
End: 2025-07-28
Payer: MEDICARE

## 2025-07-28 VITALS
SYSTOLIC BLOOD PRESSURE: 179 MMHG | HEIGHT: 69 IN | HEART RATE: 75 BPM | DIASTOLIC BLOOD PRESSURE: 94 MMHG | BODY MASS INDEX: 34.8 KG/M2 | WEIGHT: 235 LBS

## 2025-07-28 DIAGNOSIS — D04.4 SQUAMOUS CELL CARCINOMA IN SITU (SCCIS) OF SCALP: ICD-10-CM

## 2025-07-29 ENCOUNTER — HOSPITAL ENCOUNTER (OUTPATIENT)
Dept: RADIOLOGY | Facility: HOSPITAL | Age: 80
Discharge: HOME | End: 2025-07-29
Payer: MEDICARE

## 2025-07-29 DIAGNOSIS — R60.0 LOCALIZED EDEMA: ICD-10-CM

## 2025-07-29 PROCEDURE — 93971 EXTREMITY STUDY: CPT | Mod: TC,HCNC,PO,LT

## 2025-07-29 PROCEDURE — 93971 EXTREMITY STUDY: CPT | Mod: 26,HCNC,LT, | Performed by: RADIOLOGY

## 2025-07-29 NOTE — PROGRESS NOTES
MOHS MICROGRAPHIC SURGERY OPERATIVE NOTE  Name:  Sam Pete  Date: 2025  Patient : 1945  Attending Surgeon: Nafisa Solomon MD  Assistants: Esperanza Sheppard - Surgical Technician  Anesthetic Agent: 1% lidocaine with 1:100,000 epinephrine  Clinical Diagnosis: squamous cell carcinoma in situ  Operation: Mohs Micrographic Surgery  Location: right parietal scalp  Indications: Biopsy-proven skin cancer of cosmetically and functionally important areas, including head, neck, genital, hand, foot, or areas known for having difficulty in healing, such as the lower anterior legs.  Surgical Preparation: povidone-iodine  Pre-op anbitioics: no  MOHS Case #: [facility]      Description of Operation:  The nature and purpose of the procedure, associated risks and alternative treatments were explained to the patient in detail. All patient questions were answered completely.  An informed operative consent and photography permit were obtained. The tumor location was then identified and marked with agreement by the patient of the correct location.   Time out was performed with all present parties agreeing to two patient identifiers and the procedure location.  The patient was positioned, prepped, and draped in the usual sterile manner.  Local anesthesia was obtained with 3 cc(s) of 1% lidocaine with 1:100,000 epinephrine. The lesion pre-operatively measures 1.1 by 0.9 cm.     1ST STAGE:  A 2+ mm rim of normal appearing skin was marked circumferentially around the lesion after scraping with a curette to define the margin.   The area thus outlined was excised at a 45 degree angle. Hemostasis was obtained with electrodesiccation.   The specimen was oriented, mapped, and subdivided into at least two sections for evaluation.  Sections were then chromacoded and submitted for horizontal frozen sections.   The patient tolerated the procedure well and there were no complications.   Upon microscopic examination of the  processed horizontal frozen sections of this stage:  No residual tumor was noted.      SUMMARY:  The tumor was extirpated in 1 Mohs Stages resulting in a final defect measuring 1.5 by 1.4 cm².   The final defect extended deep to subcutaneous fat  The patient tolerated the procedure well and no complications were noted.     DEFECT PHOTO:      Nafisa Solomon MD      Complex Linear Closure Operative Note  Name: Sam Pete  YOB: 1945  Date: 7/28/2025  Attending Surgeon: Nafisa Solomon MD, FAAD  Assistants: Esperanza Sheppard - Surgical Technician  Defect Size: 1.5 x 1.4 cm secondary to Mohs Micrographic Surgery  Location: right parietal scalp  Operation: Complex Linear Closure  Surgical Preparation: Broad scrub with povidone-iodine    Description of Operation:    The procedure, associated risks, and alternative treatments were discussed in detail with the patient, and all questions were answered. To optimize wound healing, minimize tension, and maintain cosmetic and functional integrity, a complex linear closure was performed. Informed consent and a photography permit were obtained.  The patient was positioned, prepped, and draped in a sterile manner. Local anesthesia was achieved using 6 cc(s) of 1% lidocaine with 1:100,000 epinephrine.    The defect edges were debeveled with a scalpel blade. The circular defect was widely undermined in the deep subcutaneous plane at least 100% of the defect diameter to allow for maximum tissue movement. Hemostasis was achieved with spot electrodessication. The defect was closed first utilizing multiple 3-0 Monocryl interrupted buried subcutaneous sutures. This resulted in excellent apposition of the subcutis and the superficial fascia, two redundant areas of tissue were created on opposite sides of the defect by the primary closure. Utilizing a scalpel blade, two Burows triangles were excised to ensure a flat scar. Hemostasis was obtained with spot  electrodessication. The epidermal edges throughout were further fastened superficially with 3-0 Prolene. The final length of the repair was 4.2 cm. The patient tolerated the procedure well and there were no complications.    The patient tolerated the procedure well with no complications.    Post-Procedure Care:  Dressing: Polysporin, non-adherent gauze and a pressure dressing were applied to wound after gentle cleansing with saline.  Post-Op Medications: None  Follow-Up: The patient was provided with detailed post-op care instructions and will return in 8-10 days for suture removal or wound check.     Photo(s):      Nafisa Solomon MD.

## 2025-07-30 ENCOUNTER — OFFICE VISIT (OUTPATIENT)
Dept: FAMILY MEDICINE | Facility: CLINIC | Age: 80
End: 2025-07-30
Payer: MEDICARE

## 2025-07-30 VITALS
DIASTOLIC BLOOD PRESSURE: 80 MMHG | WEIGHT: 237.75 LBS | HEIGHT: 69 IN | OXYGEN SATURATION: 96 % | SYSTOLIC BLOOD PRESSURE: 132 MMHG | BODY MASS INDEX: 35.21 KG/M2 | HEART RATE: 75 BPM

## 2025-07-30 DIAGNOSIS — M25.562 ACUTE PAIN OF LEFT KNEE: ICD-10-CM

## 2025-07-30 DIAGNOSIS — I83.892 VARICOSE VEINS OF LEFT LEG WITH EDEMA: Primary | ICD-10-CM

## 2025-07-30 PROCEDURE — 1125F AMNT PAIN NOTED PAIN PRSNT: CPT | Mod: CPTII,HCNC,S$GLB,

## 2025-07-30 PROCEDURE — 3075F SYST BP GE 130 - 139MM HG: CPT | Mod: CPTII,HCNC,S$GLB,

## 2025-07-30 PROCEDURE — 3288F FALL RISK ASSESSMENT DOCD: CPT | Mod: CPTII,HCNC,S$GLB,

## 2025-07-30 PROCEDURE — 99999 PR PBB SHADOW E&M-EST. PATIENT-LVL III: CPT | Mod: PBBFAC,HCNC,,

## 2025-07-30 PROCEDURE — 99214 OFFICE O/P EST MOD 30 MIN: CPT | Mod: HCNC,S$GLB,,

## 2025-07-30 PROCEDURE — 1101F PT FALLS ASSESS-DOCD LE1/YR: CPT | Mod: CPTII,HCNC,S$GLB,

## 2025-07-30 PROCEDURE — 3079F DIAST BP 80-89 MM HG: CPT | Mod: CPTII,HCNC,S$GLB,

## 2025-07-30 NOTE — PROGRESS NOTES
[facility]  [address] [place] [address]    Subjective    Chief Complaint:   Chief Complaint   Patient presents with    Follow-up     Follow up on left knee.       History of Present Illness:     Sam Pete is a(n) 80 y.o. male with past medical history as noted below who presents to the clinic today for follow up.    Redness to left lower leg has improved. He continues to have pain to the left knee for which he is scheduled with orthopedics on 8/1. Repeat venous ultrasound negative for acute thrombosis. CBC without leukocytosis, ESR and CRP wnl. No fever, worsening pain, worsening swelling, chest pain or shortness of breath.     This is a reoccurring problem. He has a history of vascular surgery to the [facility] and has had intermittent edema and pain to the distal left leg for the past few years.       Coronary artery disease, dyslipidemia, aortic atherosclerosis   Status post CABG (7/2023) x 4    Cardiology- MD Valorie   Rx-atorvastatin 40 mg, beta-blocker, Aspirin     H/o smoking   Quit : 1992     Carotid artery disease, history of CVA (1997)   Residual right upper extremity tremor     History of paroxysmal supraventricular tachycardia, Paroxysmal Atrial Fib   No symptoms  Rx : beta blocker     Tricuspid regurgitation  Per Cards      Chronic edema   Rx-Lasix 40 mg QD (left > right)  + daily compression stocking      Essential hypertension   Rx-losartan 50 mg, metoprolol 12.5 mg  Home : sys <140      Chronic low back pain  Rx-gabapentin 300 mg t.i.d., MS Contin 15 mg b.i.d., Narcan, Percocet 10 mg q.4 hours p.r.n., tizanidine  Pain MD  -  MD Trupti (meds)    +    Aram Terrell MD (CARLOS)    PMR : MD Diallo (no f/u planned)   Neurosurgery- Darlene Max MD (pain stimulator)   Prev tx : Surgeries, PT (Francos), CARLOS, Spinal Cord Stimulator in place      Idiopathic neuropathy  Rx-gabapentin 300 mg t.i.d.     Depression / Anxiety   Rx-Wellbutrin 150 mg     History  squamous cell carcinoma  Dermatology-Joselyn Foss MD     Iatrogenic Hypothyroidism  Rx-levothyroxine 112 mcg  S/p total thyroidectomy : non cancerous nodules      BPH, ED, Hypogonadism      Chronic intermit Wheeze   Rx : albuterol     S/p Rt shoulder / bilateral hip / left knee arthroplasty      Prediabetes  Prev A1c : 6        Problem List: Problem List[1]    Current Outpatient Medications:   Current Outpatient Medications   Medication Instructions    albuterol (VENTOLIN HFA) 90 mcg/actuation inhaler 2 puffs, Inhalation, Every 6 hours PRN, Rescue    aspirin 81 mg, Daily    atorvastatin (LIPITOR) 40 mg, Oral, Nightly    benzonatate (TESSALON) 200 mg, Oral, 3 times daily PRN    buPROPion (WELLBUTRIN XL) 150 MG TB24 tablet TAKE 1 TABLET EVERY DAY    calcitRIOL (ROCALTROL) 0.5 MCG Cap TAKE 1 CAPSULE BY MOUTH ONE TIME DAILY    celecoxib (CELEBREX) 200 mg, Oral, 2 times daily PRN    doxycycline (VIBRA-TABS) 100 mg, Oral, 2 times daily    fluticasone propionate (FLONASE) 50 mcg/actuation nasal spray by Each Nostril route.    furosemide (LASIX) 40 mg, Oral, Daily    gabapentin (NEURONTIN) 300 mg, Oral, 3 times daily    ipratropium (ATROVENT) 21 mcg (0.03 %) nasal spray 2 sprays, Each Nostril, 3 times daily    levothyroxine (SYNTHROID) 137 mcg, Oral, Before breakfast    losartan (COZAAR) 50 MG tablet TAKE 1 TABLET EVERY DAY. TAKE ADDITIONAL 25 MG (1/2 TABLET) DAILY IF BLOOD PRESSURE  OR ABOVE    metoprolol succinate (TOPROL-XL) 25 MG 24 hr tablet TAKE ONE-HALF TABLET (12.5 MG) BY MOUTH EVERY DAY    morphine (MS CONTIN) 15 mg, Oral, 2 times daily    MUCUS RELIEF  mg, Every 12 hours    multivitamin (THERAGRAN) per tablet 1 tablet, Daily    mupirocin (BACTROBAN) 2 % ointment Topical (Top), 3 times daily    naloxone (NARCAN) 0.4 mg, Intravenous, As needed (PRN)    omeprazole (PRILOSEC) 40 MG capsule Take one capsule by mouth daily    oxyCODONE-acetaminophen (PERCOCET)  mg per tablet 1 tablet, Oral, Every 6  hours PRN    sulfamethoxazole-trimethoprim 800-160mg (BACTRIM DS) 800-160 mg Tab 1 tablet, Oral, 2 times daily    tamsulosin (FLOMAX) 0.4 mg, Oral, Nightly    tiZANidine 4 mg, Oral, 3 times daily PRN    UNABLE TO FIND Daily       Surgical History:   Past Surgical History:   Procedure Laterality Date    ANGIOGRAM, CORONARY, WITH LEFT HEART CATHETERIZATION  07/13/2023    Procedure: Left Heart Cath;  Surgeon: Salvador Eugene MD;  Location: [facility] CATH;  Service: Cardiology;;    APPENDECTOMY      ARTHROPLASTY OF SHOULDER Right 03/22/2022    Procedure: ARTHROPLASTY, SHOULDER BRENNAN RIGHT SHOULDER HUMERAL HEAD RESURFACING;  Surgeon: Frankie Joya MD;  Location: [facility] OR;  Service: Orthopedics;  Laterality: Right;    BACK SURGERY      4- back surgery    CAUDAL EPIDURAL STEROID INJECTION N/A 12/16/2021    Procedure: Injection-steroid-epidural-caudal;  Surgeon: Aram Terrell MD;  Location: [facility] OR;  Service: Pain Management;  Laterality: N/A;    CAUDAL EPIDURAL STEROID INJECTION N/A 02/02/2022    Procedure: INJECTION, STEROID, SPINE, EPIDURAL, CAUDAL;  Surgeon: Aram Terrell MD;  Location: [facility] OR;  Service: Pain Management;  Laterality: N/A;    CAUDAL EPIDURAL STEROID INJECTION N/A 07/22/2022    Procedure: Injection-steroid-epidural-caudal;  Surgeon: Aram Terrell MD;  Location: [facility] OR;  Service: Pain Management;  Laterality: N/A;  Caudal CARLOS     CAUDAL EPIDURAL STEROID INJECTION N/A 01/20/2023    Procedure: Injection-steroid-epidural-caudal;  Surgeon: Aram Terrell MD;  Location: [facility] OR;  Service: Pain Management;  Laterality: N/A;  caudal ( melinda)    CAUDAL EPIDURAL STEROID INJECTION N/A 04/21/2023    Procedure: Injection-steroid-epidural-caudal;  Surgeon: Aram Terrell MD;  Location: [facility] OR;  Service: Pain Management;  Laterality: N/A;  caudal    CAUDAL EPIDURAL STEROID INJECTION N/A 07/11/2023    Procedure: Injection-steroid-epidural-caudal;  Surgeon: Aram Terrell MD;  Location: [facility] OR;  Service: Pain Management;   Laterality: N/A;  caudal    CAUDAL EPIDURAL STEROID INJECTION N/A 1/9/2024    Procedure: Injection-steroid-epidural-caudal;  Surgeon: Aram Terrell MD;  [facility];  Service: Anesthesiology;  Laterality: N/A;  caudal    CAUDAL EPIDURAL STEROID INJECTION N/A 6/4/2024    Procedure: Injection-steroid-epidural-caudal;  Surgeon: Aram Terrell MD;  [facility];  Service: Anesthesiology;  Laterality: N/A;    CAUDAL EPIDURAL STEROID INJECTION N/A 6/17/2025    Procedure: Injection-steroid-epidural-caudal;  Surgeon: Evan Lyles MD;  Location: [facility] OR;  Service: Pain Management;  Laterality: N/A;    COLONOSCOPY  07/12/2004    [facility].   Redundant tortuous colon, otherwise normal.    COLONOSCOPY N/A 02/25/2019    Procedure: COLONOSCOPY;  Surgeon: Gilbert Rodriguez Jr., MD;  Location: [facility] ENDO;  Service: Endoscopy;  Laterality: N/A;    CORONARY ANGIOGRAPHY N/A 07/13/2023    Procedure: ANGIOGRAM, CORONARY ARTERY;  Surgeon: Salvador Eugene MD;  Location: [facility] CATH;  Service: Cardiology;  Laterality: N/A;    CORONARY ARTERY BYPASS GRAFT (CABG) N/A 7/19/2023    Procedure: CORONARY ARTERY BYPASS GRAFT (CABG)- x 4;  Surgeon: Sandrine Wagner MD;  Location: [facility] OR;  Service: Cardiothoracic;  Laterality: N/A;    ENDOSCOPIC HARVEST OF VEIN Left 7/19/2023    Procedure: HARVEST-VEIN-ENDOVASCULAR;  Surgeon: Sandrine Wagner MD;  Location: [facility] OR;  Service: Cardiothoracic;  Laterality: Left;    Epidural steroid injection      Pain management    EPIDURAL STEROID INJECTION N/A 10/24/2024    Procedure: Injection, Steroid, Epidural;  Surgeon: Aram Terrell MD;  Location: [facility] OR;  Service: Pain Management;  Laterality: N/A;    ESOPHAGOGASTRODUODENOSCOPY  07/12/2004    [facility].    EXCLUSION, LEFT ATRIAL APPENDAGE, OPEN, AS PART OF OPEN CHEST SURGERY N/A 7/19/2023    Procedure: EXCLUSION, LEFT ATRIAL APPENDAGE, OPEN, AS PART OF OPEN CHEST SURGERY;  Surgeon: Sandrine Wagner MD;  Location: [facility] OR;  Service: Cardiothoracic;  Laterality:  N/A;    FRACTURE SURGERY Left     wrist / forearm, total of 5    INSERTION OF DORSAL COLUMN NERVE STIMULATOR FOR TRIAL N/A 12/12/2019    Procedure: INSERTION, NEUROSTIMULATOR, SPINAL CORD, DORSAL COLUMN, FOR TRIAL;  Surgeon: Evan Lyles MD;  Location: [facility] OR;  Service: Pain Management;  Laterality: N/A;    JOINT REPLACEMENT  02/06/2013    ( rt hip 2007), left hip     JOINT REPLACEMENT Left     total knee    KNEE ARTHROSCOPY W/ DEBRIDEMENT      bilateral knees , total of six    KNEE SURGERY      LAMINECTOMY USING MINIMALLY INVASIVE TECHNIQUE N/A 02/12/2020    Procedure: LAMINECTOMY, SPINE, MINIMALLY INVASIVE /  PLACEMENT OF SPINAL CORD STIMULATOR;  Surgeon: Darlene Max MD;  Location: [facility] OR;  Service: Neurosurgery;  Laterality: N/A;    Nervbe Block injection      Pain management    SPINAL CORD STIMULATOR IMPLANT      TOTAL SHOULDER ARTHROPLASTY Right 03/28/2022    Dr Joya    TOTAL THYROIDECTOMY Bilateral 03/07/2022    Dr Mendoza       Family History:   Family History   Problem Relation Name Age of Onset    Hypertension Father      Heart disease Father      Drug abuse Daughter      Hypertension Son      Lung disease Sister      COPD Sister      Hypertension Sister      Diverticulitis Sister      Gout Sister      Kidney disease Sister      No Known Problems Mother      Eczema Neg Hx      Lupus Neg Hx      Psoriasis Neg Hx      Melanoma Neg Hx         Allergies:   Review of patient's allergies indicates:   Allergen Reactions    Xyzal [levocetirizine] Other (See Comments)     Knocked him out        Tobacco Status:   Tobacco Use: Medium Risk (7/30/2025)    Patient History     Smoking Tobacco Use: Former     Smokeless Tobacco Use: Never     Passive Exposure: Past       Sexual Activity:   Social History     Substance and Sexual Activity   Sexual Activity Not on file       Alcohol Use:   Social History     Substance and Sexual Activity   Alcohol Use Yes    Comment: occ         Objective       Vitals:    07/30/25  "1331   BP: 132/80   BP Location: Left forearm   Patient Position: Sitting   Pulse: 75   SpO2: 96%   Weight: 107.9 kg (237 lb 12.3 oz)   Height: 5' 9" (1.753 m)       Review of Systems   Constitutional:  Negative for chills and fever.   Respiratory:  Negative for cough and shortness of breath.    Cardiovascular:  Negative for chest pain.       Physical Exam  Constitutional:       General: He is not in acute distress.     Appearance: Normal appearance.   HENT:      Head: Normocephalic and atraumatic.   Cardiovascular:      Rate and Rhythm: Normal rate and regular rhythm.      Heart sounds: Normal heart sounds. No murmur heard.  Pulmonary:      Effort: Pulmonary effort is normal. No respiratory distress.      Breath sounds: Normal breath sounds. No wheezing.   Musculoskeletal:         General: Tenderness (medial left knee) present.      Right lower leg: No edema.      Left lower leg: No edema.   Skin:     General: Skin is warm.      Findings: Erythema (improved erythema to the LLE when compared to LOV) present.   Neurological:      Mental Status: He is alert and oriented to person, place, and time.   Psychiatric:         Behavior: Behavior normal.           Assessment and Plan:    1. Varicose veins of left leg with edema  -     CV US Lower Extremity Veins Bilateral Insufficiency; Future  -     Ambulatory referral/consult to Vascular Surgery; Future; Expected date: 08/06/2025    2. Acute pain of left knee        Visit summary:    Sam Pete presented today for follow up.    Appearance of distal left LE improved. Complete doxycycline. I do suspect some overlap with chronic venous insufficiency as he has had several similar episodes since vascular surgery of the left lower extremity. Obtain venous insufficiency US, refer to vascular surgery - Dr. Rome.     Continue compression stockings.     Examination of the left knee does not reveal worsening erythema, edema, warmth, or other signs of acute septic arthritis, " but he was instructed to report to ER if symptoms become severe - especially with any fever/chills, acute worsening of pain, swelling, or redness, shortness of breath, chest pain     Follow up: as scheduled with orthopedics on 8/1 to address acute left knee pain      Josefina Huitron PA-C    This note was created partially with voice dictation software and is prone to errors. This note has been reviewed by me but some errors are inevitable.          [1]   Patient Active Problem List  Diagnosis    Benign prostatic hyperplasia with lower urinary tract symptoms    Impotence of organic origin    Spinal stenosis in cervical region    Spinal stenosis, lumbar region, without neurogenic claudication    Hereditary and idiopathic peripheral neuropathy    Chronic pain low back and neck with spinal stenosis, djd left arm.  pain contract renewed 9/10/14    Essential (primary) hypertension    Facet arthropathy, lumbar    Atherosclerosis of aorta    PAF (paroxysmal atrial fibrillation)    Lumbar radiculopathy    Chronic pain syndrome    Chronic back pain    Other hyperlipidemia    Gastroesophageal reflux disease without esophagitis    At risk for falls    Primary osteoarthritis of right shoulder    Long term (current) use of anticoagulants    Severe obesity (BMI 35.0-39.9) with comorbidity    REAL (iron deficiency anemia)    Nonrheumatic tricuspid valve regurgitation    Mild pulmonary hypertension    Coronary artery disease involving native coronary artery of native heart without angina pectoris    H/O: CVA (cerebrovascular accident)    S/P CABG (coronary artery bypass graft)    Acquired hypothyroidism    Carotid artery plaque, bilateral    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

## 2025-07-31 DIAGNOSIS — M25.562 CHRONIC PAIN OF LEFT KNEE: Primary | ICD-10-CM

## 2025-07-31 DIAGNOSIS — G89.29 CHRONIC PAIN OF LEFT KNEE: Primary | ICD-10-CM

## 2025-08-01 ENCOUNTER — OFFICE VISIT (OUTPATIENT)
Dept: ORTHOPEDICS | Facility: CLINIC | Age: 80
End: 2025-08-01
Payer: MEDICARE

## 2025-08-01 ENCOUNTER — HOSPITAL ENCOUNTER (OUTPATIENT)
Dept: RADIOLOGY | Facility: HOSPITAL | Age: 80
Discharge: HOME | End: 2025-08-01
Attending: ORTHOPAEDIC SURGERY
Payer: MEDICARE

## 2025-08-01 ENCOUNTER — PATIENT MESSAGE (OUTPATIENT)
Dept: FAMILY MEDICINE | Facility: CLINIC | Age: 80
End: 2025-08-01
Payer: MEDICARE

## 2025-08-01 VITALS — HEIGHT: 69 IN | BODY MASS INDEX: 35.23 KG/M2 | WEIGHT: 237.88 LBS

## 2025-08-01 DIAGNOSIS — M25.562 ACUTE PAIN OF LEFT KNEE: Primary | ICD-10-CM

## 2025-08-01 DIAGNOSIS — M25.562 CHRONIC PAIN OF LEFT KNEE: ICD-10-CM

## 2025-08-01 DIAGNOSIS — G89.29 CHRONIC PAIN OF LEFT KNEE: ICD-10-CM

## 2025-08-01 DIAGNOSIS — I83.892 VARICOSE VEINS OF LEFT LEG WITH EDEMA: Primary | ICD-10-CM

## 2025-08-01 PROCEDURE — 99999 PR PBB SHADOW E&M-EST. PATIENT-LVL III: CPT | Mod: PBBFAC,HCNC,, | Performed by: ORTHOPAEDIC SURGERY

## 2025-08-01 PROCEDURE — 73562 X-RAY EXAM OF KNEE 3: CPT | Mod: 26,HCNC,LT, | Performed by: RADIOLOGY

## 2025-08-01 PROCEDURE — 73560 X-RAY EXAM OF KNEE 1 OR 2: CPT | Mod: TC,HCNC,PO,RT

## 2025-08-01 PROCEDURE — 73560 X-RAY EXAM OF KNEE 1 OR 2: CPT | Mod: 26,HCNC,RT, | Performed by: RADIOLOGY

## 2025-08-01 NOTE — TELEPHONE ENCOUNTER
Only vascular doctors we have on [place] is Dr. Vora who deals with veins.  If you would like to continue with [facility] providers, he would have to go to the [facility].  Does he have a preference?

## 2025-08-01 NOTE — PROGRESS NOTES
80 years old has had pain and swelling in his left knee and left lower extremity for couple weeks time.  Does not recall any trauma.  He is several years out from knee replacement surgery by myself    Past history coronary artery bypass graft surgery with vein harvest from left lower extremity    Exam shows peripheral edema, does not appear to be infected    X-rays show well placed components    Ultrasound negative for DVT     Assessment:  Left lower extremity swelling    Continue with compression stockings, knee brace for comfort, continue with the elevation, follow up in several weeks time

## 2025-08-04 ENCOUNTER — PATIENT MESSAGE (OUTPATIENT)
Dept: FAMILY MEDICINE | Facility: CLINIC | Age: 80
End: 2025-08-04
Payer: MEDICARE

## 2025-08-05 ENCOUNTER — CLINICAL SUPPORT (OUTPATIENT)
Dept: DERMATOLOGY | Facility: CLINIC | Age: 80
End: 2025-08-05
Payer: MEDICARE

## 2025-08-05 DIAGNOSIS — Z48.02 VISIT FOR SUTURE REMOVAL: Primary | ICD-10-CM

## 2025-08-05 DIAGNOSIS — F11.20 UNCOMPLICATED OPIOID DEPENDENCE: ICD-10-CM

## 2025-08-05 RX ORDER — OXYCODONE AND ACETAMINOPHEN 10; 325 MG/1; MG/1
1 TABLET ORAL EVERY 6 HOURS PRN
Qty: 120 TABLET | Refills: 0 | Status: SHIPPED | OUTPATIENT
Start: 2025-08-06 | End: 2025-09-05

## 2025-08-05 NOTE — PROGRESS NOTES
Suture Removal Note   Patient Name: Sam Pete  Patient : 1945  Date of Service: 2025    CC: Pt. Presents for removal of sutures on the Right parietal scalp today  Subjective: patient reports wound healing as expected     Objective: Wound well healed, sutures clean/dry/intact. No evidence of any complications noted.     Photo:            Visit For Suture Removal:  - Suture removal today  - Steri strips/mastisol were not applied  - Patient counseled on silicone gel/silicone sheeting and their use in the management of post-operative scars  - Handout on silicone gel/silicone gel sheeting was provided  - Patient to follow up with their referring provider in 3 months for further skin evaluation    Nadine Paige

## 2025-08-06 ENCOUNTER — HOSPITAL ENCOUNTER (OUTPATIENT)
Dept: CARDIOLOGY | Facility: HOSPITAL | Age: 80
Discharge: HOME | End: 2025-08-06
Attending: INTERNAL MEDICINE
Payer: MEDICARE

## 2025-08-06 VITALS — WEIGHT: 237 LBS | HEART RATE: 74 BPM | BODY MASS INDEX: 35.1 KG/M2 | HEIGHT: 69 IN

## 2025-08-06 DIAGNOSIS — I25.10 CORONARY ARTERY DISEASE INVOLVING NATIVE CORONARY ARTERY OF NATIVE HEART WITHOUT ANGINA PECTORIS: Chronic | ICD-10-CM

## 2025-08-06 DIAGNOSIS — I27.20 MILD PULMONARY HYPERTENSION: Chronic | ICD-10-CM

## 2025-08-06 PROCEDURE — 93306 TTE W/DOPPLER COMPLETE: CPT | Mod: 26,HCNC,, | Performed by: INTERNAL MEDICINE

## 2025-08-06 PROCEDURE — 93306 TTE W/DOPPLER COMPLETE: CPT | Mod: HCNC,PO

## 2025-08-07 LAB
AORTIC SIZE INDEX (SOV): 1.5 CM/M2
AORTIC SIZE INDEX: 1.3 CM/M2
ASCENDING AORTA: 2.9 CM
AV INDEX (PROSTH): 0.78
AV MEAN GRADIENT: 3 MMHG
AV PEAK GRADIENT: 5 MMHG
AV VALVE AREA BY VELOCITY RATIO: 3.1 CM²
AV VALVE AREA: 2.4 CM²
AV VELOCITY RATIO: 1
BSA FOR ECHO PROCEDURE: 2.29 M2
CV ECHO LV RWT: 0.46 CM
DOP CALC AO PEAK VEL: 1.1 M/S
DOP CALC AO VTI: 23.6 CM
DOP CALC LVOT AREA: 3.1 CM2
DOP CALC LVOT DIAMETER: 2 CM
DOP CALC LVOT PEAK VEL: 1.1 M/S
DOP CALCLVOT PEAK VEL VTI: 18.3 CM
E WAVE DECELERATION TIME: 264 MSEC
E/A RATIO: 1.81
E/E' RATIO: 10 M/S
ECHO LV POSTERIOR WALL: 1.1 CM (ref 0.6–1.1)
FRACTIONAL SHORTENING: 45.8 % (ref 28–44)
INTERVENTRICULAR SEPTUM: 1.1 CM (ref 0.6–1.1)
IVRT: 97 MSEC
LEFT ATRIUM AREA SYSTOLIC (APICAL 2 CHAMBER): 26.29 CM2
LEFT ATRIUM AREA SYSTOLIC (APICAL 4 CHAMBER): 25.18 CM2
LEFT ATRIUM SIZE: 4.1 CM
LEFT ATRIUM VOLUME INDEX MOD: 39 ML/M2
LEFT ATRIUM VOLUME MOD: 87 ML
LEFT INTERNAL DIMENSION IN SYSTOLE: 2.6 CM (ref 2.1–4)
LEFT VENTRICLE DIASTOLIC VOLUME INDEX: 47.3 ML/M2
LEFT VENTRICLE DIASTOLIC VOLUME: 105 ML
LEFT VENTRICLE END SYSTOLIC VOLUME APICAL 2 CHAMBER: 95.28 ML
LEFT VENTRICLE END SYSTOLIC VOLUME APICAL 4 CHAMBER: 80.94 ML
LEFT VENTRICLE MASS INDEX: 87.4 G/M2
LEFT VENTRICLE SYSTOLIC VOLUME INDEX: 11.3 ML/M2
LEFT VENTRICLE SYSTOLIC VOLUME: 25 ML
LEFT VENTRICULAR INTERNAL DIMENSION IN DIASTOLE: 4.8 CM (ref 3.5–6)
LEFT VENTRICULAR MASS: 194 G
LV LATERAL E/E' RATIO: 8.7 M/S
LV SEPTAL E/E' RATIO: 12.4 M/S
LVED V (TEICH): 105.37 ML
LVES V (TEICH): 25.2 ML
LVOT MG: 2.2 MMHG
LVOT MV: 0.67 CM/S
Lab: 1.7 CM/M
Lab: 1.9 CM/M
MV PEAK A VEL: 0.48 M/S
MV PEAK E VEL: 0.87 M/S
MV STENOSIS PRESSURE HALF TIME: 76.59 MS
MV VALVE AREA P 1/2 METHOD: 2.87 CM2
OHS CV CPX PATIENT HEIGHT IN: 69
OHS CV RV/LV RATIO: 0.85 CM
PISA MRMAX VEL: 5.23 M/S
PISA TR MAX VEL: 2.5 M/S
PULM VEIN S/D RATIO: 0.6
PV PEAK D VEL: 0.57 M/S
PV PEAK S VEL: 0.34 M/S
RA PRESSURE ESTIMATED: 3 MMHG
RA VOL SYS: 35.95 ML
RIGHT ATRIAL AREA: 16.7 CM2
RIGHT ATRIUM END SYSTOLIC VOLUME APICAL 4 CHAMBER INDEX BSA: 15.24 ML/M2
RIGHT ATRIUM VOLUME AREA LENGTH APICAL 4 CHAMBER: 33.84 ML
RIGHT VENTRICLE DIASTOLIC BASEL DIMENSION: 4.1 CM
RIGHT VENTRICLE DIASTOLIC LENGTH: 4.9 CM
RIGHT VENTRICLE DIASTOLIC MID DIMENSION: 4 CM
RIGHT VENTRICULAR END-DIASTOLIC DIMENSION: 4.06 CM
RIGHT VENTRICULAR LENGTH IN DIASTOLE (APICAL 4-CHAMBER VIEW): 4.85 CM
RV MID DIAMA: 4.03 CM
RV TB RVSP: 6 MMHG
RV TISSUE DOPPLER FREE WALL SYSTOLIC VELOCITY 1 (APICAL 4 CHAMBER VIEW): 11.44 CM/S
SINUS: 3.4 CM
STJ: 2.8 CM
TDI LATERAL: 0.1 M/S
TDI SEPTAL: 0.07 M/S
TDI: 0.09 M/S
TR MAX PG: 24 MMHG
TRICUSPID ANNULAR PLANE SYSTOLIC EXCURSION: 1.8 CM
TV REST PULMONARY ARTERY PRESSURE: 28 MMHG
Z-SCORE OF LEFT VENTRICULAR DIMENSION IN END DIASTOLE: -4.8
Z-SCORE OF LEFT VENTRICULAR DIMENSION IN END SYSTOLE: -4.69

## 2025-08-12 ENCOUNTER — OFFICE VISIT (OUTPATIENT)
Dept: CARDIOLOGY | Facility: CLINIC | Age: 80
End: 2025-08-12
Payer: MEDICARE

## 2025-08-12 ENCOUNTER — HOSPITAL ENCOUNTER (OUTPATIENT)
Dept: CARDIOLOGY | Facility: HOSPITAL | Age: 80
Discharge: HOME | End: 2025-08-12
Payer: MEDICARE

## 2025-08-12 VITALS
HEIGHT: 69 IN | DIASTOLIC BLOOD PRESSURE: 86 MMHG | WEIGHT: 233 LBS | SYSTOLIC BLOOD PRESSURE: 138 MMHG | BODY MASS INDEX: 34.51 KG/M2 | HEART RATE: 81 BPM

## 2025-08-12 DIAGNOSIS — E66.811 OBESITY, CLASS I, BMI 30-34.9: Chronic | ICD-10-CM

## 2025-08-12 DIAGNOSIS — I10 ESSENTIAL (PRIMARY) HYPERTENSION: Primary | Chronic | ICD-10-CM

## 2025-08-12 DIAGNOSIS — Z79.01 LONG TERM (CURRENT) USE OF ANTICOAGULANTS: Chronic | ICD-10-CM

## 2025-08-12 DIAGNOSIS — I70.0 ATHEROSCLEROSIS OF AORTA: Chronic | ICD-10-CM

## 2025-08-12 DIAGNOSIS — I51.7 LAE (LEFT ATRIAL ENLARGEMENT): ICD-10-CM

## 2025-08-12 DIAGNOSIS — I48.0 PAF (PAROXYSMAL ATRIAL FIBRILLATION): Chronic | ICD-10-CM

## 2025-08-12 DIAGNOSIS — I83.892 VARICOSE VEINS OF LEFT LEG WITH EDEMA: ICD-10-CM

## 2025-08-12 DIAGNOSIS — I27.20 MILD PULMONARY HYPERTENSION: Chronic | ICD-10-CM

## 2025-08-12 DIAGNOSIS — I25.10 CORONARY ARTERY DISEASE INVOLVING NATIVE CORONARY ARTERY OF NATIVE HEART WITHOUT ANGINA PECTORIS: Chronic | ICD-10-CM

## 2025-08-12 LAB
LEFT GIAC DIA: 0.24 MM
LEFT GREAT SAPHENOUS DISTAL THIGH DIA: 0.28 CM
LEFT GREAT SAPHENOUS DISTAL THIGH REFLUX: 2900 MS
LEFT GREAT SAPHENOUS JUNCTION DIA: 0.69 CM
LEFT GREAT SAPHENOUS KNEE DIA: 0.31 CM
LEFT GREAT SAPHENOUS PROXIMAL CALF DIA: 0.32 CM
LEFT SMALL SAPHENOUS KNEE DIA: 0.25 CM
LEFT SMALL SAPHENOUS KNEE REFLUX: 2775 MS
LEFT SMALL SAPHENOUS SPJ DIA: 0.23 CM
OHS CV CPX PATIENT HEIGHT IN: 69
RIGHT GREAT SAPHENOUS DISTAL THIGH DIA: 0.38 CM
RIGHT GREAT SAPHENOUS JUNCTION DIA: 0.74 CM
RIGHT GREAT SAPHENOUS KNEE DIA: 0.41 CM
RIGHT GREAT SAPHENOUS KNEE REFLUX: 2221 MS
RIGHT GREAT SAPHENOUS MIDDLE THIGH DIA: 0.4 CM
RIGHT GREAT SAPHENOUS PROXIMAL CALF DIA: 0.32 CM
RIGHT SMALL SAPHENOUS KNEE DIA: 0.26 CM
RIGHT SMALL SAPHENOUS SPJ DIA: 0.27 CM

## 2025-08-12 PROCEDURE — 93970 EXTREMITY STUDY: CPT | Mod: 26,HCNC,, | Performed by: INTERNAL MEDICINE

## 2025-08-12 PROCEDURE — 99214 OFFICE O/P EST MOD 30 MIN: CPT | Mod: HCNC,S$GLB,, | Performed by: INTERNAL MEDICINE

## 2025-08-12 PROCEDURE — 99999 PR PBB SHADOW E&M-EST. PATIENT-LVL IV: CPT | Mod: PBBFAC,HCNC,, | Performed by: INTERNAL MEDICINE

## 2025-08-12 PROCEDURE — 3288F FALL RISK ASSESSMENT DOCD: CPT | Mod: CPTII,HCNC,S$GLB, | Performed by: INTERNAL MEDICINE

## 2025-08-12 PROCEDURE — 1125F AMNT PAIN NOTED PAIN PRSNT: CPT | Mod: CPTII,HCNC,S$GLB, | Performed by: INTERNAL MEDICINE

## 2025-08-12 PROCEDURE — 1101F PT FALLS ASSESS-DOCD LE1/YR: CPT | Mod: CPTII,HCNC,S$GLB, | Performed by: INTERNAL MEDICINE

## 2025-08-12 PROCEDURE — 93970 EXTREMITY STUDY: CPT | Mod: TC,HCNC,PO

## 2025-08-12 PROCEDURE — 1159F MED LIST DOCD IN RCRD: CPT | Mod: CPTII,HCNC,S$GLB, | Performed by: INTERNAL MEDICINE

## 2025-08-12 PROCEDURE — 3075F SYST BP GE 130 - 139MM HG: CPT | Mod: CPTII,HCNC,S$GLB, | Performed by: INTERNAL MEDICINE

## 2025-08-12 PROCEDURE — 3079F DIAST BP 80-89 MM HG: CPT | Mod: CPTII,HCNC,S$GLB, | Performed by: INTERNAL MEDICINE

## 2025-08-12 RX ORDER — LOSARTAN POTASSIUM 50 MG/1
75 TABLET ORAL DAILY
Qty: 135 TABLET | Refills: 1 | Status: SHIPPED | OUTPATIENT
Start: 2025-08-12 | End: 2026-08-12

## 2025-08-18 ENCOUNTER — PATIENT MESSAGE (OUTPATIENT)
Dept: FAMILY MEDICINE | Facility: CLINIC | Age: 80
End: 2025-08-18
Payer: MEDICARE

## 2025-08-18 DIAGNOSIS — M25.562 ACUTE PAIN OF LEFT KNEE: ICD-10-CM

## 2025-08-18 DIAGNOSIS — G89.4 CHRONIC PAIN SYNDROME: ICD-10-CM

## 2025-08-18 RX ORDER — CELECOXIB 200 MG/1
CAPSULE ORAL
Qty: 60 CAPSULE | Refills: 0 | Status: SHIPPED | OUTPATIENT
Start: 2025-08-18

## 2025-08-19 RX ORDER — MORPHINE SULFATE 15 MG/1
15 TABLET, FILM COATED, EXTENDED RELEASE ORAL 2 TIMES DAILY
Qty: 60 TABLET | Refills: 0 | Status: SHIPPED | OUTPATIENT
Start: 2025-08-19 | End: 2025-09-18

## 2025-08-20 DIAGNOSIS — R60.9 DEPENDENT EDEMA: ICD-10-CM

## 2025-08-21 ENCOUNTER — OFFICE VISIT (OUTPATIENT)
Dept: DERMATOLOGY | Facility: CLINIC | Age: 80
End: 2025-08-21
Payer: MEDICARE

## 2025-08-21 DIAGNOSIS — D09.9 SQUAMOUS CELL CARCINOMA IN SITU: Primary | ICD-10-CM

## 2025-08-21 PROCEDURE — 99499 UNLISTED E&M SERVICE: CPT | Mod: S$GLB,,, | Performed by: STUDENT IN AN ORGANIZED HEALTH CARE EDUCATION/TRAINING PROGRAM

## 2025-08-21 PROCEDURE — 17262 DSTRJ MAL LES T/A/L 1.1-2.0: CPT | Mod: S$GLB,,, | Performed by: STUDENT IN AN ORGANIZED HEALTH CARE EDUCATION/TRAINING PROGRAM

## 2025-08-21 RX ORDER — FUROSEMIDE 40 MG/1
40 TABLET ORAL DAILY
Qty: 90 TABLET | Refills: 1 | Status: SHIPPED | OUTPATIENT
Start: 2025-08-21 | End: 2026-08-21

## 2025-09-04 DIAGNOSIS — F11.20 UNCOMPLICATED OPIOID DEPENDENCE: ICD-10-CM

## 2025-09-05 RX ORDER — OXYCODONE AND ACETAMINOPHEN 10; 325 MG/1; MG/1
1 TABLET ORAL EVERY 6 HOURS PRN
Qty: 120 TABLET | Refills: 0 | Status: SHIPPED | OUTPATIENT
Start: 2025-09-05 | End: 2025-10-05

## (undated) DEVICE — SUT 1 36IN COATED VICRYL UN

## (undated) DEVICE — SEE MEDLINE ITEM 157131

## (undated) DEVICE — SUT MCRYL PLUS 4-0 PS2 27IN

## (undated) DEVICE — TOWEL OR DISP STRL BLUE 4/PK

## (undated) DEVICE — SYR DISP LL 5CC

## (undated) DEVICE — DRAPE STERI-DRAPE 1000 17X11IN

## (undated) DEVICE — DRAPE XRAY EQUIPMENT UNIV

## (undated) DEVICE — NDL HYPODERMIC BLUNT 18G 1.5IN

## (undated) DEVICE — ELECTRODE NEEDLE 2.8IN

## (undated) DEVICE — Device

## (undated) DEVICE — BLADE SURG #15 CARBON STEEL

## (undated) DEVICE — KIT FIBRIN SEALANT EVICEL 5 ML

## (undated) DEVICE — NDL TUOHY EPIDURAL 20G X 3.5

## (undated) DEVICE — UNDERGLOVES BIOGEL PI SZ 7 LF

## (undated) DEVICE — SEE MEDLINE ITEM 157161

## (undated) DEVICE — SYR GLASS 5CC LUER LOK

## (undated) DEVICE — SYS LABEL CORRECT MED

## (undated) DEVICE — NDL ECLIPSE SAF REG 25GX1.5IN

## (undated) DEVICE — SCALPEL #11 BLADE STRL DISP

## (undated) DEVICE — TUBING MINIBORE EXTENSION

## (undated) DEVICE — REMOVER LOTION

## (undated) DEVICE — TRAY NERVE BLOCK

## (undated) DEVICE — SENZA CHARGER KIT

## (undated) DEVICE — ELECTRODE BLADE TEFLON 6

## (undated) DEVICE — DRESSING TRANS 4X4 TEGADERM

## (undated) DEVICE — STAPLER SKIN PROXIMATE WIDE

## (undated) DEVICE — SLING ORTHOPEDIC LARGE

## (undated) DEVICE — GLOVE 7.5 PROTEXIS PI MICRO

## (undated) DEVICE — SPONGE DERMACEA 4X4IN 12PLY

## (undated) DEVICE — GLOVE SURG ULTRA TOUCH 7.5

## (undated) DEVICE — SUT FIBERWIRE

## (undated) DEVICE — KIT TEMPLATE IPG

## (undated) DEVICE — ELECTRODE BLADE INSULATED 1 IN

## (undated) DEVICE — UNDERGLOVES BIOGEL PI SIZE 8

## (undated) DEVICE — SENZA OMNIA PATIENT REMOTE KIT

## (undated) DEVICE — GLOVE BIOGEL SKINSENSE PI 6.5

## (undated) DEVICE — GLOVE SENSICARE PI GRN 7.5

## (undated) DEVICE — NDL SPINAL SPINOCAN 22GX3.5

## (undated) DEVICE — SEE MEDLINE ITEM 154981

## (undated) DEVICE — KIT TUNNELING TOOL 35CM

## (undated) DEVICE — BNDG COFLEX FOAM LF2 ST 6X5YD

## (undated) DEVICE — DRESSING XEROFORM FOIL PK 1X8

## (undated) DEVICE — NDL EPIDURAL 17GAX5IN

## (undated) DEVICE — MARKER SKIN RULER STERILE

## (undated) DEVICE — ELECTRODE REM PLYHSV RETURN 9

## (undated) DEVICE — NDL SAFETY 25G X 1.5 ECLIPSE

## (undated) DEVICE — APPLICATOR CHLORAPREP CLR 10.5

## (undated) DEVICE — SEE MEDLINE ITEM 157216

## (undated) DEVICE — BLADE SAW SAGITTAL18X1.19X90MM

## (undated) DEVICE — KIT SURGIFLO HEMOSTATIC MATRIX

## (undated) DEVICE — NDL HYPO SAFETY 25GX1.5IN

## (undated) DEVICE — SOL 9P NACL IRR PIC IL

## (undated) DEVICE — SUT BLU BR 2 TAPERD NDL 1/2

## (undated) DEVICE — DURAPREP SURG SCRUB 26ML

## (undated) DEVICE — PAD K-THERMIA 24IN X 60IN

## (undated) DEVICE — SKINMARKER & RULER REGULAR X-F

## (undated) DEVICE — NDL HYPO A BEVEL 22X1 1/2

## (undated) DEVICE — DRAPE STERI INSTRUMENT 1018

## (undated) DEVICE — SUT PROLENE 3-0 PS-1 BL 18

## (undated) DEVICE — BLADE SURG CARBON STEEL #10

## (undated) DEVICE — RETRIEVER SUTURE HEWSON DISP

## (undated) DEVICE — SUT VICRYL PLUS 0 CT1 18IN

## (undated) DEVICE — CHLORAPREP W TINT 26ML APPL

## (undated) DEVICE — SEE MEDLINE ITEM 157150

## (undated) DEVICE — TUBING SUC UNIV W/CONN 12FT

## (undated) DEVICE — SUT 2-0 VICRYL / SH (J417)

## (undated) DEVICE — YANKAUER OPEN TIP W/O VENT

## (undated) DEVICE — SUTURE VICRYL PLUS 3-0 PS1 27

## (undated) DEVICE — BUR BONE CUT MICRO TPS 3X3.8MM

## (undated) DEVICE — KIT INSERTION NEEDLE 6

## (undated) DEVICE — CLOSURE SKIN STERI STRIP 1/2X4

## (undated) DEVICE — BLANKET LOWER BODY 55.9X40.2IN

## (undated) DEVICE — SKIN MARKER STER DUAL TIP

## (undated) DEVICE — NDL SPINAL 18GX3.5 SPINOCAN

## (undated) DEVICE — GLOVE PROTEXIS HYDROGEL SZ7.5

## (undated) DEVICE — DRAPE OPMI STERILE

## (undated) DEVICE — DRAPE C ARM 42 X 120 10/BX

## (undated) DEVICE — DRAPE STERI U-SHAPED 47X51IN

## (undated) DEVICE — SUT VICRYL PLUS 3-0 SH 18IN

## (undated) DEVICE — PILLOW FACE ADLT FOAM W/VELCRO

## (undated) DEVICE — SEALANT EVICEL FIBRIN HUMN 2ML

## (undated) DEVICE — SEE MEDLINE ITEM 157148

## (undated) DEVICE — NEPTUNE 4 PORT MANIFOLD

## (undated) DEVICE — GAUZE SPONGE 4X4 12PLY

## (undated) DEVICE — CARTRIDGE OIL

## (undated) DEVICE — SEE L#120831

## (undated) DEVICE — BLADE ELECTRO EDGE INSULATED

## (undated) DEVICE — ADHESIVE MASTISOL VIAL 48/BX

## (undated) DEVICE — DIFFUSER

## (undated) DEVICE — NDL BLUNT W/O FILTER 18GX1.5IN

## (undated) DEVICE — SUT SILK 0 SH 30IN BLK BR

## (undated) DEVICE — PENCIL ROCKER SWITCH 10FT CORD

## (undated) DEVICE — GLOVE SENSICARE PI ALOE 7.5

## (undated) DEVICE — DRAPE INCISE IOBAN 2 23X17IN

## (undated) DEVICE — SUT 2/0 30IN SILK BLK BRAI

## (undated) DEVICE — SEE MEDLINE ITEM 157117

## (undated) DEVICE — APPLICATOR CHLORAPREP ORN 26ML

## (undated) DEVICE — DRAPE ABDOMINAL TIBURON 14X11